# Patient Record
Sex: FEMALE | Race: WHITE | Employment: OTHER | URBAN - METROPOLITAN AREA
[De-identification: names, ages, dates, MRNs, and addresses within clinical notes are randomized per-mention and may not be internally consistent; named-entity substitution may affect disease eponyms.]

---

## 2017-02-07 ENCOUNTER — PATIENT OUTREACH (OUTPATIENT)
Dept: CASE MANAGEMENT | Age: 67
End: 2017-02-07

## 2017-02-07 ENCOUNTER — HOSPITAL ENCOUNTER (INPATIENT)
Age: 67
LOS: 1 days | Discharge: HOME | DRG: 682 | End: 2017-02-09
Attending: EMERGENCY MEDICINE | Admitting: INTERNAL MEDICINE
Payer: MEDICARE

## 2017-02-07 ENCOUNTER — APPOINTMENT (OUTPATIENT)
Dept: GENERAL RADIOLOGY | Age: 67
DRG: 682 | End: 2017-02-07
Attending: EMERGENCY MEDICINE
Payer: MEDICARE

## 2017-02-07 ENCOUNTER — APPOINTMENT (OUTPATIENT)
Dept: CT IMAGING | Age: 67
DRG: 682 | End: 2017-02-07
Attending: EMERGENCY MEDICINE
Payer: MEDICARE

## 2017-02-07 DIAGNOSIS — G93.40 ACUTE ENCEPHALOPATHY: Primary | ICD-10-CM

## 2017-02-07 LAB
BASOPHILS # BLD AUTO: 0 K/UL (ref 0–0.2)
BASOPHILS # BLD: 0 % (ref 0–2)
DIFFERENTIAL METHOD BLD: ABNORMAL
EOSINOPHIL # BLD: 0 K/UL (ref 0–0.8)
EOSINOPHIL NFR BLD: 0 % (ref 0.5–7.8)
ERYTHROCYTE [DISTWIDTH] IN BLOOD BY AUTOMATED COUNT: 15.5 % (ref 11.9–14.6)
HCT VFR BLD AUTO: 35 % (ref 35.8–46.3)
HGB BLD-MCNC: 10.8 G/DL (ref 11.7–15.4)
IMM GRANULOCYTES # BLD: 0 K/UL (ref 0–0.5)
IMM GRANULOCYTES NFR BLD AUTO: 0.2 % (ref 0–5)
INR PPP: 1.1 (ref 0.9–1.2)
LACTATE BLD-SCNC: 1.8 MMOL/L (ref 0.5–1.9)
LYMPHOCYTES # BLD AUTO: 28 % (ref 13–44)
LYMPHOCYTES # BLD: 3 K/UL (ref 0.5–4.6)
MCH RBC QN AUTO: 25.7 PG (ref 26.1–32.9)
MCHC RBC AUTO-ENTMCNC: 30.9 G/DL (ref 31.4–35)
MCV RBC AUTO: 83.1 FL (ref 79.6–97.8)
MONOCYTES # BLD: 0.4 K/UL (ref 0.1–1.3)
MONOCYTES NFR BLD AUTO: 4 % (ref 4–12)
NEUTS SEG # BLD: 7.1 K/UL (ref 1.7–8.2)
NEUTS SEG NFR BLD AUTO: 68 % (ref 43–78)
PLATELET # BLD AUTO: 333 K/UL (ref 150–450)
PMV BLD AUTO: 9.4 FL (ref 10.8–14.1)
PROTHROMBIN TIME: 11.7 SEC (ref 9.6–12)
RBC # BLD AUTO: 4.21 M/UL (ref 4.05–5.25)
WBC # BLD AUTO: 10.6 K/UL (ref 4.3–11.1)

## 2017-02-07 PROCEDURE — 80053 COMPREHEN METABOLIC PANEL: CPT | Performed by: EMERGENCY MEDICINE

## 2017-02-07 PROCEDURE — 96361 HYDRATE IV INFUSION ADD-ON: CPT | Performed by: EMERGENCY MEDICINE

## 2017-02-07 PROCEDURE — 84484 ASSAY OF TROPONIN QUANT: CPT | Performed by: EMERGENCY MEDICINE

## 2017-02-07 PROCEDURE — 71020 XR CHEST PA LAT: CPT

## 2017-02-07 PROCEDURE — 80307 DRUG TEST PRSMV CHEM ANLYZR: CPT | Performed by: EMERGENCY MEDICINE

## 2017-02-07 PROCEDURE — 83605 ASSAY OF LACTIC ACID: CPT

## 2017-02-07 PROCEDURE — 99285 EMERGENCY DEPT VISIT HI MDM: CPT | Performed by: EMERGENCY MEDICINE

## 2017-02-07 PROCEDURE — 96374 THER/PROPH/DIAG INJ IV PUSH: CPT | Performed by: EMERGENCY MEDICINE

## 2017-02-07 PROCEDURE — 85025 COMPLETE CBC W/AUTO DIFF WBC: CPT | Performed by: EMERGENCY MEDICINE

## 2017-02-07 PROCEDURE — 85610 PROTHROMBIN TIME: CPT | Performed by: EMERGENCY MEDICINE

## 2017-02-07 PROCEDURE — 70450 CT HEAD/BRAIN W/O DYE: CPT

## 2017-02-07 RX ORDER — IPRATROPIUM BROMIDE 21 UG/1
2 SPRAY, METERED NASAL
COMMUNITY
End: 2017-02-16

## 2017-02-07 RX ORDER — LORAZEPAM 1 MG/1
1 TABLET ORAL
Status: ON HOLD | COMMUNITY
End: 2017-02-08

## 2017-02-07 RX ORDER — ACYCLOVIR 200 MG/1
200 CAPSULE ORAL 2 TIMES DAILY
COMMUNITY
End: 2017-02-09

## 2017-02-07 RX ORDER — LISINOPRIL 10 MG/1
10 TABLET ORAL
COMMUNITY
End: 2017-02-09

## 2017-02-07 RX ORDER — ZINC GLUCONATE 50 MG
500 TABLET ORAL
COMMUNITY

## 2017-02-07 RX ORDER — FISH OIL/DHA/EPA 1200-144MG
CAPSULE ORAL
COMMUNITY
End: 2017-02-09

## 2017-02-07 RX ORDER — PREGABALIN 75 MG/1
75 CAPSULE ORAL 2 TIMES DAILY
Status: ON HOLD | COMMUNITY
End: 2017-02-09

## 2017-02-07 NOTE — IP AVS SNAPSHOT
[facility] [place] 
 
 
 [address] 
[phone] Patient: Yaritza Montanez MRN: [id number] You are allergic to the following Allergen Reactions Latex Contact Dermatitis Bactrim (Sulfamethoxazole-Trimethoprim) Nausea Only Lamictal (Lamotrigine) Atopic Dermatitis Pcn (Penicillins) Rash Tapentadol Rash Recent Documentation Height Weight BMI OB Status Smoking Status 1.6 m 90.7 kg 35.43 kg/m2 Hysterectomy Never Smoker Unresulted Labs Order Current Status CULTURE, BLOOD Preliminary result CULTURE, BLOOD Preliminary result CULTURE, URINE Preliminary result Emergency Contacts Name Discharge Info Relation Home Work Mobile Alee Loja  Daughter [49] [phone] hSazia Prieto  Spouse [3] [phone] About your hospitalization You were admitted on:  February 8, 2017 You last received care in the:  [facility] 6 MED SURG You were discharged on:  February 9, 2017 Unit phone number:  [phone] Why you were hospitalized Your primary diagnosis was:  Jer[place] (Acute Kidney Injury) (Hcc) Your diagnoses also included:  Speech Abnormality, Hypothyroidism, Hypertension, Polypharmacy, Hyperkalemia Providers Seen During Your Hospitalizations Provider Role Specialty Primary office phone Jose David Davalos MD Attending Provider Emergency Medicine [phone] Yoav Araujo DO Attending Provider Internal Medicine [phone] Makeda Becerra MD Attending Provider [facility] [phone] Georges Marcelino MD Attending Provider Internal Medicine [phone] Your Primary Care Physician (PCP) Primary Care Physician Office Phone Office Fax Deyvi Anand [phone] Follow-up Information Follow up With Details Comments Contact Info Sukhjinder Mohr, DO On 2/17/2017 at 10:45 [address] [facility] [address] 
[phone] Your Appointments Thursday February 16, 2017 11:00 AM EST  
(Arrive by 9:45 AM) [facility] with [facility]) [address] [facility]  
[phone] Current Discharge Medication List  
  
CONTINUE these medications which have CHANGED Dose & Instructions Dispensing Information Comments Morning Noon Evening Bedtime ALPRAZolam 2 mg tablet Commonly known as:  Balbir Pena What changed:  when to take this Dose:  2 mg Take 1 Tab by mouth three (3) times daily as needed for Anxiety. Max Daily Amount: 6 mg. Quantity:  30 Tab Refills:  0  
     
   
   
   
  
 amphetamine-dextroamphetamine XR 20 mg XR capsule Commonly known as:  ADDERALL XR What changed:  additional instructions Your next dose is:  Tomorrow Dose:  20 mg Take 1 Cap (20 mg total) by mouth every morning. Max Daily Amount: 20 mg  
 Quantity:  30 Cap Refills:  0  
     
   
   
   
  
 lisinopril 40 mg tablet Commonly known as:  Ryder Parham What changed:  Another medication with the same name was removed. Continue taking this medication, and follow the directions you see here. Your next dose is:  Tomorrow Dose:  40 mg Take 1 Tab by mouth daily. Quantity:  30 Tab Refills:  5  
     
   
   
   
  
 pregabalin 75 mg capsule Commonly known as:  Maxim Fraire What changed:  when to take this Your next dose is:  Tomorrow Dose:  75 mg Take 1 Cap by mouth daily for 30 days. Max Daily Amount: 75 mg. Quantity:  30 Cap Refills:  0 CONTINUE these medications which have NOT CHANGED Dose & Instructions Dispensing Information Comments Morning Noon Evening Bedtime * albuterol 90 mcg/actuation inhaler Commonly known as:  PROVENTIL HFA, VENTOLIN HFA, PROAIR HFA Dose:  2 Puff Take 2 Puffs by inhalation every four (4) hours as needed. Refills:  0  
     
   
   
   
  
 * albuterol sulfate 2.5 mg/0.5 mL Nebu nebulizer solution Commonly known as:  PROVENTIL;VENTOLIN Dose:  2.5 mg  
0.5 mL by Nebulization route four (4) times daily. Quantity:  120 mL Refills:  11 DX: 799.02 hypoxemia, 518.83 chronic respitory failure  
    
   
   
   
  
 biotin 10,000 mcg Cap Your next dose is:  Tomorrow Dose:  5000 mcg Take 5,000 mcg by mouth daily. Refills:  0  
     
   
   
   
  
 CARAFATE 1 gram tablet Generic drug:  sucralfate Your next dose is: Today Dose:  1 g Take 1 g by mouth two (2) times a day. Refills:  0 CENTRUM SILVER Tab tablet Generic drug:  multivitamins-minerals-lutein Your next dose is:  Tomorrow Dose:  1 Tab Take 1 Tab by mouth daily. Refills:  0  
     
   
   
   
  
 COQ10 (UBIQUINOL) PO Your next dose is: Today Dose:  200 mg Take 200 mg by mouth two (2) times a day. 200 mg 1 tab po qd Refills:  0 DULERA 200-5 mcg/actuation HFA inhaler Generic drug:  mometasone-formoterol Your next dose is: Today USE 2 INHALATIONS TWICE A DAY Quantity:  1 Inhaler Refills:  11  
     
   
   
  
   
  
 folic acid 328 mcg tablet Your next dose is:  Tomorrow Dose:  800 mcg Take 800 mcg by mouth daily. Refills:  0 GENTLE LAXATIVE PO Your next dose is:  Tomorrow Dose:  2 Tab Take 2 Tabs by mouth daily. Refills:  0  
     
   
   
   
  
 GEODON 80 mg capsule Generic drug:  ziprasidone Your next dose is: Today Dose:  160 mg Take 160 mg by mouth nightly. Refills:  0 IMITREX PO Dose:  100 mg  
take 100 mg by mouth as needed. Refills:  0 ipratropium 0.03 % nasal spray Commonly known as:  ATROVENT Dose:  2 Spray 2 Sprays by Both Nostrils route. Refills:  0 LIPITOR 10 mg tablet Generic drug:  atorvastatin Your next dose is:  Tomorrow Take  by mouth daily. Refills:  0 Magnesium Oxide 500 mg Cap Your next dose is:  Tomorrow Dose:  500 mg Take 500 mg by mouth. Refills:  0  
     
   
   
   
  
 mirtazapine 30 mg tablet Commonly known as:  Consuella Cullens Dose:  30 mg Take 30 mg by mouth nightly. Refills:  0 MOVANTIK 25 mg Tab tablet Generic drug:  naloxegol Your next dose is:  Tomorrow Dose:  25 mg Take 25 mg by mouth Daily (before breakfast). Refills:  0 NEBULIZER  
   
 by Does Not Apply route. Refills:  0  
     
   
   
   
  
 nicotinic acid 500 mg tablet Commonly known as:  NIACIN Your next dose is:  Tomorrow Dose:  500 mg Take 500 mg by mouth Daily (before breakfast). Refills:  0 NORCO  mg tablet Generic drug:  HYDROcodone-acetaminophen Dose:  1 Tab Take 1 Tab by mouth every six (6) hours as needed for Pain. Refills:  0  
     
   
   
   
  
 oxybutynin chloride XL 10 mg CR tablet Commonly known as:  DITROPAN XL Your next dose is:  Tomorrow Dose:  10 mg Take 10 mg by mouth daily. Refills:  0  
     
   
   
   
  
 pantoprazole 40 mg tablet Commonly known as:  PROTONIX Your next dose is: Today Dose:  40 mg Take 1 Tab by mouth Before breakfast and dinner. Quantity:  60 Tab Refills:  2 POTASSIUM CHLORIDE SR 10 MEQ TAB Your next dose is:  Tomorrow  
   
 daily. Refills:  0  
     
   
   
   
  
 selenium 200 mcg Cap Your next dose is:  Tomorrow Take  by mouth daily. Refills:  0 SUPER B COMPLEX PO Dose:  1 Tab Take 1 Tab by mouth daily. Refills:  0  
     
   
   
   
  
 SYNTHROID 125 mcg tablet Generic drug:  levothyroxine Your next dose is:  Tomorrow Take  by mouth every evening. Refills:  0  
     
   
   
   
  
 verapamil  mg ER capsule Commonly known as:  Sonny Katos Your next dose is: Today Dose:  120 mg Take 120 mg by mouth two (2) times a day. Refills:  0  
     
   
   
  
   
  
 VITAMIN D3 1,000 unit Cap Generic drug:  cholecalciferol Take  by mouth. Refills:  0 VOLTAREN 1 % Gel Generic drug:  diclofenac Your next dose is: Today Dose:  4 g Apply 4 g to affected area four (4) times daily. Refills:  0 ZOFRAN ODT 8 mg disintegrating tablet Generic drug:  ondansetron Dose:  8 mg Take 8 mg by mouth every eight (8) hours as needed for Nausea. Refills:  0  
     
   
   
   
  
 * Notice: This list has 2 medication(s) that are the same as other medications prescribed for you. Read the directions carefully, and ask your doctor or other care provider to review them with you. STOP taking these medications   
 acyclovir 200 mg capsule Commonly known as:  ZOVIRAX ADVAIR DISKUS 250-50 mcg/dose diskus inhaler Generic drug:  fluticasone-salmeterol AVAPRO 300 mg tablet Generic drug:  irbesartan  
   
  
 cyanocobalamin 500 mcg tablet Commonly known as:  VITAMIN B12  
   
  
 CYMBALTA 30 mg capsule Generic drug:  DULoxetine ELIQUIS PO  
   
  
 ESTRACE 0.01 % (0.1 mg/gram) vaginal cream  
Generic drug:  estradiol  
   
  
 fish oil-dha-epa 1,200-144-216 mg Cap FLEXERIL 10 mg tablet Generic drug:  cyclobenzaprine GERITOL COMPLETE 16 mg iron- 0.38 mg Tab Generic drug:  mv, min #36-iron,carbonyl-FA  
   
  
 GERITOL PO Intuniv 4 mg Tb24 Generic drug:  guanFACINE  
   
  
 LORazepam 1 mg tablet Commonly known as:  ATIVAN  
   
  
 meclizine 25 mg tablet Commonly known as:  ANTIVERT  
   
  
 MUCINEX 600 mg SR tablet Generic drug:  guaiFENesin SR  
   
  
 REGLAN 5 mg tablet Generic drug:  metoclopramide HCl  
   
  
 ROZEREM 8 mg tablet Generic drug:  ramelteon SINGULAIR 10 mg tablet Generic drug:  montelukast  
   
  
 temazepam 15 mg capsule Commonly known as:  RESTORIL  
   
  
 TOPAMAX 50 mg tablet Generic drug:  topiramate  
   
  
 valACYclovir 500 mg tablet Commonly known as:  VALTREX  
   
  
 VITAMIN D2 50,000 unit capsule Generic drug:  ergocalciferol ZOFRAN PO Where to Get Your Medications Information on where to get these meds will be given to you by the nurse or doctor. ! Ask your nurse or doctor about these medications ALPRAZolam 2 mg tablet  
 amphetamine-dextroamphetamine XR 20 mg XR capsule  
 pregabalin 75 mg capsule Discharge Instructions DISCHARGE SUMMARY from Nurse The following personal items are in your possession at time of discharge: 
 
Dental Appliances: At bedside, Lowers, Uppers Visual Aid: At home, Glasses Home Medications: None Jewelry: Yaz Human Clothing: Pants, Shirt, Shorts, Socks, Undergarments Other Valuables: None PATIENT INSTRUCTIONS: 
 
 
F-face looks uneven A-arms unable to move or move unevenly S-speech slurred or non-existent T-time-call 911 as soon as signs and symptoms begin-DO NOT go Back to bed or wait to see if you get better-TIME IS BRAIN. Warning Signs of HEART ATTACK Call 911 if you have these symptoms: ? Chest discomfort. Most heart attacks involve discomfort in the center of the chest that lasts more than a few minutes, or that goes away and comes back. It can feel like uncomfortable pressure, squeezing, fullness, or pain. ? Discomfort in other areas of the upper body. Symptoms can include pain or discomfort in one or both arms, the back, neck, jaw, or stomach. ? Shortness of breath with or without chest discomfort. ? Other signs may include breaking out in a cold sweat, nausea, or lightheadedness. Don't wait more than five minutes to call [address]! Fast action can save your life. Calling 911 is almost always the fastest way to get lifesaving treatment. Emergency Medical Services staff can begin treatment when they arrive  up to an hour sooner than if someone gets to the hospital by car. The discharge information has been reviewed with the patient. The patient verbalized understanding. Discharge medications reviewed with the patient and appropriate educational materials and side effects teaching were provided. Stroke: After Your Visit Your Care Instructions You have had a stroke. Risk factors for stroke include being overweight, smoking, and sedentary lifestyle. This means that the blood flow to a part of your brain was blocked for some time, which damages the nerve cells in that part of the brain. The part of your body controlled by that part of your brain may not function properly now. The brain is an amazing organ that can heal itself to some degree. The stroke you had damaged part of your brain, but other parts of your brain may take over in some way for the damaged areas. You have already started this process. Going home may be hard for you and your family. The more you can try to do for yourself, the better. Remember to take each day one at a time. Follow-up care is a key part of your treatment and safety.  Be sure to make and go to all appointments, and call your doctor if you are having problems. Its also a good idea to know your test results and keep a list of the medicines you take. How can you care for yourself at home? Enter a stroke rehabilitation (rehab) program, if your doctor recommends it. Physical, speech, and occupational therapies can help you manage bathing, dressing, eating, and other basics of daily living. Eat a heart-healthy diet that is low in cholesterol, saturated fat, and salt. Eat lots of fresh fruits and vegetables and foods high in fiber. Increase your activities slowly. Take short rest breaks when you get tired. Gradually increase the amount you walk. Start out by walking a little more than you did the day before. Do not drive until your doctor says it is okay. It is normal to feel sad or depressed after a stroke. If the blues last, talk to your doctor. If you are having problems with urine leakage, go to the bathroom at regular times, including when you first wake up and at bedtime. Also, limit fluids after dinner. If you are constipated, drink plenty of fluids, enough so that your urine is light yellow or clear like water. If you have kidney, heart, or liver disease and have to limit fluids, talk with your doctor before you increase the amount of fluids you drink. Set up a regular time for using the toilet. If you continue to have constipation, your doctor may suggest using a bulking agent, such as Metamucil, or a stool softener, laxative, or enema. Medicines Take your medicines exactly as prescribed. Call your doctor if you think you are having a problem with your medicine. You may be taking several medicines. ACE (angiotensin-converting enzyme) inhibitors, angiotensin II receptor blockers (ARBs), beta-blockers, diuretics (water pills), and calcium channel blockers control your blood pressure. Statins help lower cholesterol.  Your doctor may also prescribe medicines for depression, pain, sleep problems, anxiety, or agitation. If your doctor has given you medicine that prevents blood clots, such as warfarin (Coumadin), aspirin combined with extended-release dipyridamole (Aggrenox), clopidogrel (Plavix), or aspirin to prevent another stroke, you should: 
Tell your dentist, pharmacist, and other health professionals that you take these medicines. Watch for unusual bruising or bleeding, such as blood in your urine, red or black stools, or bleeding from your nose or gums. Get regular blood tests to check your clotting time if you are taking Coumadin. Wear medical alert jewelry that says you take blood thinners. You can buy this at most [facility]iqu[facility]. Do not take any over-the-counter medicines or herbal products without talking to your doctor first. 
If you take birth control pills or hormone replacement therapy, talk to your doctor about whether they are right for you. For family members and caregivers Make the home safe. Set up a room so that your loved one does not have to climb stairs. Be sure the bathroom is on the same floor. Move throw rugs and furniture that could cause falls, and make sure that the lighting is good. Put grab bars and seats in tubs and showers. Find out what your loved one can do and what he or she needs help with. Try not to do things for your loved one that your loved one can do on his or her own. Help him or her learn and practice new skills. Visit and talk with your loved one often. Try doing activities together that you both enjoy, such as playing cards or board games. Keep in touch with your loved one's friends as much as you can, and encourage them to visit. Take care of yourself. Do not try to do everything yourself. Ask other family members to help. Eat well, get enough rest, and take time to do things that you enjoy. Keep up with your own doctor visits, and make sure to take your medicines regularly. Get out of the house as much as you can. Join a local support group. Find out if you qualify for home health care visits to help with rehab or for adult day care. When should you call for help? Call 911 anytime you think you may need emergency care. For example, call if: 
You have signs of another stroke. These may include: 
Sudden numbness, paralysis, or weakness in your face, arm, or leg, especially on only one side of your body. New problems with walking or balance. Sudden vision changes. Drooling or slurred speech. New problems speaking or understanding simple statements, or you feel confused. A sudden, severe headache that is different from past headaches. Call 911 even if these symptoms go away in a few minutes. You cough up blood. You vomit blood or what looks like coffee grounds. You pass maroon or very bloody stools. Call your doctor now or seek immediate medical care if: 
You have new bruises or blood spots under your skin. You have a nosebleed. Your gums bleed when you brush your teeth. You have blood in your urine. Your stools are black and tarlike or have streaks of blood. You have vaginal bleeding when you are not having your period, or heavy period bleeding. You have new symptoms that may be related to your stroke, such as falls or trouble swallowing. Watch closely for changes in your health, and be sure to contact your doctor if you have any problems. Where can you learn more? Go to Emprego Ligado.be Enter P592  in the search box to learn more about \"Stroke: After Your Visit\". © 3114-4198 Healthwise, Incorporated. Care instructions adapted under license by [employer] (which disclaims liability or warranty for this information).  This care instruction is for use with your licensed healthcare professional. If you have questions about a medical condition or this instruction, always ask your healthcare professional. Zelda Rojas disclaims any warranty or liability for your use of this information. Discharge Instructions Attachments/References RENAL FAILURE: ACUTE (ENGLISH) Discharge Orders None Introducing [facility] SERVICES! [employer] introduces [employer] patient portal. Now you can access parts of your medical record, email your doctor's office, and request medication refills online. 1. In your internet browser, go to [email or web address] 2. Click on the First Time User? Click Here link in the Sign In box. You will see the New Member Sign Up page. 3. Enter your [employer] Access Code exactly as it appears below. You will not need to use this code after youve completed the sign-up process. If you do not sign up before the expiration date, you must request a new code. · [employer] Access Code: [email or web address] Expires: 5/2/2017 11:52 AM 
 
4. Enter the last four digits of your Social Security Number (xxxx) and Date of Birth (mm/dd/yyyy) as indicated and click Submit. You will be taken to the next sign-up page. 5. Create a [employer] ID. This will be your [employer] login ID and cannot be changed, so think of one that is secure and easy to remember. 6. Create a [employer] password. You can change your password at any time. 7. Enter your Password Reset Question and Answer. This can be used at a later time if you forget your password. 8. Enter your e-mail address. You will receive e-mail notification when new information is available in [address]. 9. Click Sign Up. You can now view and download portions of your medical record. 10. Click the Download Summary menu link to download a portable copy of your medical information. If you have questions, please visit the Frequently Asked Questions section of the [employer] website. Remember, [employer] is NOT to be used for urgent needs. For medical emergencies, dial 911. Now available from your iPhone and Android! General Information Please provide this summary of care documentation to your next provider. Patient Signature:  ____________________________________________________________ Date:  ____________________________________________________________  
  
[facility] Signature:  ____________________________________________________________ Date:  ____________________________________________________________ More Information Acute Kidney Injury: Care Instructions Your Care Instructions Acute kidney injury is the sudden loss of kidney function that happens when the kidneys stop working over a period of hours, days or, in some cases, weeks. It is also known as acute renal failure. Common causes of acute kidney injury are dehydration, blood loss, and medicines. When acute kidney injury happens, the kidneys cannot remove waste and excess fluids from the body. The waste and fluids build up and become harmful. Kidney function may return to normal if the cause of acute kidney injury is treated quickly. Your chance of a full recovery depends on what caused the problem, how quickly the cause was treated, and what other medical problems you have. A machine may be used to help your kidneys remove waste and fluids for a short period of time. This is called dialysis. Follow-up care is a key part of your treatment and safety. Be sure to make and go to all appointments, and call your doctor if you are having problems. It's also a good idea to know your test results and keep a list of the medicines you take. How can you care for yourself at home? · Talk to your doctor about how much fluid you should drink. · Eat a balanced diet. Talk to your doctor or a dietitian about what type of diet may be best for you. · Follow the instructions and schedule for dialysis that your doctor gives you. · Do not smoke. Smoking can make your condition worse.  If you need help quitting, talk to your doctor about stop-smoking programs and medicines. These can increase your chances of quitting for good. · Do not drink alcohol. · Review all of your medicines with your doctor. Do not take any medicines, including nonsteroidal anti-inflammatory drugs (NSAIDs) such as ibuprofen (Advil, Motrin) or naproxen (Aleve), unless your doctor says it is safe for you to do so. · Make sure that anyone treating you for any health problem knows that you have had acute kidney injury. When should you call for help? Call 911 anytime you think you may need emergency care. For example, call if: 
· You passed out (lost consciousness). · You have severe trouble breathing. Call your doctor now or seek immediate medical care if: 
· You have less urine than normal or no urine. · You have trouble urinating or can urinate only very small amounts. · You are confused or have trouble thinking clearly. · You feel weaker or more tired than usual. 
· You are very thirsty, lightheaded, or dizzy. · You have nausea and vomiting. · You have new swelling of your arms or feet, or your swelling is worse. · You have blood in your urine. · Your body weight goes up every day. · You have new or worse trouble breathing. Watch closely for changes in your health, and be sure to contact your doctor if: 
· You do not get better as expected. Where can you learn more? Go to http://era-fransisco.info/. Enter Y875 in the search box to learn more about \"Acute Kidney Injury: Care Instructions. \" Current as of: November 20, 2015 Content Version: 11.1 © 7068-0426 [employer]. Care instructions adapted under license by [employer] (which disclaims liability or warranty for this information).  If you have questions about a medical condition or this instruction, always ask your healthcare professional. [address] any warranty or liability for your use of this information.

## 2017-02-08 PROBLEM — R47.9 SPEECH ABNORMALITY: Status: ACTIVE | Noted: 2017-02-08

## 2017-02-08 PROBLEM — N17.9 AKI (ACUTE KIDNEY INJURY) (HCC): Status: ACTIVE | Noted: 2017-02-08

## 2017-02-08 PROBLEM — E87.5 HYPERKALEMIA: Status: ACTIVE | Noted: 2017-02-08

## 2017-02-08 PROBLEM — Z79.899 POLYPHARMACY: Status: ACTIVE | Noted: 2017-02-08

## 2017-02-08 LAB
ALBUMIN SERPL BCP-MCNC: 3.3 G/DL (ref 3.2–4.6)
ALBUMIN/GLOB SERPL: 1 {RATIO} (ref 1.2–3.5)
ALP SERPL-CCNC: 96 U/L (ref 50–136)
ALT SERPL-CCNC: 19 U/L (ref 12–65)
AMMONIA PLAS-SCNC: 37 UMOL/L (ref 11–32)
AMPHET UR QL SCN: POSITIVE
ANION GAP BLD CALC-SCNC: 11 MMOL/L (ref 7–16)
ANION GAP BLD CALC-SCNC: 9 MMOL/L (ref 7–16)
APPEARANCE UR: CLEAR
AST SERPL W P-5'-P-CCNC: 34 U/L (ref 15–37)
ATRIAL RATE: 68 BPM
BARBITURATES UR QL SCN: NEGATIVE
BENZODIAZ UR QL: POSITIVE
BILIRUB SERPL-MCNC: 0.3 MG/DL (ref 0.2–1.1)
BILIRUB UR QL: NEGATIVE
BUN SERPL-MCNC: 17 MG/DL (ref 8–23)
BUN SERPL-MCNC: 19 MG/DL (ref 8–23)
CALCIUM SERPL-MCNC: 8.4 MG/DL (ref 8.3–10.4)
CALCIUM SERPL-MCNC: 9 MG/DL (ref 8.3–10.4)
CALCULATED P AXIS, ECG09: 27 DEGREES
CALCULATED R AXIS, ECG10: -20 DEGREES
CALCULATED T AXIS, ECG11: 37 DEGREES
CANNABINOIDS UR QL SCN: NEGATIVE
CHLORIDE SERPL-SCNC: 111 MMOL/L (ref 98–107)
CHLORIDE SERPL-SCNC: 112 MMOL/L (ref 98–107)
CO2 SERPL-SCNC: 23 MMOL/L (ref 21–32)
CO2 SERPL-SCNC: 24 MMOL/L (ref 21–32)
COCAINE UR QL SCN: NEGATIVE
COLOR UR: YELLOW
CREAT SERPL-MCNC: 1.05 MG/DL (ref 0.6–1)
CREAT SERPL-MCNC: 1.76 MG/DL (ref 0.6–1)
DIAGNOSIS, 93000: NORMAL
DIASTOLIC BP, ECG02: NORMAL MMHG
ERYTHROCYTE [DISTWIDTH] IN BLOOD BY AUTOMATED COUNT: 15.6 % (ref 11.9–14.6)
ETHANOL SERPL-MCNC: <3 MG/DL
GLOBULIN SER CALC-MCNC: 3.3 G/DL (ref 2.3–3.5)
GLUCOSE SERPL-MCNC: 111 MG/DL (ref 65–100)
GLUCOSE SERPL-MCNC: 89 MG/DL (ref 65–100)
GLUCOSE UR STRIP.AUTO-MCNC: NEGATIVE MG/DL
HCT VFR BLD AUTO: 32.3 % (ref 35.8–46.3)
HGB BLD-MCNC: 9.9 G/DL (ref 11.7–15.4)
HGB UR QL STRIP: NEGATIVE
KETONES UR QL STRIP.AUTO: NEGATIVE MG/DL
LACTATE SERPL-SCNC: 1 MMOL/L (ref 0.4–2)
LEUKOCYTE ESTERASE UR QL STRIP.AUTO: NEGATIVE
MAGNESIUM SERPL-MCNC: 2.1 MG/DL (ref 1.8–2.4)
MCH RBC QN AUTO: 25.6 PG (ref 26.1–32.9)
MCHC RBC AUTO-ENTMCNC: 30.7 G/DL (ref 31.4–35)
MCV RBC AUTO: 83.5 FL (ref 79.6–97.8)
METHADONE UR QL: NEGATIVE
NITRITE UR QL STRIP.AUTO: NEGATIVE
OPIATES UR QL: POSITIVE
P-R INTERVAL, ECG05: 210 MS
PCP UR QL: NEGATIVE
PH UR STRIP: 5 [PH] (ref 5–9)
PLATELET # BLD AUTO: 285 K/UL (ref 150–450)
PMV BLD AUTO: 9.6 FL (ref 10.8–14.1)
POTASSIUM SERPL-SCNC: 4 MMOL/L (ref 3.5–5.1)
POTASSIUM SERPL-SCNC: 5.5 MMOL/L (ref 3.5–5.1)
PROT SERPL-MCNC: 6.6 G/DL (ref 6.3–8.2)
PROT UR STRIP-MCNC: NEGATIVE MG/DL
Q-T INTERVAL, ECG07: 382 MS
QRS DURATION, ECG06: 80 MS
QTC CALCULATION (BEZET), ECG08: 406 MS
RBC # BLD AUTO: 3.87 M/UL (ref 4.05–5.25)
SODIUM SERPL-SCNC: 144 MMOL/L (ref 136–145)
SODIUM SERPL-SCNC: 146 MMOL/L (ref 136–145)
SP GR UR REFRACTOMETRY: 1.02 (ref 1–1.02)
SYSTOLIC BP, ECG01: NORMAL MMHG
TROPONIN I SERPL-MCNC: <0.02 NG/ML (ref 0.02–0.05)
UROBILINOGEN UR QL STRIP.AUTO: 1 EU/DL (ref 0.2–1)
VENTRICULAR RATE, ECG03: 68 BPM
WBC # BLD AUTO: 8.6 K/UL (ref 4.3–11.1)

## 2017-02-08 PROCEDURE — 87086 URINE CULTURE/COLONY COUNT: CPT | Performed by: HOSPITALIST

## 2017-02-08 PROCEDURE — 80307 DRUG TEST PRSMV CHEM ANLYZR: CPT | Performed by: EMERGENCY MEDICINE

## 2017-02-08 PROCEDURE — 36415 COLL VENOUS BLD VENIPUNCTURE: CPT | Performed by: HOSPITALIST

## 2017-02-08 PROCEDURE — 74011250637 HC RX REV CODE- 250/637: Performed by: INTERNAL MEDICINE

## 2017-02-08 PROCEDURE — 94640 AIRWAY INHALATION TREATMENT: CPT

## 2017-02-08 PROCEDURE — 65660000000 HC RM CCU STEPDOWN

## 2017-02-08 PROCEDURE — 80048 BASIC METABOLIC PNL TOTAL CA: CPT | Performed by: HOSPITALIST

## 2017-02-08 PROCEDURE — 74011250637 HC RX REV CODE- 250/637: Performed by: HOSPITALIST

## 2017-02-08 PROCEDURE — 94760 N-INVAS EAR/PLS OXIMETRY 1: CPT

## 2017-02-08 PROCEDURE — 97162 PT EVAL MOD COMPLEX 30 MIN: CPT

## 2017-02-08 PROCEDURE — 93005 ELECTROCARDIOGRAM TRACING: CPT | Performed by: EMERGENCY MEDICINE

## 2017-02-08 PROCEDURE — 83605 ASSAY OF LACTIC ACID: CPT | Performed by: HOSPITALIST

## 2017-02-08 PROCEDURE — 83735 ASSAY OF MAGNESIUM: CPT | Performed by: HOSPITALIST

## 2017-02-08 PROCEDURE — 97165 OT EVAL LOW COMPLEX 30 MIN: CPT

## 2017-02-08 PROCEDURE — 82140 ASSAY OF AMMONIA: CPT | Performed by: EMERGENCY MEDICINE

## 2017-02-08 PROCEDURE — 74011250636 HC RX REV CODE- 250/636: Performed by: INTERNAL MEDICINE

## 2017-02-08 PROCEDURE — 74011000250 HC RX REV CODE- 250: Performed by: INTERNAL MEDICINE

## 2017-02-08 PROCEDURE — 85027 COMPLETE CBC AUTOMATED: CPT | Performed by: HOSPITALIST

## 2017-02-08 PROCEDURE — 74011250636 HC RX REV CODE- 250/636: Performed by: EMERGENCY MEDICINE

## 2017-02-08 PROCEDURE — 65270000029 HC RM PRIVATE

## 2017-02-08 PROCEDURE — 81003 URINALYSIS AUTO W/O SCOPE: CPT | Performed by: HOSPITALIST

## 2017-02-08 PROCEDURE — 92610 EVALUATE SWALLOWING FUNCTION: CPT

## 2017-02-08 PROCEDURE — 87040 BLOOD CULTURE FOR BACTERIA: CPT | Performed by: HOSPITALIST

## 2017-02-08 RX ORDER — SODIUM CHLORIDE 9 MG/ML
75 INJECTION, SOLUTION INTRAVENOUS CONTINUOUS
Status: DISPENSED | OUTPATIENT
Start: 2017-02-08 | End: 2017-02-09

## 2017-02-08 RX ORDER — LISINOPRIL 20 MG/1
40 TABLET ORAL DAILY
Status: DISCONTINUED | OUTPATIENT
Start: 2017-02-09 | End: 2017-02-09 | Stop reason: HOSPADM

## 2017-02-08 RX ORDER — ALBUTEROL SULFATE 2.5 MG/.5ML
2.5 SOLUTION RESPIRATORY (INHALATION)
Status: DISCONTINUED | OUTPATIENT
Start: 2017-02-08 | End: 2017-02-09 | Stop reason: HOSPADM

## 2017-02-08 RX ORDER — SODIUM CHLORIDE 0.9 % (FLUSH) 0.9 %
5-10 SYRINGE (ML) INJECTION EVERY 8 HOURS
Status: DISCONTINUED | OUTPATIENT
Start: 2017-02-08 | End: 2017-02-09 | Stop reason: HOSPADM

## 2017-02-08 RX ORDER — OXYBUTYNIN CHLORIDE 10 MG/1
10 TABLET, EXTENDED RELEASE ORAL DAILY
COMMUNITY

## 2017-02-08 RX ORDER — SODIUM CHLORIDE 0.9 % (FLUSH) 0.9 %
5-10 SYRINGE (ML) INJECTION AS NEEDED
Status: DISCONTINUED | OUTPATIENT
Start: 2017-02-08 | End: 2017-02-09 | Stop reason: HOSPADM

## 2017-02-08 RX ORDER — ONDANSETRON 2 MG/ML
4 INJECTION INTRAMUSCULAR; INTRAVENOUS
Status: DISCONTINUED | OUTPATIENT
Start: 2017-02-08 | End: 2017-02-09 | Stop reason: HOSPADM

## 2017-02-08 RX ORDER — MIRTAZAPINE 30 MG/1
45 TABLET, FILM COATED ORAL
COMMUNITY
End: 2018-04-02

## 2017-02-08 RX ORDER — ZIPRASIDONE HYDROCHLORIDE 20 MG/1
160 CAPSULE ORAL
Status: DISCONTINUED | OUTPATIENT
Start: 2017-02-08 | End: 2017-02-09 | Stop reason: HOSPADM

## 2017-02-08 RX ORDER — PANTOPRAZOLE SODIUM 40 MG/1
40 TABLET, DELAYED RELEASE ORAL
Status: DISCONTINUED | OUTPATIENT
Start: 2017-02-08 | End: 2017-02-09 | Stop reason: HOSPADM

## 2017-02-08 RX ORDER — ONDANSETRON 8 MG/1
8 TABLET, ORALLY DISINTEGRATING ORAL
COMMUNITY

## 2017-02-08 RX ORDER — VERAPAMIL HYDROCHLORIDE 120 MG/1
120 TABLET, FILM COATED, EXTENDED RELEASE ORAL 2 TIMES DAILY
Status: DISCONTINUED | OUTPATIENT
Start: 2017-02-08 | End: 2017-02-09 | Stop reason: HOSPADM

## 2017-02-08 RX ORDER — ALPRAZOLAM 0.5 MG/1
2 TABLET ORAL
Status: DISCONTINUED | OUTPATIENT
Start: 2017-02-08 | End: 2017-02-09 | Stop reason: HOSPADM

## 2017-02-08 RX ORDER — HYDROCODONE BITARTRATE AND ACETAMINOPHEN 10; 325 MG/1; MG/1
1 TABLET ORAL
Status: DISCONTINUED | OUTPATIENT
Start: 2017-02-08 | End: 2017-02-09 | Stop reason: HOSPADM

## 2017-02-08 RX ORDER — BUDESONIDE 0.5 MG/2ML
500 INHALANT ORAL
Status: DISCONTINUED | OUTPATIENT
Start: 2017-02-08 | End: 2017-02-09 | Stop reason: HOSPADM

## 2017-02-08 RX ORDER — LORAZEPAM 2 MG/ML
1 INJECTION INTRAMUSCULAR
Status: COMPLETED | OUTPATIENT
Start: 2017-02-08 | End: 2017-02-08

## 2017-02-08 RX ORDER — ALBUTEROL SULFATE 2.5 MG/.5ML
2.5 SOLUTION RESPIRATORY (INHALATION) 4 TIMES DAILY
Status: DISCONTINUED | OUTPATIENT
Start: 2017-02-08 | End: 2017-02-08

## 2017-02-08 RX ORDER — MIRTAZAPINE 15 MG/1
30 TABLET, FILM COATED ORAL
Status: DISCONTINUED | OUTPATIENT
Start: 2017-02-08 | End: 2017-02-09 | Stop reason: HOSPADM

## 2017-02-08 RX ORDER — LEVOTHYROXINE SODIUM 125 UG/1
125 TABLET ORAL EVERY 24 HOURS
Status: DISCONTINUED | OUTPATIENT
Start: 2017-02-09 | End: 2017-02-09 | Stop reason: HOSPADM

## 2017-02-08 RX ORDER — MECLIZINE HYDROCHLORIDE 25 MG/1
25 TABLET ORAL
COMMUNITY
End: 2017-02-09

## 2017-02-08 RX ADMIN — SODIUM CHLORIDE 100 ML/HR: 900 INJECTION, SOLUTION INTRAVENOUS at 04:40

## 2017-02-08 RX ADMIN — ONDANSETRON 4 MG: 2 INJECTION INTRAMUSCULAR; INTRAVENOUS at 12:58

## 2017-02-08 RX ADMIN — BUDESONIDE 500 MCG: 0.5 INHALANT RESPIRATORY (INHALATION) at 20:08

## 2017-02-08 RX ADMIN — RIVAROXABAN 20 MG: 20 TABLET, FILM COATED ORAL at 08:02

## 2017-02-08 RX ADMIN — ALBUTEROL SULFATE 2.5 MG: 2.5 SOLUTION RESPIRATORY (INHALATION) at 20:09

## 2017-02-08 RX ADMIN — ALPRAZOLAM 2 MG: 0.5 TABLET ORAL at 22:08

## 2017-02-08 RX ADMIN — VERAPAMIL HYDROCHLORIDE 120 MG: 120 TABLET, FILM COATED, EXTENDED RELEASE ORAL at 17:17

## 2017-02-08 RX ADMIN — SODIUM CHLORIDE 1000 ML: 900 INJECTION, SOLUTION INTRAVENOUS at 01:00

## 2017-02-08 RX ADMIN — Medication 10 ML: at 22:03

## 2017-02-08 RX ADMIN — HYDROCODONE BITARTRATE AND ACETAMINOPHEN 1 TABLET: 325; 10 TABLET ORAL at 22:01

## 2017-02-08 RX ADMIN — ZIPRASIDONE HCL 160 MG: 20 CAPSULE ORAL at 22:01

## 2017-02-08 RX ADMIN — PANTOPRAZOLE SODIUM 40 MG: 40 TABLET, DELAYED RELEASE ORAL at 17:18

## 2017-02-08 RX ADMIN — Medication 10 ML: at 14:00

## 2017-02-08 RX ADMIN — Medication 10 ML: at 13:03

## 2017-02-08 RX ADMIN — LORAZEPAM 1 MG: 2 INJECTION INTRAMUSCULAR; INTRAVENOUS at 01:09

## 2017-02-08 RX ADMIN — MIRTAZAPINE 30 MG: 15 TABLET, FILM COATED ORAL at 22:03

## 2017-02-08 NOTE — PROGRESS NOTES
Pt oriented to room and nurse call system. Admission database complete. admission packet and medication side effect sheet given and explained to pt. Pt denies needs at this time.

## 2017-02-08 NOTE — PROGRESS NOTES
Problem: Dysphagia (Adult)  Goal: *Speech Goal: (INSERT TEXT)  SPEECH LANGUAGE PATHOLOGY: BEDSIDE SWALLOW NOTE: INITIAL ASSESSMENT AND DISCHARGE     NAME/AGE/GENDER: Sergio Wagner is a 73 y.o. female  DATE: 2/8/2017  PRIMARY DIAGNOSIS: CAMILA (acute kidney injury) ([facility] [address]       ICD-10: Treatment Diagnosis: dysphagia; unspecified R13.10  INTERDISCIPLINARY COLLABORATION: Registered Nurse  PRECAUTIONS/ALLERGIES: Latex; Bactrim [sulfamethoxazole-trimethoprim]; Lamictal [lamotrigine]; Pcn [penicillins]; and Tapentadol   ASSESSMENT:   Based on the objective data described below, Ms. Misha Padilla presents with a swallow within functional limits. Patient reports she had an episode of difficulty getting her words out. Speech is clear and patient is fully oriented; although, slower to respond at times. Question effects of medications. Pt currently asking the RN about her medications and requesting something for nausea. Patient reports she doesn't rest well despite medications due to bipolar. Patient tolerated thin liquids, puree, mixed, and regular textures with no signs/sx of aspiration. Oral motor exam is grossly within functional limits. CT negative; unable to have MRI. Patient with polypharmacy and history of narcotic abuse. Recommend continue regular textures/thin liquids. No further ST indicated at this time. Patient will benefit from skilled intervention to address the below impairments. ?????? ? ? This section established at most recent assessment??????????  PROBLEM LIST (Impairments causing functional limitations):  1. Transient speech issues  REHABILITATION POTENTIAL FOR STATED GOALS: N/A      PLAN OF CARE:   Patient will benefit from skilled intervention to address the following impairments.   RECOMMENDATIONS AND PLANNED INTERVENTIONS (Benefits and precautions of therapy have been discussed with the patient.):  · PO:  Regular  · Liquids:  regular thin  MEDICATIONS:  · With liquid  COMPENSATORY STRATEGIES/MODIFICATIONS INCLUDING:  · None  OTHER RECOMMENDATIONS (including follow up treatment recommendations):   n/a  RECOMMENDED DIET MODIFICATIONS DISCUSSED WITH:  · Nursing  · Patient  FREQUENCY/DURATION: Continue to follow patient 3x a week or until short term goals are met to address above goals. RECOMMENDED REHABILITATION/EQUIPMENT: (at time of discharge pending progress):   Rehab. SUBJECTIVE:   Cooperative. History of Present Injury/Illness: Ms. Anup Kwok  has a past medical history of Acute renal failure [facility]) (July, 2014); Bilateral pulmonary embolism [facility]) (September, 2012); Calculus of ureter (May, 2015); CAP (community acquired pneumonia) (October, 2012); CVA (cerebral infarction) (January, 2012); Gastric ulcer (July, 2014); HCAP (healthcare-associated pneumonia) (January, 2013); HCAP (healthcare-associated pneumonia) (February, 2013); Left leg DVT ([address] (1991); Psychiatric disorder; and PUD (peptic ulcer disease) (???). She also has no past medical history of Aneurysm ([address] Arrhythmia; Autoimmune disease ([address] CAD (coronary artery disease); Cancer ([address] Chronic pain; Coagulation disorder ([address] COPD; Diabetes ([address] Heart failure ([address] Liver disease; Seizures ([address] or Unspecified sleep apnea. She also  has a past surgical history that includes appendectomy; cholecystectomy; gastric bypass; tonsillectomy; other surgical; orthopaedic; hysterectomy; and endoscopy (July, 2014). Present Symptoms: n/a  Pain Intensity 1: 0  Current Medications:   No current facility-administered medications on file prior to encounter. Current Outpatient Prescriptions on File Prior to Encounter   Medication Sig Dispense Refill    APIXABAN (ELIQUIS PO) Take 2 Tabs by mouth daily. Indications: 5 mg        lisinopril (PRINIVIL, ZESTRIL) 40 mg tablet Take 1 Tab by mouth daily. 30 Tab 5    HYDROcodone-acetaminophen (NORCO)  mg tablet Take 1 Tab by mouth every six (6) hours as needed for Pain.         DULERA 200-5 mcg/actuation HFA inhaler USE 2 INHALATIONS TWICE A DAY 1 Inhaler 11    irbesartan (AVAPRO) 300 mg tablet Take 300 mg by mouth nightly.  folic acid 802 mcg tablet Take 800 mcg by mouth daily.  nicotinic acid (NIACIN) 500 mg tablet Take 500 mg by mouth Daily (before breakfast).  atorvastatin (LIPITOR) 10 mg tablet Take  by mouth daily.  pantoprazole (PROTONIX) 40 mg tablet Take 1 Tab by mouth Before breakfast and dinner. 60 Tab 2    ALPRAZolam (XANAX) 2 mg tablet Take 2 mg by mouth four (4) times daily as needed for Anxiety.  multivitamins-minerals-lutein (CENTRUM SILVER) tab tablet Take 1 Tab by mouth daily.  metoclopramide HCl (REGLAN) 5 mg tablet Take 5 mg by mouth Before breakfast, lunch, and dinner.  guaiFENesin SR (MUCINEX) 600 mg SR tablet Take 600 mg by mouth two (2) times a day.  albuterol sulfate (PROVENTIL;VENTOLIN) 2.5 mg/0.5 mL nebu nebulizer solution 0.5 mL by Nebulization route four (4) times daily. 120 mL 11    amphetamine-dextroamphetamine XR (ADDERALL XR) 20 mg XR capsule Take 20 mg by mouth every morning. 2 tab po qd        sucralfate (CARAFATE) 1 gram tablet Take 1 g by mouth two (2) times a day.  montelukast (SINGULAIR) 10 mg tablet take 10 mg by mouth every evening.  levothyroxine (SYNTHROID) 125 mcg tablet Take  by mouth every evening.  POTASSIUM CHLORIDE SR 10 MEQ TAB daily.  verapamil ER (VERELAN) 120 mg ER capsule Take 120 mg by mouth two (2) times a day.  diclofenac (VOLTAREN) 1 % topical gel Apply 4 g to affected area four (4) times daily.  Cholecalciferol, Vitamin D3, (VITAMIN D3) 1,000 unit cap Take  by mouth.  biotin 10,000 mcg cap Take 5,000 mcg by mouth daily.  selenium 200 mcg cap Take  by mouth daily.  temazepam (RESTORIL) 15 mg capsule Take 15 mg by mouth nightly. 0    ROZEREM 8 mg tablet Take 8 mg by mouth nightly.    1    topiramate (TOPAMAX) 50 mg tablet Take  by mouth daily.  valACYclovir (VALTREX) 500 mg tablet Take 1,000 mg by mouth daily.  fluticasone-salmeterol (ADVAIR DISKUS) 250-50 mcg/dose diskus inhaler Take 1 Puff by inhalation every twelve (12) hours.  estradiol (ESTRACE) 0.01 % (0.1 mg/gram) vaginal cream Insert 2 g into vagina daily.  cyclobenzaprine (FLEXERIL) 10 mg tablet Take 10 mg by mouth three (3) times daily as needed.  cyanocobalamin (VITAMIN B12) 500 mcg tablet Take 500 mcg by mouth daily.  FERROUS FUMARATE/VIT BCOMP&C (SUPER B COMPLEX PO) Take 1 Tab by mouth daily.  mv, min #36-iron,carbonyl-FA (GERITOL COMPLETE) 16 mg iron- 0.38 mg tab Take 1 Tab by mouth daily.  BISACODYL (GENTLE LAXATIVE PO) Take 2 Tabs by mouth daily.  NEBULIZER by Does Not Apply route.  COQ10, UBIQUINOL, PO Take 200 mg by mouth two (2) times a day. 200 mg 1 tab po qd         Guanfacine (INTUNIV) 4 mg Tb24 Take 4 mg by mouth nightly.  ergocalciferol (VITAMIN D2) 50,000 unit capsule Take 50,000 Units by mouth every Wednesday.  albuterol (PROVENTIL HFA, VENTOLIN HFA) 90 mcg/actuation inhaler Take 2 Puffs by inhalation every four (4) hours as needed.  DULoxetine (CYMBALTA) 30 mg capsule Take 60 mg by mouth daily.  ziprasidone (GEODON) 80 mg capsule Take 160 mg by mouth nightly.  SUMATRIPTAN SUCCINATE (IMITREX PO) take 100 mg by mouth as needed.  ONDANSETRON HCL (ZOFRAN PO) Take 8 mg by mouth as needed.          Current Dietary Status:  regular           History of reflux:  Yes   · Reflux medication: protonix  Social History/Home Situation: home with daughter         OBJECTIVE:   Respiratory Status:  Room air     CXR Results: no acute abnormality     MRI/CT Results: no acute abnormality  Oral Motor Structure/Speech:  Oral-Motor Structure/Motor Speech  Labial: No impairment  Dentition: Upper & lower dentures  Lingual: No impairment     Cognitive and Communication Status:  Neurologic State: Alert  Orientation Level: Oriented X4  Cognition: Appropriate for age attention/concentration  Perception: Appears intact  Perseveration: No perseveration noted  Safety/Judgement: Awareness of environment     BEDSIDE SWALLOW EVALUATION  Oral Assessment:  Oral Assessment  Labial: No impairment  Dentition: Upper & lower dentures  Lingual: No impairment  P.O. Trials:  Patient Position: upright in bed     The patient was given tsp to straw amounts of the following:   Consistency Presented: Thin liquid; Solid;Mixed consistency;Puree  How Presented: Straw;Successive swallows; Self-fed/presented     ORAL PHASE:  Bolus Acceptance: No impairment  Bolus Formation/Control: No impairment  Propulsion: No impairment     Oral Residue: None     PHARYNGEAL PHASE:  Initiation of Swallow: No impairment  Laryngeal Elevation: Functional  Aspiration Signs/Symptoms: None  Vocal Quality: No impairment           Pharyngeal Phase Characteristics: No impairment, issues, or problems      OTHER OBSERVATIONS:  Rate/bite size: WNL         Endurance:   WNL                  Tool Used: Dysphagia Outcome and Severity Scale (ANAHI)     Score Comments   Normal Diet  [X] 7 With no strategies or extra time needed   Functional Swallow  [ ] 6 May have mild oral or pharyngeal delay         Mild Dysphagia     [ ] 5 Which may require one diet consistency restricted (those who demonstrate penetration which is entirely cleared on MBS would be included)   Mild-Moderate Dysphagia  [ ] 4 With 1-2 diet consistencies restricted         Moderate Dysphagia  [ ] 3 With 2 or more diet consistencies restricted         Moderately Severe Dysphagia  [ ] 2 With partial PO strategies (trials with ST only)         Severe Dysphagia  [ ] 1 With inability to tolerate any PO safely            Score:  Initial: 7 Most Recent: X (Date: -- )   Interpretation of Tool: The Dysphagia Outcome and Severity Scale (ANAHI) is a simple, easy-to-use, 7-point scale developed to systematically rate the functional severity of dysphagia based on objective assessment and make recommendations for diet level, independence level, and type of nutrition.        Score 7 6 5 4 3 2 1   Modifier CH CI CJ CK CL CM CN   · Swallowing:               - CURRENT STATUS:           CH - 0% impaired, limited or restricted               - GOAL STATUS:                   CH - 0% impaired, limited or restricted               - D/C STATUS:                       CH - 0% impaired, limited or restricted  [place] / Product Type: Medicare /       TREATMENT:         (In addition to Assessment/Re-Assessment sessions the following treatments were rendered)  Assessment/Reassessment only, no treatment provided today     __________________________________________________________________________________________________  Safety:   After treatment position/precautions:  · RN notified  · Upright in Bed     Total Treatment Duration:  Time In: 1254  Time Out: [address] [place], CCC-SLP

## 2017-02-08 NOTE — PROGRESS NOTES
TRANSFER - IN REPORT:    Verbal report received from Avery Bush on Prisca Hua  being received from ED for routine progression of care      Report consisted of patients Situation, Background, Assessment and   Recommendations(SBAR). Information from the following report(s) SBAR was reviewed with the receiving nurse. Opportunity for questions and clarification was provided. Assessment completed upon patients arrival to unit and care assumed.

## 2017-02-08 NOTE — PROGRESS NOTES
Problem: Mobility Impaired (Adult and Pediatric)  Goal: *Acute Goals and Plan of Care (Insert Text)  LTG:  (1.)Ms. Madhu Reagan will move from supine to sit and sit to supine , scoot up and down and roll side to side in bed with INDEPENDENCE within 5 day(s). (2.)Ms. Madhu Reagan will transfer from bed to chair and chair to bed with MODIFIED INDEPENDENCE using the least restrictive device within 5 day(s). (3.)Ms. Madhu Reagan will ambulate with MODIFIED INDEPENDENCE for 500 feet with the least restrictive device within 5 day(s). (4.)Ms. Madhu Reagan will ascend and descend 3 stairs using B hand rail(s) with SUPERVISION to improve functional mobility and safety within 5 day(s). ________________________________________________________________________________________________      PHYSICAL THERAPY: INITIAL ASSESSMENT, PM 2/8/2017  INPATIENT: Hospital Day: 2  [place] / Product Type: Medicare /      NAME/AGE/GENDER: Hemal Miller is a 73 y.o. female       PRIMARY DIAGNOSIS: CAMILA (acute kidney injury) ([facility] [address] CAMILA (acute kidney injury) ([facility] [address] CAMILA (acute kidney injury) ([facility] [address]        ICD-10: Treatment Diagnosis:       · Generalized Muscle Weakness (M62.81)  · Difficulty in walking, Not elsewhere classified (R26.2)  · Other abnormalities of gait and mobility (R26.89)  · Repeated Falls (R29.6)   Precaution/Allergies:  Latex; Bactrim [sulfamethoxazole-trimethoprim]; Lamictal [lamotrigine]; Pcn [penicillins]; and Tapentadol       ASSESSMENT:      Ms. Madhu Reagan is a 73 y.o. female admitted to the hospital for the above with a PMH of poly-pharmacy. Pt presents to PT with generalized weakness in R dorsiflexors (4/5) and standing balance impairments. Pt performed bed mobility with SBA and was given CGA-Tima for transfer/ambulation. Pt had decreased gait speed with narrowed TERE and increased trunk sway. She was also observed to have decreased R step length and clearance.   Pt back to bed after ambulation in [place], during which she needed a sitting rest break. Pt also seemed to have word-finding difficulty and delayed speech. Pt's deficits are likely the result of poly-pharmacy but could benefit from x 2-3 to ensure home safety and return to baseline. This section established at most recent assessment   PROBLEM LIST (Impairments causing functional limitations):  1. Decreased Strength  2. Decreased Ambulation Ability/Technique  3. Decreased Balance  4. Decreased Activity Tolerance    INTERVENTIONS PLANNED: (Benefits and precautions of physical therapy have been discussed with the patient.)  1. Balance Exercise  2. Bed Mobility  3. Family Education  4. Gait Training  5. Neuromuscular Re-education/Strengthening  6. Therapeutic Activites  7. Therapeutic Exercise/Strengthening  8. Transfer Training      TREATMENT PLAN: Frequency/Duration: 3 times a week for 1 week  Rehabilitation Potential For Stated Goals: GOOD      RECOMMENDED REHABILITATION/EQUIPMENT: (at time of discharge pending progress): Probably none pending pt progress. Jeff Duque HISTORY:   History of Present Injury/Illness (Reason for Referral):  Per H&P:   Subjective:      Patient is a 73 y.o. female who presents with speech trouble. Pt started having stuttering speech, trouble finding words, and generalized weakness around 3pm. She's not been eating well the last several weeks and apparently fell several times in the last 24 hours due to legs giving out. She denies dysphagia, chest pain, dyspnea, cough, fever, chills, N/V/D, abdominal pain, dysuria. She has chronic urinary incontinence. Past Medical History/Comorbidities:   Ms. Per Reeves  has a past medical history of Acute renal failure [facility]) (July, 2014); Bilateral pulmonary embolism [facility]) (September, 2012); Calculus of ureter (May, 2015); CAP (community acquired pneumonia) (October, 2012); CVA (cerebral infarction) (January, 2012); Gastric ulcer (July, 2014);  HCAP (healthcare-associated pneumonia) (January, 2013); HCAP (healthcare-associated pneumonia) (February, 2013); Left leg DVT ([facility] [address] (1991); Psychiatric disorder; and PUD (peptic ulcer disease) (???). She also has no past medical history of Aneurysm ([facility] [address] Arrhythmia; Autoimmune disease ([facility] [address] CAD (coronary artery disease); Cancer ([facility] [address] Chronic pain; Coagulation disorder ([facility] [address] COPD; Diabetes ([facility] [address] Heart failure ([facility] [address] Liver disease; Seizures ([facility] [address] or Unspecified sleep apnea. Ms. Pamela Arenas  has a past surgical history that includes appendectomy; cholecystectomy; gastric bypass; tonsillectomy; other surgical; orthopaedic; hysterectomy; and endoscopy (July, 2014). Social History/Living Environment:   Home Environment: Private residence  # Steps to Enter: 3  Rails to Enter: Yes  Hand Rails : Bilateral  One/Two Story Residence: One story  Living Alone: No  Support Systems: Child(jaswant)  Patient Expects to be Discharged to[de-identified] Unknown  Current DME Used/Available at Home: Shower chair  Tub or Shower Type: Tub/Shower combination  Prior Level of Function/Work/Activity:  Pt lives with her daughter in a one story house with several steps to enter. She reports being independent with ambulation but get assistance from daughter for bathing. Pt has had numerous falls recently.       Number of Personal Factors/Comorbidities that affect the Plan of Care:  · CVA  · Psychiatric disorder 1-2: MODERATE COMPLEXITY   EXAMINATION:   Most Recent Physical Functioning:   Gross Assessment:  AROM: Within functional limits  Strength: Generally decreased, functional  Coordination: Generally decreased, functional               Posture:     Balance:  Sitting: Intact  Standing: Impaired  Standing - Static: Fair  Standing - Dynamic : Fair Bed Mobility:  Rolling: Supervision  Supine to Sit: Supervision  Sit to Supine: Supervision  Wheelchair Mobility:     Transfers:  Sit to Stand: Contact guard assistance  Stand to Sit: Contact guard assistance  Gait:     Base of Support: Narrowed  Speed/Judith: Slow  Step Length: Left shortened;Right shortened  Gait Abnormalities: Decreased step clearance;Trunk sway increased; Path deviations  Distance (ft): 45 Feet (ft) (x 2)  Ambulation - Level of Assistance: Contact guard assistance;Minimal assistance       Body Structures Involved:  1. Nerves  2. Endocrine  3. Muscles Body Functions Affected:  1. Neuromusculoskeletal  2. Metobolic/Endocrine Activities and Participation Affected:  1. Mobility   Number of elements that affect the Plan of Care: 4+: HIGH COMPLEXITY   CLINICAL PRESENTATION:   Presentation: Evolving clinical presentation with changing clinical characteristics: MODERATE COMPLEXITY   CLINICAL DECISION MAKIN [facility] Mobility Inpatient Short Form  How much difficulty does the patient currently have. .. Unable A Lot A Little None   1. Turning over in bed (including adjusting bedclothes, sheets and blankets)? [ ] 1   [ ] 2   [ ] 3   [X] 4   2. Sitting down on and standing up from a chair with arms ( e.g., wheelchair, bedside commode, etc.)   [ ] 1   [ ] 2   [X] 3   [ ] 4   3. Moving from lying on back to sitting on the side of the bed? [ ] 1   [ ] 2   [ ] 3   [X] 4   How much help from another person does the patient currently need. .. Total A Lot A Little None   4. Moving to and from a bed to a chair (including a wheelchair)? [ ] 1   [ ] 2   [X] 3   [ ] 4   5. Need to walk in hospital room? [ ] 1   [ ] 2   [X] 3   [ ] 4   6. Climbing 3-5 steps with a railing? [ ] 1   [ ] 2   [X] 3   [ ] 4   © , Trustees of [address] [place] [address] [address] [id number], under license to [employer]. All rights reserved    Score:  Initial: 20 Most Recent: X (Date: -- )     Interpretation of Tool:  Represents activities that are increasingly more difficult (i.e. Bed mobility, Transfers, Gait).        Score 24 23 22-20 19-15 14-10 9-7 6       Modifier CH CI CJ CK CL CM CN         · Mobility - Walking and Moving Around:  - CURRENT STATUS:    CJ - 20%-39% impaired, limited or restricted               - GOAL STATUS:           CI - 1%-19% impaired, limited or restricted               - D/C STATUS:                       ---------------To be determined---------------  [place] / Product Type: Medicare /       Medical Necessity:     · Patient demonstrates good rehab potential due to higher previous functional level. Reason for Services/Other Comments:  · Patient continues to require skilled intervention due to decreased balance and difficulty with ambulation. Use of outcome tool(s) and clinical judgement create a POC that gives a: Questionable prediction of patient's progress: MODERATE COMPLEXITY                 TREATMENT:   (In addition to Assessment/Re-Assessment sessions the following treatments were rendered)   Pre-treatment Symptoms/Complaints:  Back/coccyx   Pain: Initial:   Pain Intensity 1: 8 (RN alerted)  Pain Location 1: Back  Post Session:  8/10      Assessment/Reassessment only, no treatment provided today     Braces/Orthotics/Lines/Etc:   · IV  · O2 Device: Room air  Treatment/Session Assessment:    · Response to Treatment:  Pt tolerated session fairly as she required sitting rest break during ambulation. · Interdisciplinary Collaboration:  · Physical Therapist  · Registered Nurse  · After treatment position/precautions:  · Supine in bed  · Bed/Chair-wheels locked  · Bed in low position  · Call light within reach  · RN notified  · Family at bedside  · Compliance with Program/Exercises: Will assess as treatment progresses. · Recommendations/Intent for next treatment session: \"Next visit will focus on advancements to more challenging activities and reduction in assistance provided\".   Total Treatment Duration:  PT Patient Time In/Time Out  Time In: 1409  Time Out: [address] [place], PT, DPT

## 2017-02-08 NOTE — ED TRIAGE NOTES
Pt arrives complaining of stroke like symptoms. Pt arrived POV, family states she's been falling and stuttering today. Pt states she can't remember words. Pt states her family has having to walk her from room to room. Pt states this started around 3:00 pm.  Pt shaking in triage, states she feels weak. Pt alert and oriented at this time.

## 2017-02-08 NOTE — H&P
HOSPITALIST H&P    NAME:  Samantha Cardona   Age:  73 y.o.  :   1950   MRN:   [id number]  PCP: Chema Brito DO  Chief complaint: speech trouble    Subjective:     Patient is a 73 y.o. female who presents with speech trouble. Pt started having stuttering speech, trouble finding words, and generalized weakness around 3pm.  She's not been eating well the last several weeks and apparently fell several times in the last 24 hours due to legs giving out. She denies dysphagia, chest pain, dyspnea, cough, fever, chills, N/V/D, abdominal pain, dysuria. She has chronic urinary incontinence. Past Medical History   Diagnosis Date    Acute renal failure ([address]      The patient was admitted with acute renal failure secondary to volume depletion. She was found to have a gastric ulcer on admission.  Bilateral pulmonary embolism ([address]      Restarted on anticoagulation for lifetime    Calculus of ureter May, 2015    CAP (community acquired pneumonia)      RML pneumonia    CVA (cerebral infarction)      Reportedly has mild left arm residual weakness    Gastric ulcer            HCAP (healthcare-associated pneumonia)      RLL pneumonia    HCAP (healthcare-associated pneumonia)      RLL pneumonia    Left leg DVT ([address]      On coumadin until     Psychiatric disorder     PUD (peptic ulcer disease) ? ??       Past Surgical History   Procedure Laterality Date    Hx appendectomy      Hx cholecystectomy      Hx gastric bypass      Hx tonsillectomy      Hx other surgical       vagus nerve stimulator    Hx orthopaedic       rt knee growth, right shoulder, left thumb    Hx hysterectomy      Hx endoscopy       Gastric ulcers x 2       No current facility-administered medications on file prior to encounter.       Current Outpatient Prescriptions on File Prior to Encounter   Medication Sig Dispense Refill    APIXABAN (ELIQUIS PO) Take 2 Tabs by mouth daily. Indications: 5 mg      lisinopril (PRINIVIL, ZESTRIL) 40 mg tablet Take 1 Tab by mouth daily. 30 Tab 5    HYDROcodone-acetaminophen (NORCO)  mg tablet Take 1 Tab by mouth every six (6) hours as needed for Pain.  DULERA 200-5 mcg/actuation HFA inhaler USE 2 INHALATIONS TWICE A DAY 1 Inhaler 11    irbesartan (AVAPRO) 300 mg tablet Take 300 mg by mouth nightly.  folic acid 412 mcg tablet Take 800 mcg by mouth daily.  nicotinic acid (NIACIN) 500 mg tablet Take 500 mg by mouth Daily (before breakfast).  atorvastatin (LIPITOR) 10 mg tablet Take  by mouth daily.  pantoprazole (PROTONIX) 40 mg tablet Take 1 Tab by mouth Before breakfast and dinner. 60 Tab 2    ALPRAZolam (XANAX) 2 mg tablet Take 2 mg by mouth four (4) times daily as needed for Anxiety.  multivitamins-minerals-lutein (CENTRUM SILVER) tab tablet Take 1 Tab by mouth daily.  metoclopramide HCl (REGLAN) 5 mg tablet Take 5 mg by mouth Before breakfast, lunch, and dinner.  guaiFENesin SR (MUCINEX) 600 mg SR tablet Take 600 mg by mouth two (2) times a day.  albuterol sulfate (PROVENTIL;VENTOLIN) 2.5 mg/0.5 mL nebu nebulizer solution 0.5 mL by Nebulization route four (4) times daily. 120 mL 11    amphetamine-dextroamphetamine XR (ADDERALL XR) 20 mg XR capsule Take 20 mg by mouth every morning. 2 tab po qd      sucralfate (CARAFATE) 1 gram tablet Take 1 g by mouth two (2) times a day.  montelukast (SINGULAIR) 10 mg tablet take 10 mg by mouth every evening.  levothyroxine (SYNTHROID) 125 mcg tablet Take  by mouth every evening.  POTASSIUM CHLORIDE SR 10 MEQ TAB daily.  verapamil ER (VERELAN) 120 mg ER capsule Take 120 mg by mouth two (2) times a day.  diclofenac (VOLTAREN) 1 % topical gel Apply 4 g to affected area four (4) times daily.  Cholecalciferol, Vitamin D3, (VITAMIN D3) 1,000 unit cap Take  by mouth.       biotin 10,000 mcg cap Take 5,000 mcg by mouth daily.  selenium 200 mcg cap Take  by mouth daily.  temazepam (RESTORIL) 15 mg capsule Take 15 mg by mouth nightly. 0    ROZEREM 8 mg tablet Take 8 mg by mouth nightly. 1    topiramate (TOPAMAX) 50 mg tablet Take  by mouth daily.  valACYclovir (VALTREX) 500 mg tablet Take 1,000 mg by mouth daily.  fluticasone-salmeterol (ADVAIR DISKUS) 250-50 mcg/dose diskus inhaler Take 1 Puff by inhalation every twelve (12) hours.  estradiol (ESTRACE) 0.01 % (0.1 mg/gram) vaginal cream Insert 2 g into vagina daily.  cyclobenzaprine (FLEXERIL) 10 mg tablet Take 10 mg by mouth three (3) times daily as needed.  cyanocobalamin (VITAMIN B12) 500 mcg tablet Take 500 mcg by mouth daily.  FERROUS FUMARATE/VIT BCOMP&C (SUPER B COMPLEX PO) Take 1 Tab by mouth daily.  mv, min #36-iron,carbonyl-FA (GERITOL COMPLETE) 16 mg iron- 0.38 mg tab Take 1 Tab by mouth daily.  BISACODYL (GENTLE LAXATIVE PO) Take 2 Tabs by mouth daily.  NEBULIZER by Does Not Apply route.  COQ10, UBIQUINOL, PO Take 200 mg by mouth two (2) times a day. 200 mg 1 tab po qd       Guanfacine (INTUNIV) 4 mg Tb24 Take 4 mg by mouth nightly.  ergocalciferol (VITAMIN D2) 50,000 unit capsule Take 50,000 Units by mouth every Wednesday.  albuterol (PROVENTIL HFA, VENTOLIN HFA) 90 mcg/actuation inhaler Take 2 Puffs by inhalation every four (4) hours as needed.  DULoxetine (CYMBALTA) 30 mg capsule Take 60 mg by mouth daily.  ziprasidone (GEODON) 80 mg capsule Take 160 mg by mouth nightly.  SUMATRIPTAN SUCCINATE (IMITREX PO) take 100 mg by mouth as needed.  ONDANSETRON HCL (ZOFRAN PO) Take 8 mg by mouth as needed.          Allergies   Allergen Reactions    Latex Contact Dermatitis    Bactrim [Sulfamethoxazole-Trimethoprim] Nausea Only    Lamictal [Lamotrigine] Atopic Dermatitis    Pcn [Penicillins] Rash    Tapentadol Rash       Social History   Substance Use Topics    Smoking status: Never Smoker    Smokeless tobacco: Never Used      Comment: exposed to second hand smoke at work for 16 years    Alcohol use No       Family History   Problem Relation Age of Onset    Hypertension Mother     Cancer Father      prostate    Heart Attack Father     Heart Disease Father      CAD    Hypertension Father     Hypertension Sister     Stroke Sister     Heart Disease Brother      CAD with CABG    Heart Attack Brother     Hypertension Brother        I have personally reviewed and reconciled patients history. Review of Systems  A comprehensive 12 point review of systems is negative other than what is listed above. Objective:     Patient Vitals for the past 24 hrs:   BP Temp Pulse Resp SpO2 Height Weight   02/07/17 2323 122/71 99.6 °F (37.6 °C) 70 20 96 % 5' 3\" (1.6 m) 90.7 kg (200 lb)       Exam:  General: awake, alert, no apparent distress  Eyes: PERRL, anicteric  Neck: Supple, trachea midline  Lungs: Clear to auscultation bilaterally. No rales, wheezes, or rhonchi  Heart: Regular rate and rhythm. No appreciable murmur. Abdomen: Soft, nontender, nondistended. Bowel sounds normal.  No rebound tenderness, guarding, or rigidity. Extremities:  No LE edema. Skin: Warm/dry. No rashes or lesions. Neurologic: CN II-XII grossly intact bilaterally. No focal deficits. Tongue midline. No facial asymmetry. Stuttering speech and difficulty finding words at times. Psych: AOx3. Normal mood and affect.       Data Review (Labs):   Recent Results (from the past 24 hour(s))   CBC WITH AUTOMATED DIFF    Collection Time: 02/07/17 11:30 PM   Result Value Ref Range    WBC 10.6 4.3 - 11.1 K/uL    RBC 4.21 4.05 - 5.25 M/uL    HGB 10.8 (L) 11.7 - 15.4 g/dL    HCT 35.0 (L) 35.8 - 46.3 %    MCV 83.1 79.6 - 97.8 FL    MCH 25.7 (L) 26.1 - 32.9 PG    MCHC 30.9 (L) 31.4 - 35.0 g/dL    RDW 15.5 (H) 11.9 - 14.6 %    PLATELET 447 004 - 406 K/uL    MPV 9.4 (L) 10.8 - 14.1 FL    DF AUTOMATED      NEUTROPHILS 68 43 - 78 %    LYMPHOCYTES 28 13 - 44 %    MONOCYTES 4 4.0 - 12.0 %    EOSINOPHILS 0 (L) 0.5 - 7.8 %    BASOPHILS 0 0.0 - 2.0 %    IMMATURE GRANULOCYTES 0.2 0.0 - 5.0 %    ABS. NEUTROPHILS 7.1 1.7 - 8.2 K/UL    ABS. LYMPHOCYTES 3.0 0.5 - 4.6 K/UL    ABS. MONOCYTES 0.4 0.1 - 1.3 K/UL    ABS. EOSINOPHILS 0.0 0.0 - 0.8 K/UL    ABS. BASOPHILS 0.0 0.0 - 0.2 K/UL    ABS. IMM. GRANS. 0.0 0.0 - 0.5 K/UL   METABOLIC PANEL, COMPREHENSIVE    Collection Time: 02/07/17 11:30 PM   Result Value Ref Range    Sodium 146 (H) 136 - 145 mmol/L    Potassium 5.5 (H) 3.5 - 5.1 mmol/L    Chloride 112 (H) 98 - 107 mmol/L    CO2 23 21 - 32 mmol/L    Anion gap 11 7 - 16 mmol/L    Glucose 89 65 - 100 mg/dL    BUN 19 8 - 23 MG/DL    Creatinine 1.76 (H) 0.6 - 1.0 MG/DL    GFR est AA 37 (L) >60 ml/min/1.73m2    GFR est non-AA 31 (L) >60 ml/min/1.73m2    Calcium 9.0 8.3 - 10.4 MG/DL    Bilirubin, total 0.3 0.2 - 1.1 MG/DL    ALT (SGPT) 19 12 - 65 U/L    AST (SGOT) 34 15 - 37 U/L    Alk.  phosphatase 96 50 - 136 U/L    Protein, total 6.6 6.3 - 8.2 g/dL    Albumin 3.3 3.2 - 4.6 g/dL    Globulin 3.3 2.3 - 3.5 g/dL    A-G Ratio 1.0 (L) 1.2 - 3.5     PROTHROMBIN TIME + INR    Collection Time: 02/07/17 11:30 PM   Result Value Ref Range    Prothrombin time 11.7 9.6 - 12.0 sec    INR 1.1 0.9 - 1.2     TROPONIN I    Collection Time: 02/07/17 11:30 PM   Result Value Ref Range    Troponin-I, Qt. <0.02 (L) 0.02 - 0.05 NG/ML   ETHYL ALCOHOL    Collection Time: 02/07/17 11:30 PM   Result Value Ref Range    ALCOHOL(ETHYL),SERUM <3 MG/DL   POC LACTIC ACID    Collection Time: 02/07/17 11:31 PM   Result Value Ref Range    Lactic Acid (POC) 1.8 0.5 - 1.9 mmol/L   AMMONIA    Collection Time: 02/08/17 12:57 AM   Result Value Ref Range    Ammonia 37 (H) 11 - 32 UMOL/L       Assessment:   Principal Problem:    CAMILA (acute kidney injury) ([facility] (2/8/2017)    Active Problems:    Hypertension (9/16/2012)      Hypothyroidism (9/16/2012)      Speech abnormality (2/8/2017)      Polypharmacy (2/8/2017)      Hyperkalemia (2/8/2017)        Plan:     Admit to remote telemetry  Cannot get MRI due to vagal nerve stimulator  Check CTA head when creatinine improved  PT/OT/SLP eval  IVF    DVT prophylaxis: Xarelto  Code Status: FULL    Plan of care discussed with: patient  Time spent on patient care: 30 minutes  Anticipated date of discharge: 3 days    Shyann Andrade DO

## 2017-02-08 NOTE — PROGRESS NOTES
Ms. Aarti Atkinson is a very pleasant 74 yo F admitted 02/8/17 with speech difficulties, trouble findings word  And generalized weakness  That started around 3 PM. She had creased PO intake  And generalized weakness with multiple falls. No LOC. Denies any SOB, CP ,abdominal pain . Denies any alcohol or recreational drug use. Laboratory work-up shows CAMILA with Cr 1.76 , K:5.5 and known anemia. CT head w/o acute pathology. Unable to do MRI due to spinal stimulator. Admitted to [facility] and discharged on 12/30/16 for CAP, bacteremia. PMHx positive for Hypothyroidism, Bipolar disorder, HTN, PATRICIA - noncompliant with CPAP, Asthma/COPD, GERD, Fibromyalgia, Hx of PE and DVT on chronic AC. Ms. Aarti Atkinson is on multiple sedating medication, although she is also on Adderall. She is on Norco , Xanax 2 mg 4 times per day, Remeron, Geodon (160 mg PO HS), Antivert, Ditropan, Lisinopril, Irbesartan, Xarelto and Eliquis (????), Lyrica, Topamax, Flexeril, Cymbalta, Imitrex, Verapamil, Levothyroxine, Adderall XR, and other medications. Since admission she ws started only on Xarelto . Her mental status greatly improved. Afebrile. Will get inflammatory markers and urine/blood cultures, CXR to r/o acute infectious process. Unable to get Brain MRI - if mental status not improving will repeat Head CT at 24h. Katiana Barragan her mental statis change is due to polypharmacy exacerbated by deteriorating renal function. Will stop ARB - already on ACEi. Will stop adderall and decrease Xanax. Will continue to monitor her clinical condition closely. Will need follow-up with sleep medicine  For better control of PATRICIA.        Sonido Ramirez MD  02/08/17

## 2017-02-08 NOTE — PROGRESS NOTES
Dual skin assessment performed by Kika Sarkar RN and Steve Carbajal. No breakdown on coccyx area. Skin is in tact.  Will continue to monitor

## 2017-02-08 NOTE — ED PROVIDER NOTES
HPI Comments: 71-year-old female presents with sudden onset of stuttering speech, difficulty speaking, and generalized weakness around 3 PM today. She does admit to having a decreased appetite for the past 2 weeks. Family states they have been \"forcing her to eat. \"  She fell several times today because her legs \"buckled under her. \"  She fell straight backward and hit her head. She denies any loss of consciousness. She also reports uncontrollable tremors today. Denies any new medications or changes in medications. Denies drug or alcohol. Denies any fever or headache. She does report some neck discomfort after arriving to the emergency department. She denies any chest pain, shortness of breath, cough, congestion, abdominal pain, pain with urination. She has chronic urinary incontinence. No focal numbness or weakness. No similar episodes in the past.    Patient is a 73 y.o. female presenting with aphasia. The history is provided by the patient. Aphasia   Primary symptoms include speech difficulty. Pertinent negatives include no shortness of breath, no chest pain, no vomiting, no confusion, no headaches and no nausea. Past Medical History:   Diagnosis Date    Acute renal failure ([address] July, 2014     The patient was admitted with acute renal failure secondary to volume depletion. She was found to have a gastric ulcer on admission.     Bilateral pulmonary embolism ([address] September, 2012     Restarted on anticoagulation for lifetime    Calculus of ureter May, 2015    CAP (community acquired pneumonia) October, 2012     RML pneumonia    CVA (cerebral infarction) January, 2012     Reportedly has mild left arm residual weakness    Gastric ulcer July, 2014           HCAP (healthcare-associated pneumonia) January, 2013     RLL pneumonia    HCAP (healthcare-associated pneumonia) February, 2013     RLL pneumonia    Left leg DVT ([address] 1991     On coumadin until 2008    Psychiatric disorder     PUD (peptic ulcer disease) ? ??       Past Surgical History:   Procedure Laterality Date    Hx appendectomy      Hx cholecystectomy      Hx gastric bypass      Hx tonsillectomy      Hx other surgical       vagus nerve stimulator    Hx orthopaedic       rt knee growth, right shoulder, left thumb    Hx hysterectomy      Hx endoscopy  July, 2014     Gastric ulcers x 2         Family History:   Problem Relation Age of Onset    Hypertension Mother     Cancer Father      prostate    Heart Attack Father     Heart Disease Father      CAD    Hypertension Father     Hypertension Sister     Stroke Sister     Heart Disease Brother      CAD with CABG    Heart Attack Brother     Hypertension Brother        Social History     Social History    Marital status:      Spouse name: N/A    Number of children: N/A    Years of education: N/A     Occupational History          Disabled     Social History Main Topics    Smoking status: Never Smoker    Smokeless tobacco: Never Used      Comment: exposed to second hand smoke at work for 17 years    Alcohol use No    Drug use: No    Sexual activity: Not on file     Other Topics Concern    Not on file     Social History Narrative    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 , one child who is healthy. Has always lived in [address]. Dog as a pet. No history of pet bird. There is no significant environmental or industrial exposure. There is no known exposure to TB. ALLERGIES: Latex; Bactrim [sulfamethoxazole-trimethoprim]; Lamictal [lamotrigine]; Pcn [penicillins]; and Tapentadol    Review of Systems   Constitutional: Positive for fatigue. Negative for chills and fever. HENT: Negative for hearing loss. Eyes: Negative for visual disturbance. Respiratory: Negative for cough and shortness of breath. Cardiovascular: Negative for chest pain and palpitations. Gastrointestinal: Negative for abdominal pain, diarrhea, nausea and vomiting. Musculoskeletal: Positive for neck pain. Negative for back pain and neck stiffness. Skin: Negative for rash. Neurological: Positive for tremors and speech difficulty. Negative for syncope, weakness, numbness and headaches. Psychiatric/Behavioral: Negative for confusion. Vitals:    02/07/17 2323   BP: 122/71   Pulse: 70   Resp: 20   Temp: 99.6 °F (37.6 °C)   SpO2: 96%   Weight: 90.7 kg (200 lb)   Height: 5' 3\" (1.6 m)            Physical Exam   Constitutional: She appears well-developed and well-nourished. She appears ill. HENT:   Head: Normocephalic and atraumatic. Right Ear: External ear normal.   Left Ear: External ear normal.   Nose: Nose normal.   Mouth/Throat: Oropharynx is clear and moist.   Eyes: Conjunctivae are normal. Pupils are equal, round, and reactive to light. Neck: Normal range of motion. Neck supple. Cardiovascular: Regular rhythm, normal heart sounds and intact distal pulses. Pulmonary/Chest: Effort normal and breath sounds normal. No respiratory distress. She has no wheezes. Abdominal: Soft. Bowel sounds are normal. She exhibits no distension. There is no tenderness. Musculoskeletal: Normal range of motion. She exhibits no edema. Neurological: She is alert. She displays tremor. She displays no atrophy. No sensory deficit. Coordination abnormal.   Generalized weakness without focal deficit   Skin: Skin is warm and dry. Psychiatric: Judgment normal.   Nursing note and vitals reviewed. MDM  Number of Diagnoses or Management Options  Diagnosis management comments: Parts of this document were created using dragon voice recognition software. The chart has been reviewed but errors may still be present. Patient falls out of the stroke window. We'll check labs and CT and plan for admission.   Suspect possible stroke, intracranial hemorrhage or other metabolic cause for tremors and mental status changes. 2:21 AM  Urine clear. Head CT normal.  Minimal elevation in ammonia. We'll admit for encephalopathy of unclear etiology and possible MRI in the morning. Patient and family updated.            Amount and/or Complexity of Data Reviewed  Clinical lab tests: ordered and reviewed (Results for orders placed or performed during the hospital encounter of 02/07/17  -CBC WITH AUTOMATED DIFF       Result                                            Value                         Ref Range                       WBC                                               10.6                          4.3 - 11.1 K/uL                 RBC                                               4.21                          4.05 - 5.25 M/uL                HGB                                               10.8 (L)                      11.7 - 15.4 g/dL                HCT                                               35.0 (L)                      35.8 - 46.3 %                   MCV                                               83.1                          79.6 - 97.8 FL                  MCH                                               25.7 (L)                      26.1 - 32.9 PG                  MCHC                                              30.9 (L)                      31.4 - 35.0 g/dL                RDW                                               15.5 (H)                      11.9 - 14.6 %                   PLATELET                                          333                           150 - 450 K/uL                  MPV                                               9.4 (L)                       10.8 - 14.1 FL                  DF                                                AUTOMATED                                                     NEUTROPHILS                                       68                            43 - 78 %                       LYMPHOCYTES                                       28 13 - 44 %                       MONOCYTES                                         4                             4.0 - 12.0 %                    EOSINOPHILS                                       0 (L)                         0.5 - 7.8 %                     BASOPHILS                                         0                             0.0 - 2.0 %                     IMMATURE GRANULOCYTES                             0.2                           0.0 - 5.0 %                     ABS. NEUTROPHILS                                  7.1                           1.7 - 8.2 K/UL                  ABS. LYMPHOCYTES                                  3.0                           0.5 - 4.6 K/UL                  ABS. MONOCYTES                                    0.4                           0.1 - 1.3 K/UL                  ABS. EOSINOPHILS                                  0.0                           0.0 - 0.8 K/UL                  ABS. BASOPHILS                                    0.0                           0.0 - 0.2 K/UL                  ABS. IMM.  GRANS.                                  0.0                           0.0 - 0.5 K/UL             -METABOLIC PANEL, COMPREHENSIVE       Result                                            Value                         Ref Range                       Sodium                                            146 (H)                       136 - 145 mmol/L                Potassium                                         5.5 (H)                       3.5 - 5.1 mmol/L                Chloride                                          112 (H)                       98 - 107 mmol/L                 CO2                                               23                            21 - 32 mmol/L                  Anion gap                                         11                            7 - 16 mmol/L                   Glucose                                           89                            65 - 100 mg/dL                  BUN                                               19                            8 - 23 MG/DL                    Creatinine                                        1.76 (H)                      0.6 - 1.0 MG/DL                 GFR est AA                                        37 (L)                        >60 ml/min/1.73m2               GFR est non-AA                                    31 (L)                        >60 ml/min/1.73m2               Calcium                                           9.0                           8.3 - 10.4 MG/DL                Bilirubin, total                                  0.3                           0.2 - 1.1 MG/DL                 ALT (SGPT)                                        19                            12 - 65 U/L                     AST (SGOT)                                        34                            15 - 37 U/L                     Alk. phosphatase                                  96                            50 - 136 U/L                    Protein, total                                    6.6                           6.3 - 8.2 g/dL                  Albumin                                           3.3                           3.2 - 4.6 g/dL                  Globulin                                          3.3                           2.3 - 3.5 g/dL                  A-G Ratio                                         1.0 (L)                       1.2 - 3.5                  -PROTHROMBIN TIME + INR       Result                                            Value                         Ref Range                       Prothrombin time                                  11.7                          9.6 - 12.0 sec                  INR                                               1.1                           0.9 - 1.2                  -TROPONIN I       Result                                            Value                         Ref Range Troponin-I, Qt.                                   <0.02 (L)                     0.02 - 0.05 NG/ML          -ETHYL ALCOHOL       Result                                            Value                         Ref Range                       ALCOHOL(ETHYL),SERUM                              <3                            MG/DL                      -AMMONIA       Result                                            Value                         Ref Range                       Ammonia                                           37 (H)                        11 - 32 UMOL/L             -POC LACTIC ACID       Result                                            Value                         Ref Range                       Lactic Acid (POC)                                 1.8                           0.5 - 1.9 mmol/L           )  Tests in the radiology section of CPT®: ordered and reviewed (Xr Chest Pa Lat    Result Date: 2/8/2017  Frontal and lateral views of the chest COMPARISON: December 29, 2016 INDICATION: Chest pain FINDINGS: There is no focal pulmonary consolidation. No pleural effusion, pneumothorax or evidence of pulmonary edema. The cardiomediastinal contour is within normal limits. The surrounding bones are intact. There is a battery pack overlying the left lower thorax. IMPRESSION: No pulmonary consolidation. Ct Head Wo Cont    Result Date: 2/8/2017  Noncontrast CT of the brain. COMPARISON: August 18, 2016 INDICATION: Stroke like symptoms. Stuttering TECHNIQUE: Contiguous axial images were obtained from the skull base through the vertex without IV contrast. Radiation dose reduction techniques were used for this study:  Our CT scanners use one or all of the following: Automated exposure control, adjustment of the mA and/or kVp according to patient's size, iterative reconstruction. FINDINGS: There is no acute intracranial hemorrhage or evidence for acute territorial infarction.  There is no mass effect, midline shift or hydrocephalus. There is no extra-axial fluid collection. The cerebellum and brainstem are grossly unremarkable. Periventricular diffuse hypodensities are nonspecific and likely secondary to chronic small vessel disease. There is generalized cerebral volume loss, age-related. Visualized orbits and the globes are intact. Visualized paranasal sinuses and the mastoid air cells are aerated. Surrounding bones are intact. IMPRESSION: No acute intracranial hemorrhage or CT evidence of acute territorial infarction.  MRI would be more sensitive for evaluation of acute infarct.    )  Tests in the medicine section of CPT®: ordered and reviewed  Review and summarize past medical records: yes  Independent visualization of images, tracings, or specimens: yes      ED Course       Procedures

## 2017-02-08 NOTE — PROGRESS NOTES
Problem: Self Care Deficits Care Plan (Adult)  Goal: *Acute Goals and Plan of Care (Insert Text)      OCCUPATIONAL THERAPY: Initial Assessment and Treatment Day: 1st 2/8/2017  INPATIENT: Hospital Day: 2  [place] / [place] / Product Type: Medicare /      NAME/AGE/GENDER: Marian Blandon is a 73 y.o. female       PRIMARY DIAGNOSIS:  CAMILA (acute kidney injury) ([facility] CAMILA (acute kidney injury) ([facility] CAMILA (acute kidney injury) ([facility]        ICD-10: Treatment Diagnosis:        · Generalized Muscle Weakness (M62.81)   Precautions/Allergies:         Latex; Bactrim [sulfamethoxazole-trimethoprim]; Lamictal [lamotrigine]; Pcn [penicillins]; and Tapentadol       ASSESSMENT:      Ms. Gomez Wilkinson presents with recent history of difficulty with word finding which has resolved per patient. She has a history of bipolar disorder and states she's \"screaming for xanax. \"  BUE grossly equal in strength with some limitations due to chronic shoulder problems (can get 90°). She demonstrates no visual -perceptual or obvious limitations with memory/orientation. She is basically independent/modified independent with activities of daily living. No need to pick her up for OT services at this time. Thanks for the consult! This section established at most recent assessment     INTERVENTIONS PLANNED: (Benefits and precautions of occupational therapy have been discussed with the patient.)  1. Assessment only. TREATMENT PLAN: Frequency/Duration: Follow patient this assessment only . Rehabilitation Potential For Stated Goals: N/A      RECOMMENDED REHABILITATION/EQUIPMENT: (at time of discharge pending progress): None. OCCUPATIONAL PROFILE AND HISTORY:   History of Present Injury/Illness (Reason for Referral):  Per MD H & P: Patient is a 73 y.o. female who presents with speech trouble.  Pt started having stuttering speech, trouble finding words, and generalized weakness around 3pm. She's not been eating well the last several weeks and apparently fell several times in the last 24 hours due to legs giving out. She denies dysphagia, chest pain, dyspnea, cough, fever, chills, N/V/D, abdominal pain, dysuria. She has chronic urinary incontinence. Past Medical History/Comorbidities:   Ms. Gomez Wilkinson  has a past medical history of Acute renal failure [facility]) (July, 2014); Bilateral pulmonary embolism [facility]) (September, 2012); Calculus of ureter (May, 2015); CAP (community acquired pneumonia) (October, 2012); CVA (cerebral infarction) (January, 2012); Gastric ulcer (July, 2014); HCAP (healthcare-associated pneumonia) (January, 2013); HCAP (healthcare-associated pneumonia) (February, 2013); Left leg DVT ([address] (1991); Psychiatric disorder; and PUD (peptic ulcer disease) (???). She also has no past medical history of Aneurysm ([address] Arrhythmia; Autoimmune disease ([address] CAD (coronary artery disease); Cancer ([address] Chronic pain; Coagulation disorder ([address] COPD; Diabetes ([address] Heart failure ([address] Liver disease; Seizures ([address] or Unspecified sleep apnea. Ms. Gomez Wilkinson  has a past surgical history that includes appendectomy; cholecystectomy; gastric bypass; tonsillectomy; other surgical; orthopaedic; hysterectomy; and endoscopy (July, 2014). Social History/Living Environment:   Home Environment: Private residence  One/Two Story Residence: One story  Living Alone: No  Support Systems: Child(jaswant), Spouse/Significant Other/Partner  Patient Expects to be Discharged to[de-identified] Private residence  Current DME Used/Available at Home: Shower chair  Prior Level of Function/Work/Activity:  Lives with daughter. Drives. Sits in the shower. Gets occasional assistance for grooming/hair.    Number of Personal Factors/Comorbidities that affect the Plan of Care: Brief history (0):  LOW COMPLEXITY   ASSESSMENT OF OCCUPATIONAL PERFORMANCE[de-identified]   Activities of Daily Living:           Basic ADLs (From Assessment) Complex ADLs (From Assessment)   Basic ADL  Feeding: Independent  Oral Facial Hygiene/Grooming: Independent  Bathing: Modified independent  Upper Body Dressing: Modified independent  Lower Body Dressing: Modified independent  Toileting: Modified independent     Grooming/Bathing/Dressing Activities of Daily Living     Cognitive Retraining  Safety/Judgement: Awareness of environment                     Lower Body Dressing Assistance  Slip on Shoes with Back: Modified independent  Position Performed: Seated edge of bed Bed/Mat Mobility  Supine to Sit: Modified independent  Sit to Supine: Modified independent  Sit to Stand: Independent  Scooting: Independent          Most Recent Physical Functioning:   Gross Assessment:  AROM: Generally decreased, functional (Shoulders limited chronicially ~ 90 degrees elevation)  Strength: Generally decreased, functional (BUE grossly equal in strength)               Posture:     Balance:  Sitting: Intact  Standing - Static: Good  Standing - Dynamic : Good Bed Mobility:  Supine to Sit: Modified independent  Sit to Supine: Modified independent  Scooting: Independent  Wheelchair Mobility:     Transfers:  Sit to Stand: Independent  Stand to Sit: Independent                 Patient Vitals for the past 6 hrs:       BP SpO2 Pulse   02/08/17 1000 162/87 91 % 68   02/08/17 1030 168/89 91 % 67   02/08/17 1100 (!) 169/92 - -   02/08/17 1130 137/89 94 % 66   02/08/17 1226 154/80 90 % 68        Mental Status  Neurologic State: Alert  Orientation Level: Oriented to person, Oriented to place, Oriented to situation  Cognition: Follows commands  Perception: Appears intact  Perseveration: No perseveration noted  Safety/Judgement: Awareness of environment                               Physical Skills Involved:  1. Range of Motion  2. Strength Cognitive Skills Affected (resulting in the inability to perform in a timely and safe manner):  1. None noted Psychosocial Skills Affected:  1.  None noted   Number of elements that affect the Plan of Care: 1-3:  LOW COMPLEXITY   CLINICAL DECISION MAKIN[address] [place] [address] AM-PAC 6 Clicks   Basic Mobility Inpatient Short Form  How much help from another person does the patient currently need. .. Total A Lot A Little None   1. Putting on and taking off regular lower body clothing?   [ ] 1   [ ] 2   [ ] 3   [X] 4   2. Bathing (including washing, rinsing, drying)? [ ] 1   [ ] 2   [ ] 3   [X] 4   3. Toileting, which includes using toilet, bedpan or urinal?   [ ] 1   [ ] 2   [ ] 3   [X] 4   4. Putting on and taking off regular upper body clothing?   [ ] 1   [ ] 2   [ ] 3   [X] 4   5. Taking care of personal grooming such as brushing teeth? [ ] 1   [ ] 2   [ ] 3   [X] 4   6. Eating meals? [ ] 1   [ ] 2   [ ] 3   [X] 4   © , Trustees of [address] [place] [address], under license to [employer]. All rights reserved    Score:  Initial: 24 Most Recent: X (Date: -- )     Interpretation of Tool:  Represents activities that are increasingly more difficult (i.e. Bed mobility, Transfers, Gait). Score 24 23 22-20 19-15 14-10 9-7 6       Modifier CH CI CJ CK CL CM CN         · Self Care:               - CURRENT STATUS:    CH - 0% impaired, limited or restricted               - GOAL STATUS:           CH - 0% impaired, limited or restricted               - D/C STATUS:                       CH - 0% impaired, limited or restricted  [place] / Product Type: Medicare /       Medical Necessity:     · Today's assessment only  Reason for Services/Other Comments:  · Today's assessment only.    Use of outcome tool(s) and clinical judgement create a POC that gives a: LOW COMPLEXITY             TREATMENT:   (In addition to Assessment/Re-Assessment sessions the following treatments were rendered)      Pre-treatment Symptoms/Complaints:    Pain: Initial:   Pain Intensity 1: 0  Post Session:  same      Assessment/Reassessment only, no treatment provided today Braces/Orthotics/Lines/Etc:   · IV  · O2 Device: Room air  Treatment/Session Assessment:    · Response to Treatment:  No treatment rendered this date.   · Interdisciplinary Collaboration:  · Occupational Therapist  · After treatment position/precautions:  · Supine in bed  · Bed/Chair-wheels locked  · Bed in low position  · Call light within reach  · Side rails x 2  · Compliance with Program/Exercises: compliant today  · .  · Recommendations/Intent for next treatment session:  N/A  Total Treatment Duration:  OT Patient Time In/Time Out  Time In: 1515  Time Out: 13[address] [facility], OTD, OTR/L

## 2017-02-09 VITALS
WEIGHT: 200 LBS | OXYGEN SATURATION: 98 % | HEART RATE: 76 BPM | DIASTOLIC BLOOD PRESSURE: 66 MMHG | SYSTOLIC BLOOD PRESSURE: 115 MMHG | BODY MASS INDEX: 35.44 KG/M2 | TEMPERATURE: 98 F | RESPIRATION RATE: 18 BRPM | HEIGHT: 63 IN

## 2017-02-09 LAB
ALBUMIN SERPL BCP-MCNC: 2.8 G/DL (ref 3.2–4.6)
ALBUMIN/GLOB SERPL: 1.1 {RATIO} (ref 1.2–3.5)
ALP SERPL-CCNC: 87 U/L (ref 50–136)
ALT SERPL-CCNC: 13 U/L (ref 12–65)
ANION GAP BLD CALC-SCNC: 9 MMOL/L (ref 7–16)
AST SERPL W P-5'-P-CCNC: 10 U/L (ref 15–37)
BASOPHILS # BLD AUTO: 0 K/UL (ref 0–0.2)
BASOPHILS # BLD: 1 % (ref 0–2)
BILIRUB SERPL-MCNC: 0.2 MG/DL (ref 0.2–1.1)
BUN SERPL-MCNC: 14 MG/DL (ref 8–23)
CALCIUM SERPL-MCNC: 8.2 MG/DL (ref 8.3–10.4)
CHLORIDE SERPL-SCNC: 111 MMOL/L (ref 98–107)
CHOLEST SERPL-MCNC: 159 MG/DL
CO2 SERPL-SCNC: 26 MMOL/L (ref 21–32)
CREAT SERPL-MCNC: 0.87 MG/DL (ref 0.6–1)
DIFFERENTIAL METHOD BLD: ABNORMAL
EOSINOPHIL # BLD: 0.3 K/UL (ref 0–0.8)
EOSINOPHIL NFR BLD: 3 % (ref 0.5–7.8)
ERYTHROCYTE [DISTWIDTH] IN BLOOD BY AUTOMATED COUNT: 15.7 % (ref 11.9–14.6)
EST. AVERAGE GLUCOSE BLD GHB EST-MCNC: ABNORMAL MG/DL
FERRITIN SERPL-MCNC: 8 NG/ML (ref 8–388)
FOLATE SERPL-MCNC: 75.4 NG/ML (ref 3.1–17.5)
GLOBULIN SER CALC-MCNC: 2.6 G/DL (ref 2.3–3.5)
GLUCOSE SERPL-MCNC: 83 MG/DL (ref 65–100)
HBA1C MFR BLD: 4.5 % (ref 4.8–6)
HCT VFR BLD AUTO: 32.5 % (ref 35.8–46.3)
HDLC SERPL-MCNC: 55 MG/DL (ref 40–60)
HDLC SERPL: 2.9 {RATIO}
HGB BLD-MCNC: 9.8 G/DL (ref 11.7–15.4)
HGB RETIC QN AUTO: 28 PG (ref 29–35)
IMM GRANULOCYTES # BLD: 0 K/UL (ref 0–0.5)
IMM GRANULOCYTES NFR BLD AUTO: 0.1 % (ref 0–5)
IMM RETICS NFR: 25.9 % (ref 3–15.9)
IRON SATN MFR SERPL: 4 %
IRON SERPL-MCNC: 13 UG/DL (ref 35–150)
IRON SERPL-MCNC: 15 UG/DL (ref 35–150)
LDLC SERPL CALC-MCNC: 89.6 MG/DL
LIPID PROFILE,FLP: NORMAL
LYMPHOCYTES # BLD AUTO: 42 % (ref 13–44)
LYMPHOCYTES # BLD: 3.4 K/UL (ref 0.5–4.6)
MCH RBC QN AUTO: 25.3 PG (ref 26.1–32.9)
MCHC RBC AUTO-ENTMCNC: 30.2 G/DL (ref 31.4–35)
MCV RBC AUTO: 83.8 FL (ref 79.6–97.8)
MONOCYTES # BLD: 0.7 K/UL (ref 0.1–1.3)
MONOCYTES NFR BLD AUTO: 9 % (ref 4–12)
NEUTS SEG # BLD: 3.7 K/UL (ref 1.7–8.2)
NEUTS SEG NFR BLD AUTO: 45 % (ref 43–78)
PLATELET # BLD AUTO: 262 K/UL (ref 150–450)
PMV BLD AUTO: 9.9 FL (ref 10.8–14.1)
POTASSIUM SERPL-SCNC: 3.5 MMOL/L (ref 3.5–5.1)
PROT SERPL-MCNC: 5.4 G/DL (ref 6.3–8.2)
RBC # BLD AUTO: 3.88 M/UL (ref 4.05–5.25)
RETICS # AUTO: 0.04 M/UL (ref 0.02–0.08)
RETICS/RBC NFR AUTO: 1 % (ref 0.3–2)
SODIUM SERPL-SCNC: 146 MMOL/L (ref 136–145)
TIBC SERPL-MCNC: 349 UG/DL (ref 250–450)
TRIGL SERPL-MCNC: 72 MG/DL (ref 35–150)
VIT B12 SERPL-MCNC: 769 PG/ML (ref 193–986)
VLDLC SERPL CALC-MCNC: 14.4 MG/DL (ref 6–23)
WBC # BLD AUTO: 8.1 K/UL (ref 4.3–11.1)

## 2017-02-09 PROCEDURE — 82746 ASSAY OF FOLIC ACID SERUM: CPT | Performed by: INTERNAL MEDICINE

## 2017-02-09 PROCEDURE — 85046 RETICYTE/HGB CONCENTRATE: CPT | Performed by: INTERNAL MEDICINE

## 2017-02-09 PROCEDURE — 80061 LIPID PANEL: CPT | Performed by: INTERNAL MEDICINE

## 2017-02-09 PROCEDURE — 94760 N-INVAS EAR/PLS OXIMETRY 1: CPT

## 2017-02-09 PROCEDURE — 85025 COMPLETE CBC W/AUTO DIFF WBC: CPT | Performed by: INTERNAL MEDICINE

## 2017-02-09 PROCEDURE — 74011000250 HC RX REV CODE- 250: Performed by: INTERNAL MEDICINE

## 2017-02-09 PROCEDURE — 36415 COLL VENOUS BLD VENIPUNCTURE: CPT | Performed by: INTERNAL MEDICINE

## 2017-02-09 PROCEDURE — 82728 ASSAY OF FERRITIN: CPT | Performed by: INTERNAL MEDICINE

## 2017-02-09 PROCEDURE — 74011250637 HC RX REV CODE- 250/637: Performed by: INTERNAL MEDICINE

## 2017-02-09 PROCEDURE — 83036 HEMOGLOBIN GLYCOSYLATED A1C: CPT | Performed by: INTERNAL MEDICINE

## 2017-02-09 PROCEDURE — 94640 AIRWAY INHALATION TREATMENT: CPT

## 2017-02-09 PROCEDURE — 74011250637 HC RX REV CODE- 250/637: Performed by: HOSPITALIST

## 2017-02-09 PROCEDURE — 82607 VITAMIN B-12: CPT | Performed by: INTERNAL MEDICINE

## 2017-02-09 PROCEDURE — 80053 COMPREHEN METABOLIC PANEL: CPT | Performed by: INTERNAL MEDICINE

## 2017-02-09 PROCEDURE — 83540 ASSAY OF IRON: CPT | Performed by: INTERNAL MEDICINE

## 2017-02-09 PROCEDURE — 83540 ASSAY OF IRON: CPT | Performed by: HOSPITALIST

## 2017-02-09 RX ORDER — DEXTROAMPHETAMINE SACCHARATE, AMPHETAMINE ASPARTATE MONOHYDRATE, DEXTROAMPHETAMINE SULFATE AND AMPHETAMINE SULFATE 5; 5; 5; 5 MG/1; MG/1; MG/1; MG/1
20 CAPSULE, EXTENDED RELEASE ORAL
Qty: 30 CAP | Refills: 0 | Status: SHIPPED
Start: 2017-02-09 | End: 2017-07-26

## 2017-02-09 RX ORDER — ALPRAZOLAM 2 MG/1
2 TABLET ORAL
Qty: 30 TAB | Refills: 0 | Status: SHIPPED
Start: 2017-02-09 | End: 2018-08-27

## 2017-02-09 RX ORDER — PREGABALIN 75 MG/1
75 CAPSULE ORAL DAILY
Qty: 30 CAP | Refills: 0 | Status: SHIPPED
Start: 2017-02-09 | End: 2017-03-11

## 2017-02-09 RX ADMIN — BUDESONIDE 500 MCG: 0.5 INHALANT RESPIRATORY (INHALATION) at 08:40

## 2017-02-09 RX ADMIN — VERAPAMIL HYDROCHLORIDE 120 MG: 120 TABLET, FILM COATED, EXTENDED RELEASE ORAL at 08:36

## 2017-02-09 RX ADMIN — ALBUTEROL SULFATE 2.5 MG: 2.5 SOLUTION RESPIRATORY (INHALATION) at 01:35

## 2017-02-09 RX ADMIN — Medication 10 ML: at 06:00

## 2017-02-09 RX ADMIN — RIVAROXABAN 20 MG: 20 TABLET, FILM COATED ORAL at 08:35

## 2017-02-09 RX ADMIN — LISINOPRIL 40 MG: 20 TABLET ORAL at 08:35

## 2017-02-09 RX ADMIN — PANTOPRAZOLE SODIUM 40 MG: 40 TABLET, DELAYED RELEASE ORAL at 07:30

## 2017-02-09 RX ADMIN — ALBUTEROL SULFATE 2.5 MG: 2.5 SOLUTION RESPIRATORY (INHALATION) at 08:39

## 2017-02-09 RX ADMIN — LEVOTHYROXINE SODIUM 125 MCG: 125 TABLET ORAL at 07:00

## 2017-02-09 NOTE — PROGRESS NOTES
Discharge instructions given and reviewed with pt, verbalizes understanding, medication side effect sheet reviewed with pt, pt to be discharged home, calling  for ride.

## 2017-02-09 NOTE — DISCHARGE INSTRUCTIONS
DISCHARGE SUMMARY from Nurse    The following personal items are in your possession at time of discharge:    Dental Appliances: At bedside, Lowers, Uppers  Visual Aid: At home, Glasses     Home Medications: None  Jewelry: Ring, Bracelet  Clothing: Pants, Shirt, Shorts, Socks, Undergarments  Other Valuables: None             PATIENT INSTRUCTIONS:    After general anesthesia or intravenous sedation, for 24 hours or while taking prescription Narcotics:  · Limit your activities  · Do not drive and operate hazardous machinery  · Do not make important personal or business decisions  · Do  not drink alcoholic beverages  · If you have not urinated within 8 hours after discharge, please contact your surgeon on call. Report the following to your surgeon:  · Excessive pain, swelling, redness or odor of or around the surgical area  · Temperature over 100.5  · Nausea and vomiting lasting longer than 4 hours or if unable to take medications  · Any signs of decreased circulation or nerve impairment to extremity: change in color, persistent  numbness, tingling, coldness or increase pain  · Any questions        What to do at Home:  Recommended activity: Activity as tolerated, increase water intake. If you experience any of the following symptoms changes in mental status, fever greater than 101, nausea/vomiting, or pain, please follow up with your primary care physician. *  Please give a list of your current medications to your Primary Care Provider. *  Please update this list whenever your medications are discontinued, doses are      changed, or new medications (including over-the-counter products) are added. *  Please carry medication information at all times in case of emergency situations.           These are general instructions for a healthy lifestyle:    No smoking/ No tobacco products/ Avoid exposure to second hand smoke    Surgeon General's Warning:  Quitting smoking now greatly reduces serious risk to your health. Obesity, smoking, and sedentary lifestyle greatly increases your risk for illness    A healthy diet, regular physical exercise & weight monitoring are important for maintaining a healthy lifestyle    You may be retaining fluid if you have a history of heart failure or if you experience any of the following symptoms:  Weight gain of 3 pounds or more overnight or 5 pounds in a week, increased swelling in our hands or feet or shortness of breath while lying flat in bed. Please call your doctor as soon as you notice any of these symptoms; do not wait until your next office visit. Recognize signs and symptoms of STROKE:    F-face looks uneven    A-arms unable to move or move unevenly    S-speech slurred or non-existent    T-time-call 911 as soon as signs and symptoms begin-DO NOT go       Back to bed or wait to see if you get better-TIME IS BRAIN. Warning Signs of HEART ATTACK     Call 911 if you have these symptoms:   Chest discomfort. Most heart attacks involve discomfort in the center of the chest that lasts more than a few minutes, or that goes away and comes back. It can feel like uncomfortable pressure, squeezing, fullness, or pain.  Discomfort in other areas of the upper body. Symptoms can include pain or discomfort in one or both arms, the back, neck, jaw, or stomach.  Shortness of breath with or without chest discomfort.  Other signs may include breaking out in a cold sweat, nausea, or lightheadedness. Don't wait more than five minutes to call 911 - MINUTES MATTER! Fast action can save your life. Calling 911 is almost always the fastest way to get lifesaving treatment. Emergency Medical Services staff can begin treatment when they arrive -- up to an hour sooner than if someone gets to the hospital by car. The discharge information has been reviewed with the patient. The patient verbalized understanding.     Discharge medications reviewed with the patient and appropriate educational materials and side effects teaching were provided. Stroke: After Your Visit     Your Care Instructions     You have had a stroke. Risk factors for stroke include being overweight, smoking, and sedentary lifestyle. This means that the blood flow to a part of your brain was blocked for some time, which damages the nerve cells in that part of the brain. The part of your body controlled by that part of your brain may not function properly now. The brain is an amazing organ that can heal itself to some degree. The stroke you had damaged part of your brain, but other parts of your brain may take over in some way for the damaged areas. You have already started this process. Going home may be hard for you and your family. The more you can try to do for yourself, the better. Remember to take each day one at a time. Follow-up care is a key part of your treatment and safety. Be sure to make and go to all appointments, and call your doctor if you are having problems. Its also a good idea to know your test results and keep a list of the medicines you take. How can you care for yourself at home? Enter a stroke rehabilitation (rehab) program, if your doctor recommends it. Physical, speech, and occupational therapies can help you manage bathing, dressing, eating, and other basics of daily living. Eat a heart-healthy diet that is low in cholesterol, saturated fat, and salt. Eat lots of fresh fruits and vegetables and foods high in fiber. Increase your activities slowly. Take short rest breaks when you get tired. Gradually increase the amount you walk. Start out by walking a little more than you did the day before. Do not drive until your doctor says it is okay. It is normal to feel sad or depressed after a stroke. If the blues last, talk to your doctor. If you are having problems with urine leakage, go to the bathroom at regular times, including when you first wake up and at bedtime. Also, limit fluids after dinner. If you are constipated, drink plenty of fluids, enough so that your urine is light yellow or clear like water. If you have kidney, heart, or liver disease and have to limit fluids, talk with your doctor before you increase the amount of fluids you drink. Set up a regular time for using the toilet. If you continue to have constipation, your doctor may suggest using a bulking agent, such as Metamucil, or a stool softener, laxative, or enema. Medicines  Take your medicines exactly as prescribed. Call your doctor if you think you are having a problem with your medicine. You may be taking several medicines. ACE (angiotensin-converting enzyme) inhibitors, angiotensin II receptor blockers (ARBs), beta-blockers, diuretics (water pills), and calcium channel blockers control your blood pressure. Statins help lower cholesterol. Your doctor may also prescribe medicines for depression, pain, sleep problems, anxiety, or agitation. If your doctor has given you medicine that prevents blood clots, such as warfarin (Coumadin), aspirin combined with extended-release dipyridamole (Aggrenox), clopidogrel (Plavix), or aspirin to prevent another stroke, you should:  Tell your dentist, pharmacist, and other health professionals that you take these medicines. Watch for unusual bruising or bleeding, such as blood in your urine, red or black stools, or bleeding from your nose or gums. Get regular blood tests to check your clotting time if you are taking Coumadin. Wear medical alert jewelry that says you take blood thinners. You can buy this at most drugstores. Do not take any over-the-counter medicines or herbal products without talking to your doctor first.  If you take birth control pills or hormone replacement therapy, talk to your doctor about whether they are right for you. For family members and caregivers  Make the home safe.  Set up a room so that your loved one does not have to climb stairs. Be sure the bathroom is on the same floor. Move throw rugs and furniture that could cause falls, and make sure that the lighting is good. Put grab bars and seats in tubs and showers. Find out what your loved one can do and what he or she needs help with. Try not to do things for your loved one that your loved one can do on his or her own. Help him or her learn and practice new skills. Visit and talk with your loved one often. Try doing activities together that you both enjoy, such as playing cards or board games. Keep in touch with your loved one's friends as much as you can, and encourage them to visit. Take care of yourself. Do not try to do everything yourself. Ask other family members to help. Eat well, get enough rest, and take time to do things that you enjoy. Keep up with your own doctor visits, and make sure to take your medicines regularly. Get out of the house as much as you can. Join a local support group. Find out if you qualify for home health care visits to help with rehab or for adult day care. When should you call for help? Call 911 anytime you think you may need emergency care. For example, call if:  You have signs of another stroke. These may include:  Sudden numbness, paralysis, or weakness in your face, arm, or leg, especially on only one side of your body. New problems with walking or balance. Sudden vision changes. Drooling or slurred speech. New problems speaking or understanding simple statements, or you feel confused. A sudden, severe headache that is different from past headaches. Call 911 even if these symptoms go away in a few minutes. You cough up blood. You vomit blood or what looks like coffee grounds. You pass maroon or very bloody stools. Call your doctor now or seek immediate medical care if:  You have new bruises or blood spots under your skin. You have a nosebleed. Your gums bleed when you brush your teeth. You have blood in your urine.   Your stools are black and tarlike or have streaks of blood. You have vaginal bleeding when you are not having your period, or heavy period bleeding. You have new symptoms that may be related to your stroke, such as falls or trouble swallowing. Watch closely for changes in your health, and be sure to contact your doctor if you have any problems. Where can you learn more? Go to [employer]    Enter C294  in the search box to learn more about \"Stroke: After Your Visit\". © 4088-6350 [employer]. Care instructions adapted under license by Martina Underwood (which disclaims liability or warranty for this information). This care instruction is for use with your licensed healthcare professional. If you have questions about a medical condition or this instruction, always ask your healthcare professional. Lorelei Alfaro any warranty or liability for your use of this information.

## 2017-02-09 NOTE — PROGRESS NOTES
Care Management Interventions  PCP Verified by CM: Yes  Transition of Care Consult (CM Consult): Discharge Planning  Current Support Network: Lives with Spouse, Relative's Home  Confirm Follow Up Transport: Family  Plan discussed with Pt/Family/Caregiver: Yes   Met with patient for discharge planning. Spoke with patient regarding discharge. Patient states lives with her daughter who lives next door to her house that her  lives at. States is  but daughter has been having her stay with her to help her out. Patient states she is independent of ADL's prior to admission has been getting up in her room independently. Uses no DME. States she still drives and is retired. States her  is coming to pick her up this afternoon around 430 pm. Voices no concerns or discharge needs. Discharge plan is home with spouse.

## 2017-02-09 NOTE — DISCHARGE SUMMARY
Hospitalist Discharge Summary     Patient ID:  Ernesto Haynes  [id number]  73 y.o.  1950  Admit date: 2/7/2017 11:29 PM  Discharge date and time: 2/9/2017  Attending: Mak Jernigan MD  PCP:  Frank Reyes DO  Treatment Team: Attending Provider: Mak Jernigan MD; Utilization Review: [facility]    Principal Diagnosis CAMILA (acute kidney injury) [facility])   Hospital Problems as of 2/9/2017  Date Reviewed: 1/4/2017          Codes Class Noted - Resolved POA    * (Principal)[facility] ICD-10-CM: N17.9  ICD-9-CM: 584.9  2/8/2017 - Present Yes        Speech abnormality ICD-10-CM: R47.9  ICD-9-CM: 784.59  2/8/2017 - Present Yes        Polypharmacy ICD-10-CM: J48.592  ICD-9-CM: V58.69  2/8/2017 - Present Yes        Hyperkalemia ICD-10-CM: E87.5  ICD-9-CM: 276.7  2/8/2017 - Present Yes        Hypertension (Chronic) ICD-10-CM: I10  ICD-9-CM: 401.9  9/16/2012 - Present Yes        Hypothyroidism (Chronic) ICD-10-CM: E03.9  ICD-9-CM: 244.9  9/16/2012 - Present Yes              HPI: 73 y.o. female who presents with speech trouble. Pt started having stuttering speech, trouble finding words, and generalized weakness around 3pm. She's not been eating well the last several weeks and apparently fell several times in the last 24 hours due to legs giving out. She denies dysphagia, chest pain, dyspnea, cough, fever, chills, N/V/D, abdominal pain, dysuria. She has chronic urinary incontinence. Hospital Course: Admitted overnight on 2/7 with encephalopathy and slurred speech. Was found to have worsening renal function likely from poor po intake. Likely her confusion was from polypharmacy in the setting of an CAMILA. Her condition improved significantly with improvement in her renal function and decreasing her sedating medications. Went through her medication list with the pharmacist and stopped all medications that she was not currently taking.  Will decrease Xanax to TID prn (from QID), adderall and lyrica to daily (from BID). Concerning that patient is on a stimulant when many of her psych symptoms are related to insomnia. Recommend tapering off adderall completely in the next 1-2 weeks. Patient remains at risk for polypharmacy given her extensive list of high risk medications. She would benefit from her medication list being examined further in the outpatient setting and if possible, discontinuation of some of these high risk meds. She reports that she already has followup with both her PCP, Dr. Ricky Dunn, and her psychiatrist, Dr. Ras Cheng, in the next 1-2 weeks. Significant Diagnostic Studies:   Labs: Results:       Chemistry Recent Labs      02/09/17   0535 02/08/17 1337 02/07/17   2330   GLU  83  111*  89   NA  146*  144  146*   K  3.5  4.0  5.5*   CL  111*  111*  112*   CO2  26  24  23   BUN  14  17  19   CREA  0.87  1.05*  1.76*   CA  8.2*  8.4  9.0   AGAP  9  9  11   AP  87   --   96   TP  5.4*   --   6.6   ALB  2.8*   --   3.3   GLOB  2.6   --   3.3   AGRAT  1.1*   --   1.0*      CBC w/Diff Recent Labs      02/09/17 0535 02/08/17 1337 02/07/17   2330   WBC  8.1  8.6  10.6   RBC  3.88*  3.87*  4.21   HGB  9.8*  9.9*  10.8*   HCT  32.5*  32.3*  35.0*   PLT  262  285  333   GRANS  45   --   68   LYMPH  42   --   28   EOS  3   --   0*      Cardiac Enzymes No results for input(s): CPK, CKND1, BRAN in the last 72 hours. No lab exists for component: CKRMB, TROIP   Coagulation Recent Labs      02/07/17   2330   PTP  11.7   INR  1.1       Lipid Panel Lab Results   Component Value Date/Time    Cholesterol, total 159 02/09/2017 05:35 AM    HDL Cholesterol 55 02/09/2017 05:35 AM    LDL, calculated 89.6 02/09/2017 05:35 AM    VLDL, calculated 14.4 02/09/2017 05:35 AM    Triglyceride 72 02/09/2017 05:35 AM    CHOL/HDL Ratio 2.9 02/09/2017 05:35 AM      BNP No results for input(s): BNPP in the last 72 hours.    Liver Enzymes Recent Labs      02/09/17   0535   TP  5.4*   ALB  2.8*   AP  87   SGOT  10*      Thyroid Studies Lab Results   Component Value Date/Time    T4, Total 4.9 09/16/2012 04:20 AM    T3 Uptake 41 09/16/2012 04:20 AM    TSH 0.845 02/07/2013 09:10 AM            Discharge Exam:  Visit Vitals    /66    Pulse 76    Temp 98 °F (36.7 °C)    Resp 18    Ht 5' 3\" (1.6 m)    Wt 90.7 kg (200 lb)    SpO2 98%    BMI 35.43 kg/m2     General appearance: alert, cooperative, NAD  Lungs: clear to auscultation bilaterally  Heart: regular rate and rhythm  Abdomen: soft, NTTP  Extremities: no cyanosis or edema  Neurologic: Grossly normal, no dysarthria, oriented x 4    Disposition: home  Discharge Condition: stable  Patient Instructions:   Current Discharge Medication List      CONTINUE these medications which have CHANGED    Details   ALPRAZolam (XANAX) 2 mg tablet Take 1 Tab by mouth three (3) times daily as needed for Anxiety. Max Daily Amount: 6 mg. Qty: 30 Tab, Refills: 0      amphetamine-dextroamphetamine XR (ADDERALL XR) 20 mg XR capsule Take 1 Cap (20 mg total) by mouth every morning. Max Daily Amount: 20 mg  Qty: 30 Cap, Refills: 0      pregabalin (LYRICA) 75 mg capsule Take 1 Cap by mouth daily for 30 days. Max Daily Amount: 75 mg. Qty: 30 Cap, Refills: 0         CONTINUE these medications which have NOT CHANGED    Details   mirtazapine (REMERON) 30 mg tablet Take 30 mg by mouth nightly. ondansetron (ZOFRAN ODT) 8 mg disintegrating tablet Take 8 mg by mouth every eight (8) hours as needed for Nausea. oxybutynin chloride XL (DITROPAN XL) 10 mg CR tablet Take 10 mg by mouth daily. ipratropium (ATROVENT) 0.03 % nasal spray 2 Sprays by Both Nostrils route. Magnesium Oxide 500 mg cap Take 500 mg by mouth.      lisinopril (PRINIVIL, ZESTRIL) 40 mg tablet Take 1 Tab by mouth daily. Qty: 30 Tab, Refills: 5      HYDROcodone-acetaminophen (NORCO)  mg tablet Take 1 Tab by mouth every six (6) hours as needed for Pain.       DULERA 200-5 mcg/actuation HFA inhaler USE 2 INHALATIONS TWICE A DAY  Qty: 1 Inhaler, Refills: 11      folic acid 688 mcg tablet Take 800 mcg by mouth daily. nicotinic acid (NIACIN) 500 mg tablet Take 500 mg by mouth Daily (before breakfast). atorvastatin (LIPITOR) 10 mg tablet Take  by mouth daily. pantoprazole (PROTONIX) 40 mg tablet Take 1 Tab by mouth Before breakfast and dinner. Qty: 60 Tab, Refills: 2      albuterol sulfate (PROVENTIL;VENTOLIN) 2.5 mg/0.5 mL nebu nebulizer solution 0.5 mL by Nebulization route four (4) times daily. Qty: 120 mL, Refills: 11    Comments: DX: 799.02 hypoxemia, 518.83 chronic respitory failure      levothyroxine (SYNTHROID) 125 mcg tablet Take  by mouth every evening. POTASSIUM CHLORIDE SR 10 MEQ TAB daily. verapamil ER (VERELAN) 120 mg ER capsule Take 120 mg by mouth two (2) times a day. Cholecalciferol, Vitamin D3, (VITAMIN D3) 1,000 unit cap Take  by mouth. FERROUS FUMARATE/VIT BCOMP&C (SUPER B COMPLEX PO) Take 1 Tab by mouth daily. BISACODYL (GENTLE LAXATIVE PO) Take 2 Tabs by mouth daily. NEBULIZER by Does Not Apply route. albuterol (PROVENTIL HFA, VENTOLIN HFA) 90 mcg/actuation inhaler Take 2 Puffs by inhalation every four (4) hours as needed. ziprasidone (GEODON) 80 mg capsule Take 160 mg by mouth nightly. SUMATRIPTAN SUCCINATE (IMITREX PO) take 100 mg by mouth as needed.          STOP taking these medications       meclizine (ANTIVERT) 25 mg tablet Comments:   Reason for Stopping:         naloxegol (MOVANTIK) 25 mg tab tablet Comments:   Reason for Stopping:         acyclovir (ZOVIRAX) 200 mg capsule Comments:   Reason for Stopping:         fish oil-dha-epa 1,200-144-216 mg cap Comments:   Reason for Stopping:         IRON/VITAMIN B COMPLEX (GERITOL PO) Comments:   Reason for Stopping:         APIXABAN (ELIQUIS PO) Comments:   Reason for Stopping:         irbesartan (AVAPRO) 300 mg tablet Comments:   Reason for Stopping:         multivitamins-minerals-lutein (CENTRUM SILVER) tab tablet Comments:   Reason for Stopping:         guaiFENesin SR (MUCINEX) 600 mg SR tablet Comments:   Reason for Stopping:         sucralfate (CARAFATE) 1 gram tablet Comments:   Reason for Stopping:         montelukast (SINGULAIR) 10 mg tablet Comments:   Reason for Stopping:         LORazepam (ATIVAN) 1 mg tablet Comments:   Reason for Stopping:         diclofenac (VOLTAREN) 1 % topical gel Comments:   Reason for Stopping:         biotin 10,000 mcg cap Comments:   Reason for Stopping:         selenium 200 mcg cap Comments:   Reason for Stopping:         ROZEREM 8 mg tablet Comments:   Reason for Stopping:         topiramate (TOPAMAX) 50 mg tablet Comments:   Reason for Stopping:         valACYclovir (VALTREX) 500 mg tablet Comments:   Reason for Stopping:         fluticasone-salmeterol (ADVAIR DISKUS) 250-50 mcg/dose diskus inhaler Comments:   Reason for Stopping:         estradiol (ESTRACE) 0.01 % (0.1 mg/gram) vaginal cream Comments:   Reason for Stopping:         temazepam (RESTORIL) 15 mg capsule Comments:   Reason for Stopping:         cyclobenzaprine (FLEXERIL) 10 mg tablet Comments:   Reason for Stopping:         cyanocobalamin (VITAMIN B12) 500 mcg tablet Comments:   Reason for Stopping:         metoclopramide HCl (REGLAN) 5 mg tablet Comments:   Reason for Stopping:         mv, min #36-iron,carbonyl-FA (GERITOL COMPLETE) 16 mg iron- 0.38 mg tab Comments:   Reason for Stopping:         COQ10, UBIQUINOL, PO Comments:   Reason for Stopping:         Guanfacine (INTUNIV) 4 mg Tb24 Comments:   Reason for Stopping:         ergocalciferol (VITAMIN D2) 50,000 unit capsule Comments:   Reason for Stopping:         DULoxetine (CYMBALTA) 30 mg capsule Comments:   Reason for Stopping:         ONDANSETRON HCL (ZOFRAN PO) Comments:   Reason for Stopping:               Activity: Activity as tolerated  Diet: Regular Diet    Follow-up  -   PCP and psych as scheduled    Time spent to discharge patient: 35min    Signed:  Jens Villa MD  2/9/2017  10:16 AM

## 2017-02-10 LAB
BACTERIA SPEC CULT: NORMAL
SERVICE CMNT-IMP: NORMAL

## 2017-02-13 LAB
BACTERIA SPEC CULT: NORMAL
BACTERIA SPEC CULT: NORMAL
SERVICE CMNT-IMP: NORMAL
SERVICE CMNT-IMP: NORMAL

## 2017-02-16 ENCOUNTER — HOSPITAL ENCOUNTER (OUTPATIENT)
Dept: GENERAL RADIOLOGY | Age: 67
Discharge: HOME | End: 2017-02-16
Payer: MEDICARE

## 2017-02-16 DIAGNOSIS — J45.909 UNCOMPLICATED ASTHMA, UNSPECIFIED ASTHMA SEVERITY: Chronic | ICD-10-CM

## 2017-02-16 PROCEDURE — 71020 XR CHEST PA LAT: CPT

## 2017-03-01 ENCOUNTER — PATIENT OUTREACH (OUTPATIENT)
Dept: CASE MANAGEMENT | Age: 67
End: 2017-03-01

## 2017-04-10 ENCOUNTER — APPOINTMENT (OUTPATIENT)
Dept: CT IMAGING | Age: 67
DRG: 071 | End: 2017-04-10
Attending: EMERGENCY MEDICINE
Payer: MEDICARE

## 2017-04-10 ENCOUNTER — HOSPITAL ENCOUNTER (INPATIENT)
Age: 67
LOS: 3 days | Discharge: HOME | DRG: 071 | End: 2017-04-13
Attending: EMERGENCY MEDICINE | Admitting: HOSPITALIST
Payer: MEDICARE

## 2017-04-10 DIAGNOSIS — G93.40 ACUTE ENCEPHALOPATHY: ICD-10-CM

## 2017-04-10 DIAGNOSIS — N17.9 ACUTE KIDNEY INJURY (HCC): Primary | ICD-10-CM

## 2017-04-10 PROBLEM — R41.0 CONFUSION: Status: ACTIVE | Noted: 2017-04-10

## 2017-04-10 PROBLEM — Z86.711 HX OF PULMONARY EMBOLUS: Chronic | Status: ACTIVE | Noted: 2017-04-10

## 2017-04-10 PROBLEM — R94.31 QT PROLONGATION: Status: ACTIVE | Noted: 2017-04-10

## 2017-04-10 PROBLEM — G47.33 OSA (OBSTRUCTIVE SLEEP APNEA): Chronic | Status: ACTIVE | Noted: 2017-04-10

## 2017-04-10 LAB
ALBUMIN SERPL BCP-MCNC: 3.1 G/DL (ref 3.2–4.6)
ALBUMIN/GLOB SERPL: 1 {RATIO} (ref 1.2–3.5)
ALP SERPL-CCNC: 125 U/L (ref 50–136)
ALT SERPL-CCNC: 19 U/L (ref 12–65)
AMPHET UR QL SCN: POSITIVE
ANION GAP BLD CALC-SCNC: 14 MMOL/L (ref 7–16)
APPEARANCE UR: NORMAL
AST SERPL W P-5'-P-CCNC: 15 U/L (ref 15–37)
ATRIAL RATE: 87 BPM
BARBITURATES UR QL SCN: NEGATIVE
BASOPHILS # BLD AUTO: 0 K/UL (ref 0–0.2)
BASOPHILS # BLD: 0 % (ref 0–2)
BENZODIAZ UR QL: POSITIVE
BILIRUB SERPL-MCNC: 0.3 MG/DL (ref 0.2–1.1)
BILIRUB UR QL: NEGATIVE
BUN SERPL-MCNC: 51 MG/DL (ref 8–23)
CALCIUM SERPL-MCNC: 8.6 MG/DL (ref 8.3–10.4)
CALCULATED P AXIS, ECG09: 44 DEGREES
CALCULATED R AXIS, ECG10: -11 DEGREES
CALCULATED T AXIS, ECG11: 35 DEGREES
CANNABINOIDS UR QL SCN: NEGATIVE
CHLORIDE SERPL-SCNC: 106 MMOL/L (ref 98–107)
CO2 SERPL-SCNC: 20 MMOL/L (ref 21–32)
COCAINE UR QL SCN: NEGATIVE
COLOR UR: YELLOW
CREAT SERPL-MCNC: 3.09 MG/DL (ref 0.6–1)
CREAT UR-MCNC: 112.73 MG/DL
DIAGNOSIS, 93000: NORMAL
DIFFERENTIAL METHOD BLD: ABNORMAL
EOSINOPHIL # BLD: 0.1 K/UL (ref 0–0.8)
EOSINOPHIL NFR BLD: 1 % (ref 0.5–7.8)
ERYTHROCYTE [DISTWIDTH] IN BLOOD BY AUTOMATED COUNT: 17.6 % (ref 11.9–14.6)
GLOBULIN SER CALC-MCNC: 3.1 G/DL (ref 2.3–3.5)
GLUCOSE SERPL-MCNC: 123 MG/DL (ref 65–100)
GLUCOSE UR STRIP.AUTO-MCNC: NEGATIVE MG/DL
HCT VFR BLD AUTO: 38.1 % (ref 35.8–46.3)
HGB BLD-MCNC: 11.9 G/DL (ref 11.7–15.4)
HGB UR QL STRIP: NEGATIVE
IMM GRANULOCYTES # BLD: 0 K/UL (ref 0–0.5)
IMM GRANULOCYTES NFR BLD AUTO: 0.3 % (ref 0–5)
KETONES UR QL STRIP.AUTO: NEGATIVE MG/DL
LEUKOCYTE ESTERASE UR QL STRIP.AUTO: NEGATIVE
LYMPHOCYTES # BLD AUTO: 22 % (ref 13–44)
LYMPHOCYTES # BLD: 3.1 K/UL (ref 0.5–4.6)
MCH RBC QN AUTO: 25.6 PG (ref 26.1–32.9)
MCHC RBC AUTO-ENTMCNC: 31.2 G/DL (ref 31.4–35)
MCV RBC AUTO: 81.9 FL (ref 79.6–97.8)
METHADONE UR QL: NEGATIVE
MONOCYTES # BLD: 0.8 K/UL (ref 0.1–1.3)
MONOCYTES NFR BLD AUTO: 6 % (ref 4–12)
NEUTS SEG # BLD: 9.8 K/UL (ref 1.7–8.2)
NEUTS SEG NFR BLD AUTO: 71 % (ref 43–78)
NITRITE UR QL STRIP.AUTO: NEGATIVE
OPIATES UR QL: POSITIVE
P-R INTERVAL, ECG05: 196 MS
PCP UR QL: NEGATIVE
PH UR STRIP: 5 [PH] (ref 5–9)
PLATELET # BLD AUTO: 276 K/UL (ref 150–450)
PMV BLD AUTO: 10.2 FL (ref 10.8–14.1)
POTASSIUM SERPL-SCNC: 3.9 MMOL/L (ref 3.5–5.1)
PROT SERPL-MCNC: 6.2 G/DL (ref 6.3–8.2)
PROT UR STRIP-MCNC: NEGATIVE MG/DL
PROT UR-MCNC: 14 MG/DL
PROT/CREAT UR-RTO: 0.1
Q-T INTERVAL, ECG07: 392 MS
QRS DURATION, ECG06: 86 MS
QTC CALCULATION (BEZET), ECG08: 471 MS
RBC # BLD AUTO: 4.65 M/UL (ref 4.05–5.25)
SODIUM SERPL-SCNC: 140 MMOL/L (ref 136–145)
SODIUM UR-SCNC: 26 MMOL/L
SP GR UR REFRACTOMETRY: 1.01 (ref 1–1.02)
UROBILINOGEN UR QL STRIP.AUTO: 0.2 EU/DL (ref 0.2–1)
VENTRICULAR RATE, ECG03: 87 BPM
WBC # BLD AUTO: 13.9 K/UL (ref 4.3–11.1)

## 2017-04-10 PROCEDURE — 85025 COMPLETE CBC W/AUTO DIFF WBC: CPT | Performed by: EMERGENCY MEDICINE

## 2017-04-10 PROCEDURE — 99285 EMERGENCY DEPT VISIT HI MDM: CPT | Performed by: EMERGENCY MEDICINE

## 2017-04-10 PROCEDURE — 82550 ASSAY OF CK (CPK): CPT | Performed by: HOSPITALIST

## 2017-04-10 PROCEDURE — 80053 COMPREHEN METABOLIC PANEL: CPT | Performed by: EMERGENCY MEDICINE

## 2017-04-10 PROCEDURE — 74011250637 HC RX REV CODE- 250/637: Performed by: HOSPITALIST

## 2017-04-10 PROCEDURE — 84443 ASSAY THYROID STIM HORMONE: CPT | Performed by: HOSPITALIST

## 2017-04-10 PROCEDURE — 77030019605

## 2017-04-10 PROCEDURE — 70450 CT HEAD/BRAIN W/O DYE: CPT

## 2017-04-10 PROCEDURE — 84300 ASSAY OF URINE SODIUM: CPT | Performed by: HOSPITALIST

## 2017-04-10 PROCEDURE — 80307 DRUG TEST PRSMV CHEM ANLYZR: CPT | Performed by: EMERGENCY MEDICINE

## 2017-04-10 PROCEDURE — 87641 MR-STAPH DNA AMP PROBE: CPT | Performed by: HOSPITALIST

## 2017-04-10 PROCEDURE — 77030011943

## 2017-04-10 PROCEDURE — 65660000000 HC RM CCU STEPDOWN

## 2017-04-10 PROCEDURE — 81003 URINALYSIS AUTO W/O SCOPE: CPT | Performed by: HOSPITALIST

## 2017-04-10 PROCEDURE — 96360 HYDRATION IV INFUSION INIT: CPT | Performed by: EMERGENCY MEDICINE

## 2017-04-10 PROCEDURE — 86140 C-REACTIVE PROTEIN: CPT | Performed by: HOSPITALIST

## 2017-04-10 PROCEDURE — 84156 ASSAY OF PROTEIN URINE: CPT | Performed by: HOSPITALIST

## 2017-04-10 PROCEDURE — 81003 URINALYSIS AUTO W/O SCOPE: CPT | Performed by: EMERGENCY MEDICINE

## 2017-04-10 PROCEDURE — 74011250636 HC RX REV CODE- 250/636: Performed by: EMERGENCY MEDICINE

## 2017-04-10 PROCEDURE — 74011000258 HC RX REV CODE- 258: Performed by: HOSPITALIST

## 2017-04-10 PROCEDURE — 51701 INSERT BLADDER CATHETER: CPT | Performed by: EMERGENCY MEDICINE

## 2017-04-10 PROCEDURE — 93005 ELECTROCARDIOGRAM TRACING: CPT | Performed by: EMERGENCY MEDICINE

## 2017-04-10 RX ORDER — MIRTAZAPINE 30 MG/1
30 TABLET, FILM COATED ORAL
Status: CANCELLED | OUTPATIENT
Start: 2017-04-10

## 2017-04-10 RX ORDER — ALPRAZOLAM 0.5 MG/1
1 TABLET ORAL
Status: DISCONTINUED | OUTPATIENT
Start: 2017-04-10 | End: 2017-04-13 | Stop reason: HOSPADM

## 2017-04-10 RX ORDER — SODIUM CHLORIDE 450 MG/100ML
100 INJECTION, SOLUTION INTRAVENOUS CONTINUOUS
Status: DISCONTINUED | OUTPATIENT
Start: 2017-04-10 | End: 2017-04-12

## 2017-04-10 RX ORDER — ALBUTEROL SULFATE 2.5 MG/.5ML
2.5 SOLUTION RESPIRATORY (INHALATION)
Status: DISCONTINUED | OUTPATIENT
Start: 2017-04-10 | End: 2017-04-13 | Stop reason: HOSPADM

## 2017-04-10 RX ORDER — HYDRALAZINE HYDROCHLORIDE 20 MG/ML
10 INJECTION INTRAMUSCULAR; INTRAVENOUS
Status: DISCONTINUED | OUTPATIENT
Start: 2017-04-10 | End: 2017-04-13 | Stop reason: HOSPADM

## 2017-04-10 RX ORDER — ZIPRASIDONE HYDROCHLORIDE 20 MG/1
160 CAPSULE ORAL
Status: CANCELLED | OUTPATIENT
Start: 2017-04-10

## 2017-04-10 RX ORDER — LEVOTHYROXINE SODIUM 125 UG/1
125 TABLET ORAL EVERY EVENING
Status: DISCONTINUED | OUTPATIENT
Start: 2017-04-10 | End: 2017-04-11

## 2017-04-10 RX ORDER — VERAPAMIL HYDROCHLORIDE 120 MG/1
120 TABLET, FILM COATED, EXTENDED RELEASE ORAL
Status: DISCONTINUED | OUTPATIENT
Start: 2017-04-11 | End: 2017-04-13 | Stop reason: HOSPADM

## 2017-04-10 RX ORDER — SODIUM CHLORIDE 0.9 % (FLUSH) 0.9 %
5-10 SYRINGE (ML) INJECTION AS NEEDED
Status: DISCONTINUED | OUTPATIENT
Start: 2017-04-10 | End: 2017-04-13 | Stop reason: HOSPADM

## 2017-04-10 RX ORDER — ALBUTEROL SULFATE 2.5 MG/.5ML
2.5 SOLUTION RESPIRATORY (INHALATION)
Status: DISCONTINUED | OUTPATIENT
Start: 2017-04-11 | End: 2017-04-13 | Stop reason: HOSPADM

## 2017-04-10 RX ORDER — BUDESONIDE 0.5 MG/2ML
500 INHALANT ORAL
Status: DISCONTINUED | OUTPATIENT
Start: 2017-04-10 | End: 2017-04-13 | Stop reason: HOSPADM

## 2017-04-10 RX ORDER — PANTOPRAZOLE SODIUM 40 MG/1
40 TABLET, DELAYED RELEASE ORAL
Status: DISCONTINUED | OUTPATIENT
Start: 2017-04-11 | End: 2017-04-13 | Stop reason: HOSPADM

## 2017-04-10 RX ORDER — SODIUM CHLORIDE 0.9 % (FLUSH) 0.9 %
5-10 SYRINGE (ML) INJECTION EVERY 8 HOURS
Status: DISCONTINUED | OUTPATIENT
Start: 2017-04-10 | End: 2017-04-13 | Stop reason: HOSPADM

## 2017-04-10 RX ORDER — ACETAMINOPHEN 325 MG/1
650 TABLET ORAL
Status: DISCONTINUED | OUTPATIENT
Start: 2017-04-10 | End: 2017-04-13 | Stop reason: HOSPADM

## 2017-04-10 RX ORDER — HEPARIN SODIUM 5000 [USP'U]/ML
5000 INJECTION, SOLUTION INTRAVENOUS; SUBCUTANEOUS EVERY 8 HOURS
Status: CANCELLED | OUTPATIENT
Start: 2017-04-10

## 2017-04-10 RX ADMIN — LEVOTHYROXINE SODIUM 125 MCG: 125 TABLET ORAL at 23:09

## 2017-04-10 RX ADMIN — ACETAMINOPHEN 650 MG: 325 TABLET, FILM COATED ORAL at 23:09

## 2017-04-10 RX ADMIN — SODIUM CHLORIDE 125 ML/HR: 450 INJECTION, SOLUTION INTRAVENOUS at 22:37

## 2017-04-10 RX ADMIN — Medication 10 ML: at 23:09

## 2017-04-10 RX ADMIN — SODIUM CHLORIDE 1000 ML: 900 INJECTION, SOLUTION INTRAVENOUS at 19:53

## 2017-04-10 RX ADMIN — ALPRAZOLAM 1 MG: 0.5 TABLET ORAL at 23:09

## 2017-04-10 NOTE — ED NOTES
Portable is doing the same thing. Just found out pt has a vagus nerve stimulator. Pt is also moving all appendages normally and speaking normally.

## 2017-04-10 NOTE — ED PROVIDER NOTES
HPI Comments: Patient is a 43-year-old female with history of pulmonary embolism currently on apixaban, fibromyalgia, and admission in February of this year for acute encephalopathy presented with stuttering confusion. Today patient presents with one week of word finding difficulty difficulty speaking. She also reports some left anterior foot numbness radiating up to approximately her left knee. She states she went to her psychiatrist today who told her to come to the emergency Department immediately for evaluation. Patient is alert and oriented but has some mild word finding difficulty. She has normal motor function and has no difficulty walking. The time of my evaluation she is drinking a [employer] in no acute distress. Patient is a 73 y.o. female presenting with numbness. The history is provided by the patient. No  was used. Numbness   Associated symptoms include confusion. Pertinent negatives include no shortness of breath and no headaches. Past Medical History:   Diagnosis Date    Acute renal failure ([address] July, 2014    The patient was admitted with acute renal failure secondary to volume depletion. She was found to have a gastric ulcer on admission.  Bilateral pulmonary embolism ([address] September, 2012    Restarted on anticoagulation for lifetime    Calculus of ureter May, 2015    CAP (community acquired pneumonia) October, 2012    RML pneumonia    CVA (cerebral infarction) January, 2012    Reportedly has mild left arm residual weakness    Gastric ulcer July, 2014          HCAP (healthcare-associated pneumonia) January, 2013    RLL pneumonia    HCAP (healthcare-associated pneumonia) February, 2013    RLL pneumonia    Left leg DVT ([address] 1991    On coumadin until 2008    Psychiatric disorder     PUD (peptic ulcer disease) ? ??       Past Surgical History:   Procedure Laterality Date    HX APPENDECTOMY      HX CHOLECYSTECTOMY      HX ENDOSCOPY  July, 2014 Gastric ulcers x 2    HX GASTRIC BYPASS      HX HYSTERECTOMY      HX ORTHOPAEDIC      rt knee growth, right shoulder, left thumb    HX OTHER SURGICAL      vagus nerve stimulator    HX TONSILLECTOMY           Family History:   Problem Relation Age of Onset    Hypertension Mother     Cancer Father      prostate    Heart Attack Father     Heart Disease Father      CAD    Hypertension Father     Hypertension Sister     Stroke Sister     Heart Disease Brother      CAD with CABG    Heart Attack Brother     Hypertension Brother        Social History     Social History    Marital status:      Spouse name: N/A    Number of children: N/A    Years of education: N/A     Occupational History          Disabled     Social History Main Topics    Smoking status: Never Smoker    Smokeless tobacco: Never Used      Comment: exposed to second hand smoke at work for 17 years    Alcohol use No    Drug use: No    Sexual activity: Not on file     Other Topics Concern    Not on file     Social History Narrative    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 , one child who is healthy. Has always lived in [address]. Dog as a pet. No history of pet bird. There is no significant environmental or industrial exposure. There is no known exposure to TB. ALLERGIES: Latex; Bactrim [sulfamethoxazole-trimethoprim]; Lamictal [lamotrigine]; Pcn [penicillins]; and Tapentadol    Review of Systems   Constitutional: Negative for fatigue and fever. HENT: Negative for sore throat. Eyes: Negative for pain. Respiratory: Negative for cough, chest tightness and shortness of breath. Cardiovascular: Negative for leg swelling. Gastrointestinal: Negative for abdominal pain. Genitourinary: Negative for dysuria. Musculoskeletal: Negative for back pain. Skin: Negative for rash. Neurological: Positive for numbness. Negative for syncope and headaches. Psychiatric/Behavioral: Positive for confusion and decreased concentration. Vitals:    04/10/17 1657   BP: 114/64   Pulse: (!) 103   Resp: 16   Temp: 98.1 °F (36.7 °C)   SpO2: 95%   Weight: 81.6 kg (180 lb)   Height: 5' 3\" (1.6 m)            Physical Exam   Constitutional: She is oriented to person, place, and time. She appears well-developed and well-nourished. No distress. HENT:   Head: Normocephalic and atraumatic. Eyes: Conjunctivae and EOM are normal. Pupils are equal, round, and reactive to light. Neck: Normal range of motion. Neck supple. Cardiovascular: Normal rate, regular rhythm and normal heart sounds. Pulmonary/Chest: Effort normal and breath sounds normal. No respiratory distress. She has no wheezes. She has no rales. Abdominal: Soft. She exhibits no distension. There is no tenderness. There is no rebound. Musculoskeletal: Normal range of motion. She exhibits no edema or tenderness. Neurological: She is alert and oriented to person, place, and time. Patient has some  Moderate word finding difficulty and stuttering speech. Extraocular movements intact. Symmetric smile. Good strength in bilateral upper and lower extremities. She has reduced sensation to soft touch as well as 2 point discrimination in her left anterior foot and shin region. Normal pulses distally. Skin: Skin is warm and dry. No rash noted. She is not diaphoretic. Psychiatric: She has a normal mood and affect. Her behavior is normal.   Vitals reviewed. MDM  Number of Diagnoses or Management Options  Acute encephalopathy: new and requires workup  Acute kidney injury [facility]): new and requires workup  Diagnosis management comments: Patient's presentation could be related to polypharmacy. Creatinine is significantly elevated from baseline. Fluids given here in the ED. Will admit to hospitalists for further evaluation and treatment.        Amount and/or Complexity of Data Reviewed  Clinical lab tests: ordered and reviewed (Results for orders placed or performed during the hospital encounter of 04/10/17  -CBC WITH AUTOMATED DIFF       Result                                            Value                         Ref Range                       WBC                                               13.9 (H)                      4.3 - 11.1 K/uL                 RBC                                               4.65                          4.05 - 5.25 M/uL                HGB                                               11.9                          11.7 - 15.4 g/dL                HCT                                               38.1                          35.8 - 46.3 %                   MCV                                               81.9                          79.6 - 97.8 FL                  MCH                                               25.6 (L)                      26.1 - 32.9 PG                  MCHC                                              31.2 (L)                      31.4 - 35.0 g/dL                RDW                                               17.6 (H)                      11.9 - 14.6 %                   PLATELET                                          276                           150 - 450 K/uL                  MPV                                               10.2 (L)                      10.8 - 14.1 FL                  DF                                                AUTOMATED                                                     NEUTROPHILS                                       71                            43 - 78 %                       LYMPHOCYTES                                       22                            13 - 44 %                       MONOCYTES                                         6                             4.0 - 12.0 %                    EOSINOPHILS                                       1                             0.5 - 7.8 % BASOPHILS                                         0                             0.0 - 2.0 %                     IMMATURE GRANULOCYTES                             0.3                           0.0 - 5.0 %                     ABS. NEUTROPHILS                                  9.8 (H)                       1.7 - 8.2 K/UL                  ABS. LYMPHOCYTES                                  3.1                           0.5 - 4.6 K/UL                  ABS. MONOCYTES                                    0.8                           0.1 - 1.3 K/UL                  ABS. EOSINOPHILS                                  0.1                           0.0 - 0.8 K/UL                  ABS. BASOPHILS                                    0.0                           0.0 - 0.2 K/UL                  ABS. IMM.  GRANS.                                  0.0                           0.0 - 0.5 K/UL             -METABOLIC PANEL, COMPREHENSIVE       Result                                            Value                         Ref Range                       Sodium                                            140                           136 - 145 mmol/L                Potassium                                         3.9                           3.5 - 5.1 mmol/L                Chloride                                          106                           98 - 107 mmol/L                 CO2                                               20 (L)                        21 - 32 mmol/L                  Anion gap                                         14                            7 - 16 mmol/L                   Glucose                                           123 (H)                       65 - 100 mg/dL                  BUN                                               51 (H)                        8 - 23 MG/DL                    Creatinine                                        3.09 (H)                      0.6 - 1.0 MG/DL GFR est AA                                        19 (L)                        >60 ml/min/1.73m2               GFR est non-AA                                    16 (L)                        >60 ml/min/1.73m2               Calcium                                           8.6                           8.3 - 10.4 MG/DL                Bilirubin, total                                  0.3                           0.2 - 1.1 MG/DL                 ALT (SGPT)                                        19                            12 - 65 U/L                     AST (SGOT)                                        15                            15 - 37 U/L                     Alk. phosphatase                                  125                           50 - 136 U/L                    Protein, total                                    6.2 (L)                       6.3 - 8.2 g/dL                  Albumin                                           3.1 (L)                       3.2 - 4.6 g/dL                  Globulin                                          3.1                           2.3 - 3.5 g/dL                  A-G Ratio                                         1.0 (L)                       1.2 - 3.5                  -DRUG SCREEN, URINE       Result                                            Value                         Ref Range                       PCP(PHENCYCLIDINE)                                NEGATIVE                                                      BENZODIAZEPINE                                    POSITIVE                                                      COCAINE                                           NEGATIVE                                                      AMPHETAMINE                                       POSITIVE                                                      METHADONE                                         NEGATIVE                                                      THC (TH-CANNABINOL) NEGATIVE                                                      OPIATES                                           POSITIVE                                                      BARBITURATES                                      NEGATIVE                                                 -PROTEIN/CREATININE RATIO, URINE       Result                                            Value                         Ref Range                       Protein, urine random                             14 (H)                        <11.9 mg/dL                     Creatinine, urine                                 112.73                        mg/dL                           Protein/Creat.  urine Ratio                        0.1                                                      -SODIUM, UR, RANDOM       Result                                            Value                         Ref Range                       Sodium urine, random                              26                            MMOL/L                     -EKG, 12 LEAD, INITIAL       Result                                            Value                         Ref Range                       Ventricular Rate                                  87                            BPM                             Atrial Rate                                       87                            BPM                             P-R Interval                                      196                           ms                              QRS Duration                                      86                            ms                              Q-T Interval                                      392                           ms                              QTC Calculation (Bezet)                           471                           ms                              Calculated P Axis                                 44                            degrees Calculated R Axis                                 -11                           degrees                         Calculated T Axis                                 35                            degrees                         Diagnosis                                                                                                   60 hz artefact of baseline    Normal sinus rhythm   Possible Lateral infarct , age undetermined   Abnormal ECG   When compared with ECG of 08-FEB-2017 00:04,   QT has lengthened   Confirmed by CALEB LARSON (), Johnny Ramires ([id number]) on 4/10/2017 10:12:44 PM     )  Tests in the radiology section of CPT®: ordered and reviewed (Ct Head Wo Cont    Result Date: 4/10/2017  Noncontrast head CT Clinical Indication: Difficulty speaking for one week with acute left lower extremity paresthesias today, stroke evaluation. Technique: Noncontrast axial images were obtained through the brain. Comparison: February 7, 2017 Findings: There is no acute intracranial hemorrhage, hydrocephalus, intra-axial mass, or mass-effect. There is no CT evidence of acute large artery territorial infarction or abnormal extra-axial fluid collection. The mastoid air cells and paranasal sinuses are clear where imaged. No displaced skull fractures are present.      Impression: No acute intracranial abnormality.     )  Review and summarize past medical records: yes  Discuss the patient with other providers: yes  Independent visualization of images, tracings, or specimens: yes    Risk of Complications, Morbidity, and/or Mortality  Presenting problems: high  Diagnostic procedures: moderate  Management options: moderate    Patient Progress  Patient progress: stable    ED Course       Procedures

## 2017-04-10 NOTE — IP AVS SNAPSHOT
[address] 
[phone] Patient: Remigio Fisher MRN: [id number] IIO:[birth year] You are allergic to the following Allergen Reactions Latex Contact Dermatitis Bactrim (Sulfamethoxazole-Trimethoprim) Nausea Only Lamictal (Lamotrigine) Atopic Dermatitis Pcn (Penicillins) Rash Tapentadol Rash Immunizations Administered for This Admission Name Date  
 TB Skin Test (PPD) Intradermal 4/11/2017 Recent Documentation Height Weight BMI OB Status Smoking Status 1.6 m 94.3 kg 36.85 kg/m2 Hysterectomy Never Smoker Unresulted Labs Order Current Status CULTURE, BLOOD Preliminary result CULTURE, BLOOD Preliminary result Emergency Contacts Name Discharge Info Relation Home Work Mobile Georgia Flanagan  Daughter [51] [phone] Julee Walker  Spouse [3] [phone] About your hospitalization You were admitted on:  April 10, 2017 You last received care in the:  [facility] 3 ICU You were discharged on:  April 13, 2017 Unit phone number:  [phone] Why you were hospitalized Your primary diagnosis was:  Jerome (Acute Kidney Injury) (Hcc) Your diagnoses also included:  Polypharmacy, Morbid Obesity (Hcc), Hypothyroidism, Gerd (Gastroesophageal Reflux Disease), Bipolar Affective Disorder (Hcc), Acute Encephalopathy, Ho (Obstructive Sleep Apnea), Asthma, Hypertension, History Of Peptic Ulcer Disease, Hx Of Pulmonary Embolus, Qt Prolongation Providers Seen During Your Hospitalizations Provider Role Specialty Primary office phone Danni Rosario MD Attending Provider Emergency Medicine [phone] Antonio Khan MD Attending Provider [facility] [phone] Amber Silva MD Attending Provider Internal Medicine [phone] Your Primary Care Physician (PCP) Primary Care Physician Office Phone Office Fax Jarrell Spain [phone] Follow-up Information Follow up With Details Comments Contact Info [address] In 1 week PATRICIA 11 [facility], [facility] [address] [address] [phone] Get [employer] Go on 5/2/2017 for follow-up after hospitalization [address] [facility] [address] [place] [address] 
[phone] Your Appointments Friday May 05, 2017 10:20 AM EDT  
(Arrive by 9:50 AM) Return appointment with [facility] 75 [facility] [address]  
[phone] Current Discharge Medication List  
  
CONTINUE these medications which have CHANGED Dose & Instructions Dispensing Information Comments Morning Noon Evening Bedtime  
 atorvastatin 10 mg tablet Commonly known as:  LIPITOR What changed:   
- how much to take - when to take this Your next dose is:  tonight Dose:  20 mg Take 2 Tabs by mouth nightly. Quantity:  30 Tab Refills:  11 CONTINUE these medications which have NOT CHANGED Dose & Instructions Dispensing Information Comments Morning Noon Evening Bedtime * PROAIR HFA 90 mcg/actuation inhaler Generic drug:  albuterol Dose:  2 Puff Take 2 Puffs by inhalation every four (4) hours as needed for Wheezing. Refills:  0  
     
   
   
   
  
 * albuterol sulfate 2.5 mg/0.5 mL Nebu nebulizer solution Commonly known as:  PROVENTIL;VENTOLIN Dose:  2.5 mg  
0.5 mL by Nebulization route four (4) times daily. Quantity:  120 mL Refills:  11 DX: 799.02 hypoxemia, 518.83 chronic respitory failure ALPRAZolam 2 mg tablet Commonly known as:  Josette [place] Dose:  2 mg Take 1 Tab by mouth three (3) times daily as needed for Anxiety.  Max Daily Amount: 6 mg. Quantity:  30 Tab Refills:  0  
     
   
   
   
  
 amphetamine-dextroamphetamine XR 20 mg XR capsule Commonly known as:  ADDERALL XR Your next dose is:  tomorrow Dose:  20 mg Take 1 Cap (20 mg total) by mouth every morning. Max Daily Amount: 20 mg  
 Quantity:  30 Cap Refills:  0 DULERA 200-5 mcg/actuation HFA inhaler Generic drug:  mometasone-formoterol USE 2 INHALATIONS TWICE A DAY Quantity:  1 Inhaler Refills:  11 ELIQUIS 2.5 mg tablet Generic drug:  apixaban Your last dose was:  4/13/17 at 9:30 AM  
Your next dose is:  tonight Dose:  2.5 mg Take 2.5 mg by mouth two (2) times a day. Indications: PULMONARY THROMBOEMBOLISM PREVENTION Refills:  0  
     
   
   
  
   
  
 folic acid 560 mcg tablet Dose:  800 mcg Take 800 mcg by mouth daily. Refills:  0 GENTLE LAXATIVE PO Dose:  2 Tab Take 2 Tabs by mouth daily. Refills:  0  
     
  
   
   
   
  
 GEODON 80 mg capsule Generic drug:  ziprasidone Your next dose is: Tonight Dose:  160 mg Take 160 mg by mouth nightly. Refills:  0 IMITREX PO Dose:  100 mg  
take 100 mg by mouth as needed. Refills:  0  
     
   
   
   
  
 lisinopril 40 mg tablet Commonly known as:  Barbarann Plunk Your last dose was:  4/13/17 at 11:00 AM  
Your next dose is:  Tomorrow morning 4/14/17 Dose:  40 mg Take 1 Tab by mouth daily. Quantity:  30 Tab Refills:  5 Magnesium Oxide 500 mg Cap Dose:  500 mg Take 500 mg by mouth. Refills:  0  
     
   
  
   
   
  
 mirtazapine 30 mg tablet Commonly known as:  Isis Briscoe Dose:  30 mg Take 30 mg by mouth nightly. Refills:  0 NEBULIZER  
   
 by Does Not Apply route. Refills:  0 nicotinic acid 500 mg tablet Commonly known as:  NIACIN Your next dose is:  Tomorrow 4/14/17 Dose:  500 mg Take 500 mg by mouth Daily (before breakfast). Refills:  0 NORCO  mg tablet Generic drug:  HYDROcodone-acetaminophen Dose:  1 Tab Take 1 Tab by mouth every six (6) hours as needed for Pain. Refills:  0  
     
   
   
   
  
 oxybutynin chloride XL 10 mg CR tablet Commonly known as:  DITROPAN XL Dose:  10 mg Take 10 mg by mouth daily. Refills:  0  
     
   
  
   
   
  
 pantoprazole 40 mg tablet Commonly known as:  PROTONIX Your last dose was:  4/13/17 at 8:00 AM  
Your next dose is: Tonight before dinner. Dose:  40 mg Take 1 Tab by mouth Before breakfast and dinner. Quantity:  60 Tab Refills:  2 POTASSIUM CHLORIDE SR 10 MEQ TAB Dose:  1 Tab Take 1 Tab by mouth daily. Refills:  0 ROZEREM 8 mg tablet Generic drug:  ramelteon Take  by mouth nightly. Refills:  0 SUPER B COMPLEX PO Dose:  1 Tab Take 1 Tab by mouth daily. Refills:  0  
     
   
  
   
   
  
 SYNTHROID 125 mcg tablet Generic drug:  levothyroxine Take  by mouth every evening. Refills:  0  
     
   
   
  
   
  
 verapamil  mg ER capsule Commonly known as:  Levora Gosling Your last dose was:  4/13/17 at 8:00 AM  
Your next dose is: Tonight with your evening medications Dose:  120 mg Take 120 mg by mouth two (2) times a day. Refills:  0  
     
   
   
  
   
  
 VITAMIN D3 1,000 unit Cap Generic drug:  cholecalciferol Take  by mouth. Refills:  0 ZOFRAN ODT 8 mg disintegrating tablet Generic drug:  ondansetron Dose:  8 mg Take 8 mg by mouth every eight (8) hours as needed for Nausea. Refills:  0 * Notice: This list has 2 medication(s) that are the same as other medications prescribed for you. Read the directions carefully, and ask your doctor or other care provider to review them with you. Where to Get Your Medications These medications were sent to [facility], [address] [facility] [address], [place] [address] Phone:  [phone]  
  atorvastatin 10 mg tablet Discharge Instructions Stroke: After Your Visit Your Care Instructions You have had a stroke. Risk factors for stroke include being overweight, smoking, and sedentary lifestyle. This means that the blood flow to a part of your brain was blocked for some time, which damages the nerve cells in that part of the brain. The part of your body controlled by that part of your brain may not function properly now. The brain is an amazing organ that can heal itself to some degree. The stroke you had damaged part of your brain, but other parts of your brain may take over in some way for the damaged areas. You have already started this process. Going home may be hard for you and your family. The more you can try to do for yourself, the better. Remember to take each day one at a time. Follow-up care is a key part of your treatment and safety. Be sure to make and go to all appointments, and call your doctor if you are having problems. Its also a good idea to know your test results and keep a list of the medicines you take. How can you care for yourself at home? Enter a stroke rehabilitation (rehab) program, if your doctor recommends it. Physical, speech, and occupational therapies can help you manage bathing, dressing, eating, and other basics of daily living. Eat a heart-healthy diet that is low in cholesterol, saturated fat, and salt. Eat lots of fresh fruits and vegetables and foods high in fiber. Increase your activities slowly. Take short rest breaks when you get tired. Gradually increase the amount you walk. Start out by walking a little more than you did the day before. Do not drive until your doctor says it is okay. It is normal to feel sad or depressed after a stroke. If the blues last, talk to your doctor. If you are having problems with urine leakage, go to the bathroom at regular times, including when you first wake up and at bedtime. Also, limit fluids after dinner. If you are constipated, drink plenty of fluids, enough so that your urine is light yellow or clear like water. If you have kidney, heart, or liver disease and have to limit fluids, talk with your doctor before you increase the amount of fluids you drink. Set up a regular time for using the toilet. If you continue to have constipation, your doctor may suggest using a bulking agent, such as Metamucil, or a stool softener, laxative, or enema. Medicines Take your medicines exactly as prescribed. Call your doctor if you think you are having a problem with your medicine. You may be taking several medicines. ACE (angiotensin-converting enzyme) inhibitors, angiotensin II receptor blockers (ARBs), beta-blockers, diuretics (water pills), and calcium channel blockers control your blood pressure. Statins help lower cholesterol. Your doctor may also prescribe medicines for depression, pain, sleep problems, anxiety, or agitation. If your doctor has given you medicine that prevents blood clots, such as warfarin (Coumadin), aspirin combined with extended-release dipyridamole (Aggrenox), clopidogrel (Plavix), or aspirin to prevent another stroke, you should: 
Tell your dentist, pharmacist, and other health professionals that you take these medicines. Watch for unusual bruising or bleeding, such as blood in your urine, red or black stools, or bleeding from your nose or gums. Get regular blood tests to check your clotting time if you are taking Coumadin. Wear medical alert jewelry that says you take blood thinners. You can buy this at most drugstores. Do not take any over-the-counter medicines or herbal products without talking to your doctor first. 
If you take birth control pills or hormone replacement therapy, talk to your doctor about whether they are right for you. For family members and caregivers Make the home safe. Set up a room so that your loved one does not have to climb stairs. Be sure the bathroom is on the same floor. Move throw rugs and furniture that could cause falls, and make sure that the lighting is good. Put grab bars and seats in tubs and showers. Find out what your loved one can do and what he or she needs help with. Try not to do things for your loved one that your loved one can do on his or her own. Help him or her learn and practice new skills. Visit and talk with your loved one often. Try doing activities together that you both enjoy, such as playing cards or board games. Keep in touch with your loved one's friends as much as you can, and encourage them to visit. Take care of yourself. Do not try to do everything yourself. Ask other family members to help. Eat well, get enough rest, and take time to do things that you enjoy. Keep up with your own doctor visits, and make sure to take your medicines regularly. Get out of the house as much as you can. Join a local support group. Find out if you qualify for home health care visits to help with rehab or for adult day care. When should you call for help? Call 911 anytime you think you may need emergency care. For example, call if: 
You have signs of another stroke. These may include: 
Sudden numbness, paralysis, or weakness in your face, arm, or leg, especially on only one side of your body. New problems with walking or balance. Sudden vision changes. Drooling or slurred speech. New problems speaking or understanding simple statements, or you feel confused. A sudden, severe headache that is different from past headaches. Call 911 even if these symptoms go away in a few minutes. You cough up blood. You vomit blood or what looks like coffee grounds. You pass maroon or very bloody stools. Call your doctor now or seek immediate medical care if: 
You have new bruises or blood spots under your skin. You have a nosebleed. Your gums bleed when you brush your teeth. You have blood in your urine. Your stools are black and tarlike or have streaks of blood. You have vaginal bleeding when you are not having your period, or heavy period bleeding. You have new symptoms that may be related to your stroke, such as falls or trouble swallowing. Watch closely for changes in your health, and be sure to contact your doctor if you have any problems. Where can you learn more? Go to ahoyDoc.be Enter R473  in the search box to learn more about \"Stroke: After Your Visit\". © 6995-9873 Healthwise, Incorporated. Care instructions adapted under license by Danica Kinney (which disclaims liability or warranty for this information). This care instruction is for use with your licensed healthcare professional. If you have questions about a medical condition or this instruction, always ask your healthcare professional. Shelley Keto any warranty or liability for your use of this information. Discharge Orders None Vinted Announcement We are excited to announce that we are making your provider's discharge notes available to you in Vinted. You will see these notes when they are completed and signed by the physician that discharged you from your recent hospital stay.   If you have any questions or concerns about any information you see in Vinted, please call the [facility] Department where you were seen or reach out to your Primary Care Provider for more information about your plan of care. Introducing [facility]! Amy Cooper introduces [employer] patient portal. Now you can access parts of your medical record, email your doctor's office, and request medication refills online. 1. In your internet browser, go to [email or web address] 2. Click on the First Time User? Click Here link in the Sign In box. You will see the New Member Sign Up page. 3. Enter your [employer] Access Code exactly as it appears below. You will not need to use this code after youve completed the sign-up process. If you do not sign up before the expiration date, you must request a new code. · [employer] Access Code: [email or web address] Expires: 5/2/2017 12:52 PM 
 
4. Enter the last four digits of your Social Security Number (xxxx) and Date of Birth (mm/dd/yyyy) as indicated and click Submit. You will be taken to the next sign-up page. 5. Create a SI[employer]ER ID. This will be your [employer] login ID and cannot be changed, so think of one that is secure and easy to remember. 6. Create a [employer] password. You can change your password at any time. 7. Enter your Password Reset Question and Answer. This can be used at a later time if you forget your password. 8. Enter your e-mail address. You will receive e-mail notification when new information is available in [address] 9. Click Sign Up. You can now view and download portions of your medical record. 10. Click the Download Summary menu link to download a portable copy of your medical information. If you have questions, please visit the Frequently Asked Questions section of the [employer] website. Remember, [employer]ER is NOT to be used for urgent needs. For medical emergencies, dial 911. Now available from your iPhone and Android! General Information Please provide this summary of care documentation to your next provider. Patient Signature:  ____________________________________________________________ Date:  ____________________________________________________________  
  
Thacker Ledger Provider Signature:  ____________________________________________________________ Date:  ____________________________________________________________

## 2017-04-10 NOTE — IP AVS SNAPSHOT
Current Discharge Medication List  
  
CONTINUE these medications which have CHANGED Dose & Instructions Dispensing Information Comments Morning Noon Evening Bedtime  
 atorvastatin 10 mg tablet Commonly known as:  LIPITOR What changed:   
- how much to take - when to take this Your next dose is:  tonight Dose:  20 mg Take 2 Tabs by mouth nightly. Quantity:  30 Tab Refills:  11 CONTINUE these medications which have NOT CHANGED Dose & Instructions Dispensing Information Comments Morning Noon Evening Bedtime * PROAIR HFA 90 mcg/actuation inhaler Generic drug:  albuterol Dose:  2 Puff Take 2 Puffs by inhalation every four (4) hours as needed for Wheezing. Refills:  0  
     
   
   
   
  
 * albuterol sulfate 2.5 mg/0.5 mL Nebu nebulizer solution Commonly known as:  PROVENTIL;VENTOLIN Dose:  2.5 mg  
0.5 mL by Nebulization route four (4) times daily. Quantity:  120 mL Refills:  11 DX: 799.02 hypoxemia, 518.83 chronic respitory failure ALPRAZolam 2 mg tablet Commonly known as:  Caity Isbell Dose:  2 mg Take 1 Tab by mouth three (3) times daily as needed for Anxiety. Max Daily Amount: 6 mg. Quantity:  30 Tab Refills:  0  
     
   
   
   
  
 amphetamine-dextroamphetamine XR 20 mg XR capsule Commonly known as:  ADDERALL XR Your next dose is:  tomorrow Dose:  20 mg Take 1 Cap (20 mg total) by mouth every morning. Max Daily Amount: 20 mg  
 Quantity:  30 Cap Refills:  0 DULERA 200-5 mcg/actuation HFA inhaler Generic drug:  mometasone-formoterol USE 2 INHALATIONS TWICE A DAY Quantity:  1 Inhaler Refills:  11 ELIQUIS 2.5 mg tablet Generic drug:  apixaban Your last dose was:  4/13/17 at 9:30 AM  
Your next dose is:  tonight  Dose:  2.5 mg  
 Take 2.5 mg by mouth two (2) times a day. Indications: PULMONARY THROMBOEMBOLISM PREVENTION Refills:  0  
     
   
   
  
   
  
 folic acid 058 mcg tablet Dose:  800 mcg Take 800 mcg by mouth daily. Refills:  0 GENTLE LAXATIVE PO Dose:  2 Tab Take 2 Tabs by mouth daily. Refills:  0  
     
  
   
   
   
  
 GEODON 80 mg capsule Generic drug:  ziprasidone Your next dose is: Tonight Dose:  160 mg Take 160 mg by mouth nightly. Refills:  0 IMITREX PO Dose:  100 mg  
take 100 mg by mouth as needed. Refills:  0  
     
   
   
   
  
 lisinopril 40 mg tablet Commonly known as:  Evelia Embs Your last dose was:  4/13/17 at 11:00 AM  
Your next dose is:  Tomorrow morning 4/14/17 Dose:  40 mg Take 1 Tab by mouth daily. Quantity:  30 Tab Refills:  5 Magnesium Oxide 500 mg Cap Dose:  500 mg Take 500 mg by mouth. Refills:  0  
     
   
  
   
   
  
 mirtazapine 30 mg tablet Commonly known as:  Irven Rend Dose:  30 mg Take 30 mg by mouth nightly. Refills:  0 NEBULIZER  
   
 by Does Not Apply route. Refills:  0  
     
   
   
   
  
 nicotinic acid 500 mg tablet Commonly known as:  NIACIN Your next dose is:  Tomorrow 4/14/17 Dose:  500 mg Take 500 mg by mouth Daily (before breakfast). Refills:  0 NORCO  mg tablet Generic drug:  HYDROcodone-acetaminophen Dose:  1 Tab Take 1 Tab by mouth every six (6) hours as needed for Pain. Refills:  0  
     
   
   
   
  
 oxybutynin chloride XL 10 mg CR tablet Commonly known as:  DITROPAN XL Dose:  10 mg Take 10 mg by mouth daily. Refills:  0  
     
   
  
   
   
  
 pantoprazole 40 mg tablet Commonly known as:  PROTONIX Your last dose was:  4/13/17 at 8:00 AM  
 Your next dose is: Tonight before dinner. Dose:  40 mg Take 1 Tab by mouth Before breakfast and dinner. Quantity:  60 Tab Refills:  2 POTASSIUM CHLORIDE SR 10 MEQ TAB Dose:  1 Tab Take 1 Tab by mouth daily. Refills:  0 ROZEREM 8 mg tablet Generic drug:  ramelteon Take  by mouth nightly. Refills:  0 SUPER B COMPLEX PO Dose:  1 Tab Take 1 Tab by mouth daily. Refills:  0  
     
   
  
   
   
  
 SYNTHROID 125 mcg tablet Generic drug:  levothyroxine Take  by mouth every evening. Refills:  0  
     
   
   
  
   
  
 verapamil  mg ER capsule Commonly known as:  Tru Janiya Your last dose was:  4/13/17 at 8:00 AM  
Your next dose is: Tonight with your evening medications Dose:  120 mg Take 120 mg by mouth two (2) times a day. Refills:  0  
     
   
   
  
   
  
 VITAMIN D3 1,000 unit Cap Generic drug:  cholecalciferol Take  by mouth. Refills:  0 ZOFRAN ODT 8 mg disintegrating tablet Generic drug:  ondansetron Dose:  8 mg Take 8 mg by mouth every eight (8) hours as needed for Nausea. Refills:  0  
     
   
   
   
  
 * Notice: This list has 2 medication(s) that are the same as other medications prescribed for you. Read the directions carefully, and ask your doctor or other care provider to review them with you. Where to Get Your Medications These medications were sent to [address] [facility], [address] [place] [address], [facility] [address] [address] Phone:  [phone]  
  atorvastatin 10 mg tablet

## 2017-04-11 ENCOUNTER — APPOINTMENT (OUTPATIENT)
Dept: ULTRASOUND IMAGING | Age: 67
DRG: 071 | End: 2017-04-11
Attending: HOSPITALIST
Payer: MEDICARE

## 2017-04-11 ENCOUNTER — APPOINTMENT (OUTPATIENT)
Dept: GENERAL RADIOLOGY | Age: 67
DRG: 071 | End: 2017-04-11
Attending: INTERNAL MEDICINE
Payer: MEDICARE

## 2017-04-11 LAB
ALBUMIN SERPL BCP-MCNC: 2.9 G/DL (ref 3.2–4.6)
ALBUMIN/GLOB SERPL: 1.3 {RATIO} (ref 1.2–3.5)
ALP SERPL-CCNC: 111 U/L (ref 50–136)
ALT SERPL-CCNC: 16 U/L (ref 12–65)
AMMONIA PLAS-SCNC: 19 UMOL/L (ref 11–32)
ANION GAP BLD CALC-SCNC: 12 MMOL/L (ref 7–16)
AST SERPL W P-5'-P-CCNC: 14 U/L (ref 15–37)
BACTERIA SPEC CULT: NORMAL
BASOPHILS # BLD AUTO: 0 K/UL (ref 0–0.2)
BASOPHILS # BLD: 0 % (ref 0–2)
BILIRUB SERPL-MCNC: 0.4 MG/DL (ref 0.2–1.1)
BUN SERPL-MCNC: 37 MG/DL (ref 8–23)
CALCIUM SERPL-MCNC: 7.8 MG/DL (ref 8.3–10.4)
CHLORIDE SERPL-SCNC: 109 MMOL/L (ref 98–107)
CK MB CFR SERPL CALC: 0.2 %
CK MB SERPL-MCNC: 1.2 NG/ML (ref 0.5–3.6)
CK SERPL-CCNC: 747 U/L (ref 21–215)
CO2 SERPL-SCNC: 21 MMOL/L (ref 21–32)
CORTIS BS SERPL-MCNC: 11.2 UG/DL
CREAT SERPL-MCNC: 1.69 MG/DL (ref 0.6–1)
CRP SERPL-MCNC: 2.5 MG/DL (ref 0–0.9)
DIFFERENTIAL METHOD BLD: ABNORMAL
EOSINOPHIL # BLD: 0.2 K/UL (ref 0–0.8)
EOSINOPHIL NFR BLD: 2 % (ref 0.5–7.8)
ERYTHROCYTE [DISTWIDTH] IN BLOOD BY AUTOMATED COUNT: 17.6 % (ref 11.9–14.6)
ERYTHROCYTE [SEDIMENTATION RATE] IN BLOOD: 11 MM/HR (ref 0–30)
ETHANOL SERPL-MCNC: <3 MG/DL
FERRITIN SERPL-MCNC: 31 NG/ML (ref 8–388)
FOLATE SERPL-MCNC: 81.5 NG/ML (ref 3.1–17.5)
GLOBULIN SER CALC-MCNC: 2.3 G/DL (ref 2.3–3.5)
GLUCOSE SERPL-MCNC: 87 MG/DL (ref 65–100)
HCT VFR BLD AUTO: 34.1 % (ref 35.8–46.3)
HGB BLD-MCNC: 10.5 G/DL (ref 11.7–15.4)
HGB RETIC QN AUTO: 31 PG (ref 29–35)
IMM GRANULOCYTES # BLD: 0 K/UL (ref 0–0.5)
IMM GRANULOCYTES NFR BLD AUTO: 0.2 % (ref 0–5)
IMM RETICS NFR: 10.2 % (ref 3–15.9)
IRON SATN MFR SERPL: 9 %
IRON SERPL-MCNC: 26 UG/DL (ref 35–150)
LACTATE SERPL-SCNC: 0.6 MMOL/L (ref 0.4–2)
LYMPHOCYTES # BLD AUTO: 26 % (ref 13–44)
LYMPHOCYTES # BLD: 2.8 K/UL (ref 0.5–4.6)
MAGNESIUM SERPL-MCNC: 2.3 MG/DL (ref 1.8–2.4)
MCH RBC QN AUTO: 25.2 PG (ref 26.1–32.9)
MCHC RBC AUTO-ENTMCNC: 30.8 G/DL (ref 31.4–35)
MCV RBC AUTO: 81.8 FL (ref 79.6–97.8)
MONOCYTES # BLD: 1 K/UL (ref 0.1–1.3)
MONOCYTES NFR BLD AUTO: 9 % (ref 4–12)
NEUTS SEG # BLD: 6.8 K/UL (ref 1.7–8.2)
NEUTS SEG NFR BLD AUTO: 63 % (ref 43–78)
OSMOLALITY SERPL: 297 MOSM/KG H2O
PHOSPHATE SERPL-MCNC: 4.2 MG/DL (ref 2.3–3.7)
PLATELET # BLD AUTO: 216 K/UL (ref 150–450)
PMV BLD AUTO: 10.7 FL (ref 10.8–14.1)
POTASSIUM SERPL-SCNC: 3.8 MMOL/L (ref 3.5–5.1)
PROCALCITONIN SERPL-MCNC: 0.06 NG/ML
PROT SERPL-MCNC: 5.2 G/DL (ref 6.3–8.2)
RBC # BLD AUTO: 4.17 M/UL (ref 4.05–5.25)
RETICS # AUTO: 0.02 M/UL (ref 0.02–0.08)
RETICS/RBC NFR AUTO: 0.6 % (ref 0.3–2)
SERVICE CMNT-IMP: NORMAL
SODIUM SERPL-SCNC: 142 MMOL/L (ref 136–145)
T3 SERPL-MCNC: 0.64 NG/ML (ref 0.6–1.81)
T4 FREE SERPL-MCNC: 1.2 NG/DL (ref 0.78–1.46)
TIBC SERPL-MCNC: 294 UG/DL (ref 250–450)
TROPONIN I SERPL-MCNC: <0.04 NG/ML (ref 0.02–0.05)
TSH SERPL DL<=0.005 MIU/L-ACNC: 0.26 UIU/ML (ref 0.36–3.74)
VIT B12 SERPL-MCNC: >6000 PG/ML (ref 193–986)
WBC # BLD AUTO: 10.8 K/UL (ref 4.3–11.1)

## 2017-04-11 PROCEDURE — 83930 ASSAY OF BLOOD OSMOLALITY: CPT | Performed by: HOSPITALIST

## 2017-04-11 PROCEDURE — 82728 ASSAY OF FERRITIN: CPT | Performed by: HOSPITALIST

## 2017-04-11 PROCEDURE — 94640 AIRWAY INHALATION TREATMENT: CPT

## 2017-04-11 PROCEDURE — 74011000250 HC RX REV CODE- 250: Performed by: HOSPITALIST

## 2017-04-11 PROCEDURE — 82533 TOTAL CORTISOL: CPT | Performed by: HOSPITALIST

## 2017-04-11 PROCEDURE — 97165 OT EVAL LOW COMPLEX 30 MIN: CPT

## 2017-04-11 PROCEDURE — 84100 ASSAY OF PHOSPHORUS: CPT | Performed by: HOSPITALIST

## 2017-04-11 PROCEDURE — 84439 ASSAY OF FREE THYROXINE: CPT | Performed by: HOSPITALIST

## 2017-04-11 PROCEDURE — 84145 PROCALCITONIN (PCT): CPT | Performed by: HOSPITALIST

## 2017-04-11 PROCEDURE — 65660000000 HC RM CCU STEPDOWN

## 2017-04-11 PROCEDURE — 74011250637 HC RX REV CODE- 250/637: Performed by: INTERNAL MEDICINE

## 2017-04-11 PROCEDURE — 83605 ASSAY OF LACTIC ACID: CPT | Performed by: HOSPITALIST

## 2017-04-11 PROCEDURE — 82140 ASSAY OF AMMONIA: CPT | Performed by: HOSPITALIST

## 2017-04-11 PROCEDURE — 86580 TB INTRADERMAL TEST: CPT | Performed by: INTERNAL MEDICINE

## 2017-04-11 PROCEDURE — 95816 EEG AWAKE AND DROWSY: CPT | Performed by: HOSPITALIST

## 2017-04-11 PROCEDURE — 82746 ASSAY OF FOLIC ACID SERUM: CPT | Performed by: HOSPITALIST

## 2017-04-11 PROCEDURE — 85025 COMPLETE CBC W/AUTO DIFF WBC: CPT | Performed by: HOSPITALIST

## 2017-04-11 PROCEDURE — 84480 ASSAY TRIIODOTHYRONINE (T3): CPT | Performed by: HOSPITALIST

## 2017-04-11 PROCEDURE — 83540 ASSAY OF IRON: CPT | Performed by: HOSPITALIST

## 2017-04-11 PROCEDURE — 74011000302 HC RX REV CODE- 302: Performed by: INTERNAL MEDICINE

## 2017-04-11 PROCEDURE — 80053 COMPREHEN METABOLIC PANEL: CPT | Performed by: HOSPITALIST

## 2017-04-11 PROCEDURE — 87040 BLOOD CULTURE FOR BACTERIA: CPT | Performed by: HOSPITALIST

## 2017-04-11 PROCEDURE — 82607 VITAMIN B-12: CPT | Performed by: HOSPITALIST

## 2017-04-11 PROCEDURE — 94664 DEMO&/EVAL PT USE INHALER: CPT

## 2017-04-11 PROCEDURE — 85046 RETICYTE/HGB CONCENTRATE: CPT | Performed by: HOSPITALIST

## 2017-04-11 PROCEDURE — 76770 US EXAM ABDO BACK WALL COMP: CPT

## 2017-04-11 PROCEDURE — 83735 ASSAY OF MAGNESIUM: CPT | Performed by: HOSPITALIST

## 2017-04-11 PROCEDURE — 84484 ASSAY OF TROPONIN QUANT: CPT | Performed by: HOSPITALIST

## 2017-04-11 PROCEDURE — 71010 XR CHEST PORT: CPT

## 2017-04-11 PROCEDURE — 74011250637 HC RX REV CODE- 250/637: Performed by: HOSPITALIST

## 2017-04-11 PROCEDURE — 85652 RBC SED RATE AUTOMATED: CPT | Performed by: HOSPITALIST

## 2017-04-11 PROCEDURE — 80307 DRUG TEST PRSMV CHEM ANLYZR: CPT | Performed by: HOSPITALIST

## 2017-04-11 PROCEDURE — 93005 ELECTROCARDIOGRAM TRACING: CPT | Performed by: HOSPITALIST

## 2017-04-11 PROCEDURE — 74011000258 HC RX REV CODE- 258: Performed by: HOSPITALIST

## 2017-04-11 PROCEDURE — 97161 PT EVAL LOW COMPLEX 20 MIN: CPT

## 2017-04-11 RX ORDER — ZIPRASIDONE HYDROCHLORIDE 20 MG/1
160 CAPSULE ORAL
Status: DISCONTINUED | OUTPATIENT
Start: 2017-04-11 | End: 2017-04-13 | Stop reason: HOSPADM

## 2017-04-11 RX ORDER — LEVOTHYROXINE SODIUM 50 UG/1
100 TABLET ORAL EVERY EVENING
Status: DISCONTINUED | OUTPATIENT
Start: 2017-04-11 | End: 2017-04-13 | Stop reason: HOSPADM

## 2017-04-11 RX ORDER — HYDROCODONE BITARTRATE AND ACETAMINOPHEN 10; 325 MG/1; MG/1
1 TABLET ORAL
Status: DISCONTINUED | OUTPATIENT
Start: 2017-04-11 | End: 2017-04-13 | Stop reason: HOSPADM

## 2017-04-11 RX ADMIN — ALBUTEROL SULFATE 2.5 MG: 2.5 SOLUTION RESPIRATORY (INHALATION) at 01:26

## 2017-04-11 RX ADMIN — Medication 10 ML: at 21:51

## 2017-04-11 RX ADMIN — BUDESONIDE 500 MCG: 0.5 INHALANT RESPIRATORY (INHALATION) at 01:26

## 2017-04-11 RX ADMIN — SODIUM CHLORIDE 125 ML/HR: 450 INJECTION, SOLUTION INTRAVENOUS at 16:08

## 2017-04-11 RX ADMIN — HYDROCODONE BITARTRATE AND ACETAMINOPHEN 1 TABLET: 10; 325 TABLET ORAL at 13:52

## 2017-04-11 RX ADMIN — APIXABAN 2.5 MG: 2.5 TABLET, FILM COATED ORAL at 08:47

## 2017-04-11 RX ADMIN — ALPRAZOLAM 1 MG: 0.5 TABLET ORAL at 08:47

## 2017-04-11 RX ADMIN — Medication 10 ML: at 05:54

## 2017-04-11 RX ADMIN — APIXABAN 2.5 MG: 2.5 TABLET, FILM COATED ORAL at 17:39

## 2017-04-11 RX ADMIN — ALPRAZOLAM 1 MG: 0.5 TABLET ORAL at 20:34

## 2017-04-11 RX ADMIN — BUDESONIDE 500 MCG: 0.5 INHALANT RESPIRATORY (INHALATION) at 09:14

## 2017-04-11 RX ADMIN — LEVOTHYROXINE SODIUM 100 MCG: 50 TABLET ORAL at 17:39

## 2017-04-11 RX ADMIN — VERAPAMIL HYDROCHLORIDE 120 MG: 120 TABLET, FILM COATED, EXTENDED RELEASE ORAL at 13:50

## 2017-04-11 RX ADMIN — ALBUTEROL SULFATE 2.5 MG: 2.5 SOLUTION RESPIRATORY (INHALATION) at 20:03

## 2017-04-11 RX ADMIN — TUBERCULIN PURIFIED PROTEIN DERIVATIVE 5 UNITS: 5 INJECTION, SOLUTION INTRADERMAL at 08:53

## 2017-04-11 RX ADMIN — ALBUTEROL SULFATE 2.5 MG: 2.5 SOLUTION RESPIRATORY (INHALATION) at 09:14

## 2017-04-11 RX ADMIN — ACETAMINOPHEN 650 MG: 325 TABLET, FILM COATED ORAL at 19:30

## 2017-04-11 RX ADMIN — SODIUM CHLORIDE 125 ML/HR: 450 INJECTION, SOLUTION INTRAVENOUS at 08:48

## 2017-04-11 RX ADMIN — ALBUTEROL SULFATE 2.5 MG: 2.5 SOLUTION RESPIRATORY (INHALATION) at 13:24

## 2017-04-11 RX ADMIN — ACETAMINOPHEN 650 MG: 325 TABLET, FILM COATED ORAL at 08:47

## 2017-04-11 RX ADMIN — ZIPRASIDONE HCL 160 MG: 20 CAPSULE ORAL at 21:48

## 2017-04-11 RX ADMIN — PANTOPRAZOLE SODIUM 40 MG: 40 TABLET, DELAYED RELEASE ORAL at 08:47

## 2017-04-11 RX ADMIN — BUDESONIDE 500 MCG: 0.5 INHALANT RESPIRATORY (INHALATION) at 20:03

## 2017-04-11 NOTE — PROGRESS NOTES
Per Dr. Francisco Raymond, suicide precautions are due to past history of SI and depression. No current SI or attempts to harm self. Sitter not needed at this time.

## 2017-04-11 NOTE — PROGRESS NOTES
TRANSFER - IN REPORT:    Verbal report received from Saadia Denson, [facility] (name) on Nancy Piña  being received from ED (unit) for routine progression of care. Report consisted of patients Situation, Background, Assessment and   Recommendations(SBAR). Information from the following report(s) SBAR, Kardex, ED Summary, Procedure Summary, Intake/Output, Accordion, Recent Results and Cardiac Rhythm NSR was reviewed with the receiving nurse. Opportunity for questions and clarification was provided. Assessment completed upon patients arrival to unit and care assumed. Pt bathed. Dirty brief changed. Allevyn applied to sacrum. Cardiac monitoring in place. Pt noted to have excoriation to groin and rectum, with a healing bruise to right hip.

## 2017-04-11 NOTE — ED NOTES
TRANSFER - OUT REPORT:    Verbal report given to [facility] MICHAEL RN(name) on Lesvia Sutherland  being transferred to 372  ICU(unit) for routine progression of care       Report consisted of patients Situation, Background, Assessment and   Recommendations(SBAR). Information from the following report(s) ED Summary was reviewed with the receiving nurse. Lines:   Peripheral IV 04/10/17 Right Hand (Active)   Site Assessment Clean, dry, & intact 4/10/2017  6:39 PM   Phlebitis Assessment 0 4/10/2017  6:39 PM   Infiltration Assessment 0 4/10/2017  6:39 PM   Dressing Status Clean, dry, & intact 4/10/2017  6:39 PM        Opportunity for questions and clarification was provided.       Patient transported with:   Registered Nurse

## 2017-04-11 NOTE — PROGRESS NOTES
Hospitalist Progress Note    2017  12:49 PM  Admit Date: 4/10/2017  6:04 PM   NAME: Frannie Brock   :  1950   MRN:  [id number]   Attending: Adiel Machuca MD  PCP:  Janes Andrew DO  Treatment Team: Attending Provider: Adiel Machuca MD; Consulting Provider: Maddy Lin MD  SUBJECTIVE:       Ms. Albina Triplett is a 72 yo WF with PMH of PE on eliquis, CVA, urine incontinence with nerve stimulator, depression and polypharmacy admitted with acute change in mentation, slurred speech, left sided weakness and CAMILA. CT head negative, unable to have MRI due to nerve stimulator. EEG ordered and neurology consulted. She has been hydrated with negative renal US and improved renal function. BC pending, CXR negative. Will need SW for dispo      17 currently A and O x 3, wants to resume home meds that have been held, denies taking extra meds, has ongoing left sided weakness, no dysphagia       Recent Results (from the past 24 hour(s))   CBC WITH AUTOMATED DIFF    Collection Time: 04/10/17  6:32 PM   Result Value Ref Range    WBC 13.9 (H) 4.3 - 11.1 K/uL    RBC 4.65 4.05 - 5.25 M/uL    HGB 11.9 11.7 - 15.4 g/dL    HCT 38.1 35.8 - 46.3 %    MCV 81.9 79.6 - 97.8 FL    MCH 25.6 (L) 26.1 - 32.9 PG    MCHC 31.2 (L) 31.4 - 35.0 g/dL    RDW 17.6 (H) 11.9 - 14.6 %    PLATELET 179 006 - 866 K/uL    MPV 10.2 (L) 10.8 - 14.1 FL    DF AUTOMATED      NEUTROPHILS 71 43 - 78 %    LYMPHOCYTES 22 13 - 44 %    MONOCYTES 6 4.0 - 12.0 %    EOSINOPHILS 1 0.5 - 7.8 %    BASOPHILS 0 0.0 - 2.0 %    IMMATURE GRANULOCYTES 0.3 0.0 - 5.0 %    ABS. NEUTROPHILS 9.8 (H) 1.7 - 8.2 K/UL    ABS. LYMPHOCYTES 3.1 0.5 - 4.6 K/UL    ABS. MONOCYTES 0.8 0.1 - 1.3 K/UL    ABS. EOSINOPHILS 0.1 0.0 - 0.8 K/UL    ABS. BASOPHILS 0.0 0.0 - 0.2 K/UL    ABS. IMM.  GRANS. 0.0 0.0 - 0.5 K/UL   METABOLIC PANEL, COMPREHENSIVE    Collection Time: 04/10/17  6:32 PM   Result Value Ref Range    Sodium 140 136 - 145 mmol/L    Potassium 3.9 3.5 - 5.1 mmol/L Chloride 106 98 - 107 mmol/L    CO2 20 (L) 21 - 32 mmol/L    Anion gap 14 7 - 16 mmol/L    Glucose 123 (H) 65 - 100 mg/dL    BUN 51 (H) 8 - 23 MG/DL    Creatinine 3.09 (H) 0.6 - 1.0 MG/DL    GFR est AA 19 (L) >60 ml/min/1.73m2    GFR est non-AA 16 (L) >60 ml/min/1.73m2    Calcium 8.6 8.3 - 10.4 MG/DL    Bilirubin, total 0.3 0.2 - 1.1 MG/DL    ALT (SGPT) 19 12 - 65 U/L    AST (SGOT) 15 15 - 37 U/L    Alk. phosphatase 125 50 - 136 U/L    Protein, total 6.2 (L) 6.3 - 8.2 g/dL    Albumin 3.1 (L) 3.2 - 4.6 g/dL    Globulin 3.1 2.3 - 3.5 g/dL    A-G Ratio 1.0 (L) 1.2 - 3.5     EKG, 12 LEAD, INITIAL    Collection Time: 04/10/17  7:16 PM   Result Value Ref Range    Ventricular Rate 87 BPM    Atrial Rate 87 BPM    P-R Interval 196 ms    QRS Duration 86 ms    Q-T Interval 392 ms    QTC Calculation (Bezet) 471 ms    Calculated P Axis 44 degrees    Calculated R Axis -11 degrees    Calculated T Axis 35 degrees    Diagnosis       60 hz artefact of baseline   Normal sinus rhythm  Possible Lateral infarct , age undetermined  Abnormal ECG  When compared with ECG of 08-FEB-2017 00:04,  QT has lengthened  Confirmed by CALEB LARSON (), Kalli Salinas ([id number]) on 4/10/2017 10:12:44 PM     DRUG SCREEN, URINE    Collection Time: 04/10/17  8:25 PM   Result Value Ref Range    PCP(PHENCYCLIDINE) NEGATIVE       BENZODIAZEPINE POSITIVE      COCAINE NEGATIVE       AMPHETAMINE POSITIVE      METHADONE NEGATIVE       THC (TH-CANNABINOL) NEGATIVE       OPIATES POSITIVE      BARBITURATES NEGATIVE      PROTEIN/CREATININE RATIO, URINE    Collection Time: 04/10/17  8:25 PM   Result Value Ref Range    Protein, urine random 14 (H) <11.9 mg/dL    Creatinine, urine 112.73 mg/dL    Protein/Creat.  urine Ratio 0.1     SODIUM, UR, RANDOM    Collection Time: 04/10/17  8:25 PM   Result Value Ref Range    Sodium urine, random 26 MMOL/L   URINALYSIS W/ RFLX MICROSCOPIC    Collection Time: 04/10/17  8:50 PM   Result Value Ref Range    Color YELLOW      Appearance CLOUDY Specific gravity 1.013 1.001 - 1.023      pH (UA) 5.0 5.0 - 9.0      Protein NEGATIVE  NEG mg/dL    Glucose NEGATIVE  mg/dL    Ketone NEGATIVE  NEG mg/dL    Bilirubin NEGATIVE  NEG      Blood NEGATIVE  NEG      Urobilinogen 0.2 0.2 - 1.0 EU/dL    Nitrites NEGATIVE  NEG      Leukocyte Esterase NEGATIVE  NEG     MRSA SCREEN - PCR (NASAL)    Collection Time: 04/10/17 10:36 PM   Result Value Ref Range    Special Requests: NO SPECIAL REQUESTS      Culture result:        MRSA target DNA is not detected (presumptive not colonized with MRSA)   CK WITH MB    Collection Time: 04/10/17 11:00 PM   Result Value Ref Range     (H) 21 - 215 U/L    CK - MB 1.2 0.5 - 3.6 ng/ml    CK-MB Index 0.2 %   TSH 3RD GENERATION    Collection Time: 04/10/17 11:00 PM   Result Value Ref Range    TSH 0.259 (L) 0.358 - 3.740 uIU/mL   C REACTIVE PROTEIN, QT    Collection Time: 04/10/17 11:00 PM   Result Value Ref Range    C-Reactive protein 2.5 (H) 0.0 - 0.9 mg/dL   OSMOLALITY, SERUM/PLASMA    Collection Time: 04/11/17  6:13 AM   Result Value Ref Range    Osmolality, serum/plasma 297 MOSM/kg H2O   ETHYL ALCOHOL    Collection Time: 04/11/17  6:13 AM   Result Value Ref Range    ALCOHOL(ETHYL),SERUM <3 MG/DL   METABOLIC PANEL, COMPREHENSIVE    Collection Time: 04/11/17  6:13 AM   Result Value Ref Range    Sodium 142 136 - 145 mmol/L    Potassium 3.8 3.5 - 5.1 mmol/L    Chloride 109 (H) 98 - 107 mmol/L    CO2 21 21 - 32 mmol/L    Anion gap 12 7 - 16 mmol/L    Glucose 87 65 - 100 mg/dL    BUN 37 (H) 8 - 23 MG/DL    Creatinine 1.69 (H) 0.6 - 1.0 MG/DL    GFR est AA 39 (L) >60 ml/min/1.73m2    GFR est non-AA 32 (L) >60 ml/min/1.73m2    Calcium 7.8 (L) 8.3 - 10.4 MG/DL    Bilirubin, total 0.4 0.2 - 1.1 MG/DL    ALT (SGPT) 16 12 - 65 U/L    AST (SGOT) 14 (L) 15 - 37 U/L    Alk.  phosphatase 111 50 - 136 U/L    Protein, total 5.2 (L) 6.3 - 8.2 g/dL    Albumin 2.9 (L) 3.2 - 4.6 g/dL    Globulin 2.3 2.3 - 3.5 g/dL    A-G Ratio 1.3 1.2 - 3.5     CORTISOL, BASELINE    Collection Time: 04/11/17  6:13 AM   Result Value Ref Range    Cortisol, baseline 11.2 ug/dL   MAGNESIUM    Collection Time: 04/11/17  6:13 AM   Result Value Ref Range    Magnesium 2.3 1.8 - 2.4 mg/dL   PHOSPHORUS    Collection Time: 04/11/17  6:13 AM   Result Value Ref Range    Phosphorus 4.2 (H) 2.3 - 3.7 MG/DL   FOLATE    Collection Time: 04/11/17  6:13 AM   Result Value Ref Range    Folate 81.5 (H) 3.1 - 17.5 ng/mL   CBC WITH AUTOMATED DIFF    Collection Time: 04/11/17  6:13 AM   Result Value Ref Range    WBC 10.8 4.3 - 11.1 K/uL    RBC 4.17 4.05 - 5.25 M/uL    HGB 10.5 (L) 11.7 - 15.4 g/dL    HCT 34.1 (L) 35.8 - 46.3 %    MCV 81.8 79.6 - 97.8 FL    MCH 25.2 (L) 26.1 - 32.9 PG    MCHC 30.8 (L) 31.4 - 35.0 g/dL    RDW 17.6 (H) 11.9 - 14.6 %    PLATELET 889 018 - 535 K/uL    MPV 10.7 (L) 10.8 - 14.1 FL    DF AUTOMATED      NEUTROPHILS 63 43 - 78 %    LYMPHOCYTES 26 13 - 44 %    MONOCYTES 9 4.0 - 12.0 %    EOSINOPHILS 2 0.5 - 7.8 %    BASOPHILS 0 0.0 - 2.0 %    IMMATURE GRANULOCYTES 0.2 0.0 - 5.0 %    ABS. NEUTROPHILS 6.8 1.7 - 8.2 K/UL    ABS. LYMPHOCYTES 2.8 0.5 - 4.6 K/UL    ABS. MONOCYTES 1.0 0.1 - 1.3 K/UL    ABS. EOSINOPHILS 0.2 0.0 - 0.8 K/UL    ABS. BASOPHILS 0.0 0.0 - 0.2 K/UL    ABS. IMM.  GRANS. 0.0 0.0 - 0.5 K/UL   VITAMIN B12    Collection Time: 04/11/17  6:13 AM   Result Value Ref Range    Vitamin B12 >6000 (H) 193 - 986 pg/mL   FERRITIN    Collection Time: 04/11/17  6:13 AM   Result Value Ref Range    Ferritin 31 8 - 388 NG/ML   TRANSFERRIN SATURATION    Collection Time: 04/11/17  6:13 AM   Result Value Ref Range    Iron 26 (L) 35 - 150 ug/dL    TIBC 294 250 - 450 ug/dL    Transferrin Saturation 9 (L) >20 %   RETICULOCYTE COUNT    Collection Time: 04/11/17  6:13 AM   Result Value Ref Range    Reticulocyte count 0.6 0.3 - 2.0 %    Absolute Retic Cnt. 0.0234 0.0164 - 0.0776 M/ul    Immature Retic Fraction 10.2 3.0 - 15.9 %    Retic Hgb Conc. 31 29 - 35 pg   SED RATE, AUTOMATED    Collection Time: 04/11/17  6:13 AM   Result Value Ref Range    Sed rate, automated 11 0 - 30 mm/hr   TROPONIN I    Collection Time: 04/11/17  6:13 AM   Result Value Ref Range    Troponin-I, Qt. <0.04 0.02 - 0.05 NG/ML   AMMONIA    Collection Time: 04/11/17  6:25 AM   Result Value Ref Range    Ammonia 19 11 - 32 UMOL/L   LACTIC ACID, PLASMA    Collection Time: 04/11/17  6:25 AM   Result Value Ref Range    Lactic acid 0.6 0.4 - 2.0 MMOL/L   T4, FREE    Collection Time: 04/11/17  6:25 AM   Result Value Ref Range    T4, Free 1.2 0.78 - 1.46 NG/DL   T3 TOTAL    Collection Time: 04/11/17  6:25 AM   Result Value Ref Range    T3, total 0.64 0.60 - 1.81 ng/mL       Allergies   Allergen Reactions    Latex Contact Dermatitis    Bactrim [Sulfamethoxazole-Trimethoprim] Nausea Only    Lamictal [Lamotrigine] Atopic Dermatitis    Pcn [Penicillins] Rash    Tapentadol Rash     Current Facility-Administered Medications   Medication Dose Route Frequency Provider Last Rate Last Dose    levothyroxine (SYNTHROID) tablet 100 mcg  100 mcg Oral QPM Blanca Novoa MD        ziprasidone (GEODON) capsule 160 mg  160 mg Oral QHS Shyam Medellin MD        HYDROcodone-acetaminophen (NORCO)  mg tablet 1 Tab  1 Tab Oral Q6H PRN Shyam Medellin MD        0.45% sodium chloride infusion  125 mL/hr IntraVENous CONTINUOUS Obey Longo  mL/hr at 04/11/17 0848 125 mL/hr at 04/11/17 0848    albuterol CONCENTRATE 2.5mg/0.5 mL neb soln  2.5 mg Nebulization Q4H PRN Obey Longo MD        ALPRAZolam María Doll) tablet 1 mg  1 mg Oral TID PRN Obey Longo MD   1 mg at 04/11/17 0847    pantoprazole (PROTONIX) tablet 40 mg  40 mg Oral ACB Obey Longo MD   40 mg at 04/11/17 0847    verapamil ER (CALAN-SR) tablet 120 mg  120 mg Oral DAILY WITH BREAKFAST Obey Longo MD        sodium chloride (NS) flush 5-10 mL  5-10 mL IntraVENous Q8H Obey Longo MD   10 mL at 04/11/17 0554    sodium chloride (NS) flush 5-10 mL 5-10 mL IntraVENous PRN Cari Sanchez MD        acetaminophen (TYLENOL) tablet 650 mg  650 mg Oral Q4H PRN Cari Sanchez MD   650 mg at 17 0847    apixaban (ELIQUIS) tablet 2.5 mg  2.5 mg Oral BID Cari Sanchez MD   2.5 mg at 17 0847    hydrALAZINE (APRESOLINE) 20 mg/mL injection 10 mg  10 mg IntraVENous Q6H PRN Cari Sanchez MD        budesonide (PULMICORT) 500 mcg/2 ml nebulizer suspension  500 mcg Nebulization BID RT Cari Sanchez MD   500 mcg at 17 0914    And    albuterol CONCENTRATE 2.5mg/0.5 mL neb soln  2.5 mg Nebulization Q6H RT Cari Sanchez MD   2.5 mg at 17 8935           Review of Systems as noted above in HPI   PHYSICAL EXAM     Visit Vitals    /47    Pulse 85    Temp 98.7 °F (37.1 °C)    Resp 22    Ht 5' 3\" (1.6 m)    Wt 93 kg (205 lb)    SpO2 96%    BMI 36.31 kg/m2      Temp (24hrs), Av.3 °F (36.8 °C), Min:98.1 °F (36.7 °C), Max:98.7 °F (37.1 °C)    Oxygen Therapy  O2 Sat (%): 96 % (17 09)  Pulse via Oximetry: 91 beats per minute (17)  O2 Device: Room air (17)    Intake/Output Summary (Last 24 hours) at 17 1249  Last data filed at 17 1219   Gross per 24 hour   Intake           916.67 ml   Output             2550 ml   Net         -1633.33 ml      General: No acute distress,conversive  Lungs:  CTAB, good effort   Heart:  Regular rate and rhythm, no murmur, no edema   Abdomen: Soft, nontender and nondistended, normal bowel sounds  Extremities: Warm and dry   Neuro:  5/5 strength UE and LE, slowed and delayed speech         DIAGNOSTIC STUDIES      Labs and Studies from previous 24 hours have been personally reviewed by myself           Full Code    ASSESSMENT / Marquise Sawyer 169 Problems    Diagnosis Date Noted    Acute encephalopathy 04/10/2017    PATRICIA (obstructive sleep apnea) 04/10/2017     Probable.  Possible also CSA drug induced      Hx of pulmonary embolus 04/10/2017    QT prolongation 04/10/2017    Polypharmacy 02/08/2017    CAMILA (acute kidney injury) ([facility] 02/08/2017    History of peptic ulcer disease 01/13/2013    Morbid obesity - sedentary lifestyle 09/16/2012    Hypothyroidism 09/16/2012    GERD (gastroesophageal reflux disease) 09/16/2012    Bipolar affective disorder ([facility] 09/16/2012    Asthma 09/16/2012    Hypertension 09/16/2012     -continue holding home meds that might complicate picture, needs medication reconciliation  -unable to have MRI brain, pending EEG and neuro consult   -no carotid duplex or ECHO ordered to date  - for dispo, PT/OT consult, PPD   FEN:oral,IVF  DVT Prophylaxis: eliquis  Disposition:pending  Time spent with patient:  Care plan addressed:  Risk Assessment:  Signed By: J Carlos Miranda MD     April 11, 2017

## 2017-04-11 NOTE — PROGRESS NOTES
Problem: Mobility Impaired (Adult and Pediatric)  Goal: *Acute Goals and Plan of Care (Insert Text)    LTG:  (1.)Ms. Calos Ellison will move from supine to sit and sit to supine in bed with STAND BY ASSIST within 4-7 day(s). (2.)Ms. Calos Ellison will transfer from bed to chair and chair to bed with STAND BY ASSIST using the least restrictive device within 4-7 day(s). (3.)Ms. Calos Elliosn will ambulate with STAND BY ASSIST for 200 feet with the least restrictive device within 4-7 day(s). ________________________________________________________________________________________________      PHYSICAL THERAPY: INITIAL ASSESSMENT 4/11/2017  INPATIENT: Hospital Day: 2  [place] / Product Type: Medicare /      NAME/AGE/GENDER: Fide Putnam is a 73 y.o. female       PRIMARY DIAGNOSIS: Confusion Acute encephalopathy Acute encephalopathy        ICD-10: Treatment Diagnosis:       · Generalized Muscle Weakness (M62.81)  · Difficulty in walking, Not elsewhere classified (R26.2)   Precaution/Allergies:  Latex; Bactrim [sulfamethoxazole-trimethoprim]; Lamictal [lamotrigine]; Pcn [penicillins]; and Tapentadol       ASSESSMENT:      Ms. Calos Ellison presents with generalized weakness resulting in limited gait and transfer independence. Did not notice any notable difference in strength from one side to the other. No more complaints of numbness or tingling noted on L LE. Will benefit from PT to increase her functional independence. Type of therapy needed at discharge to be determined. Will need 24 hour supervision and probably HHPT. This section established at most recent assessment   PROBLEM LIST (Impairments causing functional limitations):  1. Decreased Strength  2. Decreased Transfer Abilities  3. Decreased Ambulation Ability/Technique  4. Decreased Balance    INTERVENTIONS PLANNED: (Benefits and precautions of physical therapy have been discussed with the patient.)  1. Bed Mobility  2.  Gait Training  3. Therapeutic Exercise/Strengthening  4. Transfer Training  5. Group Therapy      TREATMENT PLAN: Frequency/Duration: daily for duration of hospital stay  Rehabilitation Potential For Stated Goals: EXCELLENT      RECOMMENDED REHABILITATION/EQUIPMENT: (at time of discharge pending progress): Continue Skilled Therapy and to be determined between HHPT and rehab. HISTORY:   History of Present Injury/Illness (Reason for Referral):  Ms. Latonya Pisano is a 72 yo F brought to ED by her daughter with complains of increasing confusion And slurred speech in the last 10-14 days, unstable gait that she needs other person to support her because he is leaning toward left side and today she noticed left foot numbness. She had multiple falls in the last 10 days. Denies any spikes of fever, cough, chest pain or abdominal pain. Complains if one soft stool per day for the last 2 days. CT head w/o acute pathology. Unable to get MRI brain due to vagal nerve stimulator and InterStim present for urgency. WBC:13.9, H/H:11.9/38.1, Cr:3.09, BUN:51. Recently admitted in February 2017 with similar complains   PMHx significant for Hx of chronic respiratory failure , was on O2 supplementation for 3 years until 1 year prior, iron deficiency anemia - had colonoscopy 1 week prior and was WNL per daughter , Severe urge incontinence s/p InterStim 7/2015 with consideration for Botox by her urologist, Hx of DVT and b/l PE (2012) on chronic Eliquis, Morbid Obesity, hx of possible CVA (2012), PUD with gastric ulcer, hx of bipolar disorder s/p Vagal nerve stimulator, HTN, Insomnia. Complains of sleeping behaviors - never had a sleep study or seen a sleep medicine physician. Past Medical History/Comorbidities:   Ms. Latonya Pisano  has a past medical history of Acute renal failure [facility]) (July, 2014); Bilateral pulmonary embolism [facility]) (September, 2012);  Calculus of ureter (May, 2015); CAP (community acquired pneumonia) (October, 2012); CVA (cerebral infarction) (January, 2012); Gastric ulcer (July, 2014); HCAP (healthcare-associated pneumonia) (January, 2013); HCAP (healthcare-associated pneumonia) (February, 2013); Left leg DVT ([address] (1991); Psychiatric disorder; and PUD (peptic ulcer disease) (???). She also has no past medical history of Aneurysm ([address] Arrhythmia; Autoimmune disease ([address] CAD (coronary artery disease); Cancer ([address] Chronic pain; Coagulation disorder ([address] COPD; Diabetes ([address] Heart failure ([address] Liver disease; Seizures ([address] or Unspecified sleep apnea. Ms. Long Moya  has a past surgical history that includes appendectomy; cholecystectomy; gastric bypass; tonsillectomy; other surgical; orthopaedic; hysterectomy; and endoscopy (July, 2014). Social History/Living Environment: patient states that she lives at home with her  and is independent without any assistive device. No family present to confirm that patient report is accurate. Prior Level of Function/Work/Activity:  Patient states she is independent without assistive device         Number of Personal Factors/Comorbidities that affect the Plan of Care: 0: LOW COMPLEXITY   EXAMINATION:   Most Recent Physical Functioning:   Gross Assessment:                  Posture:     Balance:    Bed Mobility:     Wheelchair Mobility:     Transfers:     Gait:     Speed/Judith: Slow  Gait Abnormalities:  (generally unsteady)  Distance (ft): 3 Feet (ft) (sidestep to the head of the bed)  Assistive Device:  (would benefit from RW)  Ambulation - Level of Assistance: Minimal assistance;Assist x2  Interventions: Safety awareness training;Verbal cues       Body Structures Involved:  1. None Body Functions Affected:  1. Movement Related Activities and Participation Affected:  1.  Mobility   Number of elements that affect the Plan of Care: 1-2: LOW COMPLEXITY   CLINICAL PRESENTATION:   Presentation: Evolving clinical presentation with changing clinical characteristics: MODERATE COMPLEXITY CLINICAL DECISION MAKIN[address] [place] [id number] AM-PAC 6 Clicks   Basic Mobility Inpatient Short Form  How much difficulty does the patient currently have. .. Unable A Lot A Little None   1. Turning over in bed (including adjusting bedclothes, sheets and blankets)? [ ] 1   [ ] 2   [X] 3   [ ] 4   2. Sitting down on and standing up from a chair with arms ( e.g., wheelchair, bedside commode, etc.)   [ ] 1   [ ] 2   [X] 3   [ ] 4   3. Moving from lying on back to sitting on the side of the bed? [ ] 1   [ ] 2   [X] 3   [ ] 4   How much help from another person does the patient currently need. .. Total A Lot A Little None   4. Moving to and from a bed to a chair (including a wheelchair)? [ ] 1   [ ] 2   [X] 3   [ ] 4   5. Need to walk in hospital room? [ ] 1   [ ] 2   [X] 3   [ ] 4   6. Climbing 3-5 steps with a railing? [ ] 1   [ ] 2   [X] 3   [ ] 4   © 2007, Trustees of [address] [place] [address] [address] [id number], under license to [employer]. All rights reserved    Score:  Initial: 18 Most Recent: X (Date: -- )     Interpretation of Tool:  Represents activities that are increasingly more difficult (i.e. Bed mobility, Transfers, Gait). Score 24 23 22-20 19-15 14-10 9-7 6       Modifier CH CI CJ CK CL CM CN         · Mobility - Walking and Moving Around:               - CURRENT STATUS:    CK - 40%-59% impaired, limited or restricted               - GOAL STATUS:           CJ - 20%-39% impaired, limited or restricted               - D/C STATUS:                       ---------------To be determined---------------  [place] / [place] / Product Type: Medicare /       Medical Necessity:     · Patient is expected to demonstrate progress in strength and balance to increase independence with bed mobility, transfers and gait. Reason for Services/Other Comments:  · Patient continues to require skilled intervention due to limited functional independence.    Use of outcome tool(s) and clinical judgement create a POC that gives a: Clear prediction of patient's progress: LOW COMPLEXITY                 TREATMENT:   (In addition to Assessment/Re-Assessment sessions the following treatments were rendered)   Pre-treatment Symptoms/Complaints:  none  Pain: Initial: 0     Post Session:  0      Assessment/Reassessment only, no treatment provided today     Braces/Orthotics/Lines/Etc:   · ICU lines   · O2 Device: Room air  Treatment/Session Assessment:    · Response to Treatment:  Tolerated therapy well with some minimal nausea. · Interdisciplinary Collaboration:  · Physical Therapist  · Occupational Therapist  · Registered Nurse  · After treatment position/precautions:  · Supine in bed  · Bed/Chair-wheels locked  · Bed in low position  · Call light within reach  · RN notified  · Compliance with Program/Exercises: compliant all of the time. · Recommendations/Intent for next treatment session: \"Next visit will focus on advancements to more challenging activities and reduction in assistance provided\".   Total Treatment Duration:  PT Patient Time In/Time Out  Time In: 1300  Time Out: Giacomo Coreas 99, PT

## 2017-04-11 NOTE — PROGRESS NOTES
Problem: Self Care Deficits Care Plan (Adult)  Goal: *Acute Goals and Plan of Care (Insert Text)  GOALS:  1. Patient will perform grooming with supervision/set-up within 7 day(s). 2. Patient will perform bathing and upper body dressing with minimal assistance assist within 7 day(s). 3. Patient will perform toileting with minimal assistance within 7 day(s). 4. Patient will perform toilet transfer with minimal assistance/contact guard assist within 7 day(s). 5. Patient will participate in upper extremity therapeutic exercise/activities with supervision/set-up for 15 minutes within 7 day(s). ________________________________________________________________________________________________      OCCUPATIONAL THERAPY: Initial Assessment 4/11/2017  INPATIENT: Hospital Day: 2  [place] / Product Type: Medicare /      NAME/AGE/GENDER: Cesia Field is a 73 y.o. female       PRIMARY DIAGNOSIS:  Confusion Acute encephalopathy Acute encephalopathy        ICD-10: Treatment Diagnosis:        · Generalized Muscle Weakness (M62.81)  · Other lack of cordination (R27.8)  · Difficulty in walking, Not elsewhere classified (R26.2)   Precautions/Allergies:         Latex; Bactrim [sulfamethoxazole-trimethoprim]; Lamictal [lamotrigine]; Pcn [penicillins]; and Tapentadol       ASSESSMENT:      Ms. Atif Bernstein admitted with above diagnosis; pt presents with decreased self care, functional mobility, strength and endurance. Pt would benefit from skilled OT to increase independence. This section established at most recent assessment   PROBLEM LIST (Impairments causing functional limitations):  1. Decreased Strength  2. Decreased ADL/Functional Activities  3. Decreased Transfer Abilities  4. Decreased Ambulation Ability/Technique  5. Decreased Balance  6.  Decreased Activity Tolerance    INTERVENTIONS PLANNED: (Benefits and precautions of occupational therapy have been discussed with the patient.)  1. Activities of daily living training  2. Adaptive equipment training  3. Balance training  4. Clothing management  5. Theraputic activity  6. Theraputic exercise      TREATMENT PLAN: Frequency/Duration: Follow patient 3x/week to address above goals. Rehabilitation Potential For Stated Goals: GOOD      RECOMMENDED REHABILITATION/EQUIPMENT: (at time of discharge pending progress): Continue Skilled Therapy and Rehab. OCCUPATIONAL PROFILE AND HISTORY:   History of Present Injury/Illness (Reason for Referral):Acute encephalopathy  Past Medical History/Comorbidities:   Ms. Christiano Mathew  has a past medical history of Acute renal failure [facility]) (July, 2014); Bilateral pulmonary embolism [facility]) (September, 2012); Calculus of ureter (May, 2015); CAP (community acquired pneumonia) (October, 2012); CVA (cerebral infarction) (January, 2012); Gastric ulcer (July, 2014); HCAP (healthcare-associated pneumonia) (January, 2013); HCAP (healthcare-associated pneumonia) (February, 2013); Left leg DVT ([address] (1991); Psychiatric disorder; and PUD (peptic ulcer disease) (???). She also has no past medical history of Aneurysm ([address] Arrhythmia; Autoimmune disease ([address] CAD (coronary artery disease); Cancer ([address] Chronic pain; Coagulation disorder ([address] COPD; Diabetes ([address] Heart failure ([address] Liver disease; Seizures ([address] or Unspecified sleep apnea. Ms. Christiano Mathew  has a past surgical history that includes appendectomy; cholecystectomy; gastric bypass; tonsillectomy; other surgical; orthopaedic; hysterectomy; and endoscopy (July, 2014).   Social History/Living Environment:    lives with   Prior Level of Function/Work/Activity:  independent   Number of Personal Factors/Comorbidities that affect the Plan of Care: Expanded review of therapy/medical records (1-2):  MODERATE COMPLEXITY   ASSESSMENT OF OCCUPATIONAL PERFORMANCE[de-identified]   Activities of Daily Living:           Basic ADLs (From Assessment) Complex ADLs (From Assessment)   Basic ADL  Feeding: Setup  Oral Facial Hygiene/Grooming: Minimum assistance  Bathing: Moderate assistance  Upper Body Dressing: Minimum assistance  Lower Body Dressing: Maximum assistance  Toileting: Maximum assistance     Grooming/Bathing/Dressing Activities of Daily Living     Cognitive Retraining  Safety/Judgement: Fall prevention; Awareness of environment                 Functional Transfers  Toilet Transfer : Minimum assistance;Assist x1  Shower Transfer: Minimum assistance;Assist x1     Bed/Mat Mobility  Supine to Sit: Minimum assistance  Sit to Supine: Minimum assistance  Sit to Stand: Minimum assistance;Assist x1  Bed to Chair: Minimum assistance;Assist x1          Most Recent Physical Functioning:   Gross Assessment:  AROM: Generally decreased, functional (BUE)  Strength: Generally decreased, functional (BUE)  Coordination: Generally decreased, functional (BUE)  Tone: Normal  Sensation: Intact               Posture:     Balance:  Sitting: Intact  Standing: Impaired;Pull to stand; With support  Standing - Static: Fair Bed Mobility:  Supine to Sit: Minimum assistance  Sit to Supine: Minimum assistance  Wheelchair Mobility:     Transfers:  Sit to Stand: Minimum assistance;Assist x1  Stand to Sit: Minimum assistance;Assist x1  Bed to Chair: Minimum assistance;Assist x1                 Patient Vitals for the past 6 hrs:       BP SpO2 Pulse   04/11/17 0914 - 96 % -   04/11/17 1324 - 97 % -   04/11/17 1350 124/51 - 85        Mental Status  Neurologic State: Alert  Orientation Level: Oriented to person, Oriented to place  Cognition: Follows commands  Perception: Appears intact  Perseveration: No perseveration noted  Safety/Judgement: Fall prevention, Awareness of environment                               Physical Skills Involved:  1. Balance  2. Mobility  3. Strength Cognitive Skills Affected (resulting in the inability to perform in a timely and safe manner):   1. none Psychosocial Skills Affected:  1. none   Number of elements that affect the Plan of Care: 1-3:  LOW COMPLEXITY   CLINICAL DECISION MAKIN[address] [place] [address] AM-PAC 6 Clicks   Basic Mobility Inpatient Short Form  How much help from another person does the patient currently need. .. Total A Lot A Little None   1. Putting on and taking off regular lower body clothing?   [ ] 1   [X] 2   [ ] 3   [ ] 4   2. Bathing (including washing, rinsing, drying)? [ ] 1   [X] 2   [ ] 3   [ ] 4   3. Toileting, which includes using toilet, bedpan or urinal?   [ ] 1   [X] 2   [ ] 3   [ ] 4   4. Putting on and taking off regular upper body clothing?   [ ] 1   [ ] 2   [X] 3   [ ] 4   5. Taking care of personal grooming such as brushing teeth? [ ] 1   [ ] 2   [X] 3   [ ] 4   6. Eating meals? [ ] 1   [ ] 2   [X] 3   [ ] 4   © , Trustees of [address] [place] [address], under license to [employer]. All rights reserved    Score:  Initial: 15 Most Recent: X (Date: -- )     Interpretation of Tool:  Represents activities that are increasingly more difficult (i.e. Bed mobility, Transfers, Gait). Score 24 23 22-20 19-15 14-10 9-7 6       Modifier CH CI CJ CK CL CM CN         · Self Care:               - CURRENT STATUS:    CK - 40%-59% impaired, limited or restricted               - GOAL STATUS:           CJ - 20%-39% impaired, limited or restricted               - D/C STATUS:                       ---------------To be determined---------------  [place] / [place] / Product Type: Medicare /       Medical Necessity:     · Patient is expected to demonstrate progress in strength, balance, coordination and functional technique to increase independence with self care and functional mobiltiy. Reason for Services/Other Comments:  · Patient continues to require skilled intervention due to decrease self care and mobility.    Use of outcome tool(s) and clinical judgement create a POC that gives a: LOW COMPLEXITY TREATMENT:   (In addition to Assessment/Re-Assessment sessions the following treatments were rendered)      Pre-treatment Symptoms/Complaints:    Pain: Initial:   Pain Intensity 1: 6  Pain Location 1: Generalized  Pain Orientation 1: Anterior  Pain Intervention(s) 1: Repositioned  Post Session:        Assessment/Reassessment only, no treatment provided today     Braces/Orthotics/Lines/Etc:   · O2 Device: Room air  Treatment/Session Assessment:    · Response to Treatment:   · Interdisciplinary Collaboration:  · Physical Therapist  · Registered Nurse  · After treatment position/precautions:  · Supine in bed  · Bed/Chair-wheels locked  · Bed in low position  · Call light within reach  · RN notified  · Side rails x 3  · Compliance with Program/Exercises: Will assess as treatment progresses. · Recommendations/Intent for next treatment session: \"Next visit will focus on advancements to more challenging activities and reduction in assistance provided\".   Total Treatment Duration:  OT Patient Time In/Time Out  Time In: 1310  Time Out: 16[address]

## 2017-04-11 NOTE — ED NOTES
Unable to draw blood as Hospitalist ordered. Attempted x 2 and attempted to draw off IV with no success. Will have lab draw upstairs.

## 2017-04-11 NOTE — H&P
HOSPITALIST H&P/CONSULT  NAME:  Joan Cates   Age:  73 y.o.  :   1950   DOS:               04/10/17  MRN:   [id number]  PCP: Nolan Soto DO  Consulting MD:  Treatment Team: Attending Provider: Ritesh Gibson MD    HPI:   Ms. Tommie Mcfarland is a 72 yo F brought to ED by her daughter with complains of increasing confusion  And slurred speech in the last 10-14 days, unstable gait that she needs other person to support her because he is leaning toward left side and today she noticed left foot numbness. She had multiple falls in the last 10 days. Denies any spikes of fever, cough, chest pain or abdominal pain. Complains if one soft stool per day for the last 2 days. CT head w/o acute pathology. Unable to get MRI brain due to vagal nerve stimulator and InterStim present for urgency. WBC:13.9, H/H:11.9/38.1, Cr:3.09, BUN:51. Recently admitted in 2017 with similar complains   PMHx significant for Hx of chronic respiratory failure , was on O2 supplementation for 3 years until 1 year prior, iron deficiency anemia - had colonoscopy 1 week prior and was WNL per daughter , Severe urge incontinence s/p InterStim 2015 with consideration for Botox by her urologist, Hx of DVT and b/l PE () on chronic Eliquis,  Morbid Obesity, hx of possible CVA (), PUD with gastric ulcer, hx of bipolar disorder s/p  Vagal nerve stimulator, HTN, Insomnia. Complains of sleeping behaviors - never had a sleep study or seen a sleep medicine physician. 10+ point ROS done and is negative except as noted in HPI. Past Medical History:   Diagnosis Date    Acute renal failure ([address]     The patient was admitted with acute renal failure secondary to volume depletion. She was found to have a gastric ulcer on admission.     Bilateral pulmonary embolism ([address]     Restarted on anticoagulation for lifetime    Calculus of ureter May, 2015    CAP (community acquired pneumonia)     RML pneumonia  CVA (cerebral infarction) January, 2012    Reportedly has mild left arm residual weakness    Gastric ulcer July, 2014          HCAP (healthcare-associated pneumonia) January, 2013    RLL pneumonia    HCAP (healthcare-associated pneumonia) February, 2013    RLL pneumonia    Left leg DVT ([address] 1991    On coumadin until 2008    Psychiatric disorder     PUD (peptic ulcer disease) ? ??      Past Surgical History:   Procedure Laterality Date    HX APPENDECTOMY      HX CHOLECYSTECTOMY      HX ENDOSCOPY  July, 2014    Gastric ulcers x 2    HX GASTRIC BYPASS      HX HYSTERECTOMY      HX ORTHOPAEDIC      rt knee growth, right shoulder, left thumb    HX OTHER SURGICAL      vagus nerve stimulator    HX TONSILLECTOMY        Prior to Admission Medications   Prescriptions Last Dose Informant Patient Reported? Taking? ALPRAZolam (XANAX) 2 mg tablet   No No   Sig: Take 1 Tab by mouth three (3) times daily as needed for Anxiety. Max Daily Amount: 6 mg. BISACODYL (GENTLE LAXATIVE PO)   Yes No   Sig: Take 2 Tabs by mouth daily. Cholecalciferol, Vitamin D3, (VITAMIN D3) 1,000 unit cap   Yes No   Sig: Take  by mouth. DULERA 200-5 mcg/actuation HFA inhaler   No No   Sig: USE 2 INHALATIONS TWICE A DAY   FERROUS FUMARATE/VIT BCOMP&C (SUPER B COMPLEX PO)   Yes No   Sig: Take 1 Tab by mouth daily. HYDROcodone-acetaminophen (NORCO)  mg tablet   Yes No   Sig: Take 1 Tab by mouth every six (6) hours as needed for Pain. Magnesium Oxide 500 mg cap   Yes No   Sig: Take 500 mg by mouth. NEBULIZER   Yes No   Sig: by Does Not Apply route. POTASSIUM CHLORIDE SR 10 MEQ TAB   Yes No   Sig: Take 1 Tab by mouth daily. SUMATRIPTAN SUCCINATE (IMITREX PO)   Yes No   Sig: take 100 mg by mouth as needed. albuterol (PROAIR HFA) 90 mcg/actuation inhaler   Yes No   Sig: Take 2 Puffs by inhalation every four (4) hours as needed for Wheezing.    albuterol sulfate (PROVENTIL;VENTOLIN) 2.5 mg/0.5 mL nebu nebulizer solution No No   Si.5 mL by Nebulization route four (4) times daily. amphetamine-dextroamphetamine XR (ADDERALL XR) 20 mg XR capsule   No No   Sig: Take 1 Cap (20 mg total) by mouth every morning. Max Daily Amount: 20 mg   atorvastatin (LIPITOR) 10 mg tablet   Yes No   Sig: Take  by mouth daily. folic acid 919 mcg tablet   Yes No   Sig: Take 800 mcg by mouth daily. levothyroxine (SYNTHROID) 125 mcg tablet   Yes No   Sig: Take  by mouth every evening. lisinopril (PRINIVIL, ZESTRIL) 40 mg tablet   No No   Sig: Take 1 Tab by mouth daily. mirtazapine (REMERON) 30 mg tablet   Yes No   Sig: Take 30 mg by mouth nightly. nicotinic acid (NIACIN) 500 mg tablet   Yes No   Sig: Take 500 mg by mouth Daily (before breakfast). ondansetron (ZOFRAN ODT) 8 mg disintegrating tablet   Yes No   Sig: Take 8 mg by mouth every eight (8) hours as needed for Nausea. oxybutynin chloride XL (DITROPAN XL) 10 mg CR tablet   Yes No   Sig: Take 10 mg by mouth daily. pantoprazole (PROTONIX) 40 mg tablet   No No   Sig: Take 1 Tab by mouth Before breakfast and dinner. ramelteon (ROZEREM) 8 mg tablet   Yes No   Sig: Take  by mouth nightly. verapamil ER (VERELAN) 120 mg ER capsule   Yes No   Sig: Take 120 mg by mouth two (2) times a day. ziprasidone (GEODON) 80 mg capsule   Yes No   Sig: Take 160 mg by mouth nightly. Facility-Administered Medications: None     Home meds reconciled.   Allergies   Allergen Reactions    Latex Contact Dermatitis    Bactrim [Sulfamethoxazole-Trimethoprim] Nausea Only    Lamictal [Lamotrigine] Atopic Dermatitis    Pcn [Penicillins] Rash    Tapentadol Rash      Social History   Substance Use Topics    Smoking status: Never Smoker    Smokeless tobacco: Never Used      Comment: exposed to second hand smoke at work for 17 years    Alcohol use No      Family History   Problem Relation Age of Onset    Hypertension Mother     Cancer Father      prostate    Heart Attack Father     Heart Disease Father      CAD    Hypertension Father     Hypertension Sister     Stroke Sister     Heart Disease Brother      CAD with CABG    Heart Attack Brother     Hypertension Brother       I personally reviewed home medications, social and family history. Immunization History   Administered Date(s) Administered    Influenza Vaccine 10/01/2014, 2016    Influenza Vaccine Split 2012    Pneumococcal Vaccine (Unspecified Type) 2010    TB Skin Test (PPD) Intradermal 2013, 2016     Objective:     Visit Vitals    /59 (BP 1 Location: Left arm, BP Patient Position: At rest)    Pulse 80    Temp 98.1 °F (36.7 °C)    Resp 18    Ht 5' 3\" (1.6 m)    Wt 81.6 kg (180 lb)    SpO2 99%    BMI 31.89 kg/m2      Temp (24hrs), Av.1 °F (36.7 °C), Min:98.1 °F (36.7 °C), Max:98.1 °F (36.7 °C)    Oxygen Therapy  O2 Sat (%): 99 % (04/10/17 2209)  Pulse via Oximetry: 83 beats per minute (04/10/17 2039)  O2 Device: Room air (04/10/17 2209)    Physical Exam:  General:         Alert , although with generalized weakness and On/Off confusion. Cooperative. No acute distress . Obese. HEENT:               NCAT. No obvious deformity. Nares normal. No drainage  Lungs:                    CTABL. No wheezing/rhonchi/rales  Cardiovascular:   RRR. No m/r/g. No pedal edema b/l. +2 PT/DT pulses b/l. Abdomen:       S/nt/nd. Bowel sounds normal. .   Skin:         No rashes or lesions. Not Jaundiced  Neurologic:    Alert and oriented x3, although with on/off confusion. Stuttering speech and difficulty finding words at [place]. Mydriatic pupils, reactive to light an accomodation. CN II- XII grossly WNL. No gross focal deficit. Moves all extremities. Gait not assessed due to clinical status. Psychiatric:         Depressed mood. Flat affect. Denies any HI. Complains of daily SI for as long as she knows denies any plans. No previous Hx of attempt.        Data Review:   Recent Results (from the past 24 hour(s))   CBC WITH AUTOMATED DIFF    Collection Time: 04/10/17  6:32 PM   Result Value Ref Range    WBC 13.9 (H) 4.3 - 11.1 K/uL    RBC 4.65 4.05 - 5.25 M/uL    HGB 11.9 11.7 - 15.4 g/dL    HCT 38.1 35.8 - 46.3 %    MCV 81.9 79.6 - 97.8 FL    MCH 25.6 (L) 26.1 - 32.9 PG    MCHC 31.2 (L) 31.4 - 35.0 g/dL    RDW 17.6 (H) 11.9 - 14.6 %    PLATELET 128 492 - 632 K/uL    MPV 10.2 (L) 10.8 - 14.1 FL    DF AUTOMATED      NEUTROPHILS 71 43 - 78 %    LYMPHOCYTES 22 13 - 44 %    MONOCYTES 6 4.0 - 12.0 %    EOSINOPHILS 1 0.5 - 7.8 %    BASOPHILS 0 0.0 - 2.0 %    IMMATURE GRANULOCYTES 0.3 0.0 - 5.0 %    ABS. NEUTROPHILS 9.8 (H) 1.7 - 8.2 K/UL    ABS. LYMPHOCYTES 3.1 0.5 - 4.6 K/UL    ABS. MONOCYTES 0.8 0.1 - 1.3 K/UL    ABS. EOSINOPHILS 0.1 0.0 - 0.8 K/UL    ABS. BASOPHILS 0.0 0.0 - 0.2 K/UL    ABS. IMM. GRANS. 0.0 0.0 - 0.5 K/UL   METABOLIC PANEL, COMPREHENSIVE    Collection Time: 04/10/17  6:32 PM   Result Value Ref Range    Sodium 140 136 - 145 mmol/L    Potassium 3.9 3.5 - 5.1 mmol/L    Chloride 106 98 - 107 mmol/L    CO2 20 (L) 21 - 32 mmol/L    Anion gap 14 7 - 16 mmol/L    Glucose 123 (H) 65 - 100 mg/dL    BUN 51 (H) 8 - 23 MG/DL    Creatinine 3.09 (H) 0.6 - 1.0 MG/DL    GFR est AA 19 (L) >60 ml/min/1.73m2    GFR est non-AA 16 (L) >60 ml/min/1.73m2    Calcium 8.6 8.3 - 10.4 MG/DL    Bilirubin, total 0.3 0.2 - 1.1 MG/DL    ALT (SGPT) 19 12 - 65 U/L    AST (SGOT) 15 15 - 37 U/L    Alk.  phosphatase 125 50 - 136 U/L    Protein, total 6.2 (L) 6.3 - 8.2 g/dL    Albumin 3.1 (L) 3.2 - 4.6 g/dL    Globulin 3.1 2.3 - 3.5 g/dL    A-G Ratio 1.0 (L) 1.2 - 3.5     EKG, 12 LEAD, INITIAL    Collection Time: 04/10/17  7:16 PM   Result Value Ref Range    Ventricular Rate 87 BPM    Atrial Rate 87 BPM    P-R Interval 196 ms    QRS Duration 86 ms    Q-T Interval 392 ms    QTC Calculation (Bezet) 471 ms    Calculated P Axis 44 degrees    Calculated R Axis -11 degrees    Calculated T Axis 35 degrees    Diagnosis       60 hz artefact of baseline   Normal sinus rhythm  Possible Lateral infarct , age undetermined  Abnormal ECG  When compared with ECG of 08-FEB-2017 00:04,  QT has lengthened  Confirmed by CALEB LARSON (), Tam Felix ([id number]) on 4/10/2017 10:12:44 PM     DRUG SCREEN, URINE    Collection Time: 04/10/17  8:25 PM   Result Value Ref Range    PCP(PHENCYCLIDINE) NEGATIVE       BENZODIAZEPINE POSITIVE      COCAINE NEGATIVE       AMPHETAMINE POSITIVE      METHADONE NEGATIVE       THC (TH-CANNABINOL) NEGATIVE       OPIATES POSITIVE      BARBITURATES NEGATIVE        Imaging /Procedures /Studies:  I personally reviewed all labs, imaging, and other studies this admission:    CT Results  (Last 48 hours)               04/10/17 1850  CT HEAD WO CONT Final result    Impression:  Impression: No acute intracranial abnormality. Narrative:  Noncontrast head CT        Clinical Indication: Difficulty speaking for one week with acute left lower   extremity paresthesias today, stroke evaluation. Technique: Noncontrast axial images were obtained through the brain. Comparison: February 7, 2017       Findings: There is no acute intracranial hemorrhage, hydrocephalus, intra-axial   mass, or mass-effect. There is no CT evidence of acute large artery territorial   infarction or abnormal extra-axial fluid collection. The mastoid air cells and paranasal sinuses are clear where imaged. No displaced skull fractures are present. Assessment and Plan: Active Hospital Problems    Diagnosis Date Noted    Acute encephalopathy 04/10/2017    PATRICIA (obstructive sleep apnea) 04/10/2017     Probable.  Possible also CSA drug induced      Hx of pulmonary embolus 04/10/2017    QT prolongation 04/10/2017    Polypharmacy 02/08/2017    CAMILA (acute kidney injury) ([facility] 02/08/2017    History of peptic ulcer disease 01/13/2013    Morbid obesity - sedentary lifestyle 09/16/2012    Hypothyroidism 09/16/2012    GERD (gastroesophageal reflux disease) 09/16/2012    Bipolar affective disorder ([facility] 09/16/2012    Asthma 09/16/2012    Hypertension 09/16/2012       PLAN  Acute encephalopathy - with L side weakness. L side weakness improving. CT head w/o acute pathology. Unclear etiology - can be due to polypharmacy, although on multiple medications that can lower the seizure threshold. Will get EEG. Elevated uremia  That can be part of etiology. Check TSH, vitamin B12, RPR. Unable to get MRI due to vagal nerve stimulator. . Neuro checks. Fall/seizure/aspiration precautions. Hold sedating medications. Will consult neurology - appreciate the help. CAMILA - on IV fluids. Check urine chemistries, CK and  US renal. Monitor. Leukocytosis: afebrile. Check inflammatory markers and cultures    Probable PATRICIA - in female most classic sine of PATRICIA is depression/anxiety. Hx of nocturnal hypoxemia also, although never had a sleep study. Possible CSA also due to polypharmacy. Will gent nocturnal oximetry and will arrange f/u in outpatient with sleep medicine. Bipolar disorder - chronic suicidal ideations. Denies any plans or hx of attempt. Daughter and granddaughter at bedside who confirm. Holdmedications that can prolog QT - recheck EKG in AM.     Anxiety - on chronic Xanax, although in the same time of Adderall. Possible untreated PATRICIA plays a role also    Hx of multiple PE and DVT - on chronic Eliquis    Hypothyrodism: restart Levothyroxine - check TSH    GERD with hx of PUD - on PPI    Anemia - colonoscopy 1 week prior that was WNL per daughter. Check anemia panel. IF iron deficiency will give some Iron. Asthma : resume home meds     HTN: hold ACEi due to CAMILA. PRN hydralazine    Insomnia: due to anxiety, untreated PATRICIA and use of adderall? Will hold  adderall for now.        DVT ppx: Eliquis   Code status:  Full CODE  Estimated LOS:  2-3 days  Risk assessment:  high  Plan of care discussed with: patient, daughter and granddaughter at bedside. Care team  Ms. Albina Triplett will need at least 2 midnights of admission.       Keli Cotton MD  04/10/17

## 2017-04-12 ENCOUNTER — APPOINTMENT (OUTPATIENT)
Dept: ULTRASOUND IMAGING | Age: 67
DRG: 071 | End: 2017-04-12
Attending: PSYCHIATRY & NEUROLOGY
Payer: MEDICARE

## 2017-04-12 LAB
ANION GAP BLD CALC-SCNC: 12 MMOL/L (ref 7–16)
BASOPHILS # BLD AUTO: 0 K/UL (ref 0–0.2)
BASOPHILS # BLD: 0 % (ref 0–2)
BUN SERPL-MCNC: 17 MG/DL (ref 8–23)
CALCIUM SERPL-MCNC: 8.3 MG/DL (ref 8.3–10.4)
CHLORIDE SERPL-SCNC: 113 MMOL/L (ref 98–107)
CO2 SERPL-SCNC: 21 MMOL/L (ref 21–32)
CREAT SERPL-MCNC: 0.9 MG/DL (ref 0.6–1)
DIFFERENTIAL METHOD BLD: ABNORMAL
EOSINOPHIL # BLD: 0.2 K/UL (ref 0–0.8)
EOSINOPHIL NFR BLD: 3 % (ref 0.5–7.8)
ERYTHROCYTE [DISTWIDTH] IN BLOOD BY AUTOMATED COUNT: 17.6 % (ref 11.9–14.6)
GLUCOSE SERPL-MCNC: 93 MG/DL (ref 65–100)
HCT VFR BLD AUTO: 33.4 % (ref 35.8–46.3)
HGB BLD-MCNC: 10.3 G/DL (ref 11.7–15.4)
IMM GRANULOCYTES # BLD: 0 K/UL (ref 0–0.5)
IMM GRANULOCYTES NFR BLD AUTO: 0.2 % (ref 0–5)
LYMPHOCYTES # BLD AUTO: 35 % (ref 13–44)
LYMPHOCYTES # BLD: 2.9 K/UL (ref 0.5–4.6)
MCH RBC QN AUTO: 25.3 PG (ref 26.1–32.9)
MCHC RBC AUTO-ENTMCNC: 30.8 G/DL (ref 31.4–35)
MCV RBC AUTO: 82.1 FL (ref 79.6–97.8)
MM INDURATION POC: 0 MM (ref 0–5)
MONOCYTES # BLD: 0.9 K/UL (ref 0.1–1.3)
MONOCYTES NFR BLD AUTO: 10 % (ref 4–12)
NEUTS SEG # BLD: 4.5 K/UL (ref 1.7–8.2)
NEUTS SEG NFR BLD AUTO: 52 % (ref 43–78)
PLATELET # BLD AUTO: 247 K/UL (ref 150–450)
PMV BLD AUTO: 10.1 FL (ref 10.8–14.1)
POTASSIUM SERPL-SCNC: 3.7 MMOL/L (ref 3.5–5.1)
PPD POC: NEGATIVE NEGATIVE
RBC # BLD AUTO: 4.07 M/UL (ref 4.05–5.25)
SODIUM SERPL-SCNC: 146 MMOL/L (ref 136–145)
WBC # BLD AUTO: 8.5 K/UL (ref 4.3–11.1)

## 2017-04-12 PROCEDURE — 74011250637 HC RX REV CODE- 250/637: Performed by: INTERNAL MEDICINE

## 2017-04-12 PROCEDURE — 36415 COLL VENOUS BLD VENIPUNCTURE: CPT | Performed by: INTERNAL MEDICINE

## 2017-04-12 PROCEDURE — 94640 AIRWAY INHALATION TREATMENT: CPT

## 2017-04-12 PROCEDURE — 74011250637 HC RX REV CODE- 250/637: Performed by: HOSPITALIST

## 2017-04-12 PROCEDURE — 74011000258 HC RX REV CODE- 258: Performed by: HOSPITALIST

## 2017-04-12 PROCEDURE — 80048 BASIC METABOLIC PNL TOTAL CA: CPT | Performed by: INTERNAL MEDICINE

## 2017-04-12 PROCEDURE — 74011250636 HC RX REV CODE- 250/636: Performed by: INTERNAL MEDICINE

## 2017-04-12 PROCEDURE — 74011000250 HC RX REV CODE- 250: Performed by: HOSPITALIST

## 2017-04-12 PROCEDURE — 93880 EXTRACRANIAL BILAT STUDY: CPT

## 2017-04-12 PROCEDURE — 65660000000 HC RM CCU STEPDOWN

## 2017-04-12 PROCEDURE — 74011000250 HC RX REV CODE- 250: Performed by: INTERNAL MEDICINE

## 2017-04-12 PROCEDURE — C8929 TTE W OR WO FOL WCON,DOPPLER: HCPCS

## 2017-04-12 PROCEDURE — 85025 COMPLETE CBC W/AUTO DIFF WBC: CPT | Performed by: INTERNAL MEDICINE

## 2017-04-12 RX ADMIN — ALPRAZOLAM 1 MG: 0.5 TABLET ORAL at 14:12

## 2017-04-12 RX ADMIN — ALBUTEROL SULFATE 2.5 MG: 2.5 SOLUTION RESPIRATORY (INHALATION) at 19:24

## 2017-04-12 RX ADMIN — Medication 10 ML: at 05:15

## 2017-04-12 RX ADMIN — VERAPAMIL HYDROCHLORIDE 120 MG: 120 TABLET, FILM COATED, EXTENDED RELEASE ORAL at 08:09

## 2017-04-12 RX ADMIN — HYDROCODONE BITARTRATE AND ACETAMINOPHEN 1 TABLET: 10; 325 TABLET ORAL at 14:11

## 2017-04-12 RX ADMIN — ALPRAZOLAM 1 MG: 0.5 TABLET ORAL at 21:35

## 2017-04-12 RX ADMIN — ALBUTEROL SULFATE 2.5 MG: 2.5 SOLUTION RESPIRATORY (INHALATION) at 09:01

## 2017-04-12 RX ADMIN — ALBUTEROL SULFATE 2.5 MG: 2.5 SOLUTION RESPIRATORY (INHALATION) at 13:09

## 2017-04-12 RX ADMIN — PERFLUTREN 1 ML: 6.52 INJECTION, SUSPENSION INTRAVENOUS at 16:09

## 2017-04-12 RX ADMIN — LEVOTHYROXINE SODIUM 100 MCG: 50 TABLET ORAL at 17:32

## 2017-04-12 RX ADMIN — Medication 10 ML: at 14:00

## 2017-04-12 RX ADMIN — SODIUM CHLORIDE 125 ML/HR: 450 INJECTION, SOLUTION INTRAVENOUS at 00:18

## 2017-04-12 RX ADMIN — APIXABAN 2.5 MG: 2.5 TABLET, FILM COATED ORAL at 17:32

## 2017-04-12 RX ADMIN — ACETAMINOPHEN 650 MG: 325 TABLET, FILM COATED ORAL at 18:37

## 2017-04-12 RX ADMIN — HYDROCODONE BITARTRATE AND ACETAMINOPHEN 1 TABLET: 10; 325 TABLET ORAL at 05:54

## 2017-04-12 RX ADMIN — Medication 10 ML: at 21:34

## 2017-04-12 RX ADMIN — ZIPRASIDONE HCL 160 MG: 20 CAPSULE ORAL at 21:33

## 2017-04-12 RX ADMIN — BUDESONIDE 500 MCG: 0.5 INHALANT RESPIRATORY (INHALATION) at 08:59

## 2017-04-12 RX ADMIN — APIXABAN 2.5 MG: 2.5 TABLET, FILM COATED ORAL at 08:09

## 2017-04-12 RX ADMIN — ALPRAZOLAM 1 MG: 0.5 TABLET ORAL at 10:53

## 2017-04-12 RX ADMIN — PANTOPRAZOLE SODIUM 40 MG: 40 TABLET, DELAYED RELEASE ORAL at 08:09

## 2017-04-12 RX ADMIN — BUDESONIDE 500 MCG: 0.5 INHALANT RESPIRATORY (INHALATION) at 19:24

## 2017-04-12 RX ADMIN — SODIUM CHLORIDE 125 ML/HR: 450 INJECTION, SOLUTION INTRAVENOUS at 10:50

## 2017-04-12 NOTE — PROGRESS NOTES
Bedside report received from [facility]. Patient resting quietly in bed, alert and oriented, VSS. Denies any needs at this time. Appears in NAD.

## 2017-04-12 NOTE — REHAB NOTE
Attempted to see patient this morning, nurse in room, patient on bedside toilet, observed patient ambulating to bed with nurse.   Will attempt to see patient in the pm.

## 2017-04-12 NOTE — PROGRESS NOTES
Hospitalist Progress Note     Admit Date:  4/10/2017  6:04 PM   Name:  Nancie Rivera   Age:  73 y.o.  :  1950   MRN:  [id number]   PCP:  Natalio Alva DO  Treatment Team: Attending Provider: Cortez Villavicencio MD; Consulting Provider: Jorge Carlton MD; Utilization Review: Michel Sandoval    Subjective:   Ms. Adonis Urban is a 74 yo WF with PMH of PE on eliquis, CVA, urine incontinence with nerve stimulator, depression and polypharmacy admitted with acute change in mentation, slurred speech, left sided weakness and CAMILA. CT head negative, unable to have MRI due to nerve stimulator. EEG ordered and neurology consulted. She has been hydrated with negative renal US and improved renal function. CXR negative. Carotid US no significant stenosis     - pt says she is feeling better. No current complaints.       Objective:   Patient Vitals for the past 24 hrs:   Temp Pulse Resp BP SpO2   17 1100 98.1 °F (36.7 °C) 83 20 149/63 100 %   17 0901 - - - - 98 %   17 0810 - 82 30 154/66 97 %   17 0801 - 83 13 144/89 98 %   17 0759 97.4 °F (36.3 °C) 82 12 144/89 98 %   17 0410 98.2 °F (36.8 °C) 76 18 153/75 94 %   17 0310 - 82 16 157/90 -   17 0210 - 86 13 158/72 -   17 0110 - (!) 103 17 148/66 -   17 0010 97.6 °F (36.4 °C) 80 11 (!) 162/91 99 %   17 2310 - 92 26 135/74 -   178 - 85 13 145/73 -   17 2110 - 79 12 123/66 -   17 8 120/66 -   17 - - - - 98 %   17 - - -   17 1925 97.9 °F (36.6 °C) 80 16 115/55 97 %   17 1924 - 78 13 115/55 -   17 1910 - 80 12 112/55 -   17 1810 - 82 12 119/59 -   17 1710 - 82 13 108/54 -   17 1610 - 89 22 131/73 -   17 1350 - 85 - 124/51 -   17 1324 - - - - 97 %   17 1316 - 85 12 124/51 -   17 1217 - 85 13 140/60 -     Oxygen Therapy  O2 Sat (%): 100 % (17 1100)  Pulse via Oximetry: 83 beats per minute (04/12/17 1100)  O2 Device: Room air (04/12/17 0901)    Intake/Output Summary (Last 24 hours) at 04/12/17 1138  Last data filed at 04/12/17 0900   Gross per 24 hour   Intake           2912.5 ml   Output             3350 ml   Net           -437.5 ml         General:    Well nourished. Alert. CV:   RRR. No murmur, rub, or gallop. Lungs:   Clear to auscultation bilaterally. No wheezing, rhonchi, or rales. Abdomen:   Soft, nontender, nondistended. Bowel sounds normal.   Extremities: Warm and dry. No cyanosis or edema. Skin:     No rashes or jaundice. Current Meds:  Current Facility-Administered Medications   Medication Dose Route Frequency    levothyroxine (SYNTHROID) tablet 100 mcg  100 mcg Oral QPM    ziprasidone (GEODON) capsule 160 mg  160 mg Oral QHS    HYDROcodone-acetaminophen (NORCO)  mg tablet 1 Tab  1 Tab Oral Q6H PRN    0.45% sodium chloride infusion  125 mL/hr IntraVENous CONTINUOUS    albuterol CONCENTRATE 2.5mg/0.5 mL neb soln  2.5 mg Nebulization Q4H PRN    ALPRAZolam (XANAX) tablet 1 mg  1 mg Oral TID PRN    pantoprazole (PROTONIX) tablet 40 mg  40 mg Oral ACB    verapamil ER (CALAN-SR) tablet 120 mg  120 mg Oral DAILY WITH BREAKFAST    sodium chloride (NS) flush 5-10 mL  5-10 mL IntraVENous Q8H    sodium chloride (NS) flush 5-10 mL  5-10 mL IntraVENous PRN    acetaminophen (TYLENOL) tablet 650 mg  650 mg Oral Q4H PRN    apixaban (ELIQUIS) tablet 2.5 mg  2.5 mg Oral BID    hydrALAZINE (APRESOLINE) 20 mg/mL injection 10 mg  10 mg IntraVENous Q6H PRN    budesonide (PULMICORT) 500 mcg/2 ml nebulizer suspension  500 mcg Nebulization BID RT    And    albuterol CONCENTRATE 2.5mg/0.5 mL neb soln  2.5 mg Nebulization Q6H RT       Labs and Studies:  I have reviewed all labs, meds, telemetry events, and studies from the last 24 hours.   Recent Results (from the past 24 hour(s))   TROPONIN I    Collection Time: 04/11/17  1:17 PM   Result Value Ref Range    Troponin-I, Qt. <0.04 0.02 - 0.05 NG/ML   TROPONIN I    Collection Time: 04/11/17  9:06 PM   Result Value Ref Range    Troponin-I, Qt. <0.04 0.02 - 0.05 NG/ML   CBC WITH AUTOMATED DIFF    Collection Time: 04/12/17  3:33 AM   Result Value Ref Range    WBC 8.5 4.3 - 11.1 K/uL    RBC 4.07 4.05 - 5.25 M/uL    HGB 10.3 (L) 11.7 - 15.4 g/dL    HCT 33.4 (L) 35.8 - 46.3 %    MCV 82.1 79.6 - 97.8 FL    MCH 25.3 (L) 26.1 - 32.9 PG    MCHC 30.8 (L) 31.4 - 35.0 g/dL    RDW 17.6 (H) 11.9 - 14.6 %    PLATELET 264 505 - 001 K/uL    MPV 10.1 (L) 10.8 - 14.1 FL    DF AUTOMATED      NEUTROPHILS 52 43 - 78 %    LYMPHOCYTES 35 13 - 44 %    MONOCYTES 10 4.0 - 12.0 %    EOSINOPHILS 3 0.5 - 7.8 %    BASOPHILS 0 0.0 - 2.0 %    IMMATURE GRANULOCYTES 0.2 0.0 - 5.0 %    ABS. NEUTROPHILS 4.5 1.7 - 8.2 K/UL    ABS. LYMPHOCYTES 2.9 0.5 - 4.6 K/UL    ABS. MONOCYTES 0.9 0.1 - 1.3 K/UL    ABS. EOSINOPHILS 0.2 0.0 - 0.8 K/UL    ABS. BASOPHILS 0.0 0.0 - 0.2 K/UL    ABS. IMM.  GRANS. 0.0 0.0 - 0.5 K/UL   METABOLIC PANEL, BASIC    Collection Time: 04/12/17  3:33 AM   Result Value Ref Range    Sodium 146 (H) 136 - 145 mmol/L    Potassium 3.7 3.5 - 5.1 mmol/L    Chloride 113 (H) 98 - 107 mmol/L    CO2 21 21 - 32 mmol/L    Anion gap 12 7 - 16 mmol/L    Glucose 93 65 - 100 mg/dL    BUN 17 8 - 23 MG/DL    Creatinine 0.90 0.6 - 1.0 MG/DL    GFR est AA >60 >60 ml/min/1.73m2    GFR est non-AA >60 >60 ml/min/1.73m2    Calcium 8.3 8.3 - 10.4 MG/DL   PLEASE READ & DOCUMENT PPD TEST IN 24 HRS    Collection Time: 04/12/17  8:53 AM   Result Value Ref Range    PPD Negative Negative    mm Induration 0 mm        All Micro Results     Procedure Component Value Units Date/Time    CULTURE, BLOOD [[id number]] Collected:  04/11/17 0625    Order Status:  Completed Specimen:  Blood from Blood Updated:  04/12/17 0611     Special Requests: LEFT HAND        Culture result: NO GROWTH AFTER 23 HOURS       CULTURE, BLOOD [[id number]] Collected:  04/11/17 0631    Order Status:  Completed Specimen: Blood from Blood Updated:  04/12/17 0611     Special Requests: RIGHT HAND        Culture result: NO GROWTH AFTER 23 HOURS       MRSA SCREEN - PCR (NASAL) [[id number]] Collected:  04/10/17 2236    Order Status:  Completed Specimen:  Nasal from Nares Updated:  04/11/17 0012     Special Requests: NO SPECIAL REQUESTS        Culture result:         MRSA target DNA is not detected (presumptive not colonized with MRSA)          Recent Imaging:  CXR Results  (Last 48 hours)               04/11/17 0944  XR CHEST PORT Final result    Impression:  Impression: Lungs underinflated but appear clear. Please see above comments. Narrative:  Single View portable upright chest x-ray dated   April 11, 2017       Reference Exam: February 16, 2017       Indication: Acute encephalopathy, no other clinical information provided        Findings:  Patient is rotated some to the right with lungs mildly underinflated. The cardiac silhouette is normal in size and contour. The lungs and pulmonary   vascularity appear normal. Electronic device is again seen on the left with lead   extending inferiorly. CT Results  (Last 48 hours)               04/10/17 1850  CT HEAD WO CONT Final result    Impression:  Impression: No acute intracranial abnormality. Narrative:  Noncontrast head CT        Clinical Indication: Difficulty speaking for one week with acute left lower   extremity paresthesias today, stroke evaluation. Technique: Noncontrast axial images were obtained through the brain. Comparison: February 7, 2017       Findings: There is no acute intracranial hemorrhage, hydrocephalus, intra-axial   mass, or mass-effect. There is no CT evidence of acute large artery territorial   infarction or abnormal extra-axial fluid collection. The mastoid air cells and paranasal sinuses are clear where imaged. No displaced skull fractures are present.                      Assessment and Plan:     [facility] Hospital Problems as of 4/12/2017  Date Reviewed: 2/16/2017          Codes Class Noted - Resolved POA    * (Principal)Acute encephalopathy ICD-10-CM: G93.40  ICD-9-CM: 348.30  4/10/2017 - Present Yes        PATRICIA (obstructive sleep apnea) (Chronic) ICD-10-CM: G47.33  ICD-9-CM: 327.23  4/10/2017 - Present Yes    Overview Signed 4/10/2017 10:06 PM by Colin Mendez MD     Probable. Possible also CSA drug induced             Hx of pulmonary embolus (Chronic) ICD-10-CM: Z86.711  ICD-9-CM: V12.55  4/10/2017 - Present Yes        QT prolongation ICD-10-CM: R94.31  ICD-9-CM: 794.31  4/10/2017 - Present Yes        CAMILA (acute kidney injury) ([facility] ICD-10-CM: N17.9  ICD-9-CM: 584.9  2/8/2017 - Present Yes        Polypharmacy ICD-10-CM: H15.901  ICD-9-CM: V58.69  2/8/2017 - Present Yes        History of peptic ulcer disease (Chronic) ICD-10-CM: Z87.11  ICD-9-CM: V12.71  1/13/2013 - Present Yes        Hypertension (Chronic) ICD-10-CM: I10  ICD-9-CM: 401.9  9/16/2012 - Present Yes        GERD (gastroesophageal reflux disease) (Chronic) ICD-10-CM: K21.9  ICD-9-CM: 530.81  9/16/2012 - Present Yes        Morbid obesity - sedentary lifestyle (Chronic) ICD-10-CM: E66.01  ICD-9-CM: 278.01  9/16/2012 - Present Yes        Hypothyroidism (Chronic) ICD-10-CM: E03.9  ICD-9-CM: 244.9  9/16/2012 - Present Yes        Asthma (Chronic) ICD-10-CM: J45.909  ICD-9-CM: 493.90  9/16/2012 - Present Yes        Bipolar affective disorder ([facility] (Chronic) ICD-10-CM: F31.9  ICD-9-CM: 296.80  9/16/2012 - Present Yes                PLAN:    · Awaiting 2d echo  · Appreciate neurology recs  · ARF resolved  · Home with home health later if workup done.         Signed:  Burgess Lorena MD

## 2017-04-12 NOTE — PROGRESS NOTES
Patient placed on continuous sat monitor for overnight oximetry study on room air. Monitor cleared of trends. RN notified. Notification sign placed on door. Alarm limits set to 100/80.

## 2017-04-12 NOTE — PROGRESS NOTES
Spoke with Dr. Christa Yates about sodium level of 146. No new orders received. Will Continue to monitor.

## 2017-04-13 VITALS
OXYGEN SATURATION: 97 % | HEIGHT: 63 IN | SYSTOLIC BLOOD PRESSURE: 151 MMHG | DIASTOLIC BLOOD PRESSURE: 60 MMHG | TEMPERATURE: 97.5 F | WEIGHT: 208 LBS | BODY MASS INDEX: 36.86 KG/M2 | HEART RATE: 102 BPM | RESPIRATION RATE: 15 BRPM

## 2017-04-13 PROBLEM — G93.40 ACUTE ENCEPHALOPATHY: Status: RESOLVED | Noted: 2017-04-10 | Resolved: 2017-04-13

## 2017-04-13 PROBLEM — N17.9 AKI (ACUTE KIDNEY INJURY) (HCC): Status: RESOLVED | Noted: 2017-02-08 | Resolved: 2017-04-13

## 2017-04-13 PROCEDURE — 94640 AIRWAY INHALATION TREATMENT: CPT

## 2017-04-13 PROCEDURE — 74011250637 HC RX REV CODE- 250/637: Performed by: HOSPITALIST

## 2017-04-13 PROCEDURE — 74011250637 HC RX REV CODE- 250/637: Performed by: INTERNAL MEDICINE

## 2017-04-13 PROCEDURE — 74011000250 HC RX REV CODE- 250: Performed by: HOSPITALIST

## 2017-04-13 RX ORDER — LISINOPRIL 20 MG/1
40 TABLET ORAL DAILY
Status: DISCONTINUED | OUTPATIENT
Start: 2017-04-13 | End: 2017-04-13 | Stop reason: HOSPADM

## 2017-04-13 RX ORDER — ATORVASTATIN CALCIUM 10 MG/1
20 TABLET, FILM COATED ORAL
Qty: 30 TAB | Refills: 11 | Status: SHIPPED | OUTPATIENT
Start: 2017-04-13

## 2017-04-13 RX ADMIN — VERAPAMIL HYDROCHLORIDE 120 MG: 120 TABLET, FILM COATED, EXTENDED RELEASE ORAL at 07:50

## 2017-04-13 RX ADMIN — PANTOPRAZOLE SODIUM 40 MG: 40 TABLET, DELAYED RELEASE ORAL at 07:50

## 2017-04-13 RX ADMIN — BUDESONIDE 500 MCG: 0.5 INHALANT RESPIRATORY (INHALATION) at 07:31

## 2017-04-13 RX ADMIN — LISINOPRIL 40 MG: 20 TABLET ORAL at 10:23

## 2017-04-13 RX ADMIN — ALBUTEROL SULFATE 2.5 MG: 2.5 SOLUTION RESPIRATORY (INHALATION) at 07:31

## 2017-04-13 RX ADMIN — Medication 10 ML: at 07:05

## 2017-04-13 RX ADMIN — APIXABAN 2.5 MG: 2.5 TABLET, FILM COATED ORAL at 09:26

## 2017-04-13 NOTE — PROGRESS NOTES
Discharge medications reviewed with patient and spouse and appropriate educational materials and side effects teaching were provided. Reviewed follow-up appointments and times, signs and symptoms of a stroke, and when to call 911. Informed her prescriptions were sent to her local pharmacy. All questions answered. Awaiting transport. All patient's belongings sent home including shoes, pants, two shirts, undergarments, one grey ring, and 1 pair of sunglasses.

## 2017-04-13 NOTE — DISCHARGE SUMMARY
Hospitalist Discharge Summary     Admit Date:  4/10/2017  6:04 PM   Name:  Annette Aldrich   Age:  73 y.o.  :  1950   MRN:  [id number]   PCP:  Kellie Forbes DO  Treatment Team: Attending Provider: Kamille Montana MD; Utilization Review: Hannah Dc    Problem List for this Hospitalization:  Hospital Problems as of 2017  Date Reviewed: 2017          Codes Class Noted - Resolved POA    PATRICIA (obstructive sleep apnea) (Chronic) ICD-10-CM: P79.87  ICD-9-CM: 327.23  4/10/2017 - Present Yes    Overview Signed 4/10/2017 10:06 PM by Juwan Oreilly MD     Probable.  Possible also CSA drug induced             Hx of pulmonary embolus (Chronic) ICD-10-CM: Z86.711  ICD-9-CM: V12.55  4/10/2017 - Present Yes        QT prolongation ICD-10-CM: R94.31  ICD-9-CM: 794.31  4/10/2017 - Present Yes        Polypharmacy ICD-10-CM: Z79.899  ICD-9-CM: V58.69  2017 - Present Yes        History of peptic ulcer disease (Chronic) ICD-10-CM: Z87.11  ICD-9-CM: V12.71  2013 - Present Yes        Hypertension (Chronic) ICD-10-CM: I10  ICD-9-CM: 401.9  2012 - Present Yes        GERD (gastroesophageal reflux disease) (Chronic) ICD-10-CM: K21.9  ICD-9-CM: 530.81  2012 - Present Yes        Morbid obesity - sedentary lifestyle (Chronic) ICD-10-CM: E66.01  ICD-9-CM: 278.01  2012 - Present Yes        Hypothyroidism (Chronic) ICD-10-CM: E03.9  ICD-9-CM: 244.9  2012 - Present Yes        Asthma (Chronic) ICD-10-CM: J45.909  ICD-9-CM: 493.90  2012 - Present Yes        Bipolar affective disorder ([facility] (Chronic) ICD-10-CM: F31.9  ICD-9-CM: 296.80  2012 - Present Yes        RESOLVED: Acute encephalopathy ICD-10-CM: G93.40  ICD-9-CM: 348.30  4/10/2017 - 2017 Yes        * (Principal)RESOLVED: CAMILA (acute kidney injury) ([facility] ICD-10-CM: N17.9  ICD-9-CM: 584.9  2017 - 2017 Yes                Admission HPI from 4/10/2017:    \" Ms. Maldonado Covernarciso is a 72 yo F brought to ED by her daughter with complains of increasing confusion And slurred speech in the last 10-14 days, unstable gait that she needs other person to support her because he is leaning toward left side and today she noticed left foot numbness. She had multiple falls in the last 10 days. Denies any spikes of fever, cough, chest pain or abdominal pain. Complains if one soft stool per day for the last 2 days. CT head w/o acute pathology. Unable to get MRI brain due to vagal nerve stimulator and InterStim present for urgency. WBC:13.9, H/H:11.9/38.1, Cr:3.09, BUN:51. Recently admitted in February 2017 with similar complains   PMHx significant for Hx of chronic respiratory failure , was on O2 supplementation for 3 years until 1 year prior, iron deficiency anemia - had colonoscopy 1 week prior and was WNL per daughter , Severe urge incontinence s/p InterStim 7/2015 with consideration for Botox by her urologist, Hx of DVT and b/l PE (2012) on chronic Eliquis, Morbid Obesity, hx of possible CVA (2012), PUD with gastric ulcer, hx of bipolar disorder s/p Vagal nerve stimulator, HTN, Insomnia. Complains of sleeping behaviors - never had a sleep study or seen a sleep medicine physician. \"    Hospital Course:  Ms. Calos Ellison is a 72 yo WF with PMH of PE on eliquis, CVA, urine incontinence with nerve stimulator, depression and polypharmacy admitted with acute change in mentation, slurred speech, left sided weakness and CAMILA. CT head negative, unable to have MRI due to nerve stimulator. EEG ordered and neurology consulted. She has been hydrated with negative renal US and improved renal function. CXR negative. Carotid US no significant stenosis. 2d echo unremarkable. Neurology saw and felt she had a TIA. lipitor was increased slightly based on recent LDL of 90. She will continue her eliquis that she was on already. Lisinopril will be restarted. Patient was educated on importance of staying hydrated.     Pt says her meds are managed by her PCP and Psychiatrist.  I will defer the issue of possible polypharmacy to the patient's PCP and Psychiatrist.  Her adderall also increases her risk of stroke and stopping it should be considered. Follow up instructions below. Plan was discussed with pt. All questions answered. Patient was stable at time of discharge and was instructed to call or return if there are any concerns or recurrence of symptoms. Diagnostic Imaging/Tests:    DUPLEX CAROTID BILATERAL (Final result) Result time: 04/12/17 10:42:24     Final result     Impression:     IMPRESSION:    No hemodynamically significant internal carotid artery stenosis. Thyroid nodules. Recommend consideration of a dedicated thyroid ultrasound exam.       Narrative:     HISTORY: Transient ischemic attack. Hypertension and abnormal gait. Dyslipidemia. FINDINGS:    Duplex doppler carotid ultrasound exams performed of both the right and left  side of the neck. NASCET criteria. Peak systolic velocity right common carotid artery 88 cm/s, right internal  carotid of 120 cm/s with a ratio of 1.4 on the right. Right internal carotid  artery end-diastolic velocity of 31 cm/s. Peak systolic velocity left common artery 84 cm/s, internal carotid of 117 cm/s  with a ratio of 1.4 on the left. Left internal carotid artery end-diastolic  velocity of 44 cm/s. Antegrade flow right vertebral artery and antegrade flow left vertebral artery. Grayscale and color-flow imaging reveal no significant stenosis. Thyroid nodules.                 XR CHEST PORT (Final result) Result time: 04/11/17 09:48:21     Final result     Impression:     Impression: Lungs underinflated but appear clear.  Please see above comments.     Narrative:     Single View portable upright chest x-ray dated   April 11, 2017    Reference Exam: February 16, 2017    Indication: Acute encephalopathy, no other clinical information provided     Findings:  Patient is rotated some to the right with lungs mildly underinflated. The cardiac silhouette is normal in size and contour.  The lungs and pulmonary  vascularity appear normal. Electronic device is again seen on the left with lead  extending inferiorly.                 US RETROPERITONEUM COMP (Final result) Result time: 04/11/17 07:02:34     Final result     Impression:     IMPRESSION:    No hydronephrosis.     Narrative:     Renal ultrasound     INDICATION: Acute renal failure    COMPARISON: none    TECHNIQUE: Multiple grayscale sonographic images of the kidneys. FINDINGS:    Both kidneys are normal in echogenicity, contour and size without  hydronephrosis. The right measures 9.4 cm and the left measures 10 cm. Urinary bladder is normal in contour.                 CT HEAD WO CONT (Final result) Result time: 04/10/17 18:59:11     Final result     Impression:     Impression: No acute intracranial abnormality.         Narrative:     Noncontrast head CT     Clinical Indication: Difficulty speaking for one week with acute left lower  extremity paresthesias today, stroke evaluation. Technique: Noncontrast axial images were obtained through the brain. Comparison: February 7, 2017    Findings: There is no acute intracranial hemorrhage, hydrocephalus, intra-axial  mass, or mass-effect. There is no CT evidence of acute large artery territorial  infarction or abnormal extra-axial fluid collection. The mastoid air cells and paranasal sinuses are clear where imaged. No displaced skull fractures are present.            All Micro Results     Procedure Component Value Units Date/Time    CULTURE, BLOOD [[id number]] Collected:  04/11/17 0625    Order Status:  Completed Specimen:  Blood from Blood Updated:  04/13/17 0546     Special Requests: LEFT HAND        Culture result: NO GROWTH 2 DAYS       CULTURE, BLOOD [[id number]] Collected:  04/11/17 0631    Order Status:  Completed Specimen:  Blood from Blood Updated:  04/13/17 0546     Special Requests: RIGHT HAND Culture result: NO GROWTH 2 DAYS       MRSA SCREEN - PCR (NASAL) [[id number]] Collected:  04/10/17 2236    Order Status:  Completed Specimen:  Nasal from Nares Updated:  04/11/17 0012     Special Requests: NO SPECIAL REQUESTS        Culture result:         MRSA target DNA is not detected (presumptive not colonized with MRSA)          Labs: Results:       BMP, Mg, Phos Recent Labs      04/12/17   0333  04/11/17   0613  04/10/17   1832   NA  146*  142  140   K  3.7  3.8  3.9   CL  113*  109*  106   CO2  21  21  20*   AGAP  12  12  14   BUN  17  37*  51*   CREA  0.90  1.69*  3.09*   CA  8.3  7.8*  8.6   GLU  93  87  123*   MG   --   2.3   --    PHOS   --   4.2*   --       CBC Recent Labs      04/12/17   0333  04/11/17   0613  04/10/17   1832   WBC  8.5  10.8  13.9*   RBC  4.07  4.17  4.65   HGB  10.3*  10.5*  11.9   HCT  33.4*  34.1*  38.1   PLT  247  216  276   GRANS  52  63  71   LYMPH  35  26  22   EOS  3  2  1   MONOS  10  9  6   BASOS  0  0  0   IG  0.2  0.2  0.3   ANEU  4.5  6.8  9.8*   ABL  2.9  2.8  3.1   NABIL  0.2  0.2  0.1   ABM  0.9  1.0  0.8   ABB  0.0  0.0  0.0   AIG  0.0  0.0  0.0      LFT Recent Labs      04/11/17   0613  04/10/17   1832   SGOT  14*  15   ALT  16  19   AP  111  125   TP  5.2*  6.2*   ALB  2.9*  3.1*   GLOB  2.3  3.1   AGRAT  1.3  1.0*      Cardiac Testing Lab Results   Component Value Date/Time    BNP 91 12/28/2016 04:13 AM    BNP 2 07/23/2014 09:35 PM    BNP 33 02/07/2013 09:11 AM     04/10/2017 11:00 PM    CK - MB 1.2 04/10/2017 11:00 PM    CK-MB Index 0.2 04/10/2017 11:00 PM    Troponin-I, Qt. <0.04 04/11/2017 09:06 PM    Troponin-I, Qt. <0.04 04/11/2017 01:17 PM    Troponin-I, Qt. <0.04 04/11/2017 06:13 AM      Coagulation Tests Lab Results   Component Value Date/Time    Prothrombin time 11.7 02/07/2017 11:30 PM    Prothrombin time 12.4 07/23/2014 09:35 PM    Prothrombin time 14.1 04/02/2013 11:54 AM    INR 1.1 02/07/2017 11:30 PM    INR 1.2 07/23/2014 09:35 PM    INR 1.4 04/02/2013 11:54 AM    aPTT 42.6 02/07/2013 09:10 AM    aPTT 30.3 01/06/2013 08:54 PM    aPTT 47.8 10/01/2012 10:16 AM      A1c Lab Results   Component Value Date/Time    Hemoglobin A1c 4.5 02/09/2017 05:35 AM      Lipid Panel Lab Results   Component Value Date/Time    Cholesterol, total 159 02/09/2017 05:35 AM    HDL Cholesterol 55 02/09/2017 05:35 AM    LDL, calculated 89.6 02/09/2017 05:35 AM    VLDL, calculated 14.4 02/09/2017 05:35 AM    Triglyceride 72 02/09/2017 05:35 AM    CHOL/HDL Ratio 2.9 02/09/2017 05:35 AM      Thyroid Panel Lab Results   Component Value Date/Time    T4, Total 4.9 09/16/2012 04:20 AM    T3 Uptake 41 09/16/2012 04:20 AM    TSH 0.259 04/10/2017 11:00 PM    TSH 0.845 02/07/2013 09:10 AM        Most Recent UA Lab Results   Component Value Date/Time    Color YELLOW 04/10/2017 08:50 PM    Appearance CLOUDY 04/10/2017 08:50 PM    Specific gravity 1.013 04/10/2017 08:50 PM    pH (UA) 5.0 04/10/2017 08:50 PM    Protein NEGATIVE  04/10/2017 08:50 PM    Glucose NEGATIVE  04/10/2017 08:50 PM    Ketone NEGATIVE  04/10/2017 08:50 PM    Bilirubin NEGATIVE  04/10/2017 08:50 PM    Blood NEGATIVE  04/10/2017 08:50 PM    Urobilinogen 0.2 04/10/2017 08:50 PM    Nitrites NEGATIVE  04/10/2017 08:50 PM    Leukocyte Esterase NEGATIVE  04/10/2017 08:50 PM        Allergies   Allergen Reactions    Latex Contact Dermatitis    Bactrim [Sulfamethoxazole-Trimethoprim] Nausea Only    Lamictal [Lamotrigine] Atopic Dermatitis    Pcn [Penicillins] Rash    Tapentadol Rash     Immunization History   Administered Date(s) Administered    Influenza Vaccine 10/01/2014, 08/01/2016    Influenza Vaccine Split 09/21/2012    Pneumococcal Vaccine (Unspecified Type) 09/16/2010    TB Skin Test (PPD) Intradermal 01/11/2013, 12/27/2016, 04/11/2017       All Labs from Last 24 Hrs:  No results found for this or any previous visit (from the past 24 hour(s)).     Discharge Exam:  Patient Vitals for the past 24 hrs:   Temp Pulse Resp BP SpO2   04/13/17 0748 97.5 °F (36.4 °C) (!) 102 15 151/60 97 %   04/13/17 0403 - (!) 104 18 167/78 -   04/13/17 0200 - 97 15 156/84 -   04/13/17 0002 98.1 °F (36.7 °C) 93 12 (!) 189/101 -   04/12/17 1955 97.8 °F (36.6 °C) (!) 112 20 172/81 -   04/12/17 1925 - - - - 97 %   04/12/17 1835 - (!) 111 (!) 33 164/89 97 %   04/12/17 1602 97.6 °F (36.4 °C) 99 16 (!) 141/91 94 %   04/12/17 1409 - (!) 110 18 155/77 98 %   04/12/17 1100 98.1 °F (36.7 °C) 83 20 149/63 100 %   04/12/17 1040 - 87 15 149/63 99 %     Oxygen Therapy  O2 Sat (%): 97 % (04/13/17 0748)  Pulse via Oximetry: 83 beats per minute (04/13/17 0732)  O2 Device: Room air (04/13/17 0748)    Intake/Output Summary (Last 24 hours) at 04/13/17 1018  Last data filed at 04/12/17 1611   Gross per 24 hour   Intake              480 ml   Output                0 ml   Net              480 ml       General:    Well nourished. Alert. No distress. Eyes:   Normal sclera. Extraocular movements intact. ENT:  Normocephalic, atraumatic. Moist mucous membranes  CV:   Regular rate and rhythm. No murmur, rub, or gallop. Lungs:  Clear to auscultation bilaterally. No wheezing, rhonchi, or rales. Abdomen: Soft, nontender, nondistended. Bowel sounds normal.   Extremities: Warm and dry. No cyanosis or edema. Neurologic: CN II-XII grossly intact. Sensation intact. Skin:     No rashes or jaundice. No wounds. Psych:  Normal mood and affect. Discharge Info:   Current Discharge Medication List      CONTINUE these medications which have CHANGED    Details   atorvastatin (LIPITOR) 10 mg tablet Take 2 Tabs by mouth nightly. Qty: 30 Tab, Refills: 11         CONTINUE these medications which have NOT CHANGED    Details   apixaban (ELIQUIS) 2.5 mg tablet Take 2.5 mg by mouth two (2) times a day. Indications: PULMONARY THROMBOEMBOLISM PREVENTION      ALPRAZolam (XANAX) 2 mg tablet Take 1 Tab by mouth three (3) times daily as needed for Anxiety. Max Daily Amount: 6 mg.   Qty: 30 Tab, Refills: 0      amphetamine-dextroamphetamine XR (ADDERALL XR) 20 mg XR capsule Take 1 Cap (20 mg total) by mouth every morning. Max Daily Amount: 20 mg  Qty: 30 Cap, Refills: 0      HYDROcodone-acetaminophen (NORCO)  mg tablet Take 1 Tab by mouth every six (6) hours as needed for Pain. ziprasidone (GEODON) 80 mg capsule Take 160 mg by mouth nightly. albuterol (PROAIR HFA) 90 mcg/actuation inhaler Take 2 Puffs by inhalation every four (4) hours as needed for Wheezing. ramelteon (ROZEREM) 8 mg tablet Take  by mouth nightly. mirtazapine (REMERON) 30 mg tablet Take 30 mg by mouth nightly. ondansetron (ZOFRAN ODT) 8 mg disintegrating tablet Take 8 mg by mouth every eight (8) hours as needed for Nausea. oxybutynin chloride XL (DITROPAN XL) 10 mg CR tablet Take 10 mg by mouth daily. Magnesium Oxide 500 mg cap Take 500 mg by mouth.      lisinopril (PRINIVIL, ZESTRIL) 40 mg tablet Take 1 Tab by mouth daily. Qty: 30 Tab, Refills: 5      DULERA 200-5 mcg/actuation HFA inhaler USE 2 INHALATIONS TWICE A DAY  Qty: 1 Inhaler, Refills: 11      POTASSIUM CHLORIDE SR 10 MEQ TAB Take 1 Tab by mouth daily. verapamil ER (VERELAN) 120 mg ER capsule Take 120 mg by mouth two (2) times a day. Cholecalciferol, Vitamin D3, (VITAMIN D3) 1,000 unit cap Take  by mouth. folic acid 717 mcg tablet Take 800 mcg by mouth daily. nicotinic acid (NIACIN) 500 mg tablet Take 500 mg by mouth Daily (before breakfast). pantoprazole (PROTONIX) 40 mg tablet Take 1 Tab by mouth Before breakfast and dinner. Qty: 60 Tab, Refills: 2      FERROUS FUMARATE/VIT BCOMP&C (SUPER B COMPLEX PO) Take 1 Tab by mouth daily. BISACODYL (GENTLE LAXATIVE PO) Take 2 Tabs by mouth daily. albuterol sulfate (PROVENTIL;VENTOLIN) 2.5 mg/0.5 mL nebu nebulizer solution 0.5 mL by Nebulization route four (4) times daily.   Qty: 120 mL, Refills: 11    Comments: DX: 799.02 hypoxemia, 518.83 chronic respitory failure      NEBULIZER by Does Not Apply route. SUMATRIPTAN SUCCINATE (IMITREX PO) take 100 mg by mouth as needed. levothyroxine (SYNTHROID) 125 mcg tablet Take  by mouth every evening. Disposition: home  Activity: Activity as tolerated  Diet: Resume previous diet, cardiac diet    Follow-up Information     Follow up With Details Comments Nain Valencia In 1 week PATRICIA [address] [address]  [address] [place] [address] In 1 week follow up [address] [place] [address] Rd  [phone]              Time spent in patient discharge planning and coordination 35 minutes.     Signed:  Jamey Paredes MD

## 2017-04-13 NOTE — DISCHARGE INSTRUCTIONS

## 2017-04-13 NOTE — PROCEDURES
[facility]       Name:  Sebastian Montgomery   MR#:  [id number]   :  1950   Account #:  [de-identified]   Date of Adm:  04/10/2017       CLINICAL INFORMATION: Confusion, rule out seizure. FINDINGS: This is a routine 20-channel EEG. The predominant   background rhythm has a frequency of 9 Hz. There is normal and   symmetric reactivity to eye opening and eye closure. The patient   has intermittent slowing during drowsiness and normal stage 2   sleep architecture. No signs of localized slowing or   epileptiform activity are seen. Photic stimulation was   accomplished and this was unremarkable. Hyperventilation was not   done. INTERPRETATION: Normal awake and asleep EEG. The absence of   epileptiform abnormality does not exclude the possibility of   seizures in the proper clinical setting, clinical correlation is   required.         MD CHANTAL Galvez / Maged Solano   D:  2017   20:54   T:  2017   21:50   Job #:  [id number]

## 2017-04-13 NOTE — PROGRESS NOTES
[address] Neurology    EEG negative. Unremarkable carotids/echo. No new recommendations. Will sign off. Suspect TIA.   Thanks,    Elvis Pierce MD

## 2017-06-16 ENCOUNTER — APPOINTMENT (OUTPATIENT)
Dept: CT IMAGING | Age: 67
DRG: 871 | End: 2017-06-16
Attending: EMERGENCY MEDICINE
Payer: MEDICARE

## 2017-06-16 ENCOUNTER — HOSPITAL ENCOUNTER (INPATIENT)
Age: 67
LOS: 3 days | Discharge: HOME | DRG: 871 | End: 2017-06-19
Attending: EMERGENCY MEDICINE | Admitting: FAMILY MEDICINE
Payer: MEDICARE

## 2017-06-16 ENCOUNTER — APPOINTMENT (OUTPATIENT)
Dept: GENERAL RADIOLOGY | Age: 67
DRG: 871 | End: 2017-06-16
Attending: EMERGENCY MEDICINE
Payer: MEDICARE

## 2017-06-16 DIAGNOSIS — J18.9 ATYPICAL PNEUMONIA: Primary | ICD-10-CM

## 2017-06-16 LAB
ALBUMIN SERPL BCP-MCNC: 3 G/DL (ref 3.2–4.6)
ALBUMIN/GLOB SERPL: 0.8 {RATIO} (ref 1.2–3.5)
ALP SERPL-CCNC: 100 U/L (ref 50–136)
ALT SERPL-CCNC: 23 U/L (ref 12–65)
ANION GAP BLD CALC-SCNC: 9 MMOL/L (ref 7–16)
ARTERIAL PATENCY WRIST A: ABNORMAL
AST SERPL W P-5'-P-CCNC: 20 U/L (ref 15–37)
ATRIAL RATE: 118 BPM
BASE DEFICIT BLDA-SCNC: 2.6 MMOL/L (ref 0–2)
BASOPHILS # BLD AUTO: 0 K/UL (ref 0–0.2)
BASOPHILS # BLD: 0 % (ref 0–2)
BDY SITE: ABNORMAL
BILIRUB SERPL-MCNC: 0.3 MG/DL (ref 0.2–1.1)
BUN SERPL-MCNC: 19 MG/DL (ref 8–23)
CALCIUM SERPL-MCNC: 8.9 MG/DL (ref 8.3–10.4)
CALCULATED P AXIS, ECG09: 59 DEGREES
CALCULATED R AXIS, ECG10: -4 DEGREES
CALCULATED T AXIS, ECG11: 31 DEGREES
CHLORIDE SERPL-SCNC: 109 MMOL/L (ref 98–107)
CO2 SERPL-SCNC: 26 MMOL/L (ref 21–32)
COHGB MFR BLD: 0.2 % (ref 0.5–1.5)
CREAT SERPL-MCNC: 0.99 MG/DL (ref 0.6–1)
DIAGNOSIS, 93000: NORMAL
DIFFERENTIAL METHOD BLD: ABNORMAL
DO-HGB BLD-MCNC: 7 % (ref 0–5)
EOSINOPHIL # BLD: 0.2 K/UL (ref 0–0.8)
EOSINOPHIL NFR BLD: 2 % (ref 0.5–7.8)
ERYTHROCYTE [DISTWIDTH] IN BLOOD BY AUTOMATED COUNT: 16.1 % (ref 11.9–14.6)
FIO2 ON VENT: 21 %
GLOBULIN SER CALC-MCNC: 3.6 G/DL (ref 2.3–3.5)
GLUCOSE SERPL-MCNC: 108 MG/DL (ref 65–100)
HCO3 BLDA-SCNC: 22 MMOL/L (ref 22–26)
HCT VFR BLD AUTO: 37.5 % (ref 35.8–46.3)
HGB BLD-MCNC: 11.4 G/DL (ref 11.7–15.4)
HGB BLDMV-MCNC: 11.1 GM/DL (ref 11.7–15)
IMM GRANULOCYTES # BLD: 0 K/UL (ref 0–0.5)
IMM GRANULOCYTES NFR BLD AUTO: 0.3 % (ref 0–5)
LACTATE BLD-SCNC: 2.1 MMOL/L (ref 0.5–1.9)
LACTATE BLD-SCNC: 2.7 MMOL/L (ref 0.5–1.9)
LYMPHOCYTES # BLD AUTO: 8 % (ref 13–44)
LYMPHOCYTES # BLD: 1.2 K/UL (ref 0.5–4.6)
MCH RBC QN AUTO: 25.2 PG (ref 26.1–32.9)
MCHC RBC AUTO-ENTMCNC: 30.4 G/DL (ref 31.4–35)
MCV RBC AUTO: 83 FL (ref 79.6–97.8)
METHGB MFR BLD: 0.3 % (ref 0–1.5)
MONOCYTES # BLD: 1.4 K/UL (ref 0.1–1.3)
MONOCYTES NFR BLD AUTO: 9 % (ref 4–12)
NEUTS SEG # BLD: 11.9 K/UL (ref 1.7–8.2)
NEUTS SEG NFR BLD AUTO: 81 % (ref 43–78)
OXYHGB MFR BLDA: 92.1 % (ref 94–97)
P-R INTERVAL, ECG05: 170 MS
PCO2 BLDA: 35 MMHG (ref 35–45)
PH BLDA: 7.41 [PH] (ref 7.35–7.45)
PLATELET # BLD AUTO: 307 K/UL (ref 150–450)
PMV BLD AUTO: 10.4 FL (ref 10.8–14.1)
PO2 BLDA: 66 MMHG (ref 75–100)
POTASSIUM SERPL-SCNC: 3.9 MMOL/L (ref 3.5–5.1)
PROT SERPL-MCNC: 6.6 G/DL (ref 6.3–8.2)
Q-T INTERVAL, ECG07: 292 MS
QRS DURATION, ECG06: 84 MS
QTC CALCULATION (BEZET), ECG08: 409 MS
RBC # BLD AUTO: 4.52 M/UL (ref 4.05–5.25)
SAO2 % BLD: 93 % (ref 92–98.5)
SERVICE CMNT-IMP: ABNORMAL
SODIUM SERPL-SCNC: 144 MMOL/L (ref 136–145)
TROPONIN I BLD-MCNC: 0 NG/ML (ref 0–0.08)
VENTILATION MODE VENT: ABNORMAL
VENTRICULAR RATE, ECG03: 118 BPM
WBC # BLD AUTO: 14.8 K/UL (ref 4.3–11.1)

## 2017-06-16 PROCEDURE — 96361 HYDRATE IV INFUSION ADD-ON: CPT | Performed by: EMERGENCY MEDICINE

## 2017-06-16 PROCEDURE — 83605 ASSAY OF LACTIC ACID: CPT

## 2017-06-16 PROCEDURE — 96365 THER/PROPH/DIAG IV INF INIT: CPT | Performed by: EMERGENCY MEDICINE

## 2017-06-16 PROCEDURE — 99285 EMERGENCY DEPT VISIT HI MDM: CPT | Performed by: EMERGENCY MEDICINE

## 2017-06-16 PROCEDURE — 74011250636 HC RX REV CODE- 250/636: Performed by: FAMILY MEDICINE

## 2017-06-16 PROCEDURE — 93005 ELECTROCARDIOGRAM TRACING: CPT | Performed by: EMERGENCY MEDICINE

## 2017-06-16 PROCEDURE — 74011000258 HC RX REV CODE- 258: Performed by: EMERGENCY MEDICINE

## 2017-06-16 PROCEDURE — 74011250636 HC RX REV CODE- 250/636: Performed by: EMERGENCY MEDICINE

## 2017-06-16 PROCEDURE — 74011250637 HC RX REV CODE- 250/637: Performed by: EMERGENCY MEDICINE

## 2017-06-16 PROCEDURE — 81003 URINALYSIS AUTO W/O SCOPE: CPT | Performed by: EMERGENCY MEDICINE

## 2017-06-16 PROCEDURE — 71020 XR CHEST PA LAT: CPT

## 2017-06-16 PROCEDURE — 71260 CT THORAX DX C+: CPT

## 2017-06-16 PROCEDURE — 36600 WITHDRAWAL OF ARTERIAL BLOOD: CPT

## 2017-06-16 PROCEDURE — 74011250637 HC RX REV CODE- 250/637: Performed by: FAMILY MEDICINE

## 2017-06-16 PROCEDURE — 82803 BLOOD GASES ANY COMBINATION: CPT

## 2017-06-16 PROCEDURE — 74011636320 HC RX REV CODE- 636/320: Performed by: EMERGENCY MEDICINE

## 2017-06-16 PROCEDURE — 77010033678 HC OXYGEN DAILY

## 2017-06-16 PROCEDURE — 85025 COMPLETE CBC W/AUTO DIFF WBC: CPT | Performed by: EMERGENCY MEDICINE

## 2017-06-16 PROCEDURE — 87040 BLOOD CULTURE FOR BACTERIA: CPT | Performed by: EMERGENCY MEDICINE

## 2017-06-16 PROCEDURE — 80053 COMPREHEN METABOLIC PANEL: CPT | Performed by: EMERGENCY MEDICINE

## 2017-06-16 PROCEDURE — 65270000029 HC RM PRIVATE

## 2017-06-16 PROCEDURE — 84484 ASSAY OF TROPONIN QUANT: CPT

## 2017-06-16 RX ORDER — VERAPAMIL HYDROCHLORIDE 120 MG/1
120 TABLET, FILM COATED, EXTENDED RELEASE ORAL
Status: DISCONTINUED | OUTPATIENT
Start: 2017-06-17 | End: 2017-06-19 | Stop reason: HOSPADM

## 2017-06-16 RX ORDER — ALBUTEROL SULFATE 2.5 MG/.5ML
2.5 SOLUTION RESPIRATORY (INHALATION)
Status: DISCONTINUED | OUTPATIENT
Start: 2017-06-17 | End: 2017-06-19 | Stop reason: HOSPADM

## 2017-06-16 RX ORDER — BISACODYL 5 MG
10 TABLET, DELAYED RELEASE (ENTERIC COATED) ORAL DAILY PRN
Status: DISCONTINUED | OUTPATIENT
Start: 2017-06-16 | End: 2017-06-19 | Stop reason: HOSPADM

## 2017-06-16 RX ORDER — ZIPRASIDONE HYDROCHLORIDE 20 MG/1
160 CAPSULE ORAL
Status: DISCONTINUED | OUTPATIENT
Start: 2017-06-16 | End: 2017-06-19 | Stop reason: HOSPADM

## 2017-06-16 RX ORDER — SUMATRIPTAN 50 MG/1
100 TABLET, FILM COATED ORAL DAILY PRN
Status: DISCONTINUED | OUTPATIENT
Start: 2017-06-16 | End: 2017-06-19 | Stop reason: HOSPADM

## 2017-06-16 RX ORDER — HYDROCODONE BITARTRATE AND ACETAMINOPHEN 10; 325 MG/1; MG/1
1 TABLET ORAL
Status: DISCONTINUED | OUTPATIENT
Start: 2017-06-16 | End: 2017-06-19 | Stop reason: HOSPADM

## 2017-06-16 RX ORDER — SODIUM CHLORIDE 9 MG/ML
100 INJECTION, SOLUTION INTRAVENOUS CONTINUOUS
Status: DISPENSED | OUTPATIENT
Start: 2017-06-16 | End: 2017-06-18

## 2017-06-16 RX ORDER — LEVOTHYROXINE SODIUM 125 UG/1
125 TABLET ORAL EVERY EVENING
Status: DISCONTINUED | OUTPATIENT
Start: 2017-06-16 | End: 2017-06-19 | Stop reason: HOSPADM

## 2017-06-16 RX ORDER — LANOLIN ALCOHOL/MO/W.PET/CERES
400 CREAM (GRAM) TOPICAL DAILY
Status: DISCONTINUED | OUTPATIENT
Start: 2017-06-17 | End: 2017-06-19 | Stop reason: HOSPADM

## 2017-06-16 RX ORDER — SODIUM CHLORIDE 0.9 % (FLUSH) 0.9 %
10 SYRINGE (ML) INJECTION
Status: COMPLETED | OUTPATIENT
Start: 2017-06-16 | End: 2017-06-16

## 2017-06-16 RX ORDER — LISINOPRIL 20 MG/1
40 TABLET ORAL DAILY
Status: DISCONTINUED | OUTPATIENT
Start: 2017-06-17 | End: 2017-06-19 | Stop reason: HOSPADM

## 2017-06-16 RX ORDER — LISINOPRIL 20 MG/1
40 TABLET ORAL
Status: COMPLETED | OUTPATIENT
Start: 2017-06-16 | End: 2017-06-16

## 2017-06-16 RX ORDER — SODIUM CHLORIDE 0.9 % (FLUSH) 0.9 %
5-10 SYRINGE (ML) INJECTION AS NEEDED
Status: DISCONTINUED | OUTPATIENT
Start: 2017-06-16 | End: 2017-06-19 | Stop reason: HOSPADM

## 2017-06-16 RX ORDER — PANTOPRAZOLE SODIUM 40 MG/1
40 TABLET, DELAYED RELEASE ORAL
Status: DISCONTINUED | OUTPATIENT
Start: 2017-06-17 | End: 2017-06-19 | Stop reason: HOSPADM

## 2017-06-16 RX ORDER — LEVOFLOXACIN 5 MG/ML
750 INJECTION, SOLUTION INTRAVENOUS EVERY 24 HOURS
Status: DISCONTINUED | OUTPATIENT
Start: 2017-06-17 | End: 2017-06-19 | Stop reason: HOSPADM

## 2017-06-16 RX ORDER — MIRTAZAPINE 15 MG/1
30 TABLET, FILM COATED ORAL
Status: DISCONTINUED | OUTPATIENT
Start: 2017-06-16 | End: 2017-06-19 | Stop reason: HOSPADM

## 2017-06-16 RX ORDER — SODIUM CHLORIDE 0.9 % (FLUSH) 0.9 %
5-10 SYRINGE (ML) INJECTION EVERY 8 HOURS
Status: DISCONTINUED | OUTPATIENT
Start: 2017-06-16 | End: 2017-06-19 | Stop reason: HOSPADM

## 2017-06-16 RX ORDER — OXYBUTYNIN CHLORIDE 5 MG/1
5 TABLET ORAL 2 TIMES DAILY
Status: DISCONTINUED | OUTPATIENT
Start: 2017-06-17 | End: 2017-06-19 | Stop reason: HOSPADM

## 2017-06-16 RX ORDER — ACETAMINOPHEN 325 MG/1
650 TABLET ORAL
Status: DISCONTINUED | OUTPATIENT
Start: 2017-06-16 | End: 2017-06-19 | Stop reason: HOSPADM

## 2017-06-16 RX ORDER — LEVOFLOXACIN 5 MG/ML
750 INJECTION, SOLUTION INTRAVENOUS
Status: COMPLETED | OUTPATIENT
Start: 2017-06-16 | End: 2017-06-16

## 2017-06-16 RX ORDER — ALBUTEROL SULFATE 0.83 MG/ML
2.5 SOLUTION RESPIRATORY (INHALATION)
Status: DISCONTINUED | OUTPATIENT
Start: 2017-06-16 | End: 2017-06-19 | Stop reason: HOSPADM

## 2017-06-16 RX ORDER — ACETAMINOPHEN 500 MG
1000 TABLET ORAL
Status: COMPLETED | OUTPATIENT
Start: 2017-06-16 | End: 2017-06-16

## 2017-06-16 RX ORDER — ATORVASTATIN CALCIUM 10 MG/1
20 TABLET, FILM COATED ORAL
Status: DISCONTINUED | OUTPATIENT
Start: 2017-06-16 | End: 2017-06-19 | Stop reason: HOSPADM

## 2017-06-16 RX ORDER — BUDESONIDE 0.5 MG/2ML
500 INHALANT ORAL
Status: DISCONTINUED | OUTPATIENT
Start: 2017-06-16 | End: 2017-06-19 | Stop reason: HOSPADM

## 2017-06-16 RX ORDER — ALPRAZOLAM 0.5 MG/1
2 TABLET ORAL
Status: DISCONTINUED | OUTPATIENT
Start: 2017-06-16 | End: 2017-06-19 | Stop reason: HOSPADM

## 2017-06-16 RX ADMIN — LISINOPRIL 40 MG: 20 TABLET ORAL at 15:56

## 2017-06-16 RX ADMIN — SODIUM CHLORIDE 100 ML: 900 INJECTION, SOLUTION INTRAVENOUS at 18:31

## 2017-06-16 RX ADMIN — LEVOTHYROXINE SODIUM 125 MCG: 125 TABLET ORAL at 22:24

## 2017-06-16 RX ADMIN — LEVOFLOXACIN 750 MG: 5 INJECTION, SOLUTION INTRAVENOUS at 18:06

## 2017-06-16 RX ADMIN — SODIUM CHLORIDE 700 ML: 900 INJECTION, SOLUTION INTRAVENOUS at 19:55

## 2017-06-16 RX ADMIN — SODIUM CHLORIDE 1000 ML: 900 INJECTION, SOLUTION INTRAVENOUS at 18:06

## 2017-06-16 RX ADMIN — APIXABAN 2.5 MG: 2.5 TABLET, FILM COATED ORAL at 22:24

## 2017-06-16 RX ADMIN — MIRTAZAPINE 30 MG: 15 TABLET, FILM COATED ORAL at 22:24

## 2017-06-16 RX ADMIN — Medication 10 ML: at 18:31

## 2017-06-16 RX ADMIN — ZIPRASIDONE HCL 160 MG: 20 CAPSULE ORAL at 22:31

## 2017-06-16 RX ADMIN — ACETAMINOPHEN 1000 MG: 500 TABLET ORAL at 14:35

## 2017-06-16 RX ADMIN — IOPAMIDOL 100 ML: 755 INJECTION, SOLUTION INTRAVENOUS at 18:31

## 2017-06-16 RX ADMIN — SODIUM CHLORIDE 100 ML/HR: 900 INJECTION, SOLUTION INTRAVENOUS at 22:24

## 2017-06-16 RX ADMIN — SODIUM CHLORIDE 1000 ML: 900 INJECTION, SOLUTION INTRAVENOUS at 14:35

## 2017-06-16 RX ADMIN — ALPRAZOLAM 2 MG: 0.5 TABLET ORAL at 22:24

## 2017-06-16 RX ADMIN — ATORVASTATIN CALCIUM 20 MG: 10 TABLET, FILM COATED ORAL at 22:24

## 2017-06-16 NOTE — ED TRIAGE NOTES
Pt in via [facility] c/o fever and sob starting 2 hours pta. Pt denies cough, denies urinary symptoms. States she did not take anything for fever. Pt initial O2 was 92% on room air. bgl 129 with ems.

## 2017-06-16 NOTE — H&P
HOSPITALIST H&P/CONSULT  NAME:  Fide Putnam   Age:  78 y.o.  :   1950   MRN:   [id number]  PCP: Augustine Andrew DO  Treatment Team: Primary Nurse: Danielle Mireles RN    Prior     CC: Reason for admission is: sepsis due to CAP    HPI:   Patient case was reviewed with the ER provider prior to seeing the patient. Of note: my exam was done after her sepsis IVF bolus    Patient is a 78 y.o. female who presents to the ER due to fever and cough. Symptoms started about 12 hours ago when she awoke this am.  She had been incont of urine overnight and was having chills. Once she got up and cleaned up, she was still feeling \"bad\", some SOB - but not severe. States that she just wanted to sleep. Denies chest pain or abdominal pain; no n/v/d. She did not try any treatment at home. ROS:  All systems have been reviewed and are negative except as stated in HPI or elsewhere. Past Medical History:   Diagnosis Date    Acute renal failure ([address]     The patient was admitted with acute renal failure secondary to volume depletion. She was found to have a gastric ulcer on admission. 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 ??      Past Surgical History:   Procedure Laterality Date    HX APPENDECTOMY      HX CHOLECYSTECTOMY      HX ENDOSCOPY      Gastric ulcers x 2    HX GASTRIC BYPASS      HX HYSTERECTOMY      HX ORTHOPAEDIC      rt knee growth, right shoulder, left thumb    HX OTHER SURGICAL      vagus nerve stimulator    HX TONSILLECTOMY        Social History   Substance Use Topics    Smoking status: Never Smoker    Smokeless tobacco: Never Used      Comment: exposed to second hand smoke at work for 16 years    Alcohol use No      Family History   Problem Relation Age of Onset    Hypertension Mother     Cancer Father      prostate    Heart Attack Father     Heart Disease Father      CAD    Hypertension Father     Hypertension Sister     Stroke Sister     Heart Disease Brother      CAD with CABG    Heart Attack Brother     Hypertension Brother        FH Reviewed and non-contributory to admitting diagnosis    Allergies   Allergen Reactions    Latex Contact Dermatitis    Bactrim [Sulfamethoxazole-Trimethoprim] Nausea Only    Lamictal [Lamotrigine] Atopic Dermatitis    Pcn [Penicillins] Rash    Tapentadol Rash      Prior to Admission Medications   Prescriptions Last Dose Informant Patient Reported? Taking? ALPRAZolam (XANAX) 2 mg tablet   No Yes   Sig: Take 1 Tab by mouth three (3) times daily as needed for Anxiety. Max Daily Amount: 6 mg. BISACODYL (GENTLE LAXATIVE PO)   Yes Yes   Sig: Take 2 Tabs by mouth daily. Cholecalciferol, Vitamin D3, (VITAMIN D3) 1,000 unit cap   Yes Yes   Sig: Take  by mouth. DULERA 200-5 mcg/actuation HFA inhaler   No Yes   Sig: USE 2 INHALATIONS TWICE A DAY   FERROUS FUMARATE/VIT BCOMP&C (SUPER B COMPLEX PO)   Yes Yes   Sig: Take 1 Tab by mouth daily. HYDROcodone-acetaminophen (NORCO)  mg tablet   Yes Yes   Sig: Take 1 Tab by mouth every six (6) hours as needed for Pain. Magnesium Oxide 500 mg cap   Yes Yes   Sig: Take 500 mg by mouth. NEBULIZER   Yes Yes   Sig: by Does Not Apply route. POTASSIUM CHLORIDE SR 10 MEQ TAB   Yes Yes   Sig: Take 1 Tab by mouth daily. SUMATRIPTAN SUCCINATE (IMITREX PO)   Yes Yes   Sig: take 100 mg by mouth as needed.    albuterol (PROAIR HFA) 90 mcg/actuation inhaler   Yes Yes   Sig: Take 2 Puffs by inhalation every four (4) hours as needed for Wheezing. albuterol sulfate (PROVENTIL;VENTOLIN) 2.5 mg/0.5 mL nebu nebulizer solution   No Yes   Si.5 mL by Nebulization route four (4) times daily. amphetamine-dextroamphetamine XR (ADDERALL XR) 20 mg XR capsule   No Yes   Sig: Take 1 Cap (20 mg total) by mouth every morning. Max Daily Amount: 20 mg   apixaban (ELIQUIS) 2.5 mg tablet   Yes Yes   Sig: Take 2.5 mg by mouth two (2) times a day. Indications: PULMONARY THROMBOEMBOLISM PREVENTION   atorvastatin (LIPITOR) 10 mg tablet   No Yes   Sig: Take 2 Tabs by mouth nightly. folic acid 077 mcg tablet   Yes Yes   Sig: Take 800 mcg by mouth daily. levothyroxine (SYNTHROID) 125 mcg tablet   Yes Yes   Sig: Take  by mouth every evening. lisinopril (PRINIVIL, ZESTRIL) 40 mg tablet   No Yes   Sig: Take 1 Tab by mouth daily. mirtazapine (REMERON) 30 mg tablet   Yes Yes   Sig: Take 30 mg by mouth nightly. nicotinic acid (NIACIN) 500 mg tablet   Yes Yes   Sig: Take 500 mg by mouth Daily (before breakfast). ondansetron (ZOFRAN ODT) 8 mg disintegrating tablet   Yes Yes   Sig: Take 8 mg by mouth every eight (8) hours as needed for Nausea. oxybutynin chloride XL (DITROPAN XL) 10 mg CR tablet   Yes Yes   Sig: Take 10 mg by mouth daily. pantoprazole (PROTONIX) 40 mg tablet   No Yes   Sig: Take 1 Tab by mouth Before breakfast and dinner. ramelteon (ROZEREM) 8 mg tablet   Yes Yes   Sig: Take  by mouth nightly. verapamil ER (VERELAN) 120 mg ER capsule   Yes Yes   Sig: Take 120 mg by mouth two (2) times a day. ziprasidone (GEODON) 80 mg capsule   Yes Yes   Sig: Take 160 mg by mouth nightly.       Facility-Administered Medications: None         Objective:     Visit Vitals    /75    Pulse 100    Temp 98.7 °F (37.1 °C)    Resp 17    Ht 5' 3\" (1.6 m)    Wt 90.7 kg (200 lb)    SpO2 93%    BMI 35.43 kg/m2      Temp (24hrs), Av.6 °F (37.6 °C), Min:98.7 °F (37.1 °C), Max:100.4 °F (38 °C)    Oxygen Therapy  O2 Sat (%): 93 % (06/16/17 1912)  Pulse via Oximetry: 104 beats per minute (06/16/17 1814)  O2 Device: Room air (06/16/17 1336)   Body mass index is 35.43 kg/(m^2). Physical Exam:    General:    WD and WN , No acute distress. Moderately ill appearing  Head:   Normocephalic, without obvious abnormality, atraumatic. Eyes:  PERRL; EOMI  ENT:  Hearing is normal.  Oropharynx is clear with tacky mucous membranes   Resp:    Clear/diminished to auscultation bilaterally. No Wheezing or Rhonchi. Resp are even and unlabored  Heart[de-identified]  Regular rate and rhythm,  no murmur, rub or gallop. No LE edema;  Normal peripheral pulses and cap refill  Abdomen:   Soft, non-tender. Not distended. Bowel sounds normal.  hepato-splenomegaly cannot be assess due to obesity   Musc/SK: Muscle strength and tone normal and appropriate for age and condition. No cyanosis. No clubbing  Skin:     Texture, turgor normal. No significant rashes or lesions. Skin slightly pale  Neurologic: CN II - XII are tested and intact; Reflexes unobtainable  Psych: Alert and oriented x 4;  Judgement and insight are normal     Data Review:   Recent Results (from the past 24 hour(s))   POC TROPONIN-I    Collection Time: 06/16/17  1:38 PM   Result Value Ref Range    Troponin-I (POC) 0 0.0 - 0.08 ng/ml   CBC WITH AUTOMATED DIFF    Collection Time: 06/16/17  1:40 PM   Result Value Ref Range    WBC 14.8 (H) 4.3 - 11.1 K/uL    RBC 4.52 4.05 - 5.25 M/uL    HGB 11.4 (L) 11.7 - 15.4 g/dL    HCT 37.5 35.8 - 46.3 %    MCV 83.0 79.6 - 97.8 FL    MCH 25.2 (L) 26.1 - 32.9 PG    MCHC 30.4 (L) 31.4 - 35.0 g/dL    RDW 16.1 (H) 11.9 - 14.6 %    PLATELET 401 908 - 964 K/uL    MPV 10.4 (L) 10.8 - 14.1 FL    DF AUTOMATED      NEUTROPHILS 81 (H) 43 - 78 %    LYMPHOCYTES 8 (L) 13 - 44 %    MONOCYTES 9 4.0 - 12.0 %    EOSINOPHILS 2 0.5 - 7.8 %    BASOPHILS 0 0.0 - 2.0 %    IMMATURE GRANULOCYTES 0.3 0.0 - 5.0 %    ABS. NEUTROPHILS 11.9 (H) 1.7 - 8.2 K/UL    ABS. LYMPHOCYTES 1.2 0.5 - 4.6 K/UL    ABS. MONOCYTES 1.4 (H) 0.1 - 1.3 K/UL    ABS. EOSINOPHILS 0.2 0.0 - 0.8 K/UL    ABS. BASOPHILS 0.0 0.0 - 0.2 K/UL    ABS. IMM. GRANS. 0.0 0.0 - 0.5 K/UL   METABOLIC PANEL, COMPREHENSIVE    Collection Time: 06/16/17  1:40 PM   Result Value Ref Range    Sodium 144 136 - 145 mmol/L    Potassium 3.9 3.5 - 5.1 mmol/L    Chloride 109 (H) 98 - 107 mmol/L    CO2 26 21 - 32 mmol/L    Anion gap 9 7 - 16 mmol/L    Glucose 108 (H) 65 - 100 mg/dL    BUN 19 8 - 23 MG/DL    Creatinine 0.99 0.6 - 1.0 MG/DL    GFR est AA >60 >60 ml/min/1.73m2    GFR est non-AA 60 (L) >60 ml/min/1.73m2    Calcium 8.9 8.3 - 10.4 MG/DL    Bilirubin, total 0.3 0.2 - 1.1 MG/DL    ALT (SGPT) 23 12 - 65 U/L    AST (SGOT) 20 15 - 37 U/L    Alk. phosphatase 100 50 - 136 U/L    Protein, total 6.6 6.3 - 8.2 g/dL    Albumin 3.0 (L) 3.2 - 4.6 g/dL    Globulin 3.6 (H) 2.3 - 3.5 g/dL    A-G Ratio 0.8 (L) 1.2 - 3.5     POC LACTIC ACID    Collection Time: 06/16/17  1:44 PM   Result Value Ref Range    Lactic Acid (POC) 2.1 (H) 0.5 - 1.9 mmol/L   EKG, 12 LEAD, INITIAL    Collection Time: 06/16/17  1:47 PM   Result Value Ref Range    Ventricular Rate 118 BPM    Atrial Rate 118 BPM    P-R Interval 170 ms    QRS Duration 84 ms    Q-T Interval 292 ms    QTC Calculation (Bezet) 409 ms    Calculated P Axis 59 degrees    Calculated R Axis -4 degrees    Calculated T Axis 31 degrees    Diagnosis       !! AGE AND GENDER SPECIFIC ECG ANALYSIS !!   Sinus tachycardia  Inferior infarct , age undetermined  Cannot rule out Anterior infarct (cited on or before 10-APR-2017)  Abnormal ECG  When compared with ECG of 10-APR-2017 19:16,  Inferior infarct is now Present  Confirmed by [facility]  MD (), TOMI FARRAR ([id number]) on 6/16/2017 4:29:07 PM     CULTURE, BLOOD    Collection Time: 06/16/17  2:00 PM   Result Value Ref Range    Special Requests: RIGHT ANTECUBITAL      Culture result: PENDING    CULTURE, BLOOD Collection Time: 06/16/17  2:30 PM   Result Value Ref Range    Special Requests: RIGHT ANTECUBITAL      Culture result: PENDING    BLOOD GAS, ARTERIAL    Collection Time: 06/16/17  2:59 PM   Result Value Ref Range    pH 7.41 7.35 - 7.45      PCO2 35 35 - 45 mmHg    PO2 66 (L) 75.0 - 100.0 mmHg    BICARBONATE 22 22 - 26 mmol/L    BASE DEFICIT 2.6 (H) 0 - 2 mmol/L    TOTAL HEMOGLOBIN 11.1 (L) 11.7 - 15.0 GM/DL    O2 SAT 93 92.0 - 98.5 %    ARTERIAL O2 HGB 92.1 (L) 94.0 - 97.0 %    CARBOXYHEMOGLOBIN 0.2 (L) 0.5 - 1.5 %    METHEMOGLOBIN 0.3 0.0 - 1.5 %    DEOXYHEMOGLOBIN 7 (H) 0.0 - 5.0 %    SITE LB     ALLENS TEST NA     MODE RA     FIO2 21.0 %    Respiratory comment:       Dr Keith Christy at 6 16 2017 3 58 46 PM. Read back. ROOM AIR   POC LACTIC ACID    Collection Time: 06/16/17  4:29 PM   Result Value Ref Range    Lactic Acid (POC) 2.7 (H) 0.5 - 1.9 mmol/L     CXR Results  (Last 48 hours)               06/16/17 1409  XR CHEST PA LAT Final result    Impression:  IMPRESSION: No acute disease. Narrative:  Chest 2 view       CLINICAL INDICATION: Acute moderate dyspnea and fever today       COMPARISON: 4/11/2017, 2/16/2017       TECHNIQUE: Upright AP and lateral views of the chest. Lateral view details are   limited by prominent overlying soft tissues. FINDINGS: Lung volumes are well inflated. Cardiomediastinal silhouette and   hilar contours are stable and within normal limits given technique. The lungs   demonstrate no consolidation, pneumothorax, pleural effusion or pulmonary edema. There is a left side nerve stimulator remaining in place. No acute osseous abnormalities are seen. CT Results  (Last 48 hours)               06/16/17 1846  CT CHEST W CONT Final result    Impression:  IMPRESSION:   1. No acute pulmonary embolus. 2. Clustered infectious/inflammatory nodularity in the bilateral lower lobes to   a greater extent on the right and in the right upper lobe.  This is most in   keeping with a diffuse, atypical infectious or inflammatory process       Narrative:  CT OF THE CHEST WITH CONTRAST, PULMONARY EMBOLUS PROTOCOL. CLINICAL INDICATION: Shortness of breath, elevated D-dimer, chest pain       PROCEDURE: Serial thin section axial images are obtained from the thoracic inlet   through the upper abdomen following the administration of intravenous contrast   per a dedicated, institutional pulmonary embolus protocol. Coronal MIP   reformatted images are generated. Radiation dose reduction techniques were used   for this study. Our CT scanners use one or all of the following: Automated   exposure control, adjusted of the mA and/or kV according to patient size,   iterative reconstruction       COMPARISON: Chest CT dated 9/15/2012       FINDINGS: There is adequate opacification of the central pulmonary arterial   tree. No central intraluminal filling defect noted indicate acute pulmonary   embolus. No mediastinal or axillary adenopathy evident. The heart is normal in   size. No pleural or pericardial effusion evident. Clustered nodular densities   most evident in the lower lobes, right greater than left with smaller clustered   nodularity present in the right upper lobe. An atypical infectious or   inflammatory process is suspected. There is no pneumothorax. Postsurgical changes in keeping with gastric bypass procedure noted in the left   upper abdominal quadrant. No aggressive osseous lesions identified. Assessment and Plan: Active Hospital Problems    Diagnosis Date Noted    Pneumonia 06/16/2017    Hypertension 09/16/2012    GERD (gastroesophageal reflux disease) 09/16/2012    Morbid obesity - sedentary lifestyle 09/16/2012         · PLAN   · IVF   · IV abx  · Cont appropriate home meds (see MAR)  · Control symptoms (pain, n/v, fever, etc)  · Monitor appropriate labs   · Plans discussed with patient and/or caregiver; questions answered.     Med records reviewed if applicable; findings:     Critical care time if applicable:      Signed By: [place] MD     June 16, 2017         Checklist for SIRS/Sepsis continuum    1. SIRS:  (need 2 or more from same set of VS);    this counts as time of presentation!!   T > 100.9 or < 96.8 :   n   HR > 90:     y   Resp > 20:     n   WBC > 12 or < 4 or bands >10%:  y    2. What is most likely cause of above? Suspected infection:   yes; CAP    3. SIRS + susp infect = sepsis: yes? continue;  No? stop here   Make sure cultures and abx ordered now;     then, check criteria for #4    4. SEVERE SEPSIS  (need 1 of the following)   SBP < 90 or MAP <65, or a SBP drop of > 40: n   Creat > 2 :  n   Bili > 2 :  n   plt < 100k :  n   inr > 1.5 or aPTT > 60: ukn   Lactate > 2 :  yes  If not done yet, order now   If yes; start severe sepsis bundle; re-eval after fluid bolus of 30ml/kg    Order: RN to call when IVF bolus complete    After bolus, do sepsis focused exam and eval for #5    5.  SEPTIC SHOCK (have all of the above) and (hypoperfusion after fluid bolus)   Need one of following   SBP < 90 or MAP <65, or a SBP drop of > 40: no ; if yes, start pressors   Lactate >/= 4  (measured after bolus; not original)      [place] Granger MD

## 2017-06-16 NOTE — IP AVS SNAPSHOT
[address] 
[phone] Patient: Reinier Ramsay MRN: [id number] PYN:[id number] You are allergic to the following Allergen Reactions Latex Contact Dermatitis Bactrim (Sulfamethoxazole-Trimethoprim) Nausea Only Lamictal (Lamotrigine) Atopic Dermatitis Pcn (Penicillins) Rash Tapentadol Rash Recent Documentation Height Weight Breastfeeding? BMI OB Status Smoking Status 1.6 m 90.7 kg No 35.43 kg/m2 Hysterectomy Never Smoker Unresulted Labs Order Current Status CULTURE, BLOOD Preliminary result CULTURE, BLOOD Preliminary result Emergency Contacts Name Discharge Info Relation Home Work Mobile Kathy Wei  Daughter [75] [phone] Joshua Collins  Spouse [3] [phone] About your hospitalization You were admitted on:  June 16, 2017 You last received care in the:  [facility] You were discharged on:  June 19, 2017 Unit phone number:  [phone] Why you were hospitalized Your primary diagnosis was:  Pneumonia Your diagnoses also included:  Hypertension, Gerd (Gastroesophageal Reflux Disease), Morbid Obesity (Hcc), Sepsis (Hcc) Providers Seen During Your Hospitalizations Provider Role Specialty Primary office phone Mono Mello DO Attending Provider Emergency Medicine [phone] De Tamayo MD Attending Provider [facility] [phone] Your Primary Care Physician (PCP) Primary Care Physician Office Phone Office Fax Sabrina Johnson [phone] Follow-up Information Follow up With Details Comments Contact Info Frida Topete DO On 6/22/2017 4:45pm [address] [facility] [address] 
[phone] [facility] WILL CALL YOU WITH AN APPT. [address] [address] [address] [phone] Current Discharge Medication List  
  
START taking these medications Dose & Instructions Dispensing Information Comments Morning Noon Evening Bedtime  
 levoFLOXacin 750 mg tablet Commonly known as:  Rella Gene Your next dose is: Today Dose:  750 mg Take 1 Tab by mouth daily. Quantity:  4 Tab Refills:  0 CONTINUE these medications which have NOT CHANGED Dose & Instructions Dispensing Information Comments Morning Noon Evening Bedtime * PROAIR HFA 90 mcg/actuation inhaler Generic drug:  albuterol Dose:  2 Puff Take 2 Puffs by inhalation every four (4) hours as needed for Wheezing. Refills:  0  
     
   
   
   
  
 * albuterol sulfate 2.5 mg/0.5 mL Nebu nebulizer solution Commonly known as:  PROVENTIL;VENTOLIN Your next dose is: Today Dose:  2.5 mg  
0.5 mL by Nebulization route four (4) times daily. Quantity:  120 mL Refills:  11 DX: 799.02 hypoxemia, 518.83 chronic respitory failure ALPRAZolam 2 mg tablet Commonly known as:  Brianna Trev Dose:  2 mg Take 1 Tab by mouth three (3) times daily as needed for Anxiety. Max Daily Amount: 6 mg. Quantity:  30 Tab Refills:  0  
     
   
   
   
  
 amphetamine-dextroamphetamine XR 20 mg XR capsule Commonly known as:  ADDERALL XR Dose:  20 mg Take 1 Cap (20 mg total) by mouth every morning. Max Daily Amount: 20 mg  
 Quantity:  30 Cap Refills:  0  
     
   
   
   
  
 atorvastatin 10 mg tablet Commonly known as:  LIPITOR Your next dose is: Today Dose:  20 mg Take 2 Tabs by mouth nightly. Quantity:  30 Tab Refills:  11  
     
   
   
   
  
  
 DULERA 200-5 mcg/actuation HFA inhaler Generic drug:  mometasone-formoterol Your next dose is: Today USE 2 INHALATIONS TWICE A DAY Quantity:  1 Inhaler Refills:  11  
     
   
   
  
 ELIQUIS 2.5 mg tablet Generic drug:  apixaban Your next dose is: Today Dose:  2.5 mg Take 2.5 mg by mouth two (2) times a day. Indications: PULMONARY THROMBOEMBOLISM PREVENTION Refills:  0  
     
   
   
   
  
 folic acid 840 mcg tablet Your next dose is:  Tomorrow Dose:  800 mcg Take 800 mcg by mouth daily. Refills:  0 GENTLE LAXATIVE PO Dose:  2 Tab Take 2 Tabs by mouth daily. Refills:  0  
     
   
   
   
  
 GEODON 80 mg capsule Generic drug:  ziprasidone Your next dose is: Today Dose:  160 mg Take 160 mg by mouth nightly. Refills:  0 IMITREX PO Dose:  100 mg  
take 100 mg by mouth as needed. Refills:  0  
     
   
   
   
  
 lisinopril 40 mg tablet Commonly known as:  Minus Salk Your next dose is:  Tomorrow Dose:  40 mg Take 1 Tab by mouth daily. Quantity:  30 Tab Refills:  5 Magnesium Oxide 500 mg Cap Your next dose is:  Tomorrow Dose:  500 mg Take 500 mg by mouth. Refills:  0  
     
   
   
   
  
 mirtazapine 30 mg tablet Commonly known as:  Luzmaria Gaucher Your next dose is: Today Dose:  30 mg Take 30 mg by mouth nightly. Refills:  0 NEBULIZER  
   
 by Does Not Apply route. Refills:  0  
     
   
   
   
  
 nicotinic acid 500 mg tablet Commonly known as:  NIACIN Your next dose is:  Tomorrow Dose:  500 mg Take 500 mg by mouth Daily (before breakfast). Refills:  0 NORCO  mg tablet Generic drug:  HYDROcodone-acetaminophen Dose:  1 Tab Take 1 Tab by mouth every six (6) hours as needed for Pain. Refills:  0  
     
   
   
   
  
 oxybutynin chloride XL 10 mg CR tablet Commonly known as:  DITROPAN XL Your next dose is:  Tomorrow Dose:  10 mg Take 10 mg by mouth daily. Refills:  0 pantoprazole 40 mg tablet Commonly known as:  PROTONIX Your next dose is: Today Dose:  40 mg Take 1 Tab by mouth Before breakfast and dinner. Quantity:  60 Tab Refills:  2 POTASSIUM CHLORIDE SR 10 MEQ TAB Your next dose is:  Tomorrow Dose:  1 Tab Take 1 Tab by mouth daily. Refills:  0 ROZEREM 8 mg tablet Generic drug:  ramelteon Your next dose is: Today Take  by mouth nightly. Refills:  0 SUPER B COMPLEX PO Your next dose is:  Tomorrow Dose:  1 Tab Take 1 Tab by mouth daily. Refills:  0  
     
   
   
   
  
 SYNTHROID 125 mcg tablet Generic drug:  levothyroxine Your next dose is: Today Take  by mouth every evening. Refills:  0  
     
   
   
  
   
  
 verapamil  mg ER capsule Commonly known as:  Orval Nancy Your next dose is: Today Dose:  120 mg Take 120 mg by mouth two (2) times a day. Refills:  0  
     
   
   
  
   
  
 VITAMIN D3 1,000 unit Cap Generic drug:  cholecalciferol Your next dose is:  Tomorrow Take  by mouth. Refills:  0 ZOFRAN ODT 8 mg disintegrating tablet Generic drug:  ondansetron Dose:  8 mg Take 8 mg by mouth every eight (8) hours as needed for Nausea. Refills:  0  
     
   
   
   
  
 * Notice: This list has 2 medication(s) that are the same as other medications prescribed for you. Read the directions carefully, and ask your doctor or other care provider to review them with you. Where to Get Your Medications Information on where to get these meds will be given to you by the nurse or doctor. ! Ask your nurse or doctor about these medications  
  levoFLOXacin 750 mg tablet Discharge Instructions DISCHARGE SUMMARY from Nurse The following personal items are in your possession at time of discharge: Dental Appliances: Uppers, Lowers, With patient Visual Aid: Glasses Home Medications: None Jewelry: Bracelet, Earrings, Ring, With patient, Necklace Clothing: At bedside Other Valuables: None PATIENT INSTRUCTIONS: 
 
After general anesthesia or intravenous sedation, for 24 hours or while taking prescription Narcotics: · Limit your activities · Do not drive and operate hazardous machinery · Do not make important personal or business decisions · Do  not drink alcoholic beverages · If you have not urinated within 8 hours after discharge, please contact your surgeon on call. Report the following to your surgeon: 
· Excessive pain, swelling, redness or odor of or around the surgical area · Temperature over 100.5 · Nausea and vomiting lasting longer than 4 hours or if unable to take medications · Any signs of decreased circulation or nerve impairment to extremity: change in color, persistent  numbness, tingling, coldness or increase pain · Any questions What to do at Home: 
Recommended activity: Activity as tolerated, per MD 
 
If you experience any of the following symptoms fever>101, pain unrelieved with medication, nausea/vomiting, shortness of breath, dizziness/fainting, chest pain. , please follow up with your doctor. *  Please give a list of your current medications to your Primary Care Provider. *  Please update this list whenever your medications are discontinued, doses are 
    changed, or new medications (including over-the-counter products) are added. *  Please carry medication information at all times in case of emergency situations. These are general instructions for a healthy lifestyle: No smoking/ No tobacco products/ Avoid exposure to second hand smoke Surgeon General's Warning:  Quitting smoking now greatly reduces serious risk to your health. Obesity, smoking, and sedentary lifestyle greatly increases your risk for illness A healthy diet, regular physical exercise & weight monitoring are important for maintaining a healthy lifestyle You may be retaining fluid if you have a history of heart failure or if you experience any of the following symptoms:  Weight gain of 3 pounds or more overnight or 5 pounds in a week, increased swelling in our hands or feet or shortness of breath while lying flat in bed. Please call your doctor as soon as you notice any of these symptoms; do not wait until your next office visit. Recognize signs and symptoms of STROKE: 
 
F-face looks uneven A-arms unable to move or move unevenly S-speech slurred or non-existent T-time-call 911 as soon as signs and symptoms begin-DO NOT go Back to bed or wait to see if you get better-TIME IS BRAIN. Warning Signs of HEART ATTACK Call 911 if you have these symptoms: 
? Chest discomfort. Most heart attacks involve discomfort in the center of the chest that lasts more than a few minutes, or that goes away and comes back. It can feel like uncomfortable pressure, squeezing, fullness, or pain. ? Discomfort in other areas of the upper body. Symptoms can include pain or discomfort in one or both arms, the back, neck, jaw, or stomach. ? Shortness of breath with or without chest discomfort. ? Other signs may include breaking out in a cold sweat, nausea, or lightheadedness. Don't wait more than five minutes to call [address]! Fast action can save your life. Calling 911 is almost always the fastest way to get lifesaving treatment. Emergency Medical Services staff can begin treatment when they arrive  up to an hour sooner than if someone gets to the hospital by car. The discharge information has been reviewed with the patient. The patient verbalized understanding. Discharge medications reviewed with the patient and appropriate educational materials and side effects teaching were provided. Pneumonia: Care Instructions Your Care Instructions Pneumonia is an infection of the lungs. Most cases are caused by infections from bacteria or viruses. Pneumonia may be mild or very severe. If it is caused by bacteria, you will be treated with antibiotics. It may take a few weeks to a few months to recover fully from pneumonia, depending on how sick you were and whether your overall health is good. Follow-up care is a key part of your treatment and safety. Be sure to make and go to all appointments, and call your doctor if you are having problems. Its also a good idea to know your test results and keep a list of the medicines you take. How can you care for yourself at home? · Take your antibiotics exactly as directed. Do not stop taking the medicine just because you are feeling better. You need to take the full course of antibiotics. · Take your medicines exactly as prescribed. Call your doctor if you think you are having a problem with your medicine. · Get plenty of rest and sleep. You may feel weak and tired for a while, but your energy level will improve with time. · To prevent dehydration, drink plenty of fluids, enough so that your urine is light yellow or clear like water. Choose water and other caffeine-free clear liquids until you feel better. If you have kidney, heart, or liver disease and have to limit fluids, talk with your doctor before you increase the amount of fluids you drink. · Take care of your cough so you can rest. A cough that brings up mucus from your lungs is common with pneumonia. It is one way your body gets rid of the infection. But if coughing keeps you from resting or causes severe fatigue and chest-wall pain, talk to your doctor. He or she may suggest that you take a medicine to reduce the cough. · Use a vaporizer or humidifier to add moisture to your bedroom. Follow the directions for cleaning the machine. · Do not smoke or allow others to smoke around you.  Smoke will make your cough last longer. If you need help quitting, talk to your doctor about stop-smoking programs and medicines. These can increase your chances of quitting for good. · Take an over-the-counter pain medicine, such as acetaminophen (Tylenol), ibuprofen (Advil, Motrin), or naproxen (Aleve). Read and follow all instructions on the label. · Do not take two or more pain medicines at the same time unless the doctor told you to. Many pain medicines have acetaminophen, which is Tylenol. Too much acetaminophen (Tylenol) can be harmful. · If you were given a spirometer to measure how well your lungs are working, use it as instructed. This can help your doctor tell how your recovery is going. · To prevent pneumonia in the future, talk to your doctor about getting a flu vaccine (once a year) and a pneumococcal vaccine (one time only for most people). When should you call for help? Call 911 anytime you think you may need emergency care. For example, call if: 
· You have severe trouble breathing. Call your doctor now or seek immediate medical care if: 
· You cough up dark brown or bloody mucus (sputum). · You have new or worse trouble breathing. · You are dizzy or lightheaded, or you feel like you may faint. Watch closely for changes in your health, and be sure to contact your doctor if: 
· You have a new or higher fever. · You are coughing more deeply or more often. · You are not getting better after 2 days (48 hours). · You do not get better as expected. Where can you learn more? Go to http://era-fransisco.info/. Enter [id number] in the search box to learn more about \"Pneumonia: Care Instructions. \" Current as of: May 23, 2016 Content Version: 11.2 © 9693-8179 [employer], [employer]. Care instructions adapted under license by [employer] (which disclaims liability or warranty for this information).  If you have questions about a medical condition or this instruction, always ask your healthcare professional. [place] [address] any warranty or liability for your use of this information. Discharge Orders None [employer] Announcement We are excited to announce that we are making your provider's discharge notes available to you in [employer]. You will see these notes when they are completed and signed by the physician that discharged you from your recent hospital stay. If you have any questions or concerns about any information you see in [employer], please call the Health Information Department where you were seen or reach out to your Primary Care Provider for more information about your plan of care. Introducing [facility]! Richardkj Collazo introduces [employer] patient portal. Now you can access parts of your medical record, email your doctor's office, and request medication refills online. 1. In your internet browser, go to [email or web address] 2. Click on the First Time User? Click Here link in the Sign In box. You will see the New Member Sign Up page. 3. Enter your [employer] Access Code exactly as it appears below. You will not need to use this code after youve completed the sign-up process. If you do not sign up before the expiration date, you must request a new code. · [employer] Access Code: [email or web address] Expires: 7/31/2017  4:35 PM 
 
4. Enter the last four digits of your Social Security Number (xxxx) and Date of Birth (mm/dd/yyyy) as indicated and click Submit. You will be taken to the next sign-up page. 5. Create a [employer] ID. This will be your [employer] login ID and cannot be changed, so think of one that is secure and easy to remember. 6. Create a [employer] password. You can change your password at any time. 7. Enter your Password Reset Question and Answer. This can be used at a later time if you forget your password. 8. Enter your e-mail address.  You will receive e-mail notification when new information is available in [employer]. 9. Click Sign Up. You can now view and download portions of your medical record. 10. Click the Download Summary menu link to download a portable copy of your medical information. If you have questions, please visit the Frequently Asked Questions section of the [employer] website. Remember, [employer] is NOT to be used for urgent needs. For medical emergencies, dial 911. Now available from your iPhone and Android! General Information Please provide this summary of care documentation to your next provider. Patient Signature:  ____________________________________________________________ Date:  ____________________________________________________________  
  
[facility] [employer] Provider Signature:  ____________________________________________________________ Date:  ____________________________________________________________

## 2017-06-17 PROBLEM — A41.9 SEPSIS (HCC): Status: ACTIVE | Noted: 2017-06-17

## 2017-06-17 LAB
ANION GAP BLD CALC-SCNC: 7 MMOL/L (ref 7–16)
BASOPHILS # BLD AUTO: 0 K/UL (ref 0–0.2)
BASOPHILS # BLD: 0 % (ref 0–2)
BUN SERPL-MCNC: 13 MG/DL (ref 8–23)
CALCIUM SERPL-MCNC: 8.4 MG/DL (ref 8.3–10.4)
CHLORIDE SERPL-SCNC: 113 MMOL/L (ref 98–107)
CO2 SERPL-SCNC: 25 MMOL/L (ref 21–32)
CREAT SERPL-MCNC: 0.88 MG/DL (ref 0.6–1)
DIFFERENTIAL METHOD BLD: ABNORMAL
EOSINOPHIL # BLD: 0.2 K/UL (ref 0–0.8)
EOSINOPHIL NFR BLD: 1 % (ref 0.5–7.8)
ERYTHROCYTE [DISTWIDTH] IN BLOOD BY AUTOMATED COUNT: 16.3 % (ref 11.9–14.6)
GLUCOSE SERPL-MCNC: 102 MG/DL (ref 65–100)
HCT VFR BLD AUTO: 32.4 % (ref 35.8–46.3)
HGB BLD-MCNC: 9.7 G/DL (ref 11.7–15.4)
IMM GRANULOCYTES # BLD: 0.1 K/UL (ref 0–0.5)
IMM GRANULOCYTES NFR BLD AUTO: 0.2 % (ref 0–5)
LYMPHOCYTES # BLD AUTO: 13 % (ref 13–44)
LYMPHOCYTES # BLD: 3.2 K/UL (ref 0.5–4.6)
MCH RBC QN AUTO: 25.3 PG (ref 26.1–32.9)
MCHC RBC AUTO-ENTMCNC: 29.9 G/DL (ref 31.4–35)
MCV RBC AUTO: 84.4 FL (ref 79.6–97.8)
MONOCYTES # BLD: 1.7 K/UL (ref 0.1–1.3)
MONOCYTES NFR BLD AUTO: 7 % (ref 4–12)
NEUTS SEG # BLD: 20 K/UL (ref 1.7–8.2)
NEUTS SEG NFR BLD AUTO: 79 % (ref 43–78)
PLATELET # BLD AUTO: 262 K/UL (ref 150–450)
PMV BLD AUTO: 10.8 FL (ref 10.8–14.1)
POTASSIUM SERPL-SCNC: 3.7 MMOL/L (ref 3.5–5.1)
RBC # BLD AUTO: 3.84 M/UL (ref 4.05–5.25)
SODIUM SERPL-SCNC: 145 MMOL/L (ref 136–145)
WBC # BLD AUTO: 25.1 K/UL (ref 4.3–11.1)

## 2017-06-17 PROCEDURE — 80048 BASIC METABOLIC PNL TOTAL CA: CPT | Performed by: FAMILY MEDICINE

## 2017-06-17 PROCEDURE — 94640 AIRWAY INHALATION TREATMENT: CPT

## 2017-06-17 PROCEDURE — 77030013140 HC MSK NEB VYRM -A

## 2017-06-17 PROCEDURE — 85025 COMPLETE CBC W/AUTO DIFF WBC: CPT | Performed by: FAMILY MEDICINE

## 2017-06-17 PROCEDURE — 77010033678 HC OXYGEN DAILY

## 2017-06-17 PROCEDURE — 36415 COLL VENOUS BLD VENIPUNCTURE: CPT | Performed by: FAMILY MEDICINE

## 2017-06-17 PROCEDURE — 74011250637 HC RX REV CODE- 250/637: Performed by: FAMILY MEDICINE

## 2017-06-17 PROCEDURE — 94760 N-INVAS EAR/PLS OXIMETRY 1: CPT

## 2017-06-17 PROCEDURE — 94664 DEMO&/EVAL PT USE INHALER: CPT

## 2017-06-17 PROCEDURE — 65270000029 HC RM PRIVATE

## 2017-06-17 PROCEDURE — 74011000250 HC RX REV CODE- 250: Performed by: FAMILY MEDICINE

## 2017-06-17 RX ORDER — GABAPENTIN 100 MG/1
100 CAPSULE ORAL
Status: DISCONTINUED | OUTPATIENT
Start: 2017-06-18 | End: 2017-06-19 | Stop reason: HOSPADM

## 2017-06-17 RX ORDER — GABAPENTIN 300 MG/1
300 CAPSULE ORAL
Status: DISCONTINUED | OUTPATIENT
Start: 2017-06-17 | End: 2017-06-19 | Stop reason: HOSPADM

## 2017-06-17 RX ORDER — GABAPENTIN 300 MG/1
300 CAPSULE ORAL
Status: DISCONTINUED | OUTPATIENT
Start: 2017-06-18 | End: 2017-06-17

## 2017-06-17 RX ADMIN — OXYBUTYNIN CHLORIDE 5 MG: 5 TABLET ORAL at 08:08

## 2017-06-17 RX ADMIN — BUDESONIDE 500 MCG: 0.5 SUSPENSION RESPIRATORY (INHALATION) at 08:36

## 2017-06-17 RX ADMIN — ALBUTEROL SULFATE 2.5 MG: 2.5 SOLUTION RESPIRATORY (INHALATION) at 13:46

## 2017-06-17 RX ADMIN — ZIPRASIDONE HCL 160 MG: 20 CAPSULE ORAL at 22:02

## 2017-06-17 RX ADMIN — LISINOPRIL 40 MG: 20 TABLET ORAL at 08:08

## 2017-06-17 RX ADMIN — PANTOPRAZOLE SODIUM 40 MG: 40 TABLET, DELAYED RELEASE ORAL at 16:23

## 2017-06-17 RX ADMIN — APIXABAN 2.5 MG: 2.5 TABLET, FILM COATED ORAL at 22:02

## 2017-06-17 RX ADMIN — GABAPENTIN 300 MG: 300 CAPSULE ORAL at 16:23

## 2017-06-17 RX ADMIN — VERAPAMIL HYDROCHLORIDE 120 MG: 120 TABLET, FILM COATED, EXTENDED RELEASE ORAL at 08:08

## 2017-06-17 RX ADMIN — ATORVASTATIN CALCIUM 20 MG: 10 TABLET, FILM COATED ORAL at 22:02

## 2017-06-17 RX ADMIN — GABAPENTIN 300 MG: 300 CAPSULE ORAL at 22:02

## 2017-06-17 RX ADMIN — ALBUTEROL SULFATE 2.5 MG: 2.5 SOLUTION RESPIRATORY (INHALATION) at 20:34

## 2017-06-17 RX ADMIN — LEVOTHYROXINE SODIUM 125 MCG: 125 TABLET ORAL at 16:56

## 2017-06-17 RX ADMIN — ALPRAZOLAM 2 MG: 0.5 TABLET ORAL at 06:41

## 2017-06-17 RX ADMIN — BUDESONIDE 500 MCG: 0.5 SUSPENSION RESPIRATORY (INHALATION) at 20:34

## 2017-06-17 RX ADMIN — HYDROCODONE BITARTRATE AND ACETAMINOPHEN 1 TABLET: 10; 325 TABLET ORAL at 22:02

## 2017-06-17 RX ADMIN — PANTOPRAZOLE SODIUM 40 MG: 40 TABLET, DELAYED RELEASE ORAL at 05:49

## 2017-06-17 RX ADMIN — OXYBUTYNIN CHLORIDE 5 MG: 5 TABLET ORAL at 16:56

## 2017-06-17 RX ADMIN — HYDROCODONE BITARTRATE AND ACETAMINOPHEN 1 TABLET: 10; 325 TABLET ORAL at 08:16

## 2017-06-17 RX ADMIN — ALPRAZOLAM 2 MG: 0.5 TABLET ORAL at 18:53

## 2017-06-17 RX ADMIN — ALBUTEROL SULFATE 2.5 MG: 2.5 SOLUTION RESPIRATORY (INHALATION) at 08:36

## 2017-06-17 RX ADMIN — HYDROCODONE BITARTRATE AND ACETAMINOPHEN 1 TABLET: 10; 325 TABLET ORAL at 16:23

## 2017-06-17 RX ADMIN — ACETAMINOPHEN 650 MG: 325 TABLET, FILM COATED ORAL at 08:08

## 2017-06-17 RX ADMIN — Medication 400 MG: at 08:08

## 2017-06-17 RX ADMIN — APIXABAN 2.5 MG: 2.5 TABLET, FILM COATED ORAL at 08:08

## 2017-06-17 RX ADMIN — Medication 10 ML: at 22:03

## 2017-06-17 RX ADMIN — MIRTAZAPINE 30 MG: 15 TABLET, FILM COATED ORAL at 22:02

## 2017-06-17 NOTE — PROGRESS NOTES
Shift assessment complete, see flow sheet for full assessment. Pt alert and oriented x4. Co pain See MAR. Respirations even and unlabored. Dyspnea on exertion. Diminished breath sounds bilaterally. S1 S2 auscultated, VSS. Abdomen soft and non tender, bowel sounds active in all quadrants. Bed low and locked, call light within reach, pt advised to call if assistance is needed.

## 2017-06-17 NOTE — ED PROVIDER NOTES
HPI Comments: Patient presents from her son with complaints of fever and cough. She states she woke up this morning feeling ill but no specific complaints she noted that her pants were wet and that she was having some chills as well she does complain of some shortness of breath but denies any known ill exposures she denies dysuria or hematuria due to feeling ill she did not take her blood pressure medications today. Patient is a 78 y.o. female presenting with fever. The history is provided by the patient. Fever    This is a new problem. The current episode started 6 to 12 hours ago. The problem occurs constantly. The problem has not changed since onset. The maximum temperature noted was 100 - 100.9 F. The temperature was taken using an oral thermometer. Associated symptoms include congestion, shortness of breath and urinary symptoms. Pertinent negatives include no chest pain, no sleepiness, no diarrhea, no vomiting, no headaches, no sore throat, no muscle aches, no mental status change, no neck pain and no rash. She has tried nothing for the symptoms. The treatment provided no relief. Past Medical History:   Diagnosis Date    Acute renal failure ([address] July, 2014    The patient was admitted with acute renal failure secondary to volume depletion. She was found to have a gastric ulcer on admission.  Bilateral pulmonary embolism ([address] September, 2012    Restarted on anticoagulation for lifetime    Calculus of ureter May, 2015    CAP (community acquired pneumonia) October, 2012    RML pneumonia    CVA (cerebral infarction) January, 2012    Reportedly has mild left arm residual weakness    Gastric ulcer July, 2014          HCAP (healthcare-associated pneumonia) January, 2013    RLL pneumonia    HCAP (healthcare-associated pneumonia) February, 2013    RLL pneumonia    Left leg DVT ([address] 1991    On coumadin until 2008    Psychiatric disorder     PUD (peptic ulcer disease) ? ??       Past Surgical History:   Procedure Laterality Date    HX APPENDECTOMY      HX CHOLECYSTECTOMY      HX ENDOSCOPY  July, 2014    Gastric ulcers x 2    HX GASTRIC BYPASS      HX HYSTERECTOMY      HX ORTHOPAEDIC      rt knee growth, right shoulder, left thumb    HX OTHER SURGICAL      vagus nerve stimulator    HX TONSILLECTOMY           Family History:   Problem Relation Age of Onset    Hypertension Mother     Cancer Father      prostate    Heart Attack Father     Heart Disease Father      CAD    Hypertension Father     Hypertension Sister     Stroke Sister     Heart Disease Brother      CAD with CABG    Heart Attack Brother     Hypertension Brother        Social History     Social History    Marital status:      Spouse name: N/A    Number of children: N/A    Years of education: N/A     Occupational History          Disabled     Social History Main Topics    Smoking status: Never Smoker    Smokeless tobacco: Never Used      Comment: exposed to second hand smoke at work for 17 years    Alcohol use No    Drug use: No    Sexual activity: Not on file     Other Topics Concern    Not on file     Social History Narrative    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 , one child who is healthy. Has always lived in [address] Dog as a pet. No history of pet bird. There is no significant environmental or industrial exposure. There is no known exposure to TB. ALLERGIES: Latex; Bactrim [sulfamethoxazole-trimethoprim]; Lamictal [lamotrigine]; Pcn [penicillins]; and Tapentadol    Review of Systems   Constitutional: Positive for chills and fever. HENT: Positive for congestion. Negative for sore throat. Respiratory: Positive for shortness of breath. Cardiovascular: Negative for chest pain. Gastrointestinal: Negative for diarrhea and vomiting. Genitourinary: Positive for enuresis.    Musculoskeletal: Negative for neck pain. Skin: Negative for rash. Neurological: Negative for headaches. All other systems reviewed and are negative. Vitals:    06/16/17 1912 06/16/17 1953 06/16/17 2040 06/16/17 2059   BP: 145/75 (!) 175/95  140/67   Pulse: 100 (!) 115  (!) 106   Resp:    18   Temp:    97.5 °F (36.4 °C)   SpO2: 93% 94% 94% 97%   Weight:       Height:                Physical Exam   Constitutional: She is oriented to person, place, and time. She appears well-developed and well-nourished. No distress. HENT:   Head: Normocephalic and atraumatic. Eyes: Conjunctivae and EOM are normal. Pupils are equal, round, and reactive to light. Neck: Normal range of motion. Neck supple. Cardiovascular: Normal rate, regular rhythm and normal heart sounds. Pulmonary/Chest: Effort normal and breath sounds normal.   Abdominal: Soft. Bowel sounds are normal.   Musculoskeletal: Normal range of motion. She exhibits no edema. Neurological: She is alert and oriented to person, place, and time. Skin: Skin is warm and dry. Psychiatric: She has a normal mood and affect. Her behavior is normal.   Nursing note and vitals reviewed. MDM  Number of Diagnoses or Management Options  Atypical pneumonia:   Diagnosis management comments: Patient says he was treated in the fluid bolus was given due to the tachycardia she is also given lisinopril orally for her hypertension. Due to the elevation of lactic acid and white blood cell count but no focus of infection identified in either the urine or N chest x-ray a CT of the chest was obtained to evaluate for possible pulmonary embolism.   This did demonstrate atypical type infection of the lungs case was discussed with the hospitalist for admission due to the increasing lactic acid       Amount and/or Complexity of Data Reviewed  Clinical lab tests: ordered and reviewed  Tests in the radiology section of CPT®: ordered and reviewed  Discuss the patient with other providers: yes  Independent visualization of images, tracings, or specimens: yes    Risk of Complications, Morbidity, and/or Mortality  Presenting problems: high  Diagnostic procedures: high  Management options: high    Patient Progress  Patient progress: stable    ED Course       Procedures

## 2017-06-17 NOTE — PROGRESS NOTES
Admission assessment complete. Patient is A&Ox4. Able to verbalize needs. Resting quietly with no distress noted. Neurovascular and peripheral vascular checks WNL. Lungs sounds clear bilaterally. Dyspnea with exertion. Voiding clear yellow urine. Ambulates with assistance. Denies needs. Bed low and locked. Call light within reach. Instructed to call for assistance. Patient verbalizes understanding. Will monitor.

## 2017-06-17 NOTE — ROUTINE PROCESS
TRANSFER - OUT REPORT:    Verbal report given to Blayne Dubon RN on Austin Siegel  being transferred to [phone] for routine progression of care       Report consisted of patients Situation, Background, Assessment and   Recommendations(SBAR). Information from the following report(s) ED Summary was reviewed with the receiving nurse. Opportunity for questions and clarification was provided.       Patient transported with:   [employer]

## 2017-06-17 NOTE — PROGRESS NOTES
TRANSFER - IN REPORT:    Verbal report received from Angel Garcia RN on Joan Cates  being received from ER for routine progression of care      Report consisted of patients Situation, Background, Assessment and   Recommendations(SBAR). Information from the following report(s) SBAR, Kardex, ED Summary, Procedure Summary, Intake/Output, MAR and Recent Results was reviewed with the receiving nurse. Opportunity for questions and clarification was provided. Assessment completed upon patients arrival to unit and care assumed.

## 2017-06-17 NOTE — PROGRESS NOTES
Hospitalist Progress Note    2017  Admit Date: 2017  1:32 PM   NAME: Reinier Ramsay   :  1950   MRN:  [id number]   Attending: De Tamayo MD  PCP:  Frida Topete DO    SUBJECTIVE:   Patient is a 79 yo CF with a history of HTN, GERD, morbid obesity, hypothyroidism, bipolar, chronic respiratory failure, h/o pe who presented to the ED with a complaint of fever, chills, sob, fatigue, and cough. Found to have pneumonia and admitted. 2017: No Events over night. No new complaints. States SOB, cough improved. Wearing 2L o2 via NC. Review of Systems negative with exception of pertinent positives noted above  PHYSICAL EXAM     Visit Vitals    /73    Pulse 78    Temp 97 °F (36.1 °C)    Resp 17    Ht 5' 3\" (1.6 m)    Wt 90.7 kg (200 lb)    SpO2 96%    Breastfeeding No    BMI 35.43 kg/m2      Temp (24hrs), Av.9 °F (36.6 °C), Min:96.9 °F (36.1 °C), Max:100.4 °F (38 °C)    Oxygen Therapy  O2 Sat (%): 96 % (17 1214)  Pulse via Oximetry: 76 beats per minute (17 0845)  O2 Device: Room air (Decreased from 2 lpm) (17 0845)  O2 Flow Rate (L/min): 2 l/min (17 2040)    Intake/Output Summary (Last 24 hours) at 17 1320  Last data filed at 17 0553   Gross per 24 hour   Intake              777 ml   Output              150 ml   Net              627 ml      General: Alert and oriented, No acute distress. Obese   Eye: EOMI, Normal conjunctiva, Vision Unchanged. HENT: Normocephalic, atraumatic, Normal hearing, moist mucous membranes. Respiratory: BL diminished at bases, Respirations are non-labored. Cardiovascular: Normal rate, Regular rhythm, No murmur. Gastrointestinal: Soft, Non-tender, positive bowel sounds   Musculoskeletal: No cyanosis, clubbing. Trace b/l nonpitting LE edema. Integumentary: Warm, Dry, Pink, no rash. Neurologic: Alert, Oriented, No focal deficits. Cognition and Speech: Oriented, Speech clear and coherent. Psychiatric: Cooperative, Appropriate mood & affect. ASSESSMENT      Sepsis due to pneumonia - improving  - CT Chest: No PE. Clustered infectious/inflammatory nodularity in the bilateral lower lobes to a greater extent on the right and in the right upper lobe  - IV Levaquin  - IV fluids  - bronchodilators, pulmicort, o2  - LA 2.7, ordered repeat  - Follow leukocytosis  - Follow blood cultures    Hypertension  -  Continue home antihypertensives. -  Monitor and trend BP. HLD  - Continue Statin    GERD/PUD  - cont ppi    H/o cva  - aware    H/o pe, dvt  - anticoagulated on eliquis    Bipolar  - continue home meds    Obesity BMI 35  - diet, exercise and lifestyle changes advised. DVT prophylaxis:eliquis  Full Code  Dispo: to home when stable. Total Time spent: 30 minutes. Counseling & coordinating care dominated the encounter >55%. Counseled patient regarding dx, rx, tx. Reviewed prior records, labs, vitals, diagnostic tests, flow sheet, home medications, inpatient medications, nursing and provider notes.     Taya Hardy PA-C, MPAS

## 2017-06-18 LAB
ERYTHROCYTE [DISTWIDTH] IN BLOOD BY AUTOMATED COUNT: 16.6 % (ref 11.9–14.6)
HCT VFR BLD AUTO: 28.5 % (ref 35.8–46.3)
HGB BLD-MCNC: 8.8 G/DL (ref 11.7–15.4)
MCH RBC QN AUTO: 25.7 PG (ref 26.1–32.9)
MCHC RBC AUTO-ENTMCNC: 30.9 G/DL (ref 31.4–35)
MCV RBC AUTO: 83.3 FL (ref 79.6–97.8)
PLATELET # BLD AUTO: 242 K/UL (ref 150–450)
PMV BLD AUTO: 10.3 FL (ref 10.8–14.1)
RBC # BLD AUTO: 3.42 M/UL (ref 4.05–5.25)
WBC # BLD AUTO: 14 K/UL (ref 4.3–11.1)

## 2017-06-18 PROCEDURE — 74011250636 HC RX REV CODE- 250/636: Performed by: FAMILY MEDICINE

## 2017-06-18 PROCEDURE — 94760 N-INVAS EAR/PLS OXIMETRY 1: CPT

## 2017-06-18 PROCEDURE — 65270000029 HC RM PRIVATE

## 2017-06-18 PROCEDURE — 36415 COLL VENOUS BLD VENIPUNCTURE: CPT | Performed by: PHYSICIAN ASSISTANT

## 2017-06-18 PROCEDURE — 94640 AIRWAY INHALATION TREATMENT: CPT

## 2017-06-18 PROCEDURE — 85027 COMPLETE CBC AUTOMATED: CPT | Performed by: PHYSICIAN ASSISTANT

## 2017-06-18 PROCEDURE — 94664 DEMO&/EVAL PT USE INHALER: CPT

## 2017-06-18 PROCEDURE — 74011250637 HC RX REV CODE- 250/637: Performed by: FAMILY MEDICINE

## 2017-06-18 PROCEDURE — 74011000250 HC RX REV CODE- 250: Performed by: FAMILY MEDICINE

## 2017-06-18 RX ADMIN — OXYBUTYNIN CHLORIDE 5 MG: 5 TABLET ORAL at 17:17

## 2017-06-18 RX ADMIN — PANTOPRAZOLE SODIUM 40 MG: 40 TABLET, DELAYED RELEASE ORAL at 17:17

## 2017-06-18 RX ADMIN — APIXABAN 2.5 MG: 2.5 TABLET, FILM COATED ORAL at 21:23

## 2017-06-18 RX ADMIN — HYDROCODONE BITARTRATE AND ACETAMINOPHEN 1 TABLET: 10; 325 TABLET ORAL at 17:17

## 2017-06-18 RX ADMIN — LEVOTHYROXINE SODIUM 125 MCG: 125 TABLET ORAL at 17:17

## 2017-06-18 RX ADMIN — Medication 10 ML: at 06:00

## 2017-06-18 RX ADMIN — MIRTAZAPINE 30 MG: 15 TABLET, FILM COATED ORAL at 21:23

## 2017-06-18 RX ADMIN — ALBUTEROL SULFATE 2.5 MG: 2.5 SOLUTION RESPIRATORY (INHALATION) at 14:03

## 2017-06-18 RX ADMIN — ZIPRASIDONE HCL 160 MG: 20 CAPSULE ORAL at 21:23

## 2017-06-18 RX ADMIN — GABAPENTIN 300 MG: 300 CAPSULE ORAL at 11:33

## 2017-06-18 RX ADMIN — Medication 10 ML: at 21:23

## 2017-06-18 RX ADMIN — APIXABAN 2.5 MG: 2.5 TABLET, FILM COATED ORAL at 07:50

## 2017-06-18 RX ADMIN — BUDESONIDE 500 MCG: 0.5 SUSPENSION RESPIRATORY (INHALATION) at 08:56

## 2017-06-18 RX ADMIN — ALPRAZOLAM 2 MG: 0.5 TABLET ORAL at 06:00

## 2017-06-18 RX ADMIN — ALBUTEROL SULFATE 2.5 MG: 2.5 SOLUTION RESPIRATORY (INHALATION) at 08:00

## 2017-06-18 RX ADMIN — HYDROCODONE BITARTRATE AND ACETAMINOPHEN 1 TABLET: 10; 325 TABLET ORAL at 06:00

## 2017-06-18 RX ADMIN — VERAPAMIL HYDROCHLORIDE 120 MG: 120 TABLET, FILM COATED, EXTENDED RELEASE ORAL at 07:49

## 2017-06-18 RX ADMIN — Medication 400 MG: at 07:49

## 2017-06-18 RX ADMIN — BUDESONIDE 500 MCG: 0.5 SUSPENSION RESPIRATORY (INHALATION) at 19:43

## 2017-06-18 RX ADMIN — ALBUTEROL SULFATE 2.5 MG: 2.5 SOLUTION RESPIRATORY (INHALATION) at 19:42

## 2017-06-18 RX ADMIN — LEVOFLOXACIN 750 MG: 5 INJECTION, SOLUTION INTRAVENOUS at 17:17

## 2017-06-18 RX ADMIN — GABAPENTIN 100 MG: 100 CAPSULE ORAL at 06:00

## 2017-06-18 RX ADMIN — PANTOPRAZOLE SODIUM 40 MG: 40 TABLET, DELAYED RELEASE ORAL at 06:00

## 2017-06-18 RX ADMIN — ALPRAZOLAM 2 MG: 0.5 TABLET ORAL at 17:17

## 2017-06-18 RX ADMIN — SODIUM CHLORIDE 100 ML/HR: 900 INJECTION, SOLUTION INTRAVENOUS at 06:01

## 2017-06-18 RX ADMIN — GABAPENTIN 300 MG: 300 CAPSULE ORAL at 21:23

## 2017-06-18 RX ADMIN — ACETAMINOPHEN 650 MG: 325 TABLET, FILM COATED ORAL at 07:49

## 2017-06-18 RX ADMIN — OXYBUTYNIN CHLORIDE 5 MG: 5 TABLET ORAL at 07:49

## 2017-06-18 RX ADMIN — ALPRAZOLAM 2 MG: 0.5 TABLET ORAL at 11:33

## 2017-06-18 RX ADMIN — LISINOPRIL 40 MG: 20 TABLET ORAL at 07:49

## 2017-06-18 RX ADMIN — ATORVASTATIN CALCIUM 20 MG: 10 TABLET, FILM COATED ORAL at 21:23

## 2017-06-18 RX ADMIN — HYDROCODONE BITARTRATE AND ACETAMINOPHEN 1 TABLET: 10; 325 TABLET ORAL at 11:33

## 2017-06-18 RX ADMIN — SUMATRIPTAN SUCCINATE 100 MG: 50 TABLET ORAL at 22:58

## 2017-06-18 NOTE — PROGRESS NOTES
Hospitalist Progress Note    2017  Admit Date: 2017  1:32 PM   NAME: Lizbet Gomez   :  1950   MRN:  [id number]   Attending: Lyndon Craft MD  PCP:  Betsy Khan DO    SUBJECTIVE:   Patient is a 79 yo CF with a history of HTN, GERD, morbid obesity, hypothyroidism, bipolar, chronic respiratory failure, h/o pe who presented to the ED with a complaint of fever, chills, sob, fatigue, and cough. Found to have pneumonia and admitted. 2017: No Events over night. No new complaints. States SOB, cough improved. Wearing 2L o2 via NC.     17: Pt continues to improve. No SOB, no cough. WBC coming down but still high at 14.0. She is on Levaquin. Bp is high    Review of Systems negative with exception of pertinent positives noted above  PHYSICAL EXAM     Visit Vitals    /88    Pulse 90    Temp 97.6 °F (36.4 °C)    Resp 16    Ht 5' 3\" (1.6 m)    Wt 90.7 kg (200 lb)    SpO2 96%    Breastfeeding No    BMI 35.43 kg/m2      Temp (24hrs), Av.5 °F (36.4 °C), Min:97.2 °F (36.2 °C), Max:97.6 °F (36.4 °C)    Oxygen Therapy  O2 Sat (%): 96 % (17 0859)  Pulse via Oximetry: 77 beats per minute (17 0859)  O2 Device: Room air (17 0859)  O2 Flow Rate (L/min): 0 l/min (17 0859)    Intake/Output Summary (Last 24 hours) at 17 1222  Last data filed at 17 0559   Gross per 24 hour   Intake             2166 ml   Output              100 ml   Net             2066 ml      General: Alert and oriented, No acute distress. Obese   Eye: EOMI, Normal conjunctiva, Vision Unchanged. HENT: Normocephalic, atraumatic, Normal hearing, moist mucous membranes. Respiratory: BL diminished at bases, Respirations are non-labored. Cardiovascular: Normal rate, Regular rhythm, No murmur. Gastrointestinal: Soft, Non-tender, positive bowel sounds   Musculoskeletal: No cyanosis, clubbing. Trace b/l nonpitting LE edema. Integumentary: Warm, Dry, Pink, no rash.   Neurologic: Alert, Oriented, No focal deficits. Cognition and Speech: Oriented, Speech clear and coherent. Psychiatric: Cooperative, Appropriate mood & affect. ASSESSMENT      Sepsis due to pneumonia - improving   - continue IV Levaquin, home in 24 - 48 hrs when WBC normal. Check CBC in am    Hypertension  -  Continue home antihypertensives. -  Monitor and trend BP.      HLD  - Continue Statin    H/o pe, dvt  - anticoagulated on eliquis    Bipolar  - continue home meds    DVT prophylaxis:eliquis  Full Code  Dispo: Possible home in 24 - 48 hrs, check WBC in am    Sherry Tarango MD

## 2017-06-18 NOTE — PROGRESS NOTES
Shift assessment complete, see flow sheet for full assessment. Pt alert and oriented x4, denies pain at this time. Respirations even and unlabored breath sounds clear bilaterally. S1 S2 auscultated, VSS. Skin warm, dryAbdomen soft and non tender, bowel sounds active in all quadrants. Bed low and locked, call light within reach, pt advised to call if assistance is needed.

## 2017-06-18 NOTE — PROGRESS NOTES
Patient is alert and oriented x4. Able to verbalize needs. Resting quietly with no distress noted. Diminished breath sounds. Neurovascular and peripheral vascular checks WNL. Voiding clear yellow urine. Pain is being managed with Norco, pain noted to back. Bed low and locked. Call light within reach. Instructed to call for assistance. Patient verbalizes understanding. Will monitor.

## 2017-06-19 VITALS
HEART RATE: 69 BPM | DIASTOLIC BLOOD PRESSURE: 79 MMHG | OXYGEN SATURATION: 95 % | BODY MASS INDEX: 35.44 KG/M2 | WEIGHT: 200 LBS | HEIGHT: 63 IN | TEMPERATURE: 97.6 F | SYSTOLIC BLOOD PRESSURE: 171 MMHG | RESPIRATION RATE: 18 BRPM

## 2017-06-19 LAB
ANION GAP BLD CALC-SCNC: 9 MMOL/L (ref 7–16)
BASOPHILS # BLD AUTO: 0 K/UL (ref 0–0.2)
BASOPHILS # BLD: 0 % (ref 0–2)
BUN SERPL-MCNC: 11 MG/DL (ref 8–23)
CALCIUM SERPL-MCNC: 9 MG/DL (ref 8.3–10.4)
CHLORIDE SERPL-SCNC: 108 MMOL/L (ref 98–107)
CO2 SERPL-SCNC: 27 MMOL/L (ref 21–32)
CREAT SERPL-MCNC: 0.91 MG/DL (ref 0.6–1)
DIFFERENTIAL METHOD BLD: ABNORMAL
EOSINOPHIL # BLD: 0.3 K/UL (ref 0–0.8)
EOSINOPHIL NFR BLD: 4 % (ref 0.5–7.8)
ERYTHROCYTE [DISTWIDTH] IN BLOOD BY AUTOMATED COUNT: 16.5 % (ref 11.9–14.6)
GLUCOSE SERPL-MCNC: 87 MG/DL (ref 65–100)
HCT VFR BLD AUTO: 44.3 % (ref 35.8–46.3)
HGB BLD-MCNC: 14.3 G/DL (ref 11.7–15.4)
IMM GRANULOCYTES # BLD: 0 K/UL (ref 0–0.5)
IMM GRANULOCYTES NFR BLD AUTO: 0.3 % (ref 0–5)
LYMPHOCYTES # BLD AUTO: 28 % (ref 13–44)
LYMPHOCYTES # BLD: 2 K/UL (ref 0.5–4.6)
MCH RBC QN AUTO: 26.1 PG (ref 26.1–32.9)
MCHC RBC AUTO-ENTMCNC: 32.3 G/DL (ref 31.4–35)
MCV RBC AUTO: 81 FL (ref 79.6–97.8)
MONOCYTES # BLD: 0.5 K/UL (ref 0.1–1.3)
MONOCYTES NFR BLD AUTO: 7 % (ref 4–12)
NEUTS SEG # BLD: 4.3 K/UL (ref 1.7–8.2)
NEUTS SEG NFR BLD AUTO: 61 % (ref 43–78)
PLATELET # BLD AUTO: 187 K/UL (ref 150–450)
PMV BLD AUTO: 10.6 FL (ref 10.8–14.1)
POTASSIUM SERPL-SCNC: 4.1 MMOL/L (ref 3.5–5.1)
RBC # BLD AUTO: 5.47 M/UL (ref 4.05–5.25)
SODIUM SERPL-SCNC: 144 MMOL/L (ref 136–145)
WBC # BLD AUTO: 7.2 K/UL (ref 4.3–11.1)

## 2017-06-19 PROCEDURE — 36415 COLL VENOUS BLD VENIPUNCTURE: CPT | Performed by: INTERNAL MEDICINE

## 2017-06-19 PROCEDURE — 94640 AIRWAY INHALATION TREATMENT: CPT

## 2017-06-19 PROCEDURE — 74011250637 HC RX REV CODE- 250/637: Performed by: HOSPITALIST

## 2017-06-19 PROCEDURE — 74011000250 HC RX REV CODE- 250: Performed by: FAMILY MEDICINE

## 2017-06-19 PROCEDURE — 94760 N-INVAS EAR/PLS OXIMETRY 1: CPT

## 2017-06-19 PROCEDURE — 85025 COMPLETE CBC W/AUTO DIFF WBC: CPT | Performed by: INTERNAL MEDICINE

## 2017-06-19 PROCEDURE — 74011250637 HC RX REV CODE- 250/637: Performed by: FAMILY MEDICINE

## 2017-06-19 PROCEDURE — 80048 BASIC METABOLIC PNL TOTAL CA: CPT | Performed by: INTERNAL MEDICINE

## 2017-06-19 RX ORDER — CLONIDINE HYDROCHLORIDE 0.1 MG/1
0.1 TABLET ORAL
Status: DISCONTINUED | OUTPATIENT
Start: 2017-06-19 | End: 2017-06-19 | Stop reason: HOSPADM

## 2017-06-19 RX ORDER — LEVOFLOXACIN 750 MG/1
750 TABLET ORAL DAILY
Qty: 4 TAB | Refills: 0 | Status: ON HOLD | OUTPATIENT
Start: 2017-06-19 | End: 2017-07-26

## 2017-06-19 RX ADMIN — PANTOPRAZOLE SODIUM 40 MG: 40 TABLET, DELAYED RELEASE ORAL at 05:21

## 2017-06-19 RX ADMIN — OXYBUTYNIN CHLORIDE 5 MG: 5 TABLET ORAL at 07:47

## 2017-06-19 RX ADMIN — GABAPENTIN 100 MG: 100 CAPSULE ORAL at 05:21

## 2017-06-19 RX ADMIN — CLONIDINE HYDROCHLORIDE 0.1 MG: 0.1 TABLET ORAL at 05:43

## 2017-06-19 RX ADMIN — ACETAMINOPHEN 650 MG: 325 TABLET, FILM COATED ORAL at 07:53

## 2017-06-19 RX ADMIN — VERAPAMIL HYDROCHLORIDE 120 MG: 120 TABLET, FILM COATED, EXTENDED RELEASE ORAL at 07:47

## 2017-06-19 RX ADMIN — Medication 10 ML: at 05:21

## 2017-06-19 RX ADMIN — Medication 400 MG: at 07:48

## 2017-06-19 RX ADMIN — BUDESONIDE 500 MCG: 0.5 SUSPENSION RESPIRATORY (INHALATION) at 08:31

## 2017-06-19 RX ADMIN — APIXABAN 2.5 MG: 2.5 TABLET, FILM COATED ORAL at 07:47

## 2017-06-19 RX ADMIN — HYDROCODONE BITARTRATE AND ACETAMINOPHEN 1 TABLET: 10; 325 TABLET ORAL at 05:21

## 2017-06-19 RX ADMIN — ALBUTEROL SULFATE 2.5 MG: 2.5 SOLUTION RESPIRATORY (INHALATION) at 08:31

## 2017-06-19 RX ADMIN — LISINOPRIL 40 MG: 20 TABLET ORAL at 05:43

## 2017-06-19 RX ADMIN — ALPRAZOLAM 2 MG: 0.5 TABLET ORAL at 05:20

## 2017-06-19 NOTE — PROGRESS NOTES
Shift assessment complete. Patient is alert and oriented. Chest clear and respirations unlabored. Neurovascular status are WNL. Voiding yellow clear urine. Ambulates to bathroom. Taking Tylenol for pain. Bed is low and locked. Call light within reach. Instructed to call for assistance.

## 2017-06-19 NOTE — PROGRESS NOTES
Patient is alert and oriented x4. Able to verbalize needs. Resting quietly with no distress noted. Respirations even and unlabored breath sounds clear bilaterally. Neurovascular and peripheral vascular checks WNL. Voiding clear yellow urine. Pain is being managed with Norco, tolerating well. Bed low and locked. Call light within reach. Instructed to call for assistance. Patient verbalizes understanding. Will monitor.

## 2017-06-19 NOTE — PROGRESS NOTES
Sepsis Recovery Guide provided to and reviewed with patient. Thermometer provided to patient with instructions on how often to track temperature at home. Opportunity for questions was provided. Instructed to follow up with primary care physician with any concerns once home.

## 2017-06-19 NOTE — DISCHARGE SUMMARY
Hospitalist Discharge Summary   Patient ID:  Ca Glaser  [id number]  31 y.o.  1950  Admit date: 6/16/2017  1:32 PM  Discharge date and time: 6/19/2017  Attending: Madison Lea MD  PCP:  Elizabeth Turk DO  Treatment Team: Attending Provider: Madison Lea MD; Utilization Review: Talia Vargas RN  Principal Diagnosis Pneumonia   Principal Problem:    Pneumonia (6/16/2017)    Active Problems:    Hypertension (9/16/2012)      GERD (gastroesophageal reflux disease) (9/16/2012)      Morbid obesity - sedentary lifestyle (9/16/2012)      Sepsis ([facility] [address] (6/17/2017)       * Admission Diagnoses: Pneumonia  * Discharge Diagnoses:    Hospital Problems as of 6/19/2017  Date Reviewed: 2/16/2017          Codes Class Noted - Resolved POA    Sepsis ([facility] [address] ICD-10-CM: A41.9  ICD-9-CM: 038.9, 995.91  6/17/2017 - Present Yes        * (Principal)Pneumonia ICD-10-CM: J18.9  ICD-9-CM: 198  6/16/2017 - Present Yes        Hypertension (Chronic) ICD-10-CM: I10  ICD-9-CM: 401.9  9/16/2012 - Present Yes        GERD (gastroesophageal reflux disease) (Chronic) ICD-10-CM: K21.9  ICD-9-CM: 530.81  9/16/2012 - Present Yes        Morbid obesity - sedentary lifestyle (Chronic) ICD-10-CM: E66.01  ICD-9-CM: 278.01  9/16/2012 - Present Yes                Hospital Course:  Ms. Gage Araujo is a 79 yo female who presented with c/o fever, SOB, cough and fatigue. Pt CT chest showing bilateral lower lobe pneumonia. Pt with leukocytosis on admission, has since resolved. Pt started on levaquin. On RA. Denies SOB, CP, n/v/d, dizziness.       Diagnostic Study/Procedure results summary copied from within [facility] EMR:    CT OF THE CHEST WITH CONTRAST, PULMONARY EMBOLUS PROTOCOL.     CLINICAL INDICATION: Shortness of breath, elevated D-dimer, chest pain     PROCEDURE: Serial thin section axial images are obtained from the thoracic inlet  through the upper abdomen following the administration of intravenous contrast  per a dedicated, institutional pulmonary embolus protocol. Coronal MIP  reformatted images are generated. Radiation dose reduction techniques were used  for this study. Our CT scanners use one or all of the following: Automated  exposure control, adjusted of the mA and/or kV according to patient size,  iterative reconstruction     COMPARISON: Chest CT dated 9/15/2012     FINDINGS: There is adequate opacification of the central pulmonary arterial  tree. No central intraluminal filling defect noted indicate acute pulmonary  embolus. No mediastinal or axillary adenopathy evident. The heart is normal in  size. No pleural or pericardial effusion evident. Clustered nodular densities  most evident in the lower lobes, right greater than left with smaller clustered  nodularity present in the right upper lobe. An atypical infectious or  inflammatory process is suspected. There is no pneumothorax.     Postsurgical changes in keeping with gastric bypass procedure noted in the left  upper abdominal quadrant.     No aggressive osseous lesions identified.     IMPRESSION  IMPRESSION:  1. No acute pulmonary embolus. 2. Clustered infectious/inflammatory nodularity in the bilateral lower lobes to  a greater extent on the right and in the right upper lobe. This is most in  keeping with a diffuse, atypical infectious or inflammatory process      Chest 2 view     CLINICAL INDICATION: Acute moderate dyspnea and fever today     COMPARISON: 4/11/2017, 2/16/2017     TECHNIQUE: Upright AP and lateral views of the chest. Lateral view details are  limited by prominent overlying soft tissues.      FINDINGS: Lung volumes are well inflated. Cardiomediastinal silhouette and  hilar contours are stable and within normal limits given technique. The lungs  demonstrate no consolidation, pneumothorax, pleural effusion or pulmonary edema.   There is a left side nerve stimulator remaining in place.      No acute osseous abnormalities are seen.        IMPRESSION  IMPRESSION: No acute disease. Labs: Results:       Chemistry Recent Labs      06/19/17 0440 06/17/17   0544  06/16/17   1340   GLU  87  102*  108*   NA  144  145  144   K  4.1  3.7  3.9   CL  108*  113*  109*   CO2  27  25  26   BUN  11  13  19   CREA  0.91  0.88  0.99   CA  9.0  8.4  8.9   AGAP  9  7  9   TBILI   --    --   0.3   ALT   --    --   23   AP   --    --   100   TP   --    --   6.6   ALB   --    --   3.0*   GLOB   --    --   3.6*   AGRAT   --    --   0.8*      CBC w/Diff Recent Labs      06/19/17 0440 06/18/17 0434 06/17/17   0544  06/16/17   1340   WBC  7.2  14.0*  25.1*  14.8*   RBC  5.47*  3.42*  3.84*  4.52   HGB  14.3  8.8*  9.7*  11.4*   HCT  44.3  28.5*  32.4*  37.5   PLT  187  242  262  307   GRANS  61   --   79*  81*   LYMPH  28   --   13  8*   EOS  4   --   1  2      Cardiac Enzymes No results for input(s): CPK, CKND1, BRAN in the last 72 hours. No lab exists for component: CKRMB, TROIP   Coagulation No results for input(s): PTP, INR, APTT in the last 72 hours. No lab exists for component: INREXT    Lipid Panel @BRIEFLAB(CHOL,CHOLPOCT,715214,685385,[id number],CHOLX,CHOLP,CHLST,CHOLV,829033,HDL,HDLPOC,HDLPOCT,654805,NHDLCT,[id number],HDLC,HDLP,LDL,LDLPOCT,LDLCPOC,897015,NLDLCT,DLDL,LDLC,DLDLP,894918,VLDLC,VLDL,TGL,TGLX,TRIGL,[id number],TRIGP,TGLPOCT,046085,647362,CHHD,CHHDX)@   BNP No results for input(s): BNPP in the last 72 hours.    Liver Enzymes Recent Labs      06/16/17   1340   TP  6.6   ALB  3.0*   AP  100   SGOT  20      Thyroid Studies Lab Results   Component Value Date/Time    T4, Total 4.9 09/16/2012 04:20 AM    T3 Uptake 41 09/16/2012 04:20 AM    TSH 0.259 04/10/2017 11:00 PM            Discharge Exam:  Visit Vitals    /79 (BP 1 Location: Left arm, BP Patient Position: At rest)    Pulse 69    Temp 97.6 °F (36.4 °C)    Resp 18    Ht 5' 3\" (1.6 m)    Wt 90.7 kg (200 lb)    SpO2 95%    Breastfeeding No    BMI 35.43 kg/m2 General appearance: alert, cooperative, no distress, appears stated age  Lungs: clear to auscultation bilaterally, resp even and nonlabored  Heart: regular rate and rhythm, S1S2 present without murmur rubs gallops. No edema  Abdomen: soft, non-tender, non distended. Bowel sounds normal.    Extremities: moves ext spontaneously. No cyanosis  Psych:   A/O X3  Neuro:  No focal deficits      Disposition: home  Discharge Condition: stable  Patient Instructions:   Current Discharge Medication List      START taking these medications    Details   levoFLOXacin (LEVAQUIN) 750 mg tablet Take 1 Tab by mouth daily. Qty: 4 Tab, Refills: 0         CONTINUE these medications which have NOT CHANGED    Details   apixaban (ELIQUIS) 2.5 mg tablet Take 2.5 mg by mouth two (2) times a day. Indications: PULMONARY THROMBOEMBOLISM PREVENTION      atorvastatin (LIPITOR) 10 mg tablet Take 2 Tabs by mouth nightly. Qty: 30 Tab, Refills: 11      albuterol (PROAIR HFA) 90 mcg/actuation inhaler Take 2 Puffs by inhalation every four (4) hours as needed for Wheezing. ramelteon (ROZEREM) 8 mg tablet Take  by mouth nightly. ALPRAZolam (XANAX) 2 mg tablet Take 1 Tab by mouth three (3) times daily as needed for Anxiety. Max Daily Amount: 6 mg. Qty: 30 Tab, Refills: 0      amphetamine-dextroamphetamine XR (ADDERALL XR) 20 mg XR capsule Take 1 Cap (20 mg total) by mouth every morning. Max Daily Amount: 20 mg  Qty: 30 Cap, Refills: 0      mirtazapine (REMERON) 30 mg tablet Take 30 mg by mouth nightly. ondansetron (ZOFRAN ODT) 8 mg disintegrating tablet Take 8 mg by mouth every eight (8) hours as needed for Nausea. oxybutynin chloride XL (DITROPAN XL) 10 mg CR tablet Take 10 mg by mouth daily. Magnesium Oxide 500 mg cap Take 500 mg by mouth.      lisinopril (PRINIVIL, ZESTRIL) 40 mg tablet Take 1 Tab by mouth daily.   Qty: 30 Tab, Refills: 5      HYDROcodone-acetaminophen (NORCO)  mg tablet Take 1 Tab by mouth every six (6) hours as needed for Pain. DULERA 200-5 mcg/actuation HFA inhaler USE 2 INHALATIONS TWICE A DAY  Qty: 1 Inhaler, Refills: 11      POTASSIUM CHLORIDE SR 10 MEQ TAB Take 1 Tab by mouth daily. verapamil ER (VERELAN) 120 mg ER capsule Take 120 mg by mouth two (2) times a day. Cholecalciferol, Vitamin D3, (VITAMIN D3) 1,000 unit cap Take  by mouth. folic acid 216 mcg tablet Take 800 mcg by mouth daily. nicotinic acid (NIACIN) 500 mg tablet Take 500 mg by mouth Daily (before breakfast). pantoprazole (PROTONIX) 40 mg tablet Take 1 Tab by mouth Before breakfast and dinner. Qty: 60 Tab, Refills: 2      FERROUS FUMARATE/VIT BCOMP&C (SUPER B COMPLEX PO) Take 1 Tab by mouth daily. BISACODYL (GENTLE LAXATIVE PO) Take 2 Tabs by mouth daily. albuterol sulfate (PROVENTIL;VENTOLIN) 2.5 mg/0.5 mL nebu nebulizer solution 0.5 mL by Nebulization route four (4) times daily. Qty: 120 mL, Refills: 11    Comments: DX: 799.02 hypoxemia, 518.83 chronic respitory failure      NEBULIZER by Does Not Apply route. ziprasidone (GEODON) 80 mg capsule Take 160 mg by mouth nightly. SUMATRIPTAN SUCCINATE (IMITREX PO) take 100 mg by mouth as needed. levothyroxine (SYNTHROID) 125 mcg tablet Take  by mouth every evening.              Activity: Activity as tolerated  Diet: Regular Diet  Wound Care: None needed      Full Code   Surrogate decision maker: none reported    Pneumonia and flu vaccine to be administered at discharge per hospital protocol   Follow-up    ·   DC to complete levaquin course total 8 days  · FU with PCP 2 days  · FU [facility] 1 week,  Established patient    Notes, labs, VS, diagnostic testing reviewed  Case dicussed with pt, care team, Dr. Brenda Estrella    Time spent to discharge patient 45 min  Signed:  Donta Strauss NP  6/19/2017  10:38 AM

## 2017-06-19 NOTE — DISCHARGE INSTRUCTIONS
DISCHARGE SUMMARY from Nurse    The following personal items are in your possession at time of discharge:    Dental Appliances: Uppers, Lowers, With patient  Visual Aid: Glasses     Home Medications: None  Jewelry: Bracelet, Earrings, Ring, With patient, Necklace  Clothing: At bedside  Other Valuables: None             PATIENT INSTRUCTIONS:    After general anesthesia or intravenous sedation, for 24 hours or while taking prescription Narcotics:  · Limit your activities  · Do not drive and operate hazardous machinery  · Do not make important personal or business decisions  · Do  not drink alcoholic beverages  · If you have not urinated within 8 hours after discharge, please contact your surgeon on call. Report the following to your surgeon:  · Excessive pain, swelling, redness or odor of or around the surgical area  · Temperature over 100.5  · Nausea and vomiting lasting longer than 4 hours or if unable to take medications  · Any signs of decreased circulation or nerve impairment to extremity: change in color, persistent  numbness, tingling, coldness or increase pain  · Any questions        What to do at Home:  Recommended activity: Activity as tolerated, per MD    If you experience any of the following symptoms fever>101, pain unrelieved with medication, nausea/vomiting, shortness of breath, dizziness/fainting, chest pain. , please follow up with your doctor. *  Please give a list of your current medications to your Primary Care Provider. *  Please update this list whenever your medications are discontinued, doses are      changed, or new medications (including over-the-counter products) are added. *  Please carry medication information at all times in case of emergency situations.           These are general instructions for a healthy lifestyle:    No smoking/ No tobacco products/ Avoid exposure to second hand smoke    Surgeon General's Warning:  Quitting smoking now greatly reduces serious risk to your health. Obesity, smoking, and sedentary lifestyle greatly increases your risk for illness    A healthy diet, regular physical exercise & weight monitoring are important for maintaining a healthy lifestyle    You may be retaining fluid if you have a history of heart failure or if you experience any of the following symptoms:  Weight gain of 3 pounds or more overnight or 5 pounds in a week, increased swelling in our hands or feet or shortness of breath while lying flat in bed. Please call your doctor as soon as you notice any of these symptoms; do not wait until your next office visit. Recognize signs and symptoms of STROKE:    F-face looks uneven    A-arms unable to move or move unevenly    S-speech slurred or non-existent    T-time-call 911 as soon as signs and symptoms begin-DO NOT go       Back to bed or wait to see if you get better-TIME IS BRAIN. Warning Signs of HEART ATTACK     Call 911 if you have these symptoms:   Chest discomfort. Most heart attacks involve discomfort in the center of the chest that lasts more than a few minutes, or that goes away and comes back. It can feel like uncomfortable pressure, squeezing, fullness, or pain.  Discomfort in other areas of the upper body. Symptoms can include pain or discomfort in one or both arms, the back, neck, jaw, or stomach.  Shortness of breath with or without chest discomfort.  Other signs may include breaking out in a cold sweat, nausea, or lightheadedness. Don't wait more than five minutes to call 911 - MINUTES MATTER! Fast action can save your life. Calling 911 is almost always the fastest way to get lifesaving treatment. Emergency Medical Services staff can begin treatment when they arrive -- up to an hour sooner than if someone gets to the hospital by car. The discharge information has been reviewed with the patient. The patient verbalized understanding.     Discharge medications reviewed with the patient and appropriate educational materials and side effects teaching were provided. Pneumonia: Care Instructions  Your Care Instructions    Pneumonia is an infection of the lungs. Most cases are caused by infections from bacteria or viruses. Pneumonia may be mild or very severe. If it is caused by bacteria, you will be treated with antibiotics. It may take a few weeks to a few months to recover fully from pneumonia, depending on how sick you were and whether your overall health is good. Follow-up care is a key part of your treatment and safety. Be sure to make and go to all appointments, and call your doctor if you are having problems. Its also a good idea to know your test results and keep a list of the medicines you take. How can you care for yourself at home? · Take your antibiotics exactly as directed. Do not stop taking the medicine just because you are feeling better. You need to take the full course of antibiotics. · Take your medicines exactly as prescribed. Call your doctor if you think you are having a problem with your medicine. · Get plenty of rest and sleep. You may feel weak and tired for a while, but your energy level will improve with time. · To prevent dehydration, drink plenty of fluids, enough so that your urine is light yellow or clear like water. Choose water and other caffeine-free clear liquids until you feel better. If you have kidney, heart, or liver disease and have to limit fluids, talk with your doctor before you increase the amount of fluids you drink. · Take care of your cough so you can rest. A cough that brings up mucus from your lungs is common with pneumonia. It is one way your body gets rid of the infection. But if coughing keeps you from resting or causes severe fatigue and chest-wall pain, talk to your doctor. He or she may suggest that you take a medicine to reduce the cough. · Use a vaporizer or humidifier to add moisture to your bedroom.  Follow the directions for cleaning the machine. · Do not smoke or allow others to smoke around you. Smoke will make your cough last longer. If you need help quitting, talk to your doctor about stop-smoking programs and medicines. These can increase your chances of quitting for good. · Take an over-the-counter pain medicine, such as acetaminophen (Tylenol), ibuprofen (Advil, Motrin), or naproxen (Aleve). Read and follow all instructions on the label. · Do not take two or more pain medicines at the same time unless the doctor told you to. Many pain medicines have acetaminophen, which is Tylenol. Too much acetaminophen (Tylenol) can be harmful. · If you were given a spirometer to measure how well your lungs are working, use it as instructed. This can help your doctor tell how your recovery is going. · To prevent pneumonia in the future, talk to your doctor about getting a flu vaccine (once a year) and a pneumococcal vaccine (one time only for most people). When should you call for help? Call 911 anytime you think you may need emergency care. For example, call if:  · You have severe trouble breathing. Call your doctor now or seek immediate medical care if:  · You cough up dark brown or bloody mucus (sputum). · You have new or worse trouble breathing. · You are dizzy or lightheaded, or you feel like you may faint. Watch closely for changes in your health, and be sure to contact your doctor if:  · You have a new or higher fever. · You are coughing more deeply or more often. · You are not getting better after 2 days (48 hours). · You do not get better as expected. Where can you learn more? Go to http://era-fransisco.info/. Enter [id number] in the search box to learn more about \"Pneumonia: Care Instructions. \"  Current as of: May 23, 2016  Content Version: 11.2  © 9669-4321 [employer]. Care instructions adapted under license by [employer] (which disclaims liability or warranty for this information).  If you have questions about a medical condition or this instruction, always ask your healthcare professional. [place] [address] any warranty or liability for your use of this information.

## 2017-06-19 NOTE — PROGRESS NOTES
Discharge instructions and prescriptions provided and explained to the pt. Med side effect sheet reviewed. Opportunity for questions provided. Pt  Needs to get dressed. Instructed to call once ready to leave.

## 2017-07-25 ENCOUNTER — APPOINTMENT (OUTPATIENT)
Dept: GENERAL RADIOLOGY | Age: 67
DRG: 189 | End: 2017-07-25
Attending: EMERGENCY MEDICINE
Payer: MEDICARE

## 2017-07-25 ENCOUNTER — HOSPITAL ENCOUNTER (INPATIENT)
Age: 67
LOS: 1 days | Discharge: HOME HEALTH | DRG: 189 | End: 2017-07-27
Attending: EMERGENCY MEDICINE | Admitting: INTERNAL MEDICINE
Payer: MEDICARE

## 2017-07-25 DIAGNOSIS — J45.901 ASTHMA EXACERBATION: ICD-10-CM

## 2017-07-25 DIAGNOSIS — R60.0 BILATERAL LOWER EXTREMITY EDEMA: ICD-10-CM

## 2017-07-25 DIAGNOSIS — J96.00 ACUTE RESPIRATORY FAILURE, UNSPECIFIED WHETHER WITH HYPOXIA OR HYPERCAPNIA (HCC): ICD-10-CM

## 2017-07-25 DIAGNOSIS — J45.41 MODERATE PERSISTENT ASTHMA WITH ACUTE EXACERBATION: Primary | ICD-10-CM

## 2017-07-25 DIAGNOSIS — R50.9 FEVER, UNSPECIFIED FEVER CAUSE: ICD-10-CM

## 2017-07-25 DIAGNOSIS — J02.9 SORE THROAT: ICD-10-CM

## 2017-07-25 DIAGNOSIS — J20.9 ACUTE BRONCHITIS, UNSPECIFIED ORGANISM: ICD-10-CM

## 2017-07-25 PROBLEM — J18.9 PNEUMONIA: Status: RESOLVED | Noted: 2017-06-16 | Resolved: 2017-07-25

## 2017-07-25 PROBLEM — A41.9 SEPSIS (HCC): Status: RESOLVED | Noted: 2017-06-17 | Resolved: 2017-07-25

## 2017-07-25 LAB
ALBUMIN SERPL BCP-MCNC: 2.8 G/DL (ref 3.2–4.6)
ALBUMIN/GLOB SERPL: 0.7 {RATIO} (ref 1.2–3.5)
ALP SERPL-CCNC: 105 U/L (ref 50–136)
ALT SERPL-CCNC: 23 U/L (ref 12–65)
ANION GAP BLD CALC-SCNC: 11 MMOL/L (ref 7–16)
ARTERIAL PATENCY WRIST A: ABNORMAL
AST SERPL W P-5'-P-CCNC: 17 U/L (ref 15–37)
BASE EXCESS BLDA CALC-SCNC: 1.5 MMOL/L (ref 0–3)
BASOPHILS # BLD AUTO: 0 K/UL (ref 0–0.2)
BASOPHILS # BLD: 0 % (ref 0–2)
BDY SITE: ABNORMAL
BILIRUB SERPL-MCNC: 0.3 MG/DL (ref 0.2–1.1)
BNP SERPL-MCNC: 66 PG/ML
BUN SERPL-MCNC: 11 MG/DL (ref 8–23)
CALCIUM SERPL-MCNC: 8.2 MG/DL (ref 8.3–10.4)
CHLORIDE SERPL-SCNC: 116 MMOL/L (ref 98–107)
CO2 SERPL-SCNC: 27 MMOL/L (ref 21–32)
COHGB MFR BLD: 0.6 % (ref 0.5–1.5)
CREAT SERPL-MCNC: 0.98 MG/DL (ref 0.6–1)
DIFFERENTIAL METHOD BLD: ABNORMAL
DO-HGB BLD-MCNC: 7 % (ref 0–5)
EOSINOPHIL # BLD: 0.3 K/UL (ref 0–0.8)
EOSINOPHIL NFR BLD: 2 % (ref 0.5–7.8)
ERYTHROCYTE [DISTWIDTH] IN BLOOD BY AUTOMATED COUNT: 17.1 % (ref 11.9–14.6)
FIO2 ON VENT: 28 %
GAS FLOW.O2 O2 DELIVERY SYS: 2 L/MIN
GLOBULIN SER CALC-MCNC: 3.8 G/DL (ref 2.3–3.5)
GLUCOSE SERPL-MCNC: 88 MG/DL (ref 65–100)
HCO3 BLDA-SCNC: 27 MMOL/L (ref 22–26)
HCT VFR BLD AUTO: 34.8 % (ref 35.8–46.3)
HGB BLD-MCNC: 10.9 G/DL (ref 11.7–15.4)
HGB BLDMV-MCNC: 11.1 GM/DL (ref 11.7–15)
IMM GRANULOCYTES # BLD: 0 K/UL (ref 0–0.5)
IMM GRANULOCYTES NFR BLD AUTO: 0.2 % (ref 0–5)
LYMPHOCYTES # BLD AUTO: 23 % (ref 13–44)
LYMPHOCYTES # BLD: 3 K/UL (ref 0.5–4.6)
MCH RBC QN AUTO: 25.6 PG (ref 26.1–32.9)
MCHC RBC AUTO-ENTMCNC: 31.3 G/DL (ref 31.4–35)
MCV RBC AUTO: 81.9 FL (ref 79.6–97.8)
METHGB MFR BLD: 0.3 % (ref 0–1.5)
MONOCYTES # BLD: 1.2 K/UL (ref 0.1–1.3)
MONOCYTES NFR BLD AUTO: 9 % (ref 4–12)
NEUTS SEG # BLD: 8.6 K/UL (ref 1.7–8.2)
NEUTS SEG NFR BLD AUTO: 66 % (ref 43–78)
OXYHGB MFR BLDA: 92.1 % (ref 94–97)
PCO2 BLDA: 46 MMHG (ref 35–45)
PH BLDA: 7.38 [PH] (ref 7.35–7.45)
PLATELET # BLD AUTO: 299 K/UL (ref 150–450)
PMV BLD AUTO: 10 FL (ref 10.8–14.1)
PO2 BLDA: 66 MMHG (ref 75–100)
POTASSIUM SERPL-SCNC: 3.6 MMOL/L (ref 3.5–5.1)
PROCALCITONIN SERPL-MCNC: <0.1 NG/ML
PROT SERPL-MCNC: 6.6 G/DL (ref 6.3–8.2)
RBC # BLD AUTO: 4.25 M/UL (ref 4.05–5.25)
SAO2 % BLD: 93 % (ref 92–98.5)
SERVICE CMNT-IMP: ABNORMAL
SODIUM SERPL-SCNC: 154 MMOL/L (ref 136–145)
VENTILATION MODE VENT: ABNORMAL
WBC # BLD AUTO: 13.1 K/UL (ref 4.3–11.1)

## 2017-07-25 PROCEDURE — 83880 ASSAY OF NATRIURETIC PEPTIDE: CPT | Performed by: INTERNAL MEDICINE

## 2017-07-25 PROCEDURE — 85025 COMPLETE CBC W/AUTO DIFF WBC: CPT | Performed by: EMERGENCY MEDICINE

## 2017-07-25 PROCEDURE — 94664 DEMO&/EVAL PT USE INHALER: CPT

## 2017-07-25 PROCEDURE — 96365 THER/PROPH/DIAG IV INF INIT: CPT | Performed by: EMERGENCY MEDICINE

## 2017-07-25 PROCEDURE — 80053 COMPREHEN METABOLIC PANEL: CPT | Performed by: EMERGENCY MEDICINE

## 2017-07-25 PROCEDURE — 74011250636 HC RX REV CODE- 250/636: Performed by: EMERGENCY MEDICINE

## 2017-07-25 PROCEDURE — 74011000250 HC RX REV CODE- 250: Performed by: EMERGENCY MEDICINE

## 2017-07-25 PROCEDURE — 99284 EMERGENCY DEPT VISIT MOD MDM: CPT | Performed by: EMERGENCY MEDICINE

## 2017-07-25 PROCEDURE — 94644 CONT INHLJ TX 1ST HOUR: CPT

## 2017-07-25 PROCEDURE — 87081 CULTURE SCREEN ONLY: CPT | Performed by: INTERNAL MEDICINE

## 2017-07-25 PROCEDURE — 87040 BLOOD CULTURE FOR BACTERIA: CPT | Performed by: EMERGENCY MEDICINE

## 2017-07-25 PROCEDURE — 74011250636 HC RX REV CODE- 250/636: Performed by: INTERNAL MEDICINE

## 2017-07-25 PROCEDURE — 84145 PROCALCITONIN (PCT): CPT | Performed by: EMERGENCY MEDICINE

## 2017-07-25 PROCEDURE — 99223 1ST HOSP IP/OBS HIGH 75: CPT | Performed by: INTERNAL MEDICINE

## 2017-07-25 PROCEDURE — 74011250637 HC RX REV CODE- 250/637: Performed by: EMERGENCY MEDICINE

## 2017-07-25 PROCEDURE — 36600 WITHDRAWAL OF ARTERIAL BLOOD: CPT

## 2017-07-25 PROCEDURE — 96376 TX/PRO/DX INJ SAME DRUG ADON: CPT | Performed by: EMERGENCY MEDICINE

## 2017-07-25 PROCEDURE — 87880 STREP A ASSAY W/OPTIC: CPT | Performed by: INTERNAL MEDICINE

## 2017-07-25 PROCEDURE — 96375 TX/PRO/DX INJ NEW DRUG ADDON: CPT | Performed by: EMERGENCY MEDICINE

## 2017-07-25 PROCEDURE — 82803 BLOOD GASES ANY COMBINATION: CPT

## 2017-07-25 PROCEDURE — 71010 XR CHEST PORT: CPT

## 2017-07-25 RX ORDER — FUROSEMIDE 10 MG/ML
20 INJECTION INTRAMUSCULAR; INTRAVENOUS ONCE
Status: COMPLETED | OUTPATIENT
Start: 2017-07-25 | End: 2017-07-25

## 2017-07-25 RX ORDER — MAGNESIUM SULFATE HEPTAHYDRATE 40 MG/ML
2 INJECTION, SOLUTION INTRAVENOUS
Status: COMPLETED | OUTPATIENT
Start: 2017-07-25 | End: 2017-07-25

## 2017-07-25 RX ORDER — ACETAMINOPHEN 500 MG
1000 TABLET ORAL
Status: COMPLETED | OUTPATIENT
Start: 2017-07-25 | End: 2017-07-25

## 2017-07-25 RX ORDER — ALBUTEROL SULFATE 0.83 MG/ML
10 SOLUTION RESPIRATORY (INHALATION)
Status: COMPLETED | OUTPATIENT
Start: 2017-07-25 | End: 2017-07-25

## 2017-07-25 RX ORDER — KETOROLAC TROMETHAMINE 30 MG/ML
15 INJECTION, SOLUTION INTRAMUSCULAR; INTRAVENOUS
Status: COMPLETED | OUTPATIENT
Start: 2017-07-25 | End: 2017-07-25

## 2017-07-25 RX ADMIN — FUROSEMIDE 20 MG: 10 INJECTION, SOLUTION INTRAMUSCULAR; INTRAVENOUS at 23:08

## 2017-07-25 RX ADMIN — KETOROLAC TROMETHAMINE 15 MG: 30 INJECTION, SOLUTION INTRAMUSCULAR; INTRAVENOUS at 20:50

## 2017-07-25 RX ADMIN — METHYLPREDNISOLONE SODIUM SUCCINATE 125 MG: 125 INJECTION, POWDER, FOR SOLUTION INTRAMUSCULAR; INTRAVENOUS at 20:45

## 2017-07-25 RX ADMIN — ACETAMINOPHEN 1000 MG: 500 TABLET ORAL at 23:41

## 2017-07-25 RX ADMIN — SODIUM CHLORIDE 1000 ML: 900 INJECTION, SOLUTION INTRAVENOUS at 20:40

## 2017-07-25 RX ADMIN — ALBUTEROL SULFATE 10 MG: 2.5 SOLUTION RESPIRATORY (INHALATION) at 21:22

## 2017-07-25 RX ADMIN — MAGNESIUM SULFATE HEPTAHYDRATE 2 G: 40 INJECTION, SOLUTION INTRAVENOUS at 21:06

## 2017-07-25 NOTE — IP AVS SNAPSHOT
Arlyss Drought 
 
 
 [facility] 
[phone] Patient: Cheo Allison MRN: [id number] AOH:2/90/8967 You are allergic to the following Allergen Reactions Latex Contact Dermatitis Bactrim (Sulfamethoxazole-Trimethoprim) Nausea Only Lamictal (Lamotrigine) Atopic Dermatitis Pcn (Penicillins) Rash Tapentadol Rash Recent Documentation Height Weight Breastfeeding? BMI OB Status Smoking Status 1.6 m 113.4 kg No 44.29 kg/m2 Hysterectomy Never Smoker Unresulted Labs Order Current Status CULTURE, BLOOD Preliminary result CULTURE, BLOOD Preliminary result CULTURE, STREP THROAT Preliminary result Emergency Contacts Name Discharge Info Relation Home Work Mobile Zenaida Maddox  Daughter [78] [phone] Clinton Safe  Spouse [3] [phone] Soo Ferguson   [phone] About your hospitalization You were admitted on:  July 26, 2017 You last received care in the:  [facility] 8 MED SURG You were discharged on:  July 27, 2017 Unit phone number:  [phone] Why you were hospitalized Your primary diagnosis was:  Acute Respiratory Failure (Hcc) Your diagnoses also included:  Asthma Exacerbation, Bilateral Lower Extremity Edema, Sore Throat Providers Seen During Your Hospitalizations Provider Role Specialty Primary office phone Arslan Vargas MD Attending Provider Emergency Medicine [phone] Diamond Regalado MD Attending Provider Pulmonary Disease [phone] Your Primary Care Physician (PCP) Primary Care Physician Office Phone Office Fax George Inna [phone] Follow-up Information Follow up With Details Comments Contact Info Rossy Boyle DO   [address] [facility] [address] [phone] Rae Quintanilla NP On 8/24/2017 9:00 a.m. [address] [place] [address] 
[phone] Your Appointments Thursday August 24, 2017  9:20 AM EDT  
(Arrive by 9:00 AM) HOSPITAL with Rae Quintanilla NP [facility] ([facility]) [address] [facility]  
[phone] Current Discharge Medication List  
  
START taking these medications Dose & Instructions Dispensing Information Comments Morning Noon Evening Bedtime  
 doxycycline 100 mg capsule Commonly known as:  Richmond Sow Your next dose is:  TODAY, evening Dose:  100 mg Take 1 Cap by mouth two (2) times a day for 7 days. Quantity:  14 Cap Refills:  0  
     
  
   
   
  
   
  
 predniSONE 20 mg tablet Commonly known as:  Jeff Res Your next dose is:  Tomorrow Morning following instructions on medication bottle Take 2 tablets daily for 3 days then, take 1 1/2 tablets daily for 3 days then, take 1 tablet daily for 3 days then, take 1/2 tablet daily for 3 days then stop. #20 Quantity:  20 Tab Refills:  0 CONTINUE these medications which have NOT CHANGED Dose & Instructions Dispensing Information Comments Morning Noon Evening Bedtime * PROAIR HFA 90 mcg/actuation inhaler Generic drug:  albuterol Your next dose is: Take on as needed schedule Dose:  2 Puff Take 2 Puffs by inhalation every four (4) hours as needed for Wheezing. Refills:  0  
     
   
   
   
  
 * albuterol sulfate 2.5 mg/0.5 mL Nebu nebulizer solution Commonly known as:  PROVENTIL;VENTOLIN Your next dose is: Take on as needed schedule Dose:  2.5 mg  
0.5 mL by Nebulization route four (4) times daily. Quantity:  120 mL Refills:  11 DX: 799.02 hypoxemia, 518.83 chronic respitory failure ALPRAZolam 2 mg tablet Commonly known as:  Jacklyn Díaz Your last dose was:  9:03 a.m. Your next dose is: Take on as needed schedule Dose:  2 mg Take 1 Tab by mouth three (3) times daily as needed for Anxiety. Max Daily Amount: 6 mg. Quantity:  30 Tab Refills:  0  
     
   
   
   
  
 atorvastatin 10 mg tablet Commonly known as:  LIPITOR Your next dose is: Take tonight Dose:  20 mg Take 2 Tabs by mouth nightly. Quantity:  30 Tab Refills:  11  
     
   
   
   
  
  
 COREG 12.5 mg tablet Generic drug:  carvedilol Your next dose is:  TODAY, with evening meal  
   
 Dose:  12.5 mg Take 12.5 mg by mouth two (2) times daily (with meals). Refills:  0 DULERA 200-5 mcg/actuation HFA inhaler Generic drug:  mometasone-formoterol Your next dose is:  TODAY, evening USE 2 INHALATIONS TWICE A DAY Quantity:  1 Inhaler Refills:  11 ELIQUIS 2.5 mg tablet Generic drug:  apixaban Your next dose is:  TODAY, evening Dose:  2.5 mg Take 2.5 mg by mouth two (2) times a day. Indications: PULMONARY THROMBOEMBOLISM PREVENTION Refills:  0  
     
  
   
   
  
   
  
 folic acid 600 mcg tablet Your next dose is:  Tomorrow Morning Dose:  800 mcg Take 800 mcg by mouth daily. Refills:  0 GENTLE LAXATIVE PO Your next dose is:  Tomorrow Morning Dose:  2 Tab Take 2 Tabs by mouth daily. Refills:  0  
     
  
   
   
   
  
 GEODON 80 mg capsule Generic drug:  ziprasidone Your next dose is: Take tonight Dose:  160 mg Take 160 mg by mouth nightly. Refills:  0 IMITREX PO Your next dose is: Take on as needed schedule Dose:  100 mg  
take 100 mg by mouth as needed. Refills:  0  
     
   
   
   
  
 lisinopril 40 mg tablet Commonly known as:  Yamini Englewood Your next dose is:  Tomorrow Morning  Dose:  40 mg  
 Take 1 Tab by mouth daily. Quantity:  30 Tab Refills:  5 Magnesium Oxide 500 mg Cap Your next dose is:  Tomorrow Morning Dose:  500 mg Take 500 mg by mouth. Refills:  0  
     
  
   
   
   
  
 mirtazapine 30 mg tablet Commonly known as:  Marina Jacks Your next dose is: Take tonight Dose:  30 mg Take 30 mg by mouth nightly. Refills:  0 NEBULIZER  
   
 by Does Not Apply route. Refills:  0  
     
   
   
   
  
 nicotinic acid 500 mg tablet Commonly known as:  NIACIN Your next dose is:  Tomorrow Morning Dose:  500 mg Take 500 mg by mouth Daily (before breakfast). Refills:  0 NORCO  mg tablet Generic drug:  HYDROcodone-acetaminophen Your last dose was:  9:03 a.m. Your next dose is:  3:00 p.m. Dose:  1 Tab Take 1 Tab by mouth every six (6) hours as needed for Pain. Refills:  0  
     
   
   
   
  
 oxybutynin chloride XL 10 mg CR tablet Commonly known as:  DITROPAN XL Your next dose is:  Tomorrow Morning Dose:  10 mg Take 10 mg by mouth daily. Refills:  0  
     
  
   
   
   
  
 pantoprazole 40 mg tablet Commonly known as:  PROTONIX Your next dose is:  TODAY, before dinner Dose:  40 mg Take 1 Tab by mouth Before breakfast and dinner. Quantity:  60 Tab Refills:  2 POTASSIUM CHLORIDE SR 10 MEQ TAB Your next dose is:  Tomorrow Morning Dose:  1 Tab Take 1 Tab by mouth daily. Refills:  0 SUPER B COMPLEX PO Your next dose is:  Tomorrow Morning Dose:  1 Tab Take 1 Tab by mouth daily. Refills:  0  
     
  
   
   
   
  
 SYNTHROID 125 mcg tablet Generic drug:  levothyroxine Your next dose is: This evening Take  by mouth every evening. Refills:  0  
     
   
   
  
   
  
 verapamil  mg ER capsule Commonly known as:  Monet Hinders Your next dose is:  TODAY, evening Dose:  120 mg Take 120 mg by mouth two (2) times a day. Refills:  0  
     
  
   
   
  
   
  
 VITAMIN D3 1,000 unit Cap Generic drug:  cholecalciferol Your next dose is:  Tomorrow Morning Take  by mouth. Refills:  0 ZOFRAN ODT 8 mg disintegrating tablet Generic drug:  ondansetron Your next dose is: Take on as needed schedule Dose:  8 mg Take 8 mg by mouth every eight (8) hours as needed for Nausea. Refills:  0  
     
   
   
   
  
 * Notice: This list has 2 medication(s) that are the same as other medications prescribed for you. Read the directions carefully, and ask your doctor or other care provider to review them with you. Where to Get Your Medications Information on where to get these meds will be given to you by the nurse or doctor. ! Ask your nurse or doctor about these medications  
  doxycycline 100 mg capsule  
 predniSONE 20 mg tablet Discharge Instructions Asthma in Adults: Care Instructions Your Care Instructions During an asthma attack, your airways swell and narrow as a reaction to certain things (triggers). This makes it hard to breathe. You may be able to prevent asthma attacks if you avoid the things that set off your asthma symptoms. Keeping your asthma under control and treating symptoms before they get bad can help you avoid severe attacks. If you can control your asthma, you may be able to do all of your normal daily activities. You may also avoid asthma attacks and trips to the hospital. 
Follow-up care is a key part of your treatment and safety. Be sure to make and go to all appointments, and call your doctor if you are having problems. It's also a good idea to know your test results and keep a list of the medicines you take. How can you care for yourself at home? · Follow your asthma action plan so you can manage your symptoms at home. An asthma action plan will help you prevent and control airway reactions and will tell you what to do during an asthma attack. If you do not have an asthma action plan, work with your doctor to build one. · Take your asthma medicine exactly as prescribed. Medicine plays an important role in controlling asthma. Talk to your doctor right away if you have any questions about what to take and how to take it. ¨ Use your quick-relief medicine when you have symptoms of an attack. Quick-relief medicine often is an albuterol inhaler. Some people need to use quick-relief medicine before they exercise. ¨ Take your controller medicine every day, not just when you have symptoms. Controller medicine is usually an inhaled corticosteroid. The goal is to prevent problems before they occur. Do not use your controller medicine to try to treat an attack that has already started. It does not work fast enough to help. ¨ If your doctor prescribed corticosteroid pills to use during an attack, take them as directed. They may take hours to work, but they may shorten the attack and help you breathe better. ¨ Keep your quick-relief medicine with you at all times. · Talk to your doctor before using other medicines. Some medicines, such as aspirin, can cause asthma attacks in some people. · Check yourself for asthma symptoms to know which step to follow in your action plan. Watch for things like being short of breath, having chest tightness, coughing, and wheezing. Also notice if symptoms wake you up at night or if you get tired quickly when you exercise. · If you have a peak flow meter, use it to check how well you are breathing. This can help you predict when an asthma attack is going to occur. Then you can take medicine to prevent the asthma attack or make it less severe. · See your doctor regularly.  These visits will help you learn more about asthma and what you can do to control it. Your doctor will monitor your treatment to make sure the medicine is helping you. · Keep track of your asthma attacks and your treatment. After you have had an attack, write down what triggered it, what helped end it, and any concerns you have about your asthma action plan. Take your diary when you see your doctor. You can then review your asthma action plan and decide if it is working. · Do not smoke or allow others to smoke around you. Avoid smoky places. Smoking makes asthma worse. If you need help quitting, talk to your doctor about stop-smoking programs and medicines. These can increase your chances of quitting for good. · Learn what triggers an asthma attack for you, and avoid the triggers when you can. Common triggers include colds, smoke, air pollution, dust, pollen, mold, pets, cockroaches, stress, and cold air. · Avoid colds and the flu. Get a pneumococcal vaccine shot. If you have had one before, ask your doctor whether you need a second dose. Get a flu vaccine every fall. If you must be around people with colds or the flu, wash your hands often. When should you call for help? Call 911 anytime you think you may need emergency care. For example, call if: 
· You have severe trouble breathing. Call your doctor now or seek immediate medical care if: 
· Your symptoms do not get better after you have followed your asthma action plan. · You cough up yellow, dark brown, or bloody mucus (sputum). Watch closely for changes in your health, and be sure to contact your doctor if: 
· Your coughing and wheezing get worse. · You need to use quick-relief medicine on more than 2 days a week (unless it is just for exercise). · You need help figuring out what is triggering your asthma attacks. Where can you learn more? Go to http://era-fransisco.info/. Enter P597 in the search box to learn more about \"Asthma in Adults: Care Instructions. \" 
 Current as of: March 25, 2017 Content Version: 11.3 © 1241-0608 [employer]. Care instructions adapted under license by [employer] (which disclaims liability or warranty for this information). If you have questions about a medical condition or this instruction, always ask your healthcare professional. [address] any warranty or liability for your use of this information. DISCHARGE SUMMARY from Nurse The following personal items are in your possession at time of discharge: 
 
Dental Appliances: None Visual Aid: Glasses Home Medications: None Jewelry: Ring, Earrings, Bracelet Clothing: Pajamas PATIENT INSTRUCTIONS: 
 
 
F-face looks uneven A-arms unable to move or move unevenly S-speech slurred or non-existent T-time-call 911 as soon as signs and symptoms begin-DO NOT go Back to bed or wait to see if you get better-TIME IS BRAIN. Warning Signs of HEART ATTACK Call 911 if you have these symptoms: 
? Chest discomfort. Most heart attacks involve discomfort in the center of the chest that lasts more than a few minutes, or that goes away and comes back. It can feel like uncomfortable pressure, squeezing, fullness, or pain. ? Discomfort in other areas of the upper body. Symptoms can include pain or discomfort in one or both arms, the back, neck, jaw, or stomach. ? Shortness of breath with or without chest discomfort. ? Other signs may include breaking out in a cold sweat, nausea, or lightheadedness. Don't wait more than five minutes to call [address]! Fast action can save your life. Calling 911 is almost always the fastest way to get lifesaving treatment.  Emergency Medical Services staff can begin treatment when they arrive  up to an hour sooner than if someone gets to the hospital by car. The discharge information has been reviewed with the patient. The patient verbalized understanding. Discharge medications reviewed with the patient and appropriate educational materials and side effects teaching were provided. [facility] Announcement We are excited to announce that we are making your provider's discharge notes available to you in Raptr. You will see these notes when they are completed and signed by the physician that discharged you from your recent hospital stay. If you have any questions or concerns about any information you see in Raptr, please call the Health Information Department where you were seen or reach out to your Primary Care Provider for more information about your plan of care. Introducing [facility]! Willadean Saint introduces Raptr patient portal. Now you can access parts of your medical record, email your doctor's office, and request medication refills online. 1. In your internet browser, go to [email or web address] 2. Click on the First Time User? Click Here link in the Sign In box. You will see the New Member Sign Up page. 3. Enter your Raptr Access Code exactly as it appears below. You will not need to use this code after youve completed the sign-up process. If you do not sign up before the expiration date, you must request a new code. · Raptr Access Code: [email or web address] Expires: 7/31/2017  4:35 PM 
 
4. Enter the last four digits of your Social Security Number (xxxx) and Date of Birth (mm/dd/yyyy) as indicated and click Submit. You will be taken to the next sign-up page. 5. Create a qunbt ID. This will be your Raptr login ID and cannot be changed, so think of one that is secure and easy to remember. 6. Create a qunbt password. You can change your password at any time. 7. Enter your Password Reset Question and Answer. This can be used at a later time if you forget your password. 8. Enter your e-mail address. You will receive e-mail notification when new information is available in [address] 9. Click Sign Up. You can now view and download portions of your medical record. 10. Click the Download Summary menu link to download a portable copy of your medical information. If you have questions, please visit the Frequently Asked Questions section of the [employer] website. Remember, [employer] is NOT to be used for urgent needs. For medical emergencies, dial 911. Now available from your iPhone and Android! General Information Please provide this summary of care documentation to your next provider. Patient Signature:  ____________________________________________________________ Date:  ____________________________________________________________  
  
[facility] Provider Signature:  ____________________________________________________________ Date:  ____________________________________________________________

## 2017-07-25 NOTE — ED TRIAGE NOTES
Patient brought in via EMS of known bilateral PE and pneumonia. Patient complains of increased SOB, cough and congestion. 4L NC by EMS. 2 neb given in route.

## 2017-07-26 LAB
ANION GAP BLD CALC-SCNC: 8 MMOL/L (ref 7–16)
BUN SERPL-MCNC: 12 MG/DL (ref 8–23)
CALCIUM SERPL-MCNC: 7.9 MG/DL (ref 8.3–10.4)
CHLORIDE SERPL-SCNC: 107 MMOL/L (ref 98–107)
CO2 SERPL-SCNC: 27 MMOL/L (ref 21–32)
CREAT SERPL-MCNC: 1.23 MG/DL (ref 0.6–1)
DEPRECATED S PYO AG THROAT QL EIA: NEGATIVE
ERYTHROCYTE [DISTWIDTH] IN BLOOD BY AUTOMATED COUNT: 16.7 % (ref 11.9–14.6)
GLUCOSE SERPL-MCNC: 289 MG/DL (ref 65–100)
HCT VFR BLD AUTO: 35.2 % (ref 35.8–46.3)
HEMOCCULT STL QL: NEGATIVE
HGB BLD-MCNC: 10.8 G/DL (ref 11.7–15.4)
MCH RBC QN AUTO: 25.4 PG (ref 26.1–32.9)
MCHC RBC AUTO-ENTMCNC: 30.7 G/DL (ref 31.4–35)
MCV RBC AUTO: 82.6 FL (ref 79.6–97.8)
PLATELET # BLD AUTO: 262 K/UL (ref 150–450)
PMV BLD AUTO: 9.7 FL (ref 10.8–14.1)
POTASSIUM SERPL-SCNC: 3.7 MMOL/L (ref 3.5–5.1)
RBC # BLD AUTO: 4.26 M/UL (ref 4.05–5.25)
SODIUM SERPL-SCNC: 142 MMOL/L (ref 136–145)
WBC # BLD AUTO: 14.9 K/UL (ref 4.3–11.1)

## 2017-07-26 PROCEDURE — 80048 BASIC METABOLIC PNL TOTAL CA: CPT | Performed by: INTERNAL MEDICINE

## 2017-07-26 PROCEDURE — 74011250636 HC RX REV CODE- 250/636: Performed by: INTERNAL MEDICINE

## 2017-07-26 PROCEDURE — 85027 COMPLETE CBC AUTOMATED: CPT | Performed by: INTERNAL MEDICINE

## 2017-07-26 PROCEDURE — 74011000250 HC RX REV CODE- 250: Performed by: NURSE PRACTITIONER

## 2017-07-26 PROCEDURE — 74011000250 HC RX REV CODE- 250: Performed by: INTERNAL MEDICINE

## 2017-07-26 PROCEDURE — 65270000029 HC RM PRIVATE

## 2017-07-26 PROCEDURE — 94640 AIRWAY INHALATION TREATMENT: CPT

## 2017-07-26 PROCEDURE — 94760 N-INVAS EAR/PLS OXIMETRY 1: CPT

## 2017-07-26 PROCEDURE — 74011000258 HC RX REV CODE- 258: Performed by: NURSE PRACTITIONER

## 2017-07-26 PROCEDURE — 82272 OCCULT BLD FECES 1-3 TESTS: CPT | Performed by: NURSE PRACTITIONER

## 2017-07-26 PROCEDURE — 74011250637 HC RX REV CODE- 250/637: Performed by: INTERNAL MEDICINE

## 2017-07-26 PROCEDURE — 77010033678 HC OXYGEN DAILY

## 2017-07-26 PROCEDURE — 99232 SBSQ HOSP IP/OBS MODERATE 35: CPT | Performed by: INTERNAL MEDICINE

## 2017-07-26 PROCEDURE — 99218 HC RM OBSERVATION: CPT

## 2017-07-26 RX ORDER — OXYBUTYNIN CHLORIDE 5 MG/1
5 TABLET ORAL EVERY 12 HOURS
Status: DISCONTINUED | OUTPATIENT
Start: 2017-07-26 | End: 2017-07-27 | Stop reason: HOSPADM

## 2017-07-26 RX ORDER — ONDANSETRON 2 MG/ML
4 INJECTION INTRAMUSCULAR; INTRAVENOUS
Status: DISCONTINUED | OUTPATIENT
Start: 2017-07-26 | End: 2017-07-27 | Stop reason: HOSPADM

## 2017-07-26 RX ORDER — LEVOTHYROXINE SODIUM 125 UG/1
125 TABLET ORAL EVERY EVENING
Status: DISCONTINUED | OUTPATIENT
Start: 2017-07-26 | End: 2017-07-27 | Stop reason: HOSPADM

## 2017-07-26 RX ORDER — LANOLIN ALCOHOL/MO/W.PET/CERES
400 CREAM (GRAM) TOPICAL DAILY
Status: DISCONTINUED | OUTPATIENT
Start: 2017-07-26 | End: 2017-07-27 | Stop reason: HOSPADM

## 2017-07-26 RX ORDER — ALBUTEROL SULFATE 0.83 MG/ML
2.5 SOLUTION RESPIRATORY (INHALATION)
Status: DISCONTINUED | OUTPATIENT
Start: 2017-07-26 | End: 2017-07-27 | Stop reason: HOSPADM

## 2017-07-26 RX ORDER — AMOXICILLIN 250 MG
1 CAPSULE ORAL
Status: DISCONTINUED | OUTPATIENT
Start: 2017-07-26 | End: 2017-07-27 | Stop reason: HOSPADM

## 2017-07-26 RX ORDER — SODIUM CHLORIDE 0.9 % (FLUSH) 0.9 %
5-10 SYRINGE (ML) INJECTION AS NEEDED
Status: DISCONTINUED | OUTPATIENT
Start: 2017-07-26 | End: 2017-07-27 | Stop reason: HOSPADM

## 2017-07-26 RX ORDER — LISINOPRIL 20 MG/1
40 TABLET ORAL DAILY
Status: DISCONTINUED | OUTPATIENT
Start: 2017-07-26 | End: 2017-07-27 | Stop reason: HOSPADM

## 2017-07-26 RX ORDER — SODIUM CHLORIDE 0.9 % (FLUSH) 0.9 %
5-10 SYRINGE (ML) INJECTION EVERY 8 HOURS
Status: DISCONTINUED | OUTPATIENT
Start: 2017-07-26 | End: 2017-07-27 | Stop reason: HOSPADM

## 2017-07-26 RX ORDER — ACETAMINOPHEN 325 MG/1
650 TABLET ORAL
Status: DISCONTINUED | OUTPATIENT
Start: 2017-07-26 | End: 2017-07-27 | Stop reason: HOSPADM

## 2017-07-26 RX ORDER — NIACIN 500 MG/1
500 TABLET, EXTENDED RELEASE ORAL
Status: DISCONTINUED | OUTPATIENT
Start: 2017-07-26 | End: 2017-07-27 | Stop reason: HOSPADM

## 2017-07-26 RX ORDER — MIRTAZAPINE 30 MG/1
30 TABLET, FILM COATED ORAL
Status: DISCONTINUED | OUTPATIENT
Start: 2017-07-26 | End: 2017-07-27 | Stop reason: HOSPADM

## 2017-07-26 RX ORDER — ATORVASTATIN CALCIUM 10 MG/1
20 TABLET, FILM COATED ORAL
Status: DISCONTINUED | OUTPATIENT
Start: 2017-07-26 | End: 2017-07-27 | Stop reason: HOSPADM

## 2017-07-26 RX ORDER — DEXTROAMPHETAMINE SACCHARATE, AMPHETAMINE ASPARTATE MONOHYDRATE, DEXTROAMPHETAMINE SULFATE AND AMPHETAMINE SULFATE 5; 5; 5; 5 MG/1; MG/1; MG/1; MG/1
20 CAPSULE, EXTENDED RELEASE ORAL DAILY
Status: DISCONTINUED | OUTPATIENT
Start: 2017-07-26 | End: 2017-07-26

## 2017-07-26 RX ORDER — BISACODYL 5 MG
10 TABLET, DELAYED RELEASE (ENTERIC COATED) ORAL DAILY
Status: DISCONTINUED | OUTPATIENT
Start: 2017-07-26 | End: 2017-07-27 | Stop reason: HOSPADM

## 2017-07-26 RX ORDER — BUDESONIDE 0.5 MG/2ML
500 INHALANT ORAL
Status: DISCONTINUED | OUTPATIENT
Start: 2017-07-26 | End: 2017-07-27 | Stop reason: HOSPADM

## 2017-07-26 RX ORDER — CARVEDILOL 12.5 MG/1
12.5 TABLET ORAL 2 TIMES DAILY
Status: ON HOLD | COMMUNITY
End: 2020-08-04

## 2017-07-26 RX ORDER — VERAPAMIL HYDROCHLORIDE 120 MG/1
120 TABLET, FILM COATED, EXTENDED RELEASE ORAL EVERY 12 HOURS
Status: DISCONTINUED | OUTPATIENT
Start: 2017-07-26 | End: 2017-07-27 | Stop reason: HOSPADM

## 2017-07-26 RX ORDER — ALPRAZOLAM 0.5 MG/1
2 TABLET ORAL
Status: DISCONTINUED | OUTPATIENT
Start: 2017-07-26 | End: 2017-07-27 | Stop reason: HOSPADM

## 2017-07-26 RX ORDER — PANTOPRAZOLE SODIUM 40 MG/1
40 TABLET, DELAYED RELEASE ORAL
Status: DISCONTINUED | OUTPATIENT
Start: 2017-07-26 | End: 2017-07-27 | Stop reason: HOSPADM

## 2017-07-26 RX ORDER — RAMELTEON 8 MG/1
8 TABLET ORAL
Status: DISCONTINUED | OUTPATIENT
Start: 2017-07-26 | End: 2017-07-26

## 2017-07-26 RX ORDER — ZIPRASIDONE HYDROCHLORIDE 20 MG/1
160 CAPSULE ORAL
Status: DISCONTINUED | OUTPATIENT
Start: 2017-07-26 | End: 2017-07-27 | Stop reason: HOSPADM

## 2017-07-26 RX ORDER — HYDROCODONE BITARTRATE AND ACETAMINOPHEN 10; 325 MG/1; MG/1
1 TABLET ORAL
Status: DISCONTINUED | OUTPATIENT
Start: 2017-07-26 | End: 2017-07-27 | Stop reason: HOSPADM

## 2017-07-26 RX ADMIN — BUDESONIDE 500 MCG: 0.5 SUSPENSION RESPIRATORY (INHALATION) at 20:04

## 2017-07-26 RX ADMIN — MIRTAZAPINE 30 MG: 30 TABLET, FILM COATED ORAL at 03:11

## 2017-07-26 RX ADMIN — B-COMPLEX W/ C & FOLIC ACID TAB 1 TABLET: TAB at 09:45

## 2017-07-26 RX ADMIN — BUDESONIDE 500 MCG: 0.5 SUSPENSION RESPIRATORY (INHALATION) at 08:19

## 2017-07-26 RX ADMIN — Medication 400 MG: at 09:46

## 2017-07-26 RX ADMIN — BISACODYL 10 MG: 5 TABLET, COATED ORAL at 09:45

## 2017-07-26 RX ADMIN — DOXYCYCLINE 100 MG: 100 INJECTION, POWDER, LYOPHILIZED, FOR SOLUTION INTRAVENOUS at 15:39

## 2017-07-26 RX ADMIN — APIXABAN 2.5 MG: 2.5 TABLET, FILM COATED ORAL at 09:46

## 2017-07-26 RX ADMIN — OXYBUTYNIN CHLORIDE 5 MG: 5 TABLET ORAL at 09:46

## 2017-07-26 RX ADMIN — Medication 10 ML: at 15:40

## 2017-07-26 RX ADMIN — ALBUTEROL SULFATE 2.5 MG: 2.5 SOLUTION RESPIRATORY (INHALATION) at 11:23

## 2017-07-26 RX ADMIN — VERAPAMIL HYDROCHLORIDE 120 MG: 120 TABLET, FILM COATED, EXTENDED RELEASE ORAL at 09:46

## 2017-07-26 RX ADMIN — ZIPRASIDONE HYDROCHLORIDE 160 MG: 20 CAPSULE ORAL at 22:08

## 2017-07-26 RX ADMIN — OXYBUTYNIN CHLORIDE 5 MG: 5 TABLET ORAL at 22:08

## 2017-07-26 RX ADMIN — ALPRAZOLAM 2 MG: 0.5 TABLET ORAL at 17:55

## 2017-07-26 RX ADMIN — ALBUTEROL SULFATE 2.5 MG: 2.5 SOLUTION RESPIRATORY (INHALATION) at 20:05

## 2017-07-26 RX ADMIN — PANTOPRAZOLE SODIUM 40 MG: 40 TABLET, DELAYED RELEASE ORAL at 09:46

## 2017-07-26 RX ADMIN — PANTOPRAZOLE SODIUM 40 MG: 40 TABLET, DELAYED RELEASE ORAL at 16:14

## 2017-07-26 RX ADMIN — VERAPAMIL HYDROCHLORIDE 120 MG: 120 TABLET, FILM COATED, EXTENDED RELEASE ORAL at 22:08

## 2017-07-26 RX ADMIN — ZIPRASIDONE HYDROCHLORIDE 160 MG: 20 CAPSULE ORAL at 03:11

## 2017-07-26 RX ADMIN — LEVOTHYROXINE SODIUM 125 MCG: 125 TABLET ORAL at 16:14

## 2017-07-26 RX ADMIN — MIRTAZAPINE 30 MG: 30 TABLET, FILM COATED ORAL at 22:08

## 2017-07-26 RX ADMIN — Medication 5 ML: at 22:07

## 2017-07-26 RX ADMIN — ALBUTEROL SULFATE 2.5 MG: 2.5 SOLUTION RESPIRATORY (INHALATION) at 05:38

## 2017-07-26 RX ADMIN — HYDROCODONE BITARTRATE AND ACETAMINOPHEN 1 TABLET: 10; 325 TABLET ORAL at 17:55

## 2017-07-26 RX ADMIN — ATORVASTATIN CALCIUM 20 MG: 10 TABLET, FILM COATED ORAL at 03:11

## 2017-07-26 RX ADMIN — NIACIN 500 MG: 500 TABLET, EXTENDED RELEASE ORAL at 09:46

## 2017-07-26 RX ADMIN — METHYLPREDNISOLONE SODIUM SUCCINATE 60 MG: 125 INJECTION, POWDER, FOR SOLUTION INTRAMUSCULAR; INTRAVENOUS at 09:46

## 2017-07-26 RX ADMIN — LISINOPRIL 40 MG: 20 TABLET ORAL at 09:44

## 2017-07-26 RX ADMIN — HYDROCODONE BITARTRATE AND ACETAMINOPHEN 1 TABLET: 10; 325 TABLET ORAL at 03:16

## 2017-07-26 RX ADMIN — METHYLPREDNISOLONE SODIUM SUCCINATE 60 MG: 125 INJECTION, POWDER, FOR SOLUTION INTRAMUSCULAR; INTRAVENOUS at 22:07

## 2017-07-26 RX ADMIN — ALPRAZOLAM 2 MG: 0.5 TABLET ORAL at 03:20

## 2017-07-26 RX ADMIN — ALBUTEROL SULFATE 2.5 MG: 2.5 SOLUTION RESPIRATORY (INHALATION) at 15:50

## 2017-07-26 RX ADMIN — ALBUTEROL SULFATE 2.5 MG: 2.5 SOLUTION RESPIRATORY (INHALATION) at 08:19

## 2017-07-26 RX ADMIN — APIXABAN 2.5 MG: 2.5 TABLET, FILM COATED ORAL at 22:08

## 2017-07-26 RX ADMIN — ATORVASTATIN CALCIUM 20 MG: 10 TABLET, FILM COATED ORAL at 22:07

## 2017-07-26 NOTE — ED NOTES
TRANSFER - OUT REPORT:    Verbal report given to [address] [place] [address] (name) on Braulio Early  being transferred to [facility](unit) for routine progression of care       Report consisted of patients Situation, Background, Assessment and   Recommendations(SBAR). Information from the following report(s) SBAR was reviewed with the receiving nurse. Lines:   Peripheral IV 07/25/17 Left Hand (Active)   Site Assessment Clean, dry, & intact 7/25/2017  8:35 PM   Phlebitis Assessment 0 7/25/2017  8:35 PM   Infiltration Assessment 0 7/25/2017  8:35 PM   Dressing Status Clean, dry, & intact 7/25/2017  8:35 PM        Opportunity for questions and clarification was provided.       Patient transported with:   [employer]

## 2017-07-26 NOTE — PROGRESS NOTES
TRANSFER - IN REPORT:    Verbal report received from Letty Olivo 44 to [address] [facility] 21 on Kellen Merchant  being received from ER for routine progression of care      Report consisted of patients Situation, Background, Assessment and   Recommendations(SBAR). Information from the following report(s) SBAR and ED Summary was reviewed with the receiving nurse. Opportunity for questions and clarification was provided. Assessment completed upon patients arrival to unit and care assumed.

## 2017-07-26 NOTE — H&P
HISTORY AND PHYSICAL      Shazia Griffith    7/25/2017    Date of Admission:  7/25/2017    The patient's chart is reviewed and the patient is discussed with the staff. Dr. Tremayne White of the [facility] has requested that this patient be admitted for asthma exacerbation. HPI:     Shazia Griffith is a 77yoF with h/o asthma and morbid obesity, GERD, with recurrent episodes of pneumonia (2012, 2013, 2017), PE on lifelong anticoagulation, left vocal fold paralysis, nephrolithiasis, s/p gastric bypass 13 years ago, iron deficiency anemia on infusions as well as recent hospitalization from 6/16-6/19 for CAP. She reports that for the last 2-3 days she has had worsening dyspnea and wheezing. She called her PCP who prescribed prednisone, which she started 2 days ago, as well as an antibiotic. She denies chest pain, hemoptysis or fevers but confirms a sore throat. She reports she has been doing her nebulizers every 4 hours at night as well as dulera but has continued to wheeze, keeping her up at night. Also her whole family is out of town and she has little support in caring for herself, has felt weak to get up to do nebs. In the ER, she was given solumedrol and 10mg of albuterol. She has not been hypoxic but has been tachypneic and breathless even with short distance of ambulation. There is no infiltrate on her CXR but she continues to wheeze. REVIEW OF SYSTEMS:  CONSTITUTIONAL:  There is no history of fever, chills, night sweats, weight loss, weight gain, persistent fatigue, or lethargy/hypersomnolence. EYES:  Denies problems with eye pain, erythema, blurred vision, or visual field loss. ENTM:  Denies history of tinnitus, epistaxis, sore throat, hoarseness, or dysphonia. LYMPH:  Denies swollen glands. CARDIAC:  No chest pain, pressure, discomfort, palpitations, orthopnea, murmurs, or edema.   GI:  No dysphagia, heartburn reflux, nausea/vomiting, diarrhea, abdominal pain, or bleeding. :Denies history of dysuria, hematuria, polyuria, or decreased urine output. MS:  No history of myalgias, arthralgias, bone pain, or muscle cramps. SKIN:  No history of rashes, jaundice, cyanosis, nodules, or ulcers. ENDO:  Negative for heat or cold intolerance. PSYCH:  Negative for anxiety, depression, insomnia, hallucinations. NEURO:  There is no history of AMS, persistent headache, decreased level of consciousness, seizures, or motor or sensory deficits. Prior to Admission Medications   Prescriptions Last Dose Informant Patient Reported? Taking? ALPRAZolam (XANAX) 2 mg tablet   No No   Sig: Take 1 Tab by mouth three (3) times daily as needed for Anxiety. Max Daily Amount: 6 mg. BISACODYL (GENTLE LAXATIVE PO)   Yes No   Sig: Take 2 Tabs by mouth daily. Cholecalciferol, Vitamin D3, (VITAMIN D3) 1,000 unit cap   Yes No   Sig: Take  by mouth. DULERA 200-5 mcg/actuation HFA inhaler   No No   Sig: USE 2 INHALATIONS TWICE A DAY   FERROUS FUMARATE/VIT BCOMP&C (SUPER B COMPLEX PO)   Yes No   Sig: Take 1 Tab by mouth daily. HYDROcodone-acetaminophen (NORCO)  mg tablet   Yes No   Sig: Take 1 Tab by mouth every six (6) hours as needed for Pain. Magnesium Oxide 500 mg cap   Yes No   Sig: Take 500 mg by mouth. NEBULIZER   Yes No   Sig: by Does Not Apply route. POTASSIUM CHLORIDE SR 10 MEQ TAB   Yes No   Sig: Take 1 Tab by mouth daily. SUMATRIPTAN SUCCINATE (IMITREX PO)   Yes No   Sig: take 100 mg by mouth as needed. albuterol (PROAIR HFA) 90 mcg/actuation inhaler   Yes No   Sig: Take 2 Puffs by inhalation every four (4) hours as needed for Wheezing. albuterol sulfate (PROVENTIL;VENTOLIN) 2.5 mg/0.5 mL nebu nebulizer solution   No No   Si.5 mL by Nebulization route four (4) times daily. amphetamine-dextroamphetamine XR (ADDERALL XR) 20 mg XR capsule   No No   Sig: Take 1 Cap (20 mg total) by mouth every morning.   Max Daily Amount: 20 mg   apixaban (ELIQUIS) 2.5 mg tablet Yes No   Sig: Take 2.5 mg by mouth two (2) times a day. Indications: PULMONARY THROMBOEMBOLISM PREVENTION   atorvastatin (LIPITOR) 10 mg tablet   No No   Sig: Take 2 Tabs by mouth nightly. folic acid 776 mcg tablet   Yes No   Sig: Take 800 mcg by mouth daily. levoFLOXacin (LEVAQUIN) 750 mg tablet   No No   Sig: Take 1 Tab by mouth daily. levothyroxine (SYNTHROID) 125 mcg tablet   Yes No   Sig: Take  by mouth every evening. lisinopril (PRINIVIL, ZESTRIL) 40 mg tablet   No No   Sig: Take 1 Tab by mouth daily. mirtazapine (REMERON) 30 mg tablet   Yes No   Sig: Take 30 mg by mouth nightly. nicotinic acid (NIACIN) 500 mg tablet   Yes No   Sig: Take 500 mg by mouth Daily (before breakfast). ondansetron (ZOFRAN ODT) 8 mg disintegrating tablet   Yes No   Sig: Take 8 mg by mouth every eight (8) hours as needed for Nausea. oxybutynin chloride XL (DITROPAN XL) 10 mg CR tablet   Yes No   Sig: Take 10 mg by mouth daily. pantoprazole (PROTONIX) 40 mg tablet   No No   Sig: Take 1 Tab by mouth Before breakfast and dinner. ramelteon (ROZEREM) 8 mg tablet   Yes No   Sig: Take  by mouth nightly. verapamil ER (VERELAN) 120 mg ER capsule   Yes No   Sig: Take 120 mg by mouth two (2) times a day. ziprasidone (GEODON) 80 mg capsule   Yes No   Sig: Take 160 mg by mouth nightly. Facility-Administered Medications: None       Past Medical History:   Diagnosis Date    Acute renal failure ([address] July, 2014    The patient was admitted with acute renal failure secondary to volume depletion. She was found to have a gastric ulcer on admission.     Bilateral pulmonary embolism ([address] September, 2012    Restarted on anticoagulation for lifetime    Calculus of ureter May, 2015    CAP (community acquired pneumonia) October, 2012    RML pneumonia    CVA (cerebral infarction) January, 2012    Reportedly has mild left arm residual weakness    Gastric ulcer July, 2014          HCAP (healthcare-associated pneumonia) January, 2013    RLL pneumonia    HCAP (healthcare-associated pneumonia) February, 2013    RLL pneumonia    Left leg DVT ([address] 1991    On coumadin until 2008    Psychiatric disorder     PUD (peptic ulcer disease) ? ??     Past Surgical History:   Procedure Laterality Date    HX APPENDECTOMY      HX CHOLECYSTECTOMY      HX ENDOSCOPY  July, 2014    Gastric ulcers x 2    HX GASTRIC BYPASS      HX HYSTERECTOMY      HX ORTHOPAEDIC      rt knee growth, right shoulder, left thumb    HX OTHER SURGICAL      vagus nerve stimulator    HX TONSILLECTOMY       Social History     Social History    Marital status:      Spouse name: N/A    Number of children: N/A    Years of education: N/A     Occupational History          Disabled     Social History Main Topics    Smoking status: Never Smoker    Smokeless tobacco: Never Used      Comment: exposed to second hand smoke at work for 17 years    Alcohol use No    Drug use: No    Sexual activity: Not on file     Other Topics Concern    Not on file     Social History Narrative    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 , one child who is healthy. Has always lived in [address] [place] [address] Road. Dog as a pet. No history of pet bird. There is no significant environmental or industrial exposure. There is no known exposure to TB.          Family History   Problem Relation Age of Onset    Hypertension Mother     Cancer Father      prostate    Heart Attack Father     Heart Disease Father      CAD    Hypertension Father     Hypertension Sister     Stroke Sister     Heart Disease Brother      CAD with CABG    Heart Attack Brother     Hypertension Brother      Allergies   Allergen Reactions    Latex Contact Dermatitis    Bactrim [Sulfamethoxazole-Trimethoprim] Nausea Only    Lamictal [Lamotrigine] Atopic Dermatitis    Pcn [Penicillins] Rash    Tapentadol Rash       Current Facility-Administered Medications   Medication Dose Route Frequency    furosemide (LASIX) injection 20 mg  20 mg IntraVENous ONCE     Current Outpatient Prescriptions   Medication Sig    levoFLOXacin (LEVAQUIN) 750 mg tablet Take 1 Tab by mouth daily.  apixaban (ELIQUIS) 2.5 mg tablet Take 2.5 mg by mouth two (2) times a day. Indications: PULMONARY THROMBOEMBOLISM PREVENTION    atorvastatin (LIPITOR) 10 mg tablet Take 2 Tabs by mouth nightly.  albuterol (PROAIR HFA) 90 mcg/actuation inhaler Take 2 Puffs by inhalation every four (4) hours as needed for Wheezing.  ramelteon (ROZEREM) 8 mg tablet Take  by mouth nightly.  ALPRAZolam (XANAX) 2 mg tablet Take 1 Tab by mouth three (3) times daily as needed for Anxiety. Max Daily Amount: 6 mg.  amphetamine-dextroamphetamine XR (ADDERALL XR) 20 mg XR capsule Take 1 Cap (20 mg total) by mouth every morning. Max Daily Amount: 20 mg    mirtazapine (REMERON) 30 mg tablet Take 30 mg by mouth nightly.  ondansetron (ZOFRAN ODT) 8 mg disintegrating tablet Take 8 mg by mouth every eight (8) hours as needed for Nausea.  oxybutynin chloride XL (DITROPAN XL) 10 mg CR tablet Take 10 mg by mouth daily.  Magnesium Oxide 500 mg cap Take 500 mg by mouth.  lisinopril (PRINIVIL, ZESTRIL) 40 mg tablet Take 1 Tab by mouth daily.  HYDROcodone-acetaminophen (NORCO)  mg tablet Take 1 Tab by mouth every six (6) hours as needed for Pain.  DULERA 200-5 mcg/actuation HFA inhaler USE 2 INHALATIONS TWICE A DAY    POTASSIUM CHLORIDE SR 10 MEQ TAB Take 1 Tab by mouth daily.  verapamil ER (VERELAN) 120 mg ER capsule Take 120 mg by mouth two (2) times a day.  Cholecalciferol, Vitamin D3, (VITAMIN D3) 1,000 unit cap Take  by mouth.  folic acid 488 mcg tablet Take 800 mcg by mouth daily.  nicotinic acid (NIACIN) 500 mg tablet Take 500 mg by mouth Daily (before breakfast).     pantoprazole (PROTONIX) 40 mg tablet Take 1 Tab by mouth Before breakfast and dinner.  FERROUS FUMARATE/VIT BCOMP&C (SUPER B COMPLEX PO) Take 1 Tab by mouth daily.  BISACODYL (GENTLE LAXATIVE PO) Take 2 Tabs by mouth daily.  albuterol sulfate (PROVENTIL;VENTOLIN) 2.5 mg/0.5 mL nebu nebulizer solution 0.5 mL by Nebulization route four (4) times daily.  NEBULIZER by Does Not Apply route.  ziprasidone (GEODON) 80 mg capsule Take 160 mg by mouth nightly.  SUMATRIPTAN SUCCINATE (IMITREX PO) take 100 mg by mouth as needed.  levothyroxine (SYNTHROID) 125 mcg tablet Take  by mouth every evening. Objective:     PHYSICAL EXAM     Vitals:    07/25/17 2116 07/25/17 2122 07/25/17 2224 07/25/17 2232   BP: 142/70  144/80    Pulse: 90  88 (!) 113   Resp: 16  19 24   Temp:       SpO2: 98% 98% 96% 90%   Weight:       Height:         Constitutional:  the patient is obese and tachypneic with RR 20  EENMT:  Sclera clear, pupils equal, oral mucosa moist  Respiratory: scattered bilateral wheezes, worse on left presently  Cardiovascular:  RRR without M,G,R (tachycardic from neb)  Gastrointestinal: soft and non-tender; with positive bowel sounds. Musculoskeletal: warm without cyanosis. There is ++ lower leg edema. Skin:  no jaundice or rashes  Neurologic: no gross neuro deficits     Psychiatric:  alert and oriented x3    CXR: more vascular congestion and possible L-sided infiltrate      Prior CXR in April    Recent Labs      07/25/17   1734   WBC  13.1*   HGB  10.9*   HCT  34.8*   PLT  299     Recent Labs      07/25/17   1734   NA  154*   K  3.6   CL  116*   GLU  88   CO2  27   BUN  11   CREA  0.98   CA  8.2*   ALB  2.8*   TBILI  0.3   ALT  23   SGOT  17     Recent Labs      07/25/17   2302   PH  7.38   PCO2  46*   PO2  66*   HCO3  27*     No results for input(s): LCAD, LAC in the last 72 hours.     Assessment:  (Medical Decision Making)     Hospital Problems  Date Reviewed: 2/16/2017          Codes Class Noted POA    Asthma exacerbation ICD-10-CM: J45.901  ICD-9-CM: 493.92  7/25/2017 Unknown    Moderate, will admit to observation and monitor on steroids, bronchodilators,     Bilateral lower extremity edema ICD-10-CM: R60.0  ICD-9-CM: 782.3  7/25/2017 Unknown    Low dose lasix 20mg IV given in ER    Acute respiratory failure [facility]) ICD-10-CM: J96.00  ICD-9-CM: 518.81  7/25/2017 Unknown    With dyspnea, tachypnea, acute wheezing and severe dyspnea on exertion. Not currently hypoxemic    Sore throat ICD-10-CM: J02.9  ICD-9-CM: 462  7/25/2017 Unknown    No purulent exudate or measured fever thus far. Supportive care planned. Plan:  (Medical Decision Making)     --Will admit to observation for further medical management of asthma exacerbation and acute respiratory failure. --Supplemental O2 IF NEEDED. --Respiratory nebulizer treatments  --stop IVF and monitor BNP  --Check labs in am  --Holding off on antibiotics since no purulent sputum production or fevers. --Continue home medications  --Full code, Alfonso Dang is her         More than 50% of the time documented was spent in face-to-face contact with the patient and in the care of the patient on the floor/unit where the patient is located.     Carmen Dunne MD

## 2017-07-26 NOTE — PROGRESS NOTES
Xanax (4- 0.5 mg tabs ) po given for complaints of anxiety and Norco 10 mg 1 tab po given for complaints of back pain 7/10. Will monitor.

## 2017-07-26 NOTE — PROGRESS NOTES
TRANSFER - IN REPORT:    Verbal report received from tamara(name) on Cy Nolen  being received from er(unit) for routine progression of care      Report consisted of patients Situation, Background, Assessment and   Recommendations(SBAR). Information from the following report(s) ED Summary was reviewed with the receiving nurse. Opportunity for questions and clarification was provided. Assessment completed upon patients arrival to unit and care assumed.  Awaiting arrival, report sagrario sim rn

## 2017-07-26 NOTE — PROGRESS NOTES
Pt resting in bed, pt remains on RA at this time. Pt encouraged to call for assistance if needed call light in reach, door open will monitor.

## 2017-07-26 NOTE — ED NOTES
IV in left wrist intact; no phlebitis, or infiltration noted. Pt. Able to swallow pills, and drink water. NAD noted.

## 2017-07-26 NOTE — ED NOTES
Pt asked for bedpan and also was incontinent in brief she had on from home. Pericare and respositioned in bed for comfort.  AB

## 2017-07-26 NOTE — ED NOTES
\"My am hurting in my esophagus and my throat and my right knee has blood clots in it and it hurts.   My throat feels like hamburger meat\"

## 2017-07-26 NOTE — PROGRESS NOTES
Pt arrived to room 812 via stretcher from the ER. Pt assisted to room. Pt incontinent of urine. Pt cleaned and assisted back to bed. Pt denies pain or distress at this time. Pt has no skin breakdown noted at this time. Pt skin assessed with Mark Melendrez RN. Pt on RA with O2 sat 92% at this time. Pt oriented to room and surroundings.  Pt encouraged to call for assistance if needed call light in reach, door open [facility]

## 2017-07-26 NOTE — PROGRESS NOTES
Jazzy Judd  Admission Date: 7/25/2017             Daily Progress Note: 7/26/2017    The patient's chart is reviewed and the patient is discussed with the staff. 67yoF presents to the ER with worsening sore throat, dyspnea and wheezing for last 2-3 days. PCP called in Prednisone and an antibiotic. Used nebulizer q4h, on Dulera but continued to wheeze. Her whole family is out of town, she has little support in caring for herself and too weak to do nebs. Received Albuterol and Soul Medrol in the ER and tachypneic and breathless with short ambulation. No infiltrate on CXR but she continues to wheeze. Has asthma, morbid obesity, GERD, recurrent pneumonia (2012, 2013, 2017), PE on lifelong anticoagulation, left vocal fold paralysis, nephrolithiasis, s/p gastric bypass 13 years ago, iron deficiency anemia on infusions as well as recent hospitalization from 6/16-6/19 for CAP. Admitted for observation. Subjective:     Sitting up in bed with O2 off, sat 94%. States is feeling better today and slept well last night. Has occasional cough, nonproductive. Family at the bedside.       Current Facility-Administered Medications   Medication Dose Route Frequency    albuterol (PROVENTIL VENTOLIN) nebulizer solution 2.5 mg  2.5 mg Nebulization Q4H RT    ALPRAZolam (XANAX) tablet 2 mg  2 mg Oral TID PRN    apixaban (ELIQUIS) tablet 2.5 mg  2.5 mg Oral BID    atorvastatin (LIPITOR) tablet 20 mg  20 mg Oral QHS    bisacodyl (DULCOLAX) tablet 10 mg  10 mg Oral DAILY    multivitamin, stress formula (STRESS TAB) tablet 1 Tab  1 Tab Oral DAILY    HYDROcodone-acetaminophen (NORCO)  mg tablet 1 Tab  1 Tab Oral Q6H PRN    levothyroxine (SYNTHROID) tablet 125 mcg  125 mcg Oral QPM    lisinopril (PRINIVIL, ZESTRIL) tablet 40 mg  40 mg Oral DAILY    magnesium oxide (MAG-OX) tablet 400 mg  400 mg Oral DAILY    mirtazapine (REMERON) tablet 30 mg  30 mg Oral QHS    niacin ER (NIASPAN) tablet 500 mg  500 mg Oral ACB    oxybutynin (DITROPAN) tablet 5 mg  5 mg Oral Q12H    pantoprazole (PROTONIX) tablet 40 mg  40 mg Oral ACB&D    verapamil ER (CALAN-SR) tablet 120 mg  120 mg Oral Q12H    ziprasidone (GEODON) capsule 160 mg  160 mg Oral QHS    sodium chloride (NS) flush 5-10 mL  5-10 mL IntraVENous Q8H    sodium chloride (NS) flush 5-10 mL  5-10 mL IntraVENous PRN    albuterol (PROVENTIL VENTOLIN) nebulizer solution 2.5 mg  2.5 mg Nebulization Q4H PRN    budesonide (PULMICORT) 500 mcg/2 ml nebulizer suspension  500 mcg Nebulization BID RT    acetaminophen (TYLENOL) tablet 650 mg  650 mg Oral Q4H PRN    ondansetron (ZOFRAN) injection 4 mg  4 mg IntraVENous Q4H PRN    senna-docusate (PERICOLACE) 8.6-50 mg per tablet 1 Tab  1 Tab Oral BID PRN    methylPREDNISolone (PF) (SOLU-MEDROL) injection 60 mg  60 mg IntraVENous Q12H     Current Outpatient Prescriptions   Medication Sig    levoFLOXacin (LEVAQUIN) 750 mg tablet Take 1 Tab by mouth daily.  apixaban (ELIQUIS) 2.5 mg tablet Take 2.5 mg by mouth two (2) times a day. Indications: PULMONARY THROMBOEMBOLISM PREVENTION    atorvastatin (LIPITOR) 10 mg tablet Take 2 Tabs by mouth nightly.  albuterol (PROAIR HFA) 90 mcg/actuation inhaler Take 2 Puffs by inhalation every four (4) hours as needed for Wheezing.  ALPRAZolam (XANAX) 2 mg tablet Take 1 Tab by mouth three (3) times daily as needed for Anxiety. Max Daily Amount: 6 mg.  mirtazapine (REMERON) 30 mg tablet Take 30 mg by mouth nightly.  ondansetron (ZOFRAN ODT) 8 mg disintegrating tablet Take 8 mg by mouth every eight (8) hours as needed for Nausea.  oxybutynin chloride XL (DITROPAN XL) 10 mg CR tablet Take 10 mg by mouth daily.  Magnesium Oxide 500 mg cap Take 500 mg by mouth.  lisinopril (PRINIVIL, ZESTRIL) 40 mg tablet Take 1 Tab by mouth daily.  HYDROcodone-acetaminophen (NORCO)  mg tablet Take 1 Tab by mouth every six (6) hours as needed for Pain.     DULERA 200-5 mcg/actuation HFA inhaler USE 2 INHALATIONS TWICE A DAY    POTASSIUM CHLORIDE SR 10 MEQ TAB Take 1 Tab by mouth daily.  verapamil ER (VERELAN) 120 mg ER capsule Take 120 mg by mouth two (2) times a day.  Cholecalciferol, Vitamin D3, (VITAMIN D3) 1,000 unit cap Take  by mouth.  folic acid 293 mcg tablet Take 800 mcg by mouth daily.  nicotinic acid (NIACIN) 500 mg tablet Take 500 mg by mouth Daily (before breakfast).  pantoprazole (PROTONIX) 40 mg tablet Take 1 Tab by mouth Before breakfast and dinner.  FERROUS FUMARATE/VIT BCOMP&C (SUPER B COMPLEX PO) Take 1 Tab by mouth daily.  BISACODYL (GENTLE LAXATIVE PO) Take 2 Tabs by mouth daily.  albuterol sulfate (PROVENTIL;VENTOLIN) 2.5 mg/0.5 mL nebu nebulizer solution 0.5 mL by Nebulization route four (4) times daily.  NEBULIZER by Does Not Apply route.  ziprasidone (GEODON) 80 mg capsule Take 160 mg by mouth nightly.  SUMATRIPTAN SUCCINATE (IMITREX PO) take 100 mg by mouth as needed.  levothyroxine (SYNTHROID) 125 mcg tablet Take  by mouth every evening. Review of Systems  Constitutional: negative for fever, chills, sweats  Cardiovascular: negative for chest pain, palpitations, syncope, edema  Gastrointestinal:  negative for dysphagia, reflux, vomiting, diarrhea, abdominal pain, or melena  Neurologic:  negative for focal weakness, numbness, headache    Objective:     Vitals:    07/26/17 0604 07/26/17 0821 07/26/17 0944 07/26/17 1126   BP: 145/79  141/75    Pulse: 84  60    Resp:       Temp:   98.6 °F (37 °C)    SpO2: 94% 94% 97% 96%   Weight:       Height:         Intake and Output:           Physical Exam:   Constitution:  the patient is obese and in no acute distress, room air, sat 94%  EENMT:  Sclera clear, pupils equal, oral mucosa moist  Respiratory: posterior bibasilar crackles and wheezes  Cardiovascular:  RRR without M,G,R  Gastrointestinal: soft, obese and non-tender; with positive bowel sounds.   Musculoskeletal: warm without cyanosis. There is no lower leg edema. Skin:  no jaundice or rashes, no wounds   Neurologic: no gross neuro deficits     Psychiatric:  alert and oriented x 3    CXR:       LAB  No results for input(s): GLUCPOC in the last 72 hours. No lab exists for component: [place]   Recent Labs      07/26/17   0330  07/25/17   1734   WBC  14.9*  13.1*   HGB  10.8*  10.9*   HCT  35.2*  34.8*   PLT  262  299     Recent Labs      07/26/17   0330  07/25/17   1734   NA  142  154*   K  3.7  3.6   CL  107  116*   CO2  27  27   GLU  289*  88   BUN  12  11   CREA  1.23*  0.98   CA  7.9*  8.2*   ALB   --   2.8*   TBILI   --   0.3   ALT   --   23   SGOT   --   17     Recent Labs      07/25/17   2302   PH  7.38   PCO2  46*   PO2  66*   HCO3  27*     No results for input(s): LCAD, LAC in the last 72 hours. Assessment:  (Medical Decision Making)     Hospital Problems  Date Reviewed: 2/16/2017          Codes Class Noted POA    Asthma exacerbation ICD-10-CM: J45.901  ICD-9-CM: 493.92  7/25/2017 Unknown    Posterior wheezing    Bilateral lower extremity edema ICD-10-CM: R60.0  ICD-9-CM: 782.3  7/25/2017 Unknown    Less with bedrest    * (Principal)Acute respiratory failure ([facility] [address] ICD-10-CM: J96.00  ICD-9-CM: 518.81  7/25/2017 Unknown    OP2 required on admission--now on room air    Sore throat ICD-10-CM: J02.9  ICD-9-CM: 393  7/25/2017 Unknown    Negative strep          Plan:  (Medical Decision Making)     --Albuterol, Pulmicort  --Solu Medrol 60mg q12h  --Eliquis--life long for PE  --Strep throat-negative  --Blood culture:  No growth --pending  --Stool positive for blood--hemoglobin 10.8--history of Fe def anemia--follow   --PT for mobility  --Need to determine O2 requirements prior to discharge  --Add Doxycycline for antibiotics coverage    More than 50% of the time documented was spent in face-to-face contact with the patient and in the care of the patient on the floor/unit where the patient is located.     Eddie Ruiz, NP          Lungs:  Diffuse B exp wheeze  Heart:  RRR with no Murmur/Rubs/Gallops    Additional Comments:    Patient with ongoing asthma exacerbation symptoms. Also recent hospitalization for PNA, infiltrates only visible on CT scan. With low grade fever will start abx: more recently received levquin, will use doxycycline to cover bronchitis and atypical organisms. I have spoken with and examined the patient. I agree with the above assessment and plan as documented.     Jose Uriostegui MD

## 2017-07-26 NOTE — ED NOTES
Patient ambulated . Patient complained of being winded and was coughing during ambulation.   Patient placed on o2

## 2017-07-26 NOTE — ED PROVIDER NOTES
HPI Comments: Patient was several days of productive cough, though she cannot tell me what the sputum looks like. Increasing shortness of breath with wheezing. No fevers, but patient feels generally weak. Recently seen by her family doctor day or 2 ago, Dr. Sander Nissen put her on an antibiotic, and oral steroids, which have not yet helped. She relates usually using her nebulizer 3 times a day, she used it twice yesterday and once this morning. She states she's been too weak to get up to get to it. Patient is a 78 y.o. female presenting with shortness of breath. The history is provided by the patient. Shortness of Breath   This is a new problem. The problem occurs frequently. The current episode started more than 2 days ago. The problem has been gradually worsening. Associated symptoms include sore throat, cough, wheezing and leg swelling. Pertinent negatives include no fever, no headaches, no rhinorrhea, no chest pain, no vomiting, no abdominal pain and no rash. Precipitated by: Patient was cleaning her house some yesterday with a friend who was helping her, and thinks the dust may have made things worse. Treatments tried: albuterol nebulizer twice yesterday, once today. She has had prior hospitalizations. She has had prior ED visits. Past Medical History:   Diagnosis Date    Acute renal failure ([address] July, 2014    The patient was admitted with acute renal failure secondary to volume depletion. She was found to have a gastric ulcer on admission.     Bilateral pulmonary embolism ([address] September, 2012    Restarted on anticoagulation for lifetime    Calculus of ureter May, 2015    CAP (community acquired pneumonia) October, 2012    RML pneumonia    CVA (cerebral infarction) January, 2012    Reportedly has mild left arm residual weakness    Gastric ulcer July, 2014          HCAP (healthcare-associated pneumonia) January, 2013    RLL pneumonia    HCAP (healthcare-associated pneumonia) February, 2013    RLL pneumonia    Left leg DVT [facility] 1991    On coumadin until 2008    Psychiatric disorder     PUD (peptic ulcer disease) ? ??       Past Surgical History:   Procedure Laterality Date    HX APPENDECTOMY      HX CHOLECYSTECTOMY      HX ENDOSCOPY  July, 2014    Gastric ulcers x 2    HX GASTRIC BYPASS      HX HYSTERECTOMY      HX ORTHOPAEDIC      rt knee growth, right shoulder, left thumb    HX OTHER SURGICAL      vagus nerve stimulator    HX TONSILLECTOMY           Family History:   Problem Relation Age of Onset    Hypertension Mother     Cancer Father      prostate    Heart Attack Father     Heart Disease Father      CAD    Hypertension Father     Hypertension Sister     Stroke Sister     Heart Disease Brother      CAD with CABG    Heart Attack Brother     Hypertension Brother        Social History     Social History    Marital status:      Spouse name: N/A    Number of children: N/A    Years of education: N/A     Occupational History          Disabled     Social History Main Topics    Smoking status: Never Smoker    Smokeless tobacco: Never Used      Comment: exposed to second hand smoke at work for 17 years    Alcohol use No    Drug use: No    Sexual activity: Not on file     Other Topics Concern    Not on file     Social History Narrative    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 , one child who is healthy. Has always lived in [facility] [address]. Dog as a pet. No history of pet bird. There is no significant environmental or industrial exposure. There is no known exposure to TB. ALLERGIES: Latex; Bactrim [sulfamethoxazole-trimethoprim]; Lamictal [lamotrigine]; Pcn [penicillins]; and Tapentadol    Review of Systems   Constitutional: Positive for activity change and fatigue. Negative for chills and fever. HENT: Positive for sore throat. Negative for rhinorrhea.     Eyes: Negative for discharge and redness. Respiratory: Positive for cough, shortness of breath and wheezing. Cardiovascular: Positive for leg swelling. Negative for chest pain and palpitations. Gastrointestinal: Negative for abdominal pain, nausea and vomiting. Genitourinary: Negative for difficulty urinating and dysuria. Musculoskeletal: Negative for arthralgias and back pain. Skin: Negative for rash. Neurological: Negative for dizziness and headaches. All other systems reviewed and are negative. Vitals:    07/25/17 1732 07/25/17 2116 07/25/17 2122 07/25/17 2224   BP: 144/78 142/70  144/80   Pulse: 98 90  88   Resp: 18 16  19   Temp: 98.7 °F (37.1 °C)      SpO2: 96% 98% 98% 96%   Weight: 113.4 kg (250 lb)      Height: 5' 3\" (1.6 m)               Physical Exam   Constitutional: She is oriented to person, place, and time. She appears well-developed and well-nourished. She appears distressed. HENT:   Head: Normocephalic and atraumatic. Eyes: Conjunctivae are normal. Pupils are equal, round, and reactive to light. Right eye exhibits no discharge. Left eye exhibits no discharge. No scleral icterus. Neck: Normal range of motion. Neck supple. Cardiovascular: Normal rate, regular rhythm and normal heart sounds. Exam reveals no gallop. No murmur heard. Pulmonary/Chest: Tachypnea noted. No respiratory distress. She has wheezes in the right upper field, the right middle field, the right lower field, the left upper field, the left middle field and the left lower field. She has no rales. Abdominal: Soft. Bowel sounds are normal. There is no tenderness. There is no guarding. Musculoskeletal: Normal range of motion. She exhibits edema. Ecchymosis about right lower extremity,  Left lower extremity tender to pre-tibial palpation/edema check     Neurological: She is alert and oriented to person, place, and time. She exhibits normal muscle tone. cni 2-12 grossly   Skin: Skin is warm and dry.  She is not diaphoretic. Psychiatric: She has a normal mood and affect. Her behavior is normal.   Nursing note and vitals reviewed. MDM  Number of Diagnoses or Management Options  Moderate persistent asthma with acute exacerbation:   Diagnosis management comments: Medical decision making note:  Asthma exacerbation, patient continues doing poorly despiteing seen her family doctor couple days ago and she is early on antibiotics, she is already on oral steroids, but continues to do poorly. After iv steroids, iv Mg, and 10mg nebulizer, resp rate is elevated, at 19/min, at rest.  Then, following a short walk across the chest pain center, sats drop to 90% on room air, and resp rate accelerates to 24/minute,   Pt visibly fatigued with increased work of breathing  This concludes the \"medical decision making note\" part of this emergency department visit note.       ED Course       Procedures

## 2017-07-27 VITALS
OXYGEN SATURATION: 96 % | HEART RATE: 82 BPM | TEMPERATURE: 98.3 F | SYSTOLIC BLOOD PRESSURE: 168 MMHG | RESPIRATION RATE: 18 BRPM | BODY MASS INDEX: 44.3 KG/M2 | HEIGHT: 63 IN | DIASTOLIC BLOOD PRESSURE: 81 MMHG | WEIGHT: 250 LBS

## 2017-07-27 LAB
BASOPHILS # BLD AUTO: 0 K/UL (ref 0–0.2)
BASOPHILS # BLD: 0 % (ref 0–2)
DIFFERENTIAL METHOD BLD: ABNORMAL
EOSINOPHIL # BLD: 0 K/UL (ref 0–0.8)
EOSINOPHIL NFR BLD: 0 % (ref 0.5–7.8)
ERYTHROCYTE [DISTWIDTH] IN BLOOD BY AUTOMATED COUNT: 16.6 % (ref 11.9–14.6)
HCT VFR BLD AUTO: 31.8 % (ref 35.8–46.3)
HGB BLD-MCNC: 10.2 G/DL (ref 11.7–15.4)
IMM GRANULOCYTES # BLD: 0 K/UL (ref 0–0.5)
IMM GRANULOCYTES NFR BLD AUTO: 0.3 % (ref 0–5)
LYMPHOCYTES # BLD AUTO: 10 % (ref 13–44)
LYMPHOCYTES # BLD: 1.5 K/UL (ref 0.5–4.6)
MCH RBC QN AUTO: 25.5 PG (ref 26.1–32.9)
MCHC RBC AUTO-ENTMCNC: 32.1 G/DL (ref 31.4–35)
MCV RBC AUTO: 79.5 FL (ref 79.6–97.8)
MONOCYTES # BLD: 0.5 K/UL (ref 0.1–1.3)
MONOCYTES NFR BLD AUTO: 3 % (ref 4–12)
NEUTS SEG # BLD: 13.1 K/UL (ref 1.7–8.2)
NEUTS SEG NFR BLD AUTO: 87 % (ref 43–78)
PLATELET # BLD AUTO: 295 K/UL (ref 150–450)
PMV BLD AUTO: 9.7 FL (ref 10.8–14.1)
RBC # BLD AUTO: 4 M/UL (ref 4.05–5.25)
WBC # BLD AUTO: 15.1 K/UL (ref 4.3–11.1)

## 2017-07-27 PROCEDURE — 74011000250 HC RX REV CODE- 250: Performed by: INTERNAL MEDICINE

## 2017-07-27 PROCEDURE — 77010033678 HC OXYGEN DAILY

## 2017-07-27 PROCEDURE — 74011000250 HC RX REV CODE- 250: Performed by: NURSE PRACTITIONER

## 2017-07-27 PROCEDURE — 74011250636 HC RX REV CODE- 250/636: Performed by: INTERNAL MEDICINE

## 2017-07-27 PROCEDURE — 74011250637 HC RX REV CODE- 250/637: Performed by: INTERNAL MEDICINE

## 2017-07-27 PROCEDURE — 97161 PT EVAL LOW COMPLEX 20 MIN: CPT

## 2017-07-27 PROCEDURE — 99238 HOSP IP/OBS DSCHRG MGMT 30/<: CPT | Performed by: INTERNAL MEDICINE

## 2017-07-27 PROCEDURE — 36415 COLL VENOUS BLD VENIPUNCTURE: CPT | Performed by: NURSE PRACTITIONER

## 2017-07-27 PROCEDURE — 85025 COMPLETE CBC W/AUTO DIFF WBC: CPT | Performed by: NURSE PRACTITIONER

## 2017-07-27 PROCEDURE — 74011000258 HC RX REV CODE- 258: Performed by: NURSE PRACTITIONER

## 2017-07-27 PROCEDURE — 94760 N-INVAS EAR/PLS OXIMETRY 1: CPT

## 2017-07-27 PROCEDURE — 94640 AIRWAY INHALATION TREATMENT: CPT

## 2017-07-27 RX ORDER — DOXYCYCLINE 100 MG/1
100 CAPSULE ORAL 2 TIMES DAILY
Qty: 14 CAP | Refills: 0 | Status: SHIPPED | OUTPATIENT
Start: 2017-07-27 | End: 2017-08-03

## 2017-07-27 RX ORDER — PREDNISONE 20 MG/1
TABLET ORAL
Qty: 20 TAB | Refills: 0 | Status: SHIPPED | OUTPATIENT
Start: 2017-07-27 | End: 2017-08-24

## 2017-07-27 RX ADMIN — Medication 400 MG: at 09:01

## 2017-07-27 RX ADMIN — PANTOPRAZOLE SODIUM 40 MG: 40 TABLET, DELAYED RELEASE ORAL at 05:20

## 2017-07-27 RX ADMIN — ALBUTEROL SULFATE 2.5 MG: 2.5 SOLUTION RESPIRATORY (INHALATION) at 11:14

## 2017-07-27 RX ADMIN — APIXABAN 2.5 MG: 2.5 TABLET, FILM COATED ORAL at 09:01

## 2017-07-27 RX ADMIN — Medication 5 ML: at 05:20

## 2017-07-27 RX ADMIN — BISACODYL 10 MG: 5 TABLET, COATED ORAL at 09:01

## 2017-07-27 RX ADMIN — BUDESONIDE 500 MCG: 0.5 SUSPENSION RESPIRATORY (INHALATION) at 07:20

## 2017-07-27 RX ADMIN — HYDROCODONE BITARTRATE AND ACETAMINOPHEN 1 TABLET: 10; 325 TABLET ORAL at 09:06

## 2017-07-27 RX ADMIN — B-COMPLEX W/ C & FOLIC ACID TAB 1 TABLET: TAB at 09:01

## 2017-07-27 RX ADMIN — LISINOPRIL 40 MG: 20 TABLET ORAL at 09:01

## 2017-07-27 RX ADMIN — NIACIN 500 MG: 500 TABLET, EXTENDED RELEASE ORAL at 05:20

## 2017-07-27 RX ADMIN — ALBUTEROL SULFATE 2.5 MG: 2.5 SOLUTION RESPIRATORY (INHALATION) at 07:20

## 2017-07-27 RX ADMIN — METHYLPREDNISOLONE SODIUM SUCCINATE 60 MG: 125 INJECTION, POWDER, FOR SOLUTION INTRAMUSCULAR; INTRAVENOUS at 09:02

## 2017-07-27 RX ADMIN — ALPRAZOLAM 2 MG: 0.5 TABLET ORAL at 09:03

## 2017-07-27 RX ADMIN — OXYBUTYNIN CHLORIDE 5 MG: 5 TABLET ORAL at 09:01

## 2017-07-27 RX ADMIN — ALPRAZOLAM 2 MG: 0.5 TABLET ORAL at 02:20

## 2017-07-27 RX ADMIN — VERAPAMIL HYDROCHLORIDE 120 MG: 120 TABLET, FILM COATED, EXTENDED RELEASE ORAL at 09:01

## 2017-07-27 RX ADMIN — HYDROCODONE BITARTRATE AND ACETAMINOPHEN 1 TABLET: 10; 325 TABLET ORAL at 02:20

## 2017-07-27 RX ADMIN — DOXYCYCLINE 100 MG: 100 INJECTION, POWDER, LYOPHILIZED, FOR SOLUTION INTRAVENOUS at 05:20

## 2017-07-27 NOTE — PROGRESS NOTES
Problem: Mobility Impaired (Adult and Pediatric)  Goal: *Acute Goals and Plan of Care (Insert Text)  LTG:  (1.)Ms. Yamila Pryor will move from supine to sit and sit to supine, scoot up and down and roll side to side INDEPENDENTLY with bed flat within 7 day(s). (2.)Ms. Yamila Pryor will transfer from bed to chair and chair to bed INDEPENDENTLY within 7 day(s). (3.)Ms. Yamila Pryor will ambulate INDEPENDENTLY for 600 feet with good balance and safety awareness within 7 day(s). (4.)Ms. Yamila Pryor will ascend and descend 3 steps with MODIFIED INDEPENDENCE using handrails within 7 days. ________________________________________________________________________________________________      PHYSICAL THERAPY: INITIAL ASSESSMENT, AM 7/27/2017  INPATIENT: Hospital Day: 3  [place] / Product Type: Medicare /      NAME/AGE/GENDER: Michael Elena is a 78 y.o. female       PRIMARY DIAGNOSIS: Acute respiratory failure ([facility] [address]  Asthma exacerbation Acute respiratory failure ([facility] [address] Acute respiratory failure ([facility] [address]        ICD-10: Treatment Diagnosis:       · Generalized Muscle Weakness (M62.81)  · Other abnormalities of gait and mobility (R26.89)   Precaution/Allergies:  Latex; Bactrim [sulfamethoxazole-trimethoprim]; Lamictal [lamotrigine]; Pcn [penicillins]; and Tapentadol       ASSESSMENT:      Ms. Yamila Pryor is a pleasant 78year old female admitted from home for acute respiratory failure, asthma exacerbation. She presents in supine without complaints and is agreeable to therapy assessment. Lives alone at baseline but has been staying with daughter and son in law recently due to weakness. Pt transfers to sitting with min assist at her request. Able to don shoes and housecoat independently. Performs transfers with SBA. Ambulates total of 500 ft in hallway without use of any DME. Using rails briefly at first however balance improves greatly once she takes the first few steps. No major loss of balance noted.  O2 sats during ambulation are 94% on room air. Returned to room and up to chair after activity. Reviewed seated LE exercises which pt is familiar with from previous hospitalizations. Overall Ms. Geovanna Becerra appears to be functioning close to baseline. She may be slightly weaker due to her resp failure and decreased activity recently so may benefit from a few additional therapy sessions if she stays, however feel she is currently safe to discharge home and will have family staying with her. This section established at most recent assessment   PROBLEM LIST (Impairments causing functional limitations):  1. Decreased Ambulation Ability/Technique  2. Decreased Balance  3. Decreased Activity Tolerance    INTERVENTIONS PLANNED: (Benefits and precautions of physical therapy have been discussed with the patient.)  1. Balance Exercise  2. Bed Mobility  3. Gait Training  4. Therapeutic Activites  5. Therapeutic Exercise/Strengthening  6. Transfer Training  7. Group Therapy      TREATMENT PLAN: Frequency/Duration: 3 times a week for 1 week  Rehabilitation Potential For Stated Goals: EXCELLENT      RECOMMENDED REHABILITATION/EQUIPMENT: (at time of discharge pending progress): Continue Skilled Therapy and possible  PT. HISTORY:   History of Present Injury/Illness (Reason for Referral):  Per H&P, \"Cheri Caceres Ana Becerra is a 77yoF with h/o asthma and morbid obesity, GERD, with recurrent episodes of pneumonia (2012, 2013, 2017), PE on lifelong anticoagulation, left vocal fold paralysis, nephrolithiasis, s/p gastric bypass 13 years ago, iron deficiency anemia on infusions as well as recent hospitalization from 6/16-6/19 for CAP. She reports that for the last 2-3 days she has had worsening dyspnea and wheezing. She called her PCP who prescribed prednisone, which she started 2 days ago, as well as an antibiotic. She denies chest pain, hemoptysis or fevers but confirms a sore throat.   She reports she has been doing her nebulizers every 4 hours at night as well as dulera but has continued to wheeze, keeping her up at night. Also her whole family is out of town and she has little support in caring for herself, has felt weak to get up to do nebs. In the ER, she was given solumedrol and 10mg of albuterol. She has not been hypoxic but has been tachypneic and breathless even with short distance of ambulation. There is no infiltrate on her CXR but she continues to wheeze\"     Past Medical History/Comorbidities:   Ms. Savannah Mcclain  has a past medical history of Acute renal failure [facility]) (July, 2014); Bilateral pulmonary embolism [facility]) (September, 2012); Calculus of ureter (May, 2015); CAP (community acquired pneumonia) (October, 2012); CVA (cerebral infarction) (January, 2012); Gastric ulcer (July, 2014); HCAP (healthcare-associated pneumonia) (January, 2013); HCAP (healthcare-associated pneumonia) (February, 2013); Left leg DVT ([address] (1991); Psychiatric disorder; and PUD (peptic ulcer disease) (???). She also has no past medical history of Aneurysm ([address] Arrhythmia; Autoimmune disease ([address] CAD (coronary artery disease); Cancer ([address] Chronic pain; Coagulation disorder ([address] COPD; Diabetes ([address] Heart failure ([address] Liver disease; Seizures ([address] or Unspecified sleep apnea. Ms. Savannah Mcclain  has a past surgical history that includes appendectomy; cholecystectomy; gastric bypass; tonsillectomy; other surgical; orthopaedic; hysterectomy; and endoscopy (July, 2014).   Social History/Living Environment:   Home Environment: Private residence  # Steps to Enter: 3  Rails to Enter: Yes  Hand Rails : Bilateral  Wheelchair Ramp: No  One/Two Story Residence: One story  Living Alone: No  Support Systems: Family member(s), Child(jaswant)  Patient Expects to be Discharged to[de-identified] Private residence  Current DME Used/Available at Home: Shower chair  Tub or Shower Type: Tub/Shower combination  Prior Level of Function/Work/Activity:  Lives alone but has been staying with daughter and son in law recently who live next door. Ambulates without assist or DME. Denies falls or home O2 use. Daughter assists sometimes with ADLs. Number of Personal Factors/Comorbidities that affect the Plan of Care: 1-2: MODERATE COMPLEXITY   EXAMINATION:   Most Recent Physical Functioning:   Gross Assessment:  AROM: Within functional limits  Strength: Generally decreased, functional  Coordination: Within functional limits  Sensation: Intact               Posture:  Posture (WDL): Exceptions to WDL  Posture Assessment: Forward head, Rounded shoulders  Balance:  Sitting: Intact  Standing: Impaired  Standing - Static: Good  Standing - Dynamic : Fair (+) Bed Mobility:  Rolling: Supervision  Supine to Sit: Minimum assistance  Scooting: Supervision  Wheelchair Mobility:     Transfers:  Sit to Stand: Stand-by asssistance  Stand to Sit: Stand-by asssistance  Bed to Chair: Independent  Gait:     Base of Support: Center of gravity altered  Speed/Judith: Pace decreased (<100 feet/min)  Step Length: Left shortened;Right shortened  Gait Abnormalities: Trunk sway increased;Decreased step clearance  Distance (ft): 500 Feet (ft)  Assistive Device:  (none)  Ambulation - Level of Assistance: Stand-by asssistance;Supervision  Interventions: Verbal cues; Safety awareness training       Body Structures Involved:  1. Lungs  2. Thoracic Cage  3. Muscles Body Functions Affected:  1. Respiratory  2. Movement Related Activities and Participation Affected:  1. General Tasks and Demands  2. Mobility  3. Self Care  4. Domestic Life  5. Community, Social and [place]   Number of elements that affect the Plan of Care: 4+: HIGH COMPLEXITY   CLINICAL PRESENTATION:   Presentation: Stable and uncomplicated: LOW COMPLEXITY   CLINICAL DECISION MAKIN [facility] Inpatient Short Form  How much difficulty does the patient currently have. .. Unable A Lot A Little None   1.   Turning over in bed (including adjusting bedclothes, sheets and blankets)? [ ] 1   [ ] 2   [ ] 3   [X] 4   2. Sitting down on and standing up from a chair with arms ( e.g., wheelchair, bedside commode, etc.)   [ ] 1   [ ] 2   [ ] 3   [X] 4   3. Moving from lying on back to sitting on the side of the bed? [ ] 1   [ ] 2   [X] 3   [ ] 4   How much help from another person does the patient currently need. .. Total A Lot A Little None   4. Moving to and from a bed to a chair (including a wheelchair)? [ ] 1   [ ] 2   [ ] 3   [X] 4   5. Need to walk in hospital room? [ ] 1   [ ] 2   [ ] 3   [X] 4   6. Climbing 3-5 steps with a railing? [ ] 1   [ ] 2   [X] 3   [ ] 4   © 2007, Trustees of [address] [place] [address], under license to [employer]. All rights reserved    Score:  Initial: 22 Most Recent: X (Date: -- )     Interpretation of Tool:  Represents activities that are increasingly more difficult (i.e. Bed mobility, Transfers, Gait). Score 24 23 22-20 19-15 14-10 9-7 6       Modifier CH CI CJ CK CL CM CN         · Mobility - Walking and Moving Around:               - CURRENT STATUS:    CJ - 20%-39% impaired, limited or restricted               - GOAL STATUS:           CH - 0% impaired, limited or restricted               - D/C STATUS:                       ---------------To be determined---------------  [place] / Product Type: Medicare /       Medical Necessity:     · Patient demonstrates excellent rehab potential due to higher previous functional level. Reason for Services/Other Comments:  · Patient continues to demonstrate capacity to improve strength, balance, activity tolerance which will increase independence, decrease amount of assistance required from caregiver and increase safety.    Use of outcome tool(s) and clinical judgement create a POC that gives a: Clear prediction of patient's progress: LOW COMPLEXITY                 TREATMENT:   (In addition to Assessment/Re-Assessment sessions the following treatments were rendered)   Pre-treatment Symptoms/Complaints:  \"thank you\"  Pain: Initial:   Pain Intensity 1: 0  Post Session:  0/10      Assessment/Reassessment only, no treatment provided today     Braces/Orthotics/Lines/Etc:   · O2 Device: Room air  Treatment/Session Assessment:    · Response to Treatment:  Pt performs mobility with supervision-independence  · Interdisciplinary Collaboration:  · Physical Therapist  · Registered Nurse  · After treatment position/precautions:  · Up in chair  · Bed/Chair-wheels locked  · Bed in low position  · Call light within reach  · Compliance with Program/Exercises: Will assess as treatment progresses. · Recommendations/Intent for next treatment session: \"Next visit will focus on advancements to more challenging activities and reduction in assistance provided\".   Total Treatment Duration:  PT Patient Time In/Time Out  Time In: 1042  Time Out: Nain Nelson DPT

## 2017-07-27 NOTE — PROGRESS NOTES
Pt discharged this pm to private home. Pt left via wheelchair/ private car. Daughter in attendance pt is alert and oriented. rr are even and unlabored pt stated she has no c/o pain no distress noted. Pt has no increased bleeding noted. Pt stated she understood all discharge teaching and she stated she had no questions. Pt was given all follow up appointments. Pt stated she left with every thing she came with. Pt is stable.

## 2017-07-27 NOTE — PROGRESS NOTES
After Visit Summary   2017    Xiang Pavon    MRN: [id number]           Patient Information     Date Of Birth          1992        Visit Information        Provider Department      2017 10:45 AM Sully Seymour MD [facility]        Today's Diagnoses     Acne vulgaris    -  1       Follow-ups after your visit        Follow-up notes from your care team     Return in about 6 months (around 10/13/2017).      Who to contact     Please call your clinic at [phone] to:    Ask questions about your health    Make or cancel appointments    Discuss your medicines    Learn about your test results    Speak to your doctor   If you have compliments or concerns about an experience at your clinic, or if you wish to file a complaint, please contact [facility] Patient Relations at [phone] or email us at [email or web address]         Additional Information About Your Visit        MyChart Information     [employer] is an electronic gateway that provides easy, online access to your medical records. With [employer], you can request a clinic appointment, read your test results, renew a prescription or communicate with your care team.     To sign up for [employer] visit the website at [email or web address]   You will be asked to enter the access code listed below, as well as some personal information. Please follow the directions to create your username and password.     Your access code is: [email or web address]  Expires: 2017  6:30 AM     Your access code will  in 90 days. If you need help or a new code, please contact your [facility] Clinic or call [phone] for assistance.        Care EveryWhere ID     This is your Care EveryWhere ID. This could be used by other organizations to access your [facility] medical records  [id number]         Blood Pressure from Last 3 Encounters:   No data found for BP    Weight from Last 3  Slept without further complaints. Encounters:   No data found for Wt              Today, you had the following     No orders found for display         Today's Medication Changes          These changes are accurate as of: 4/13/17 11:59 PM.  If you have any questions, ask your nurse or doctor.               These medicines have changed or have updated prescriptions.        Dose/Directions    * tretinoin 0.025 % cream   Commonly known as:  RETIN-A   This may have changed:  Another medication with the same name was added. Make sure you understand how and when to take each.   Used for:  Acne vulgaris   Changed by:  Carmel Tavarez MD        Apply pea sized amount of medication to face every other day at night for one week, then increase to nightly.   Quantity:  45 g   Refills:  3       * tretinoin 0.05 % cream   Commonly known as:  RETIN-A   This may have changed:  You were already taking a medication with the same name, and this prescription was added. Make sure you understand how and when to take each.   Used for:  Acne vulgaris   Changed by:  Sully Seymour MD        Apply topically At Bedtime Apply pea size amount over face   Quantity:  20 g   Refills:  3       * Notice:  This list has 2 medication(s) that are the same as other medications prescribed for you. Read the directions carefully, and ask your doctor or other care provider to review them with you.         Where to get your medicines      These medications were sent to [address] [address]     Phone:  [phone]     amoxicillin 500 MG capsule    tretinoin 0.05 % cream                Primary Care Provider    None Specified       No primary provider on file.        Thank you!     Thank you for choosing [facility]  for your care. Our goal is always to provide you with excellent care. Hearing back from our patients is one way we can continue to improve our services. Please take a few minutes to  complete the written survey that you may receive in the mail after your visit with us. Thank you!             Your Updated Medication List - Protect others around you: Learn how to safely use, store and throw away your medicines at www.disposemymeds.org.          This list is accurate as of: 4/13/17 11:59 PM.  Always use your most recent med list.                   Brand Name Dispense Instructions for use    amoxicillin 500 MG capsule    AMOXIL    60 capsule    Take 1 capsule (500 mg) by mouth 2 times daily       clindamycin 1 % lotion    CLEOCIN T    60 mL    Apply topically once every morning       * tretinoin 0.025 % cream    RETIN-A    45 g    Apply pea sized amount of medication to face every other day at night for one week, then increase to nightly.       * tretinoin 0.05 % cream    RETIN-A    20 g    Apply topically At Bedtime Apply pea size amount over face       * Notice:  This list has 2 medication(s) that are the same as other medications prescribed for you. Read the directions carefully, and ask your doctor or other care provider to review them with you.

## 2017-07-27 NOTE — DISCHARGE SUMMARY
Discharge Note    Julian Baeza  Admission date:  7/25/2017  Discharge date:  7/27/2017     Admitting Diagnosis:  Acute respiratory failure [facility])  Asthma exacerbation    Discharge Diagnoses:   Hospital Problems  Date Reviewed: 2/16/2017          Codes Class Noted POA    Asthma exacerbation ICD-10-CM: J45.901  ICD-9-CM: 493.92  7/25/2017 Yes        Bilateral lower extremity edema ICD-10-CM: R60.0  ICD-9-CM: 782.3  7/25/2017 Yes        * (Principal)Acute respiratory failure ([facility] [address] ICD-10-CM: J96.00  ICD-9-CM: 518.81  7/25/2017 Yes        Sore throat ICD-10-CM: J02.9  ICD-9-CM: 906  7/25/2017 Yes              Consultants:  none    Studies/Procedures:  CXR    Condition on Discharge:  stable    Disposition:  home    Hospital course:  67yoF presents to the ER with worsening sore throat, dyspnea and wheezing for last 2-3 days. PCP recently prescribed Prednisone and antibiotics. Used nebulizer and on Dulera but continued to wheeze.  Received Albuterol and Solu Medrol in the ER and tachypneic and breathless with short ambulation.  Her family members were not at home and she had no on to care for her. She felt weak and was brought to the ER. CXR with no infiltrate but she continued to wheeze and was admitted. She has asthma, morbid obesity, GERD, recurrent pneumonia (2012, 2013, 2017), PE on lifelong anticoagulation, left vocal fold paralysis, nephrolithiasis, s/p gastric bypass 13 years ago, iron deficiency anemia on infusions as well as recent hospitalization from 6/16-6/19 for CAP.  She was admitted for observation and placed on Doxycycline. She is feeling better, has been up to the bathroom and ready to go home. She will continue antibiotics and follow up in our office in 4 weeks.      Physical Exam:   Constitution:  the patient is obese and in no acute distress, room air, sat 94%  EENMT:  Sclera clear, pupils equal, oral mucosa moist  Respiratory: posterior bibasilar crackles and wheezes  Cardiovascular:  RRR without M,G,R  Gastrointestinal: soft, obese and non-tender; with positive bowel sounds. Musculoskeletal: warm without cyanosis. There is no lower leg edema. Skin:  no jaundice or rashes, no wounds   Neurologic: no gross neuro deficits     Psychiatric:  alert and oriented x 3      LAB  Recent Labs      07/27/17   0612  07/26/17   0330  07/25/17   1734   WBC  15.1*  14.9*  13.1*   HGB  10.2*  10.8*  10.9*   HCT  31.8*  35.2*  34.8*   PLT  295  262  299     Recent Labs      07/26/17   0330  07/25/17   1734   NA  142  154*   K  3.7  3.6   CL  107  116*   CO2  27  27   BUN  12  11   CREA  1.23*  0.98   ALB   --   2.8*     Recent Labs      07/25/17   2302   PH  7.38   PCO2  46*   PO2  66*   HCO3  27*       Discharge Medications:   Current Discharge Medication List      START taking these medications    Details   predniSONE (DELTASONE) 20 mg tablet Take 2 tablets daily for 3 days then, take 1 1/2 tablets daily for 3 days then, take 1 tablet daily for 3 days then, take 1/2 tablet daily for 3 days then stop. #20  Qty: 20 Tab, Refills: 0      doxycycline (MONODOX) 100 mg capsule Take 1 Cap by mouth two (2) times a day for 7 days. Qty: 14 Cap, Refills: 0         CONTINUE these medications which have NOT CHANGED    Details   carvedilol (COREG) 12.5 mg tablet Take 12.5 mg by mouth two (2) times daily (with meals). apixaban (ELIQUIS) 2.5 mg tablet Take 2.5 mg by mouth two (2) times a day. Indications: PULMONARY THROMBOEMBOLISM PREVENTION      atorvastatin (LIPITOR) 10 mg tablet Take 2 Tabs by mouth nightly. Qty: 30 Tab, Refills: 11      albuterol (PROAIR HFA) 90 mcg/actuation inhaler Take 2 Puffs by inhalation every four (4) hours as needed for Wheezing. ALPRAZolam (XANAX) 2 mg tablet Take 1 Tab by mouth three (3) times daily as needed for Anxiety. Max Daily Amount: 6 mg. Qty: 30 Tab, Refills: 0      mirtazapine (REMERON) 30 mg tablet Take 30 mg by mouth nightly.       ondansetron (ZOFRAN ODT) 8 mg disintegrating tablet Take 8 mg by mouth every eight (8) hours as needed for Nausea. oxybutynin chloride XL (DITROPAN XL) 10 mg CR tablet Take 10 mg by mouth daily. Magnesium Oxide 500 mg cap Take 500 mg by mouth.      lisinopril (PRINIVIL, ZESTRIL) 40 mg tablet Take 1 Tab by mouth daily. Qty: 30 Tab, Refills: 5      HYDROcodone-acetaminophen (NORCO)  mg tablet Take 1 Tab by mouth every six (6) hours as needed for Pain. DULERA 200-5 mcg/actuation HFA inhaler USE 2 INHALATIONS TWICE A DAY  Qty: 1 Inhaler, Refills: 11      POTASSIUM CHLORIDE SR 10 MEQ TAB Take 1 Tab by mouth daily. verapamil ER (VERELAN) 120 mg ER capsule Take 120 mg by mouth two (2) times a day. Cholecalciferol, Vitamin D3, (VITAMIN D3) 1,000 unit cap Take  by mouth. folic acid 824 mcg tablet Take 800 mcg by mouth daily. nicotinic acid (NIACIN) 500 mg tablet Take 500 mg by mouth Daily (before breakfast). pantoprazole (PROTONIX) 40 mg tablet Take 1 Tab by mouth Before breakfast and dinner. Qty: 60 Tab, Refills: 2      FERROUS FUMARATE/VIT BCOMP&C (SUPER B COMPLEX PO) Take 1 Tab by mouth daily. BISACODYL (GENTLE LAXATIVE PO) Take 2 Tabs by mouth daily. albuterol sulfate (PROVENTIL;VENTOLIN) 2.5 mg/0.5 mL nebu nebulizer solution 0.5 mL by Nebulization route four (4) times daily. Qty: 120 mL, Refills: 11    Comments: DX: 799.02 hypoxemia, 518.83 chronic respitory failure      NEBULIZER by Does Not Apply route. ziprasidone (GEODON) 80 mg capsule Take 160 mg by mouth nightly. SUMATRIPTAN SUCCINATE (IMITREX PO) take 100 mg by mouth as needed. levothyroxine (SYNTHROID) 125 mcg tablet Take  by mouth every evening. Followup/Outpt Studies:    --Follow up appointment with [facility] in 4 weeks. --Continue home meds  --Prednisone taper  --Complete course of antibiotics with Doxycycline. --Total discharge greater than 30 minutes in duration.     More than 50% of the time documented was spent in face-to-face contact with the patient and in the care of the patient on the floor/unit where the patient is located. Rachel Salinas NP     Lungs: few trace rhonchi  Heart:  RRR with no Murmur/Rubs/Gallops    Additional Comments:  Doing much better. Ready for discharge. I have spoken with and examined the patient. I agree with the above assessment and plan as documented.     Chaitanya Mcdonald MD

## 2017-07-27 NOTE — PROGRESS NOTES
Discharge instructions and prescriptions provided and explained to the pt. Med side effect sheet reviewed. Opportunity for questions provided. Patient waiting on ride. States it will be after lunch. Instructed to call once ready to leave.

## 2017-07-27 NOTE — DISCHARGE INSTRUCTIONS
Asthma in Adults: Care Instructions  Your Care Instructions    During an asthma attack, your airways swell and narrow as a reaction to certain things (triggers). This makes it hard to breathe. You may be able to prevent asthma attacks if you avoid the things that set off your asthma symptoms. Keeping your asthma under control and treating symptoms before they get bad can help you avoid severe attacks. If you can control your asthma, you may be able to do all of your normal daily activities. You may also avoid asthma attacks and trips to the hospital.  Follow-up care is a key part of your treatment and safety. Be sure to make and go to all appointments, and call your doctor if you are having problems. It's also a good idea to know your test results and keep a list of the medicines you take. How can you care for yourself at home? · Follow your asthma action plan so you can manage your symptoms at home. An asthma action plan will help you prevent and control airway reactions and will tell you what to do during an asthma attack. If you do not have an asthma action plan, work with your doctor to build one. · Take your asthma medicine exactly as prescribed. Medicine plays an important role in controlling asthma. Talk to your doctor right away if you have any questions about what to take and how to take it. ¨ Use your quick-relief medicine when you have symptoms of an attack. Quick-relief medicine often is an albuterol inhaler. Some people need to use quick-relief medicine before they exercise. ¨ Take your controller medicine every day, not just when you have symptoms. Controller medicine is usually an inhaled corticosteroid. The goal is to prevent problems before they occur. Do not use your controller medicine to try to treat an attack that has already started. It does not work fast enough to help. ¨ If your doctor prescribed corticosteroid pills to use during an attack, take them as directed.  They may take hours to work, but they may shorten the attack and help you breathe better. ¨ Keep your quick-relief medicine with you at all times. · Talk to your doctor before using other medicines. Some medicines, such as aspirin, can cause asthma attacks in some people. · Check yourself for asthma symptoms to know which step to follow in your action plan. Watch for things like being short of breath, having chest tightness, coughing, and wheezing. Also notice if symptoms wake you up at night or if you get tired quickly when you exercise. · If you have a peak flow meter, use it to check how well you are breathing. This can help you predict when an asthma attack is going to occur. Then you can take medicine to prevent the asthma attack or make it less severe. · See your doctor regularly. These visits will help you learn more about asthma and what you can do to control it. Your doctor will monitor your treatment to make sure the medicine is helping you. · Keep track of your asthma attacks and your treatment. After you have had an attack, write down what triggered it, what helped end it, and any concerns you have about your asthma action plan. Take your diary when you see your doctor. You can then review your asthma action plan and decide if it is working. · Do not smoke or allow others to smoke around you. Avoid smoky places. Smoking makes asthma worse. If you need help quitting, talk to your doctor about stop-smoking programs and medicines. These can increase your chances of quitting for good. · Learn what triggers an asthma attack for you, and avoid the triggers when you can. Common triggers include colds, smoke, air pollution, dust, pollen, mold, pets, cockroaches, stress, and cold air. · Avoid colds and the flu. Get a pneumococcal vaccine shot. If you have had one before, ask your doctor whether you need a second dose. Get a flu vaccine every fall.  If you must be around people with colds or the flu, wash your hands often.  When should you call for help? Call 911 anytime you think you may need emergency care. For example, call if:  · You have severe trouble breathing. Call your doctor now or seek immediate medical care if:  · Your symptoms do not get better after you have followed your asthma action plan. · You cough up yellow, dark brown, or bloody mucus (sputum). Watch closely for changes in your health, and be sure to contact your doctor if:  · Your coughing and wheezing get worse. · You need to use quick-relief medicine on more than 2 days a week (unless it is just for exercise). · You need help figuring out what is triggering your asthma attacks. Where can you learn more? Go to http://era-fransisco.info/. Enter P597 in the search box to learn more about \"Asthma in Adults: Care Instructions. \"  Current as of: March 25, 2017  Content Version: 11.3  © 1730-7747 [employer]. Care instructions adapted under license by [employer] (which disclaims liability or warranty for this information). If you have questions about a medical condition or this instruction, always ask your healthcare professional. [place] [address] any warranty or liability for your use of this information. DISCHARGE SUMMARY from Nurse    The following personal items are in your possession at time of discharge:    Dental Appliances: None  Visual Aid: Glasses     Home Medications: None  Jewelry: Ring, Earrings, Bracelet  Clothing: Pajamas                PATIENT INSTRUCTIONS:    After general anesthesia or intravenous sedation, for 24 hours or while taking prescription Narcotics:  · Limit your activities  · Do not drive and operate hazardous machinery  · Do not make important personal or business decisions  · Do  not drink alcoholic beverages  · If you have not urinated within 8 hours after discharge, please contact your surgeon on call.     Report the following to your surgeon:  · Excessive pain, swelling, redness or odor of or around the surgical area  · Temperature over 100.5  · Nausea and vomiting lasting longer than 4 hours or if unable to take medications  · Any signs of decreased circulation or nerve impairment to extremity: change in color, persistent  numbness, tingling, coldness or increase pain  · Any questions        What to do at Home:  Recommended activity: Activity as tolerated, diet as tolerated. *  Please give a list of your current medications to your Primary Care Provider. *  Please update this list whenever your medications are discontinued, doses are      changed, or new medications (including over-the-counter products) are added. *  Please carry medication information at all times in case of emergency situations. These are general instructions for a healthy lifestyle:    No smoking/ No tobacco products/ Avoid exposure to second hand smoke    Surgeon General's Warning:  Quitting smoking now greatly reduces serious risk to your health. Obesity, smoking, and sedentary lifestyle greatly increases your risk for illness    A healthy diet, regular physical exercise & weight monitoring are important for maintaining a healthy lifestyle    You may be retaining fluid if you have a history of heart failure or if you experience any of the following symptoms:  Weight gain of 3 pounds or more overnight or 5 pounds in a week, increased swelling in our hands or feet or shortness of breath while lying flat in bed. Please call your doctor as soon as you notice any of these symptoms; do not wait until your next office visit. Recognize signs and symptoms of STROKE:    F-face looks uneven    A-arms unable to move or move unevenly    S-speech slurred or non-existent    T-time-call 911 as soon as signs and symptoms begin-DO NOT go       Back to bed or wait to see if you get better-TIME IS BRAIN.     Warning Signs of HEART ATTACK     Call 911 if you have these symptoms:   Chest discomfort. Most heart attacks involve discomfort in the center of the chest that lasts more than a few minutes, or that goes away and comes back. It can feel like uncomfortable pressure, squeezing, fullness, or pain.  Discomfort in other areas of the upper body. Symptoms can include pain or discomfort in one or both arms, the back, neck, jaw, or stomach.  Shortness of breath with or without chest discomfort.  Other signs may include breaking out in a cold sweat, nausea, or lightheadedness. Don't wait more than five minutes to call 911 - MINUTES MATTER! Fast action can save your life. Calling 911 is almost always the fastest way to get lifesaving treatment. Emergency Medical Services staff can begin treatment when they arrive -- up to an hour sooner than if someone gets to the hospital by car. The discharge information has been reviewed with the patient. The patient verbalized understanding. Discharge medications reviewed with the patient and appropriate educational materials and side effects teaching were provided.

## 2017-07-28 LAB
BACTERIA SPEC CULT: NORMAL
SERVICE CMNT-IMP: NORMAL

## 2017-09-28 ENCOUNTER — APPOINTMENT (OUTPATIENT)
Dept: GENERAL RADIOLOGY | Age: 67
DRG: 202 | End: 2017-09-28
Attending: EMERGENCY MEDICINE
Payer: MEDICARE

## 2017-09-28 ENCOUNTER — HOSPITAL ENCOUNTER (INPATIENT)
Age: 67
LOS: 4 days | Discharge: HOME HEALTH | DRG: 202 | End: 2017-10-02
Attending: EMERGENCY MEDICINE | Admitting: INTERNAL MEDICINE
Payer: MEDICARE

## 2017-09-28 DIAGNOSIS — R53.81 DEBILITY: Chronic | ICD-10-CM

## 2017-09-28 DIAGNOSIS — J98.4 RESTRICTIVE LUNG DISEASE: Chronic | ICD-10-CM

## 2017-09-28 DIAGNOSIS — Z79.899 POLYPHARMACY: ICD-10-CM

## 2017-09-28 DIAGNOSIS — G47.33 OSA (OBSTRUCTIVE SLEEP APNEA): Chronic | ICD-10-CM

## 2017-09-28 DIAGNOSIS — E66.01 MORBID OBESITY DUE TO EXCESS CALORIES (HCC): Chronic | ICD-10-CM

## 2017-09-28 DIAGNOSIS — J45.901 MODERATE ASTHMA WITH ACUTE EXACERBATION, UNSPECIFIED WHETHER PERSISTENT: ICD-10-CM

## 2017-09-28 DIAGNOSIS — J18.9 CAP (COMMUNITY ACQUIRED PNEUMONIA): ICD-10-CM

## 2017-09-28 DIAGNOSIS — E03.9 HYPOTHYROIDISM, UNSPECIFIED TYPE: Chronic | ICD-10-CM

## 2017-09-28 DIAGNOSIS — J45.901 ASTHMA EXACERBATION: ICD-10-CM

## 2017-09-28 DIAGNOSIS — E66.01 MORBID OBESITY (HCC): Chronic | ICD-10-CM

## 2017-09-28 DIAGNOSIS — F41.1 ANXIETY STATE: Chronic | ICD-10-CM

## 2017-09-28 DIAGNOSIS — J96.00 ACUTE RESPIRATORY FAILURE, UNSPECIFIED WHETHER WITH HYPOXIA OR HYPERCAPNIA (HCC): ICD-10-CM

## 2017-09-28 DIAGNOSIS — J45.41 MODERATE PERSISTENT ASTHMA WITH ACUTE EXACERBATION: ICD-10-CM

## 2017-09-28 DIAGNOSIS — R09.02 HYPOXIA: Primary | ICD-10-CM

## 2017-09-28 DIAGNOSIS — J45.20 INTERMITTENT ASTHMA WITHOUT COMPLICATION, UNSPECIFIED ASTHMA SEVERITY: Chronic | ICD-10-CM

## 2017-09-28 PROBLEM — Z86.711 HX OF PULMONARY EMBOLUS: Chronic | Status: ACTIVE | Noted: 2017-09-28

## 2017-09-28 PROBLEM — J45.909 ASTHMA: Chronic | Status: ACTIVE | Noted: 2017-09-28

## 2017-09-28 LAB
ALBUMIN SERPL-MCNC: 2.9 G/DL (ref 3.2–4.6)
ALBUMIN/GLOB SERPL: 0.9 {RATIO} (ref 1.2–3.5)
ALP SERPL-CCNC: 97 U/L (ref 50–136)
ALT SERPL-CCNC: 17 U/L (ref 12–65)
ANION GAP SERPL CALC-SCNC: 8 MMOL/L (ref 7–16)
AST SERPL-CCNC: 22 U/L (ref 15–37)
ATRIAL RATE: 77 BPM
BACTERIA URNS QL MICRO: 0 /HPF
BASOPHILS # BLD: 0 K/UL (ref 0–0.2)
BASOPHILS NFR BLD: 0 % (ref 0–2)
BILIRUB SERPL-MCNC: 0.3 MG/DL (ref 0.2–1.1)
BUN SERPL-MCNC: 15 MG/DL (ref 8–23)
CALCIUM SERPL-MCNC: 9 MG/DL (ref 8.3–10.4)
CALCULATED P AXIS, ECG09: 46 DEGREES
CALCULATED R AXIS, ECG10: -17 DEGREES
CALCULATED T AXIS, ECG11: 39 DEGREES
CASTS URNS QL MICRO: 0 /LPF
CHLORIDE SERPL-SCNC: 106 MMOL/L (ref 98–107)
CO2 SERPL-SCNC: 30 MMOL/L (ref 21–32)
CREAT SERPL-MCNC: 0.96 MG/DL (ref 0.6–1)
DIAGNOSIS, 93000: NORMAL
DIFFERENTIAL METHOD BLD: ABNORMAL
EOSINOPHIL # BLD: 0.1 K/UL (ref 0–0.8)
EOSINOPHIL NFR BLD: 1 % (ref 0.5–7.8)
EPI CELLS #/AREA URNS HPF: NORMAL /HPF
ERYTHROCYTE [DISTWIDTH] IN BLOOD BY AUTOMATED COUNT: 17.5 % (ref 11.9–14.6)
FLUAV AG NPH QL IA: NEGATIVE
FLUBV AG NPH QL IA: NEGATIVE
GLOBULIN SER CALC-MCNC: 3.3 G/DL (ref 2.3–3.5)
GLUCOSE BLD STRIP.AUTO-MCNC: 254 MG/DL (ref 65–100)
GLUCOSE BLD STRIP.AUTO-MCNC: 273 MG/DL (ref 65–100)
GLUCOSE SERPL-MCNC: 114 MG/DL (ref 65–100)
HCT VFR BLD AUTO: 36.3 % (ref 35.8–46.3)
HGB BLD-MCNC: 10.8 G/DL (ref 11.7–15.4)
IMM GRANULOCYTES # BLD: 0 K/UL (ref 0–0.5)
IMM GRANULOCYTES NFR BLD: 0.4 % (ref 0–5)
LACTATE BLD-SCNC: 1.3 MMOL/L (ref 0.5–1.9)
LYMPHOCYTES # BLD: 1.3 K/UL (ref 0.5–4.6)
LYMPHOCYTES NFR BLD: 12 % (ref 13–44)
MCH RBC QN AUTO: 24.5 PG (ref 26.1–32.9)
MCHC RBC AUTO-ENTMCNC: 29.8 G/DL (ref 31.4–35)
MCV RBC AUTO: 82.3 FL (ref 79.6–97.8)
MONOCYTES # BLD: 0.7 K/UL (ref 0.1–1.3)
MONOCYTES NFR BLD: 6 % (ref 4–12)
NEUTS SEG # BLD: 9 K/UL (ref 1.7–8.2)
NEUTS SEG NFR BLD: 81 % (ref 43–78)
P-R INTERVAL, ECG05: 178 MS
PLATELET # BLD AUTO: 283 K/UL (ref 150–450)
PMV BLD AUTO: 9.8 FL (ref 10.8–14.1)
POTASSIUM SERPL-SCNC: 4.4 MMOL/L (ref 3.5–5.1)
PROCALCITONIN SERPL-MCNC: 0.2 NG/ML
PROT SERPL-MCNC: 6.2 G/DL (ref 6.3–8.2)
Q-T INTERVAL, ECG07: 356 MS
QRS DURATION, ECG06: 80 MS
QTC CALCULATION (BEZET), ECG08: 402 MS
RBC # BLD AUTO: 4.41 M/UL (ref 4.05–5.25)
RBC #/AREA URNS HPF: NORMAL /HPF
SODIUM SERPL-SCNC: 144 MMOL/L (ref 136–145)
VENTRICULAR RATE, ECG03: 77 BPM
WBC # BLD AUTO: 11.1 K/UL (ref 4.3–11.1)
WBC URNS QL MICRO: NORMAL /HPF

## 2017-09-28 PROCEDURE — 81003 URINALYSIS AUTO W/O SCOPE: CPT | Performed by: EMERGENCY MEDICINE

## 2017-09-28 PROCEDURE — 74011250636 HC RX REV CODE- 250/636: Performed by: EMERGENCY MEDICINE

## 2017-09-28 PROCEDURE — 83605 ASSAY OF LACTIC ACID: CPT

## 2017-09-28 PROCEDURE — 87040 BLOOD CULTURE FOR BACTERIA: CPT | Performed by: EMERGENCY MEDICINE

## 2017-09-28 PROCEDURE — 82962 GLUCOSE BLOOD TEST: CPT

## 2017-09-28 PROCEDURE — 99223 1ST HOSP IP/OBS HIGH 75: CPT | Performed by: INTERNAL MEDICINE

## 2017-09-28 PROCEDURE — 94640 AIRWAY INHALATION TREATMENT: CPT

## 2017-09-28 PROCEDURE — 74011636637 HC RX REV CODE- 636/637: Performed by: NURSE PRACTITIONER

## 2017-09-28 PROCEDURE — 74011000258 HC RX REV CODE- 258: Performed by: EMERGENCY MEDICINE

## 2017-09-28 PROCEDURE — 96365 THER/PROPH/DIAG IV INF INIT: CPT | Performed by: EMERGENCY MEDICINE

## 2017-09-28 PROCEDURE — 74011250636 HC RX REV CODE- 250/636: Performed by: NURSE PRACTITIONER

## 2017-09-28 PROCEDURE — 93005 ELECTROCARDIOGRAM TRACING: CPT | Performed by: EMERGENCY MEDICINE

## 2017-09-28 PROCEDURE — 81015 MICROSCOPIC EXAM OF URINE: CPT | Performed by: EMERGENCY MEDICINE

## 2017-09-28 PROCEDURE — 85025 COMPLETE CBC W/AUTO DIFF WBC: CPT | Performed by: EMERGENCY MEDICINE

## 2017-09-28 PROCEDURE — 80053 COMPREHEN METABOLIC PANEL: CPT | Performed by: EMERGENCY MEDICINE

## 2017-09-28 PROCEDURE — 94760 N-INVAS EAR/PLS OXIMETRY 1: CPT

## 2017-09-28 PROCEDURE — 74011000250 HC RX REV CODE- 250: Performed by: EMERGENCY MEDICINE

## 2017-09-28 PROCEDURE — 71020 XR CHEST PA LAT: CPT

## 2017-09-28 PROCEDURE — 65270000029 HC RM PRIVATE

## 2017-09-28 PROCEDURE — 84145 PROCALCITONIN (PCT): CPT | Performed by: NURSE PRACTITIONER

## 2017-09-28 PROCEDURE — 74011250637 HC RX REV CODE- 250/637: Performed by: NURSE PRACTITIONER

## 2017-09-28 PROCEDURE — 99285 EMERGENCY DEPT VISIT HI MDM: CPT | Performed by: EMERGENCY MEDICINE

## 2017-09-28 PROCEDURE — 74011000250 HC RX REV CODE- 250: Performed by: INTERNAL MEDICINE

## 2017-09-28 PROCEDURE — 74011250637 HC RX REV CODE- 250/637: Performed by: EMERGENCY MEDICINE

## 2017-09-28 PROCEDURE — 87804 INFLUENZA ASSAY W/OPTIC: CPT | Performed by: NURSE PRACTITIONER

## 2017-09-28 RX ORDER — SODIUM CHLORIDE 0.9 % (FLUSH) 0.9 %
5-10 SYRINGE (ML) INJECTION EVERY 8 HOURS
Status: DISCONTINUED | OUTPATIENT
Start: 2017-09-28 | End: 2017-10-02 | Stop reason: HOSPADM

## 2017-09-28 RX ORDER — ALPRAZOLAM 0.5 MG/1
2 TABLET ORAL
Status: DISCONTINUED | OUTPATIENT
Start: 2017-09-28 | End: 2017-10-02 | Stop reason: HOSPADM

## 2017-09-28 RX ORDER — CARVEDILOL 12.5 MG/1
12.5 TABLET ORAL 2 TIMES DAILY WITH MEALS
Status: DISCONTINUED | OUTPATIENT
Start: 2017-09-28 | End: 2017-10-02 | Stop reason: HOSPADM

## 2017-09-28 RX ORDER — SODIUM CHLORIDE 0.9 % (FLUSH) 0.9 %
5-10 SYRINGE (ML) INJECTION AS NEEDED
Status: DISCONTINUED | OUTPATIENT
Start: 2017-09-28 | End: 2017-10-02 | Stop reason: HOSPADM

## 2017-09-28 RX ORDER — ATORVASTATIN CALCIUM 10 MG/1
20 TABLET, FILM COATED ORAL
Status: DISCONTINUED | OUTPATIENT
Start: 2017-09-28 | End: 2017-10-02 | Stop reason: HOSPADM

## 2017-09-28 RX ORDER — ZIPRASIDONE HYDROCHLORIDE 20 MG/1
160 CAPSULE ORAL
Status: DISCONTINUED | OUTPATIENT
Start: 2017-09-28 | End: 2017-10-02 | Stop reason: HOSPADM

## 2017-09-28 RX ORDER — DOXYCYCLINE 100 MG/1
100 CAPSULE ORAL EVERY 12 HOURS
Status: DISCONTINUED | OUTPATIENT
Start: 2017-09-28 | End: 2017-10-02 | Stop reason: HOSPADM

## 2017-09-28 RX ORDER — PREGABALIN 150 MG/1
150 CAPSULE ORAL 2 TIMES DAILY
Status: DISCONTINUED | OUTPATIENT
Start: 2017-09-28 | End: 2017-10-02 | Stop reason: HOSPADM

## 2017-09-28 RX ORDER — ACETAMINOPHEN 325 MG/1
650 TABLET ORAL
Status: COMPLETED | OUTPATIENT
Start: 2017-09-28 | End: 2017-09-28

## 2017-09-28 RX ORDER — ALBUTEROL SULFATE 2.5 MG/.5ML
2.5 SOLUTION RESPIRATORY (INHALATION) 4 TIMES DAILY
Status: DISCONTINUED | OUTPATIENT
Start: 2017-09-28 | End: 2017-09-28

## 2017-09-28 RX ORDER — HYDROCODONE BITARTRATE AND ACETAMINOPHEN 10; 325 MG/1; MG/1
1 TABLET ORAL
Status: DISCONTINUED | OUTPATIENT
Start: 2017-09-28 | End: 2017-10-02 | Stop reason: HOSPADM

## 2017-09-28 RX ORDER — ONDANSETRON 4 MG/1
8 TABLET, ORALLY DISINTEGRATING ORAL
Status: DISCONTINUED | OUTPATIENT
Start: 2017-09-28 | End: 2017-10-02 | Stop reason: HOSPADM

## 2017-09-28 RX ORDER — SUCRALFATE 1 G/1
1 TABLET ORAL 4 TIMES DAILY
Status: DISCONTINUED | OUTPATIENT
Start: 2017-09-28 | End: 2017-10-02 | Stop reason: HOSPADM

## 2017-09-28 RX ORDER — IPRATROPIUM BROMIDE AND ALBUTEROL SULFATE 2.5; .5 MG/3ML; MG/3ML
3 SOLUTION RESPIRATORY (INHALATION)
Status: COMPLETED | OUTPATIENT
Start: 2017-09-28 | End: 2017-09-28

## 2017-09-28 RX ORDER — MONTELUKAST SODIUM 10 MG/1
10 TABLET ORAL DAILY
Status: DISCONTINUED | OUTPATIENT
Start: 2017-09-29 | End: 2017-10-02 | Stop reason: HOSPADM

## 2017-09-28 RX ORDER — FOLIC ACID 1 MG/1
1000 TABLET ORAL DAILY
Status: DISCONTINUED | OUTPATIENT
Start: 2017-09-29 | End: 2017-10-02 | Stop reason: HOSPADM

## 2017-09-28 RX ORDER — LISINOPRIL 20 MG/1
40 TABLET ORAL DAILY
Status: DISCONTINUED | OUTPATIENT
Start: 2017-09-29 | End: 2017-10-02 | Stop reason: HOSPADM

## 2017-09-28 RX ORDER — LEVOTHYROXINE SODIUM 112 UG/1
112 TABLET ORAL EVERY EVENING
Status: DISCONTINUED | OUTPATIENT
Start: 2017-09-28 | End: 2017-10-02 | Stop reason: HOSPADM

## 2017-09-28 RX ORDER — MIRTAZAPINE 30 MG/1
45 TABLET, FILM COATED ORAL
Status: DISCONTINUED | OUTPATIENT
Start: 2017-09-28 | End: 2017-10-02 | Stop reason: HOSPADM

## 2017-09-28 RX ORDER — ALBUTEROL SULFATE 2.5 MG/.5ML
2.5 SOLUTION RESPIRATORY (INHALATION)
Status: DISCONTINUED | OUTPATIENT
Start: 2017-09-28 | End: 2017-10-02 | Stop reason: HOSPADM

## 2017-09-28 RX ORDER — PANTOPRAZOLE SODIUM 40 MG/1
40 TABLET, DELAYED RELEASE ORAL
Status: DISCONTINUED | OUTPATIENT
Start: 2017-09-28 | End: 2017-10-02 | Stop reason: HOSPADM

## 2017-09-28 RX ORDER — GABAPENTIN 300 MG/1
300 CAPSULE ORAL 3 TIMES DAILY
Status: DISCONTINUED | OUTPATIENT
Start: 2017-09-28 | End: 2017-10-02 | Stop reason: HOSPADM

## 2017-09-28 RX ORDER — LANOLIN ALCOHOL/MO/W.PET/CERES
400 CREAM (GRAM) TOPICAL DAILY
Status: DISCONTINUED | OUTPATIENT
Start: 2017-09-29 | End: 2017-10-02 | Stop reason: HOSPADM

## 2017-09-28 RX ADMIN — Medication 10 ML: at 18:16

## 2017-09-28 RX ADMIN — HYDROCODONE BITARTRATE AND ACETAMINOPHEN 1 TABLET: 10; 325 TABLET ORAL at 21:32

## 2017-09-28 RX ADMIN — METHYLPREDNISOLONE SODIUM SUCCINATE 40 MG: 40 INJECTION, POWDER, FOR SOLUTION INTRAMUSCULAR; INTRAVENOUS at 21:32

## 2017-09-28 RX ADMIN — DOXYCYCLINE HYCLATE 100 MG: 100 CAPSULE ORAL at 21:32

## 2017-09-28 RX ADMIN — PANTOPRAZOLE SODIUM 40 MG: 40 TABLET, DELAYED RELEASE ORAL at 18:14

## 2017-09-28 RX ADMIN — GABAPENTIN 300 MG: 300 CAPSULE ORAL at 18:14

## 2017-09-28 RX ADMIN — SUCRALFATE 1 G: 1 TABLET ORAL at 18:14

## 2017-09-28 RX ADMIN — SUCRALFATE 1 G: 1 TABLET ORAL at 21:31

## 2017-09-28 RX ADMIN — GABAPENTIN 300 MG: 300 CAPSULE ORAL at 21:31

## 2017-09-28 RX ADMIN — ATORVASTATIN CALCIUM 20 MG: 10 TABLET, FILM COATED ORAL at 21:31

## 2017-09-28 RX ADMIN — ACETAMINOPHEN 650 MG: 325 TABLET ORAL at 14:51

## 2017-09-28 RX ADMIN — CARVEDILOL 12.5 MG: 12.5 TABLET, FILM COATED ORAL at 18:14

## 2017-09-28 RX ADMIN — Medication 10 ML: at 21:33

## 2017-09-28 RX ADMIN — APIXABAN 2.5 MG: 2.5 TABLET, FILM COATED ORAL at 21:32

## 2017-09-28 RX ADMIN — IPRATROPIUM BROMIDE AND ALBUTEROL SULFATE 3 ML: .5; 3 SOLUTION RESPIRATORY (INHALATION) at 15:04

## 2017-09-28 RX ADMIN — ZIPRASIDONE HYDROCHLORIDE 160 MG: 20 CAPSULE ORAL at 21:29

## 2017-09-28 RX ADMIN — PREGABALIN 150 MG: 150 CAPSULE ORAL at 21:47

## 2017-09-28 RX ADMIN — MIRTAZAPINE 45 MG: 30 TABLET, FILM COATED ORAL at 22:48

## 2017-09-28 RX ADMIN — ALBUTEROL SULFATE 2.5 MG: 2.5 SOLUTION RESPIRATORY (INHALATION) at 19:30

## 2017-09-28 RX ADMIN — HUMAN INSULIN 6 UNITS: 100 INJECTION, SOLUTION SUBCUTANEOUS at 21:33

## 2017-09-28 RX ADMIN — ALPRAZOLAM 2 MG: 0.5 TABLET ORAL at 23:41

## 2017-09-28 RX ADMIN — CEFTRIAXONE SODIUM 2 G: 2 INJECTION, POWDER, FOR SOLUTION INTRAMUSCULAR; INTRAVENOUS at 15:07

## 2017-09-28 NOTE — PROGRESS NOTES
Admission assessment completed. Pt alert and oriented x4. O2 in place @ 5lnc. Audible wheezing noted. No overt distress noted. Brief changed. Oriented to room and staff. Dual skin assessment completed with Flakito Roy RN. Blister noted to right forearm. Excoriation noted to groin and buttocks. Multiple ecchymotic areas.

## 2017-09-28 NOTE — ED NOTES
TRANSFER - OUT REPORT:    Verbal report given to Eladia on Sergio Wagner  being transferred to  for routine progression of care       Report consisted of patients Situation, Background, Assessment and   Recommendations(SBAR). Information from the following report(s) SBAR, ED Summary, Procedure Summary, Intake/Output, MAR and Recent Results was reviewed with the receiving nurse. Lines:   Peripheral IV 09/28/17 Left Wrist (Active)   Phlebitis Assessment 0 9/28/2017 12:42 PM   Infiltration Assessment 0 9/28/2017 12:42 PM   Dressing Status Clean, dry, & intact 9/28/2017 12:42 PM        Opportunity for questions and clarification was provided.       Patient transported with:   O2 @ 5 liters  Tech

## 2017-09-28 NOTE — ED PROVIDER NOTES
HPI Comments: Patient is a 81-year-old asthmatic history of pneumonia is presenting with what she believes is pneumonia. She states last night she began somewhat short of breath and noticed she had a fever. She says yesterday her PCP started her on a Z-Geovani as well as steroid. c.  She denies any significant nausea, vomiting. She does not wear oxygen at home and came in with a room air oxygen saturation of 85% after 2 nebulizer treatments as well as Solu-Medrol. Patient is a 78 y.o. female presenting with shortness of breath. The history is provided by the patient. No  was used. Shortness of Breath   Associated symptoms include a fever. Pertinent negatives include no headaches, no vomiting and no abdominal pain. Past Medical History:   Diagnosis Date    Acute renal failure ([address] July, 2014    The patient was admitted with acute renal failure secondary to volume depletion. She was found to have a gastric ulcer on admission.  Bilateral pulmonary embolism ([address] September, 2012    Restarted on anticoagulation for lifetime    Calculus of ureter May, 2015    CAP (community acquired pneumonia) October, 2012    RML pneumonia    CVA (cerebral infarction) January, 2012    Reportedly has mild left arm residual weakness    Gastric ulcer July, 2014          HCAP (healthcare-associated pneumonia) January, 2013    RLL pneumonia    HCAP (healthcare-associated pneumonia) February, 2013    RLL pneumonia    Left leg DVT ([address] 1991    On coumadin until 2008    Psychiatric disorder     PUD (peptic ulcer disease) ? ??       Past Surgical History:   Procedure Laterality Date    HX APPENDECTOMY      HX CHOLECYSTECTOMY      HX ENDOSCOPY  July, 2014    Gastric ulcers x 2    HX GASTRIC BYPASS      HX HYSTERECTOMY      HX ORTHOPAEDIC      rt knee growth, right shoulder, left thumb    HX OTHER SURGICAL      vagus nerve stimulator    HX TONSILLECTOMY           Family History:   Problem Relation Age of Onset    Hypertension Mother     Cancer Father      prostate    Heart Attack Father     Heart Disease Father      CAD    Hypertension Father     Hypertension Sister     Stroke Sister     Heart Disease Brother      CAD with CABG    Heart Attack Brother     Hypertension Brother        Social History     Social History    Marital status:      Spouse name: N/A    Number of children: N/A    Years of education: N/A     Occupational History          Disabled     Social History Main Topics    Smoking status: Never Smoker    Smokeless tobacco: Never Used      Comment: exposed to second hand smoke at work for 17 years    Alcohol use No    Drug use: No    Sexual activity: Not on file     Other Topics Concern    Not on file     Social History Narrative    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 , one child who is healthy. Has always lived in [address]. Dog as a pet. No history of pet bird. There is no significant environmental or industrial exposure. There is no known exposure to TB. ALLERGIES: Latex; Bactrim [sulfamethoxazole-trimethoprim]; Lamictal [lamotrigine]; Pcn [penicillins]; and Tapentadol    Review of Systems   Constitutional: Positive for fever. Negative for fatigue. Respiratory: Positive for shortness of breath. Gastrointestinal: Negative for abdominal pain, nausea and vomiting. Neurological: Negative for headaches. All other systems reviewed and are negative. Vitals:    09/28/17 1122 09/28/17 1123 09/28/17 1130   BP: 128/63     Pulse: 76     Resp: 28     Temp: (!) 100.6 °F (38.1 °C)     SpO2: (!) 85% (!) 85% 90%            Physical Exam   Constitutional: She is oriented to person, place, and time. She appears well-developed and well-nourished. No distress. HENT:   Head: Normocephalic and atraumatic.    Eyes: Conjunctivae and EOM are normal. Pupils are equal, round, and reactive to light. Neck: Normal range of motion. Neck supple. Cardiovascular: Normal rate, regular rhythm and normal heart sounds. Pulmonary/Chest: Effort normal. No respiratory distress. She has wheezes. She has no rales. Moderate wheezes appreciated bilaterally. Abdominal: Soft. She exhibits no distension. There is no tenderness. There is no rebound. Musculoskeletal: Normal range of motion. She exhibits no edema or tenderness. Neurological: She is alert and oriented to person, place, and time. Skin: Skin is warm and dry. No rash noted. She is not diaphoretic. Psychiatric: She has a normal mood and affect. Her behavior is normal.   Vitals reviewed. MDM  Number of Diagnoses or Management Options  Hypoxia: new and requires workup  Diagnosis management comments: Patient still requiring oxygen to maintain saturations above 90%. We'll give Tylenol and then here in the ED. Discussed patient with pulmonary who will be evaluating the patient for admission. Patient updated and is agreeable with plan.        Amount and/or Complexity of Data Reviewed  Clinical lab tests: ordered and reviewed (Results for orders placed or performed during the hospital encounter of 09/28/17  -CBC WITH AUTOMATED DIFF       Result                                            Value                         Ref Range                       WBC                                               11.1                          4.3 - 11.1 K/uL                 RBC                                               4.41                          4.05 - 5.25 M/uL                HGB                                               10.8 (L)                      11.7 - 15.4 g/dL                HCT                                               36.3                          35.8 - 46.3 %                   MCV                                               82.3                          79.6 - 97.8 FL                  MCH 24.5 (L)                      26.1 - 32.9 PG                  MCHC                                              29.8 (L)                      31.4 - 35.0 g/dL                RDW                                               17.5 (H)                      11.9 - 14.6 %                   PLATELET                                          283                           150 - 450 K/uL                  MPV                                               9.8 (L)                       10.8 - 14.1 FL                  DF                                                AUTOMATED                                                     NEUTROPHILS                                       81 (H)                        43 - 78 %                       LYMPHOCYTES                                       12 (L)                        13 - 44 %                       MONOCYTES                                         6                             4.0 - 12.0 %                    EOSINOPHILS                                       1                             0.5 - 7.8 %                     BASOPHILS                                         0                             0.0 - 2.0 %                     IMMATURE GRANULOCYTES                             0.4                           0.0 - 5.0 %                     ABS. NEUTROPHILS                                  9.0 (H)                       1.7 - 8.2 K/UL                  ABS. LYMPHOCYTES                                  1.3                           0.5 - 4.6 K/UL                  ABS. MONOCYTES                                    0.7                           0.1 - 1.3 K/UL                  ABS. EOSINOPHILS                                  0.1                           0.0 - 0.8 K/UL                  ABS. BASOPHILS                                    0.0                           0.0 - 0.2 K/UL                  ABS. IMM.  GRANS.                                  0.0 0.0 - 0.5 K/UL             -METABOLIC PANEL, COMPREHENSIVE       Result                                            Value                         Ref Range                       Sodium                                            144                           136 - 145 mmol/L                Potassium                                         4.4                           3.5 - 5.1 mmol/L                Chloride                                          106                           98 - 107 mmol/L                 CO2                                               30                            21 - 32 mmol/L                  Anion gap                                         8                             7 - 16 mmol/L                   Glucose                                           114 (H)                       65 - 100 mg/dL                  BUN                                               15                            8 - 23 MG/DL                    Creatinine                                        0.96                          0.6 - 1.0 MG/DL                 GFR est AA                                        >60                           >60 ml/min/1.73m2               GFR est non-AA                                    >60                           >60 ml/min/1.73m2               Calcium                                           9.0                           8.3 - 10.4 MG/DL                Bilirubin, total                                  0.3                           0.2 - 1.1 MG/DL                 ALT (SGPT)                                        17                            12 - 65 U/L                     AST (SGOT)                                        22                            15 - 37 U/L                     Alk. phosphatase                                  97                            50 - 136 U/L                    Protein, total                                    6.2 (L) 6.3 - 8.2 g/dL                  Albumin                                           2.9 (L)                       3.2 - 4.6 g/dL                  Globulin                                          3.3                           2.3 - 3.5 g/dL                  A-G Ratio                                         0.9 (L)                       1.2 - 3.5                  -URINE MICROSCOPIC       Result                                            Value                         Ref Range                       WBC                                               0-3                           0 /hpf                          RBC                                               0-3                           0 /hpf                          Epithelial cells                                  0-3                           0 /hpf                          Bacteria                                          0                             0 /hpf                          Casts                                             0                             0 /lpf                     -POC LACTIC ACID       Result                                            Value                         Ref Range                       Lactic Acid (POC)                                 1.3                           0.5 - 1.9 mmol/L           -EKG, 12 LEAD, INITIAL       Result                                            Value                         Ref Range                       Ventricular Rate                                  77                            BPM                             Atrial Rate                                       77                            BPM                             P-R Interval                                      178                           ms                              QRS Duration                                      80                            ms                              Q-T Interval                                      356 ms                              QTC Calculation (Bezet)                           402                           ms                              Calculated P Axis                                 46                            degrees                         Calculated R Axis                                 -17                           degrees                         Calculated T Axis                                 39                            degrees                         Diagnosis                                                                                                   !! AGE AND GENDER SPECIFIC ECG ANALYSIS !! Normal sinus rhythm   Cannot rule out Anterior infarct (cited on or before 10-APR-2017)   Abnormal ECG   When compared with ECG of 16-JUN-2017 13:47,   Vent. rate has decreased BY  41 BPM   Criteria for Inferior infarct are no longer Present     )  Tests in the radiology section of CPT®: ordered and reviewed (Xr Chest Pa Lat    Result Date: 9/28/2017  CHEST X-RAY, 2 views. HISTORY:  Moderate shortness of breath. TECHNIQUE: PA and lateral views. COMPARISON: 25 July 2017. FINDINGS: Some bibasilar atelectasis. Otherwise lungs are clear. Left-sided electronic device is present.      IMPRESSION: Negative for acute abnormality.     )  Review and summarize past medical records: yes  Discuss the patient with other providers: yes  Independent visualization of images, tracings, or specimens: yes    Risk of Complications, Morbidity, and/or Mortality  Presenting problems: high  Diagnostic procedures: moderate  Management options: moderate    Patient Progress  Patient progress: stable    ED Course       Procedures

## 2017-09-28 NOTE — PROGRESS NOTES
Patient resting in bed with HOB elevated. Resp even and unlabored. O2 at 5L via NC. IV to left hand. Denies SOB at this time. No distress noted.

## 2017-09-28 NOTE — PROGRESS NOTES
Admission database complete. Requested family bring in an updated home medication list,  Daughter states she can bring one tomorrow. Instructed daughter to give list to patient's primary nurse when brought. Patient/family oriented to room and call system. SBAR handoff given to primary nurse.

## 2017-09-28 NOTE — H&P
HISTORY AND PHYSICAL      Staci Soto    9/28/2017    Date of Admission:  9/28/2017    The patient's chart is reviewed and the patient is discussed with the staff. Subjective:     Patient is a 78 y.o.  female presents with shortness of breath and wheezing that started yesterday. She went to PCP and was prescribed antibiotics and steroids but felt worse today and called EMS. Temp was 100.6 on arrival and she felt feverish this morning with sweating epsiode. Has had some cough but no sputum production. She has been at her usual states of health and has been able to ambulate in the home. She is not on home O2 and states she has been compliant with chronic home meds. LA is 1.3, WBC 11.1 and CXR without infiltrate. She will be admitted for asthma exacerbation with possible CAP. Was admitted 7/25-7/27/17 for asthma exacerbation, treated with sterids and discharged on Doxycycline. She is morbidly obese, has chronic left vocal cord paralysis. GERD, hx of PE and on lifelong anticoagulation, previous gastric bypass 13 years ago, Fe def anemia and has been on iron infusions as well pneumonia in 2012, 2013 and 2017. Home DME company none.     Review of Systems  A comprehensive review of systems was negative except for: Constitutional: positive for fevers, chills, sweats, fatigue and malaise  Respiratory: positive for cough, wheezing, dyspnea on exertion or chronic asthma  Cardiovascular: positive for dyspnea, fatigue, dyspnea on exertion  Gastrointestinal: positive for reflux symptoms  Behvioral/Psych: positive for anxiety, bipolar, depression and obesity  Endocrine: positive for hypothyroidism    Patient Active Problem List   Diagnosis Code    Fibromyalgia M79.7    Hypertension I10    GERD (gastroesophageal reflux disease) K21.9    Morbid obesity - sedentary lifestyle E66.01    Hypothyroidism E03.9    Asthma J45.909    Arthritis M19.90    Bipolar affective disorder ([facility] [address] F31.9    Debility R53.81    History of peptic ulcer disease Z87.11    Chronic narcotic use     Anxiety state F41.1    Chronic rhinitis J31.0    Stress incontinence in female N39.3    Calculus of ureter N20.1    CAP (community acquired pneumonia) J18.9    Asthma with acute exacerbation J45. 0    Speech abnormality R47.9    Polypharmacy Z79.899    PATRICIA (obstructive sleep apnea) G47.33    Hx of pulmonary embolus Z86.711    QT prolongation R94.31    Bilateral lower extremity edema R60.0    Acute respiratory failure (HCC) J96.00    Sore throat J02.9       Prior to Admission Medications   Prescriptions Last Dose Informant Patient Reported? Taking? ALPRAZolam (XANAX) 2 mg tablet   No No   Sig: Take 1 Tab by mouth three (3) times daily as needed for Anxiety. Max Daily Amount: 6 mg. Biotin 2,500 mcg cap   Yes No   Sig: Take  by mouth. Cholecalciferol, Vitamin D3, (VITAMIN D3) 1,000 unit cap   Yes No   Sig: Take  by mouth. FERROUS FUMARATE/VIT BCOMP&C (SUPER B COMPLEX PO)   Yes No   Sig: Take 1 Tab by mouth daily. HYDROcodone-acetaminophen (NORCO)  mg tablet   Yes No   Sig: Take 1 Tab by mouth every six (6) hours as needed for Pain. Magnesium Oxide 500 mg cap   Yes No   Sig: Take 500 mg by mouth. NEBULIZER   Yes No   Sig: by Does Not Apply route. POTASSIUM CHLORIDE SR 10 MEQ TAB   Yes No   Sig: Take 1 Tab by mouth daily. SUMATRIPTAN SUCCINATE (IMITREX PO)   Yes No   Sig: take 100 mg by mouth as needed. UBIDECARENONE/VITAMIN E MIXED (COQ10  PO)   Yes No   Sig: Take  by mouth. albuterol (PROVENTIL HFA, VENTOLIN HFA, PROAIR HFA) 90 mcg/actuation inhaler   No No   Sig: Take 1 Puff by inhalation every four (4) hours as needed for Wheezing. albuterol sulfate (PROVENTIL;VENTOLIN) 2.5 mg/0.5 mL nebu nebulizer solution   No No   Si.5 mL by Nebulization route four (4) times daily. apixaban (ELIQUIS) 2.5 mg tablet   Yes No   Sig: Take 2.5 mg by mouth two (2) times a day. Indications: PULMONARY THROMBOEMBOLISM PREVENTION   atorvastatin (LIPITOR) 10 mg tablet   No No   Sig: Take 2 Tabs by mouth nightly. carvedilol (COREG) 12.5 mg tablet   Yes No   Sig: Take 12.5 mg by mouth two (2) times daily (with meals). folic acid 887 mcg tablet   Yes No   Sig: Take 800 mcg by mouth daily. gabapentin (NEURONTIN) 300 mg capsule   Yes No   Sig: Take 300 mg by mouth three (3) times daily. ipratropium (ATROVENT) 0.03 % nasal spray   Yes No   Si Sprays every twelve (12) hours. levothyroxine (SYNTHROID) 125 mcg tablet   Yes No   Sig: Take  by mouth every evening. lisinopril (PRINIVIL, ZESTRIL) 40 mg tablet   No No   Sig: Take 1 Tab by mouth daily. mirtazapine (REMERON) 30 mg tablet   Yes No   Sig: Take 30 mg by mouth nightly. mometasone-formoterol (DULERA) 200-5 mcg/actuation HFA inhaler   No No   Sig: Take 2 Puffs by inhalation two (2) times a day. montelukast (SINGULAIR) 10 mg tablet   Yes No   Sig: Take 10 mg by mouth daily. naloxegol (MOVANTIK) 25 mg tab tablet   Yes No   Sig: Take  by mouth Daily (before breakfast). nicotinic acid (NIACIN) 500 mg tablet   Yes No   Sig: Take 500 mg by mouth Daily (before breakfast). ondansetron (ZOFRAN ODT) 8 mg disintegrating tablet   Yes No   Sig: Take 8 mg by mouth every eight (8) hours as needed for Nausea. oxybutynin chloride XL (DITROPAN XL) 10 mg CR tablet   Yes No   Sig: Take 10 mg by mouth daily. pantoprazole (PROTONIX) 40 mg tablet   No No   Sig: Take 1 Tab by mouth Before breakfast and dinner. pregabalin (LYRICA) 150 mg capsule   Yes No   Sig: Take  by mouth.   sucralfate (CARAFATE) 1 gram tablet   Yes No   Sig: Take 1 g by mouth four (4) times daily. verapamil ER (VERELAN) 120 mg ER capsule   Yes No   Sig: Take 120 mg by mouth two (2) times a day. ziprasidone (GEODON) 80 mg capsule   Yes No   Sig: Take 160 mg by mouth nightly.       Facility-Administered Medications: None       Past Medical History:   Diagnosis Date  Acute renal failure [facility]) July, 2014    The patient was admitted with acute renal failure secondary to volume depletion. She was found to have a gastric ulcer on admission.  Bilateral pulmonary embolism ([address] September, 2012    Restarted on anticoagulation for lifetime    Calculus of ureter May, 2015    CAP (community acquired pneumonia) October, 2012    RML pneumonia    CVA (cerebral infarction) January, 2012    Reportedly has mild left arm residual weakness    Gastric ulcer July, 2014          Gram-positive bacteremia 1/2 cx 12/26/2016    HCAP (healthcare-associated pneumonia) January, 2013    RLL pneumonia    HCAP (healthcare-associated pneumonia) February, 2013    RLL pneumonia    Left leg DVT ([facility] [address] 1991    On coumadin until 2008    Psychiatric disorder     PUD (peptic ulcer disease) ? ??     Past Surgical History:   Procedure Laterality Date    HX APPENDECTOMY      HX CHOLECYSTECTOMY      HX ENDOSCOPY  July, 2014    Gastric ulcers x 2    HX GASTRIC BYPASS      HX HYSTERECTOMY      HX ORTHOPAEDIC      rt knee growth, right shoulder, left thumb    HX OTHER SURGICAL      vagus nerve stimulator    HX TONSILLECTOMY       Social History     Social History    Marital status:      Spouse name: N/A    Number of children: N/A    Years of education: N/A     Occupational History          Disabled     Social History Main Topics    Smoking status: Never Smoker    Smokeless tobacco: Never Used      Comment: exposed to second hand smoke at work for 17 years    Alcohol use No    Drug use: No    Sexual activity: Not on file     Other Topics Concern    Not on file     Social History Narrative    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 , one child who is healthy. Has always lived in [address] [place] [address] Road. Dog as a pet. No history of pet bird. There is no significant environmental or industrial exposure.   There is no known exposure to TB. Family History   Problem Relation Age of Onset    Hypertension Mother     Cancer Father      prostate    Heart Attack Father     Heart Disease Father      CAD    Hypertension Father     Hypertension Sister     Stroke Sister     Heart Disease Brother      CAD with CABG    Heart Attack Brother     Hypertension Brother      Allergies   Allergen Reactions    Latex Contact Dermatitis    Bactrim [Sulfamethoxazole-Trimethoprim] Nausea Only    Lamictal [Lamotrigine] Atopic Dermatitis    Pcn [Penicillins] Rash    Tapentadol Rash       Current Facility-Administered Medications   Medication Dose Route Frequency    cefTRIAXone (ROCEPHIN) 2 g in 0.9% sodium chloride (MBP/ADV) 50 mL  2 g IntraVENous NOW     Current Outpatient Prescriptions   Medication Sig    sucralfate (CARAFATE) 1 gram tablet Take 1 g by mouth four (4) times daily.  Biotin 2,500 mcg cap Take  by mouth.  UBIDECARENONE/VITAMIN E MIXED (COQ10  PO) Take  by mouth.  gabapentin (NEURONTIN) 300 mg capsule Take 300 mg by mouth three (3) times daily.  ipratropium (ATROVENT) 0.03 % nasal spray 2 Sprays every twelve (12) hours.  pregabalin (LYRICA) 150 mg capsule Take  by mouth.  montelukast (SINGULAIR) 10 mg tablet Take 10 mg by mouth daily.  naloxegol (MOVANTIK) 25 mg tab tablet Take  by mouth Daily (before breakfast).  albuterol sulfate (PROVENTIL;VENTOLIN) 2.5 mg/0.5 mL nebu nebulizer solution 0.5 mL by Nebulization route four (4) times daily.  mometasone-formoterol (DULERA) 200-5 mcg/actuation HFA inhaler Take 2 Puffs by inhalation two (2) times a day.  albuterol (PROVENTIL HFA, VENTOLIN HFA, PROAIR HFA) 90 mcg/actuation inhaler Take 1 Puff by inhalation every four (4) hours as needed for Wheezing.  carvedilol (COREG) 12.5 mg tablet Take 12.5 mg by mouth two (2) times daily (with meals).  apixaban (ELIQUIS) 2.5 mg tablet Take 2.5 mg by mouth two (2) times a day.  Indications: PULMONARY THROMBOEMBOLISM PREVENTION    atorvastatin (LIPITOR) 10 mg tablet Take 2 Tabs by mouth nightly.  ALPRAZolam (XANAX) 2 mg tablet Take 1 Tab by mouth three (3) times daily as needed for Anxiety. Max Daily Amount: 6 mg.  mirtazapine (REMERON) 30 mg tablet Take 30 mg by mouth nightly.  ondansetron (ZOFRAN ODT) 8 mg disintegrating tablet Take 8 mg by mouth every eight (8) hours as needed for Nausea.  oxybutynin chloride XL (DITROPAN XL) 10 mg CR tablet Take 10 mg by mouth daily.  Magnesium Oxide 500 mg cap Take 500 mg by mouth.  lisinopril (PRINIVIL, ZESTRIL) 40 mg tablet Take 1 Tab by mouth daily.  HYDROcodone-acetaminophen (NORCO)  mg tablet Take 1 Tab by mouth every six (6) hours as needed for Pain.  POTASSIUM CHLORIDE SR 10 MEQ TAB Take 1 Tab by mouth daily.  verapamil ER (VERELAN) 120 mg ER capsule Take 120 mg by mouth two (2) times a day.  Cholecalciferol, Vitamin D3, (VITAMIN D3) 1,000 unit cap Take  by mouth.  folic acid 605 mcg tablet Take 800 mcg by mouth daily.  nicotinic acid (NIACIN) 500 mg tablet Take 500 mg by mouth Daily (before breakfast).  pantoprazole (PROTONIX) 40 mg tablet Take 1 Tab by mouth Before breakfast and dinner.  FERROUS FUMARATE/VIT BCOMP&C (SUPER B COMPLEX PO) Take 1 Tab by mouth daily.  NEBULIZER by Does Not Apply route.  ziprasidone (GEODON) 80 mg capsule Take 160 mg by mouth nightly.  SUMATRIPTAN SUCCINATE (IMITREX PO) take 100 mg by mouth as needed.  levothyroxine (SYNTHROID) 125 mcg tablet Take  by mouth every evening.            Objective:     Vitals:    09/28/17 1155 09/28/17 1219 09/28/17 1238 09/28/17 1504   BP:  128/68     Pulse: 77 73 84    Resp: 22 21     Temp:       SpO2: 90% 94% 94% 95%       PHYSICAL EXAM     Constitutional:  the patient is obese and in no acute distress, NC 3L, sat 93%  EENMT:  Sclera clear, pupils equal, oral mucosa moist  Respiratory: anterior and posterior crackles and wheezing with moist, nonproductive cough  Cardiovascular:  RRR without M,G,R  Gastrointestinal: soft and non-tender; with positive bowel sounds. Musculoskeletal: warm without cyanosis. There is trace lower leg edema. Skin:  no jaundice or rashes, no wounds   Neurologic: no gross neuro deficits     Psychiatric:  alert and oriented x 3    CXR:      Recent Labs      09/28/17   1207   WBC  11.1   HGB  10.8*   HCT  36.3   PLT  283     Recent Labs      09/28/17   1207   NA  144   K  4.4   CL  106   GLU  114*   CO2  30   BUN  15   CREA  0.96   CA  9.0   ALB  2.9*   TBILI  0.3   ALT  17   SGOT  22     No results for input(s): PH, PCO2, PO2, HCO3 in the last 72 hours. No results for input(s): LCAD, LAC in the last 72 hours. Assessment:  (Medical Decision Making)     Hospital Problems  Date Reviewed: 9/28/2017          Codes Class Noted POA    Morbid obesity - sedentary lifestyle (Chronic) ICD-10-CM: E66.01  ICD-9-CM: 278.01  9/28/2017 Yes        Hypothyroidism (Chronic) ICD-10-CM: E03.9  ICD-9-CM: 244.9  9/28/2017 Yes        Asthma (Chronic) ICD-10-CM: J45.909  ICD-9-CM: 493.90  9/28/2017 Yes        * (Principal)Asthma with acute exacerbation ICD-10-CM: J45.901  ICD-9-CM: 493.92  9/28/2017 Yes    Will treat with IV steroids and bronchodilators    Hx of pulmonary embolus (Chronic) ICD-10-CM: Z86.711  ICD-9-CM: V12.55  9/28/2017 Yes    Overview Signed 9/28/2017  3:18 PM by Diamantina Osgood, NP     Chronic Eliquis               debility: engage PT  CAP: possible, repeat CXR tomorrow.   For now will continue ceftriaxone/azithro    Plan:  (Medical Decision Making)     --Will admit for further medical management  --Supplemental O2 if needed  --Respiratory nebulizer treatments  --Blood cultures drawn in ER  --IV steroid therapy  --Antibiotic therapy for possible CAP with Doxycycline and Rocephin  --Check influenza swab  --Continue Eliquis--is on lifelong anticoagulation secondary to PE    More than 50% of the time documented was spent in face-to-face contact with the patient and in the care of the patient on the floor/unit where the patient is located. Shabana Kumari NP  I have spoken with and examined the patient. I agree with the above assessment and plan as documented. Luis Astudillo has had fevers and res    Gen: dyspneic on supplemental O2  Lungs: coarse rhonchi and ZOILA wheezes  Heart:  RRR with no Murmur/Rubs/Gallops  Abd: NABS  Ext:1+ edema    --Continue ceftriaxone/azithro  --Will treat asthma exacerbation with steroids and bronchodilators  --f/u cultures  --wean O2 as tolerated.     Juan Su MD

## 2017-09-28 NOTE — ED TRIAGE NOTES
Pt has chronic asthma, tried all home inhalor, was given prednisone and zithromax, was told to get chest xray to confirm pneumonia. 125 solumedrol, 2 albuterol tx by ems.

## 2017-09-28 NOTE — IP AVS SNAPSHOT
Rene Marquis 
 
 
 [address] [facility] [address] [facility] 
[phone] Patient: Shawn Ayoub MRN: [id number] You are allergic to the following Allergen Reactions Latex Contact Dermatitis Bactrim (Sulfamethoxazole-Trimethoprim) Nausea Only Lamictal (Lamotrigine) Atopic Dermatitis Pcn (Penicillins) Rash Tapentadol Rash Recent Documentation Weight BMI OB Status Smoking Status 115.4 kg 48.15 kg/m2 Hysterectomy Never Smoker Unresulted Labs Order Current Status CULTURE, BLOOD Preliminary result CULTURE, BLOOD Preliminary result Emergency Contacts Name Discharge Info Relation Home Work Mobile Melissa Gutierrez  Daughter [26] [phone] Haritha Lama  Spouse [3] [phone] Martha Lopez   [phone] About your hospitalization You were admitted on:  September 28, 2017 You last received care in the:  [facility] 8 MED SURG You were discharged on:  October 2, 2017 Unit phone number:  [phone] Why you were hospitalized Your primary diagnosis was:  Asthma With Acute Exacerbation Your diagnoses also included: Morbid Obesity (Hcc), Hx Of Pulmonary Embolus, Asthma, Hypothyroidism, Cap (Community Acquired Pneumonia), Restrictive Lung Disease Providers Seen During Your Hospitalizations Provider Role Specialty Primary office phone Janith Koyanagi, MD Attending Provider Emergency Medicine [phone] Abimael Jules MD Attending Provider Emergency Medicine [phone] Ne Monsalve MD Attending Provider Pulmonary Disease [phone] Your Primary Care Physician (PCP) Primary Care Physician Office Phone Office Fax Lily Noland [phone] Follow-up Information Follow up With Details Comments Contact Info Juanis Mario DO On 10/5/2017 2:15pm [address] [facility] [address] 
[phone] [facility]  Office lines are down. Patient to call tomorrow for follow up appt. [address] [facility] [address] Marquise Aleman [id number] [phone] [address] [facility] [address] 
[phone] Current Discharge Medication List  
  
START taking these medications Dose & Instructions Dispensing Information Comments Morning Noon Evening Bedtime  
 doxycycline 100 mg capsule Commonly known as:  VIBRAMYCIN Your last dose was:  10/02/17 am  
Your next dose is:  10/02/17 9pm  
   
 Dose:  100 mg Take 1 Cap by mouth every twelve (12) hours for 3 days. Quantity:  6 Cap Refills:  0  
     
  
   
   
   
  
  
 nystatin powder Commonly known as:  MYCOSTATIN Your last dose was:  10/02/17 am  
Your next dose is:  10/02/17 6pm  
   
 Apply  to affected area two (2) times a day. Quantity:  1 Bottle Refills:  0  
     
  
   
   
  
   
  
 predniSONE 20 mg tablet Commonly known as:  Clarke Fossa Your last dose was:  10/02/17 am  
Your next dose is:  10/03/17 am  
   
 40mg per day for 3 days, then 20mg per day for 3 days then 10mg per day for 3 days, then stop. Quantity:  11 Tab Refills:  0 CONTINUE these medications which have NOT CHANGED Dose & Instructions Dispensing Information Comments Morning Noon Evening Bedtime * albuterol sulfate 2.5 mg/0.5 mL Nebu nebulizer solution Commonly known as:  PROVENTIL;VENTOLIN Your last dose was:  10/02/17 am  
Your next dose is:  10/02/17 1pm  
   
 Dose:  2.5 mg  
0.5 mL by Nebulization route four (4) times daily. Quantity:  120 mL Refills:  11 DX: 799.02 hypoxemia, 518.83 chronic respitory failure * albuterol 90 mcg/actuation inhaler Commonly known as:  PROVENTIL HFA, VENTOLIN HFA, PROAIR HFA  
 Your next dose is:  Per as needed schedule Dose:  1 Puff Take 1 Puff by inhalation every four (4) hours as needed for Wheezing. Quantity:  1 Inhaler Refills:  5 ALPRAZolam 2 mg tablet Commonly known as:  Aarti Bardalesin Your last dose was:  10/1/2017 5:40pm  
Your next dose is:  Per as needed schedule Dose:  2 mg Take 1 Tab by mouth three (3) times daily as needed for Anxiety. Max Daily Amount: 6 mg. Quantity:  30 Tab Refills:  0  
     
   
   
   
  
 atorvastatin 10 mg tablet Commonly known as:  LIPITOR Your last dose was:  10/1/2017 10pm  
Your next dose is:  10/02/17 10pm  
   
 Dose:  20 mg Take 2 Tabs by mouth nightly. Quantity:  30 Tab Refills:  11  
     
   
   
   
  
  
 ATROVENT 0.03 % nasal spray Generic drug:  ipratropium Your next dose is:  Resume home schedule Dose:  2 Spray 2 Sprays every twelve (12) hours. Refills:  0 Biotin 2,500 mcg Cap Your next dose is:  Resume home schedule Take  by mouth. Refills:  0  
     
  
   
   
   
  
 CARAFATE 1 gram tablet Generic drug:  sucralfate Your last dose was:  10/02/17 am  
Your next dose is:  10/02/17 1pm  
   
 Dose:  1 g Take 1 g by mouth four (4) times daily. Refills:  0  
     
  
   
  
   
  
   
  
  
 COQ10  PO Your next dose is:  Resume home schedule Take  by mouth. Refills:  0 COREG 12.5 mg tablet Generic drug:  carvedilol Your last dose was:  10/02/17 am  
Your next dose is:  10/02/17 5pm  
   
 Dose:  12.5 mg Take 12.5 mg by mouth two (2) times daily (with meals). Refills:  0  
     
  
   
   
  
   
  
 ELIQUIS 2.5 mg tablet Generic drug:  apixaban Your last dose was:  10/02/17 am  
Your next dose is:  10/02/17 9pm  
   
 Dose:  2.5 mg Take 2.5 mg by mouth two (2) times a day. Indications: PULMONARY THROMBOEMBOLISM PREVENTION Refills:  0 folic acid 736 mcg tablet Your last dose was:  10/02/17 am  
Your next dose is:  10/03/17 am  
   
 Dose:  800 mcg Take 800 mcg by mouth daily. Refills:  0  
     
  
   
   
   
  
 gabapentin 300 mg capsule Commonly known as:  NEURONTIN Your last dose was:  10/02/17 am  
Your next dose is:  10/02/17 4pm  
   
 Dose:  300 mg Take 300 mg by mouth three (3) times daily. Refills:  0  
     
  
   
   
  
   
  
  
 GEODON 80 mg capsule Generic drug:  ziprasidone Your last dose was:  10/1/2017 10pm  
Your next dose is:  10/02/17 10pm  
   
 Dose:  160 mg Take 160 mg by mouth nightly. Refills:  0 IMITREX PO Your next dose is:  Per as needed schedule Dose:  100 mg  
take 100 mg by mouth as needed. Refills:  0  
     
   
   
   
  
 lisinopril 40 mg tablet Commonly known as:  Robertha Roup Your last dose was:  10/02/17 am  
Your next dose is:  10/03/17 am  
   
 Dose:  40 mg Take 1 Tab by mouth daily. Quantity:  30 Tab Refills:  5 Magnesium Oxide 500 mg Cap Your last dose was:  10/02/17 am  
Your next dose is:  10/03/17 am  
   
 Dose:  500 mg Take 500 mg by mouth. Refills:  0  
     
  
   
   
   
  
 mirtazapine 30 mg tablet Commonly known as:  Elvie Khan Your last dose was:  10/1/2017 10pm  
Your next dose is:  10/02/17 10pm  
   
 Dose:  45 mg Take 45 mg by mouth nightly. Refills:  0  
     
   
   
   
  
  
 mometasone-formoterol 200-5 mcg/actuation HFA inhaler Commonly known as:  Suzanna Suh Your next dose is:  Resume home schedule Dose:  2 Puff Take 2 Puffs by inhalation two (2) times a day. Quantity:  3 Inhaler Refills:  4  
     
  
   
   
  
   
  
 montelukast 10 mg tablet Commonly known as:  SINGULAIR Your last dose was:  10/02/17 am  
Your next dose is:  10/03/17 am  
   
 Dose:  10 mg Take 10 mg by mouth daily. Refills:  0 MOVANTIK 25 mg Tab tablet Generic drug:  naloxegol Your next dose is:  Resume home schedule Take  by mouth Daily (before breakfast). Refills:  0 NEBULIZER  
   
 by Does Not Apply route. Refills:  0  
     
   
   
   
  
 nicotinic acid 500 mg tablet Commonly known as:  NIACIN Your next dose is:  Resume home schedule Dose:  500 mg Take 500 mg by mouth Daily (before breakfast). Refills:  0 NORCO  mg tablet Generic drug:  HYDROcodone-acetaminophen Your last dose was:  10/1/2017 10am  
Your next dose is:  Per as needed schedule Dose:  1 Tab Take 1 Tab by mouth every six (6) hours as needed for Pain. Refills:  0  
     
   
   
   
  
 oxybutynin chloride XL 10 mg CR tablet Commonly known as:  DITROPAN XL Your next dose is:  Resume home schedule Dose:  10 mg Take 10 mg by mouth daily. Refills:  0  
     
  
   
   
   
  
 pantoprazole 40 mg tablet Commonly known as:  PROTONIX Your last dose was:  10/02/17 am  
Your next dose is:  10/02/17 4pm  
   
 Dose:  40 mg Take 1 Tab by mouth Before breakfast and dinner. Quantity:  60 Tab Refills:  2 POTASSIUM CHLORIDE SR 10 MEQ TAB Your next dose is:  Resume home schedule Dose:  1 Tab Take 1 Tab by mouth daily. Refills:  0  
     
  
   
   
   
  
 pregabalin 150 mg capsule Commonly known as:  Edman Speaker Your last dose was:  10/1/2017 6pm  
Your next dose is:  10/02/17 6pm  
   
 Take  by mouth. Refills:  0 SUPER B COMPLEX PO Your next dose is:  Resume home schedule Dose:  1 Tab Take 1 Tab by mouth daily. Refills:  0  
     
  
   
   
   
  
 SYNTHROID 125 mcg tablet Generic drug:  levothyroxine Your last dose was:  10/1/2017 6pm  
Your next dose is:  10/02/17 6pm  
   
 Dose:  112 mcg Take 112 mcg by mouth every evening. Refills:  0 VITAMIN D3 1,000 unit Cap Generic drug:  cholecalciferol Your next dose is:  Resume home schedule Take  by mouth. Refills:  0 ZOFRAN ODT 8 mg disintegrating tablet Generic drug:  ondansetron Your next dose is:  Per as needed schedule Dose:  8 mg Take 8 mg by mouth every eight (8) hours as needed for Nausea. Refills:  0  
     
   
   
   
  
 * Notice: This list has 2 medication(s) that are the same as other medications prescribed for you. Read the directions carefully, and ask your doctor or other care provider to review them with you. STOP taking these medications   
 verapamil  mg ER capsule Commonly known as:  Sergio Layne Where to Get Your Medications Information on where to get these meds will be given to you by the nurse or doctor. ! Ask your nurse or doctor about these medications  
  doxycycline 100 mg capsule  
 nystatin powder  
 predniSONE 20 mg tablet Discharge Instructions DISCHARGE SUMMARY from Nurse The following personal items are in your possession at time of discharge: 
 
Dental Appliances: Uppers Visual Aid: Glasses Home Medications: None PATIENT INSTRUCTIONS: 
 
After general anesthesia or intravenous sedation, for 24 hours or while taking prescription Narcotics: · Limit your activities · Do not drive and operate hazardous machinery · Do not make important personal or business decisions · Do  not drink alcoholic beverages · If you have not urinated within 8 hours after discharge, please contact your surgeon on call. Report the following to your surgeon: 
· Excessive pain, swelling, redness or odor of or around the surgical area · Temperature over 100.5 · Nausea and vomiting lasting longer than 4 hours or if unable to take medications · Any signs of decreased circulation or nerve impairment to extremity: change in color, persistent  numbness, tingling, coldness or increase pain · Any questions What to do at Home: 
Recommended activity: Activity as tolerated. If you experience any of the following symptoms temp > 101.5, worsening cough or wheezing, shortness of breath or fatigue not relieved with rest, please follow up with MD. 
 
 
*  Please give a list of your current medications to your Primary Care Provider. *  Please update this list whenever your medications are discontinued, doses are 
    changed, or new medications (including over-the-counter products) are added. *  Please carry medication information at all times in case of emergency situations. These are general instructions for a healthy lifestyle: No smoking/ No tobacco products/ Avoid exposure to second hand smoke Surgeon General's Warning:  Quitting smoking now greatly reduces serious risk to your health. Obesity, smoking, and sedentary lifestyle greatly increases your risk for illness A healthy diet, regular physical exercise & weight monitoring are important for maintaining a healthy lifestyle You may be retaining fluid if you have a history of heart failure or if you experience any of the following symptoms:  Weight gain of 3 pounds or more overnight or 5 pounds in a week, increased swelling in our hands or feet or shortness of breath while lying flat in bed. Please call your doctor as soon as you notice any of these symptoms; do not wait until your next office visit. Recognize signs and symptoms of STROKE: 
 
F-face looks uneven A-arms unable to move or move unevenly S-speech slurred or non-existent T-time-call 911 as soon as signs and symptoms begin-DO NOT go Back to bed or wait to see if you get better-TIME IS BRAIN. Warning Signs of HEART ATTACK Call 911 if you have these symptoms: 
? Chest discomfort.  Most heart attacks involve discomfort in the center of the chest that lasts more than a few minutes, or that goes away and comes back. It can feel like uncomfortable pressure, squeezing, fullness, or pain. ? Discomfort in other areas of the upper body. Symptoms can include pain or discomfort in one or both arms, the back, neck, jaw, or stomach. ? Shortness of breath with or without chest discomfort. ? Other signs may include breaking out in a cold sweat, nausea, or lightheadedness. Don't wait more than five minutes to call [address]! Fast action can save your life. Calling 911 is almost always the fastest way to get lifesaving treatment. Emergency Medical Services staff can begin treatment when they arrive  up to an hour sooner than if someone gets to the hospital by car. The discharge information has been reviewed with the patient. The patient verbalized understanding. Discharge medications reviewed with the patient and appropriate educational materials and side effects teaching were provided. Asthma in Adults: Care Instructions Your Care Instructions During an asthma attack, your airways swell and narrow as a reaction to certain things (triggers). This makes it hard to breathe. You may be able to prevent asthma attacks if you avoid the things that set off your asthma symptoms. Keeping your asthma under control and treating symptoms before they get bad can help you avoid severe attacks. If you can control your asthma, you may be able to do all of your normal daily activities. You may also avoid asthma attacks and trips to the hospital. 
Follow-up care is a key part of your treatment and safety. Be sure to make and go to all appointments, and call your doctor if you are having problems. It's also a good idea to know your test results and keep a list of the medicines you take. How can you care for yourself at home? · Follow your asthma action plan so you can manage your symptoms at home. An asthma action plan will help you prevent and control airway reactions and will tell you what to do during an asthma attack. If you do not have an asthma action plan, work with your doctor to build one. · Take your asthma medicine exactly as prescribed. Medicine plays an important role in controlling asthma. Talk to your doctor right away if you have any questions about what to take and how to take it. ¨ Use your quick-relief medicine when you have symptoms of an attack. Quick-relief medicine often is an albuterol inhaler. Some people need to use quick-relief medicine before they exercise. ¨ Take your controller medicine every day, not just when you have symptoms. Controller medicine is usually an inhaled corticosteroid. The goal is to prevent problems before they occur. Do not use your controller medicine to try to treat an attack that has already started. It does not work fast enough to help. ¨ If your doctor prescribed corticosteroid pills to use during an attack, take them as directed. They may take hours to work, but they may shorten the attack and help you breathe better. ¨ Keep your quick-relief medicine with you at all times. · Talk to your doctor before using other medicines. Some medicines, such as aspirin, can cause asthma attacks in some people. · Check yourself for asthma symptoms to know which step to follow in your action plan. Watch for things like being short of breath, having chest tightness, coughing, and wheezing. Also notice if symptoms wake you up at night or if you get tired quickly when you exercise. · If you have a peak flow meter, use it to check how well you are breathing. This can help you predict when an asthma attack is going to occur. Then you can take medicine to prevent the asthma attack or make it less severe. · See your doctor regularly. These visits will help you learn more about asthma and what you can do to control it.  Your doctor will monitor your treatment to make sure the medicine is helping you. · Keep track of your asthma attacks and your treatment. After you have had an attack, write down what triggered it, what helped end it, and any concerns you have about your asthma action plan. Take your diary when you see your doctor. You can then review your asthma action plan and decide if it is working. · Do not smoke or allow others to smoke around you. Avoid smoky places. Smoking makes asthma worse. If you need help quitting, talk to your doctor about stop-smoking programs and medicines. These can increase your chances of quitting for good. · Learn what triggers an asthma attack for you, and avoid the triggers when you can. Common triggers include colds, smoke, air pollution, dust, pollen, mold, pets, cockroaches, stress, and cold air. · Avoid colds and the flu. Get a pneumococcal vaccine shot. If you have had one before, ask your doctor whether you need a second dose. Get a flu vaccine every fall. If you must be around people with colds or the flu, wash your hands often. When should you call for help? Call 911 anytime you think you may need emergency care. For example, call if: 
· You have severe trouble breathing. Call your doctor now or seek immediate medical care if: 
· Your symptoms do not get better after you have followed your asthma action plan. · You cough up yellow, dark brown, or bloody mucus (sputum). Watch closely for changes in your health, and be sure to contact your doctor if: 
· Your coughing and wheezing get worse. · You need to use quick-relief medicine on more than 2 days a week (unless it is just for exercise). · You need help figuring out what is triggering your asthma attacks. Where can you learn more? Go to http://era-fransisco.info/. Enter P597 in the search box to learn more about \"Asthma in Adults: Care Instructions. \" Current as of: March 25, 2017 Content Version: 11.3 © 1335-5321 [employer]. Care instructions adapted under license by [employer] (which disclaims liability or warranty for this information). If you have questions about a medical condition or this instruction, always ask your healthcare professional. [address] any warranty or liability for your use of this information. Pneumonia: Care Instructions Your Care Instructions Pneumonia is an infection of the lungs. Most cases are caused by infections from bacteria or viruses. Pneumonia may be mild or very severe. If it is caused by bacteria, you will be treated with antibiotics. It may take a few weeks to a few months to recover fully from pneumonia, depending on how sick you were and whether your overall health is good. Follow-up care is a key part of your treatment and safety. Be sure to make and go to all appointments, and call your doctor if you are having problems. Its also a good idea to know your test results and keep a list of the medicines you take. How can you care for yourself at home? · Take your antibiotics exactly as directed. Do not stop taking the medicine just because you are feeling better. You need to take the full course of antibiotics. · Take your medicines exactly as prescribed. Call your doctor if you think you are having a problem with your medicine. · Get plenty of rest and sleep. You may feel weak and tired for a while, but your energy level will improve with time. · To prevent dehydration, drink plenty of fluids, enough so that your urine is light yellow or clear like water. Choose water and other caffeine-free clear liquids until you feel better. If you have kidney, heart, or liver disease and have to limit fluids, talk with your doctor before you increase the amount of fluids you drink.  
· Take care of your cough so you can rest. A cough that brings up mucus from your lungs is common with pneumonia. It is one way your body gets rid of the infection. But if coughing keeps you from resting or causes severe fatigue and chest-wall pain, talk to your doctor. He or she may suggest that you take a medicine to reduce the cough. · Use a vaporizer or humidifier to add moisture to your bedroom. Follow the directions for cleaning the machine. · Do not smoke or allow others to smoke around you. Smoke will make your cough last longer. If you need help quitting, talk to your doctor about stop-smoking programs and medicines. These can increase your chances of quitting for good. · Take an over-the-counter pain medicine, such as acetaminophen (Tylenol), ibuprofen (Advil, Motrin), or naproxen (Aleve). Read and follow all instructions on the label. · Do not take two or more pain medicines at the same time unless the doctor told you to. Many pain medicines have acetaminophen, which is Tylenol. Too much acetaminophen (Tylenol) can be harmful. · If you were given a spirometer to measure how well your lungs are working, use it as instructed. This can help your doctor tell how your recovery is going. · To prevent pneumonia in the future, talk to your doctor about getting a flu vaccine (once a year) and a pneumococcal vaccine (one time only for most people). When should you call for help? Call 911 anytime you think you may need emergency care. For example, call if: 
· You have severe trouble breathing. Call your doctor now or seek immediate medical care if: 
· You cough up dark brown or bloody mucus (sputum). · You have new or worse trouble breathing. · You are dizzy or lightheaded, or you feel like you may faint. Watch closely for changes in your health, and be sure to contact your doctor if: 
· You have a new or higher fever. · You are coughing more deeply or more often. · You are not getting better after 2 days (48 hours). · You do not get better as expected. Where can you learn more? Go to http://era-fransisco.info/. Enter [id number] in the search box to learn more about \"Pneumonia: Care Instructions. \" Current as of: March 25, 2017 Content Version: 11.3 © 3656-9189 [employer]. Care instructions adapted under license by [employer] (which disclaims liability or warranty for this information). If you have questions about a medical condition or this instruction, always ask your healthcare professional. [place] [address] any warranty or liability for your use of this information. Discharge Orders Procedure Order Date Status Priority Quantity Spec Type Associated Dx SLEEP STUDIES - [facility] 10/02/17 0951 Normal Routine 1  Restrictive lung disease [[id number]] Comments:  PSG for restrictive lung disease, snoring [employer] Announcement We are excited to announce that we are making your provider's discharge notes available to you in [employer]. You will see these notes when they are completed and signed by the physician that discharged you from your recent hospital stay. If you have any questions or concerns about any information you see in [employer], please call the Health Information Department where you were seen or reach out to your Primary Care Provider for more information about your plan of care. Introducing [facility]! [employer] introduces [employer] patient portal. Now you can access parts of your medical record, email your doctor's office, and request medication refills online. 1. In your internet browser, go to [email or web address] 2. Click on the First Time User? Click Here link in the Sign In box. You will see the New Member Sign Up page. 3. Enter your [employer] Access Code exactly as it appears below. You will not need to use this code after youve completed the sign-up process.  If you do not sign up before the expiration date, you must request a new code. · [employer] Access Code: [email or web address] Expires: 11/20/2017 11:54 AM 
 
4. Enter the last four digits of your Social Security Number (xxxx) and Date of Birth (mm/dd/yyyy) as indicated and click Submit. You will be taken to the next sign-up page. 5. Create a [employer] ID. This will be your [employer] login ID and cannot be changed, so think of one that is secure and easy to remember. 6. Create a [employer] password. You can change your password at any time. 7. Enter your Password Reset Question and Answer. This can be used at a later time if you forget your password. 8. Enter your e-mail address. You will receive e-mail notification when new information is available in [address] 9. Click Sign Up. You can now view and download portions of your medical record. 10. Click the Download Summary menu link to download a portable copy of your medical information. If you have questions, please visit the Frequently Asked Questions section of the [employer] website. Remember, [employer] is NOT to be used for urgent needs. For medical emergencies, dial 911. Now available from your iPhone and Android! General Information Please provide this summary of care documentation to your next provider. Patient Signature:  ____________________________________________________________ Date:  ____________________________________________________________  
  
Kamilla Nadiya Provider Signature:  ____________________________________________________________ Date:  ____________________________________________________________

## 2017-09-28 NOTE — PROGRESS NOTES
TRANSFER - IN REPORT:    Verbal report received from [place](name) on Octavio Seen  being received from er(unit) for routine progression of care      Report consisted of patients Situation, Background, Assessment and   Recommendations(SBAR). Information from the following report(s) ED Summary was reviewed with the receiving nurse. Opportunity for questions and clarification was provided. Assessment completed upon patients arrival to unit and care assumed. Awaiting arrival,report to [address] [place] [phone], primary nurse upon arrival to unit.

## 2017-09-28 NOTE — IP AVS SNAPSHOT
Ann Delgado 
 
 
 [address] [facility] [address] [facility] 
[phone] Patient: Gold Daniel MRN: [id number] [facility]:[id number] Current Discharge Medication List  
  
START taking these medications Dose & Instructions Dispensing Information Comments Morning Noon Evening Bedtime  
 doxycycline 100 mg capsule Commonly known as:  VIBRAMYCIN Your last dose was:  10/02/17 am  
Your next dose is:  10/02/17 9pm  
   
 Dose:  100 mg Take 1 Cap by mouth every twelve (12) hours for 3 days. Quantity:  6 Cap Refills:  0  
     
  
   
   
   
  
  
 nystatin powder Commonly known as:  MYCOSTATIN Your last dose was:  10/02/17 am  
Your next dose is:  10/02/17 6pm  
   
 Apply  to affected area two (2) times a day. Quantity:  1 Bottle Refills:  0  
     
  
   
   
  
   
  
 predniSONE 20 mg tablet Commonly known as:  Nanine Knife Your last dose was:  10/02/17 am  
Your next dose is:  10/03/17 am  
   
 40mg per day for 3 days, then 20mg per day for 3 days then 10mg per day for 3 days, then stop. Quantity:  11 Tab Refills:  0 CONTINUE these medications which have NOT CHANGED Dose & Instructions Dispensing Information Comments Morning Noon Evening Bedtime * albuterol sulfate 2.5 mg/0.5 mL Nebu nebulizer solution Commonly known as:  PROVENTIL;VENTOLIN Your last dose was:  10/02/17 am  
Your next dose is:  10/02/17 1pm  
   
 Dose:  2.5 mg  
0.5 mL by Nebulization route four (4) times daily. Quantity:  120 mL Refills:  11 DX: 799.02 hypoxemia, 518.83 chronic respitory failure * albuterol 90 mcg/actuation inhaler Commonly known as:  PROVENTIL HFA, VENTOLIN HFA, PROAIR HFA Your next dose is:  Per as needed schedule Dose:  1 Puff Take 1 Puff by inhalation every four (4) hours as needed for Wheezing. Quantity:  1 Inhaler Refills:  5 ALPRAZolam 2 mg tablet Commonly known as:  Melliliana Alcaraz Your last dose was:  10/1/2017 5:40pm  
Your next dose is:  Per as needed schedule Dose:  2 mg Take 1 Tab by mouth three (3) times daily as needed for Anxiety. Max Daily Amount: 6 mg. Quantity:  30 Tab Refills:  0  
     
   
   
   
  
 atorvastatin 10 mg tablet Commonly known as:  LIPITOR Your last dose was:  10/1/2017 10pm  
Your next dose is:  10/02/17 10pm  
   
 Dose:  20 mg Take 2 Tabs by mouth nightly. Quantity:  30 Tab Refills:  11  
     
   
   
   
  
  
 ATROVENT 0.03 % nasal spray Generic drug:  ipratropium Your next dose is:  Resume home schedule Dose:  2 Spray 2 Sprays every twelve (12) hours. Refills:  0 Biotin 2,500 mcg Cap Your next dose is:  Resume home schedule Take  by mouth. Refills:  0  
     
  
   
   
   
  
 CARAFATE 1 gram tablet Generic drug:  sucralfate Your last dose was:  10/02/17 am  
Your next dose is:  10/02/17 1pm  
   
 Dose:  1 g Take 1 g by mouth four (4) times daily. Refills:  0  
     
  
   
  
   
  
   
  
  
 COQ10  PO Your next dose is:  Resume home schedule Take  by mouth. Refills:  0 COREG 12.5 mg tablet Generic drug:  carvedilol Your last dose was:  10/02/17 am  
Your next dose is:  10/02/17 5pm  
   
 Dose:  12.5 mg Take 12.5 mg by mouth two (2) times daily (with meals). Refills:  0  
     
  
   
   
  
   
  
 ELIQUIS 2.5 mg tablet Generic drug:  apixaban Your last dose was:  10/02/17 am  
Your next dose is:  10/02/17 9pm  
   
 Dose:  2.5 mg Take 2.5 mg by mouth two (2) times a day. Indications: PULMONARY THROMBOEMBOLISM PREVENTION Refills:  0  
     
  
   
   
   
  
  
 folic acid 034 mcg tablet Your last dose was:  10/02/17 am  
Your next dose is:  10/03/17 am  
   
 Dose:  800 mcg Take 800 mcg by mouth daily. Refills:  0 gabapentin 300 mg capsule Commonly known as:  NEURONTIN Your last dose was:  10/02/17 am  
Your next dose is:  10/02/17 4pm  
   
 Dose:  300 mg Take 300 mg by mouth three (3) times daily. Refills:  0  
     
  
   
   
  
   
  
  
 GEODON 80 mg capsule Generic drug:  ziprasidone Your last dose was:  10/1/2017 10pm  
Your next dose is:  10/02/17 10pm  
   
 Dose:  160 mg Take 160 mg by mouth nightly. Refills:  0 IMITREX PO Your next dose is:  Per as needed schedule Dose:  100 mg  
take 100 mg by mouth as needed. Refills:  0  
     
   
   
   
  
 lisinopril 40 mg tablet Commonly known as:  Denice Sanchez Your last dose was:  10/02/17 am  
Your next dose is:  10/03/17 am  
   
 Dose:  40 mg Take 1 Tab by mouth daily. Quantity:  30 Tab Refills:  5 Magnesium Oxide 500 mg Cap Your last dose was:  10/02/17 am  
Your next dose is:  10/03/17 am  
   
 Dose:  500 mg Take 500 mg by mouth. Refills:  0  
     
  
   
   
   
  
 mirtazapine 30 mg tablet Commonly known as:  Nolvia Reeves Your last dose was:  10/1/2017 10pm  
Your next dose is:  10/02/17 10pm  
   
 Dose:  45 mg Take 45 mg by mouth nightly. Refills:  0  
     
   
   
   
  
  
 mometasone-formoterol 200-5 mcg/actuation HFA inhaler Commonly known as:  Dmitriy Moore Your next dose is:  Resume home schedule Dose:  2 Puff Take 2 Puffs by inhalation two (2) times a day. Quantity:  3 Inhaler Refills:  4  
     
  
   
   
  
   
  
 montelukast 10 mg tablet Commonly known as:  SINGULAIR Your last dose was:  10/02/17 am  
Your next dose is:  10/03/17 am  
   
 Dose:  10 mg Take 10 mg by mouth daily. Refills:  0 MOVANTIK 25 mg Tab tablet Generic drug:  naloxegol Your next dose is:  Resume home schedule Take  by mouth Daily (before breakfast). Refills:  0  NEBULIZER  
   
 by Does Not Apply route. Refills:  0  
     
   
   
   
  
 nicotinic acid 500 mg tablet Commonly known as:  NIACIN Your next dose is:  Resume home schedule Dose:  500 mg Take 500 mg by mouth Daily (before breakfast). Refills:  0 NORCO  mg tablet Generic drug:  HYDROcodone-acetaminophen Your last dose was:  10/1/2017 10am  
Your next dose is:  Per as needed schedule Dose:  1 Tab Take 1 Tab by mouth every six (6) hours as needed for Pain. Refills:  0  
     
   
   
   
  
 oxybutynin chloride XL 10 mg CR tablet Commonly known as:  DITROPAN XL Your next dose is:  Resume home schedule Dose:  10 mg Take 10 mg by mouth daily. Refills:  0  
     
  
   
   
   
  
 pantoprazole 40 mg tablet Commonly known as:  PROTONIX Your last dose was:  10/02/17 am  
Your next dose is:  10/02/17 4pm  
   
 Dose:  40 mg Take 1 Tab by mouth Before breakfast and dinner. Quantity:  60 Tab Refills:  2 POTASSIUM CHLORIDE SR 10 MEQ TAB Your next dose is:  Resume home schedule Dose:  1 Tab Take 1 Tab by mouth daily. Refills:  0  
     
  
   
   
   
  
 pregabalin 150 mg capsule Commonly known as:  Sidonie [place] Your last dose was:  10/1/2017 6pm  
Your next dose is:  10/02/17 6pm  
   
 Take  by mouth. Refills:  0 SUPER B COMPLEX PO Your next dose is:  Resume home schedule Dose:  1 Tab Take 1 Tab by mouth daily. Refills:  0  
     
  
   
   
   
  
 SYNTHROID 125 mcg tablet Generic drug:  levothyroxine Your last dose was:  10/1/2017 6pm  
Your next dose is:  10/02/17 6pm  
   
 Dose:  112 mcg Take 112 mcg by mouth every evening. Refills:  0  
     
   
   
  
   
  
 VITAMIN D3 1,000 unit Cap Generic drug:  cholecalciferol Your next dose is:  Resume home schedule Take  by mouth. Refills:  0 ZOFRAN ODT 8 mg disintegrating tablet Generic drug:  ondansetron Your next dose is:  Per as needed schedule Dose:  8 mg Take 8 mg by mouth every eight (8) hours as needed for Nausea. Refills:  0  
     
   
   
   
  
 * Notice: This list has 2 medication(s) that are the same as other medications prescribed for you. Read the directions carefully, and ask your doctor or other care provider to review them with you. STOP taking these medications   
 verapamil  mg ER capsule Commonly known as:  [place] Where to Get Your Medications Information on where to get these meds will be given to you by the nurse or doctor. ! Ask your nurse or doctor about these medications  
  doxycycline 100 mg capsule  
 nystatin powder  
 predniSONE 20 mg tablet

## 2017-09-29 PROBLEM — J98.4 RESTRICTIVE LUNG DISEASE: Chronic | Status: ACTIVE | Noted: 2017-09-29

## 2017-09-29 LAB
ANION GAP SERPL CALC-SCNC: 8 MMOL/L (ref 7–16)
BUN SERPL-MCNC: 19 MG/DL (ref 8–23)
CALCIUM SERPL-MCNC: 8.6 MG/DL (ref 8.3–10.4)
CHLORIDE SERPL-SCNC: 104 MMOL/L (ref 98–107)
CO2 SERPL-SCNC: 29 MMOL/L (ref 21–32)
CREAT SERPL-MCNC: 0.81 MG/DL (ref 0.6–1)
ERYTHROCYTE [DISTWIDTH] IN BLOOD BY AUTOMATED COUNT: 17.8 % (ref 11.9–14.6)
GLUCOSE BLD STRIP.AUTO-MCNC: 105 MG/DL (ref 65–100)
GLUCOSE BLD STRIP.AUTO-MCNC: 110 MG/DL (ref 65–100)
GLUCOSE BLD STRIP.AUTO-MCNC: 152 MG/DL (ref 65–100)
GLUCOSE BLD STRIP.AUTO-MCNC: 165 MG/DL (ref 65–100)
GLUCOSE SERPL-MCNC: 156 MG/DL (ref 65–100)
HCT VFR BLD AUTO: 33.4 % (ref 35.8–46.3)
HGB BLD-MCNC: 10.6 G/DL (ref 11.7–15.4)
MCH RBC QN AUTO: 25.3 PG (ref 26.1–32.9)
MCHC RBC AUTO-ENTMCNC: 31.7 G/DL (ref 31.4–35)
MCV RBC AUTO: 79.7 FL (ref 79.6–97.8)
PLATELET # BLD AUTO: 291 K/UL (ref 150–450)
PMV BLD AUTO: 9.6 FL (ref 10.8–14.1)
POTASSIUM SERPL-SCNC: 4 MMOL/L (ref 3.5–5.1)
RBC # BLD AUTO: 4.19 M/UL (ref 4.05–5.25)
SODIUM SERPL-SCNC: 141 MMOL/L (ref 136–145)
WBC # BLD AUTO: 9.7 K/UL (ref 4.3–11.1)

## 2017-09-29 PROCEDURE — 77030012341 HC CHMB SPCR OPTC MDI VYRM -A

## 2017-09-29 PROCEDURE — 94640 AIRWAY INHALATION TREATMENT: CPT

## 2017-09-29 PROCEDURE — 36415 COLL VENOUS BLD VENIPUNCTURE: CPT | Performed by: NURSE PRACTITIONER

## 2017-09-29 PROCEDURE — 74011000258 HC RX REV CODE- 258: Performed by: NURSE PRACTITIONER

## 2017-09-29 PROCEDURE — 74011250637 HC RX REV CODE- 250/637: Performed by: NURSE PRACTITIONER

## 2017-09-29 PROCEDURE — 77010033678 HC OXYGEN DAILY

## 2017-09-29 PROCEDURE — 74011250636 HC RX REV CODE- 250/636: Performed by: NURSE PRACTITIONER

## 2017-09-29 PROCEDURE — 82962 GLUCOSE BLOOD TEST: CPT

## 2017-09-29 PROCEDURE — 80048 BASIC METABOLIC PNL TOTAL CA: CPT | Performed by: NURSE PRACTITIONER

## 2017-09-29 PROCEDURE — 97161 PT EVAL LOW COMPLEX 20 MIN: CPT

## 2017-09-29 PROCEDURE — 74011000250 HC RX REV CODE- 250: Performed by: INTERNAL MEDICINE

## 2017-09-29 PROCEDURE — 99232 SBSQ HOSP IP/OBS MODERATE 35: CPT | Performed by: INTERNAL MEDICINE

## 2017-09-29 PROCEDURE — 94760 N-INVAS EAR/PLS OXIMETRY 1: CPT

## 2017-09-29 PROCEDURE — 65270000029 HC RM PRIVATE

## 2017-09-29 PROCEDURE — 85027 COMPLETE CBC AUTOMATED: CPT | Performed by: NURSE PRACTITIONER

## 2017-09-29 PROCEDURE — 74011636637 HC RX REV CODE- 636/637: Performed by: NURSE PRACTITIONER

## 2017-09-29 RX ORDER — POLYETHYLENE GLYCOL 3350 17 G/17G
17 POWDER, FOR SOLUTION ORAL DAILY
Status: DISCONTINUED | OUTPATIENT
Start: 2017-09-29 | End: 2017-10-02 | Stop reason: HOSPADM

## 2017-09-29 RX ADMIN — Medication 10 ML: at 23:14

## 2017-09-29 RX ADMIN — LEVOTHYROXINE SODIUM 112 MCG: 112 TABLET ORAL at 17:14

## 2017-09-29 RX ADMIN — PANTOPRAZOLE SODIUM 40 MG: 40 TABLET, DELAYED RELEASE ORAL at 05:05

## 2017-09-29 RX ADMIN — GABAPENTIN 300 MG: 300 CAPSULE ORAL at 09:49

## 2017-09-29 RX ADMIN — SUCRALFATE 1 G: 1 TABLET ORAL at 17:14

## 2017-09-29 RX ADMIN — ALBUTEROL SULFATE 2.5 MG: 2.5 SOLUTION RESPIRATORY (INHALATION) at 19:15

## 2017-09-29 RX ADMIN — SUCRALFATE 1 G: 1 TABLET ORAL at 09:49

## 2017-09-29 RX ADMIN — POLYETHYLENE GLYCOL 3350 17 G: 17 POWDER, FOR SOLUTION ORAL at 13:25

## 2017-09-29 RX ADMIN — Medication 10 ML: at 13:30

## 2017-09-29 RX ADMIN — PREGABALIN 150 MG: 150 CAPSULE ORAL at 17:15

## 2017-09-29 RX ADMIN — GABAPENTIN 300 MG: 300 CAPSULE ORAL at 17:14

## 2017-09-29 RX ADMIN — HYDROCODONE BITARTRATE AND ACETAMINOPHEN 1 TABLET: 10; 325 TABLET ORAL at 11:35

## 2017-09-29 RX ADMIN — SUCRALFATE 1 G: 1 TABLET ORAL at 11:35

## 2017-09-29 RX ADMIN — ALBUTEROL SULFATE 2.5 MG: 2.5 SOLUTION RESPIRATORY (INHALATION) at 15:03

## 2017-09-29 RX ADMIN — ATORVASTATIN CALCIUM 20 MG: 10 TABLET, FILM COATED ORAL at 23:12

## 2017-09-29 RX ADMIN — CARVEDILOL 12.5 MG: 12.5 TABLET, FILM COATED ORAL at 09:49

## 2017-09-29 RX ADMIN — ZIPRASIDONE HYDROCHLORIDE 160 MG: 20 CAPSULE ORAL at 23:12

## 2017-09-29 RX ADMIN — APIXABAN 2.5 MG: 2.5 TABLET, FILM COATED ORAL at 23:12

## 2017-09-29 RX ADMIN — Medication 400 MG: at 09:49

## 2017-09-29 RX ADMIN — METHYLPREDNISOLONE SODIUM SUCCINATE 40 MG: 40 INJECTION, POWDER, FOR SOLUTION INTRAMUSCULAR; INTRAVENOUS at 09:49

## 2017-09-29 RX ADMIN — PANTOPRAZOLE SODIUM 40 MG: 40 TABLET, DELAYED RELEASE ORAL at 17:14

## 2017-09-29 RX ADMIN — PREGABALIN 150 MG: 150 CAPSULE ORAL at 09:49

## 2017-09-29 RX ADMIN — CEFTRIAXONE SODIUM 2 G: 2 INJECTION, POWDER, FOR SOLUTION INTRAMUSCULAR; INTRAVENOUS at 13:24

## 2017-09-29 RX ADMIN — HYDROCODONE BITARTRATE AND ACETAMINOPHEN 1 TABLET: 10; 325 TABLET ORAL at 17:18

## 2017-09-29 RX ADMIN — ALBUTEROL SULFATE 2.5 MG: 2.5 SOLUTION RESPIRATORY (INHALATION) at 11:06

## 2017-09-29 RX ADMIN — GABAPENTIN 300 MG: 300 CAPSULE ORAL at 23:12

## 2017-09-29 RX ADMIN — HUMAN INSULIN 2 UNITS: 100 INJECTION, SOLUTION SUBCUTANEOUS at 11:38

## 2017-09-29 RX ADMIN — ALPRAZOLAM 2 MG: 0.5 TABLET ORAL at 13:25

## 2017-09-29 RX ADMIN — APIXABAN 2.5 MG: 2.5 TABLET, FILM COATED ORAL at 09:49

## 2017-09-29 RX ADMIN — SUCRALFATE 1 G: 1 TABLET ORAL at 23:14

## 2017-09-29 RX ADMIN — FOLIC ACID 1 MG: 1 TABLET ORAL at 09:49

## 2017-09-29 RX ADMIN — MONTELUKAST SODIUM 10 MG: 10 TABLET, FILM COATED ORAL at 09:49

## 2017-09-29 RX ADMIN — MIRTAZAPINE 45 MG: 30 TABLET, FILM COATED ORAL at 23:12

## 2017-09-29 RX ADMIN — CARVEDILOL 12.5 MG: 12.5 TABLET, FILM COATED ORAL at 17:14

## 2017-09-29 RX ADMIN — LISINOPRIL 40 MG: 20 TABLET ORAL at 09:49

## 2017-09-29 RX ADMIN — DOXYCYCLINE HYCLATE 100 MG: 100 CAPSULE ORAL at 23:14

## 2017-09-29 RX ADMIN — METHYLPREDNISOLONE SODIUM SUCCINATE 40 MG: 40 INJECTION, POWDER, FOR SOLUTION INTRAMUSCULAR; INTRAVENOUS at 23:14

## 2017-09-29 RX ADMIN — HUMAN INSULIN 2 UNITS: 100 INJECTION, SOLUTION SUBCUTANEOUS at 06:11

## 2017-09-29 RX ADMIN — ALBUTEROL SULFATE 2.5 MG: 2.5 SOLUTION RESPIRATORY (INHALATION) at 08:18

## 2017-09-29 RX ADMIN — Medication 5 ML: at 05:05

## 2017-09-29 RX ADMIN — DOXYCYCLINE HYCLATE 100 MG: 100 CAPSULE ORAL at 09:49

## 2017-09-29 NOTE — PROGRESS NOTES
Shift assessment completed. Pt. Alert and oriented x4. Delayed responses. 2lnc in place. States, \"I had a rough night. \" wheezing noted. Nonproductive cough. Still need sputum cx. Trying to rest. Call light in reach. Instructed to call for assistance.

## 2017-09-29 NOTE — PROGRESS NOTES
Problem: Mobility Impaired (Adult and Pediatric)  Goal: *Acute Goals and Plan of Care (Insert Text)  STG:  (1.)Ms. Misha Padilla will move from supine to sit and sit to supine , scoot up and down and roll side to side with CONTACT GUARD ASSIST within 3 day(s). (2.)Ms. Misha Padilla will transfer from bed to chair and chair to bed with CONTACT GUARD ASSIST using the least restrictive device within 3 day(s). (3.)Ms. Misha Padilla will ambulate with CONTACT GUARD ASSIST for 50 feet with the least restrictive device within 3 day(s). LTG:  (1.)Ms. Misha Padilla will move from supine to sit and sit to supine , scoot up and down and roll side to side in bed with INDEPENDENT within 7 day(s). (2.)Ms. Misha Padilla will transfer from bed to chair and chair to bed with SUPERVISION using the least restrictive device within 7 day(s). (3.)Ms. Misha Padilla will ambulate with STAND BY ASSIST for 200+ feet with the least restrictive device within 7 day(s). ________________________________________________________________________________________________      PHYSICAL THERAPY: INITIAL ASSESSMENT, TREATMENT DAY: DAY OF ASSESSMENT, PM 9/29/2017  INPATIENT: Hospital Day: 2  [place] / Product Type: Medicare /      NAME/AGE/GENDER: Sergio Wagner is a 78 y.o. female       PRIMARY DIAGNOSIS: Asthma exacerbation Asthma with acute exacerbation Asthma with acute exacerbation        ICD-10: Treatment Diagnosis:       · Generalized Muscle Weakness (M62.81)  · Difficulty in walking, Not elsewhere classified (R26.2)  · History of falling (Z91.81)   Precaution/Allergies:  Latex; Bactrim [sulfamethoxazole-trimethoprim]; Lamictal [lamotrigine]; Pcn [penicillins]; and Tapentadol       ASSESSMENT:      Ms. Misha Padilla is supine in bed upon contact and agreeable to PT evaluation this afternoon. Pt reports 6/10 lower back pain prior to activity and states she recent received medication for this pain.  Pt reports she is living with her daughter right now in one story home with 3 steps to enter with B railing. Pt reports independence with gait at baseline and requires assist with showering from her daughter. Pt does not require supplemental O2 at baseline and reports 2 falls in past 6 months. Pt is Tima with transition supine to sit EOB. Pt is min-modA with sit to stand. Pt is unsteady on feet and reports some lightheadedness. Secondary to symptoms, pt took lateral side steps at EOB right and left with min-modA. Pt returned to sitting and transitioned supine in bed with CGA. Pt left supine in bed with all needs met and within reach. Esequiel Saucedo will benefit from skilled PT (medically necessary) to address decreased strength, decreased balance, decreased functional tolerance, decreased cardiopulmonary endurance affecting participation in basic ADLs and functional tasks. This section established at most recent assessment   PROBLEM LIST (Impairments causing functional limitations):  1. Decreased Strength  2. Decreased ADL/Functional Activities  3. Decreased Transfer Abilities  4. Decreased Ambulation Ability/Technique  5. Decreased Balance  6. Increased Pain  7. Decreased Activity Tolerance  8. Decreased Pacing Skills  9. Increased Fatigue  10. Increased Shortness of Breath  11. Decreased [place] with Home Exercise Program    INTERVENTIONS PLANNED: (Benefits and precautions of physical therapy have been discussed with the patient.)  1. Balance Exercise  2. Bed Mobility  3. Family Education  4. Gait Training  5. Home Exercise Program (HEP)  6. Neuromuscular Re-education/Strengthening  7. Range of Motion (ROM)  8. Therapeutic Activites  9. Therapeutic Exercise/Strengthening  10. Transfer Training  11.  Group Therapy      TREATMENT PLAN: Frequency/Duration: 3 times a week for duration of hospital stay  Rehabilitation Potential For Stated Goals: FAIR      RECOMMENDED REHABILITATION/EQUIPMENT: (at time of discharge pending progress): Due to the probability of continued deficits (see above) this patient will likely need continued skilled physical therapy after discharge. Equipment:   · None at this time                   HISTORY:   History of Present Injury/Illness (Reason for Referral):  See H&P below  Patient is a 78 y.o.  female presents with shortness of breath and wheezing that started yesterday. She went to PCP and was prescribed antibiotics and steroids but felt worse today and called EMS. Temp was 100.6 on arrival and she felt feverish this morning with sweating epsiode. Has had some cough but no sputum production. She has been at her usual states of health and has been able to ambulate in the home. She is not on home O2 and states she has been compliant with chronic home meds. LA is 1.3, WBC 11.1 and CXR without infiltrate. She will be admitted for asthma exacerbation with possible CAP. Was admitted 7/25-7/27/17 for asthma exacerbation, treated with sterids and discharged on Doxycycline. She is morbidly obese, has chronic left vocal cord paralysis. GERD, hx of PE and on lifelong anticoagulation, previous gastric bypass 13 years ago, Fe def anemia and has been on iron infusions as well pneumonia in 2012, 2013 and 2017. Home DME company none. Past Medical History/Comorbidities:   Ms. Hieu Oliver  has a past medical history of Acute renal failure [facility]) (July, 2014); Bilateral pulmonary embolism [facility]) (September, 2012); Calculus of ureter (May, 2015); CAP (community acquired pneumonia) (October, 2012); CVA (cerebral infarction) (January, 2012); Gastric ulcer (July, 2014); Gram-positive bacteremia 1/2 cx (12/26/2016); HCAP (healthcare-associated pneumonia) (January, 2013); HCAP (healthcare-associated pneumonia) (February, 2013); Left leg DVT ([address] (1991); Psychiatric disorder; and PUD (peptic ulcer disease) (???). She also has no past medical history of Aneurysm ([address] Arrhythmia;  Autoimmune disease ([facility] [address] CAD (coronary artery disease); Cancer ([facility] [address] Chronic pain; Coagulation disorder ([facility] [address] COPD; Diabetes ([facility] [address] Heart failure ([facility] [address] Liver disease; Seizures ([facility] [address] or Unspecified sleep apnea. Ms. Doretha Jarvis  has a past surgical history that includes appendectomy; cholecystectomy; gastric bypass; tonsillectomy; other surgical; orthopaedic; hysterectomy; and endoscopy (July, 2014). Social History/Living Environment:   Home Environment: Private residence  # Steps to Enter: 3  Rails to Enter: Yes  Hand Rails : Bilateral  One/Two Story Residence: One story  Living Alone: Yes  Support Systems: Child(jaswant)  Patient Expects to be Discharged to[de-identified] Private residence  Current DME Used/Available at Home: Nebulizer, Shower chair  Tub or Shower Type: Tub/Shower combination  Prior Level of Function/Work/Activity:  Lives with daughter, indep with gait, requires assist with bathing, 2 falls    Number of Personal Factors/Comorbidities that affect the Plan of Care: 3+: HIGH COMPLEXITY   EXAMINATION:   Most Recent Physical Functioning:   Gross Assessment:  AROM: Generally decreased, functional  Strength: Generally decreased, functional  Coordination: Generally decreased, functional  Sensation: Intact               Posture:     Balance:  Sitting: Intact; Without support  Standing: Impaired;Pull to stand; With support Bed Mobility:  Supine to Sit: Minimum assistance  Sit to Supine: Contact guard assistance;Stand-by asssistance  Wheelchair Mobility:     Transfers:  Sit to Stand: Minimum assistance; Moderate assistance  Stand to Sit: Minimum assistance  Gait:     Base of Support: Widened;Center of gravity altered  Speed/Judith: Slow;Shuffled  Step Length: Right shortened;Left shortened  Gait Abnormalities: Decreased step clearance;Trunk sway increased; Path deviations  Distance (ft): 5 Feet (ft)  Assistive Device:  (HHA)  Ambulation - Level of Assistance: Minimal assistance  Interventions: Safety awareness training; Tactile cues; Verbal cues       Body Structures Involved:  1. Heart  2. Lungs  3. Bones  4. Joints  5. Muscles Body Functions Affected:  1. Sensory/Pain  2. Cardio  3. Respiratory  4. Neuromusculoskeletal  5. Movement Related Activities and Participation Affected:  1. General Tasks and Demands  2. Mobility  3. Self Care  4. Interpersonal Interactions and Relationships   Number of elements that affect the Plan of Care: 4+: HIGH COMPLEXITY   CLINICAL PRESENTATION:   Presentation: Evolving clinical presentation with changing clinical characteristics: MODERATE COMPLEXITY   CLINICAL DECISION MAKIN [facility] Inpatient Short Form  How much difficulty does the patient currently have. .. Unable A Lot A Little None   1. Turning over in bed (including adjusting bedclothes, sheets and blankets)? [ ] 1   [ ] 2   [X] 3   [ ] 4   2. Sitting down on and standing up from a chair with arms ( e.g., wheelchair, bedside commode, etc.)   [ ] 1   [ ] 2   [X] 3   [ ] 4   3. Moving from lying on back to sitting on the side of the bed? [ ] 1   [ ] 2   [X] 3   [ ] 4   How much help from another person does the patient currently need. .. Total A Lot A Little None   4. Moving to and from a bed to a chair (including a wheelchair)? [ ] 1   [X] 2   [ ] 3   [ ] 4   5. Need to walk in hospital room? [ ] 1   [X] 2   [ ] 3   [ ] 4   6. Climbing 3-5 steps with a railing? [ ] 1   [X] 2   [ ] 3   [ ] 4   © , Trustees of [address] [place] [address], under license to [employer]. All rights reserved    Score:  Initial: 15 Most Recent: X (Date: -- )     Interpretation of Tool:  Represents activities that are increasingly more difficult (i.e. Bed mobility, Transfers, Gait).        Score 24 23 22-20 19-15 14-10 9-7 6       Modifier CH CI CJ CK CL CM CN         · Mobility - Walking and Moving Around:               - CURRENT STATUS:    CK - 40%-59% impaired, limited or restricted               - GOAL STATUS:           CJ - 20%-39% impaired, limited or restricted               - D/C STATUS:                       ---------------To be determined---------------  [place] / Product Type: Medicare /       Medical Necessity:     · Patient is expected to demonstrate progress in strength, balance, coordination and functional technique to decrease assistance required with gait, transfers, and functional mobility. Reason for Services/Other Comments:  · Patient continues to require skilled intervention due to  decreased strength, decreased balance, decreased functional tolerance, decreased cardiopulmonary endurance affecting participation in basic ADLs and functional tasks. Use of outcome tool(s) and clinical judgement create a POC that gives a: Clear prediction of patient's progress: LOW COMPLEXITY                 TREATMENT:   (In addition to Assessment/Re-Assessment sessions the following treatments were rendered)   Pre-treatment Symptoms/Complaints:  Lower back pain, weakness  Pain: Initial:   Pain Intensity 1: 6  Pain Location 1: Back  Post Session:  No increased pain with activity, increased dizziness/lightheadedness with standing. Assessment/Reassessment only, no treatment provided today     Braces/Orthotics/Lines/Etc:   · IV  · O2 Device: Nasal cannula  Treatment/Session Assessment:    · Response to Treatment:  Decreased tolerance for functional activity. Min-modA to stand and take lateral steps EOB. · Interdisciplinary Collaboration:  · Physical Therapist  · Registered Nurse  · After treatment position/precautions:  · Supine in bed  · Bed alarm/tab alert on  · Bed/Chair-wheels locked  · Bed in low position  · Caregiver at bedside  · Call light within reach  · Compliance with Program/Exercises: Will assess as treatment progresses. · Recommendations/Intent for next treatment session: \"Next visit will focus on advancements to more challenging activities and reduction in assistance provided\".   Total Treatment Duration:  PT Patient Time In/Time Out  Time In: 1433  Time Out: [address]

## 2017-09-29 NOTE — PROGRESS NOTES
COPD patient well known to Case Management. She lives in her own home with family. Patient does not have home O2 at this time. She is normally independent. PT/OT evaluations have been requested. Patient will likely return home with no needs at discharge. Case Management will continue to follow.     Care Management Interventions  Transition of Care Consult (CM Consult): Discharge Planning  Discharge Durable Medical Equipment: No  Physical Therapy Consult: Yes  Occupational Therapy Consult: Yes  Speech Therapy Consult: No  Current Support Network: Lives with Spouse, Own Home  Confirm Follow Up Transport: Family  Plan discussed with Pt/Family/Caregiver: Yes  Freedom of Choice Offered: Yes  Discharge Location  Discharge Placement: Home

## 2017-09-29 NOTE — PROGRESS NOTES
Spiritual Care visit. Initial Visit. Visit by Darius Gongora.     By Meera Jackson M.Ed., .B. ,Staff

## 2017-09-29 NOTE — PROGRESS NOTES
Pt. Sitting up in bed. No acute distress noted. 2lnc in place. Wheezing. Call light in reach. Instructed to call for assistance.

## 2017-09-29 NOTE — PROGRESS NOTES
Pt. sitting up in bed. O2 in place @ 2lnc. Still needs to have bm. Daughter is to bring home medication in. Call light in reach. Instructed to call for assistance.

## 2017-09-29 NOTE — PROGRESS NOTES
Patient is a potential Asthma Bundle Payment for Care Improvement (BPCI) patient and will be followed for 90 days post acute care. Patient known to us from previous admission. She is very weak- PT/ OT following and slow to respond. Asthma and Pneumonia education given. Lives with daughter and 3year old grandchildren. She would like to thing about HH and HC. 24 hour Help Line number given if patient would like Health  to follow.      Malissa Neff, RN- [facility], BSN  Care Transition/ Bundled Payment Navigator  [phone]

## 2017-09-29 NOTE — PROGRESS NOTES
Shar Fletcher  Admission Date: 9/28/2017             Daily Progress Note: 9/29/2017     Patient is a 78 y.o.  female presented with shortness of breath and wheezing that started yesterday. She went to PCP and was prescribed antibiotics and steroids but felt worse  and called EMS. She has been at her usual states of health and has been able to ambulate in the home. She is not on home O2 and states she has been compliant with chronic home meds. LA is 1.3, WBC 11.1 and CXR without infiltrate. She was admitted for asthma exacerbation with possible CAP.     Was admitted 7/25-7/27/17 for asthma exacerbation, treated with sterids and discharged on Doxycycline. She is morbidly obese, has chronic left vocal cord paralysis. GERD, hx of PE and on lifelong anticoagulation, previous gastric bypass 13 years ago, Fe def anemia and has been on iron infusions as well pneumonia in 2012, 2013 and 2017.     Home DME company none.       Subjective:       On 2 lpm NC, afebrile. Flat affect. On norco and xanax. Restrictiveve lung disease with morbid obesity and chronic pain.       Review of Systems  Respiratory: positive for dyspnea on exertion    Current Facility-Administered Medications   Medication Dose Route Frequency    ALPRAZolam (XANAX) tablet 2 mg  2 mg Oral TID PRN    apixaban (ELIQUIS) tablet 2.5 mg  2.5 mg Oral Q12H    atorvastatin (LIPITOR) tablet 20 mg  20 mg Oral QHS    carvedilol (COREG) tablet 12.5 mg  12.5 mg Oral BID WITH MEALS    levothyroxine (SYNTHROID) tablet 112 mcg  112 mcg Oral QPM    lisinopril (PRINIVIL, ZESTRIL) tablet 40 mg  40 mg Oral DAILY    magnesium oxide (MAG-OX) tablet 400 mg  400 mg Oral DAILY    mirtazapine (REMERON) tablet 45 mg  45 mg Oral QHS    montelukast (SINGULAIR) tablet 10 mg  10 mg Oral DAILY    folic acid (FOLVITE) tablet 1 mg  1,000 mcg Oral DAILY    gabapentin (NEURONTIN) capsule 300 mg  300 mg Oral TID    HYDROcodone-acetaminophen (NORCO)  mg tablet 1 Tab  1 Tab Oral Q6H PRN    ondansetron (ZOFRAN ODT) tablet 8 mg  8 mg Oral Q8H PRN    pantoprazole (PROTONIX) tablet 40 mg  40 mg Oral ACB&D    pregabalin (LYRICA) capsule 150 mg  150 mg Oral BID    sucralfate (CARAFATE) tablet 1 g  1 g Oral QID    ziprasidone (GEODON) capsule 160 mg  160 mg Oral QHS    sodium chloride (NS) flush 5-10 mL  5-10 mL IntraVENous Q8H    sodium chloride (NS) flush 5-10 mL  5-10 mL IntraVENous PRN    cefTRIAXone (ROCEPHIN) 2 g in 0.9% sodium chloride (MBP/ADV) 50 mL  2 g IntraVENous Q24H    insulin regular (NOVOLIN R, HUMULIN R) injection   SubCUTAneous AC&HS    methylPREDNISolone (PF) (SOLU-MEDROL) injection 40 mg  40 mg IntraVENous Q12H    doxycycline (VIBRAMYCIN) capsule 100 mg  100 mg Oral Q12H    albuterol CONCENTRATE 2.5mg/0.5 mL neb soln  2.5 mg Nebulization QID RT         Objective:     Vitals:    09/29/17 0121 09/29/17 0341 09/29/17 0722 09/29/17 0819   BP:  112/70 130/71    Pulse:  79 73    Resp:  19 18    Temp:  97.9 °F (36.6 °C) 97.8 °F (36.6 °C)    SpO2:  92% 94% 95%   Weight: 254 lb 8 oz (115.4 kg)        Intake and Output:   09/27 1901 - 09/29 0700  In: -   Out: 250 [Urine:250]       Physical Exam:          Constitutional: the patient is morbidly obese  HEENT: Sclera clear, pupils equal, oral mucosa moist  Lungs: scattered bilateral wheezes on NC  Cardiovascular: RRR without M,G,R  Abd/GI: soft and non-tender; with positive bowel sounds. Ext: warm without cyanosis. There is no lower leg edema.   Musculoskeletal: moves all four extremities with equal strength  Skin: no jaundice or rashes, no wounds   Neuro: no gross neuro deficits       Lines/Drains: IV  Nutrition: cardiac      LAB  Recent Labs      09/29/17   0550  09/28/17   1207   WBC  9.7  11.1   HGB  10.6*  10.8*   HCT  33.4*  36.3   PLT  291  283     Recent Labs      09/29/17   0550  09/28/17   1207   NA  141  144   K  4.0  4.4   CL  104  106   CO2  29  30   GLU  156*  114*   BUN  19  15   CREA 0. 81  0.96         Assessment:     Patient Active Problem List   Diagnosis Code    Fibromyalgia M79.7    Hypertension I10    GERD (gastroesophageal reflux disease) K21.9    Morbid obesity - sedentary lifestyle E66.01    Hypothyroidism E03.9    Asthma J45.909    Arthritis M19.90    Bipolar affective disorder ([facility] F31.9    Debility R53.81    History of peptic ulcer disease Z87.11    Chronic narcotic use     Anxiety state F41.1    Chronic rhinitis J31.0    Stress incontinence in female N39.3    Calculus of ureter N20.1    CAP (community acquired pneumonia) J18.9    Asthma with acute exacerbation J45. 0    Speech abnormality R47.9    Polypharmacy Z79.899    PATRICIA (obstructive sleep apnea) G47.33    Hx of pulmonary embolus Z86.711    QT prolongation R94.31    Bilateral lower extremity edema R60.0    Acute respiratory failure (HCC) J96.00    Sore throat J02.9    Asthma exacerbation [facility] Problems  Date Reviewed: 9/28/2017          Codes Class Noted POA    Morbid obesity - sedentary lifestyle (Chronic) ICD-10-CM: E66.01  ICD-9-CM: 278.01  9/28/2017 Yes    Chronic     Hypothyroidism (Chronic) ICD-10-CM: E03.9  ICD-9-CM: 244.9  9/28/2017 Yes        Asthma (Chronic) ICD-10-CM: J45.909  ICD-9-CM: 493.90  9/28/2017 Yes        CAP (community acquired pneumonia) ICD-10-CM: J18.9  ICD-9-CM: 789  9/28/2017 Yes    On doxycycline and rocephin     * (Principal)Asthma with acute exacerbation ICD-10-CM: J45.901  ICD-9-CM: 493.92  9/28/2017 Yes        Hx of pulmonary embolus (Chronic) ICD-10-CM: Z86.711  ICD-9-CM: V12.55  9/28/2017 Yes    Overview Signed 9/28/2017  3:18 PM by Matthew Baron NP     Chronic Eliquis             Asthma exacerbation ICD-10-CM: G79.890  ICD-9-CM: 313.36  9/28/2017 Unknown    BD, steroids          -- weight loss, limit oversedation with restrictive lung disease  -- try and convert solumedrol 40 mg BID to oral prednisone tomorrow  -- Mobilize  -- de-escalate abx soon  -- continue BD  --on doxycycline and rocephin  --PT/OT, REMA Patterson Cost, NP    More than 50% of time documented was spent in face-to-face contact with the patient and in the care of the patient on the floor/unit where the patient is located. The patient has been seen and examined by me personally, the chart,labs, and radiographic studies have been reviewed. Chest: scattered wheezing  Extremities: trace edema    I agree with the above assessment and plan.     Shelby Gale MD.

## 2017-09-30 LAB
GLUCOSE BLD STRIP.AUTO-MCNC: 110 MG/DL (ref 65–100)
GLUCOSE BLD STRIP.AUTO-MCNC: 121 MG/DL (ref 65–100)
GLUCOSE BLD STRIP.AUTO-MCNC: 149 MG/DL (ref 65–100)
GLUCOSE BLD STRIP.AUTO-MCNC: 99 MG/DL (ref 65–100)

## 2017-09-30 PROCEDURE — 82962 GLUCOSE BLOOD TEST: CPT

## 2017-09-30 PROCEDURE — 94760 N-INVAS EAR/PLS OXIMETRY 1: CPT

## 2017-09-30 PROCEDURE — 74011000250 HC RX REV CODE- 250: Performed by: INTERNAL MEDICINE

## 2017-09-30 PROCEDURE — 74011250636 HC RX REV CODE- 250/636: Performed by: INTERNAL MEDICINE

## 2017-09-30 PROCEDURE — 99232 SBSQ HOSP IP/OBS MODERATE 35: CPT | Performed by: INTERNAL MEDICINE

## 2017-09-30 PROCEDURE — 65270000029 HC RM PRIVATE

## 2017-09-30 PROCEDURE — 74011250636 HC RX REV CODE- 250/636: Performed by: NURSE PRACTITIONER

## 2017-09-30 PROCEDURE — 77010033678 HC OXYGEN DAILY

## 2017-09-30 PROCEDURE — 97166 OT EVAL MOD COMPLEX 45 MIN: CPT

## 2017-09-30 PROCEDURE — 74011250637 HC RX REV CODE- 250/637: Performed by: INTERNAL MEDICINE

## 2017-09-30 PROCEDURE — 74011250637 HC RX REV CODE- 250/637: Performed by: NURSE PRACTITIONER

## 2017-09-30 PROCEDURE — 94640 AIRWAY INHALATION TREATMENT: CPT

## 2017-09-30 RX ORDER — PREDNISONE 20 MG/1
40 TABLET ORAL
Status: DISCONTINUED | OUTPATIENT
Start: 2017-10-01 | End: 2017-10-02 | Stop reason: HOSPADM

## 2017-09-30 RX ORDER — HYDRALAZINE HYDROCHLORIDE 20 MG/ML
10 INJECTION INTRAMUSCULAR; INTRAVENOUS
Status: DISCONTINUED | OUTPATIENT
Start: 2017-09-30 | End: 2017-10-02 | Stop reason: HOSPADM

## 2017-09-30 RX ORDER — NYSTATIN 100000 [USP'U]/G
POWDER TOPICAL 2 TIMES DAILY
Status: DISCONTINUED | OUTPATIENT
Start: 2017-09-30 | End: 2017-10-02 | Stop reason: HOSPADM

## 2017-09-30 RX ADMIN — PREGABALIN 150 MG: 150 CAPSULE ORAL at 08:44

## 2017-09-30 RX ADMIN — ALBUTEROL SULFATE 2.5 MG: 2.5 SOLUTION RESPIRATORY (INHALATION) at 07:36

## 2017-09-30 RX ADMIN — SUCRALFATE 1 G: 1 TABLET ORAL at 22:57

## 2017-09-30 RX ADMIN — NYSTATIN: 100000 POWDER TOPICAL at 12:25

## 2017-09-30 RX ADMIN — ALPRAZOLAM 2 MG: 0.5 TABLET ORAL at 08:45

## 2017-09-30 RX ADMIN — ZIPRASIDONE HYDROCHLORIDE 160 MG: 20 CAPSULE ORAL at 22:57

## 2017-09-30 RX ADMIN — Medication 5 ML: at 05:54

## 2017-09-30 RX ADMIN — HYDRALAZINE HYDROCHLORIDE 10 MG: 20 INJECTION INTRAMUSCULAR; INTRAVENOUS at 19:05

## 2017-09-30 RX ADMIN — Medication 400 MG: at 08:44

## 2017-09-30 RX ADMIN — ALBUTEROL SULFATE 2.5 MG: 2.5 SOLUTION RESPIRATORY (INHALATION) at 20:25

## 2017-09-30 RX ADMIN — METHYLPREDNISOLONE SODIUM SUCCINATE 40 MG: 40 INJECTION, POWDER, FOR SOLUTION INTRAMUSCULAR; INTRAVENOUS at 08:44

## 2017-09-30 RX ADMIN — POLYETHYLENE GLYCOL 3350 17 G: 17 POWDER, FOR SOLUTION ORAL at 08:44

## 2017-09-30 RX ADMIN — SUCRALFATE 1 G: 1 TABLET ORAL at 17:24

## 2017-09-30 RX ADMIN — PREGABALIN 150 MG: 150 CAPSULE ORAL at 17:24

## 2017-09-30 RX ADMIN — APIXABAN 2.5 MG: 2.5 TABLET, FILM COATED ORAL at 08:45

## 2017-09-30 RX ADMIN — ATORVASTATIN CALCIUM 20 MG: 10 TABLET, FILM COATED ORAL at 22:57

## 2017-09-30 RX ADMIN — CARVEDILOL 12.5 MG: 12.5 TABLET, FILM COATED ORAL at 08:44

## 2017-09-30 RX ADMIN — DOXYCYCLINE HYCLATE 100 MG: 100 CAPSULE ORAL at 08:45

## 2017-09-30 RX ADMIN — CARVEDILOL 12.5 MG: 12.5 TABLET, FILM COATED ORAL at 17:24

## 2017-09-30 RX ADMIN — FOLIC ACID 1 MG: 1 TABLET ORAL at 08:44

## 2017-09-30 RX ADMIN — SUCRALFATE 1 G: 1 TABLET ORAL at 08:45

## 2017-09-30 RX ADMIN — LEVOTHYROXINE SODIUM 112 MCG: 112 TABLET ORAL at 17:26

## 2017-09-30 RX ADMIN — DOXYCYCLINE HYCLATE 100 MG: 100 CAPSULE ORAL at 22:59

## 2017-09-30 RX ADMIN — GABAPENTIN 300 MG: 300 CAPSULE ORAL at 22:57

## 2017-09-30 RX ADMIN — GABAPENTIN 300 MG: 300 CAPSULE ORAL at 08:45

## 2017-09-30 RX ADMIN — ALBUTEROL SULFATE 2.5 MG: 2.5 SOLUTION RESPIRATORY (INHALATION) at 15:42

## 2017-09-30 RX ADMIN — LISINOPRIL 40 MG: 20 TABLET ORAL at 08:45

## 2017-09-30 RX ADMIN — Medication 10 ML: at 23:12

## 2017-09-30 RX ADMIN — ALBUTEROL SULFATE 2.5 MG: 2.5 SOLUTION RESPIRATORY (INHALATION) at 12:10

## 2017-09-30 RX ADMIN — NYSTATIN: 100000 POWDER TOPICAL at 17:27

## 2017-09-30 RX ADMIN — PANTOPRAZOLE SODIUM 40 MG: 40 TABLET, DELAYED RELEASE ORAL at 05:55

## 2017-09-30 RX ADMIN — PANTOPRAZOLE SODIUM 40 MG: 40 TABLET, DELAYED RELEASE ORAL at 17:24

## 2017-09-30 RX ADMIN — APIXABAN 2.5 MG: 2.5 TABLET, FILM COATED ORAL at 22:57

## 2017-09-30 RX ADMIN — GABAPENTIN 300 MG: 300 CAPSULE ORAL at 17:26

## 2017-09-30 RX ADMIN — HYDROCODONE BITARTRATE AND ACETAMINOPHEN 1 TABLET: 10; 325 TABLET ORAL at 23:19

## 2017-09-30 RX ADMIN — Medication 5 ML: at 17:28

## 2017-09-30 RX ADMIN — SUCRALFATE 1 G: 1 TABLET ORAL at 12:25

## 2017-09-30 RX ADMIN — MIRTAZAPINE 45 MG: 30 TABLET, FILM COATED ORAL at 22:57

## 2017-09-30 RX ADMIN — MONTELUKAST SODIUM 10 MG: 10 TABLET, FILM COATED ORAL at 08:45

## 2017-09-30 NOTE — PROGRESS NOTES
Received bedside shift report from [place]. Pt lying in bed. No apparent distress. Respirations even and unlabored. Instructed to call for assistance with needs, as they arise. Pt voiced understanding.

## 2017-09-30 NOTE — PROGRESS NOTES
Visit with patient to build rapport with . Patient is calm. Receptive to  presence. Encouraged and assured patient of our continued care.     Josy Patterson,  Staff   C: [phone]  /  [email or web address]

## 2017-09-30 NOTE — PROGRESS NOTES
Bethany Chun  Admission Date: 9/28/2017             Daily Progress Note: 9/30/2017     Patient is a 78 y.o.  female presented with shortness of breath and wheezing that started yesterday. She went to PCP and was prescribed antibiotics and steroids but felt worse  and called EMS. She has been at her usual states of health and has been able to ambulate in the home. She is not on home O2 and states she has been compliant with chronic home meds. LA is 1.3, WBC 11.1 and CXR without infiltrate. She was admitted for asthma exacerbation with possible CAP.     Was admitted 7/25-7/27/17 for asthma exacerbation, treated with sterids and discharged on Doxycycline. She is morbidly obese, has chronic left vocal cord paralysis. GERD, hx of PE and on lifelong anticoagulation, previous gastric bypass 13 years ago, Fe def anemia and has been on iron infusions as well pneumonia in 2012, 2013 and 2017.     Home DME company none.       Subjective:       On 2 lpm NC, afebrile. Flat affect. On norco and xanax. Restrictiveve lung disease with morbid obesity and chronic pain.   States she feels better     Review of Systems  Respiratory: positive for dyspnea on exertion    Current Facility-Administered Medications   Medication Dose Route Frequency    nystatin (MYCOSTATIN) 100,000 unit/gram powder   Topical BID    polyethylene glycol (MIRALAX) packet 17 g  17 g Oral DAILY    naloxegol (MOVANTIK) tablet 25 mg (Patient Supplied)  25 mg Oral ACB    ALPRAZolam (XANAX) tablet 2 mg  2 mg Oral TID PRN    apixaban (ELIQUIS) tablet 2.5 mg  2.5 mg Oral Q12H    atorvastatin (LIPITOR) tablet 20 mg  20 mg Oral QHS    carvedilol (COREG) tablet 12.5 mg  12.5 mg Oral BID WITH MEALS    levothyroxine (SYNTHROID) tablet 112 mcg  112 mcg Oral QPM    lisinopril (PRINIVIL, ZESTRIL) tablet 40 mg  40 mg Oral DAILY    magnesium oxide (MAG-OX) tablet 400 mg  400 mg Oral DAILY    mirtazapine (REMERON) tablet 45 mg  45 mg Oral QHS    montelukast (SINGULAIR) tablet 10 mg  10 mg Oral DAILY    folic acid (FOLVITE) tablet 1 mg  1,000 mcg Oral DAILY    gabapentin (NEURONTIN) capsule 300 mg  300 mg Oral TID    HYDROcodone-acetaminophen (NORCO)  mg tablet 1 Tab  1 Tab Oral Q6H PRN    ondansetron (ZOFRAN ODT) tablet 8 mg  8 mg Oral Q8H PRN    pantoprazole (PROTONIX) tablet 40 mg  40 mg Oral ACB&D    pregabalin (LYRICA) capsule 150 mg  150 mg Oral BID    sucralfate (CARAFATE) tablet 1 g  1 g Oral QID    ziprasidone (GEODON) capsule 160 mg  160 mg Oral QHS    sodium chloride (NS) flush 5-10 mL  5-10 mL IntraVENous Q8H    sodium chloride (NS) flush 5-10 mL  5-10 mL IntraVENous PRN    cefTRIAXone (ROCEPHIN) 2 g in 0.9% sodium chloride (MBP/ADV) 50 mL  2 g IntraVENous Q24H    insulin regular (NOVOLIN R, HUMULIN R) injection   SubCUTAneous AC&HS    methylPREDNISolone (PF) (SOLU-MEDROL) injection 40 mg  40 mg IntraVENous Q12H    doxycycline (VIBRAMYCIN) capsule 100 mg  100 mg Oral Q12H    albuterol CONCENTRATE 2.5mg/0.5 mL neb soln  2.5 mg Nebulization QID RT         Objective:     Vitals:    09/30/17 0725 09/30/17 0737 09/30/17 1126 09/30/17 1210   BP: 144/79  (!) 153/93    Pulse: 75  78    Resp: 20  18    Temp: 98.4 °F (36.9 °C)  98.4 °F (36.9 °C)    SpO2: 95% 93% 93% 94%   Weight:         Intake and Output:   09/28 1901 - 09/30 0700  In: 240 [P.O.:240]  Out: 250 [Urine:250]       Physical Exam:          Constitutional: the patient is morbidly obese  HEENT: Sclera clear, pupils equal, oral mucosa moist  Lungs: scattered bilateral wheezes on NC  Cardiovascular: RRR without M,G,R  Abd/GI: soft and non-tender; with positive bowel sounds. Ext: warm without cyanosis. There is no lower leg edema.   Musculoskeletal: moves all four extremities with equal strength  Skin: no jaundice or rashes, no wounds   Neuro: no gross neuro deficits       Lines/Drains: IV  Nutrition: cardiac      LAB  Recent Labs      09/29/17   0550  09/28/17   1207   WBC 9. 7  11.1   HGB  10.6*  10.8*   HCT  33.4*  36.3   PLT  291  283     Recent Labs      09/29/17   0550  09/28/17   1207   NA  141  144   K  4.0  4.4   CL  104  106   CO2  29  30   GLU  156*  114*   BUN  19  15   CREA  0.81  0.96         Assessment:     Patient Active Problem List   Diagnosis Code    Fibromyalgia M79.7    Hypertension I10    GERD (gastroesophageal reflux disease) K21.9    Morbid obesity - sedentary lifestyle E66.01    Hypothyroidism E03.9    Asthma J45.909    Arthritis M19.90    Bipolar affective disorder ([facility] [address] F31.9    Debility R53.81    History of peptic ulcer disease Z87.11    Chronic narcotic use     Anxiety state F41.1    Chronic rhinitis J31.0    Stress incontinence in female N39.3    Calculus of ureter N20.1    CAP (community acquired pneumonia) J18.9    Asthma with acute exacerbation J45. 0    Speech abnormality R47.9    Polypharmacy Z79.899    PATRICIA (obstructive sleep apnea) G47.33    Hx of pulmonary embolus Z86.711    QT prolongation R94.31    Bilateral lower extremity edema R60.0    Acute respiratory failure (HCC) J96.00    Sore throat J02.9    Restrictive lung disease J98.4       Plan     Hospital Problems  Date Reviewed: 9/28/2017          Codes Class Noted POA    Morbid obesity - sedentary lifestyle (Chronic) ICD-10-CM: E66.01  ICD-9-CM: 278.01  9/28/2017 Yes    Chronic     Hypothyroidism (Chronic) ICD-10-CM: E03.9  ICD-9-CM: 244.9  9/28/2017 Yes        Asthma (Chronic) ICD-10-CM: J45.909  ICD-9-CM: 493.90  9/28/2017 Yes        CAP (community acquired pneumonia) ICD-10-CM: J18.9  ICD-9-CM: 301  9/28/2017 Yes    On doxycycline and rocephin     * (Principal)Asthma with acute exacerbation ICD-10-CM: J45.901  ICD-9-CM: 493.92  9/28/2017 Yes        Hx of pulmonary embolus (Chronic) ICD-10-CM: Q92.374  ICD-9-CM: V12.55  9/28/2017 Yes    Overview Signed 9/28/2017  3:18 PM by Mari Reagan NP     Chronic Eliquis             Asthma exacerbation ICD-10-CM: J45.901  ICD-9-CM: 493.92  9/28/2017 Unknown    BD, steroids          -- weight loss, limit oversedation with restrictive lung disease  --  convert solumedrol 40 mg BID to oral prednisone tomorrow  -- Mobilize  -- de-escalate abx soon  -- continue BD  --on doxycycline and rocephin- deescalate to doxycicline PO alone to complete 8 days of Rx  --PT/OT, OOB  Anticipate discharge by Monday    Brian Stephenson MD    More than 50% of time documented was spent in face-to-face contact with the patient and in the care of the patient on the floor/unit where the patient is located.

## 2017-09-30 NOTE — PROGRESS NOTES
Problem: Self Care Deficits Care Plan (Adult)  Goal: *Acute Goals and Plan of Care (Insert Text)  1. Patient will complete lower body bathing and dressing with modified independence and adaptive equipment as needed. 2. Patient will complete toileting with modified independence. 3. Patient will tolerate 20 minutes of OT treatment with 1-2 rest breaks to increase activity tolerance for ADLs. 4. Patient will complete functional transfers with modified independence and adaptive equipment as needed. Timeframe: 7 visits       OCCUPATIONAL THERAPY: Initial Assessment and PM 9/30/2017  INPATIENT: Hospital Day: 3  [place] / Product Type: Medicare /      NAME/AGE/GENDER: Staci Soto is a 78 y.o. female       PRIMARY DIAGNOSIS:  Asthma exacerbation Asthma with acute exacerbation Asthma with acute exacerbation        ICD-10: Treatment Diagnosis:        · Generalized Muscle Weakness (M62.81)  · Other lack of cordination (R27.8)   Precautions/Allergies:        FALLS Latex; Bactrim [sulfamethoxazole-trimethoprim]; Lamictal [lamotrigine]; Pcn [penicillins]; and Tapentadol       ASSESSMENT:      Ms. Leidy Alonso presents with SOB and wheezing. Pt with 02 nasal cannula in place. Pt with decreased activity tolerance this admission and a few weeks prior per her report. Before this onset of problems lately, she was driving and taking care of her own ADLS. Pt with decreased mobility and ADLS, requiring assistance at this time for safety and falls prevention. Recommend further OT. This section established at most recent assessment   PROBLEM LIST (Impairments causing functional limitations):  1. Decreased Strength  2. Decreased ADL/Functional Activities  3. Decreased Transfer Abilities  4. Decreased Ambulation Ability/Technique  5. Decreased Balance  6. Increased Pain  7. Decreased Activity Tolerance  8. Decreased Pacing Skills  9.  Decreased Work Simplification/Energy Conservation Techniques  10. Increased Fatigue  11. Increased Shortness of Breath  12. Decreased Flexibility/Joint Mobility  13. Edema/Girth  14. Decreased [place] with Home Exercise Program    INTERVENTIONS PLANNED: (Benefits and precautions of occupational therapy have been discussed with the patient.)  1. Activities of daily living training  2. Adaptive equipment training  3. Balance training  4. Clothing management  5. Community reintergration  6. Donning&doffing training  7. Hygiene training  8. Therapeutic activity  9. Therapeutic exercise      TREATMENT PLAN: Frequency/Duration: Follow patient 2 wks to address above goals. Rehabilitation Potential For Stated Goals: GOOD      RECOMMENDED REHABILITATION/EQUIPMENT: (at time of discharge pending progress): Due to the probability of continued deficits (see above) this patient will likely need continued skilled occupational therapy after discharge. Equipment:   · None at this time                      [address]:   History of Present Injury/Illness (Reason for Referral):  See H & P  Past Medical History/Comorbidities:   Ms. Shalom Villeda  has a past medical history of Acute renal failure [facility]) (July, 2014); Bilateral pulmonary embolism [facility]) (September, 2012); Calculus of ureter (May, 2015); CAP (community acquired pneumonia) (October, 2012); CVA (cerebral infarction) (January, 2012); Gastric ulcer (July, 2014); Gram-positive bacteremia 1/2 cx (12/26/2016); HCAP (healthcare-associated pneumonia) (January, 2013); HCAP (healthcare-associated pneumonia) (February, 2013); Left leg DVT ([address] (1991); Psychiatric disorder; and PUD (peptic ulcer disease) (???). She also has no past medical history of Aneurysm ([address] Arrhythmia; Autoimmune disease ([address] CAD (coronary artery disease); Cancer ([address] Chronic pain; Coagulation disorder ([address] COPD; Diabetes ([address] Heart failure ([address] Liver disease; Seizures ([address] or Unspecified sleep apnea.   Ms. Shalom Villeda  has a past surgical history that includes appendectomy; cholecystectomy; gastric bypass; tonsillectomy; other surgical; orthopaedic; hysterectomy; and endoscopy (July, 2014). Social History/Living Environment:   Home Environment: Private residence  # Steps to Enter: 3  Rails to Enter: Yes  Hand Rails : Bilateral  One/Two Story Residence: One story  Living Alone: Yes  Support Systems: Child(jaswant)  Patient Expects to be Discharged to[de-identified] Private residence  Current DME Used/Available at Home: Shower chair (handheld shower head)  Tub or Shower Type: Tub/Shower combination  Prior Level of Function/Work/Activity:  Able to do her own ADLS, driving car      Number of Personal Factors/Comorbidities that affect the Plan of Care: Expanded review of therapy/medical records (1-2):  MODERATE COMPLEXITY   ASSESSMENT OF OCCUPATIONAL PERFORMANCE[de-identified]   Activities of Daily Living:           Basic ADLs (From Assessment) Complex ADLs (From Assessment)   Basic ADL  Feeding: Independent  Oral Facial Hygiene/Grooming: Setup, Supervision  Bathing: Minimum assistance, Adaptive equipment, Additional time  Upper Body Dressing: Setup  Lower Body Dressing: Minimum assistance, Additional time, Adaptive equipment  Toileting: Supervision, Setup, Additional time, Adaptive equipment     Grooming/Bathing/Dressing Activities of Daily Living     Cognitive Retraining  Safety/Judgement: Awareness of environment; Insight into deficits           Toileting  Toileting Assistance: Supervision/set up;Stand-by assistance  Bowel Hygiene: Supervision/set-up; Stand-by assistance     Functional Transfers  Toilet Transfer : Contact guard assistance     Bed/Mat Mobility  Supine to Sit: Contact guard assistance  Sit to Supine: Contact guard assistance  Sit to Stand: Contact guard assistance  Scooting: Contact guard assistance          Most Recent Physical Functioning:   Gross Assessment:                  Posture:     Balance:  Sitting: Intact; Without support  Standing: Impaired; With support  Standing - Static: Good  Standing - Dynamic : Fair Bed Mobility:  Supine to Sit: Contact guard assistance  Sit to Supine: Contact guard assistance  Scooting: Contact guard assistance  Wheelchair Mobility:     Transfers:  Sit to Stand: Contact guard assistance                    Patient Vitals for the past 6 hrs:       BP SpO2 O2 Flow Rate (L/min) Pulse   17 1126 (!) 153/93 93 % - 78   17 1210 - 94 % 2 l/min -        Mental Status  Neurologic State: Alert  Orientation Level: Oriented X4  Cognition: Follows commands  Perception: Appears intact  Perseveration: No perseveration noted  Safety/Judgement: Awareness of environment, Insight into deficits                               Physical Skills Involved:  1. Range of Motion  2. Balance  3. Strength  4. Activity Tolerance  5. Edema Cognitive Skills Affected (resulting in the inability to perform in a timely and safe manner):  1. Perception  2. Executive Function  3. Divided Attention  4. Comprehension  5. Expression Psychosocial Skills Affected:  1. Habits/Routines  2. Environmental Adaptation   Number of elements that affect the Plan of Care: 3-5:  MODERATE COMPLEXITY   CLINICAL DECISION MAKIN[address] [place] [id number] AM-PAC 6 Clicks   Daily Activity Inpatient Short Form  How much help from another person does the patient currently need. .. Total A Lot A Little None   1. Putting on and taking off regular lower body clothing?   [ ] 1   [ ] 2   [X] 3   [ ] 4   2. Bathing (including washing, rinsing, drying)? [ ] 1   [ ] 2   [X] 3   [ ] 4   3. Toileting, which includes using toilet, bedpan or urinal?   [ ] 1   [ ] 2   [X] 3   [ ] 4   4. Putting on and taking off regular upper body clothing?   [ ] 1   [ ] 2   [X] 3   [ ] 4   5. Taking care of personal grooming such as brushing teeth? [ ] 1   [ ] 2   [ ] 3   [X] 4   6. Eating meals? [ ] 1   [ ] 2   [ ] 3   [X] 4   © , Trustees of [address] [place] [address] [address] [address], under license to [employer].  All rights reserved    Score:  Initial: 20, completed 9/30 Most Recent: X (Date: -- )     Interpretation of Tool:  Represents activities that are increasingly more difficult (i.e. Bed mobility, Transfers, Gait). Score 24 23 22-20 19-15 14-10 9-7 6       Modifier CH CI CJ CK CL CM CN         · Self Care:               - CURRENT STATUS:    CJ - 20%-39% impaired, limited or restricted               - GOAL STATUS:           CI - 1%-19% impaired, limited or restricted               - D/C STATUS:                       ---------------To be determined---------------  [place] / Product Type: Medicare /       Medical Necessity:     · Skilled intervention continues to be required due to need for 02, SOB, difficulty with voiding bladder. Reason for Services/Other Comments:  · Patient continues to require skilled intervention due to functioning below baseline. Use of outcome tool(s) and clinical judgement create a POC that gives a: MODERATE COMPLEXITY             TREATMENT:   (In addition to Assessment/Re-Assessment sessions the following treatments were rendered)      Pre-treatment Symptoms/Complaints:    Pain: Initial:   Pain Intensity 1: 0  Post Session:  No complaints of pain      Assessment/Reassessment only, no treatment provided today     Braces/Orthotics/Lines/Etc:   · IV  · O2 Device: Nasal cannula  Treatment/Session Assessment:    · Response to Treatment:  Pleasant and cooperative  · Interdisciplinary Collaboration:  · Occupational Therapist  · Registered Nurse  · After treatment position/precautions:  · Supine in bed  · Bed alarm/tab alert on  · Bed/Chair-wheels locked  · Bed in low position  · Caregiver at bedside  · Call light within reach  · RN notified  · Compliance with Program/Exercises: Will assess as treatment progresses. · Recommendations/Intent for next treatment session:   \"Next visit will focus on advancements to more challenging activities and reduction in assistance provided\".   Total Treatment Duration:  OT Patient Time In/Time Out  Time In: 1345  Time Out: [facility],

## 2017-09-30 NOTE — PROGRESS NOTES
Pt resting in bed,  alert and orient x4. Pt on 2L via nasal cannula. Respirations unlabored, wheezing noted on assessment. No acute distress noted. Call light in reach. Simona pad on. Pt instructed to call for assistance.

## 2017-09-30 NOTE — PROGRESS NOTES
Pt resting in bed after working with PT. Alert, oriented x4. Family at bedside. No acute distress noted. Posey pad in place. Call light in reach. Pt instructed to call for assistance.

## 2017-10-01 LAB
GLUCOSE BLD STRIP.AUTO-MCNC: 101 MG/DL (ref 65–100)
GLUCOSE BLD STRIP.AUTO-MCNC: 120 MG/DL (ref 65–100)
GLUCOSE BLD STRIP.AUTO-MCNC: 94 MG/DL (ref 65–100)

## 2017-10-01 PROCEDURE — 82962 GLUCOSE BLOOD TEST: CPT

## 2017-10-01 PROCEDURE — 65270000029 HC RM PRIVATE

## 2017-10-01 PROCEDURE — 94760 N-INVAS EAR/PLS OXIMETRY 1: CPT

## 2017-10-01 PROCEDURE — 74011250636 HC RX REV CODE- 250/636: Performed by: INTERNAL MEDICINE

## 2017-10-01 PROCEDURE — 74011250637 HC RX REV CODE- 250/637: Performed by: INTERNAL MEDICINE

## 2017-10-01 PROCEDURE — 99232 SBSQ HOSP IP/OBS MODERATE 35: CPT | Performed by: INTERNAL MEDICINE

## 2017-10-01 PROCEDURE — 74011000250 HC RX REV CODE- 250: Performed by: INTERNAL MEDICINE

## 2017-10-01 PROCEDURE — 94640 AIRWAY INHALATION TREATMENT: CPT

## 2017-10-01 PROCEDURE — 74011250637 HC RX REV CODE- 250/637: Performed by: NURSE PRACTITIONER

## 2017-10-01 PROCEDURE — 74011636637 HC RX REV CODE- 636/637: Performed by: INTERNAL MEDICINE

## 2017-10-01 PROCEDURE — 77010033678 HC OXYGEN DAILY

## 2017-10-01 RX ADMIN — Medication 10 ML: at 23:09

## 2017-10-01 RX ADMIN — NYSTATIN: 100000 POWDER TOPICAL at 23:07

## 2017-10-01 RX ADMIN — PREGABALIN 150 MG: 150 CAPSULE ORAL at 09:44

## 2017-10-01 RX ADMIN — Medication 10 ML: at 17:43

## 2017-10-01 RX ADMIN — PANTOPRAZOLE SODIUM 40 MG: 40 TABLET, DELAYED RELEASE ORAL at 17:41

## 2017-10-01 RX ADMIN — Medication 400 MG: at 09:45

## 2017-10-01 RX ADMIN — APIXABAN 2.5 MG: 2.5 TABLET, FILM COATED ORAL at 09:46

## 2017-10-01 RX ADMIN — SUCRALFATE 1 G: 1 TABLET ORAL at 12:47

## 2017-10-01 RX ADMIN — SUCRALFATE 1 G: 1 TABLET ORAL at 23:04

## 2017-10-01 RX ADMIN — ALBUTEROL SULFATE 2.5 MG: 2.5 SOLUTION RESPIRATORY (INHALATION) at 07:29

## 2017-10-01 RX ADMIN — PREGABALIN 150 MG: 150 CAPSULE ORAL at 17:40

## 2017-10-01 RX ADMIN — FOLIC ACID 1 MG: 1 TABLET ORAL at 09:46

## 2017-10-01 RX ADMIN — APIXABAN 2.5 MG: 2.5 TABLET, FILM COATED ORAL at 23:06

## 2017-10-01 RX ADMIN — POLYETHYLENE GLYCOL 3350 17 G: 17 POWDER, FOR SOLUTION ORAL at 09:44

## 2017-10-01 RX ADMIN — GABAPENTIN 300 MG: 300 CAPSULE ORAL at 09:45

## 2017-10-01 RX ADMIN — MONTELUKAST SODIUM 10 MG: 10 TABLET, FILM COATED ORAL at 09:45

## 2017-10-01 RX ADMIN — ZIPRASIDONE HYDROCHLORIDE 160 MG: 20 CAPSULE ORAL at 23:05

## 2017-10-01 RX ADMIN — LEVOTHYROXINE SODIUM 112 MCG: 112 TABLET ORAL at 17:41

## 2017-10-01 RX ADMIN — GABAPENTIN 300 MG: 300 CAPSULE ORAL at 17:41

## 2017-10-01 RX ADMIN — ALBUTEROL SULFATE 2.5 MG: 2.5 SOLUTION RESPIRATORY (INHALATION) at 11:28

## 2017-10-01 RX ADMIN — PANTOPRAZOLE SODIUM 40 MG: 40 TABLET, DELAYED RELEASE ORAL at 06:36

## 2017-10-01 RX ADMIN — CARVEDILOL 12.5 MG: 12.5 TABLET, FILM COATED ORAL at 17:40

## 2017-10-01 RX ADMIN — NYSTATIN: 100000 POWDER TOPICAL at 09:44

## 2017-10-01 RX ADMIN — DOXYCYCLINE HYCLATE 100 MG: 100 CAPSULE ORAL at 23:06

## 2017-10-01 RX ADMIN — HYDRALAZINE HYDROCHLORIDE 10 MG: 20 INJECTION INTRAMUSCULAR; INTRAVENOUS at 18:32

## 2017-10-01 RX ADMIN — ALBUTEROL SULFATE 2.5 MG: 2.5 SOLUTION RESPIRATORY (INHALATION) at 20:38

## 2017-10-01 RX ADMIN — LISINOPRIL 40 MG: 20 TABLET ORAL at 09:45

## 2017-10-01 RX ADMIN — ALPRAZOLAM 2 MG: 0.5 TABLET ORAL at 17:40

## 2017-10-01 RX ADMIN — HYDROCODONE BITARTRATE AND ACETAMINOPHEN 1 TABLET: 10; 325 TABLET ORAL at 09:56

## 2017-10-01 RX ADMIN — MIRTAZAPINE 45 MG: 30 TABLET, FILM COATED ORAL at 23:05

## 2017-10-01 RX ADMIN — DOXYCYCLINE HYCLATE 100 MG: 100 CAPSULE ORAL at 09:45

## 2017-10-01 RX ADMIN — SUCRALFATE 1 G: 1 TABLET ORAL at 09:44

## 2017-10-01 RX ADMIN — Medication 10 ML: at 06:38

## 2017-10-01 RX ADMIN — ATORVASTATIN CALCIUM 20 MG: 10 TABLET, FILM COATED ORAL at 23:05

## 2017-10-01 RX ADMIN — SUCRALFATE 1 G: 1 TABLET ORAL at 17:40

## 2017-10-01 RX ADMIN — PREDNISONE 40 MG: 20 TABLET ORAL at 09:45

## 2017-10-01 RX ADMIN — ALBUTEROL SULFATE 2.5 MG: 2.5 SOLUTION RESPIRATORY (INHALATION) at 16:23

## 2017-10-01 RX ADMIN — CARVEDILOL 12.5 MG: 12.5 TABLET, FILM COATED ORAL at 09:45

## 2017-10-01 RX ADMIN — GABAPENTIN 300 MG: 300 CAPSULE ORAL at 23:06

## 2017-10-01 NOTE — PROGRESS NOTES
Norco 10 mg po given. Will monitor.      09/30/17 2313   Pain 1   Pain Scale 1 Numeric (0 - 10)   Pain Intensity 1 8   Pain Location 1 Back   Pain Orientation 1 Posterior   Pain Description 1 Aching   Pain Intervention(s) 1 Medication (see MAR)

## 2017-10-01 NOTE — PROGRESS NOTES
Received bedside shift report from [facility], [facility]. Pt lying in bed. No apparent distress. Respirations even and unlabored. Instructed to call for assistance with needs, as they arise. Pt voiced understanding.

## 2017-10-01 NOTE — PROGRESS NOTES
Soy Lott  Admission Date: 9/28/2017             Daily Progress Note: 10/1/2017     Patient is a 78 y.o.  female presented with shortness of breath and wheezing that started yesterday. She went to PCP and was prescribed antibiotics and steroids but felt worse  and called EMS. She has been at her usual states of health and has been able to ambulate in the home. She is not on home O2 and states she has been compliant with chronic home meds. LA is 1.3, WBC 11.1 and CXR without infiltrate. She was admitted for asthma exacerbation with possible CAP.     Was admitted 7/25-7/27/17 for asthma exacerbation, treated with sterids and discharged on Doxycycline. She is morbidly obese, has chronic left vocal cord paralysis. GERD, hx of PE and on lifelong anticoagulation, previous gastric bypass 13 years ago, Fe def anemia and has been on iron infusions as well pneumonia in 2012, 2013 and 2017.     Home DME company none.       Subjective:       On 2 lpm NC, afebrile. Flat affect. On norco and xanax. Restrictiveve lung disease with morbid obesity and chronic pain.   States she feels better     Review of Systems  Respiratory: positive for dyspnea on exertion    Current Facility-Administered Medications   Medication Dose Route Frequency    nystatin (MYCOSTATIN) 100,000 unit/gram powder   Topical BID    predniSONE (DELTASONE) tablet 40 mg  40 mg Oral DAILY WITH BREAKFAST    hydrALAZINE (APRESOLINE) 20 mg/mL injection 10 mg  10 mg IntraVENous Q6H PRN    polyethylene glycol (MIRALAX) packet 17 g  17 g Oral DAILY    naloxegol (MOVANTIK) tablet 25 mg (Patient Supplied)  25 mg Oral ACB    ALPRAZolam (XANAX) tablet 2 mg  2 mg Oral TID PRN    apixaban (ELIQUIS) tablet 2.5 mg  2.5 mg Oral Q12H    atorvastatin (LIPITOR) tablet 20 mg  20 mg Oral QHS    carvedilol (COREG) tablet 12.5 mg  12.5 mg Oral BID WITH MEALS    levothyroxine (SYNTHROID) tablet 112 mcg  112 mcg Oral QPM    lisinopril (PRINIVIL, ZESTRIL) tablet 40 mg  40 mg Oral DAILY    magnesium oxide (MAG-OX) tablet 400 mg  400 mg Oral DAILY    mirtazapine (REMERON) tablet 45 mg  45 mg Oral QHS    montelukast (SINGULAIR) tablet 10 mg  10 mg Oral DAILY    folic acid (FOLVITE) tablet 1 mg  1,000 mcg Oral DAILY    gabapentin (NEURONTIN) capsule 300 mg  300 mg Oral TID    HYDROcodone-acetaminophen (NORCO)  mg tablet 1 Tab  1 Tab Oral Q6H PRN    ondansetron (ZOFRAN ODT) tablet 8 mg  8 mg Oral Q8H PRN    pantoprazole (PROTONIX) tablet 40 mg  40 mg Oral ACB&D    pregabalin (LYRICA) capsule 150 mg  150 mg Oral BID    sucralfate (CARAFATE) tablet 1 g  1 g Oral QID    ziprasidone (GEODON) capsule 160 mg  160 mg Oral QHS    sodium chloride (NS) flush 5-10 mL  5-10 mL IntraVENous Q8H    sodium chloride (NS) flush 5-10 mL  5-10 mL IntraVENous PRN    insulin regular (NOVOLIN R, HUMULIN R) injection   SubCUTAneous AC&HS    doxycycline (VIBRAMYCIN) capsule 100 mg  100 mg Oral Q12H    albuterol CONCENTRATE 2.5mg/0.5 mL neb soln  2.5 mg Nebulization QID RT         Objective:     Vitals:    10/01/17 0718 10/01/17 0731 10/01/17 1038 10/01/17 1128   BP: 178/87  135/88    Pulse: (!) 101  99    Resp: 18  18    Temp: 98.9 °F (37.2 °C)  98.9 °F (37.2 °C)    SpO2: 90% 91% 90% 91%   Weight:         Intake and Output:   09/29 1901 - 10/01 0700  In: 240 [P.O.:240]  Out: 1750 [Urine:1750]       Physical Exam:          Constitutional: the patient is morbidly obese  HEENT: Sclera clear, pupils equal, oral mucosa moist  Lungs: scattered bilateral wheezes on NC  Cardiovascular: RRR without M,G,R  Abd/GI: soft and non-tender; with positive bowel sounds. Ext: warm without cyanosis. There is no lower leg edema.   Musculoskeletal: moves all four extremities with equal strength  Skin: no jaundice or rashes, no wounds   Neuro: no gross neuro deficits       Lines/Drains: IV  Nutrition: cardiac      LAB  Recent Labs      09/29/17   0550   WBC  9.7   HGB  10.6*   HCT  33.4* PLT  291     Recent Labs      09/29/17   0550   NA  141   K  4.0   CL  104   CO2  29   GLU  156*   BUN  19   CREA  0.81         Assessment:     Patient Active Problem List   Diagnosis Code    Fibromyalgia M79.7    Hypertension I10    GERD (gastroesophageal reflux disease) K21.9    Morbid obesity - sedentary lifestyle E66.01    Hypothyroidism E03.9    Asthma J45.909    Arthritis M19.90    Bipolar affective disorder ([facility] [address] F31.9    Debility R53.81    History of peptic ulcer disease Z87.11    Chronic narcotic use     Anxiety state F41.1    Chronic rhinitis J31.0    Stress incontinence in female N39.3    Calculus of ureter N20.1    CAP (community acquired pneumonia) J18.9    Asthma with acute exacerbation J45. 0    Speech abnormality R47.9    Polypharmacy Z79.899    PATRICIA (obstructive sleep apnea) G47.33    Hx of pulmonary embolus Z86.711    QT prolongation R94.31    Bilateral lower extremity edema R60.0    Acute respiratory failure (HCC) J96.00    Sore throat J02.9    Restrictive lung disease J98.4       Plan     Hospital Problems  Date Reviewed: 9/28/2017          Codes Class Noted POA    Morbid obesity - sedentary lifestyle (Chronic) ICD-10-CM: E66.01  ICD-9-CM: 278.01  9/28/2017 Yes    Chronic     Hypothyroidism (Chronic) ICD-10-CM: E03.9  ICD-9-CM: 244.9  9/28/2017 Yes        Asthma (Chronic) ICD-10-CM: J45.909  ICD-9-CM: 493.90  9/28/2017 Yes        CAP (community acquired pneumonia) ICD-10-CM: J18.9  ICD-9-CM: 340  9/28/2017 Yes    On doxycycline and rocephin     * (Principal)Asthma with acute exacerbation ICD-10-CM: J45.901  ICD-9-CM: 493.92  9/28/2017 Yes        Hx of pulmonary embolus (Chronic) ICD-10-CM: Z86.711  ICD-9-CM: V12.55  9/28/2017 Yes    Overview Signed 9/28/2017  3:18 PM by Iliana Renae NP     Chronic Eliquis             Asthma exacerbation ICD-10-CM: X72.003  ICD-9-CM: 841.27  9/28/2017 Unknown    BD, steroids          -- weight loss, limit oversedation with restrictive lung disease  --  convert solumedrol 40 mg BID to oral prednisone  -- Mobilize  -- de-escalate abx soon  -- continue BD  --on doxycycline and rocephin- deescalated to doxycicline PO alone to complete 8 days of Rx  --PT/OT, OOB  Anticipate discharge by Brittanie Miller MD    More than 50% of time documented was spent in face-to-face contact with the patient and in the care of the patient on the floor/unit where the patient is located.

## 2017-10-02 ENCOUNTER — PATIENT OUTREACH (OUTPATIENT)
Dept: CASE MANAGEMENT | Age: 67
End: 2017-10-02

## 2017-10-02 ENCOUNTER — HOME HEALTH ADMISSION (OUTPATIENT)
Dept: HOME HEALTH SERVICES | Facility: HOME HEALTH | Age: 67
End: 2017-10-02

## 2017-10-02 VITALS
RESPIRATION RATE: 18 BRPM | TEMPERATURE: 98.5 F | DIASTOLIC BLOOD PRESSURE: 81 MMHG | SYSTOLIC BLOOD PRESSURE: 127 MMHG | OXYGEN SATURATION: 92 % | BODY MASS INDEX: 48.15 KG/M2 | HEART RATE: 78 BPM | WEIGHT: 254.5 LBS

## 2017-10-02 PROBLEM — J98.11 ATELECTASIS: Status: ACTIVE | Noted: 2017-10-02

## 2017-10-02 LAB
GLUCOSE BLD STRIP.AUTO-MCNC: 106 MG/DL (ref 65–100)
GLUCOSE BLD STRIP.AUTO-MCNC: 136 MG/DL (ref 65–100)

## 2017-10-02 PROCEDURE — 74011636637 HC RX REV CODE- 636/637: Performed by: INTERNAL MEDICINE

## 2017-10-02 PROCEDURE — 94640 AIRWAY INHALATION TREATMENT: CPT

## 2017-10-02 PROCEDURE — 82962 GLUCOSE BLOOD TEST: CPT

## 2017-10-02 PROCEDURE — 77010033678 HC OXYGEN DAILY

## 2017-10-02 PROCEDURE — 99238 HOSP IP/OBS DSCHRG MGMT 30/<: CPT | Performed by: INTERNAL MEDICINE

## 2017-10-02 PROCEDURE — 74011000250 HC RX REV CODE- 250: Performed by: INTERNAL MEDICINE

## 2017-10-02 PROCEDURE — 74011250637 HC RX REV CODE- 250/637: Performed by: NURSE PRACTITIONER

## 2017-10-02 RX ORDER — NYSTATIN 100000 [USP'U]/G
POWDER TOPICAL 2 TIMES DAILY
Qty: 1 BOTTLE | Refills: 0 | Status: SHIPPED | OUTPATIENT
Start: 2017-10-02

## 2017-10-02 RX ORDER — DOXYCYCLINE 100 MG/1
100 CAPSULE ORAL EVERY 12 HOURS
Qty: 6 CAP | Refills: 0 | Status: SHIPPED | OUTPATIENT
Start: 2017-10-02 | End: 2017-10-05

## 2017-10-02 RX ORDER — PREDNISONE 20 MG/1
TABLET ORAL
Qty: 11 TAB | Refills: 0 | Status: SHIPPED | OUTPATIENT
Start: 2017-10-02 | End: 2017-11-06

## 2017-10-02 RX ADMIN — DOXYCYCLINE HYCLATE 100 MG: 100 CAPSULE ORAL at 08:42

## 2017-10-02 RX ADMIN — NYSTATIN: 100000 POWDER TOPICAL at 08:43

## 2017-10-02 RX ADMIN — PREGABALIN 150 MG: 150 CAPSULE ORAL at 08:43

## 2017-10-02 RX ADMIN — PANTOPRAZOLE SODIUM 40 MG: 40 TABLET, DELAYED RELEASE ORAL at 06:49

## 2017-10-02 RX ADMIN — FOLIC ACID 1 MG: 1 TABLET ORAL at 08:43

## 2017-10-02 RX ADMIN — Medication 400 MG: at 08:43

## 2017-10-02 RX ADMIN — PREDNISONE 40 MG: 20 TABLET ORAL at 08:42

## 2017-10-02 RX ADMIN — MONTELUKAST SODIUM 10 MG: 10 TABLET, FILM COATED ORAL at 08:43

## 2017-10-02 RX ADMIN — ALPRAZOLAM 2 MG: 0.5 TABLET ORAL at 11:52

## 2017-10-02 RX ADMIN — GABAPENTIN 300 MG: 300 CAPSULE ORAL at 08:42

## 2017-10-02 RX ADMIN — ALBUTEROL SULFATE 2.5 MG: 2.5 SOLUTION RESPIRATORY (INHALATION) at 08:28

## 2017-10-02 RX ADMIN — SUCRALFATE 1 G: 1 TABLET ORAL at 08:43

## 2017-10-02 RX ADMIN — APIXABAN 2.5 MG: 2.5 TABLET, FILM COATED ORAL at 08:43

## 2017-10-02 RX ADMIN — ALBUTEROL SULFATE 2.5 MG: 2.5 SOLUTION RESPIRATORY (INHALATION) at 11:37

## 2017-10-02 RX ADMIN — Medication 10 ML: at 06:49

## 2017-10-02 RX ADMIN — CARVEDILOL 12.5 MG: 12.5 TABLET, FILM COATED ORAL at 08:43

## 2017-10-02 RX ADMIN — LISINOPRIL 40 MG: 20 TABLET ORAL at 08:42

## 2017-10-02 NOTE — DISCHARGE INSTRUCTIONS
DISCHARGE SUMMARY from Nurse    The following personal items are in your possession at time of discharge:    Dental Appliances: Uppers  Visual Aid: Glasses     Home Medications: None                      PATIENT INSTRUCTIONS:    After general anesthesia or intravenous sedation, for 24 hours or while taking prescription Narcotics:  · Limit your activities  · Do not drive and operate hazardous machinery  · Do not make important personal or business decisions  · Do  not drink alcoholic beverages  · If you have not urinated within 8 hours after discharge, please contact your surgeon on call. Report the following to your surgeon:  · Excessive pain, swelling, redness or odor of or around the surgical area  · Temperature over 100.5  · Nausea and vomiting lasting longer than 4 hours or if unable to take medications  · Any signs of decreased circulation or nerve impairment to extremity: change in color, persistent  numbness, tingling, coldness or increase pain  · Any questions        What to do at Home:  Recommended activity: Activity as tolerated. If you experience any of the following symptoms temp > 101.5, worsening cough or wheezing, shortness of breath or fatigue not relieved with rest, please follow up with MD.      *  Please give a list of your current medications to your Primary Care Provider. *  Please update this list whenever your medications are discontinued, doses are      changed, or new medications (including over-the-counter products) are added. *  Please carry medication information at all times in case of emergency situations. These are general instructions for a healthy lifestyle:    No smoking/ No tobacco products/ Avoid exposure to second hand smoke    Surgeon General's Warning:  Quitting smoking now greatly reduces serious risk to your health.     Obesity, smoking, and sedentary lifestyle greatly increases your risk for illness    A healthy diet, regular physical exercise & weight monitoring are important for maintaining a healthy lifestyle    You may be retaining fluid if you have a history of heart failure or if you experience any of the following symptoms:  Weight gain of 3 pounds or more overnight or 5 pounds in a week, increased swelling in our hands or feet or shortness of breath while lying flat in bed. Please call your doctor as soon as you notice any of these symptoms; do not wait until your next office visit. Recognize signs and symptoms of STROKE:    F-face looks uneven    A-arms unable to move or move unevenly    S-speech slurred or non-existent    T-time-call 911 as soon as signs and symptoms begin-DO NOT go       Back to bed or wait to see if you get better-TIME IS BRAIN. Warning Signs of HEART ATTACK     Call 911 if you have these symptoms:   Chest discomfort. Most heart attacks involve discomfort in the center of the chest that lasts more than a few minutes, or that goes away and comes back. It can feel like uncomfortable pressure, squeezing, fullness, or pain.  Discomfort in other areas of the upper body. Symptoms can include pain or discomfort in one or both arms, the back, neck, jaw, or stomach.  Shortness of breath with or without chest discomfort.  Other signs may include breaking out in a cold sweat, nausea, or lightheadedness. Don't wait more than five minutes to call 911 - MINUTES MATTER! Fast action can save your life. Calling 911 is almost always the fastest way to get lifesaving treatment. Emergency Medical Services staff can begin treatment when they arrive -- up to an hour sooner than if someone gets to the hospital by car. The discharge information has been reviewed with the patient. The patient verbalized understanding. Discharge medications reviewed with the patient and appropriate educational materials and side effects teaching were provided.            Asthma in Adults: Care Instructions  Your Care Instructions    During an asthma attack, your airways swell and narrow as a reaction to certain things (triggers). This makes it hard to breathe. You may be able to prevent asthma attacks if you avoid the things that set off your asthma symptoms. Keeping your asthma under control and treating symptoms before they get bad can help you avoid severe attacks. If you can control your asthma, you may be able to do all of your normal daily activities. You may also avoid asthma attacks and trips to the hospital.  Follow-up care is a key part of your treatment and safety. Be sure to make and go to all appointments, and call your doctor if you are having problems. It's also a good idea to know your test results and keep a list of the medicines you take. How can you care for yourself at home? · Follow your asthma action plan so you can manage your symptoms at home. An asthma action plan will help you prevent and control airway reactions and will tell you what to do during an asthma attack. If you do not have an asthma action plan, work with your doctor to build one. · Take your asthma medicine exactly as prescribed. Medicine plays an important role in controlling asthma. Talk to your doctor right away if you have any questions about what to take and how to take it. ¨ Use your quick-relief medicine when you have symptoms of an attack. Quick-relief medicine often is an albuterol inhaler. Some people need to use quick-relief medicine before they exercise. ¨ Take your controller medicine every day, not just when you have symptoms. Controller medicine is usually an inhaled corticosteroid. The goal is to prevent problems before they occur. Do not use your controller medicine to try to treat an attack that has already started. It does not work fast enough to help. ¨ If your doctor prescribed corticosteroid pills to use during an attack, take them as directed. They may take hours to work, but they may shorten the attack and help you breathe better.   ¨ Keep your quick-relief medicine with you at all times. · Talk to your doctor before using other medicines. Some medicines, such as aspirin, can cause asthma attacks in some people. · Check yourself for asthma symptoms to know which step to follow in your action plan. Watch for things like being short of breath, having chest tightness, coughing, and wheezing. Also notice if symptoms wake you up at night or if you get tired quickly when you exercise. · If you have a peak flow meter, use it to check how well you are breathing. This can help you predict when an asthma attack is going to occur. Then you can take medicine to prevent the asthma attack or make it less severe. · See your doctor regularly. These visits will help you learn more about asthma and what you can do to control it. Your doctor will monitor your treatment to make sure the medicine is helping you. · Keep track of your asthma attacks and your treatment. After you have had an attack, write down what triggered it, what helped end it, and any concerns you have about your asthma action plan. Take your diary when you see your doctor. You can then review your asthma action plan and decide if it is working. · Do not smoke or allow others to smoke around you. Avoid smoky places. Smoking makes asthma worse. If you need help quitting, talk to your doctor about stop-smoking programs and medicines. These can increase your chances of quitting for good. · Learn what triggers an asthma attack for you, and avoid the triggers when you can. Common triggers include colds, smoke, air pollution, dust, pollen, mold, pets, cockroaches, stress, and cold air. · Avoid colds and the flu. Get a pneumococcal vaccine shot. If you have had one before, ask your doctor whether you need a second dose. Get a flu vaccine every fall. If you must be around people with colds or the flu, wash your hands often. When should you call for help?   Call 911 anytime you think you may need emergency care. For example, call if:  · You have severe trouble breathing. Call your doctor now or seek immediate medical care if:  · Your symptoms do not get better after you have followed your asthma action plan. · You cough up yellow, dark brown, or bloody mucus (sputum). Watch closely for changes in your health, and be sure to contact your doctor if:  · Your coughing and wheezing get worse. · You need to use quick-relief medicine on more than 2 days a week (unless it is just for exercise). · You need help figuring out what is triggering your asthma attacks. Where can you learn more? Go to http://era-fransisco.info/. Enter P597 in the search box to learn more about \"Asthma in Adults: Care Instructions. \"  Current as of: March 25, 2017  Content Version: 11.3  © 5725-6338 [employer]. Care instructions adapted under license by [employer] (which disclaims liability or warranty for this information). If you have questions about a medical condition or this instruction, always ask your healthcare professional. [place] [address] any warranty or liability for your use of this information. Pneumonia: Care Instructions  Your Care Instructions    Pneumonia is an infection of the lungs. Most cases are caused by infections from bacteria or viruses. Pneumonia may be mild or very severe. If it is caused by bacteria, you will be treated with antibiotics. It may take a few weeks to a few months to recover fully from pneumonia, depending on how sick you were and whether your overall health is good. Follow-up care is a key part of your treatment and safety. Be sure to make and go to all appointments, and call your doctor if you are having problems. Its also a good idea to know your test results and keep a list of the medicines you take. How can you care for yourself at home? · Take your antibiotics exactly as directed.  Do not stop taking the medicine just because you are feeling better. You need to take the full course of antibiotics. · Take your medicines exactly as prescribed. Call your doctor if you think you are having a problem with your medicine. · Get plenty of rest and sleep. You may feel weak and tired for a while, but your energy level will improve with time. · To prevent dehydration, drink plenty of fluids, enough so that your urine is light yellow or clear like water. Choose water and other caffeine-free clear liquids until you feel better. If you have kidney, heart, or liver disease and have to limit fluids, talk with your doctor before you increase the amount of fluids you drink. · Take care of your cough so you can rest. A cough that brings up mucus from your lungs is common with pneumonia. It is one way your body gets rid of the infection. But if coughing keeps you from resting or causes severe fatigue and chest-wall pain, talk to your doctor. He or she may suggest that you take a medicine to reduce the cough. · Use a vaporizer or humidifier to add moisture to your bedroom. Follow the directions for cleaning the machine. · Do not smoke or allow others to smoke around you. Smoke will make your cough last longer. If you need help quitting, talk to your doctor about stop-smoking programs and medicines. These can increase your chances of quitting for good. · Take an over-the-counter pain medicine, such as acetaminophen (Tylenol), ibuprofen (Advil, Motrin), or naproxen (Aleve). Read and follow all instructions on the label. · Do not take two or more pain medicines at the same time unless the doctor told you to. Many pain medicines have acetaminophen, which is Tylenol. Too much acetaminophen (Tylenol) can be harmful. · If you were given a spirometer to measure how well your lungs are working, use it as instructed. This can help your doctor tell how your recovery is going.   · To prevent pneumonia in the future, talk to your doctor about getting a flu vaccine (once a year) and a pneumococcal vaccine (one time only for most people). When should you call for help? Call 911 anytime you think you may need emergency care. For example, call if:  · You have severe trouble breathing. Call your doctor now or seek immediate medical care if:  · You cough up dark brown or bloody mucus (sputum). · You have new or worse trouble breathing. · You are dizzy or lightheaded, or you feel like you may faint. Watch closely for changes in your health, and be sure to contact your doctor if:  · You have a new or higher fever. · You are coughing more deeply or more often. · You are not getting better after 2 days (48 hours). · You do not get better as expected. Where can you learn more? Go to http://era-fransisco.info/. Enter [id number] in the search box to learn more about \"Pneumonia: Care Instructions. \"  Current as of: March 25, 2017  Content Version: 11.3  © 4358-1803 [employer]. Care instructions adapted under license by [employer] (which disclaims liability or warranty for this information). If you have questions about a medical condition or this instruction, always ask your healthcare professional. [address] any warranty or liability for your use of this information.

## 2017-10-02 NOTE — PROGRESS NOTES
Pt. sitting up in bed eating supper. 2lnc in place. Waiting on home O2.  at bedside. Call light in reach. Instructed to call for assistance.

## 2017-10-02 NOTE — PROGRESS NOTES
Oxygen Qualifier       Room air: SpO2 with O2 and liter flow   Resting SpO2  88%  93% on 2L   Ambulating SpO2  84%  91% on 3L       Completed by:    Lois Sahu

## 2017-10-02 NOTE — WOUND CARE
Mild redness between folds of groin and abdomen, noted nystatin powder in use, would continue, noted purwick in use, would continue nothing further to add. Available please call or re-consult. Will sign off.

## 2017-10-02 NOTE — PROGRESS NOTES
Discharge instructions, follow up information , medication list, prescriptions, and medication side effects sheet provided and explained to the pt. IV removed from right wrist.  Opportunity for questions provided. Patient waiting on home oxygen before leaving,  Patient and family at bedside verbalized understanding to wait until portable oxygen tank arrives to leave. Instructed to call once ready to leave.

## 2017-10-02 NOTE — PROGRESS NOTES
Patient is discharging home today. She requires home O2 and will need home health services at discharge. Home O2 has been arranged through [address] [place] [address]. A portable tank will be delivered to her room prior to discharge for transport home. She will receive home health RN, PT, and OT services through [facility]. No other discharge needs identified at this time. Case Management will remain available to assist as needed.     Care Management Interventions  Transition of Care Consult (CM Consult): Discharge Planning  Discharge Durable Medical Equipment: No  Physical Therapy Consult: Yes  Occupational Therapy Consult: Yes  Speech Therapy Consult: No  Current Support Network: Lives with Spouse, Own Home  Confirm Follow Up Transport: Family  Plan discussed with Pt/Family/Caregiver: Yes  Freedom of Choice Offered: Yes  Discharge Location  Discharge Placement: Home

## 2017-10-02 NOTE — PROGRESS NOTES
Shift assessment completed. Pt. Alert and oriented x4. Delayed responses. No acute distress noted. Minimal wheezing noted. Pure wick catheter in place. Call light in reach. Instructed to call for assistance.

## 2017-10-03 ENCOUNTER — HOME CARE VISIT (OUTPATIENT)
Dept: SCHEDULING | Facility: HOME HEALTH | Age: 67
End: 2017-10-03

## 2017-10-05 ENCOUNTER — PATIENT OUTREACH (OUTPATIENT)
Dept: CASE MANAGEMENT | Age: 67
End: 2017-10-05

## 2017-10-05 NOTE — PROGRESS NOTES
Transition of Care Discharge Follow-up Questionnaire   Date/Time of Call:   10/5/17 1600   What was the patient hospitalized for? Asthma           Does the patient understand his/her diagnosis and/or treatment and what happened during the hospitalization? yes   Did the patient receive discharge instructions? yes     Review any discharge instructions (see notes in [facility]). Ask patient if they understand these. Do they have any questions? No questions    Were home services ordered (nursing, PT, OT, ST, etc.)? Turned down home care. Did not feel like she needed them. Has aid everyday. If so, has the first visit occurred? If not, why? (Assist with coordination of services if necessary.)   N/A       Was any DME ordered?   none   If so, has it been received? If not, why?  (Assist with coordination of arranging DME orders if necessary.)   N/A       Complete a review of all medications (new, continued and discontinued meds per the D/C instructions and medication tab in [facility]). Done        Were all new prescriptions filled? If not, why?  (Assist with obtainment of medications if necessary.) Yes, all filled          Does the patient understand the purpose and dosing instructions for all medications? (If patient has questions, provide explanation and education.) Yes   Does the patient have any problems in performing ADLs? (If patient is unable to perform ADLs  what is the limiting factor(s)? Do they have a support system that can assist? If no support system is present, discuss possible assistance that they may be able to obtain.)     Needs assistance with ADLs and has aid to assist.  and daughter in home. Does the patient have all follow-up appointments scheduled? Has transportation been arranged?   [facility] Pulmonary follow-up should be within 7 days of discharge; all others should have PCP follow-up within 7 days of discharge; follow-ups with other specialists as appropriate or ordered.) F/u with Dr Hi Baeza 10/6/17  Does not have [facility] Pulmonary appointment yet- will call. Any other questions or concerns expressed by the patient? None. SHLOMO Watson 99 number given if questions or needs arise. Schedule next appointment with ALEXA PEGUERO Coordinator or refer to RN Case Manager/  as appropriate.  10/12/17   TAWNY Call Completed By: Indigo Wallace RN- [facility], BSN  Care Transition/ Bundled Payment Navigator  [phone]

## 2017-10-24 ENCOUNTER — PATIENT OUTREACH (OUTPATIENT)
Dept: CASE MANAGEMENT | Age: 67
End: 2017-10-24

## 2017-10-30 ENCOUNTER — PATIENT OUTREACH (OUTPATIENT)
Dept: CASE MANAGEMENT | Age: 67
End: 2017-10-30

## 2017-11-01 ENCOUNTER — APPOINTMENT (OUTPATIENT)
Dept: GENERAL RADIOLOGY | Age: 67
DRG: 871 | End: 2017-11-01
Attending: EMERGENCY MEDICINE
Payer: MEDICARE

## 2017-11-01 ENCOUNTER — HOSPITAL ENCOUNTER (INPATIENT)
Age: 67
LOS: 5 days | Discharge: HOME | DRG: 871 | End: 2017-11-06
Attending: EMERGENCY MEDICINE | Admitting: INTERNAL MEDICINE
Payer: MEDICARE

## 2017-11-01 DIAGNOSIS — J18.9 COMMUNITY ACQUIRED PNEUMONIA OF LEFT LUNG, UNSPECIFIED PART OF LUNG: ICD-10-CM

## 2017-11-01 DIAGNOSIS — R53.81 DEBILITY: Chronic | ICD-10-CM

## 2017-11-01 DIAGNOSIS — A41.9 SEPSIS, DUE TO UNSPECIFIED ORGANISM: Primary | ICD-10-CM

## 2017-11-01 DIAGNOSIS — M79.7 FIBROMYALGIA: Chronic | ICD-10-CM

## 2017-11-01 DIAGNOSIS — J18.9 HOSPITAL-ACQUIRED PNEUMONIA: ICD-10-CM

## 2017-11-01 DIAGNOSIS — J18.9 HCAP (HEALTHCARE-ASSOCIATED PNEUMONIA): ICD-10-CM

## 2017-11-01 DIAGNOSIS — Z79.899 POLYPHARMACY: Chronic | ICD-10-CM

## 2017-11-01 DIAGNOSIS — L03.116 CELLULITIS OF LEFT LOWER EXTREMITY: ICD-10-CM

## 2017-11-01 DIAGNOSIS — J96.20 ACUTE ON CHRONIC RESPIRATORY FAILURE, UNSPECIFIED WHETHER WITH HYPOXIA OR HYPERCAPNIA (HCC): ICD-10-CM

## 2017-11-01 DIAGNOSIS — J98.4 RESTRICTIVE LUNG DISEASE: Chronic | ICD-10-CM

## 2017-11-01 DIAGNOSIS — N28.9 MILD RENAL INSUFFICIENCY: ICD-10-CM

## 2017-11-01 DIAGNOSIS — R47.9 SPEECH DISTURBANCE, UNSPECIFIED TYPE: Chronic | ICD-10-CM
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
## 2017-11-01 DIAGNOSIS — E66.01 MORBID OBESITY (HCC): Chronic | ICD-10-CM

## 2017-11-01 PROBLEM — R94.31 QT PROLONGATION: Chronic | Status: ACTIVE | Noted: 2017-04-10

## 2017-11-01 LAB
ALBUMIN SERPL-MCNC: 2.6 G/DL (ref 3.2–4.6)
ALBUMIN/GLOB SERPL: 0.8 {RATIO} (ref 1.2–3.5)
ALP SERPL-CCNC: 83 U/L (ref 50–136)
ALT SERPL-CCNC: 17 U/L (ref 12–65)
AMPHET UR QL SCN: POSITIVE
ANION GAP SERPL CALC-SCNC: 7 MMOL/L (ref 7–16)
APPEARANCE UR: CLEAR
AST SERPL-CCNC: 13 U/L (ref 15–37)
ATRIAL RATE: 71 BPM
BARBITURATES UR QL SCN: NEGATIVE
BASOPHILS # BLD: 0 K/UL (ref 0–0.2)
BASOPHILS NFR BLD: 0 % (ref 0–2)
BENZODIAZ UR QL: POSITIVE
BILIRUB SERPL-MCNC: 0.4 MG/DL (ref 0.2–1.1)
BILIRUB UR QL: NEGATIVE
BNP SERPL-MCNC: 16 PG/ML
BUN SERPL-MCNC: 24 MG/DL (ref 8–23)
CALCIUM SERPL-MCNC: 8.4 MG/DL (ref 8.3–10.4)
CALCULATED P AXIS, ECG09: 35 DEGREES
CALCULATED R AXIS, ECG10: -17 DEGREES
CALCULATED T AXIS, ECG11: 29 DEGREES
CANNABINOIDS UR QL SCN: NEGATIVE
CHLORIDE SERPL-SCNC: 104 MMOL/L (ref 98–107)
CO2 SERPL-SCNC: 33 MMOL/L (ref 21–32)
COCAINE UR QL SCN: NEGATIVE
COLOR UR: YELLOW
CREAT SERPL-MCNC: 1.31 MG/DL (ref 0.6–1)
DIAGNOSIS, 93000: NORMAL
DIFFERENTIAL METHOD BLD: ABNORMAL
EOSINOPHIL # BLD: 0.2 K/UL (ref 0–0.8)
EOSINOPHIL NFR BLD: 1 % (ref 0.5–7.8)
ERYTHROCYTE [DISTWIDTH] IN BLOOD BY AUTOMATED COUNT: 18.2 % (ref 11.9–14.6)
FLUAV AG NPH QL IA: NEGATIVE
FLUBV AG NPH QL IA: NEGATIVE
GLOBULIN SER CALC-MCNC: 3.1 G/DL (ref 2.3–3.5)
GLUCOSE BLD STRIP.AUTO-MCNC: 339 MG/DL (ref 65–100)
GLUCOSE SERPL-MCNC: 99 MG/DL (ref 65–100)
GLUCOSE UR STRIP.AUTO-MCNC: NEGATIVE MG/DL
HCT VFR BLD AUTO: 31.7 % (ref 35.8–46.3)
HGB BLD-MCNC: 9.8 G/DL (ref 11.7–15.4)
HGB UR QL STRIP: NEGATIVE
IGG SERPL-MCNC: 492 MG/DL (ref 700–1600)
IMM GRANULOCYTES # BLD: 0.1 K/UL (ref 0–0.5)
IMM GRANULOCYTES NFR BLD: 0 % (ref 0–5)
KETONES UR QL STRIP.AUTO: NEGATIVE MG/DL
LACTATE BLD-SCNC: 1.5 MMOL/L (ref 0.5–1.9)
LEUKOCYTE ESTERASE UR QL STRIP.AUTO: NEGATIVE
LYMPHOCYTES # BLD: 1.2 K/UL (ref 0.5–4.6)
LYMPHOCYTES NFR BLD: 6 % (ref 13–44)
MCH RBC QN AUTO: 25.7 PG (ref 26.1–32.9)
MCHC RBC AUTO-ENTMCNC: 30.9 G/DL (ref 31.4–35)
MCV RBC AUTO: 83 FL (ref 79.6–97.8)
METHADONE UR QL: NEGATIVE
MONOCYTES # BLD: 1.2 K/UL (ref 0.1–1.3)
MONOCYTES NFR BLD: 6 % (ref 4–12)
NEUTS SEG # BLD: 17.7 K/UL (ref 1.7–8.2)
NEUTS SEG NFR BLD: 87 % (ref 43–78)
NITRITE UR QL STRIP.AUTO: NEGATIVE
OPIATES UR QL: POSITIVE
P-R INTERVAL, ECG05: 246 MS
PCP UR QL: NEGATIVE
PH UR STRIP: 6 [PH] (ref 5–9)
PLATELET # BLD AUTO: 380 K/UL (ref 150–450)
PMV BLD AUTO: 9 FL (ref 10.8–14.1)
POTASSIUM SERPL-SCNC: 3.7 MMOL/L (ref 3.5–5.1)
PROCALCITONIN SERPL-MCNC: 0.1 NG/ML
PROT SERPL-MCNC: 5.7 G/DL (ref 6.3–8.2)
PROT UR STRIP-MCNC: NEGATIVE MG/DL
Q-T INTERVAL, ECG07: 372 MS
QRS DURATION, ECG06: 80 MS
QTC CALCULATION (BEZET), ECG08: 404 MS
RBC # BLD AUTO: 3.82 M/UL (ref 4.05–5.25)
SODIUM SERPL-SCNC: 144 MMOL/L (ref 136–145)
SP GR UR REFRACTOMETRY: 1.02 (ref 1–1.02)
UROBILINOGEN UR QL STRIP.AUTO: 0.2 EU/DL (ref 0.2–1)
VENTRICULAR RATE, ECG03: 71 BPM
WBC # BLD AUTO: 20.3 K/UL (ref 4.3–11.1)

## 2017-11-01 PROCEDURE — 99285 EMERGENCY DEPT VISIT HI MDM: CPT | Performed by: EMERGENCY MEDICINE

## 2017-11-01 PROCEDURE — 74011000258 HC RX REV CODE- 258: Performed by: EMERGENCY MEDICINE

## 2017-11-01 PROCEDURE — 83605 ASSAY OF LACTIC ACID: CPT

## 2017-11-01 PROCEDURE — 96366 THER/PROPH/DIAG IV INF ADDON: CPT | Performed by: EMERGENCY MEDICINE

## 2017-11-01 PROCEDURE — 80307 DRUG TEST PRSMV CHEM ANLYZR: CPT | Performed by: NURSE PRACTITIONER

## 2017-11-01 PROCEDURE — 85025 COMPLETE CBC W/AUTO DIFF WBC: CPT | Performed by: EMERGENCY MEDICINE

## 2017-11-01 PROCEDURE — 65270000029 HC RM PRIVATE

## 2017-11-01 PROCEDURE — 96367 TX/PROPH/DG ADDL SEQ IV INF: CPT | Performed by: EMERGENCY MEDICINE

## 2017-11-01 PROCEDURE — 83880 ASSAY OF NATRIURETIC PEPTIDE: CPT | Performed by: EMERGENCY MEDICINE

## 2017-11-01 PROCEDURE — 74011250636 HC RX REV CODE- 250/636: Performed by: EMERGENCY MEDICINE

## 2017-11-01 PROCEDURE — 87040 BLOOD CULTURE FOR BACTERIA: CPT | Performed by: EMERGENCY MEDICINE

## 2017-11-01 PROCEDURE — 82784 ASSAY IGA/IGD/IGG/IGM EACH: CPT | Performed by: NURSE PRACTITIONER

## 2017-11-01 PROCEDURE — 93005 ELECTROCARDIOGRAM TRACING: CPT | Performed by: EMERGENCY MEDICINE

## 2017-11-01 PROCEDURE — 74011000250 HC RX REV CODE- 250: Performed by: EMERGENCY MEDICINE

## 2017-11-01 PROCEDURE — 84145 PROCALCITONIN (PCT): CPT | Performed by: EMERGENCY MEDICINE

## 2017-11-01 PROCEDURE — 94640 AIRWAY INHALATION TREATMENT: CPT

## 2017-11-01 PROCEDURE — 82785 ASSAY OF IGE: CPT | Performed by: NURSE PRACTITIONER

## 2017-11-01 PROCEDURE — 87804 INFLUENZA ASSAY W/OPTIC: CPT | Performed by: INTERNAL MEDICINE

## 2017-11-01 PROCEDURE — 81003 URINALYSIS AUTO W/O SCOPE: CPT | Performed by: INTERNAL MEDICINE

## 2017-11-01 PROCEDURE — 80053 COMPREHEN METABOLIC PANEL: CPT | Performed by: EMERGENCY MEDICINE

## 2017-11-01 PROCEDURE — 74011636637 HC RX REV CODE- 636/637: Performed by: INTERNAL MEDICINE

## 2017-11-01 PROCEDURE — 74011000250 HC RX REV CODE- 250: Performed by: INTERNAL MEDICINE

## 2017-11-01 PROCEDURE — 71010 XR CHEST PORT: CPT

## 2017-11-01 PROCEDURE — 99223 1ST HOSP IP/OBS HIGH 75: CPT | Performed by: INTERNAL MEDICINE

## 2017-11-01 PROCEDURE — 74011250637 HC RX REV CODE- 250/637: Performed by: INTERNAL MEDICINE

## 2017-11-01 PROCEDURE — 96365 THER/PROPH/DIAG IV INF INIT: CPT | Performed by: EMERGENCY MEDICINE

## 2017-11-01 PROCEDURE — 74011250637 HC RX REV CODE- 250/637: Performed by: EMERGENCY MEDICINE

## 2017-11-01 PROCEDURE — 82962 GLUCOSE BLOOD TEST: CPT

## 2017-11-01 RX ORDER — MONTELUKAST SODIUM 10 MG/1
10 TABLET ORAL DAILY
Status: DISCONTINUED | OUTPATIENT
Start: 2017-11-02 | End: 2017-11-06 | Stop reason: HOSPADM

## 2017-11-01 RX ORDER — INSULIN LISPRO 100 [IU]/ML
INJECTION, SOLUTION INTRAVENOUS; SUBCUTANEOUS
Status: DISCONTINUED | OUTPATIENT
Start: 2017-11-01 | End: 2017-11-06 | Stop reason: HOSPADM

## 2017-11-01 RX ORDER — ZIPRASIDONE HYDROCHLORIDE 20 MG/1
160 CAPSULE ORAL
Status: DISCONTINUED | OUTPATIENT
Start: 2017-11-01 | End: 2017-11-04

## 2017-11-01 RX ORDER — PREGABALIN 150 MG/1
150 CAPSULE ORAL 2 TIMES DAILY
Status: DISCONTINUED | OUTPATIENT
Start: 2017-11-01 | End: 2017-11-06 | Stop reason: HOSPADM

## 2017-11-01 RX ORDER — PANTOPRAZOLE SODIUM 40 MG/1
40 TABLET, DELAYED RELEASE ORAL
Status: DISCONTINUED | OUTPATIENT
Start: 2017-11-02 | End: 2017-11-06 | Stop reason: HOSPADM

## 2017-11-01 RX ORDER — HYDROCODONE BITARTRATE AND ACETAMINOPHEN 10; 325 MG/1; MG/1
1 TABLET ORAL
Status: DISCONTINUED | OUTPATIENT
Start: 2017-11-01 | End: 2017-11-06 | Stop reason: HOSPADM

## 2017-11-01 RX ORDER — SODIUM CHLORIDE 0.9 % (FLUSH) 0.9 %
5-10 SYRINGE (ML) INJECTION EVERY 8 HOURS
Status: DISCONTINUED | OUTPATIENT
Start: 2017-11-01 | End: 2017-11-06 | Stop reason: HOSPADM

## 2017-11-01 RX ORDER — FOLIC ACID 1 MG/1
1 TABLET ORAL DAILY
Status: DISCONTINUED | OUTPATIENT
Start: 2017-11-02 | End: 2017-11-06 | Stop reason: HOSPADM

## 2017-11-01 RX ORDER — SUCRALFATE 1 G/1
1 TABLET ORAL
Status: DISCONTINUED | OUTPATIENT
Start: 2017-11-01 | End: 2017-11-06 | Stop reason: HOSPADM

## 2017-11-01 RX ORDER — CARVEDILOL 12.5 MG/1
12.5 TABLET ORAL 2 TIMES DAILY WITH MEALS
Status: DISCONTINUED | OUTPATIENT
Start: 2017-11-01 | End: 2017-11-06 | Stop reason: HOSPADM

## 2017-11-01 RX ORDER — ATORVASTATIN CALCIUM 10 MG/1
20 TABLET, FILM COATED ORAL
Status: DISCONTINUED | OUTPATIENT
Start: 2017-11-01 | End: 2017-11-06 | Stop reason: HOSPADM

## 2017-11-01 RX ORDER — ALPRAZOLAM 0.5 MG/1
2 TABLET ORAL
Status: DISCONTINUED | OUTPATIENT
Start: 2017-11-01 | End: 2017-11-06 | Stop reason: HOSPADM

## 2017-11-01 RX ORDER — NIACIN 500 MG/1
500 TABLET, EXTENDED RELEASE ORAL
Status: DISCONTINUED | OUTPATIENT
Start: 2017-11-02 | End: 2017-11-06 | Stop reason: HOSPADM

## 2017-11-01 RX ORDER — SODIUM CHLORIDE 9 MG/ML
500 INJECTION, SOLUTION INTRAVENOUS ONCE
Status: COMPLETED | OUTPATIENT
Start: 2017-11-01 | End: 2017-11-01

## 2017-11-01 RX ORDER — LEVOTHYROXINE SODIUM 112 UG/1
112 TABLET ORAL EVERY EVENING
Status: DISCONTINUED | OUTPATIENT
Start: 2017-11-02 | End: 2017-11-05

## 2017-11-01 RX ORDER — VANCOMYCIN 2 GRAM/500 ML IN 0.9 % SODIUM CHLORIDE INTRAVENOUS
2000 EVERY 12 HOURS
Status: DISCONTINUED | OUTPATIENT
Start: 2017-11-01 | End: 2017-11-02

## 2017-11-01 RX ORDER — SODIUM CHLORIDE 0.9 % (FLUSH) 0.9 %
5-10 SYRINGE (ML) INJECTION AS NEEDED
Status: DISCONTINUED | OUTPATIENT
Start: 2017-11-01 | End: 2017-11-06 | Stop reason: HOSPADM

## 2017-11-01 RX ORDER — IPRATROPIUM BROMIDE 21 UG/1
2 SPRAY, METERED NASAL EVERY 12 HOURS
Status: DISCONTINUED | OUTPATIENT
Start: 2017-11-01 | End: 2017-11-06 | Stop reason: HOSPADM

## 2017-11-01 RX ORDER — ACETAMINOPHEN 500 MG
1000 TABLET ORAL
Status: COMPLETED | OUTPATIENT
Start: 2017-11-01 | End: 2017-11-01

## 2017-11-01 RX ORDER — ALBUTEROL SULFATE 2.5 MG/.5ML
2.5 SOLUTION RESPIRATORY (INHALATION)
Status: DISCONTINUED | OUTPATIENT
Start: 2017-11-01 | End: 2017-11-02

## 2017-11-01 RX ORDER — GABAPENTIN 300 MG/1
300 CAPSULE ORAL 3 TIMES DAILY
Status: DISCONTINUED | OUTPATIENT
Start: 2017-11-01 | End: 2017-11-01

## 2017-11-01 RX ORDER — LISINOPRIL 20 MG/1
40 TABLET ORAL DAILY
Status: DISCONTINUED | OUTPATIENT
Start: 2017-11-02 | End: 2017-11-06 | Stop reason: HOSPADM

## 2017-11-01 RX ORDER — OXYBUTYNIN CHLORIDE 5 MG/1
5 TABLET ORAL 2 TIMES DAILY
Status: DISCONTINUED | OUTPATIENT
Start: 2017-11-01 | End: 2017-11-06 | Stop reason: HOSPADM

## 2017-11-01 RX ORDER — PREDNISONE 20 MG/1
40 TABLET ORAL
Status: DISCONTINUED | OUTPATIENT
Start: 2017-11-02 | End: 2017-11-03

## 2017-11-01 RX ADMIN — TOBRAMYCIN SULFATE 360 MG: 40 INJECTION, SOLUTION INTRAMUSCULAR; INTRAVENOUS at 22:34

## 2017-11-01 RX ADMIN — SODIUM CHLORIDE 500 ML: 900 INJECTION, SOLUTION INTRAVENOUS at 15:29

## 2017-11-01 RX ADMIN — OXYBUTYNIN CHLORIDE 5 MG: 5 TABLET ORAL at 21:20

## 2017-11-01 RX ADMIN — ACETAMINOPHEN 1000 MG: 500 TABLET, FILM COATED ORAL at 15:53

## 2017-11-01 RX ADMIN — AZTREONAM 2 G: 2 INJECTION, POWDER, LYOPHILIZED, FOR SOLUTION INTRAMUSCULAR; INTRAVENOUS at 15:29

## 2017-11-01 RX ADMIN — PREGABALIN 150 MG: 150 CAPSULE ORAL at 22:32

## 2017-11-01 RX ADMIN — Medication: at 21:13

## 2017-11-01 RX ADMIN — ALBUTEROL SULFATE 2.5 MG: 2.5 SOLUTION RESPIRATORY (INHALATION) at 20:03

## 2017-11-01 RX ADMIN — Medication 5 ML: at 21:22

## 2017-11-01 RX ADMIN — VANCOMYCIN HYDROCHLORIDE 2000 MG: 10 INJECTION, POWDER, LYOPHILIZED, FOR SOLUTION INTRAVENOUS at 16:04

## 2017-11-01 RX ADMIN — CARVEDILOL 12.5 MG: 12.5 TABLET, FILM COATED ORAL at 21:19

## 2017-11-01 RX ADMIN — INSULIN LISPRO 8 UNITS: 100 INJECTION, SOLUTION INTRAVENOUS; SUBCUTANEOUS at 22:31

## 2017-11-01 RX ADMIN — ATORVASTATIN CALCIUM 20 MG: 10 TABLET, FILM COATED ORAL at 22:00

## 2017-11-01 RX ADMIN — SUCRALFATE 1 G: 1 TABLET ORAL at 21:17

## 2017-11-01 RX ADMIN — HYDROCODONE BITARTRATE AND ACETAMINOPHEN 1 TABLET: 10; 325 TABLET ORAL at 22:32

## 2017-11-01 NOTE — ED PROVIDER NOTES
HPI Comments: Recently in hospital for pneumonia for about 5 days-discharged end of Sept.? Here with sob, fever to 100.5 for ems, mild tachycardia, cough. Also sore spot left lower leg from skin tear. Patient is a 78 y.o. female presenting with shortness of breath. The history is provided by the patient. Shortness of Breath   This is a new problem. The problem occurs continuously. The current episode started 12 to 24 hours ago. Associated symptoms include a fever, cough and leg pain. Pertinent negatives include no headaches, no rhinorrhea, no sore throat, no sputum production, no chest pain, no vomiting, no abdominal pain and no leg swelling. She has tried nothing for the symptoms. She has had prior hospitalizations. She has had prior ED visits. Associated medical issues include asthma, chronic lung disease and PE. Associated medical issues do not include heart failure. Past Medical History:   Diagnosis Date    Acute renal failure ([address] July, 2014    The patient was admitted with acute renal failure secondary to volume depletion. She was found to have a gastric ulcer on admission.  Bilateral pulmonary embolism ([address] September, 2012    Restarted on anticoagulation for lifetime    Calculus of ureter May, 2015    CAP (community acquired pneumonia) October, 2012    RML pneumonia    CVA (cerebral infarction) January, 2012    Reportedly has mild left arm residual weakness    Gastric ulcer July, 2014          Gram-positive bacteremia 1/2 cx 12/26/2016    HCAP (healthcare-associated pneumonia) January, 2013    RLL pneumonia    HCAP (healthcare-associated pneumonia) February, 2013    RLL pneumonia    Left leg DVT ([address] 1991    On coumadin until 2008    Psychiatric disorder     PUD (peptic ulcer disease) ? ??       Past Surgical History:   Procedure Laterality Date    HX APPENDECTOMY      HX CHOLECYSTECTOMY      HX ENDOSCOPY  July, 2014    Gastric ulcers x 2    HX GASTRIC BYPASS      HX HYSTERECTOMY      HX ORTHOPAEDIC      rt knee growth, right shoulder, left thumb    HX OTHER SURGICAL      vagus nerve stimulator    HX TONSILLECTOMY           Family History:   Problem Relation Age of Onset    Hypertension Mother     Cancer Father      prostate    Heart Attack Father     Heart Disease Father      CAD    Hypertension Father     Hypertension Sister     Stroke Sister     Heart Disease Brother      CAD with CABG    Heart Attack Brother     Hypertension Brother        Social History     Social History    Marital status:      Spouse name: N/A    Number of children: N/A    Years of education: N/A     Occupational History          Disabled     Social History Main Topics    Smoking status: Never Smoker    Smokeless tobacco: Never Used      Comment: exposed to second hand smoke at work for 17 years    Alcohol use No    Drug use: No    Sexual activity: Not on file     Other Topics Concern    Not on file     Social History Narrative    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 , one child who is healthy. Has always lived in [address]. Dog as a pet. No history of pet bird. There is no significant environmental or industrial exposure. There is no known exposure to TB. ALLERGIES: Latex; Bactrim [sulfamethoxazole-trimethoprim]; Lamictal [lamotrigine]; Pcn [penicillins]; and Tapentadol    Review of Systems   Constitutional: Positive for fever. Negative for chills. HENT: Negative for congestion, rhinorrhea and sore throat. Eyes: Negative for photophobia and redness. Respiratory: Positive for cough and shortness of breath. Negative for sputum production. Cardiovascular: Negative for chest pain and leg swelling. Gastrointestinal: Negative for abdominal pain, diarrhea, nausea and vomiting. Endocrine: Negative for polydipsia and polyuria. Genitourinary: Negative for dysuria. Musculoskeletal: Negative for back pain and myalgias. Neurological: Negative for weakness, numbness and headaches. Vitals:    11/01/17 1434   BP: 122/60   Pulse: 92   Resp: 22   Temp: 99.7 °F (37.6 °C)   SpO2: 92%   Weight: 127 kg (280 lb)   Height: 5' 1\" (1.549 m)            Physical Exam   Constitutional: She is oriented to person, place, and time. She appears well-developed and well-nourished. Eyes: Conjunctivae are normal. Pupils are equal, round, and reactive to light. Neck: Normal range of motion. Neck supple. Cardiovascular: Normal rate, regular rhythm and normal heart sounds. No murmur heard. Pulmonary/Chest: No respiratory distress. She has rales (bilateral lower lobe rales). Abdominal: Soft. She exhibits no distension. There is no tenderness. There is no rebound and no guarding. Musculoskeletal: Normal range of motion. She exhibits tenderness (sore tender mildly injected left lower lateral leg surrounding abrasions-palm sized area). She exhibits no edema. Neurological: She is alert and oriented to person, place, and time. She has normal strength. No sensory deficit. Skin: Skin is warm and dry. Nursing note and vitals reviewed. MDM  Number of Diagnoses or Management Options  Diagnosis management comments: Probable sepsis from HAP less likely cellulitis LLE. No urinary or GI symptoms.          Amount and/or Complexity of Data Reviewed  Clinical lab tests: reviewed and ordered (Results for orders placed or performed during the hospital encounter of 11/01/17  -CBC WITH AUTOMATED DIFF       Result                                            Value                         Ref Range                       WBC                                               20.3 (H)                      4.3 - 11.1 K/uL                 RBC                                               3.82 (L)                      4.05 - 5.25 M/uL                HGB                                               9.8 (L)                       11.7 - 15.4 g/dL                HCT                                               31.7 (L)                      35.8 - 46.3 %                   MCV                                               83.0                          79.6 - 97.8 FL                  MCH                                               25.7 (L)                      26.1 - 32.9 PG                  MCHC                                              30.9 (L)                      31.4 - 35.0 g/dL                RDW                                               18.2 (H)                      11.9 - 14.6 %                   PLATELET                                          380                           150 - 450 K/uL                  MPV                                               9.0 (L)                       10.8 - 14.1 FL                  DF                                                AUTOMATED                                                     NEUTROPHILS                                       87 (H)                        43 - 78 %                       LYMPHOCYTES                                       6 (L)                         13 - 44 %                       MONOCYTES                                         6                             4.0 - 12.0 %                    EOSINOPHILS                                       1                             0.5 - 7.8 %                     BASOPHILS                                         0                             0.0 - 2.0 %                     IMMATURE GRANULOCYTES                             0                             0.0 - 5.0 %                     ABS. NEUTROPHILS                                  17.7 (H)                      1.7 - 8.2 K/UL                  ABS.  LYMPHOCYTES                                  1.2                           0.5 - 4.6 K/UL                  ABS. MONOCYTES                                    1.2                           0.1 - 1.3 K/UL ABS. EOSINOPHILS                                  0.2                           0.0 - 0.8 K/UL                  ABS. BASOPHILS                                    0.0                           0.0 - 0.2 K/UL                  ABS. IMM.  GRANS.                                  0.1                           0.0 - 0.5 K/UL             -METABOLIC PANEL, COMPREHENSIVE       Result                                            Value                         Ref Range                       Sodium                                            144                           136 - 145 mmol/L                Potassium                                         3.7                           3.5 - 5.1 mmol/L                Chloride                                          104                           98 - 107 mmol/L                 CO2                                               33 (H)                        21 - 32 mmol/L                  Anion gap                                         7                             7 - 16 mmol/L                   Glucose                                           99                            65 - 100 mg/dL                  BUN                                               24 (H)                        8 - 23 MG/DL                    Creatinine                                        1.31 (H)                      0.6 - 1.0 MG/DL                 GFR est AA                                        52 (L)                        >60 ml/min/1.73m2               GFR est non-AA                                    43 (L)                        >60 ml/min/1.73m2               Calcium                                           8.4                           8.3 - 10.4 MG/DL                Bilirubin, total                                  0.4                           0.2 - 1.1 MG/DL                 ALT (SGPT)                                        17                            12 - 65 U/L AST (SGOT)                                        13 (L)                        15 - 37 U/L                     Alk. phosphatase                                  83                            50 - 136 U/L                    Protein, total                                    5.7 (L)                       6.3 - 8.2 g/dL                  Albumin                                           2.6 (L)                       3.2 - 4.6 g/dL                  Globulin                                          3.1                           2.3 - 3.5 g/dL                  A-G Ratio                                         0.8 (L)                       1.2 - 3.5                  -PROCALCITONIN       Result                                            Value                         Ref Range                       Procalcitonin                                     0.1                           ng/mL                      -POC LACTIC ACID       Result                                            Value                         Ref Range                       Lactic Acid (POC)                                 1.5                           0.5 - 1.9 mmol/L           -EKG, 12 LEAD, INITIAL       Result                                            Value                         Ref Range                       Ventricular Rate                                  71                            BPM                             Atrial Rate                                       71                            BPM                             P-R Interval                                      246                           ms                              QRS Duration                                      80                            ms                              Q-T Interval                                      372                           ms                              QTC Calculation (Bezet)                           404                           ms Calculated P Axis                                 35                            degrees                         Calculated R Axis                                 -17                           degrees                         Calculated T Axis                                 29                            degrees                         Diagnosis                                                                                                   !! AGE AND GENDER SPECIFIC ECG ANALYSIS !! Sinus rhythm with 1st degree A-V block   Otherwise normal ECG   When compared with ECG of 28-SEP-2017 11:40,   FL interval has increased   Confirmed by El Lucia MD (), TOMI FARRAR ([id number]) on 11/1/2017 3:55:49 PM     )  Tests in the radiology section of CPT®: ordered and reviewed (Xr Chest Port    Result Date: 11/1/2017  Portable AP upright chest dated 11/1/2017 Comparison x-ray 9/28/2017 CLINICAL INFORMATION: Shortness of breath Heart is enlarged, thoracic aorta mildly tortuous. Vascularity is prominent and mildly congested. There are hazy infiltrates right mid and both lower lung zones. No pleural effusion. IMPRESSION: 1. Mild cardiomegaly with mild prominence of vascularity in the lungs.  2. Bilateral infiltrates    )  Discuss the patient with other providers: yes      ED Course       Procedures

## 2017-11-01 NOTE — IP AVS SNAPSHOT
[address] 
[phone] Patient: Ernesto Haynes MRN: [id number] DDU:[id number][birth year]70[birth year][id number] About your hospitalization You were admitted on:  November 1, 2017 You last received care in the:  [facility] You were discharged on:  November 6, 2017 Why you were hospitalized Your primary diagnosis was:  Not on File Your diagnoses also included:  Restrictive Lung Disease, Speech Abnormality, Hcap (Healthcare-Associated Pneumonia), Morbid Obesity (Hcc), Polypharmacy, Acute On Chronic Respiratory Failure (Hcc), Debility, Fibromyalgia, Cellulitis Of Left Lower Extremity, Sepsis (Hcc), Drug Abuse Things You Need To Do (next 8 weeks) Wednesday Nov 08, 2017 Follow up with Frank Reyes DO  
2:45 PM  
  
Phone:  [phone] Where:  [facility], [address] [place] [address] Wednesday Nov 22, 2017 HOSPITAL with Yolette Snider NP at 11:00 AM (Arrive by 10:30 AM) Where:  [facility] ([facility]) Discharge Orders None A check elvi indicates which time of day the medication should be taken. My Medications TAKE these medications as instructed Instructions Each Dose to Equal  
 Morning Noon Evening Bedtime ADDERALL XR 20 mg XR capsule Generic drug:  amphetamine-dextroamphetamine XR Your next dose is:  Tomorrow Morning Take 20 mg by mouth daily. 20 mg  
    
  
   
   
   
  
 * PROAIR HFA 90 mcg/actuation inhaler Generic drug:  albuterol Take 2 Puffs by inhalation every four (4) hours as needed for Wheezing. 2 Puff * albuterol sulfate 2.5 mg/0.5 mL Nebu nebulizer solution Commonly known as:  PROVENTIL;VENTOLIN  
   
 0.5 mL by Nebulization route every four (4) hours. Dx J45.909  
 2.5 mg  
    
   
   
   
  
 ALPRAZolam 2 mg tablet Commonly known as:  Nallely Samano Your last dose was:  Yesterday 11/05/17 at 2140 Your next dose is: Take on as needed schedule Take 1 Tab by mouth three (3) times daily as needed for Anxiety. Max Daily Amount: 6 mg.  
 2 mg  
    
   
   
   
  
 atorvastatin 10 mg tablet Commonly known as:  LIPITOR Your next dose is: Take tonight Take 2 Tabs by mouth nightly. 20 mg  
    
   
   
   
  
  
 ATROVENT 0.03 % nasal spray Generic drug:  ipratropium Your next dose is: This evening 2 Sprays every twelve (12) hours. 2 Spray Biotin 2,500 mcg Cap Your next dose is:  Tomorrow Morning Take  by mouth. CARAFATE 1 gram tablet Generic drug:  sucralfate Your next dose is: This evening Take 1 g by mouth four (4) times daily. 1 g  
    
  
   
  
   
  
   
  
  
 COQ10  PO Take  by mouth. COREG 12.5 mg tablet Generic drug:  carvedilol Your next dose is: This evening Take 12.5 mg by mouth two (2) times daily (with meals). 12.5 mg  
    
  
   
   
  
   
  
 ELIQUIS 2.5 mg tablet Generic drug:  apixaban Your next dose is: This evening Take 2.5 mg by mouth two (2) times a day. Indications: PULMONARY THROMBOEMBOLISM PREVENTION  
 2.5 mg  
    
  
   
   
  
   
  
 fluticasone-vilanterol 200-25 mcg/dose inhaler Commonly known as:  BREO ELLIPTA Your next dose is:  Tomorrow Morning Take 1 Puff by inhalation daily. RINSE MOUTH WELL AFTER USE  
 1 Puff  
    
  
   
   
   
  
 folic acid 917 mcg tablet Your next dose is:  Tomorrow Morning Take 800 mcg by mouth daily. 800 mcg  
    
  
   
   
   
  
 furosemide 40 mg tablet Commonly known as:  LASIX Take 1 Tab by mouth daily as needed. 40 mg  
    
   
   
   
  
 GEODON 80 mg capsule Generic drug:  ziprasidone Your next dose is: Take tonight Take 160 mg by mouth nightly. 160 mg  
    
   
   
   
  
  
 IMITREX PO  
   
 take 100 mg by mouth as needed. 100 mg  
    
   
   
   
  
 lisinopril 40 mg tablet Commonly known as:  Cookie Pinch Your next dose is:  Tomorrow Morning Take 1 Tab by mouth daily. 40 mg  
    
  
   
   
   
  
 Magnesium Oxide 500 mg Cap Your next dose is:  Tomorrow Morning Take 500 mg by mouth. 500 mg  
    
  
   
   
   
  
 mirtazapine 30 mg tablet Commonly known as:  [place] Brightly Your next dose is: Take tonight Take 45 mg by mouth nightly. 45 mg  
    
   
   
   
  
  
 montelukast 10 mg tablet Commonly known as:  SINGULAIR Your next dose is:  Tomorrow Morning Take 10 mg by mouth daily. 10 mg  
    
  
   
   
   
  
 MOVANTIK 25 mg Tab tablet Generic drug:  naloxegol Your next dose is:  Tomorrow Morning Take  by mouth Daily (before breakfast). NEBULIZER  
   
 by Does Not Apply route. nicotinic acid 500 mg tablet Commonly known as:  NIACIN Your next dose is:  Tomorrow Morning Take 500 mg by mouth Daily (before breakfast). 500 mg NORCO  mg tablet Generic drug:  HYDROcodone-acetaminophen Take 1 Tab by mouth every six (6) hours as needed for Pain. 1 Tab  
    
   
   
   
  
 nystatin powder Commonly known as:  MYCOSTATIN Apply  to affected area two (2) times a day. oxybutynin chloride XL 10 mg CR tablet Commonly known as:  DITROPAN XL Your next dose is:  Tomorrow Morning Take 10 mg by mouth daily. 10 mg OXYGEN-AIR DELIVERY SYSTEMS  
   
 2 lpm cont. pantoprazole 40 mg tablet Commonly known as:  PROTONIX Your next dose is: This evening Take 1 Tab by mouth Before breakfast and dinner.   
 40 mg  
    
  
   
   
  
   
  
 POTASSIUM CHLORIDE SR 10 MEQ TAB Your next dose is:  Tomorrow Morning Take 1 Tab by mouth daily. 1 Tab  
    
  
   
   
   
  
 predniSONE 20 mg tablet Commonly known as:  Ofelia Dino Your next dose is:  Tomorrow Morning Take 1 tablet daily for 5 days then, take 1/2 tablet daily for 5 days then stop. pregabalin 150 mg capsule Commonly known as:  Palmiramaryan Marianne Take  by mouth. SUPER B COMPLEX PO Take 1 Tab by mouth daily. 1 Tab SYNTHROID 125 mcg tablet Generic drug:  levothyroxine Your next dose is:  Tomorrow Morning Take 112 mcg by mouth every evening. 112 mcg VITAMIN D3 1,000 unit Cap Generic drug:  cholecalciferol Your next dose is:  Tomorrow Morning Take  by mouth. ZOFRAN ODT 8 mg disintegrating tablet Generic drug:  ondansetron Take 8 mg by mouth every eight (8) hours as needed for Nausea. 8 mg * Notice: This list has 2 medication(s) that are the same as other medications prescribed for you. Read the directions carefully, and ask your doctor or other care provider to review them with you. Where to Get Your Medications Information on where to get these meds will be given to you by the nurse or doctor. ! Ask your nurse or doctor about these medications  
  furosemide 40 mg tablet  
 predniSONE 20 mg tablet Discharge Instructions Pneumonia: Care Instructions Your Care Instructions Pneumonia is an infection of the lungs. Most cases are caused by infections from bacteria or viruses. Pneumonia may be mild or very severe. If it is caused by bacteria, you will be treated with antibiotics. It may take a few weeks to a few months to recover fully from pneumonia, depending on how sick you were and whether your overall health is good. Follow-up care is a key part of your treatment and safety. Be sure to make and go to all appointments, and call your doctor if you are having problems. It's also a good idea to know your test results and keep a list of the medicines you take. How can you care for yourself at home? · Take your antibiotics exactly as directed. Do not stop taking the medicine just because you are feeling better. You need to take the full course of antibiotics. · Take your medicines exactly as prescribed. Call your doctor if you think you are having a problem with your medicine. · Get plenty of rest and sleep. You may feel weak and tired for a while, but your energy level will improve with time. · To prevent dehydration, drink plenty of fluids, enough so that your urine is light yellow or clear like water. Choose water and other caffeine-free clear liquids until you feel better. If you have kidney, heart, or liver disease and have to limit fluids, talk with your doctor before you increase the amount of fluids you drink. · Take care of your cough so you can rest. A cough that brings up mucus from your lungs is common with pneumonia. It is one way your body gets rid of the infection. But if coughing keeps you from resting or causes severe fatigue and chest-wall pain, talk to your doctor. He or she may suggest that you take a medicine to reduce the cough. · Use a vaporizer or humidifier to add moisture to your bedroom. Follow the directions for cleaning the machine. · Do not smoke or allow others to smoke around you. Smoke will make your cough last longer. If you need help quitting, talk to your doctor about stop-smoking programs and medicines. These can increase your chances of quitting for good. · Take an over-the-counter pain medicine, such as acetaminophen (Tylenol), ibuprofen (Advil, Motrin), or naproxen (Aleve). Read and follow all instructions on the label. · Do not take two or more pain medicines at the same time unless the doctor told you to. Many pain medicines have acetaminophen, which is Tylenol. Too much acetaminophen (Tylenol) can be harmful. · If you were given a spirometer to measure how well your lungs are working, use it as instructed. This can help your doctor tell how your recovery is going. · To prevent pneumonia in the future, talk to your doctor about getting a flu vaccine (once a year) and a pneumococcal vaccine (one time only for most people). When should you call for help? Call 911 anytime you think you may need emergency care. For example, call if: 
? · You have severe trouble breathing. ?Call your doctor now or seek immediate medical care if: 
? · You cough up dark brown or bloody mucus (sputum). ? · You have new or worse trouble breathing. ? · You are dizzy or lightheaded, or you feel like you may faint. ? Watch closely for changes in your health, and be sure to contact your doctor if: 
? · You have a new or higher fever. ? · You are coughing more deeply or more often. ? · You are not getting better after 2 days (48 hours). ? · You do not get better as expected. Where can you learn more? Go to http://era-fransisco.info/. Enter [id number] in the search box to learn more about \"Pneumonia: Care Instructions. \" Current as of: May 12, 2017 Content Version: 11.4 © 4784-8027 NPR. Care instructions adapted under license by [employer] (which disclaims liability or warranty for this information). If you have questions about a medical condition or this instruction, always ask your healthcare professional. [place] [address] any warranty or liability for your use of this information. Sepsis: Care Instructions Your Care Instructions Sepsis is an infection that has spread throughout your body.  It is a life-threatening condition and often causes extremely low blood pressure. This can lead to problems with many different organs. The cause of sepsis is not always clear, but it can happen as part of a long-term or sudden illness. Sometimes even a mild illness can lead to sepsis. Follow-up care is a key part of your treatment and safety. Be sure to make and go to all appointments, and call your doctor if you are having problems. It's also a good idea to know your test results and keep a list of the medicines you take. How can you care for yourself at home? · If your doctor prescribed antibiotics, take them as directed. Do not stop taking them just because you feel better. You need to take the full course of antibiotics. · Drink plenty of fluids, enough so that your urine is light yellow or clear like water. Choose water or caffeine-free clear liquids until you feel better. If you have kidney, heart, or liver disease and have to limit fluids, talk with your doctor before you increase your fluid intake. You can try rehydration drinks, such as Gatorade or Powerade. · Do not drink alcohol. · Eat a healthy diet. Include fruits, vegetables, and whole grains in your diet every day. · Walking is an easy way to get exercise. Gradually increase the amount you walk every day. Make sure your doctor knows that you are starting an exercise program. 
· Do not smoke or use other tobacco products. If you need help quitting, talk to your doctor about stop-smoking programs and medicines. These can increase your chances of quitting for good. When should you call for help? Call 911 anytime you think you may need emergency care. For example, call if: 
? · You passed out (lost consciousness). ?Call your doctor now or seek immediate medical care if: 
? · You have a fever or chills. ? · You have cool, pale, or clammy skin. ? · You are dizzy or lightheaded, or you feel like you may faint. ? · You have any new symptoms, such as a cough, pain in one part of your body, or urinary problems. ? Watch closely for changes in your health, and be sure to contact your doctor if: 
? · You do not get better as expected. Where can you learn more? Go to http://era-fransisco.info/. Enter H571 in the search box to learn more about \"Sepsis: Care Instructions. \" Current as of: March 20, 2017 Content Version: 11.4 © 1488-6990 [employer]. Care instructions adapted under license by [employer] (which disclaims liability or warranty for this information). If you have questions about a medical condition or this instruction, always ask your healthcare professional. [address] any warranty or liability for your use of this information. DISCHARGE SUMMARY from Nurse PATIENT INSTRUCTIONS: 
 
 
F-face looks uneven A-arms unable to move or move unevenly S-speech slurred or non-existent T-time-call 911 as soon as signs and symptoms begin-DO NOT go Back to bed or wait to see if you get better-TIME IS BRAIN. Warning Signs of HEART ATTACK Call 911 if you have these symptoms: 
? Chest discomfort. Most heart attacks involve discomfort in the center of the chest that lasts more than a few minutes, or that goes away and comes back. It can feel like uncomfortable pressure, squeezing, fullness, or pain. ? Discomfort in other areas of the upper body. Symptoms can include pain or discomfort in one or both arms, the back, neck, jaw, or stomach. ? Shortness of breath with or without chest discomfort. ? Other signs may include breaking out in a cold sweat, nausea, or lightheadedness. Don't wait more than five minutes to call [address]! Fast action can save your life.  Calling 911 is almost always the fastest way to get lifesaving treatment. Emergency Medical Services staff can begin treatment when they arrive  up to an hour sooner than if someone gets to the hospital by car. The discharge information has been reviewed with the patient. The patient verbalized understanding. Discharge medications reviewed with the patient and appropriate educational materials and side effects teaching were provided. ___________________________________________________________________________________________________________________________________ [employer] Announcement We are excited to announce that we are making your provider's discharge notes available to you in [employer] You will see these notes when they are completed and signed by the physician that discharged you from your recent hospital stay. If you have any questions or concerns about any information you see in [employer] please call the Health Information Department where you were seen or reach out to your Primary Care Provider for more information about your plan of care. Introducing [facility]! Lu Astorga introduces [employer] patient portal. Now you can access parts of your medical record, email your doctor's office, and request medication refills online. 1. In your internet browser, go to [email or web address] 2. Click on the First Time User? Click Here link in the Sign In box. You will see the New Member Sign Up page. 3. Enter your [employer] Access Code exactly as it appears below. You will not need to use this code after youve completed the sign-up process. If you do not sign up before the expiration date, you must request a new code. · [employer] Access Code: [email or web address] Expires: 11/20/2017 10:54 AM 
 
4. Enter the last four digits of your Social Security Number (xxxx) and Date of Birth (mm/dd/yyyy) as indicated and click Submit. You will be taken to the next sign-up page. 5. Create a [employer] ID. This will be your [employer] login ID and cannot be changed, so think of one that is secure and easy to remember. 6. Create a [employer] password. You can change your password at any time. 7. Enter your Password Reset Question and Answer. This can be used at a later time if you forget your password. 8. Enter your e-mail address. You will receive e-mail notification when new information is available in [address] 9. Click Sign Up. You can now view and download portions of your medical record. 10. Click the Download Summary menu link to download a portable copy of your medical information. If you have questions, please visit the Frequently Asked Questions section of the [employer] website. Remember, [employer] is NOT to be used for urgent needs. For medical emergencies, dial 911. Now available from your iPhone and Android! Providers Seen During Your Hospitalization Provider Specialty Primary office phone Ananth Edward MD Emergency Medicine [phone] Yakelin Soto MD Pulmonary Disease [phone] Your Primary Care Physician (PCP) Primary Care Physician Office Phone Office Fax Anya Paredes [phone] You are allergic to the following Allergen Reactions Latex Contact Dermatitis Bactrim (Sulfamethoxazole-Trimethoprim) Nausea Only Lamictal (Lamotrigine) Atopic Dermatitis Pcn (Penicillins) Rash Tapentadol Rash Recent Documentation Height Weight Breastfeeding? BMI OB Status Smoking Status 1.549 m 112.2 kg No 46.73 kg/m2 Hysterectomy Never Smoker Emergency Contacts Name Discharge Info Relation Home Work Mobile Trung Carvalho  Daughter [26] [phone] Shayy Serra  Spouse [3] [phone] [facility]   [phone] Patient Belongings The following personal items are in your possession at time of discharge: Dental Appliances: None  Visual Aid: Glasses      Home Medications: None   Jewelry: None  Clothing: Pajamas    Other Valuables: None Please provide this summary of care documentation to your next provider. Signatures-by signing, you are acknowledging that this After Visit Summary has been reviewed with you and you have received a copy. Patient Signature:  ____________________________________________________________ Date:  ____________________________________________________________  
  
[facility] Provider Signature:  ____________________________________________________________ Date:  ____________________________________________________________

## 2017-11-01 NOTE — PROGRESS NOTES
Please note that patient is an Asthma Bundled Payment patient. She initially agreed, but turned down home care and Health  services when called to follow up. Recent Pulmonary visit on 10/27/17- Sleep study being arranged. Seen patient in the ER. She is very drowsy, but oriented. Discussed events that lead to ER. 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 Reports she hit her leg on her grandchild's chair, and started to feel bad. Discussed Health  follow up again, and she agrees. Consider use of VibraLung, and we can provide in the home.       Kasandra Khan RN- [facility], BSN  Care Transition/ Bundled Payment Navigator  [phone]

## 2017-11-01 NOTE — ED TRIAGE NOTES
Pt arrives to the ED from home. Pt states she had a fever and her lungs \"felt raspy\". Pt temp 100.5 orally, HR 95, o2 sat 92% 4L nasal cannula. EMS did not obtain IV access. Pt alert and oriented during triage. States she had pneumonia approx 1 month prior to ED admission, and finished the entire cycle of antibiotics. Today pt complains of left ankle pain, stating she bumped it about a week ago. Are is red and warm to touch.

## 2017-11-01 NOTE — H&P
[facility] H&P :  11/1/2017    Date of Admission:  11/1/2017      Subjective:     Patient is a 78 y.o.  female presents with dyspnea. She is well known to us has been hospitalized a number of times for asthma exacerbation. She recently was discharged on home oxygen. She has restrictive lung disease with morbid obesity, chronic pain, chronic left vocal cord paralysis, GERD, hx of PE on lifelong anticoagulation, previous gastric bypass 13 years ago, Fe def anemia (has been on iron infusions) and multiple bouts of pneumonia in 2012, 2013 and 2017.        Of note, she was also admitted in April 2017, 7/25-7/27/17 and 9/28/17- 10/2/2017. She was seen in our office post- hospital follow up on 10/27. Her previous drug screens have been positive for amphetamines, benzos, and opiates. She has received antibiotics for bronchitis while here and another round from here PCP since discharge. Her SOB had improved at that appointment. She uses pro-air and nebulizer treatments. She is on oxygen at 2 L continuous. She is taking Mucinex twice daily. PSG was ordered. Today, she is presents with SOB. Lethargic with flat affect on multiple sedating medications. Bilateral infiltrates on CXR and hypoxemia. We were asked to admit for further acute care.      Patient Active Problem List    Diagnosis Date Noted    HCAP (healthcare-associated pneumonia) 11/01/2017    Atelectasis 10/02/2017    Restrictive lung disease 09/29/2017    Morbid obesity - sedentary lifestyle 09/28/2017    Hypothyroidism 09/28/2017    Asthma 09/28/2017    CAP (community acquired pneumonia) 09/28/2017    Asthma with acute exacerbation 09/28/2017    Hx of pulmonary embolus 09/28/2017    Bilateral lower extremity edema 07/25/2017    Acute on chronic respiratory failure ([facility] [address] 07/25/2017    Sore throat 07/25/2017    PATRICIA (obstructive sleep apnea) 04/10/2017    QT prolongation 04/10/2017    Speech abnormality 02/08/2017    Polypharmacy 02/08/2017    Calculus of ureter 05/01/2015    Stress incontinence in female 10/14/2014    Anxiety state 05/05/2014    Chronic rhinitis 05/05/2014    Chronic narcotic use 02/07/2013    History of peptic ulcer disease 01/13/2013    Debility 10/01/2012    Hypertension 09/16/2012    GERD (gastroesophageal reflux disease) 09/16/2012    Arthritis 09/16/2012    Bipolar affective disorder ([address] 09/16/2012    Fibromyalgia        Past Medical History:   Diagnosis Date    Acute renal failure ([address] July, 2014    The patient was admitted with acute renal failure secondary to volume depletion. She was found to have a gastric ulcer on admission.  Bilateral pulmonary embolism ([address] September, 2012    Restarted on anticoagulation for lifetime    Calculus of ureter May, 2015    CAP (community acquired pneumonia) October, 2012    RML pneumonia    CVA (cerebral infarction) January, 2012    Reportedly has mild left arm residual weakness    Gastric ulcer July, 2014          Gram-positive bacteremia 1/2 cx 12/26/2016    HCAP (healthcare-associated pneumonia) January, 2013    RLL pneumonia    HCAP (healthcare-associated pneumonia) February, 2013    RLL pneumonia    Left leg DVT ([address] 1991    On coumadin until 2008    Psychiatric disorder     PUD (peptic ulcer disease) ? ??      Past Surgical History:   Procedure Laterality Date    HX APPENDECTOMY      HX CHOLECYSTECTOMY      HX ENDOSCOPY  July, 2014    Gastric ulcers x 2    HX GASTRIC BYPASS      HX HYSTERECTOMY      HX ORTHOPAEDIC      rt knee growth, right shoulder, left thumb    HX OTHER SURGICAL      vagus nerve stimulator    HX TONSILLECTOMY          Social History   Substance Use Topics    Smoking status: Never Smoker    Smokeless tobacco: Never Used      Comment: exposed to second hand smoke at work for 17 years    Alcohol use No      Family History   Problem Relation Age of Onset    Hypertension Mother     Cancer Father prostate    Heart Attack Father     Heart Disease Father      CAD    Hypertension Father     Hypertension Sister     Stroke Sister     Heart Disease Brother      CAD with CABG    Heart Attack Brother     Hypertension Brother       Allergies   Allergen Reactions    Latex Contact Dermatitis    Bactrim [Sulfamethoxazole-Trimethoprim] Nausea Only    Lamictal [Lamotrigine] Atopic Dermatitis    Pcn [Penicillins] Rash    Tapentadol Rash      Prior to Admission Medications   Prescriptions Last Dose Informant Patient Reported? Taking? ALPRAZolam (XANAX) 2 mg tablet   No No   Sig: Take 1 Tab by mouth three (3) times daily as needed for Anxiety. Max Daily Amount: 6 mg. Biotin 2,500 mcg cap   Yes No   Sig: Take  by mouth. Cholecalciferol, Vitamin D3, (VITAMIN D3) 1,000 unit cap   Yes No   Sig: Take  by mouth. FERROUS FUMARATE/VIT BCOMP&C (SUPER B COMPLEX PO)   Yes No   Sig: Take 1 Tab by mouth daily. HYDROcodone-acetaminophen (NORCO)  mg tablet   Yes No   Sig: Take 1 Tab by mouth every six (6) hours as needed for Pain. Magnesium Oxide 500 mg cap   Yes No   Sig: Take 500 mg by mouth. NEBULIZER   Yes No   Sig: by Does Not Apply route. OXYGEN-AIR DELIVERY SYSTEMS   Yes No   Si lpm cont. POTASSIUM CHLORIDE SR 10 MEQ TAB   Yes No   Sig: Take 1 Tab by mouth daily. SUMATRIPTAN SUCCINATE (IMITREX PO)   Yes No   Sig: take 100 mg by mouth as needed. UBIDECARENONE/VITAMIN E MIXED (COQ10  PO)   Yes No   Sig: Take  by mouth. albuterol (PROAIR HFA) 90 mcg/actuation inhaler   Yes No   Sig: Take 2 Puffs by inhalation every four (4) hours as needed for Wheezing. albuterol sulfate (PROVENTIL;VENTOLIN) 2.5 mg/0.5 mL nebu nebulizer solution   No No   Si.5 mL by Nebulization route every four (4) hours. Dx J45.909   apixaban (ELIQUIS) 2.5 mg tablet   Yes No   Sig: Take 2.5 mg by mouth two (2) times a day.  Indications: PULMONARY THROMBOEMBOLISM PREVENTION   atorvastatin (LIPITOR) 10 mg tablet No No   Sig: Take 2 Tabs by mouth nightly. carvedilol (COREG) 12.5 mg tablet   Yes No   Sig: Take 12.5 mg by mouth two (2) times daily (with meals). fluticasone-vilanterol (BREO ELLIPTA) 200-25 mcg/dose inhaler   No No   Sig: Take 1 Puff by inhalation daily. RINSE MOUTH WELL AFTER USE   folic acid 146 mcg tablet   Yes No   Sig: Take 800 mcg by mouth daily. gabapentin (NEURONTIN) 300 mg capsule   Yes No   Sig: Take 300 mg by mouth three (3) times daily. ipratropium (ATROVENT) 0.03 % nasal spray   Yes No   Si Sprays every twelve (12) hours. levothyroxine (SYNTHROID) 125 mcg tablet   Yes No   Sig: Take 112 mcg by mouth every evening. lisinopril (PRINIVIL, ZESTRIL) 40 mg tablet   No No   Sig: Take 1 Tab by mouth daily. mirtazapine (REMERON) 30 mg tablet   Yes No   Sig: Take 45 mg by mouth nightly. montelukast (SINGULAIR) 10 mg tablet   Yes No   Sig: Take 10 mg by mouth daily. naloxegol (MOVANTIK) 25 mg tab tablet   Yes No   Sig: Take  by mouth Daily (before breakfast). nicotinic acid (NIACIN) 500 mg tablet   Yes No   Sig: Take 500 mg by mouth Daily (before breakfast). nystatin (MYCOSTATIN) powder   No No   Sig: Apply  to affected area two (2) times a day. ondansetron (ZOFRAN ODT) 8 mg disintegrating tablet   Yes No   Sig: Take 8 mg by mouth every eight (8) hours as needed for Nausea. oxybutynin chloride XL (DITROPAN XL) 10 mg CR tablet   Yes No   Sig: Take 10 mg by mouth daily. pantoprazole (PROTONIX) 40 mg tablet   No No   Sig: Take 1 Tab by mouth Before breakfast and dinner. predniSONE (DELTASONE) 20 mg tablet   No No   Simg per day for 3 days, then 20mg per day for 3 days then 10mg per day for 3 days, then stop. Patient taking differently: Take 10 mg by mouth daily. pregabalin (LYRICA) 150 mg capsule   Yes No   Sig: Take  by mouth.   sucralfate (CARAFATE) 1 gram tablet   Yes No   Sig: Take 1 g by mouth four (4) times daily.    ziprasidone (GEODON) 80 mg capsule   Yes No Sig: Take 160 mg by mouth nightly. Facility-Administered Medications: None       MEDS SCHEDULED:    Current Facility-Administered Medications   Medication Dose Route Frequency    aztreonam (AZACTAM) 2 g in 0.9% sodium chloride (MBP/ADV) 100 mL  2 g IntraVENous Q8H    tobramycin (NEBCIN) 360 mg in 0.9% sodium chloride 100 mL IVPB  360 mg IntraVENous Q24H    vancomycin (VANCOCIN) 2000 mg in  ml infusion  2,000 mg IntraVENous Q12H    [START ON 11/2/2017] TOBRAMYCIN INFORMATION NOTE   Other ONCE     Current Outpatient Prescriptions   Medication Sig    albuterol (PROAIR HFA) 90 mcg/actuation inhaler Take 2 Puffs by inhalation every four (4) hours as needed for Wheezing.  OXYGEN-AIR DELIVERY SYSTEMS 2 lpm cont.  fluticasone-vilanterol (BREO ELLIPTA) 200-25 mcg/dose inhaler Take 1 Puff by inhalation daily. RINSE MOUTH WELL AFTER USE    albuterol sulfate (PROVENTIL;VENTOLIN) 2.5 mg/0.5 mL nebu nebulizer solution 0.5 mL by Nebulization route every four (4) hours. Dx J45.909    nystatin (MYCOSTATIN) powder Apply  to affected area two (2) times a day.  predniSONE (DELTASONE) 20 mg tablet 40mg per day for 3 days, then 20mg per day for 3 days then 10mg per day for 3 days, then stop. (Patient taking differently: Take 10 mg by mouth daily.)    sucralfate (CARAFATE) 1 gram tablet Take 1 g by mouth four (4) times daily.  Biotin 2,500 mcg cap Take  by mouth.  UBIDECARENONE/VITAMIN E MIXED (COQ10  PO) Take  by mouth.  gabapentin (NEURONTIN) 300 mg capsule Take 300 mg by mouth three (3) times daily.  ipratropium (ATROVENT) 0.03 % nasal spray 2 Sprays every twelve (12) hours.  pregabalin (LYRICA) 150 mg capsule Take  by mouth.  montelukast (SINGULAIR) 10 mg tablet Take 10 mg by mouth daily.  naloxegol (MOVANTIK) 25 mg tab tablet Take  by mouth Daily (before breakfast).  carvedilol (COREG) 12.5 mg tablet Take 12.5 mg by mouth two (2) times daily (with meals).     apixaban (ELIQUIS) 2.5 mg tablet Take 2.5 mg by mouth two (2) times a day. Indications: PULMONARY THROMBOEMBOLISM PREVENTION    atorvastatin (LIPITOR) 10 mg tablet Take 2 Tabs by mouth nightly.  ALPRAZolam (XANAX) 2 mg tablet Take 1 Tab by mouth three (3) times daily as needed for Anxiety. Max Daily Amount: 6 mg.  mirtazapine (REMERON) 30 mg tablet Take 45 mg by mouth nightly.  ondansetron (ZOFRAN ODT) 8 mg disintegrating tablet Take 8 mg by mouth every eight (8) hours as needed for Nausea.  oxybutynin chloride XL (DITROPAN XL) 10 mg CR tablet Take 10 mg by mouth daily.  Magnesium Oxide 500 mg cap Take 500 mg by mouth.  lisinopril (PRINIVIL, ZESTRIL) 40 mg tablet Take 1 Tab by mouth daily.  HYDROcodone-acetaminophen (NORCO)  mg tablet Take 1 Tab by mouth every six (6) hours as needed for Pain.  POTASSIUM CHLORIDE SR 10 MEQ TAB Take 1 Tab by mouth daily.  Cholecalciferol, Vitamin D3, (VITAMIN D3) 1,000 unit cap Take  by mouth.  folic acid 214 mcg tablet Take 800 mcg by mouth daily.  nicotinic acid (NIACIN) 500 mg tablet Take 500 mg by mouth Daily (before breakfast).  pantoprazole (PROTONIX) 40 mg tablet Take 1 Tab by mouth Before breakfast and dinner.  FERROUS FUMARATE/VIT BCOMP&C (SUPER B COMPLEX PO) Take 1 Tab by mouth daily.  NEBULIZER by Does Not Apply route.  ziprasidone (GEODON) 80 mg capsule Take 160 mg by mouth nightly.  SUMATRIPTAN SUCCINATE (IMITREX PO) take 100 mg by mouth as needed.  levothyroxine (SYNTHROID) 125 mcg tablet Take 112 mcg by mouth every evening.          Review of Systems  Constitutional: positive for weight gain, obesity  Respiratory: positive for cough or dyspnea on exertion  Cardiovascular: negative for chest pain, chest pressure/discomfort, palpitations, irregular heart beats, near-syncope  Gastrointestinal: negative for nausea, vomiting, change in bowel habits, melena, diarrhea, constipation, abdominal pain and jaundice  Musculoskeletal:positive for debility    Objective:     Vitals:    11/01/17 1434 11/01/17 1528 11/01/17 1531   BP: 122/60 112/67    Pulse: 92 83    Resp: 22 29    Temp: 99.7 °F (37.6 °C)  (!) 102 °F (38.9 °C)   SpO2: 92% 91%    Weight: 280 lb (127 kg)     Height: 5' 1\" (1.549 m)                 PHYSICAL EXAM     Physical Exam:   General:  Flat affect, lethargic, flat affect   Eyes:  Conjunctivae/corneas clear. Nose: Nares patent. Mucosa pink/moist.    Mouth/Throat: Lips, mucosa, and tongue pink and intact. Neck: Supple, symmetrical.   Respiratory:   Scattered rhonchi to auscultation bilaterally on NC   Cardiovascular:  Regular rate and rhythm, S1, S2, no murmur, click, rub or gallop. Telemetry monitor:NSR   GI:   Soft, non-tender. Bowel sounds active X 4 Q. Extremities: Extremities symmetrical, atraumatic, no cyanosis, 1+edema. Pulses: 2+ and symmetric all extremities. Skin: Skin color, texture, turgor normal.       Neurologic: 2+ strength bilateral upper and lower extremities. Alert and oriented. CHEST X-RAYS:    CULTURES: ordered    LABS    Recent Labs      11/01/17   1447   WBC  20.3*   HGB  9.8*   HCT  31.7*   PLT  380     Recent Labs      11/01/17   1447   NA  144   K  3.7   CL  104   GLU  99   CO2  33*   BUN  24*   CREA  1.31*     Echocardiogram April 2017  SUMMARY:    -  Left ventricle: Systolic function was hyperdynamic. Ejection fraction was  estimated in the range of 75 % to 80 %. There were no regional wall motion  abnormalities. There was mild concentric hypertrophy. -  Atrial septum: Agitated saline contrast injection (bubble study) was  performed.  There was no right-to-left shunt, at rest or induced by the   Valsalva  maneuver.             Assessment:     Hospital Problems  Date Reviewed: 10/27/2017          Codes Class Noted POA    HCAP (healthcare-associated pneumonia) ICD-10-CM: J18.9  ICD-9-CM: 464  11/1/2017 Yes        Restrictive lung disease (Chronic) ICD-10-CM: J98.4  ICD-9-CM: 518.89  9/29/2017 Yes        Morbid obesity - sedentary lifestyle (Chronic) ICD-10-CM: E66.01  ICD-9-CM: 278.01  9/28/2017 Yes        Acute on chronic respiratory failure (HCC) ICD-10-CM: J96.20  ICD-9-CM: 518.84  7/25/2017 Yes        Speech abnormality (Chronic) ICD-10-CM: R47.9  ICD-9-CM: 784.59  2/8/2017 Yes        Polypharmacy (Chronic) ICD-10-CM: Y55.301  ICD-9-CM: V58.69  2/8/2017 Yes                Plan:     Admit for acute care  Oxygen  Check UDS, IgE, IgG, check aspergillosis parcipitants if IgE elevated. Absolute eosinophils are not elevated. Antibiotics for HCAP  Autoset CPAP while here. Needs O/P PSG! Limit sedating agents- on multiple meds and suspect contributing to disposition  PT/OT, suspect she needs rehab or more skilled care  May need to repeat Chest CT  Speech consult  May need bronchoscopy, ? Veronica Chance NP-C  More than 50% of time documented was spent in face-to-face contact with the patient and in the care of the patient on the floor/unit where the patient is located. Lungs:  Mild exp wheeze. Heart:  RRR with no Murmur/Rubs/Gallops    Additional Comments:    Patient presenting with fever. She states her breathing does not feel any worse than usual. Prescribed 2L O2, currently on 3L. WBC up, febrile, CXR with infiltrates. PNA certainly possible, but no purulent secretions. Also has wound on left LE where she hit it on a child's toy. This is another possible source of infection. Will admit, obtain cultures, cover for HAP given recent admit and abx. Oral steroids for mild wheeze. Cont home equivalent nebs. Meets criteria for sepsis but does not appear critically ill. I have spoken with and examined the patient. I agree with the above assessment and plan as documented.     Caterina Sanchez MD

## 2017-11-01 NOTE — ED NOTES
TRANSFER - OUT REPORT:    Verbal report given to Evie(name) on Gerda Rodas  being transferred to 809(unit) for routine progression of care       Report consisted of patients Situation, Background, Assessment and   Recommendations(SBAR). Information from the following report(s) SBAR, Kardex, ED Summary, [facility] and Recent Results was reviewed with the receiving nurse. Lines:   Peripheral IV 11/01/17 Right (Active)       Peripheral IV 11/01/17 Left Arm (Active)   Site Assessment Clean, dry, & intact 11/1/2017  2:58 PM   Phlebitis Assessment 0 11/1/2017  2:58 PM   Infiltration Assessment 0 11/1/2017  2:58 PM   Dressing Status Clean, dry, & intact 11/1/2017  2:58 PM        Opportunity for questions and clarification was provided.

## 2017-11-01 NOTE — PROGRESS NOTES
TRANSFER - IN REPORT:    Verbal report received from ross(name) on Ernesto Haynes  being received from er(unit) for routine progression of care      Report consisted of patients Situation, Background, Assessment and   Recommendations(SBAR). Information from the following report(s) ED Summary was reviewed with the receiving nurse. Opportunity for questions and clarification was provided. Assessment completed upon patients arrival to unit and care assumed.  Awaiting arrival, report given to nelly Shine, primary nurse

## 2017-11-02 LAB
ANION GAP SERPL CALC-SCNC: 5 MMOL/L (ref 7–16)
BUN SERPL-MCNC: 24 MG/DL (ref 8–23)
CALCIUM SERPL-MCNC: 8.1 MG/DL (ref 8.3–10.4)
CHLORIDE SERPL-SCNC: 108 MMOL/L (ref 98–107)
CO2 SERPL-SCNC: 32 MMOL/L (ref 21–32)
CREAT SERPL-MCNC: 1.16 MG/DL (ref 0.6–1)
ERYTHROCYTE [DISTWIDTH] IN BLOOD BY AUTOMATED COUNT: 18.2 % (ref 11.9–14.6)
GLUCOSE BLD STRIP.AUTO-MCNC: 202 MG/DL (ref 65–100)
GLUCOSE BLD STRIP.AUTO-MCNC: 202 MG/DL (ref 65–100)
GLUCOSE BLD STRIP.AUTO-MCNC: 90 MG/DL (ref 65–100)
GLUCOSE BLD STRIP.AUTO-MCNC: 93 MG/DL (ref 65–100)
GLUCOSE SERPL-MCNC: 86 MG/DL (ref 65–100)
HCT VFR BLD AUTO: 28.4 % (ref 35.8–46.3)
HGB BLD-MCNC: 8.6 G/DL (ref 11.7–15.4)
IGA SERPL-MCNC: 128 MG/DL (ref 70–400)
IGM SERPL-MCNC: 54 MG/DL (ref 40–230)
MCH RBC QN AUTO: 25.8 PG (ref 26.1–32.9)
MCHC RBC AUTO-ENTMCNC: 30.3 G/DL (ref 31.4–35)
MCV RBC AUTO: 85.3 FL (ref 79.6–97.8)
PLATELET # BLD AUTO: 347 K/UL (ref 150–450)
PMV BLD AUTO: 9.2 FL (ref 10.8–14.1)
POTASSIUM SERPL-SCNC: 4 MMOL/L (ref 3.5–5.1)
RBC # BLD AUTO: 3.33 M/UL (ref 4.05–5.25)
SODIUM SERPL-SCNC: 145 MMOL/L (ref 136–145)
TOBRAMYCIN SERPL-MCNC: 6.8 UG/ML
WBC # BLD AUTO: 22.2 K/UL (ref 4.3–11.1)

## 2017-11-02 PROCEDURE — 74011250636 HC RX REV CODE- 250/636: Performed by: INTERNAL MEDICINE

## 2017-11-02 PROCEDURE — 74011000250 HC RX REV CODE- 250: Performed by: INTERNAL MEDICINE

## 2017-11-02 PROCEDURE — 65270000029 HC RM PRIVATE

## 2017-11-02 PROCEDURE — 85027 COMPLETE CBC AUTOMATED: CPT | Performed by: INTERNAL MEDICINE

## 2017-11-02 PROCEDURE — 97165 OT EVAL LOW COMPLEX 30 MIN: CPT

## 2017-11-02 PROCEDURE — 74011000258 HC RX REV CODE- 258: Performed by: EMERGENCY MEDICINE

## 2017-11-02 PROCEDURE — 77030027688 HC DRSG MEPILEX 16-48IN NO BORD MOLN -A

## 2017-11-02 PROCEDURE — 74011636637 HC RX REV CODE- 636/637: Performed by: INTERNAL MEDICINE

## 2017-11-02 PROCEDURE — 82784 ASSAY IGA/IGD/IGG/IGM EACH: CPT | Performed by: INTERNAL MEDICINE

## 2017-11-02 PROCEDURE — 80200 ASSAY OF TOBRAMYCIN: CPT | Performed by: INTERNAL MEDICINE

## 2017-11-02 PROCEDURE — 74011000258 HC RX REV CODE- 258: Performed by: INTERNAL MEDICINE

## 2017-11-02 PROCEDURE — 82962 GLUCOSE BLOOD TEST: CPT

## 2017-11-02 PROCEDURE — 97161 PT EVAL LOW COMPLEX 20 MIN: CPT

## 2017-11-02 PROCEDURE — 74011250636 HC RX REV CODE- 250/636: Performed by: EMERGENCY MEDICINE

## 2017-11-02 PROCEDURE — 77030027138 HC INCENT SPIROMETER -A

## 2017-11-02 PROCEDURE — 99232 SBSQ HOSP IP/OBS MODERATE 35: CPT | Performed by: INTERNAL MEDICINE

## 2017-11-02 PROCEDURE — 77010033678 HC OXYGEN DAILY

## 2017-11-02 PROCEDURE — 87389 HIV-1 AG W/HIV-1&-2 AB AG IA: CPT | Performed by: INTERNAL MEDICINE

## 2017-11-02 PROCEDURE — 80048 BASIC METABOLIC PNL TOTAL CA: CPT | Performed by: INTERNAL MEDICINE

## 2017-11-02 PROCEDURE — 92610 EVALUATE SWALLOWING FUNCTION: CPT

## 2017-11-02 PROCEDURE — 94760 N-INVAS EAR/PLS OXIMETRY 1: CPT

## 2017-11-02 PROCEDURE — 94640 AIRWAY INHALATION TREATMENT: CPT

## 2017-11-02 PROCEDURE — 36415 COLL VENOUS BLD VENIPUNCTURE: CPT | Performed by: INTERNAL MEDICINE

## 2017-11-02 PROCEDURE — 74011000250 HC RX REV CODE- 250: Performed by: EMERGENCY MEDICINE

## 2017-11-02 PROCEDURE — 74011250637 HC RX REV CODE- 250/637: Performed by: INTERNAL MEDICINE

## 2017-11-02 RX ORDER — ALBUTEROL SULFATE 2.5 MG/.5ML
2.5 SOLUTION RESPIRATORY (INHALATION)
Status: DISCONTINUED | OUTPATIENT
Start: 2017-11-02 | End: 2017-11-06 | Stop reason: HOSPADM

## 2017-11-02 RX ORDER — VANCOMYCIN 2 GRAM/500 ML IN 0.9 % SODIUM CHLORIDE INTRAVENOUS
2000
Status: DISCONTINUED | OUTPATIENT
Start: 2017-11-02 | End: 2017-11-04

## 2017-11-02 RX ADMIN — AZTREONAM 2 G: 2 INJECTION, POWDER, LYOPHILIZED, FOR SOLUTION INTRAMUSCULAR; INTRAVENOUS at 00:22

## 2017-11-02 RX ADMIN — PREGABALIN 150 MG: 150 CAPSULE ORAL at 17:29

## 2017-11-02 RX ADMIN — Medication 5 ML: at 13:17

## 2017-11-02 RX ADMIN — PANTOPRAZOLE SODIUM 40 MG: 40 TABLET, DELAYED RELEASE ORAL at 05:40

## 2017-11-02 RX ADMIN — Medication 10 ML: at 21:36

## 2017-11-02 RX ADMIN — PANTOPRAZOLE SODIUM 40 MG: 40 TABLET, DELAYED RELEASE ORAL at 16:24

## 2017-11-02 RX ADMIN — IPRATROPIUM BROMIDE 2 SPRAY: 21 SPRAY NASAL at 21:35

## 2017-11-02 RX ADMIN — SUCRALFATE 1 G: 1 TABLET ORAL at 13:11

## 2017-11-02 RX ADMIN — PREGABALIN 150 MG: 150 CAPSULE ORAL at 10:05

## 2017-11-02 RX ADMIN — LEVOTHYROXINE SODIUM 112 MCG: 125 TABLET ORAL at 19:00

## 2017-11-02 RX ADMIN — TOBRAMYCIN SULFATE 360 MG: 40 INJECTION, SOLUTION INTRAMUSCULAR; INTRAVENOUS at 21:43

## 2017-11-02 RX ADMIN — CARVEDILOL 12.5 MG: 12.5 TABLET, FILM COATED ORAL at 10:06

## 2017-11-02 RX ADMIN — Medication: at 10:04

## 2017-11-02 RX ADMIN — CARVEDILOL 12.5 MG: 12.5 TABLET, FILM COATED ORAL at 16:24

## 2017-11-02 RX ADMIN — ALBUTEROL SULFATE 2.5 MG: 2.5 SOLUTION RESPIRATORY (INHALATION) at 11:29

## 2017-11-02 RX ADMIN — PREDNISONE 40 MG: 20 TABLET ORAL at 10:06

## 2017-11-02 RX ADMIN — SUCRALFATE 1 G: 1 TABLET ORAL at 16:24

## 2017-11-02 RX ADMIN — APIXABAN 2.5 MG: 2.5 TABLET, FILM COATED ORAL at 17:29

## 2017-11-02 RX ADMIN — VANCOMYCIN HYDROCHLORIDE 2000 MG: 10 INJECTION, POWDER, LYOPHILIZED, FOR SOLUTION INTRAVENOUS at 23:46

## 2017-11-02 RX ADMIN — ATORVASTATIN CALCIUM 20 MG: 10 TABLET, FILM COATED ORAL at 21:35

## 2017-11-02 RX ADMIN — ALBUTEROL SULFATE 2.5 MG: 2.5 SOLUTION RESPIRATORY (INHALATION) at 19:57

## 2017-11-02 RX ADMIN — MIRTAZAPINE 45 MG: 30 TABLET, FILM COATED ORAL at 00:23

## 2017-11-02 RX ADMIN — OXYBUTYNIN CHLORIDE 5 MG: 5 TABLET ORAL at 17:29

## 2017-11-02 RX ADMIN — LISINOPRIL 40 MG: 20 TABLET ORAL at 10:05

## 2017-11-02 RX ADMIN — ALBUTEROL SULFATE 2.5 MG: 2.5 SOLUTION RESPIRATORY (INHALATION) at 15:49

## 2017-11-02 RX ADMIN — SUCRALFATE 1 G: 1 TABLET ORAL at 05:40

## 2017-11-02 RX ADMIN — SUCRALFATE 1 G: 1 TABLET ORAL at 21:35

## 2017-11-02 RX ADMIN — ZIPRASIDONE HYDROCHLORIDE 160 MG: 20 CAPSULE ORAL at 00:31

## 2017-11-02 RX ADMIN — Medication 10 ML: at 04:30

## 2017-11-02 RX ADMIN — VANCOMYCIN HYDROCHLORIDE 2000 MG: 10 INJECTION, POWDER, LYOPHILIZED, FOR SOLUTION INTRAVENOUS at 04:25

## 2017-11-02 RX ADMIN — CEFEPIME HYDROCHLORIDE 2 G: 2 INJECTION, POWDER, FOR SOLUTION INTRAVENOUS at 21:34

## 2017-11-02 RX ADMIN — CEFEPIME HYDROCHLORIDE 2 G: 2 INJECTION, POWDER, FOR SOLUTION INTRAVENOUS at 13:09

## 2017-11-02 RX ADMIN — MONTELUKAST SODIUM 10 MG: 10 TABLET, FILM COATED ORAL at 10:06

## 2017-11-02 RX ADMIN — FOLIC ACID 1 MG: 1 TABLET ORAL at 10:06

## 2017-11-02 RX ADMIN — OXYBUTYNIN CHLORIDE 5 MG: 5 TABLET ORAL at 10:07

## 2017-11-02 RX ADMIN — INSULIN LISPRO 4 UNITS: 100 INJECTION, SOLUTION INTRAVENOUS; SUBCUTANEOUS at 21:35

## 2017-11-02 RX ADMIN — ALPRAZOLAM 2 MG: 0.5 TABLET ORAL at 16:24

## 2017-11-02 RX ADMIN — APIXABAN 2.5 MG: 2.5 TABLET, FILM COATED ORAL at 10:07

## 2017-11-02 RX ADMIN — NIACIN 500 MG: 500 TABLET, EXTENDED RELEASE ORAL at 05:41

## 2017-11-02 RX ADMIN — HYDROCODONE BITARTRATE AND ACETAMINOPHEN 1 TABLET: 10; 325 TABLET ORAL at 21:41

## 2017-11-02 RX ADMIN — INSULIN LISPRO 4 UNITS: 100 INJECTION, SOLUTION INTRAVENOUS; SUBCUTANEOUS at 16:25

## 2017-11-02 RX ADMIN — ALBUTEROL SULFATE 2.5 MG: 2.5 SOLUTION RESPIRATORY (INHALATION) at 07:24

## 2017-11-02 NOTE — PROGRESS NOTES
Problem: Mobility Impaired (Adult and Pediatric)  Goal: *Acute Goals and Plan of Care (Insert Text)  Discharge Goals:  (1.)Ms. Hieu Oliver will perform transfer with INDEPENDENCE within 7 day(s). (2.)Ms. Hieu Oliver will demonstrate good dynamic standing balance within 7 day(s). (3.)Ms. Hieu Oliver will ambulate with INDEPENDENCE for 300+ feet with the least restrictive device within 7 day(s). (4.)Ms. Hieu Oliver will tolerate 25+ minutes of therapeutic activity/exercise while maintaining stable vitals to improve functional strength and endurance within 7 day(s). _______________________________________________________________________________________________      PHYSICAL THERAPY: Initial Assessment, AM 11/2/2017  INPATIENT: Hospital Day: 2  [place] / Product Type: Medicare /      NAME/AGE/GENDER: Esequiel Saucedo is a 78 y.o. female   PRIMARY DIAGNOSIS: Sepsis ([facility] <principal problem not specified> <principal problem not specified>        ICD-10: Treatment Diagnosis:   · Generalized Muscle Weakness (M62.81)  · Difficulty in walking, Not elsewhere classified (R26.2)   Precaution/Allergies:  Latex; Bactrim [sulfamethoxazole-trimethoprim]; Lamictal [lamotrigine]; Pcn [penicillins]; and Tapentadol      ASSESSMENT:     Ms. Hieu Oliver is a 78year old female admitted with sepsis. Patient seen this AM for initial physical therapy evaluation: presents supine in bed, oriented x4, and endorses 6/10 acute on chronic back pain. Patient has been stay with her daughter in a single story residence with 3 steps to enter. At baseline, patient is modified with ADLs, ambulates without utilizing an assistive device, chronic 2L02 nasal cannula, no recent falls and endorses decreased activity tolerance secondary to recent illness/hospitlization for asthma exacerbation and pneumonia. Today, B UE/LE strength 4/5 proximally and 4+/5 distally, B UE/LE ROM WFL, and sensation is intact to light touch B UE/LE.  Patient performed bed mobility with supervision, transfers with SBA, and ambulation x5ft to bedside chair with SBA. Demonstrated a slow, shuffled, step-to gait pattern with good static balance and fair dynamic balance; endorses fatigue this AM. Ms. Misha Padilla presents with decreased functional activity toelrance from baseline. Recommend continued skilled PT services to address stated deficits. Patient would also benefit from energy conservation training. Will follow and progress toward stated goals during acute stay. This section established at most recent assessment   PROBLEM LIST (Impairments causing functional limitations):  1. Decreased Transfer Abilities  2. Decreased Ambulation Ability/Technique  3. Decreased Balance  4. Increased Pain  5. Decreased Activity Tolerance  6. Decreased Work Simplification/Energy Conservation Techniques  7. Increased Fatigue  8. Increased Shortness of Breath  9. Decreased Knowledge of Precautions   INTERVENTIONS PLANNED: (Benefits and precautions of physical therapy have been discussed with the patient.)  1. Balance Exercise  2. Bed Mobility  3. Family Education  4. Gait Training  5. Home Exercise Program (HEP)  6. Range of Motion (ROM)  7. Therapeutic Activites  8. Therapeutic Exercise/Strengthening  9. Transfer Training  10. Group Therapy     TREATMENT PLAN: Frequency/Duration: 3 times a week for duration of hospital stay  Rehabilitation Potential For Stated Goals: Good     RECOMMENDED REHABILITATION/EQUIPMENT: (at time of discharge pending progress): Due to the probability of continued deficits (see above) this patient will likely need continued skilled physical therapy after discharge. TBD  Equipment:    None at this time TBD              HISTORY:   History of Present Injury/Illness (Reason for Referral):  Weakness; Difficulty in walking  Past Medical History/Comorbidities:   Ms. Misha Padilla  has a past medical history of Acute renal failure [facility]) (July, 2014);  Bilateral pulmonary embolism [facility]) (September, 2012); Calculus of ureter (May, 2015); CAP (community acquired pneumonia) (October, 2012); CVA (cerebral infarction) (January, 2012); Gastric ulcer (July, 2014); Gram-positive bacteremia 1/2 cx (12/26/2016); HCAP (healthcare-associated pneumonia) (January, 2013); HCAP (healthcare-associated pneumonia) (February, 2013); Left leg DVT ([address] (1991); Psychiatric disorder; and PUD (peptic ulcer disease) (???). She also has no past medical history of Aneurysm ([address] Arrhythmia; Autoimmune disease ([address] CAD (coronary artery disease); Cancer ([address] Chronic pain; Coagulation disorder ([address] COPD; Diabetes ([address] Heart failure ([address] Liver disease; Seizures ([address] or Unspecified sleep apnea. Ms. Drea Cesar  has a past surgical history that includes appendectomy; cholecystectomy; gastric bypass; tonsillectomy; other surgical; orthopaedic; hysterectomy; and endoscopy (July, 2014). Social History/Living Environment:   Home Environment: Private residence  One/Two Story Residence: One story  Living Alone: No  Support Systems: Family member(s)  Patient Expects to be Discharged to[de-identified] Private residence  Current DME Used/Available at Home: Patsy Adri, straight, Shower chair  Tub or Shower Type: Tub/Shower combination  Prior Level of Function/Work/Activity:  Patient has been stay with her daughter in a single story residence with 3 steps to enter. At baseline, patient is modified with ADLs, ambulates without utilizing an assistive device, chronic 2L02 nasal cannula, no recent falls and endorses decreased activity tolerance secondary to recent illness/hospitlization for asthma exacerbation and pneumonia.    Number of Personal Factors/Comorbidities that affect the Plan of Care: 1-2: MODERATE COMPLEXITY   EXAMINATION:   Most Recent Physical Functioning:   Gross Assessment:  AROM: Within functional limits (B UE/LE)  Strength: Generally decreased, functional (Proximal B UE/LE 4/5; distally 4+/5)  Coordination: Within functional limits (B UE/LE)  Sensation: Intact (B UE/LE)               Posture:  Posture (WDL): Exceptions to WDL  Posture Assessment: Forward head, Rounded shoulders  Balance:  Sitting: Intact  Standing: Impaired  Standing - Static: Good  Standing - Dynamic : Fair Bed Mobility:  Supine to Sit: Supervision  Scooting: Supervision  Wheelchair Mobility:     Transfers:  Sit to Stand: Stand-by asssistance  Stand to Sit: Stand-by asssistance  Bed to Chair: Stand-by asssistance  Gait:     Base of Support: Widened;Center of gravity altered  Speed/Judith: Shuffled; Slow  Gait Abnormalities: Decreased step clearance; Step to gait  Distance (ft): 5 Feet (ft)  Assistive Device:  (None)  Interventions: Safety awareness training; Tactile cues; Verbal cues      Body Structures Involved:  1. Lungs  2. Muscles Body Functions Affected:  1. Respiratory  2. Movement Related Activities and Participation Affected:  1. Mobility  2. Self Care   Number of elements that affect the Plan of Care: 4+: HIGH COMPLEXITY   CLINICAL PRESENTATION:   Presentation: Stable and uncomplicated: LOW COMPLEXITY   CLINICAL DECISION MAKIN[address] [place] [address] AM-PAC 6 Clicks   Basic Mobility Inpatient Short Form  How much difficulty does the patient currently have. .. Unable A Lot A Little None   1. Turning over in bed (including adjusting bedclothes, sheets and blankets)? [] 1   [] 2   [] 3   [x] 4   2. Sitting down on and standing up from a chair with arms ( e.g., wheelchair, bedside commode, etc.)   [] 1   [] 2   [] 3   [x] 4   3. Moving from lying on back to sitting on the side of the bed? [] 1   [] 2   [] 3   [x] 4   How much help from another person does the patient currently need. .. Total A Lot A Little None   4. Moving to and from a bed to a chair (including a wheelchair)? [] 1   [] 2   [x] 3   [] 4   5. Need to walk in hospital room? [] 1   [] 2   [x] 3   [] 4   6. Climbing 3-5 steps with a railing?    [] 1   [] 2   [x] 3   [] 4   © 2007, Trustees of [address] [place] [address] [address] [address], under license to [employer]. All rights reserved      Score:  Initial: 21 Most Recent: 21 (Date: 11/2/17 )    Interpretation of Tool:  Represents activities that are increasingly more difficult (i.e. Bed mobility, Transfers, Gait). Score 24 23 22-20 19-15 14-10 9-7 6     Modifier CH CI CJ CK CL CM CN      ? Mobility - Walking and Moving Around:     - CURRENT STATUS: CJ - 20%-39% impaired, limited or restricted    - GOAL STATUS: CI - 1%-19% impaired, limited or restricted    - D/C STATUS:  ---------------To be determined---------------  [place] / [place] / Product Type: Medicare /      Medical Necessity:     · Patient demonstrates good rehab potential due to higher previous functional level. Reason for Services/Other Comments:  · Patient continues to require skilled intervention due to decreased functional activity toelrance from baseline. .   Use of outcome tool(s) and clinical judgement create a POC that gives a: Clear prediction of patient's progress: LOW COMPLEXITY            TREATMENT:   (In addition to Assessment/Re-Assessment sessions the following treatments were rendered)   Pre-treatment Symptoms/Complaints:    Pain: Initial:   Pain Intensity 1: 6  Pain Location 1: Back  Pain Orientation 1: Lower  Pain Intervention(s) 1: Ambulation/Increased Activity, Emotional support, Position, Rest, Repositioned  Post Session:  6/10 unchanged; comfortable in bedside chair. Assessment/Reassessment only, no treatment provided today    Braces/Orthotics/Lines/Etc:   · O2 Device: Nasal cannula  Treatment/Session Assessment:    · Response to Treatment:  See above.   · Interdisciplinary Collaboration:   o Physical Therapist  o Registered Nurse  · After treatment position/precautions:   o Up in chair  o Bed alarm/tab alert on  o Bed/Chair-wheels locked  o Call light within reach  o RN notified  o posey alarm activated; instructed patient to call for assistance out of bed/chair; needs met; needs within reach   · Compliance with Program/Exercises: Will assess as treatment progresses. · Recommendations/Intent for next treatment session: \"Next visit will focus on advancements to more challenging activities and reduction in assistance provided\".     Total Treatment Duration:  PT Patient Time In/Time Out  Time In: 0944  Time Out: [address] [facility],2 And 3 S Floors, DPT

## 2017-11-02 NOTE — PROGRESS NOTES
Asthma education reviewed with patient and Asthma action plan filled out with patient and placed with her belongings to go home. Patient has trouble with congestion and effective airway clearance despite respiratory medication and Mucinex daily. Patient has breath actuated small volume neb with Aerobika device at bedside and knows to take them home with her at discharge. Patient could benefit from [address] [place] [address] and is agreeable to try and use at home with Health  program if physician agrees treatment is needed.

## 2017-11-02 NOTE — PROGRESS NOTES
Staci Soto  Admission Date: 11/1/2017             Daily Progress Note: 11/2/2017    The patient's chart is reviewed and the patient is discussed with the staff. Patient is a 78 y.o.  female presents with dyspnea. She is well known to us has been hospitalized a number of times for asthma exacerbation. She recently was discharged on home oxygen. She has restrictive lung disease with morbid obesity, chronic pain, chronic left vocal cord paralysis, GERD, hx of PE on lifelong anticoagulation, previous gastric bypass 13 years ago, Fe def anemia (has been on iron infusions) and multiple bouts of pneumonia in 2012, 2013 and 2017.        Of note, she was also admitted in April 2017, 7/25-7/27/17 and 9/28/17- 10/2/2017. She was seen in our office post- hospital follow up on 10/27. Her previous drug screens have been positive for amphetamines, benzos, and opiates. She has received antibiotics for bronchitis while here and another round from here PCP since discharge. Her SOB had improved at that appointment. She uses pro-air and nebulizer treatments. She is on oxygen at 2 L continuous. She is taking Mucinex twice daily. PSG was ordered. Today, she is presents with SOB. Lethargic with flat affect on multiple sedating medications. Bilateral infiltrates on CXR and hypoxemia. We were asked to admit for further acute care. Admitted to [facility] and need for oxygen      Subjective:     Patient is awake and still does not feels well. She did report that had recent vomiting after issues with eating [place] food. Not expectorating.  Using oxygen at home and at 2 LPM and now on 2 LPM.     Current Facility-Administered Medications   Medication Dose Route Frequency    aztreonam (AZACTAM) 2 g in 0.9% sodium chloride (MBP/ADV) 100 mL  2 g IntraVENous Q8H    tobramycin (NEBCIN) 360 mg in 0.9% sodium chloride 100 mL IVPB  360 mg IntraVENous Q24H    vancomycin (VANCOCIN) 2000 mg in  ml infusion  2,000 mg IntraVENous Q12H    TOBRAMYCIN INFORMATION NOTE   Other ONCE    albuterol CONCENTRATE 2.5mg/0.5 mL neb soln  2.5 mg Nebulization QID RT    ALPRAZolam (XANAX) tablet 2 mg  2 mg Oral TID PRN    apixaban (ELIQUIS) tablet 2.5 mg  2.5 mg Oral BID    atorvastatin (LIPITOR) tablet 20 mg  20 mg Oral QHS    carvedilol (COREG) tablet 12.5 mg  12.5 mg Oral BID WITH MEALS    folic acid (FOLVITE) tablet 1 mg  1 mg Oral DAILY    HYDROcodone-acetaminophen (NORCO)  mg tablet 1 Tab  1 Tab Oral Q6H PRN    ipratropium (ATROVENT) 0.03 % nasal spray 2 Spray  2 Spray Both Nostrils Q12H    levothyroxine (SYNTHROID) tablet 112 mcg  112 mcg Oral QPM    lisinopril (PRINIVIL, ZESTRIL) tablet 40 mg  40 mg Oral DAILY    mirtazapine (REMERON) tablet 45 mg  45 mg Oral QHS    montelukast (SINGULAIR) tablet 10 mg  10 mg Oral DAILY    naloxegol (MOVANTIK) tablet 25 mg (patient supplied) (Patient Supplied)  25 mg Oral ACB    niacin ER (NIASPAN) tablet 500 mg  500 mg Oral ACB    oxybutynin (DITROPAN) tablet 5 mg  5 mg Oral BID    pantoprazole (PROTONIX) tablet 40 mg  40 mg Oral ACB&D    pregabalin (LYRICA) capsule 150 mg  150 mg Oral BID    sucralfate (CARAFATE) tablet 1 g  1 g Oral AC&HS    ziprasidone (GEODON) capsule 160 mg  160 mg Oral QHS    sodium chloride (NS) flush 5-10 mL  5-10 mL IntraVENous Q8H    sodium chloride (NS) flush 5-10 mL  5-10 mL IntraVENous PRN    predniSONE (DELTASONE) tablet 40 mg  40 mg Oral DAILY WITH BREAKFAST    lip protectant (BLISTEX) ointment   Topical PRN    insulin lispro (HUMALOG) injection   SubCUTAneous AC&HS       Review of Systems:  -Fever  -Headaches  -Chest pain  -Dyspnea, -wheezing,+ cough  -Abdominal pain, -constipation  +Leg swelling -- due to left leg bruise  All other organ systems grossly normal.      Objective:     Vitals:    11/01/17 2247 11/02/17 0437 11/02/17 0455 11/02/17 0726   BP: 100/65  100/63    Pulse: 72  74    Resp: 16  18    Temp: 98.1 °F (36.7 °C)  98.8 °F (37.1 °C)    SpO2: 95%  96% 96%   Weight:  249 lb 14.4 oz (113.4 kg)     Height:         Intake and Output:   10/31 1901 - 11/02 0700  In: 360 [P.O.:360]  Out: 700 [Urine:700]       Physical Exam:   Constitution:  the patient is well developed and in no acute distress  EENMT:  Sclera clear, pupils equal, oral mucosa moist, narrow  Respiratory: slightly coarse sounds. No wheezing  Cardiovascular:  RRR without M,G,R  Gastrointestinal: soft and non-tender; with positive bowel sounds. Musculoskeletal: warm without cyanosis. There is + erythema and healing bruise in left lower leg above left ankle  Skin:  no jaundice or rashes, wounds as per above  Neurologic: no gross neuro deficits     Psychiatric:  alert and oriented x 3    CXR:  Yesterday with b/l basal infiltrates        LAB  Recent Labs      11/02/17   0555  11/01/17 2011   GLUCPOC  90  339*      Recent Labs      11/02/17   0332  11/01/17   1447   WBC  22.2*  20.3*   HGB  8.6*  9.8*   HCT  28.4*  31.7*   PLT  347  380     Recent Labs      11/02/17   0332  11/01/17   1447   NA  145  144   K  4.0  3.7   CL  108*  104   CO2  32  33*   GLU  86  99   BUN  24*  24*   CREA  1.16*  1.31*   CA  8.1*  8.4   ALB   --   2.6*   TBILI   --   0.4   ALT   --   17   SGOT   --   13*     No results for input(s): PH, PCO2, PO2, HCO3 in the last 72 hours. No results for input(s): LCAD, LAC in the last 72 hours.       Assessment:  (Medical Decision Making)     Hospital Problems  Date Reviewed: 11/1/2017          Codes Class Noted POA    HCAP (healthcare-associated pneumonia) ICD-10-CM: J18.9  ICD-9-CM: 693  11/1/2017 Yes        Cellulitis of left lower extremity ICD-10-CM: L03.116  ICD-9-CM: 682.6  11/1/2017 Unknown        Sepsis ([facility] ICD-10-CM: A41.9  ICD-9-CM: 038.9, 995.91  11/1/2017 Unknown        Restrictive lung disease (Chronic) ICD-10-CM: J98.4  ICD-9-CM: 518.89  9/29/2017 Yes        Morbid obesity - sedentary lifestyle (Chronic) ICD-10-CM: E66.01  ICD-9-CM: 278.01  9/28/2017 Yes        Acute on chronic respiratory failure [facility]) ICD-10-CM: J96.20  ICD-9-CM: 518.84  7/25/2017 Yes        Speech abnormality (Chronic) ICD-10-CM: R47.9  ICD-9-CM: 784.59  2/8/2017 Yes        Polypharmacy (Chronic) ICD-10-CM: Q98.855  ICD-9-CM: V58.69  2/8/2017 Yes        Debility (Chronic) ICD-10-CM: R53.81  ICD-9-CM: 799.3  10/1/2012 Yes        Fibromyalgia (Chronic) ICD-10-CM: M79.7  ICD-9-CM: 729.1  Unknown Yes              Drug abuse  --again positive for amphetamines, benzo and opiates. Home meds include only last 2. Now reports takes Adderall at home which would explain amphetamines. Reports takes it for ADHD and told her to get family to bring in med list. She denies smoking any drugs including meth. Plan:     --taper oxygen back to home levels  --IgG is low and f/u IgE and will check IgA and IgM level to r/o CVID. Will also check HIV test. She is agreeable  --continue abx D2 for now, cultures negative  --PT/OT today  --will start IS and make nebs with IPPD. Repeat x-ray tomorrow and if still with infiltrates may need CT of chest and possbile bronch with BAL in the future. --limiting sedating meds  --speech consult  --f/u left leg -- appears to be healing and so far cultures are negative  --taper prednisone tomorrow  --continue remaining treatment.       Sandrita Cortez MD

## 2017-11-02 NOTE — PROGRESS NOTES
Speech language pathology: bedside swallow note: Initial Assessment and Discharge    NAME/AGE/GENDER: Gold Daniel is a 78 y.o. female  DATE: 11/2/2017  PRIMARY DIAGNOSIS: Sepsis ([facility] [address]       ICD-10: Treatment Diagnosis: dysphagia; oropharyngeal R13.12\    INTERDISCIPLINARY COLLABORATION: Registered Nurse  PRECAUTIONS/ALLERGIES: Latex; Bactrim [sulfamethoxazole-trimethoprim]; Lamictal [lamotrigine]; Pcn [penicillins]; and Tapentadol ASSESSMENT:Based on the objective data described below, Ms. Zahida Andrew presents with a swallow within functional limits. Patient previously seen by speech therapy in February with recommendations for regular textures/thin liquids. Patient reports no difficulty with swallowing other than at time initiating a swallow with larger pills. She reports eating \"bad Mexican\" last week with associated emesis episode. Several hospitalizations for asthma exacerbation with hx of restrictive lung disease with morbid obesity, GERD, chronic pain, and polypharmacy abuse. Patient is fully oriented. Tolerated thin liquids via the cup and serial swallows by straw with prompt initiation of the swallow and no overt signs/sx of aspiration. Mastication time grossly within functional limits with the mixed and solid trials. Patient has upper and lower dentures; reports she can eat anything except steak with them in place. Recommend continue current regular textures/thin liquids. No further speech therapy indicated at this time. ?????? ? ? This section established at most recent assessment??????????  PROBLEM LIST (Impairments causing functional limitations):  1. dysphagia  REHABILITATION POTENTIAL FOR STATED GOALS: n/a  PLAN OF CARE:   Patient will benefit from skilled intervention to address the following impairments.   RECOMMENDATIONS AND PLANNED INTERVENTIONS (Benefits and precautions of therapy have been discussed with the patient.):  · PO:  Regular  · Liquids:  regular thin  MEDICATIONS:  · With liquid  COMPENSATORY STRATEGIES/MODIFICATIONS INCLUDING:  · Small sips and bites  OTHER RECOMMENDATIONS (including follow up treatment recommendations):   · n/a  RECOMMENDED DIET MODIFICATIONS DISCUSSED WITH:  · Nursing  · Patient  FREQUENCY/DURATION: Will not continue to follow patient to address above goals. RECOMMENDED REHABILITATION/EQUIPMENT: (at time of discharge pending progress): Due to the probability of continued deficits (see above) this patient will not likely need continued skilled speech therapy after discharge. SUBJECTIVE:   Cooperative. History of Present Injury/Illness: Ms. Pamela Arenas  has a past medical history of Acute renal failure [facility]) (July, 2014); Bilateral pulmonary embolism [facility]) (September, 2012); Calculus of ureter (May, 2015); CAP (community acquired pneumonia) (October, 2012); CVA (cerebral infarction) (January, 2012); Gastric ulcer (July, 2014); Gram-positive bacteremia 1/2 cx (12/26/2016); HCAP (healthcare-associated pneumonia) (January, 2013); HCAP (healthcare-associated pneumonia) (February, 2013); Left leg DVT ([address] (1991); Psychiatric disorder; and PUD (peptic ulcer disease) (???). She also has no past medical history of Aneurysm ([address] Arrhythmia; Autoimmune disease ([address] CAD (coronary artery disease); Cancer ([address] Chronic pain; Coagulation disorder ([address] COPD; Diabetes ([address] Heart failure ([address] Liver disease; Seizures ([address] or Unspecified sleep apnea. She also  has a past surgical history that includes appendectomy; cholecystectomy; gastric bypass; tonsillectomy; other surgical; orthopaedic; hysterectomy; and endoscopy (July, 2014). Present Symptoms:   Pain Intensity 1: 0  Pain Location 1: Back  Pain Orientation 1: Lower  Pain Intervention(s) 1: Medication (see MAR)  Current Medications:   No current facility-administered medications on file prior to encounter.       Current Outpatient Prescriptions on File Prior to Encounter   Medication Sig Dispense Refill    albuterol (PROAIR HFA) 90 mcg/actuation inhaler Take 2 Puffs by inhalation every four (4) hours as needed for Wheezing.  OXYGEN-AIR DELIVERY SYSTEMS 2 lpm cont.  fluticasone-vilanterol (BREO ELLIPTA) 200-25 mcg/dose inhaler Take 1 Puff by inhalation daily. RINSE MOUTH WELL AFTER USE 3 Inhaler 3    albuterol sulfate (PROVENTIL;VENTOLIN) 2.5 mg/0.5 mL nebu nebulizer solution 0.5 mL by Nebulization route every four (4) hours. Dx J45.909 180 mL 4    predniSONE (DELTASONE) 20 mg tablet 40mg per day for 3 days, then 20mg per day for 3 days then 10mg per day for 3 days, then stop. (Patient taking differently: Take 10 mg by mouth daily.) 11 Tab 0    sucralfate (CARAFATE) 1 gram tablet Take 1 g by mouth four (4) times daily.  pregabalin (LYRICA) 150 mg capsule Take  by mouth.  montelukast (SINGULAIR) 10 mg tablet Take 10 mg by mouth daily.  naloxegol (MOVANTIK) 25 mg tab tablet Take  by mouth Daily (before breakfast).  carvedilol (COREG) 12.5 mg tablet Take 12.5 mg by mouth two (2) times daily (with meals).  apixaban (ELIQUIS) 2.5 mg tablet Take 2.5 mg by mouth two (2) times a day. Indications: PULMONARY THROMBOEMBOLISM PREVENTION      atorvastatin (LIPITOR) 10 mg tablet Take 2 Tabs by mouth nightly. 30 Tab 11    mirtazapine (REMERON) 30 mg tablet Take 45 mg by mouth nightly.  oxybutynin chloride XL (DITROPAN XL) 10 mg CR tablet Take 10 mg by mouth daily.  lisinopril (PRINIVIL, ZESTRIL) 40 mg tablet Take 1 Tab by mouth daily. 30 Tab 5    POTASSIUM CHLORIDE SR 10 MEQ TAB Take 1 Tab by mouth daily.  Cholecalciferol, Vitamin D3, (VITAMIN D3) 1,000 unit cap Take  by mouth.  nicotinic acid (NIACIN) 500 mg tablet Take 500 mg by mouth Daily (before breakfast).  pantoprazole (PROTONIX) 40 mg tablet Take 1 Tab by mouth Before breakfast and dinner. 60 Tab 2    FERROUS FUMARATE/VIT BCOMP&C (SUPER B COMPLEX PO) Take 1 Tab by mouth daily.  NEBULIZER by Does Not Apply route.  ziprasidone (GEODON) 80 mg capsule Take 160 mg by mouth nightly.  levothyroxine (SYNTHROID) 125 mcg tablet Take 112 mcg by mouth every evening.  nystatin (MYCOSTATIN) powder Apply  to affected area two (2) times a day. 1 Bottle 0    Biotin 2,500 mcg cap Take  by mouth.  UBIDECARENONE/VITAMIN E MIXED (COQ10  PO) Take  by mouth.  ipratropium (ATROVENT) 0.03 % nasal spray 2 Sprays every twelve (12) hours.  ALPRAZolam (XANAX) 2 mg tablet Take 1 Tab by mouth three (3) times daily as needed for Anxiety. Max Daily Amount: 6 mg. 30 Tab 0    ondansetron (ZOFRAN ODT) 8 mg disintegrating tablet Take 8 mg by mouth every eight (8) hours as needed for Nausea.  Magnesium Oxide 500 mg cap Take 500 mg by mouth.  HYDROcodone-acetaminophen (NORCO)  mg tablet Take 1 Tab by mouth every six (6) hours as needed for Pain.  folic acid 831 mcg tablet Take 800 mcg by mouth daily.  SUMATRIPTAN SUCCINATE (IMITREX PO) take 100 mg by mouth as needed. Current Dietary Status:  regular      History of reflux: yes   Reflux medication: protonix  Social History/Home Situation: home with family  Home Environment: Private residence  One/Two Story Residence: One story  Living Alone: No  Support Systems: Family member(s)  Patient Expects to be Discharged to[de-identified] Private residence  Current DME Used/Available at Home: Nebulizer, Shower chair  OBJECTIVE:   Respiratory Status:  Nasal cannula  3 l/min  CXR Results:Mild cardiomegaly with mild prominence of vascularity in the lungs.   2. Bilateral infiltrates  MRI/CT Results: n/a  Oral Motor Structure/Speech:  Oral-Motor Structure/Motor Speech  Labial: No impairment  Dentition: Upper & lower dentures  Oral Hygiene: adequate  Lingual: Decreased rate    Cognitive and Communication Status:  Neurologic State: Alert  Orientation Level: Oriented X4  Cognition: Appropriate for age attention/concentration  Perception: Appears intact  Perseveration: No perseveration noted  Safety/Judgement: Awareness of environment    BEDSIDE SWALLOW EVALUATION  Oral Assessment:  Oral Assessment  Labial: No impairment  Dentition: Upper & lower dentures  Oral Hygiene: adequate  Lingual: Decreased rate  P.O. Trials:  Patient Position: upright in bed    The patient was given tsp to straw amounts of the following:   Consistency Presented: Thin liquid;Mixed consistency; Solid  How Presented: Successive swallows;Straw;Self-fed/presented;Cup/sip    ORAL PHASE:  Bolus Acceptance: No impairment  Bolus Formation/Control: Impaired  Propulsion: No impairment  Type of Impairment: Delayed (mild)  Oral Residue: None    PHARYNGEAL PHASE:  Initiation of Swallow: No impairment  Laryngeal Elevation: Functional  Aspiration Signs/Symptoms: None  Vocal Quality: No impairment           Pharyngeal Phase Characteristics: No impairment, issues, or problems     OTHER OBSERVATIONS:  Rate/bite size: WNL   Endurance:   WNL     Tool Used: Dysphagia Outcome and Severity Scale (ANAHI)    Score Comments   Normal Diet  [] 7 With no strategies or extra time needed   Functional Swallow  [x] 6 May have mild oral or pharyngeal delay       Mild Dysphagia    [] 5 Which may require one diet consistency restricted (those who demonstrate penetration which is entirely cleared on MBS would be included)   Mild-Moderate Dysphagia  [] 4 With 1-2 diet consistencies restricted       Moderate Dysphagia  [] 3 With 2 or more diet consistencies restricted       Moderately Severe Dysphagia  [] 2 With partial PO strategies (trials with ST only)       Severe Dysphagia  [] 1 With inability to tolerate any PO safely          Score:  Initial: 6 Most Recent: X (Date: -- )   Interpretation of Tool: The Dysphagia Outcome and Severity Scale (ANAHI) is a simple, easy-to-use, 7-point scale developed to systematically rate the functional severity of dysphagia based on objective assessment and make recommendations for diet level, independence level, and type of nutrition. Score 7 6 5 4 3 2 1   Modifier CH CI CJ CK CL CM CN   ?  Swallowing:     - CURRENT STATUS: CI - 1%-19% impaired, limited or restricted    - GOAL STATUS:  CI - 1%-19% impaired, limited or restricted    - D/C STATUS:  CI - 1%-19% impaired, limited or restricted  [place] / [place] / Product Type: Medicare /     TREATMENT:    (In addition to Assessment/Re-Assessment sessions the following treatments were rendered)  Assessment/Reassessment only, no treatment provided today  __________________________________________________________________________________________________  Safety:   After treatment position/precautions:  · RN notified  · Upright in Bed    Total Treatment Duration:  Time In: 6291  Time Out: Sandra MS, CCC-SLP

## 2017-11-02 NOTE — PROGRESS NOTES
Pharmacokinetic Consult to Pharmacist    Zaynab Christopher is a 78 y.o. female being treated for HAP with vancomycin, tobramycin, and cefepime. Height: 5' 1\" (154.9 cm)  Weight: 113.4 kg (249 lb 14.4 oz)  Lab Results   Component Value Date/Time    BUN 24 11/02/2017 03:32 AM    Creatinine 1.16 11/02/2017 03:32 AM    WBC 22.2 11/02/2017 03:32 AM    Procalcitonin 0.1 11/01/2017 02:47 PM    Lactic acid 0.6 04/11/2017 06:25 AM    Lactic acid 3.0 02/07/2013 08:54 AM    Lactic Acid (POC) 1.5 11/01/2017 02:50 PM      Estimated Creatinine Clearance: 55.7 mL/min (based on Cr of 1.16). CULTURES:  11/1 Blood Cx: NGTD, prelim   Flu: negative   Sputum Cx: pending    Lab Results   Component Value Date/Time    Tobramycin,random 6.8 11/02/2017 03:32 AM       Day 2 of vancomycin. Goal trough is 15-20. Vancomycin adjusted to 2000 mg IV q18h. Plan to check a steady state level as indicated. Day 2 of tobramycin. Unable to assess random level based on Ane Knuckles nomogram due to timing. Will continue current dose of 360 mg IV q24h and check a 10 hour random level after the next dose. Pharmacy will continue to follow. Please call with any questions.     Thank you,  Chandni Byrne, PharmD  Clinical Pharmacist  [phone]

## 2017-11-02 NOTE — PROGRESS NOTES
Report received from [facility], RN. No distress on 3L via NC. No complaints. Family at bedside. Instructed pt to call should needs arise. Pt voiced understanding. Call light within reach. Will monitor.

## 2017-11-02 NOTE — PROGRESS NOTES
Morning bedsdie rounding report received from Ramila De La Fuente RN. Morning assessment completed. Patient resting with closed eyes, easily arousable. Demonstrating no signs of dyspnea or distress on 3L oxygen via nasal cannula. Patient verbalizes no concerns at this time. Left lower extremity wth 2+ edema, dark purple blotches circled with marking pen by previous Nurse.

## 2017-11-02 NOTE — PROGRESS NOTES
Problem: Self Care Deficits Care Plan (Adult)  Goal: *Acute Goals and Plan of Care (Insert Text)  1. Patient will complete lower body bathing and dressing with supervision and adaptive equipment as needed. 2. Patient will complete toileting with supervision. 3. Patient will tolerate 30 minutes of OT treatment with 2-3 rest breaks to increase activity tolerance for ADLs. 4. Patient will complete functional transfers with supervision and adaptive equipment as needed. 5. Patient will verbalize with minimal cues 5 ways to complete energy conservation with ADL. Timeframe: 7 visits       OCCUPATIONAL THERAPY: Initial Assessment and PM 11/2/2017  INPATIENT: Hospital Day: 2  [place] / Product Type: Medicare /      NAME/AGE/GENDER: Yaz Otero is a 78 y.o. female   PRIMARY DIAGNOSIS:  Sepsis ([facility] [address] <principal problem not specified> <principal problem not specified>        ICD-10: Treatment Diagnosis:    · Generalized Muscle Weakness (M62.81)  · Other lack of cordination (R27.8)  · History of falling (Z91.81)   Precautions/Allergies:     Latex; Bactrim [sulfamethoxazole-trimethoprim]; Lamictal [lamotrigine]; Pcn [penicillins]; and Tapentadol      ASSESSMENT:     Ms. Migue Phelan presents to the hospital with sepsis. Pt has recent hospital admission in September for pneumonia. Pt was supine in bed upon arrival. Pt is alert, pleasant, and agreeable to participating in OT evaluation. Pt reports chronic back pain reporting 8/10 pain. Pt sat to the edge of bed with CGA. Pt able to stand with CGA edge of bed and participate in side steps, backward steps, forward steps, and marching in place. Pt demonstrated fair balance during session. Pt does report fatigue after activity and returned back to supine afterwards. Pt typically modified independent with ADL, but does need assistance with bathing. Pt reports getting fatigued with activity at home.  Pt is currently functioning below baseline at this time with ADL/functional transfers and will benefit from OT services to address stated goals and plan of care. This section established at most recent assessment   PROBLEM LIST (Impairments causing functional limitations):  1. Decreased Strength  2. Decreased ADL/Functional Activities  3. Decreased Transfer Abilities  4. Decreased Ambulation Ability/Technique  5. Decreased Balance  6. Increased Pain  7. Decreased Activity Tolerance  8. Decreased Work Simplification/Energy Conservation Techniques  9. Edema/Girth  10. Decreased Skin Integrity/Hygeine  11. Decreased [place] with Home Exercise Program   INTERVENTIONS PLANNED: (Benefits and precautions of occupational therapy have been discussed with the patient.)  1. Activities of daily living training  2. Adaptive equipment training  3. Balance training  4. Clothing management  5. Neuromuscular re-eduation  6. Therapeutic activity  7. Therapeutic exercise     TREATMENT PLAN: Frequency/Duration: Follow patient 3 times per week to address above goals. Rehabilitation Potential For Stated Goals: Excellent     RECOMMENDED REHABILITATION/EQUIPMENT: (at time of discharge pending progress): Due to the probability of continued deficits (see above) this patient will likely need continued skilled occupational therapy after discharge. Equipment:    TBD]              OCCUPATIONAL PROFILE AND HISTORY:   History of Present Injury/Illness (Reason for Referral):  See H&P  Past Medical History/Comorbidities:   Ms. Luis Stokes  has a past medical history of Acute renal failure ([facility] [address] (July, 2014); Bilateral pulmonary embolism [facility]) (September, 2012); Calculus of ureter (May, 2015); CAP (community acquired pneumonia) (October, 2012); CVA (cerebral infarction) (January, 2012); Gastric ulcer (July, 2014); Gram-positive bacteremia 1/2 cx (12/26/2016); HCAP (healthcare-associated pneumonia) (January, 2013); HCAP (healthcare-associated pneumonia) (February, 2013);  Left leg DVT ([facility] [address] (1991); Psychiatric disorder; and PUD (peptic ulcer disease) (???). She also has no past medical history of Aneurysm ([facility] [address] Arrhythmia; Autoimmune disease ([facility] [address] CAD (coronary artery disease); Cancer ([facility] [address] Chronic pain; Coagulation disorder ([facility] [address] COPD; Diabetes ([facility] [address] Heart failure ([facility] [address] Liver disease; Seizures ([facility] [address] or Unspecified sleep apnea. Ms. Linette Guzman  has a past surgical history that includes appendectomy; cholecystectomy; gastric bypass; tonsillectomy; other surgical; orthopaedic; hysterectomy; and endoscopy (July, 2014). Social History/Living Environment:   Home Environment: Private residence  # Steps to Enter: 3  One/Two Story Residence: One story  Living Alone: No  Support Systems: Family member(s)  Patient Expects to be Discharged to[de-identified] Private residence  Current DME Used/Available at Home: Mari Barboza, nataly, Shower chair  Tub or Shower Type: Tub/Shower combination  Prior Level of Function/Work/Activity:  Pt lives at home with her daughter/son-in-law and their 4 children. Pt reports that typically she completes ADL with modified independence but daughter does assist her with bathing. Reports 1 fall and 1 other near fall in the past 6 months. Personal Factors:          Past/Current Experience:  Hx of recent hospitalization        Other factors that influence how disability is experienced by the patient:  Multiple co-morbidities (see above)   Number of Personal Factors/Comorbidities that affect the Plan of Care: Expanded review of therapy/medical records (1-2):  MODERATE COMPLEXITY   ASSESSMENT OF OCCUPATIONAL PERFORMANCE[de-identified]   Activities of Daily Living:           Basic ADLs (From Assessment) Complex ADLs (From Assessment)   Basic ADL  Feeding: Supervision  Oral Facial Hygiene/Grooming: Supervision  Bathing: Minimum assistance  Upper Body Dressing: Minimum assistance  Lower Body Dressing: Moderate assistance  Toileting:  Moderate assistance Instrumental ADL  Meal Preparation: Maximum assistance  Homemaking: Maximum assistance   Grooming/Bathing/Dressing Activities of Daily Living     Cognitive Retraining  Safety/Judgement: Awareness of environment                 Functional Transfers  Toilet Transfer : Contact guard assistance  Tub Transfer: Minimum assistance  Shower Transfer: Contact guard assistance     Bed/Mat Mobility  Supine to Sit: Stand-by asssistance  Sit to Supine: Stand-by asssistance  Sit to Stand: Contact guard assistance  Bed to Chair: Contact guard assistance  Scooting: Supervision       Most Recent Physical Functioning:   Gross Assessment:  AROM: Within functional limits  Strength: Generally decreased, functional  Coordination: Within functional limits  Sensation: Intact               Posture:  Posture (WDL): Exceptions to WDL  Posture Assessment: Forward head, Rounded shoulders  Balance:  Sitting: Intact  Standing: Impaired  Standing - Static: Good  Standing - Dynamic : Fair Bed Mobility:  Supine to Sit: Stand-by asssistance  Sit to Supine: Stand-by asssistance  Scooting: Supervision  Wheelchair Mobility:     Transfers:  Sit to Stand: Contact guard assistance  Stand to Sit: Contact guard assistance  Bed to Chair: Contact guard assistance              Patient Vitals for the past 6 hrs:   BP SpO2 O2 Flow Rate (L/min) Pulse   11/02/17 1131 - 97 % 3 l/min -   11/02/17 1155 101/67 98 % - 75   11/02/17 1459 119/79 95 % - 78       Mental Status  Neurologic State: Alert  Orientation Level: Oriented X4  Cognition: Appropriate for age attention/concentration, Follows commands  Perception: Appears intact  Perseveration: No perseveration noted  Safety/Judgement: Awareness of environment                          Physical Skills Involved:  1. Range of Motion  2. Balance  3. Strength  4. Activity Tolerance  5. Pain (Chronic)  6. Edema  7. Skin Integrity Cognitive Skills Affected (resulting in the inability to perform in a timely and safe manner):  1.  [facility] Psychosocial Skills Affected:  1. n/a Number of elements that affect the Plan of Care: 5+:  HIGH COMPLEXITY   CLINICAL DECISION MAKIN[address] [place] [id number] AM-PAC 6 Clicks   Daily Activity Inpatient Short Form  How much help from another person does the patient currently need. .. Total A Lot A Little None   1. Putting on and taking off regular lower body clothing? [] 1   [x] 2   [] 3   [] 4   2. Bathing (including washing, rinsing, drying)? [] 1   [] 2   [x] 3   [] 4   3. Toileting, which includes using toilet, bedpan or urinal?   [] 1   [x] 2   [] 3   [] 4   4. Putting on and taking off regular upper body clothing? [] 1   [] 2   [x] 3   [] 4   5. Taking care of personal grooming such as brushing teeth? [] 1   [] 2   [x] 3   [] 4   6. Eating meals? [] 1   [] 2   [x] 3   [] 4   © , Trustees of [address] [place] [id number], under license to [employer]. All rights reserved      Score:  Initial: 16 Most Recent: X (Date: -- )    Interpretation of Tool:  Represents activities that are increasingly more difficult (i.e. Bed mobility, Transfers, Gait). Score 24 23 22-20 19-15 14-10 9-7 6     Modifier CH CI CJ CK CL CM CN      ? Self Care:     - CURRENT STATUS: CK - 40%-59% impaired, limited or restricted    - GOAL STATUS: CJ - 20%-39% impaired, limited or restricted    - D/C STATUS:  ---------------To be determined---------------  [place] / Product Type: Medicare /      Medical Necessity:     · Patient demonstrates excellent rehab potential due to higher previous functional level. Reason for Services/Other Comments:  · Patient continues to require skilled intervention due to decreased indepedence with ADL/functional transfers.    Use of outcome tool(s) and clinical judgement create a POC that gives a: LOW COMPLEXITY         TREATMENT:   (In addition to Assessment/Re-Assessment sessions the following treatments were rendered)     Pre-treatment Symptoms/Complaints:  =  Pain: Initial: Pain Intensity 1: 8  Pain Location 1: Back  Pain Orientation 1: Lower  Pain Intervention(s) 1: Repositioned  Post Session: same     Assessment/Reassessment only, no treatment provided today    Braces/Orthotics/Lines/Etc:   · IV  · O2 Device: Nasal cannula  Treatment/Session Assessment:    · Response to Treatment:  Evaluation only. · Interdisciplinary Collaboration:   o Occupational Therapist  o Registered Nurse  · After treatment position/precautions:   o Supine in bed  o Bed alarm/tab alert on  o Bed/Chair-wheels locked  o Call light within reach  o RN notified   · Compliance with Program/Exercises: Will assess as treatment progresses. · Recommendations/Intent for next treatment session: \"Next visit will focus on advancements to more challenging activities and reduction in assistance provided\".   Total Treatment Duration:  OT Patient Time In/Time Out  Time In: 1357  Time Out: [address], OT

## 2017-11-02 NOTE — WOUND CARE
Bilateral lower legs with trace edema,left lower leg with backwards L shape line of blisters 9x0.5cm with ecchymosis surrounding, patient states she bumped a child's chair when walking at home, no current open areas. Patient is not agreeable to tight static compression. Recommend foam bandage light compression. Discussion with Dr. Tammy Bernard, will add THANH hose for light compression. Will monitor.

## 2017-11-03 ENCOUNTER — APPOINTMENT (OUTPATIENT)
Dept: GENERAL RADIOLOGY | Age: 67
DRG: 871 | End: 2017-11-03
Attending: INTERNAL MEDICINE
Payer: MEDICARE

## 2017-11-03 LAB
ANION GAP SERPL CALC-SCNC: 6 MMOL/L (ref 7–16)
BUN SERPL-MCNC: 14 MG/DL (ref 8–23)
CALCIUM SERPL-MCNC: 8.2 MG/DL (ref 8.3–10.4)
CHLORIDE SERPL-SCNC: 111 MMOL/L (ref 98–107)
CO2 SERPL-SCNC: 29 MMOL/L (ref 21–32)
CREAT SERPL-MCNC: 0.92 MG/DL (ref 0.6–1)
ERYTHROCYTE [DISTWIDTH] IN BLOOD BY AUTOMATED COUNT: 17.8 % (ref 11.9–14.6)
GLUCOSE BLD STRIP.AUTO-MCNC: 106 MG/DL (ref 65–100)
GLUCOSE BLD STRIP.AUTO-MCNC: 108 MG/DL (ref 65–100)
GLUCOSE BLD STRIP.AUTO-MCNC: 151 MG/DL (ref 65–100)
GLUCOSE BLD STRIP.AUTO-MCNC: 97 MG/DL (ref 65–100)
GLUCOSE SERPL-MCNC: 107 MG/DL (ref 65–100)
HCT VFR BLD AUTO: 27.5 % (ref 35.8–46.3)
HGB BLD-MCNC: 8.3 G/DL (ref 11.7–15.4)
IGE SERPL-ACNC: <1 IU/ML (ref 0–100)
MCH RBC QN AUTO: 25.3 PG (ref 26.1–32.9)
MCHC RBC AUTO-ENTMCNC: 30.2 G/DL (ref 31.4–35)
MCV RBC AUTO: 83.8 FL (ref 79.6–97.8)
PLATELET # BLD AUTO: 341 K/UL (ref 150–450)
PMV BLD AUTO: 8.5 FL (ref 10.8–14.1)
POTASSIUM SERPL-SCNC: 4.3 MMOL/L (ref 3.5–5.1)
RBC # BLD AUTO: 3.28 M/UL (ref 4.05–5.25)
SODIUM SERPL-SCNC: 146 MMOL/L (ref 136–145)
WBC # BLD AUTO: 16.3 K/UL (ref 4.3–11.1)

## 2017-11-03 PROCEDURE — 74011250637 HC RX REV CODE- 250/637: Performed by: INTERNAL MEDICINE

## 2017-11-03 PROCEDURE — 74011250636 HC RX REV CODE- 250/636: Performed by: INTERNAL MEDICINE

## 2017-11-03 PROCEDURE — 74011000258 HC RX REV CODE- 258: Performed by: INTERNAL MEDICINE

## 2017-11-03 PROCEDURE — 74011636637 HC RX REV CODE- 636/637: Performed by: INTERNAL MEDICINE

## 2017-11-03 PROCEDURE — 80048 BASIC METABOLIC PNL TOTAL CA: CPT | Performed by: INTERNAL MEDICINE

## 2017-11-03 PROCEDURE — 36415 COLL VENOUS BLD VENIPUNCTURE: CPT | Performed by: INTERNAL MEDICINE

## 2017-11-03 PROCEDURE — 74011000250 HC RX REV CODE- 250: Performed by: INTERNAL MEDICINE

## 2017-11-03 PROCEDURE — 99232 SBSQ HOSP IP/OBS MODERATE 35: CPT | Performed by: INTERNAL MEDICINE

## 2017-11-03 PROCEDURE — 71020 XR CHEST PA LAT: CPT

## 2017-11-03 PROCEDURE — 65270000029 HC RM PRIVATE

## 2017-11-03 PROCEDURE — 77010033678 HC OXYGEN DAILY

## 2017-11-03 PROCEDURE — 82962 GLUCOSE BLOOD TEST: CPT

## 2017-11-03 PROCEDURE — 94640 AIRWAY INHALATION TREATMENT: CPT

## 2017-11-03 PROCEDURE — 94760 N-INVAS EAR/PLS OXIMETRY 1: CPT

## 2017-11-03 PROCEDURE — 85027 COMPLETE CBC AUTOMATED: CPT | Performed by: INTERNAL MEDICINE

## 2017-11-03 RX ORDER — DEXTROAMPHETAMINE SACCHARATE, AMPHETAMINE ASPARTATE, DEXTROAMPHETAMINE SULFATE AND AMPHETAMINE SULFATE 2.5; 2.5; 2.5; 2.5 MG/1; MG/1; MG/1; MG/1
20 TABLET ORAL DAILY
Status: DISCONTINUED | OUTPATIENT
Start: 2017-11-03 | End: 2017-11-06 | Stop reason: HOSPADM

## 2017-11-03 RX ORDER — DEXTROAMPHETAMINE SACCHARATE, AMPHETAMINE ASPARTATE MONOHYDRATE, DEXTROAMPHETAMINE SULFATE AND AMPHETAMINE SULFATE 5; 5; 5; 5 MG/1; MG/1; MG/1; MG/1
20 CAPSULE, EXTENDED RELEASE ORAL DAILY
COMMUNITY
End: 2018-04-02

## 2017-11-03 RX ORDER — FUROSEMIDE 40 MG/1
40 TABLET ORAL DAILY
Status: DISCONTINUED | OUTPATIENT
Start: 2017-11-03 | End: 2017-11-06 | Stop reason: HOSPADM

## 2017-11-03 RX ORDER — DEXTROAMPHETAMINE SACCHARATE, AMPHETAMINE ASPARTATE, DEXTROAMPHETAMINE SULFATE AND AMPHETAMINE SULFATE 3.75; 3.75; 3.75; 3.75 MG/1; MG/1; MG/1; MG/1
15 TABLET ORAL DAILY
Status: DISCONTINUED | OUTPATIENT
Start: 2017-11-03 | End: 2017-11-03

## 2017-11-03 RX ADMIN — MONTELUKAST SODIUM 10 MG: 10 TABLET, FILM COATED ORAL at 09:04

## 2017-11-03 RX ADMIN — VANCOMYCIN HYDROCHLORIDE 2000 MG: 10 INJECTION, POWDER, LYOPHILIZED, FOR SOLUTION INTRAVENOUS at 17:47

## 2017-11-03 RX ADMIN — CARVEDILOL 12.5 MG: 12.5 TABLET, FILM COATED ORAL at 09:07

## 2017-11-03 RX ADMIN — Medication 10 ML: at 06:14

## 2017-11-03 RX ADMIN — HYDROCODONE BITARTRATE AND ACETAMINOPHEN 1 TABLET: 10; 325 TABLET ORAL at 09:03

## 2017-11-03 RX ADMIN — ALBUTEROL SULFATE 2.5 MG: 2.5 SOLUTION RESPIRATORY (INHALATION) at 16:37

## 2017-11-03 RX ADMIN — ATORVASTATIN CALCIUM 20 MG: 10 TABLET, FILM COATED ORAL at 21:48

## 2017-11-03 RX ADMIN — CEFEPIME HYDROCHLORIDE 2 G: 2 INJECTION, POWDER, FOR SOLUTION INTRAVENOUS at 09:41

## 2017-11-03 RX ADMIN — OXYBUTYNIN CHLORIDE 5 MG: 5 TABLET ORAL at 17:38

## 2017-11-03 RX ADMIN — CEFEPIME HYDROCHLORIDE 2 G: 2 INJECTION, POWDER, FOR SOLUTION INTRAVENOUS at 21:47

## 2017-11-03 RX ADMIN — HYDROCODONE BITARTRATE AND ACETAMINOPHEN 1 TABLET: 10; 325 TABLET ORAL at 22:55

## 2017-11-03 RX ADMIN — ZIPRASIDONE HYDROCHLORIDE 160 MG: 20 CAPSULE ORAL at 00:06

## 2017-11-03 RX ADMIN — PREGABALIN 150 MG: 150 CAPSULE ORAL at 17:38

## 2017-11-03 RX ADMIN — ALPRAZOLAM 2 MG: 0.5 TABLET ORAL at 15:06

## 2017-11-03 RX ADMIN — MIRTAZAPINE 45 MG: 30 TABLET, FILM COATED ORAL at 00:06

## 2017-11-03 RX ADMIN — PANTOPRAZOLE SODIUM 40 MG: 40 TABLET, DELAYED RELEASE ORAL at 17:37

## 2017-11-03 RX ADMIN — ALBUTEROL SULFATE 2.5 MG: 2.5 SOLUTION RESPIRATORY (INHALATION) at 08:26

## 2017-11-03 RX ADMIN — OXYBUTYNIN CHLORIDE 5 MG: 5 TABLET ORAL at 09:04

## 2017-11-03 RX ADMIN — SUCRALFATE 1 G: 1 TABLET ORAL at 21:49

## 2017-11-03 RX ADMIN — SUCRALFATE 1 G: 1 TABLET ORAL at 12:26

## 2017-11-03 RX ADMIN — Medication 5 ML: at 15:08

## 2017-11-03 RX ADMIN — INSULIN LISPRO 2 UNITS: 100 INJECTION, SOLUTION INTRAVENOUS; SUBCUTANEOUS at 06:30

## 2017-11-03 RX ADMIN — IPRATROPIUM BROMIDE 2 SPRAY: 21 SPRAY NASAL at 21:46

## 2017-11-03 RX ADMIN — APIXABAN 2.5 MG: 2.5 TABLET, FILM COATED ORAL at 17:38

## 2017-11-03 RX ADMIN — FOLIC ACID 1 MG: 1 TABLET ORAL at 09:04

## 2017-11-03 RX ADMIN — SUCRALFATE 1 G: 1 TABLET ORAL at 17:38

## 2017-11-03 RX ADMIN — NIACIN 500 MG: 500 TABLET, EXTENDED RELEASE ORAL at 06:13

## 2017-11-03 RX ADMIN — LEVOTHYROXINE SODIUM 112 MCG: 125 TABLET ORAL at 17:38

## 2017-11-03 RX ADMIN — PREGABALIN 150 MG: 150 CAPSULE ORAL at 09:02

## 2017-11-03 RX ADMIN — SUCRALFATE 1 G: 1 TABLET ORAL at 06:14

## 2017-11-03 RX ADMIN — CARVEDILOL 12.5 MG: 12.5 TABLET, FILM COATED ORAL at 17:38

## 2017-11-03 RX ADMIN — DEXTROAMPHETAMINE SACCHARATE, AMPHETAMINE ASPARTATE, DEXTROAMPHETAMINE SULFATE AND AMPHETAMINE SULFATE 20 MG: 2.5; 2.5; 2.5; 2.5 TABLET ORAL at 12:13

## 2017-11-03 RX ADMIN — Medication 10 ML: at 21:48

## 2017-11-03 RX ADMIN — FUROSEMIDE 40 MG: 40 TABLET ORAL at 09:41

## 2017-11-03 RX ADMIN — LISINOPRIL 40 MG: 20 TABLET ORAL at 09:03

## 2017-11-03 RX ADMIN — ALBUTEROL SULFATE 2.5 MG: 2.5 SOLUTION RESPIRATORY (INHALATION) at 20:11

## 2017-11-03 RX ADMIN — PANTOPRAZOLE SODIUM 40 MG: 40 TABLET, DELAYED RELEASE ORAL at 06:13

## 2017-11-03 RX ADMIN — APIXABAN 2.5 MG: 2.5 TABLET, FILM COATED ORAL at 09:04

## 2017-11-03 NOTE — PROGRESS NOTES
Respiratory Care Services     Policy Number: -[id number]    Title: Bronchial Hygiene Protocol    Effective Date: 01/1999    Revised Date: 12/2014    Reviewed Date: 06/2013, 05/2014, 03/2015, 03/2016, 06/2017     I. Purpose: The Respiratory Care Practitioner (RCP) will utilize the following protocol to select and initiate bronchial hygiene therapy to open and maintain obstructed airways when indicated. II. Patients: All patients who are ordered bronchial hygiene therapy. III. Clinical Area: All general patient floors     IV. Protocol: The following conditions or diseases are indications for bronchial hygiene                           therapy. A. Oscillating PEP Therapy        Indications should include:   1. Atelectasis caused by mucus plugging or foreign body  2. Chronic mucociliary clearance disorders  3. Retained secretions which may be associated with the following conditions:  a. Bronchitis  b. Bronchiectasis  c. Pneumonia  B. Chest physical therapy (Percussion/Vibration and Postural Drainage)          Indications are:   1. Atelectasis caused by mucus plugging or foreign body  2. Chronic mucociliary clearance disorders  3. Neuromuscular diseases with impaired cough efficiency  4. Retained secretions which may be associated with the following conditions:  a. Bronchitis/emphysema/asthma  b. Bronchiectasis/cystic fibrosis  c. Post operative atelectasis  d. Following foreign body extraction      C. PAP (Positive airway pressure therapy), i.e. EZPAP indications include:  1. Patients with post-operative atelectasis or to prevent post operative atelectasis. 2. Patients who cannot perform deep breathing exercises due to pain. 3. Patients requiring lung expansion therapy who cannot follow instructions. 4. Patients requiring lung expansion therapy with poor inspiratory capacity <10cc/kg. 5. Patients requiring aerosol therapy in conjunction with opening their airways.   D. Bronchoscopy and nasotracheal suctioning indications should include:  1. Inability to cough effectively  2. Excessive secretions  3. Artificial airway      V. Equipment:   A. Buelah Copper. Vest therapy   Julianne Pope    VI. Guidelines:   Monitor patients vital signs and evaluate patients clinical status. The need to                     change therapy modality may be indicated by:  A. Change in patients sensorium (patient now confused or obtunded, and unable to follow directions). B. Significant worsening of dyspnea  C. A significant deterioration is evident on patients chest radiograph or increased sputum production. D. Increased thickening of secretions (e.g. mucolytic therapy may be indicated.)  E. Development of wheezing  F. Decreasing SaO2  G. Chest pain    VII. Clinical Responsibility: The Therapy Assessment Protocol guidelines will be used to                re-evaluate all patients on bronchial hygiene therapy (See Therapy Assessment Protocol). Karyle Balding will perform changes in therapy according to protocol. .  B. Bronchial hygiene therapy may be discontinued when goals of therapy are met, e.g., secretions easily expectorated for 48 hours, atelectasis is resolved, etc.  C. PAP Therapy may be utilized in place of IPPB therapy per discretion of the RCP, as approved by the Pulmonary Medicine and [address] [place] [address]. VIII. Outcome Criteria:  Outcome criteria for bronchial hygiene therapy should include:  A. Decrease in sputum production  B. Improved breath sounds  C. Improved arterial oxygen tension and/or SaO2  D. Improved chest X-ray  E. Subjective response to therapy    IX. Documentation  A. Document assessment findings in the respiratory assessment section of the patients EMR. B. Document changes in therapy per protocol in the respiratory orders section and in the care plan section of the patients EMR. C. Document patient education in the patient education section of the patients EMR.     X. Related Protocols:  A. Respiratory Patient Care Protocols  B. Incentive Spirometry Protocol  C. Aerosolized Medication Protocol  D.  Oxygen Therapy Protocol        Reference:

## 2017-11-03 NOTE — PROGRESS NOTES
Patient is familiar to Case Management from previous visits. She was last discharged on 10/02/2017 when she was discharged home with new home O2 through [address] [place] [address]. We also arranged home health services through [facility] for RN, PT, and OT follow-up. However, patient declined their services when [facility] attempted to make an appointment to see her. Patient has had numerous admissions (four in the last six months) and continues to have issues. She is being followed by the Pulmonary Care Navigator and the health  will continue to attempt to see patient. Case Management will follow for discharge planning needs.     Care Management Interventions  Transition of Care Consult (CM Consult): Discharge Planning  Discharge Durable Medical Equipment: No  Physical Therapy Consult: Yes  Occupational Therapy Consult: Yes  Speech Therapy Consult: Yes  Current Support Network: Lives with Spouse, Own Home  Confirm Follow Up Transport: Family  Plan discussed with Pt/Family/Caregiver: Yes  Freedom of Choice Offered: Yes  Discharge Location  Discharge Placement: Home with home health

## 2017-11-03 NOTE — PROGRESS NOTES
Report received from [facility], RN. No distress on 3L via NC. Family at beside. Instructed pt to call should needs arise. PT voiced understanding. Will monitor. Door open. SR up x 3, bed low locked.

## 2017-11-03 NOTE — PROGRESS NOTES
Spiritual Care visit. Initial visit. Visited by [facility].     Entered by Anisha Ahuja M.Ed., .B. ,Staff

## 2017-11-03 NOTE — PROGRESS NOTES
Hibiclens bath to BLE. Foam mepilex applied to LLE.   Large THANH knees highs applied, SCDs initiated

## 2017-11-03 NOTE — PROGRESS NOTES
Manolo Cousin  Admission Date: 11/1/2017             Daily Progress Note: 11/3/2017    The patient's chart is reviewed and the patient is discussed with the staff. Patient is a 78 y.o.  female presents with dyspnea. She is well known to us has been hospitalized a number of times for asthma exacerbation. She recently was discharged on home oxygen. She has restrictive lung disease with morbid obesity, chronic pain, chronic left vocal cord paralysis, GERD, hx of PE on lifelong anticoagulation, previous gastric bypass 13 years ago, Fe def anemia (has been on iron infusions) and multiple bouts of pneumonia in 2012, 2013 and 2017.        Of note, she was also admitted in April 2017, 7/25-7/27/17 and 9/28/17- 10/2/2017. She was seen in our office post- hospital follow up on 10/27. Her previous drug screens have been positive for amphetamines, benzos, and opiates. She has received antibiotics for bronchitis while here and another round from here PCP since discharge. Her SOB had improved at that appointment. She uses pro-air and nebulizer treatments. She is on oxygen at 2 L continuous. She is taking Mucinex twice daily. PSG was ordered. Today, she is presents with SOB. Lethargic with flat affect on multiple sedating medications. Bilateral infiltrates on CXR and hypoxemia. We were asked to admit for further acute care. Admitted to [facility] and need for oxygen      Subjective:     Patient awake and less dyspnea today. Back on her 2 LPM oxygen. Wants to know if she will get her lasix. Also family brought in her Adderall 20 mg daily bottle. Not expectorating.      Current Facility-Administered Medications   Medication Dose Route Frequency    albuterol CONCENTRATE 2.5mg/0.5 mL neb soln  2.5 mg Nebulization QID RT    cefepime (MAXIPIME) 2 g in 0.9% sodium chloride (MBP/ADV) 100 mL  2 g IntraVENous Q12H    vancomycin (VANCOCIN) 2000 mg in  ml infusion  2,000 mg IntraVENous Q18H    Tobramycin random level reminder   Other ONCE    tobramycin (NEBCIN) 360 mg in 0.9% sodium chloride 100 mL IVPB  360 mg IntraVENous Q24H    ALPRAZolam (XANAX) tablet 2 mg  2 mg Oral TID PRN    apixaban (ELIQUIS) tablet 2.5 mg  2.5 mg Oral BID    atorvastatin (LIPITOR) tablet 20 mg  20 mg Oral QHS    carvedilol (COREG) tablet 12.5 mg  12.5 mg Oral BID WITH MEALS    folic acid (FOLVITE) tablet 1 mg  1 mg Oral DAILY    HYDROcodone-acetaminophen (NORCO)  mg tablet 1 Tab  1 Tab Oral Q6H PRN    ipratropium (ATROVENT) 0.03 % nasal spray 2 Spray  2 Spray Both Nostrils Q12H    levothyroxine (SYNTHROID) tablet 112 mcg  112 mcg Oral QPM    lisinopril (PRINIVIL, ZESTRIL) tablet 40 mg  40 mg Oral DAILY    mirtazapine (REMERON) tablet 45 mg  45 mg Oral QHS    montelukast (SINGULAIR) tablet 10 mg  10 mg Oral DAILY    naloxegol (MOVANTIK) tablet 25 mg (patient supplied) (Patient Supplied)  25 mg Oral ACB    niacin ER (NIASPAN) tablet 500 mg  500 mg Oral ACB    oxybutynin (DITROPAN) tablet 5 mg  5 mg Oral BID    pantoprazole (PROTONIX) tablet 40 mg  40 mg Oral ACB&D    pregabalin (LYRICA) capsule 150 mg  150 mg Oral BID    sucralfate (CARAFATE) tablet 1 g  1 g Oral AC&HS    ziprasidone (GEODON) capsule 160 mg  160 mg Oral QHS    sodium chloride (NS) flush 5-10 mL  5-10 mL IntraVENous Q8H    sodium chloride (NS) flush 5-10 mL  5-10 mL IntraVENous PRN    predniSONE (DELTASONE) tablet 40 mg  40 mg Oral DAILY WITH BREAKFAST    lip protectant (BLISTEX) ointment   Topical PRN    insulin lispro (HUMALOG) injection   SubCUTAneous AC&HS       Review of Systems:  -Fever  -Headaches  -Chest pain  -Dyspnea, -wheezing,+ cough  -Abdominal pain, -constipation  +Leg swelling -- due to left leg bruise  All other organ systems grossly normal.      Objective:     Vitals:    11/02/17 1830 11/02/17 1959 11/03/17 0008 11/03/17 0749   BP: 125/76  (!) 153/91 158/83   Pulse: 79  98 89   Resp: 20  20 20   Temp: 98.6 °F (37 °C)  98 °F (36.7 °C) 99 °F (37.2 °C)   SpO2: 96% 95% 96% 97%   Weight:       Height:         Intake and Output:   11/01 1901 - 11/03 0700  In: 360 [P.O.:360]  Out: 700 [Urine:700]       Physical Exam:   Constitution:  the patient is well developed and in no acute distress  EENMT:  Sclera clear, pupils equal, oral mucosa moist, narrow  Respiratory: slightly coarse sounds. Slight wheezing on 2 lPM  Cardiovascular:  RRR without M,G,R  Gastrointestinal: soft and non-tender; with positive bowel sounds. Musculoskeletal: warm without cyanosis. There is + erythema and healing bruise in left lower leg above left ankle  Skin:  no jaundice or rashes, wounds as per above  Neurologic: no gross neuro deficits     Psychiatric:  alert and oriented x 3    CXR:  Pending today. On Admission with b/l basal infiltrates        LAB  Recent Labs      11/03/17   0530  11/02/17   2045  11/02/17   1558  11/02/17   1151  11/02/17   0555   GLUCPOC  151*  202*  202*  93  90      Recent Labs      11/03/17   0355  11/02/17   0332  11/01/17   1447   WBC  16.3*  22.2*  20.3*   HGB  8.3*  8.6*  9.8*   HCT  27.5*  28.4*  31.7*   PLT  341  347  380     Recent Labs      11/03/17   0355  11/02/17   0332  11/01/17   1447   NA  146*  145  144   K  4.3  4.0  3.7   CL  111*  108*  104   CO2  29  32  33*   GLU  107*  86  99   BUN  14  24*  24*   CREA  0.92  1.16*  1.31*   CA  8.2*  8.1*  8.4   ALB   --    --   2.6*   TBILI   --    --   0.4   ALT   --    --   17   SGOT   --    --   13*     No results for input(s): PH, PCO2, PO2, HCO3 in the last 72 hours. No results for input(s): LCAD, LAC in the last 72 hours.       Assessment:  (Medical Decision Making)     Hospital Problems  Date Reviewed: 11/1/2017          Codes Class Noted POA    HCAP (healthcare-associated pneumonia) ICD-10-CM: J18.9  ICD-9-CM: 362  11/1/2017 Yes        Cellulitis of left lower extremity ICD-10-CM: L03.116  ICD-9-CM: 682.6  11/1/2017 Unknown        Sepsis ([facility] ICD-10-CM: A41.9  ICD-9-CM: 038.9, 995.91  11/1/2017 Unknown        Restrictive lung disease (Chronic) ICD-10-CM: J98.4  ICD-9-CM: 518.89  9/29/2017 Yes        Morbid obesity - sedentary lifestyle (Chronic) ICD-10-CM: E66.01  ICD-9-CM: 278.01  9/28/2017 Yes        Acute on chronic respiratory failure (HCC) ICD-10-CM: J96.20  ICD-9-CM: 518.84  7/25/2017 Yes        Speech abnormality (Chronic) ICD-10-CM: R47.9  ICD-9-CM: 784.59  2/8/2017 Yes        Polypharmacy (Chronic) ICD-10-CM: S01.142  ICD-9-CM: V58.69  2/8/2017 Yes        Debility (Chronic) ICD-10-CM: R53.81  ICD-9-CM: 799.3  10/1/2012 Yes        Fibromyalgia (Chronic) ICD-10-CM: M79.7  ICD-9-CM: 729.1  Unknown Yes              Drug abuse  --again positive for amphetamines, benzo and opiates. Home meds include only last 2. Now brought in her bottle and also does take Adderall 20 mg daily and will restart. She takes for ADHD      Plan:     --taper oxygen back to home levels  --IgG is low with noted normal f/u IgE and will check IgA and IgM level. HIV test pending.   --continue abx D3 for now, cultures negative if negative by tomorrow will taper to PO  --cxr today and if still with infiltrates may need CT of chest and possbile bronch with BAL in the future. --continue IS and nebs with IPPB  --speech consult done and on normal diet  --f/u left leg -- appears to be healing and so far cultures are negative  --taper prednisone tomorrow  --PT/OT  --hopefully home in next 24-48 hours  --continue remaining treatment.       Faviola Lane MD

## 2017-11-03 NOTE — PROGRESS NOTES
Resp even and unlabored with eyes closed. No distress. Door open. SR up x 3, bed low locked, Call light within reach.

## 2017-11-03 NOTE — PROGRESS NOTES
OT NOTE:    Charts reviewed, tx session attempted. Pt off floor this AM for x-ray. Will re-attempt at later time/date as schedule permits.     Thanks,    Ethan Arroyo, OTR/L

## 2017-11-04 LAB
ANION GAP SERPL CALC-SCNC: 7 MMOL/L (ref 7–16)
BUN SERPL-MCNC: 11 MG/DL (ref 8–23)
CALCIUM SERPL-MCNC: 8.4 MG/DL (ref 8.3–10.4)
CHLORIDE SERPL-SCNC: 107 MMOL/L (ref 98–107)
CO2 SERPL-SCNC: 32 MMOL/L (ref 21–32)
CREAT SERPL-MCNC: 0.92 MG/DL (ref 0.6–1)
ERYTHROCYTE [DISTWIDTH] IN BLOOD BY AUTOMATED COUNT: 18.2 % (ref 11.9–14.6)
GLUCOSE BLD STRIP.AUTO-MCNC: 105 MG/DL (ref 65–100)
GLUCOSE BLD STRIP.AUTO-MCNC: 130 MG/DL (ref 65–100)
GLUCOSE BLD STRIP.AUTO-MCNC: 134 MG/DL (ref 65–100)
GLUCOSE BLD STRIP.AUTO-MCNC: 156 MG/DL (ref 65–100)
GLUCOSE SERPL-MCNC: 150 MG/DL (ref 65–100)
HCT VFR BLD AUTO: 30.2 % (ref 35.8–46.3)
HGB BLD-MCNC: 9.2 G/DL (ref 11.7–15.4)
HIV 1+2 AB+HIV1 P24 AG SERPL QL IA: NON REACTIVE
MCH RBC QN AUTO: 25.6 PG (ref 26.1–32.9)
MCHC RBC AUTO-ENTMCNC: 30.5 G/DL (ref 31.4–35)
MCV RBC AUTO: 83.9 FL (ref 79.6–97.8)
PLATELET # BLD AUTO: 368 K/UL (ref 150–450)
PMV BLD AUTO: 9.1 FL (ref 10.8–14.1)
POTASSIUM SERPL-SCNC: 3.6 MMOL/L (ref 3.5–5.1)
RBC # BLD AUTO: 3.6 M/UL (ref 4.05–5.25)
SODIUM SERPL-SCNC: 146 MMOL/L (ref 136–145)
WBC # BLD AUTO: 12.7 K/UL (ref 4.3–11.1)

## 2017-11-04 PROCEDURE — 74011000258 HC RX REV CODE- 258: Performed by: INTERNAL MEDICINE

## 2017-11-04 PROCEDURE — 80048 BASIC METABOLIC PNL TOTAL CA: CPT | Performed by: INTERNAL MEDICINE

## 2017-11-04 PROCEDURE — 74011000250 HC RX REV CODE- 250: Performed by: INTERNAL MEDICINE

## 2017-11-04 PROCEDURE — 94760 N-INVAS EAR/PLS OXIMETRY 1: CPT

## 2017-11-04 PROCEDURE — 74011250636 HC RX REV CODE- 250/636: Performed by: INTERNAL MEDICINE

## 2017-11-04 PROCEDURE — 94640 AIRWAY INHALATION TREATMENT: CPT

## 2017-11-04 PROCEDURE — 77030027688 HC DRSG MEPILEX 16-48IN NO BORD MOLN -A

## 2017-11-04 PROCEDURE — 74011636637 HC RX REV CODE- 636/637: Performed by: INTERNAL MEDICINE

## 2017-11-04 PROCEDURE — 65270000029 HC RM PRIVATE

## 2017-11-04 PROCEDURE — 74011250637 HC RX REV CODE- 250/637: Performed by: INTERNAL MEDICINE

## 2017-11-04 PROCEDURE — 99232 SBSQ HOSP IP/OBS MODERATE 35: CPT | Performed by: INTERNAL MEDICINE

## 2017-11-04 PROCEDURE — 77010033678 HC OXYGEN DAILY

## 2017-11-04 PROCEDURE — 36415 COLL VENOUS BLD VENIPUNCTURE: CPT | Performed by: INTERNAL MEDICINE

## 2017-11-04 PROCEDURE — 85027 COMPLETE CBC AUTOMATED: CPT | Performed by: INTERNAL MEDICINE

## 2017-11-04 PROCEDURE — 82962 GLUCOSE BLOOD TEST: CPT

## 2017-11-04 RX ORDER — ZIPRASIDONE HYDROCHLORIDE 20 MG/1
80 CAPSULE ORAL
Status: DISCONTINUED | OUTPATIENT
Start: 2017-11-04 | End: 2017-11-05

## 2017-11-04 RX ADMIN — ZIPRASIDONE HYDROCHLORIDE 80 MG: 20 CAPSULE ORAL at 22:59

## 2017-11-04 RX ADMIN — SUCRALFATE 1 G: 1 TABLET ORAL at 06:06

## 2017-11-04 RX ADMIN — MIRTAZAPINE 45 MG: 30 TABLET, FILM COATED ORAL at 00:26

## 2017-11-04 RX ADMIN — ALBUTEROL SULFATE 2.5 MG: 2.5 SOLUTION RESPIRATORY (INHALATION) at 20:01

## 2017-11-04 RX ADMIN — CARVEDILOL 12.5 MG: 12.5 TABLET, FILM COATED ORAL at 16:54

## 2017-11-04 RX ADMIN — ALPRAZOLAM 2 MG: 0.5 TABLET ORAL at 12:48

## 2017-11-04 RX ADMIN — PANTOPRAZOLE SODIUM 40 MG: 40 TABLET, DELAYED RELEASE ORAL at 16:53

## 2017-11-04 RX ADMIN — ALBUTEROL SULFATE 2.5 MG: 2.5 SOLUTION RESPIRATORY (INHALATION) at 11:59

## 2017-11-04 RX ADMIN — SUCRALFATE 1 G: 1 TABLET ORAL at 12:15

## 2017-11-04 RX ADMIN — MONTELUKAST SODIUM 10 MG: 10 TABLET, FILM COATED ORAL at 09:18

## 2017-11-04 RX ADMIN — OXYBUTYNIN CHLORIDE 5 MG: 5 TABLET ORAL at 09:18

## 2017-11-04 RX ADMIN — ZIPRASIDONE HYDROCHLORIDE 160 MG: 20 CAPSULE ORAL at 00:26

## 2017-11-04 RX ADMIN — PREGABALIN 150 MG: 150 CAPSULE ORAL at 16:59

## 2017-11-04 RX ADMIN — CARVEDILOL 12.5 MG: 12.5 TABLET, FILM COATED ORAL at 09:18

## 2017-11-04 RX ADMIN — APIXABAN 2.5 MG: 2.5 TABLET, FILM COATED ORAL at 09:18

## 2017-11-04 RX ADMIN — ATORVASTATIN CALCIUM 20 MG: 10 TABLET, FILM COATED ORAL at 23:00

## 2017-11-04 RX ADMIN — APIXABAN 2.5 MG: 2.5 TABLET, FILM COATED ORAL at 16:55

## 2017-11-04 RX ADMIN — Medication 5 ML: at 16:58

## 2017-11-04 RX ADMIN — CEFTRIAXONE SODIUM 1 G: 1 INJECTION, POWDER, FOR SOLUTION INTRAMUSCULAR; INTRAVENOUS at 09:36

## 2017-11-04 RX ADMIN — PREDNISONE 30 MG: 20 TABLET ORAL at 09:18

## 2017-11-04 RX ADMIN — Medication 10 ML: at 23:06

## 2017-11-04 RX ADMIN — SUCRALFATE 1 G: 1 TABLET ORAL at 23:00

## 2017-11-04 RX ADMIN — LISINOPRIL 40 MG: 20 TABLET ORAL at 09:18

## 2017-11-04 RX ADMIN — PREGABALIN 150 MG: 150 CAPSULE ORAL at 09:18

## 2017-11-04 RX ADMIN — FUROSEMIDE 40 MG: 40 TABLET ORAL at 09:18

## 2017-11-04 RX ADMIN — Medication 10 ML: at 06:07

## 2017-11-04 RX ADMIN — DEXTROAMPHETAMINE SACCHARATE, AMPHETAMINE ASPARTATE, DEXTROAMPHETAMINE SULFATE AND AMPHETAMINE SULFATE 20 MG: 2.5; 2.5; 2.5; 2.5 TABLET ORAL at 09:32

## 2017-11-04 RX ADMIN — SUCRALFATE 1 G: 1 TABLET ORAL at 16:53

## 2017-11-04 RX ADMIN — ALBUTEROL SULFATE 2.5 MG: 2.5 SOLUTION RESPIRATORY (INHALATION) at 08:02

## 2017-11-04 RX ADMIN — HYDROCODONE BITARTRATE AND ACETAMINOPHEN 1 TABLET: 10; 325 TABLET ORAL at 20:39

## 2017-11-04 RX ADMIN — NIACIN 500 MG: 500 TABLET, EXTENDED RELEASE ORAL at 06:08

## 2017-11-04 RX ADMIN — FOLIC ACID 1 MG: 1 TABLET ORAL at 09:18

## 2017-11-04 RX ADMIN — MIRTAZAPINE 45 MG: 30 TABLET, FILM COATED ORAL at 23:00

## 2017-11-04 RX ADMIN — OXYBUTYNIN CHLORIDE 5 MG: 5 TABLET ORAL at 16:57

## 2017-11-04 RX ADMIN — INSULIN LISPRO 2 UNITS: 100 INJECTION, SOLUTION INTRAVENOUS; SUBCUTANEOUS at 16:52

## 2017-11-04 RX ADMIN — ALBUTEROL SULFATE 2.5 MG: 2.5 SOLUTION RESPIRATORY (INHALATION) at 15:46

## 2017-11-04 NOTE — PROGRESS NOTES
Resting quietly without c/o SOB or pain, respirations even and unlabored. Safety maintained through shift. Call bell and personal items within reach. Up in chair for 6 hours.

## 2017-11-04 NOTE — PROGRESS NOTES
Bethany Chun  Admission Date: 11/1/2017             Daily Progress Note: 11/4/2017    The patient's chart is reviewed and the patient is discussed with the staff. Patient is a 78 y.o.  female presents with dyspnea. She is well known to us has been hospitalized a number of times for asthma exacerbation. She recently was discharged on home oxygen. She has restrictive lung disease with morbid obesity, chronic pain, chronic left vocal cord paralysis, GERD, hx of PE on lifelong anticoagulation, previous gastric bypass 13 years ago, Fe def anemia (has been on iron infusions) and multiple bouts of pneumonia in 2012, 2013 and 2017.        Of note, she was also admitted in April 2017, 7/25-7/27/17 and 9/28/17- 10/2/2017. She was seen in our office post- hospital follow up on 10/27. Her previous drug screens have been positive for amphetamines, benzos, and opiates. She has received antibiotics for bronchitis while here and another round from here PCP since discharge. Her SOB had improved at that appointment. She uses pro-air and nebulizer treatments. She is on oxygen at 2 L continuous. She is taking Mucinex twice daily. PSG was ordered. Today, she is presents with SOB. Lethargic with flat affect on multiple sedating medications. Bilateral infiltrates on CXR and hypoxemia. We were asked to admit for further acute care. Admitted to [facility] and need for oxygen      Subjective:     Patient awake but seems confused. Feels funny and not herself. No headaches. No chest pain. No fevers. Did eat today.      Current Facility-Administered Medications   Medication Dose Route Frequency    predniSONE (DELTASONE) tablet 30 mg  30 mg Oral DAILY WITH BREAKFAST    furosemide (LASIX) tablet 40 mg  40 mg Oral DAILY    Vancomycin trough reminder   Other ONCE    dextroamphetamine-amphetamine (ADDERALL) tablet 20 mg (Patient Supplied)  20 mg Oral DAILY    albuterol CONCENTRATE 2.5mg/0.5 mL neb soln  2.5 mg Nebulization QID RT    cefepime (MAXIPIME) 2 g in 0.9% sodium chloride (MBP/ADV) 100 mL  2 g IntraVENous Q12H    vancomycin (VANCOCIN) 2000 mg in  ml infusion  2,000 mg IntraVENous Q18H    ALPRAZolam (XANAX) tablet 2 mg  2 mg Oral TID PRN    apixaban (ELIQUIS) tablet 2.5 mg  2.5 mg Oral BID    atorvastatin (LIPITOR) tablet 20 mg  20 mg Oral QHS    carvedilol (COREG) tablet 12.5 mg  12.5 mg Oral BID WITH MEALS    folic acid (FOLVITE) tablet 1 mg  1 mg Oral DAILY    HYDROcodone-acetaminophen (NORCO)  mg tablet 1 Tab  1 Tab Oral Q6H PRN    ipratropium (ATROVENT) 0.03 % nasal spray 2 Spray  2 Spray Both Nostrils Q12H    levothyroxine (SYNTHROID) tablet 112 mcg  112 mcg Oral QPM    lisinopril (PRINIVIL, ZESTRIL) tablet 40 mg  40 mg Oral DAILY    mirtazapine (REMERON) tablet 45 mg  45 mg Oral QHS    montelukast (SINGULAIR) tablet 10 mg  10 mg Oral DAILY    naloxegol (MOVANTIK) tablet 25 mg (patient supplied) (Patient Supplied)  25 mg Oral ACB    niacin ER (NIASPAN) tablet 500 mg  500 mg Oral ACB    oxybutynin (DITROPAN) tablet 5 mg  5 mg Oral BID    pantoprazole (PROTONIX) tablet 40 mg  40 mg Oral ACB&D    pregabalin (LYRICA) capsule 150 mg  150 mg Oral BID    sucralfate (CARAFATE) tablet 1 g  1 g Oral AC&HS    ziprasidone (GEODON) capsule 160 mg  160 mg Oral QHS    sodium chloride (NS) flush 5-10 mL  5-10 mL IntraVENous Q8H    sodium chloride (NS) flush 5-10 mL  5-10 mL IntraVENous PRN    lip protectant (BLISTEX) ointment   Topical PRN    insulin lispro (HUMALOG) injection   SubCUTAneous AC&HS       Review of Systems:  -Fever  -Headaches  -Chest pain  -Dyspnea, -wheezing,+ cough  -Abdominal pain, -constipation  +Leg swelling -- due to left leg bruise  All other organ systems grossly normal.      Objective:     Vitals:    11/04/17 0335 11/04/17 0547 11/04/17 0744 11/04/17 0802   BP: 118/76  150/76    Pulse: (!) 108  (!) 111    Resp: 16  16    Temp: 98.1 °F (36.7 °C)  98.4 °F (36.9 °C)    SpO2: 95%  96% 96%   Weight:  248 lb 1.6 oz (112.5 kg)     Height:         Intake and Output:   11/02 1901 - 11/04 0700  In: 240 [P.O.:240]  Out: 300 [Urine:300]       Physical Exam:   Constitution:  the patient is well developed and in no acute distress  EENMT:  Sclera clear, pupils equal, oral mucosa moist, narrow  Respiratory: slightly coarse with decreased effort. on 2 lPM  Cardiovascular:  RRR without M,G,R  Gastrointestinal: soft and non-tender; with positive bowel sounds. Musculoskeletal: warm without cyanosis. There is + erythema and healing bruise in left lower leg above left ankle  Skin:  no jaundice or rashes, wounds as per above  Neurologic: no gross neuro deficits     Psychiatric:  alert and oriented x 3 but slightly confused     CXR:  Yesterday with b/l infiltrates but less dense today          On Admission with b/l basal infiltrates        LAB  Recent Labs      11/04/17   0525  11/03/17   2128  11/03/17   1607  11/03/17   1156  11/03/17   0530   GLUCPOC  105*  108*  106*  97  151*      Recent Labs      11/04/17   0321  11/03/17   0355  11/02/17   0332  11/01/17   1447   WBC  12.7*  16.3*  22.2*  20.3*   HGB  9.2*  8.3*  8.6*  9.8*   HCT  30.2*  27.5*  28.4*  31.7*   PLT  368  341  347  380     Recent Labs      11/04/17   0321  11/03/17   0355  11/02/17   0332  11/01/17   1447   NA  146*  146*  145  144   K  3.6  4.3  4.0  3.7   CL  107  111*  108*  104   CO2  32  29  32  33*   GLU  150*  107*  86  99   BUN  11  14  24*  24*   CREA  0.92  0.92  1.16*  1.31*   CA  8.4  8.2*  8.1*  8.4   ALB   --    --    --   2.6*   TBILI   --    --    --   0.4   ALT   --    --    --   17   SGOT   --    --    --   13*     No results for input(s): PH, PCO2, PO2, HCO3 in the last 72 hours. No results for input(s): LCAD, LAC in the last 72 hours.       Assessment:  (Medical Decision Making)     Hospital Problems  Date Reviewed: 11/1/2017          Codes Class Noted POA    HCAP (healthcare-associated pneumonia) ICD-10-CM: J18.9  ICD-9-CM: 710  11/1/2017 Yes        Cellulitis of left lower extremity ICD-10-CM: L03.116  ICD-9-CM: 682.6  11/1/2017 Unknown        Sepsis ([facility] ICD-10-CM: A41.9  ICD-9-CM: 038.9, 995.91  11/1/2017 Unknown        Restrictive lung disease (Chronic) ICD-10-CM: J98.4  ICD-9-CM: 518.89  9/29/2017 Yes        Morbid obesity - sedentary lifestyle (Chronic) ICD-10-CM: E66.01  ICD-9-CM: 278.01  9/28/2017 Yes        Acute on chronic respiratory failure (HCC) ICD-10-CM: J96.20  ICD-9-CM: 518.84  7/25/2017 Yes        Speech abnormality (Chronic) ICD-10-CM: R47.9  ICD-9-CM: 784.59  2/8/2017 Yes        Polypharmacy (Chronic) ICD-10-CM: U90.029  ICD-9-CM: V58.69  2/8/2017 Yes        Debility (Chronic) ICD-10-CM: R53.81  ICD-9-CM: 799.3  10/1/2012 Yes        Fibromyalgia (Chronic) ICD-10-CM: M79.7  ICD-9-CM: 729.1  Unknown Yes              Drug abuse  --again positive for amphetamines, benzo and opiates. Home meds include only last 2. Now brought in her bottle and also does take Adderall 20 mg daily and will restart. She takes for ADHD      Plan:     --confusion today, will have to monitor. appears to to pay attention or be focused, will monitor for now  --taper oxygen back to home levels on 2 lPM  --IgG is low with noted normal f/u IgE and will check IgA and IgM level. HIV test negative. Hold off IVIG for now. Will have to repeat in the future. --continue abx D4 for now, cultures negative will decreased to just rocephin for now  --cxr yesterday with improving b/l infiltrates  --continue IS and nebs with IPPB  --f/u left leg -- appears to be healing and so far cultures are negative  --taper prednisone to 30 mg daily  --PT/OT  --hopefully home soon -- confused today so will hold off.   --apparently takes ? ?geodon 160 mg QHS. Will cut in half and told nurse to check dose with family given very high dose. --continue remaining treatment.       Sneha Rodriguez MD

## 2017-11-05 LAB
ANION GAP SERPL CALC-SCNC: 8 MMOL/L (ref 7–16)
BASOPHILS # BLD: 0 K/UL (ref 0–0.2)
BASOPHILS NFR BLD: 0 % (ref 0–2)
BUN SERPL-MCNC: 12 MG/DL (ref 8–23)
CALCIUM SERPL-MCNC: 8.8 MG/DL (ref 8.3–10.4)
CHLORIDE SERPL-SCNC: 103 MMOL/L (ref 98–107)
CO2 SERPL-SCNC: 33 MMOL/L (ref 21–32)
CREAT SERPL-MCNC: 0.89 MG/DL (ref 0.6–1)
DIFFERENTIAL METHOD BLD: ABNORMAL
EOSINOPHIL # BLD: 0.1 K/UL (ref 0–0.8)
EOSINOPHIL NFR BLD: 1 % (ref 0.5–7.8)
ERYTHROCYTE [DISTWIDTH] IN BLOOD BY AUTOMATED COUNT: 17.8 % (ref 11.9–14.6)
GLUCOSE BLD STRIP.AUTO-MCNC: 100 MG/DL (ref 65–100)
GLUCOSE BLD STRIP.AUTO-MCNC: 109 MG/DL (ref 65–100)
GLUCOSE BLD STRIP.AUTO-MCNC: 129 MG/DL (ref 65–100)
GLUCOSE BLD STRIP.AUTO-MCNC: 216 MG/DL (ref 65–100)
GLUCOSE SERPL-MCNC: 95 MG/DL (ref 65–100)
HCT VFR BLD AUTO: 31.4 % (ref 35.8–46.3)
HGB BLD-MCNC: 9.6 G/DL (ref 11.7–15.4)
IMM GRANULOCYTES # BLD: 0.1 K/UL (ref 0–0.5)
IMM GRANULOCYTES NFR BLD: 1 % (ref 0–5)
LYMPHOCYTES # BLD: 4.4 K/UL (ref 0.5–4.6)
LYMPHOCYTES NFR BLD: 32 % (ref 13–44)
MCH RBC QN AUTO: 25.1 PG (ref 26.1–32.9)
MCHC RBC AUTO-ENTMCNC: 30.6 G/DL (ref 31.4–35)
MCV RBC AUTO: 82.2 FL (ref 79.6–97.8)
MONOCYTES # BLD: 1.6 K/UL (ref 0.1–1.3)
MONOCYTES NFR BLD: 12 % (ref 4–12)
NEUTS SEG # BLD: 7.5 K/UL (ref 1.7–8.2)
NEUTS SEG NFR BLD: 54 % (ref 43–78)
PLATELET # BLD AUTO: 381 K/UL (ref 150–450)
PMV BLD AUTO: 9.1 FL (ref 10.8–14.1)
POTASSIUM SERPL-SCNC: 3.3 MMOL/L (ref 3.5–5.1)
RBC # BLD AUTO: 3.82 M/UL (ref 4.05–5.25)
SODIUM SERPL-SCNC: 144 MMOL/L (ref 136–145)
WBC # BLD AUTO: 13.6 K/UL (ref 4.3–11.1)

## 2017-11-05 PROCEDURE — 85025 COMPLETE CBC W/AUTO DIFF WBC: CPT | Performed by: INTERNAL MEDICINE

## 2017-11-05 PROCEDURE — 99232 SBSQ HOSP IP/OBS MODERATE 35: CPT | Performed by: INTERNAL MEDICINE

## 2017-11-05 PROCEDURE — 74011250637 HC RX REV CODE- 250/637: Performed by: INTERNAL MEDICINE

## 2017-11-05 PROCEDURE — 77010033678 HC OXYGEN DAILY

## 2017-11-05 PROCEDURE — 74011636637 HC RX REV CODE- 636/637: Performed by: INTERNAL MEDICINE

## 2017-11-05 PROCEDURE — 80048 BASIC METABOLIC PNL TOTAL CA: CPT | Performed by: INTERNAL MEDICINE

## 2017-11-05 PROCEDURE — 94640 AIRWAY INHALATION TREATMENT: CPT

## 2017-11-05 PROCEDURE — 97530 THERAPEUTIC ACTIVITIES: CPT

## 2017-11-05 PROCEDURE — 65270000029 HC RM PRIVATE

## 2017-11-05 PROCEDURE — 97535 SELF CARE MNGMENT TRAINING: CPT

## 2017-11-05 PROCEDURE — 94760 N-INVAS EAR/PLS OXIMETRY 1: CPT

## 2017-11-05 PROCEDURE — 36415 COLL VENOUS BLD VENIPUNCTURE: CPT | Performed by: INTERNAL MEDICINE

## 2017-11-05 PROCEDURE — 74011000250 HC RX REV CODE- 250: Performed by: INTERNAL MEDICINE

## 2017-11-05 PROCEDURE — 74011000258 HC RX REV CODE- 258: Performed by: INTERNAL MEDICINE

## 2017-11-05 PROCEDURE — 82962 GLUCOSE BLOOD TEST: CPT

## 2017-11-05 PROCEDURE — 74011250636 HC RX REV CODE- 250/636: Performed by: INTERNAL MEDICINE

## 2017-11-05 RX ORDER — POLYETHYLENE GLYCOL 3350 17 G/17G
17 POWDER, FOR SOLUTION ORAL
Status: DISCONTINUED | OUTPATIENT
Start: 2017-11-05 | End: 2017-11-06 | Stop reason: HOSPADM

## 2017-11-05 RX ORDER — LEVOTHYROXINE SODIUM 112 UG/1
112 TABLET ORAL
Status: DISCONTINUED | OUTPATIENT
Start: 2017-11-06 | End: 2017-11-06 | Stop reason: HOSPADM

## 2017-11-05 RX ORDER — ZIPRASIDONE HYDROCHLORIDE 20 MG/1
120 CAPSULE ORAL
Status: DISCONTINUED | OUTPATIENT
Start: 2017-11-05 | End: 2017-11-06 | Stop reason: HOSPADM

## 2017-11-05 RX ORDER — POLYETHYLENE GLYCOL 3350 17 G/17G
17 POWDER, FOR SOLUTION ORAL DAILY
Status: DISCONTINUED | OUTPATIENT
Start: 2017-11-05 | End: 2017-11-05 | Stop reason: DRUGHIGH

## 2017-11-05 RX ADMIN — SUCRALFATE 1 G: 1 TABLET ORAL at 05:21

## 2017-11-05 RX ADMIN — CARVEDILOL 12.5 MG: 12.5 TABLET, FILM COATED ORAL at 16:45

## 2017-11-05 RX ADMIN — ALBUTEROL SULFATE 2.5 MG: 2.5 SOLUTION RESPIRATORY (INHALATION) at 11:30

## 2017-11-05 RX ADMIN — SUCRALFATE 1 G: 1 TABLET ORAL at 11:09

## 2017-11-05 RX ADMIN — ALPRAZOLAM 2 MG: 0.5 TABLET ORAL at 09:11

## 2017-11-05 RX ADMIN — POLYETHYLENE GLYCOL 3350 17 G: 17 POWDER, FOR SOLUTION ORAL at 21:43

## 2017-11-05 RX ADMIN — PANTOPRAZOLE SODIUM 40 MG: 40 TABLET, DELAYED RELEASE ORAL at 16:44

## 2017-11-05 RX ADMIN — PREGABALIN 150 MG: 150 CAPSULE ORAL at 16:54

## 2017-11-05 RX ADMIN — HYDROCODONE BITARTRATE AND ACETAMINOPHEN 1 TABLET: 10; 325 TABLET ORAL at 21:40

## 2017-11-05 RX ADMIN — Medication 10 ML: at 05:20

## 2017-11-05 RX ADMIN — NIACIN 500 MG: 500 TABLET, EXTENDED RELEASE ORAL at 05:21

## 2017-11-05 RX ADMIN — OXYBUTYNIN CHLORIDE 5 MG: 5 TABLET ORAL at 16:54

## 2017-11-05 RX ADMIN — Medication 5 ML: at 16:47

## 2017-11-05 RX ADMIN — MIRTAZAPINE 45 MG: 30 TABLET, FILM COATED ORAL at 23:50

## 2017-11-05 RX ADMIN — ATORVASTATIN CALCIUM 20 MG: 10 TABLET, FILM COATED ORAL at 21:40

## 2017-11-05 RX ADMIN — SUCRALFATE 1 G: 1 TABLET ORAL at 21:40

## 2017-11-05 RX ADMIN — APIXABAN 2.5 MG: 2.5 TABLET, FILM COATED ORAL at 16:45

## 2017-11-05 RX ADMIN — MONTELUKAST SODIUM 10 MG: 10 TABLET, FILM COATED ORAL at 09:11

## 2017-11-05 RX ADMIN — FOLIC ACID 1 MG: 1 TABLET ORAL at 09:10

## 2017-11-05 RX ADMIN — ALPRAZOLAM 2 MG: 0.5 TABLET ORAL at 00:20

## 2017-11-05 RX ADMIN — FUROSEMIDE 40 MG: 40 TABLET ORAL at 09:10

## 2017-11-05 RX ADMIN — IPRATROPIUM BROMIDE 2 SPRAY: 21 SPRAY NASAL at 09:00

## 2017-11-05 RX ADMIN — Medication 10 ML: at 21:42

## 2017-11-05 RX ADMIN — ALBUTEROL SULFATE 2.5 MG: 2.5 SOLUTION RESPIRATORY (INHALATION) at 08:14

## 2017-11-05 RX ADMIN — INSULIN LISPRO 4 UNITS: 100 INJECTION, SOLUTION INTRAVENOUS; SUBCUTANEOUS at 16:46

## 2017-11-05 RX ADMIN — PREGABALIN 150 MG: 150 CAPSULE ORAL at 09:10

## 2017-11-05 RX ADMIN — ZIPRASIDONE HYDROCHLORIDE 120 MG: 20 CAPSULE ORAL at 23:50

## 2017-11-05 RX ADMIN — PANTOPRAZOLE SODIUM 40 MG: 40 TABLET, DELAYED RELEASE ORAL at 05:19

## 2017-11-05 RX ADMIN — IPRATROPIUM BROMIDE 2 SPRAY: 21 SPRAY NASAL at 00:20

## 2017-11-05 RX ADMIN — ALBUTEROL SULFATE 2.5 MG: 2.5 SOLUTION RESPIRATORY (INHALATION) at 19:34

## 2017-11-05 RX ADMIN — IPRATROPIUM BROMIDE 2 SPRAY: 21 SPRAY NASAL at 21:41

## 2017-11-05 RX ADMIN — ALBUTEROL SULFATE 2.5 MG: 2.5 SOLUTION RESPIRATORY (INHALATION) at 15:17

## 2017-11-05 RX ADMIN — SUCRALFATE 1 G: 1 TABLET ORAL at 16:44

## 2017-11-05 RX ADMIN — CEFTRIAXONE SODIUM 1 G: 1 INJECTION, POWDER, FOR SOLUTION INTRAMUSCULAR; INTRAVENOUS at 09:11

## 2017-11-05 RX ADMIN — CARVEDILOL 12.5 MG: 12.5 TABLET, FILM COATED ORAL at 09:10

## 2017-11-05 RX ADMIN — OXYBUTYNIN CHLORIDE 5 MG: 5 TABLET ORAL at 09:11

## 2017-11-05 RX ADMIN — APIXABAN 2.5 MG: 2.5 TABLET, FILM COATED ORAL at 09:11

## 2017-11-05 RX ADMIN — PREDNISONE 30 MG: 20 TABLET ORAL at 09:11

## 2017-11-05 RX ADMIN — ALPRAZOLAM 2 MG: 0.5 TABLET ORAL at 21:40

## 2017-11-05 RX ADMIN — LISINOPRIL 40 MG: 20 TABLET ORAL at 09:11

## 2017-11-05 RX ADMIN — HYDROCODONE BITARTRATE AND ACETAMINOPHEN 1 TABLET: 10; 325 TABLET ORAL at 11:08

## 2017-11-05 NOTE — PROGRESS NOTES
At 2039 - Norco 10/325 mg tablet 1 tab PRN given for pain on lower back pain for pain score of 7/10.

## 2017-11-05 NOTE — PROGRESS NOTES
Pt refuses to perform vibra-lung because of vagal nerve stimulator. Pt performed nebulizing treatment with in line flutter valve. Pt tolerated.

## 2017-11-05 NOTE — PROGRESS NOTES
Marian Blandon  Admission Date: 11/1/2017             Daily Progress Note: 11/5/2017    The patient's chart is reviewed and the patient is discussed with the staff. Patient is a 78 y.o.  female presents with dyspnea. She is well known to us has been hospitalized a number of times for asthma exacerbation. She recently was discharged on home oxygen. She has restrictive lung disease with morbid obesity, chronic pain, chronic left vocal cord paralysis, GERD, hx of PE on lifelong anticoagulation, previous gastric bypass 13 years ago, Fe def anemia (has been on iron infusions) and multiple bouts of pneumonia in 2012, 2013 and 2017.        Of note, she was also admitted in April 2017, 7/25-7/27/17 and 9/28/17- 10/2/2017. She was seen in our office post- hospital follow up on 10/27. Her previous drug screens have been positive for amphetamines, benzos, and opiates. She has received antibiotics for bronchitis while here and another round from here PCP since discharge. Her SOB had improved at that appointment. She uses pro-air and nebulizer treatments. She is on oxygen at 2 L continuous. She is taking Mucinex twice daily. PSG was ordered. Today, she is presents with SOB. Lethargic with flat affect on multiple sedating medications. Bilateral infiltrates on CXR and hypoxemia. We were asked to admit for further acute care. Admitted to [facility] and need for oxygen      Subjective:     patient is more alert today and upset that went down on her Geodon from 160 mg to 80 mg. Now more focused and not as confused. Looking at me when talking about issues.      Current Facility-Administered Medications   Medication Dose Route Frequency    cefTRIAXone (ROCEPHIN) 1 g in 0.9% sodium chloride (MBP/ADV) 50 mL  1 g IntraVENous Q24H    ziprasidone (GEODON) capsule 80 mg  80 mg Oral QHS    predniSONE (DELTASONE) tablet 30 mg  30 mg Oral DAILY WITH BREAKFAST    furosemide (LASIX) tablet 40 mg  40 mg Oral DAILY    dextroamphetamine-amphetamine (ADDERALL) tablet 20 mg (Patient Supplied)  20 mg Oral DAILY    albuterol CONCENTRATE 2.5mg/0.5 mL neb soln  2.5 mg Nebulization QID RT    ALPRAZolam (XANAX) tablet 2 mg  2 mg Oral TID PRN    apixaban (ELIQUIS) tablet 2.5 mg  2.5 mg Oral BID    atorvastatin (LIPITOR) tablet 20 mg  20 mg Oral QHS    carvedilol (COREG) tablet 12.5 mg  12.5 mg Oral BID WITH MEALS    folic acid (FOLVITE) tablet 1 mg  1 mg Oral DAILY    HYDROcodone-acetaminophen (NORCO)  mg tablet 1 Tab  1 Tab Oral Q6H PRN    ipratropium (ATROVENT) 0.03 % nasal spray 2 Spray  2 Spray Both Nostrils Q12H    levothyroxine (SYNTHROID) tablet 112 mcg  112 mcg Oral QPM    lisinopril (PRINIVIL, ZESTRIL) tablet 40 mg  40 mg Oral DAILY    mirtazapine (REMERON) tablet 45 mg  45 mg Oral QHS    montelukast (SINGULAIR) tablet 10 mg  10 mg Oral DAILY    naloxegol (MOVANTIK) tablet 25 mg (patient supplied) (Patient Supplied)  25 mg Oral ACB    niacin ER (NIASPAN) tablet 500 mg  500 mg Oral ACB    oxybutynin (DITROPAN) tablet 5 mg  5 mg Oral BID    pantoprazole (PROTONIX) tablet 40 mg  40 mg Oral ACB&D    pregabalin (LYRICA) capsule 150 mg  150 mg Oral BID    sucralfate (CARAFATE) tablet 1 g  1 g Oral AC&HS    sodium chloride (NS) flush 5-10 mL  5-10 mL IntraVENous Q8H    sodium chloride (NS) flush 5-10 mL  5-10 mL IntraVENous PRN    lip protectant (BLISTEX) ointment   Topical PRN    insulin lispro (HUMALOG) injection   SubCUTAneous AC&HS       Review of Systems:  -Fever  -Headaches  -Chest pain  -Dyspnea, -wheezing,+ cough  -Abdominal pain, +constipation  +Leg swelling -- due to left leg bruise  All other organ systems grossly normal.      Objective:     Vitals:    11/04/17 2340 11/05/17 0315 11/05/17 0801 11/05/17 0814   BP: 141/81 125/81 147/75    Pulse: 98 88 72    Resp: 16 16 17    Temp: 98.5 °F (36.9 °C) 98.5 °F (36.9 °C) 98.6 °F (37 °C)    SpO2: 93% 94% 93% 94%   Weight:       Height: Intake and Output:           Physical Exam:   Constitution:  the patient is well developed and in no acute distress  EENMT:  Sclera clear, pupils equal, oral mucosa moist, narrow  Respiratory: distant sounds on 3 lPM  Cardiovascular:  RRR without M,G,R  Gastrointestinal: soft and non-tender; with positive bowel sounds. Musculoskeletal: warm without cyanosis. There is + erythema and healing bruise in left lower leg above left ankle (healing well when examined yesterday)  Skin:  no jaundice or rashes, wounds as per above  Neurologic: no gross neuro deficits     Psychiatric:  alert and oriented x 3 but slightly confused     CXR:  Yesterday with b/l infiltrates but less dense today          On Admission with b/l basal infiltrates        LAB  Recent Labs      11/05/17   0510  11/04/17   2055  11/04/17   1549  11/04/17   1140  11/04/17   0525   GLUCPOC  109*  130*  156*  134*  105*      Recent Labs      11/05/17   0434  11/04/17   0321  11/03/17   0355   WBC  13.6*  12.7*  16.3*   HGB  9.6*  9.2*  8.3*   HCT  31.4*  30.2*  27.5*   PLT  381  368  341     Recent Labs      11/05/17   0434  11/04/17   0321  11/03/17   0355   NA  144  146*  146*   K  3.3*  3.6  4.3   CL  103  107  111*   CO2  33*  32  29   GLU  95  150*  107*   BUN  12  11  14   CREA  0.89  0.92  0.92   CA  8.8  8.4  8.2*     No results for input(s): PH, PCO2, PO2, HCO3 in the last 72 hours. No results for input(s): LCAD, LAC in the last 72 hours.       Assessment:  (Medical Decision Making)     Hospital Problems  Date Reviewed: 11/1/2017          Codes Class Noted POA    HCAP (healthcare-associated pneumonia) ICD-10-CM: J18.9  ICD-9-CM: 234  11/1/2017 Yes        Cellulitis of left lower extremity ICD-10-CM: L03.116  ICD-9-CM: 682.6  11/1/2017 Unknown        Sepsis ([facility] ICD-10-CM: A41.9  ICD-9-CM: 038.9, 995.91  11/1/2017 Unknown        Restrictive lung disease (Chronic) ICD-10-CM: J98.4  ICD-9-CM: 518.89  9/29/2017 Yes        Morbid obesity - sedentary lifestyle (Chronic) ICD-10-CM: E66.01  ICD-9-CM: 278.01  9/28/2017 Yes        Acute on chronic respiratory failure ([facility] ICD-10-CM: J96.20  ICD-9-CM: 518.84  7/25/2017 Yes        Speech abnormality (Chronic) ICD-10-CM: R47.9  ICD-9-CM: 784.59  2/8/2017 Yes        Polypharmacy (Chronic) ICD-10-CM: F34.111  ICD-9-CM: V58.69  2/8/2017 Yes        Debility (Chronic) ICD-10-CM: R53.81  ICD-9-CM: 799.3  10/1/2012 Yes        Fibromyalgia (Chronic) ICD-10-CM: M79.7  ICD-9-CM: 729.1  Unknown Yes              Drug abuse  --again positive for amphetamines, benzo and opiates. Home meds include only last 2. Now brought in her bottle and also does take Adderall 20 mg daily and will restart. She takes for ADHD      Plan:     --confusion less today and on 80mg geodon and will go up to 120 mg QHS. She normally takes 160 mg and has bottle with her today  --continue PT/OT  --taper oxygen back to home levels on 2-3 lPM  --IgG is low with noted normal f/u IgE and will check IgA and IgM level. HIV test negative. Hold off IVIG for now. Will have to repeat in the future. --continue abx D5 for now, cultures negative and did decreased to just rocephin for yesterday and will continue to 2 more days. cxr yesterday with improving b/l infiltrates  --continue IS and nebs with IPPB  --f/u left leg -- appears to be healing and so far cultures are negative  --taper prednisone to 30 mg daily and then to 20 mg tomorrow.   --PT/OT  --hopefully home next 24-48 hours if mentally fine. --continue remaining treatment.       Jd Mendez MD

## 2017-11-05 NOTE — PROGRESS NOTES
BSSR received from [facility], RN. Patient is AAXOx4, no acute distress, O2 at 1L/min via NC, denies pain at this time. Call light within reach.

## 2017-11-05 NOTE — PROGRESS NOTES
Resting quietly without c/o SOB or pain, respirations even and unlabored. Safety maintained through shift. Call bell and personal items within reach. Up in chair for 6 hours today.

## 2017-11-05 NOTE — PROGRESS NOTES
Problem: Self Care Deficits Care Plan (Adult)  Goal: *Acute Goals and Plan of Care (Insert Text)  1. Patient will complete lower body bathing and dressing with supervision and adaptive equipment as needed. 2. Patient will complete toileting with supervision. 3. Patient will tolerate 30 minutes of OT treatment with 2-3 rest breaks to increase activity tolerance for ADLs. 4. Patient will complete functional transfers with supervision and adaptive equipment as needed. 5. Patient will verbalize with minimal cues 5 ways to complete energy conservation with ADL. Timeframe: 7 visits       OCCUPATIONAL THERAPY: Daily Note, Treatment Day: 1st and AM 11/5/2017  INPATIENT: Hospital Day: 5  [place] / [place] / Product Type: Medicare /      NAME/AGE/GENDER: Geronimo Fonseca is a 78 y.o. female   PRIMARY DIAGNOSIS:  Sepsis ([facility] <principal problem not specified> <principal problem not specified>        ICD-10: Treatment Diagnosis:    · Generalized Muscle Weakness (M62.81)  · Other lack of cordination (R27.8)  · History of falling (Z91.81)   Precautions/Allergies:     Latex; Bactrim [sulfamethoxazole-trimethoprim]; Lamictal [lamotrigine]; Pcn [penicillins]; and Tapentadol      ASSESSMENT:   Ms. Stefanie Chen was admitted for the above diagnosis. Pt presents supine upon arrival. Pt required very little assistance with supine to sit transfer. Pt demonstrates fair sitting balance at edge of bed. She was able to stand and transfer to bedside commode with CGA. Performed toileting with supervision (bladder hygiene). Pt practiced functional mobility out in hallway with HHA. She did better than she expected. Would benefit from using rolling walker due to decrease activity tolerance. Pt returned to recliner and performed a few UE exercises. Left with all needs in reach. Will continue to benefit from skilled OT during stay.   This section established at most recent assessment   PROBLEM LIST (Impairments causing functional limitations):  1. Decreased Strength  2. Decreased ADL/Functional Activities  3. Decreased Transfer Abilities  4. Decreased Ambulation Ability/Technique  5. Decreased Balance  6. Increased Pain  7. Decreased Activity Tolerance  8. Decreased Work Simplification/Energy Conservation Techniques  9. Edema/Girth  10. Decreased Skin Integrity/Hygeine  11. Decreased [place] with Home Exercise Program   INTERVENTIONS PLANNED: (Benefits and precautions of occupational therapy have been discussed with the patient.)  1. Activities of daily living training  2. Adaptive equipment training  3. Balance training  4. Clothing management  5. Neuromuscular re-eduation  6. Therapeutic activity  7. Therapeutic exercise     TREATMENT PLAN: Frequency/Duration: Follow patient 3 times per week to address above goals. Rehabilitation Potential For Stated Goals: Excellent     RECOMMENDED REHABILITATION/EQUIPMENT: (at time of discharge pending progress): Due to the probability of continued deficits (see above) this patient will likely need continued skilled occupational therapy after discharge. Equipment:    TBD]              OCCUPATIONAL PROFILE AND HISTORY:   History of Present Injury/Illness (Reason for Referral):  See H&P  Past Medical History/Comorbidities:   Ms. Gomez Wilkinson  has a past medical history of Acute renal failure ([facility] (July, 2014); Bilateral pulmonary embolism [facility]) (September, 2012); Calculus of ureter (May, 2015); CAP (community acquired pneumonia) (October, 2012); CVA (cerebral infarction) (January, 2012); Gastric ulcer (July, 2014); Gram-positive bacteremia 1/2 cx (12/26/2016); HCAP (healthcare-associated pneumonia) (January, 2013); HCAP (healthcare-associated pneumonia) (February, 2013); Left leg DVT ([address] (1991); Psychiatric disorder; and PUD (peptic ulcer disease) (???). She also has no past medical history of Aneurysm ([address] Arrhythmia; Autoimmune disease ([address] CAD (coronary artery disease);  Cancer ([facility] Chronic pain; Coagulation disorder ([facility] COPD; Diabetes ([facility] Heart failure ([facility] Liver disease; Seizures ([facility] or Unspecified sleep apnea. Ms. Linette Guzman  has a past surgical history that includes appendectomy; cholecystectomy; gastric bypass; tonsillectomy; other surgical; orthopaedic; hysterectomy; and endoscopy (July, 2014). Social History/Living Environment:   Home Environment: Private residence  # Steps to Enter: 3  One/Two Story Residence: One story  Living Alone: No  Support Systems: Family member(s)  Patient Expects to be Discharged to[de-identified] Private residence  Current DME Used/Available at Home: Mari Barboza, nataly, Shower chair  Tub or Shower Type: Tub/Shower combination  Prior Level of Function/Work/Activity:  Pt lives at home with her daughter/son-in-law and their 4 children. Pt reports that typically she completes ADL with modified independence but daughter does assist her with bathing. Reports 1 fall and 1 other near fall in the past 6 months. Personal Factors:          Past/Current Experience:  Hx of recent hospitalization        Other factors that influence how disability is experienced by the patient:  Multiple co-morbidities (see above)   Number of Personal Factors/Comorbidities that affect the Plan of Care: Expanded review of therapy/medical records (1-2):  MODERATE COMPLEXITY   ASSESSMENT OF OCCUPATIONAL PERFORMANCE[de-identified]   Activities of Daily Living:           Basic ADLs (From Assessment) Complex ADLs (From Assessment)   Basic ADL  Feeding: Supervision  Oral Facial Hygiene/Grooming: Supervision  Bathing: Minimum assistance  Upper Body Dressing: Minimum assistance  Lower Body Dressing: Moderate assistance  Toileting:  Moderate assistance Instrumental ADL  Meal Preparation: Maximum assistance  Homemaking: Maximum assistance   Grooming/Bathing/Dressing Activities of Daily Living     Cognitive Retraining  Safety/Judgement: Awareness of environment           Toileting  Bladder Hygiene: Supervision/set-up Functional Transfers  Toilet Transfer : Contact guard assistance     Bed/Mat Mobility  Supine to Sit: Supervision  Sit to Stand: Stand-by asssistance       Most Recent Physical Functioning:   Gross Assessment:                  Posture:  Posture (WDL): Exceptions to WDL  Posture Assessment: Forward head, Rounded shoulders  Balance:  Sitting: Impaired  Sitting - Static: Good (unsupported)  Sitting - Dynamic: Fair (occasional)  Standing: Impaired  Standing - Static: Fair  Standing - Dynamic : Fair Bed Mobility:  Supine to Sit: Supervision  Wheelchair Mobility:     Transfers:  Sit to Stand: Stand-by asssistance              Patient Vitals for the past 6 hrs:   BP BP Patient Position SpO2 O2 Flow Rate (L/min) Pulse   17 0801 147/75 At rest 93 % - 72   17 0814 - - 94 % 2 l/min -   17 1130 - - 94 % 2 l/min -   17 1148 105/59 At rest 93 % - 98       Mental Status  Neurologic State: Alert, Appropriate for age  Orientation Level: Oriented X4  Cognition: Appropriate decision making, Follows commands  Perception: Appears intact  Perseveration: No perseveration noted  Safety/Judgement: Awareness of environment                          Physical Skills Involved:  1. Range of Motion  2. Balance  3. Strength  4. Activity Tolerance  5. Pain (Chronic)  6. Edema  7. Skin Integrity Cognitive Skills Affected (resulting in the inability to perform in a timely and safe manner):  1. WFL Psychosocial Skills Affected:  1. n/a   Number of elements that affect the Plan of Care: 5+:  HIGH COMPLEXITY   CLINICAL DECISION MAKIN [facility] [address] [address] AM-PAC 6 Clicks   Daily Activity Inpatient Short Form  How much help from another person does the patient currently need. .. Total A Lot A Little None   1. Putting on and taking off regular lower body clothing? [] 1   [x] 2   [] 3   [] 4   2. Bathing (including washing, rinsing, drying)? [] 1   [] 2   [x] 3   [] 4   3.   Toileting, which includes using toilet, bedpan or urinal?   [] 1   [x] 2   [] 3   [] 4   4. Putting on and taking off regular upper body clothing? [] 1   [] 2   [x] 3   [] 4   5. Taking care of personal grooming such as brushing teeth? [] 1   [] 2   [x] 3   [] 4   6. Eating meals? [] 1   [] 2   [x] 3   [] 4   © 2007, Trustees of [address] [place] [address] [address] [address], under license to [employer]. All rights reserved      Score:  Initial: 16 Most Recent: X (Date: -- )    Interpretation of Tool:  Represents activities that are increasingly more difficult (i.e. Bed mobility, Transfers, Gait). Score 24 23 22-20 19-15 14-10 9-7 6     Modifier CH CI CJ CK CL CM CN      ? Self Care:     - CURRENT STATUS: CK - 40%-59% impaired, limited or restricted    - GOAL STATUS: CJ - 20%-39% impaired, limited or restricted    - D/C STATUS:  ---------------To be determined---------------  [place] / [place] / Product Type: Medicare /      Medical Necessity:     · Patient demonstrates excellent rehab potential due to higher previous functional level. Reason for Services/Other Comments:  · Patient continues to require skilled intervention due to decreased indepedence with ADL/functional transfers. Use of outcome tool(s) and clinical judgement create a POC that gives a: LOW COMPLEXITY         TREATMENT:   (In addition to Assessment/Re-Assessment sessions the following treatments were rendered)     Pre-treatment Symptoms/Complaints:  =  Pain: Initial:   Pain Intensity 1: 0  Post Session: same     Self Care: (10 minutes): Procedure(s) (per grid) utilized to improve and/or restore self-care/home management as related to toileting. Required minimal tactile cueing to facilitate activities of daily living skills. Therapeutic Activity: (15 minutes): Therapeutic activities including Chair transfers, Toilet transfers and functional mobility on level ground to improve mobility, strength, balance and activity tolerance.   Required minimal assistance to promote static and dynamic balance in standing. Braces/Orthotics/Lines/Etc:   · IV  · O2 Device: Nasal cannula  Treatment/Session Assessment:    · Response to Treatment:  Tolerated well   · Interdisciplinary Collaboration:   o Certified Occupational Therapy Assistant  o Registered Nurse  · After treatment position/precautions:   o Up in chair  o Bed/Chair-wheels locked  o Call light within reach   · Compliance with Program/Exercises: Will assess as treatment progresses. · Recommendations/Intent for next treatment session: \"Next visit will focus on advancements to more challenging activities and reduction in assistance provided\".   Total Treatment Duration:OT Patient Time In/Time Out  Time In: 7156  Time Out: [address]

## 2017-11-06 ENCOUNTER — PATIENT OUTREACH (OUTPATIENT)
Dept: CASE MANAGEMENT | Age: 67
End: 2017-11-06

## 2017-11-06 VITALS
DIASTOLIC BLOOD PRESSURE: 73 MMHG | TEMPERATURE: 97.7 F | HEART RATE: 80 BPM | WEIGHT: 247.3 LBS | BODY MASS INDEX: 46.69 KG/M2 | RESPIRATION RATE: 18 BRPM | SYSTOLIC BLOOD PRESSURE: 131 MMHG | OXYGEN SATURATION: 97 % | HEIGHT: 61 IN

## 2017-11-06 PROBLEM — J98.4 RESTRICTIVE LUNG DISEASE: Chronic | Status: ACTIVE | Noted: 2017-11-01

## 2017-11-06 PROBLEM — M79.7 FIBROMYALGIA: Chronic | Status: ACTIVE | Noted: 2017-11-01

## 2017-11-06 PROBLEM — F19.10 DRUG ABUSE (HCC): Chronic | Status: ACTIVE | Noted: 2017-11-06

## 2017-11-06 PROBLEM — J96.20 ACUTE ON CHRONIC RESPIRATORY FAILURE (HCC): Status: ACTIVE | Noted: 2017-11-01

## 2017-11-06 PROBLEM — E66.01 MORBID OBESITY (HCC): Chronic | Status: ACTIVE | Noted: 2017-11-01

## 2017-11-06 PROBLEM — Z79.899 POLYPHARMACY: Chronic | Status: ACTIVE | Noted: 2017-11-01

## 2017-11-06 PROBLEM — R53.81 DEBILITY: Chronic | Status: ACTIVE | Noted: 2017-11-01

## 2017-11-06 PROBLEM — R47.9 SPEECH ABNORMALITY: Chronic | Status: ACTIVE | Noted: 2017-11-01

## 2017-11-06 LAB
ANION GAP SERPL CALC-SCNC: 9 MMOL/L (ref 7–16)
BACTERIA SPEC CULT: NORMAL
BACTERIA SPEC CULT: NORMAL
BUN SERPL-MCNC: 14 MG/DL (ref 8–23)
CALCIUM SERPL-MCNC: 8.8 MG/DL (ref 8.3–10.4)
CHLORIDE SERPL-SCNC: 102 MMOL/L (ref 98–107)
CO2 SERPL-SCNC: 32 MMOL/L (ref 21–32)
CREAT SERPL-MCNC: 0.85 MG/DL (ref 0.6–1)
ERYTHROCYTE [DISTWIDTH] IN BLOOD BY AUTOMATED COUNT: 17.7 % (ref 11.9–14.6)
GLUCOSE BLD STRIP.AUTO-MCNC: 101 MG/DL (ref 65–100)
GLUCOSE BLD STRIP.AUTO-MCNC: 111 MG/DL (ref 65–100)
GLUCOSE SERPL-MCNC: 98 MG/DL (ref 65–100)
HCT VFR BLD AUTO: 30.1 % (ref 35.8–46.3)
HGB BLD-MCNC: 9.1 G/DL (ref 11.7–15.4)
MCH RBC QN AUTO: 25 PG (ref 26.1–32.9)
MCHC RBC AUTO-ENTMCNC: 30.2 G/DL (ref 31.4–35)
MCV RBC AUTO: 82.7 FL (ref 79.6–97.8)
PLATELET # BLD AUTO: 364 K/UL (ref 150–450)
PMV BLD AUTO: 9.1 FL (ref 10.8–14.1)
POTASSIUM SERPL-SCNC: 3.4 MMOL/L (ref 3.5–5.1)
RBC # BLD AUTO: 3.64 M/UL (ref 4.05–5.25)
SERVICE CMNT-IMP: NORMAL
SERVICE CMNT-IMP: NORMAL
SODIUM SERPL-SCNC: 143 MMOL/L (ref 136–145)
WBC # BLD AUTO: 12.8 K/UL (ref 4.3–11.1)

## 2017-11-06 PROCEDURE — 74011250637 HC RX REV CODE- 250/637: Performed by: INTERNAL MEDICINE

## 2017-11-06 PROCEDURE — 36415 COLL VENOUS BLD VENIPUNCTURE: CPT | Performed by: INTERNAL MEDICINE

## 2017-11-06 PROCEDURE — 99239 HOSP IP/OBS DSCHRG MGMT >30: CPT | Performed by: INTERNAL MEDICINE

## 2017-11-06 PROCEDURE — 80048 BASIC METABOLIC PNL TOTAL CA: CPT | Performed by: INTERNAL MEDICINE

## 2017-11-06 PROCEDURE — 94760 N-INVAS EAR/PLS OXIMETRY 1: CPT

## 2017-11-06 PROCEDURE — 82962 GLUCOSE BLOOD TEST: CPT

## 2017-11-06 PROCEDURE — 94640 AIRWAY INHALATION TREATMENT: CPT

## 2017-11-06 PROCEDURE — 74011636637 HC RX REV CODE- 636/637: Performed by: INTERNAL MEDICINE

## 2017-11-06 PROCEDURE — 97116 GAIT TRAINING THERAPY: CPT

## 2017-11-06 PROCEDURE — 74011000258 HC RX REV CODE- 258: Performed by: INTERNAL MEDICINE

## 2017-11-06 PROCEDURE — 85027 COMPLETE CBC AUTOMATED: CPT | Performed by: INTERNAL MEDICINE

## 2017-11-06 PROCEDURE — 74011250636 HC RX REV CODE- 250/636: Performed by: INTERNAL MEDICINE

## 2017-11-06 PROCEDURE — 74011000250 HC RX REV CODE- 250: Performed by: INTERNAL MEDICINE

## 2017-11-06 PROCEDURE — 97110 THERAPEUTIC EXERCISES: CPT

## 2017-11-06 PROCEDURE — 77010033678 HC OXYGEN DAILY

## 2017-11-06 RX ORDER — PREDNISONE 20 MG/1
TABLET ORAL
Qty: 10 TAB | Refills: 0 | Status: SHIPPED | OUTPATIENT
Start: 2017-11-06 | End: 2017-12-03

## 2017-11-06 RX ORDER — FUROSEMIDE 40 MG/1
40 TABLET ORAL
Qty: 20 TAB | Refills: 0 | Status: SHIPPED | OUTPATIENT
Start: 2017-11-06 | End: 2018-08-20 | Stop reason: SINTOL

## 2017-11-06 RX ADMIN — FOLIC ACID 1 MG: 1 TABLET ORAL at 09:16

## 2017-11-06 RX ADMIN — OXYBUTYNIN CHLORIDE 5 MG: 5 TABLET ORAL at 09:16

## 2017-11-06 RX ADMIN — LISINOPRIL 40 MG: 20 TABLET ORAL at 09:16

## 2017-11-06 RX ADMIN — ALBUTEROL SULFATE 2.5 MG: 2.5 SOLUTION RESPIRATORY (INHALATION) at 07:17

## 2017-11-06 RX ADMIN — ALBUTEROL SULFATE 2.5 MG: 2.5 SOLUTION RESPIRATORY (INHALATION) at 12:10

## 2017-11-06 RX ADMIN — CARVEDILOL 12.5 MG: 12.5 TABLET, FILM COATED ORAL at 09:16

## 2017-11-06 RX ADMIN — PREGABALIN 150 MG: 150 CAPSULE ORAL at 09:16

## 2017-11-06 RX ADMIN — MONTELUKAST SODIUM 10 MG: 10 TABLET, FILM COATED ORAL at 09:16

## 2017-11-06 RX ADMIN — PREDNISONE 30 MG: 20 TABLET ORAL at 09:16

## 2017-11-06 RX ADMIN — APIXABAN 2.5 MG: 2.5 TABLET, FILM COATED ORAL at 09:16

## 2017-11-06 RX ADMIN — FUROSEMIDE 40 MG: 40 TABLET ORAL at 09:16

## 2017-11-06 RX ADMIN — CEFTRIAXONE SODIUM 1 G: 1 INJECTION, POWDER, FOR SOLUTION INTRAMUSCULAR; INTRAVENOUS at 09:16

## 2017-11-06 RX ADMIN — NIACIN 500 MG: 500 TABLET, EXTENDED RELEASE ORAL at 06:00

## 2017-11-06 RX ADMIN — SUCRALFATE 1 G: 1 TABLET ORAL at 06:01

## 2017-11-06 RX ADMIN — LEVOTHYROXINE SODIUM 112 MCG: 112 TABLET ORAL at 06:00

## 2017-11-06 RX ADMIN — DEXTROAMPHETAMINE SACCHARATE, AMPHETAMINE ASPARTATE, DEXTROAMPHETAMINE SULFATE AND AMPHETAMINE SULFATE 20 MG: 2.5; 2.5; 2.5; 2.5 TABLET ORAL at 09:32

## 2017-11-06 RX ADMIN — PANTOPRAZOLE SODIUM 40 MG: 40 TABLET, DELAYED RELEASE ORAL at 06:01

## 2017-11-06 NOTE — PROGRESS NOTES
Patient did not agree to [employer] home visits. She has our number if she changes her mind. Will follow by phone.

## 2017-11-06 NOTE — PROGRESS NOTES
Patient has not agreed to [address] home visit. She has our number if she changes her mind. Will follow up by phone tomorrow.      Jimbo Santamaria, RN- [facility], BSN  Care Transition/ Bundled Payment Navigator  [phone]

## 2017-11-06 NOTE — PROGRESS NOTES
Report received from lElen Cooper RN. Pt sitting up in bed. No distress. Instructed pt to call should needs arise. Pt voiced understanding. Will monitor.

## 2017-11-06 NOTE — DISCHARGE SUMMARY
Discharge Note    Staci Soto  Admission date:  11/1/2017  Discharge date:  11/6/2017     Admitting Diagnosis:  Sepsis [facility])    Discharge Diagnoses:   Hospital Problems  Date Reviewed: 11/6/2017          Codes Class Noted POA    Drug abuse (Chronic) ICD-10-CM: F19.10  ICD-9-CM: 305.90  11/6/2017 Yes        Fibromyalgia (Chronic) ICD-10-CM: M79.7  ICD-9-CM: 729.1  11/1/2017 Yes        Morbid obesity - sedentary lifestyle (Chronic) ICD-10-CM: E66.01  ICD-9-CM: 278.01  11/1/2017 Yes        Debility (Chronic) ICD-10-CM: R53.81  ICD-9-CM: 799.3  11/1/2017 Yes        Speech abnormality (Chronic) ICD-10-CM: R47.9  ICD-9-CM: 784.59  11/1/2017 Yes        Polypharmacy (Chronic) ICD-10-CM: G95.959  ICD-9-CM: V58.69  11/1/2017 Yes        Acute on chronic respiratory failure ([facility] [address] ICD-10-CM: J96.20  ICD-9-CM: 518.84  11/1/2017 Yes        Restrictive lung disease (Chronic) ICD-10-CM: J98.4  ICD-9-CM: 518.89  11/1/2017 Yes        HCAP (healthcare-associated pneumonia) ICD-10-CM: J18.9  ICD-9-CM: 397  11/1/2017 Yes        Cellulitis of left lower extremity ICD-10-CM: L03.116  ICD-9-CM: 682.6  11/1/2017 Yes        Sepsis ([facility] [place] [address] ICD-10-CM: A41.9  ICD-9-CM: 038.9, 995.91  11/1/2017 Yes              Consultants:  None    Studies/Procedures:  CXR    Condition on Discharge:  stable    Disposition:  home      Hospital course:  Patient is a 78 y.o. CF presents with dyspnea, has had multiple admissions for asthma exacerbations and was recently discharged on home O2. She was lethargic with flat affect on multiple sedating medications. Bilateral infiltrates on CXR and hypoxemia and was admitted for pneumonia. We were asked to admit for further acute care. She has restrictive lung disease, morbid obesity, chronic pain, chronic left vocal cord paralysis, GERD, hx of PE on lifelong anticoagulation, previous gastric bypass 13 years ago, Fe def anemia (has been on iron infusions) and multiple bouts of pneumonia in 2012, 2013 and 2017.  She was admitted in April 2017, 7/25-7/27/17 and 9/28/17- 10/2/2017. She was seen in our office post- hospital follow up on 10/27. Her previous drug screens have been positive for amphetamines, benzos, and opiates.    She has received antibiotics for bronchitis while here and another round from here PCP since discharge. Her SOB had improved at that appointment. She uses pro-air and nebulizer treatments. She is on oxygen at 2 L continuous.  She is taking Mucinex twice daily. PSG was ordered. Was admitted and placed on antibiotics for HCAP due to recent admissions. UDS was positive for amphetamines, benzodiazepines and opiates. HIV screen was non-reactive. IgA normal at 128, IgE < 1 and IgG low 492. Had leg blisters and wound therapy consutled--THANH hose applied. PT/OT consulted for mobility. Was weaned back to her home O2 flow of 2l. She had some mild confusion during hospital stay that resolved at the time of discharge. Symptoms improved and she is now ready to go home. She has completed antibiotics course and will continue Prednisone taper. She will continue home o2 and be seen in our office in 1-2 weeks for follow up. She will notify our office prior to that is symptoms worsen. Physical Exam:   Constitution:  the patient is obese and in no acute distress, NC 2L  EENMT:  Sclera clear, pupils equal, oral mucosa moist  Respiratory: few scattered crackles, no wheezing  Cardiovascular:  RRR without M,G,R  Gastrointestinal: soft, obese and non-tender; with positive bowel sounds. Musculoskeletal: warm without cyanosis. There is no lower leg edema.   Skin:  no jaundice or rashes, no wounds   Neurologic: no gross neuro deficits     Psychiatric:  alert and oriented x 3      LAB  Recent Labs      11/06/17   0346  11/05/17   0434  11/04/17   0321   WBC  12.8*  13.6*  12.7*   HGB  9.1*  9.6*  9.2*   HCT  30.1*  31.4*  30.2*   PLT  364  381  368     Recent Labs      11/06/17   0346  11/05/17   0434 11/04/17   0321   NA  143  144  146*   K  3.4*  3.3*  3.6   CL  102  103  107   CO2  32  33*  32   BUN  14  12  11   CREA  0.85  0.89  0.92     No results for input(s): PH, PCO2, PO2, HCO3 in the last 72 hours. Discharge Medications:   Current Discharge Medication List      START taking these medications    Details   furosemide (LASIX) 40 mg tablet Take 1 Tab by mouth daily as needed. Qty: 20 Tab, Refills: 0         CONTINUE these medications which have CHANGED    Details   predniSONE (DELTASONE) 20 mg tablet Take 1 tablet daily for 5 days then, take 1/2 tablet daily for 5 days then stop. Qty: 10 Tab, Refills: 0         CONTINUE these medications which have NOT CHANGED    Details   amphetamine-dextroamphetamine XR (ADDERALL XR) 20 mg XR capsule Take 20 mg by mouth daily. albuterol (PROAIR HFA) 90 mcg/actuation inhaler Take 2 Puffs by inhalation every four (4) hours as needed for Wheezing. OXYGEN-AIR DELIVERY SYSTEMS 2 lpm cont. fluticasone-vilanterol (BREO ELLIPTA) 200-25 mcg/dose inhaler Take 1 Puff by inhalation daily. RINSE MOUTH WELL AFTER USE  Qty: 3 Inhaler, Refills: 3      albuterol sulfate (PROVENTIL;VENTOLIN) 2.5 mg/0.5 mL nebu nebulizer solution 0.5 mL by Nebulization route every four (4) hours. Dx J45.909  Qty: 180 mL, Refills: 4    Comments: DX: 799.02 hypoxemia, 518.83 chronic respitory failure      sucralfate (CARAFATE) 1 gram tablet Take 1 g by mouth four (4) times daily. pregabalin (LYRICA) 150 mg capsule Take  by mouth.      montelukast (SINGULAIR) 10 mg tablet Take 10 mg by mouth daily. naloxegol (MOVANTIK) 25 mg tab tablet Take  by mouth Daily (before breakfast). carvedilol (COREG) 12.5 mg tablet Take 12.5 mg by mouth two (2) times daily (with meals). apixaban (ELIQUIS) 2.5 mg tablet Take 2.5 mg by mouth two (2) times a day. Indications: PULMONARY THROMBOEMBOLISM PREVENTION      atorvastatin (LIPITOR) 10 mg tablet Take 2 Tabs by mouth nightly.   Qty: 30 Tab, Refills: 11      mirtazapine (REMERON) 30 mg tablet Take 45 mg by mouth nightly. oxybutynin chloride XL (DITROPAN XL) 10 mg CR tablet Take 10 mg by mouth daily. lisinopril (PRINIVIL, ZESTRIL) 40 mg tablet Take 1 Tab by mouth daily. Qty: 30 Tab, Refills: 5      POTASSIUM CHLORIDE SR 10 MEQ TAB Take 1 Tab by mouth daily. Cholecalciferol, Vitamin D3, (VITAMIN D3) 1,000 unit cap Take  by mouth. nicotinic acid (NIACIN) 500 mg tablet Take 500 mg by mouth Daily (before breakfast). pantoprazole (PROTONIX) 40 mg tablet Take 1 Tab by mouth Before breakfast and dinner. Qty: 60 Tab, Refills: 2      FERROUS FUMARATE/VIT BCOMP&C (SUPER B COMPLEX PO) Take 1 Tab by mouth daily. NEBULIZER by Does Not Apply route. ziprasidone (GEODON) 80 mg capsule Take 160 mg by mouth nightly. levothyroxine (SYNTHROID) 125 mcg tablet Take 112 mcg by mouth every evening. nystatin (MYCOSTATIN) powder Apply  to affected area two (2) times a day. Qty: 1 Bottle, Refills: 0      Biotin 2,500 mcg cap Take  by mouth. UBIDECARENONE/VITAMIN E MIXED (COQ10  PO) Take  by mouth. ipratropium (ATROVENT) 0.03 % nasal spray 2 Sprays every twelve (12) hours. ALPRAZolam (XANAX) 2 mg tablet Take 1 Tab by mouth three (3) times daily as needed for Anxiety. Max Daily Amount: 6 mg. Qty: 30 Tab, Refills: 0      ondansetron (ZOFRAN ODT) 8 mg disintegrating tablet Take 8 mg by mouth every eight (8) hours as needed for Nausea. Magnesium Oxide 500 mg cap Take 500 mg by mouth. HYDROcodone-acetaminophen (NORCO)  mg tablet Take 1 Tab by mouth every six (6) hours as needed for Pain. folic acid 845 mcg tablet Take 800 mcg by mouth daily. SUMATRIPTAN SUCCINATE (IMITREX PO) take 100 mg by mouth as needed. Followup/Outpt Studies:  --Follow up appointment with [facility] in 1-2 weeks. --Continue Prednisone taper and home O2  --Would benefit from outpatient PSG.   --Total discharge greater than 30 minutes in duration. More than 50% of the time documented was spent in face-to-face contact with the patient and in the care of the patient on the floor/unit where the patient is located. Carl Carpio NP        Lungs:  Coarse   Heart:  RRR with no Murmur/Rubs/Gallops    Additional Comments:  Going home, follow up in the office     I have spoken with and examined the patient. I agree with the above assessment and plan as documented.     Karen Celis MD

## 2017-11-06 NOTE — PROGRESS NOTES
Morning bedside rounding report received from Mary Garza RN. Patient resting in bed with closed eyes, easily arousable. Demonstrating no signs of dyspnea or distress on 3L oxygen via nasal cannula. Voices no concerns at this time. Will continue to monitor.

## 2017-11-06 NOTE — PROGRESS NOTES
Pt complains of pain 7/10 in back. Norco 10 mg po given for pain. Will monitor. PT complains of anxiety. Xanax 2 mg po given for anxiety. WIll monitor.

## 2017-11-06 NOTE — PROGRESS NOTES
Problem: Mobility Impaired (Adult and Pediatric)  Goal: *Acute Goals and Plan of Care (Insert Text)  Discharge Goals:  (1.)Ms. Tori Harp will perform transfer with INDEPENDENCE within 7 day(s). (2.)Ms. Tori Harp will demonstrate good dynamic standing balance within 7 day(s). (3.)Ms. Tori Harp will ambulate with INDEPENDENCE for 300+ feet with the least restrictive device within 7 day(s). (4.)Ms. Tori Harp will tolerate 25+ minutes of therapeutic activity/exercise while maintaining stable vitals to improve functional strength and endurance within 7 day(s). _______________________________________________________________________________________________      PHYSICAL THERAPY: Daily Note, Treatment Day: 1st, AM 11/6/2017  INPATIENT: Hospital Day: 6  [place] / Product Type: Medicare /      NAME/AGE/GENDER: Gregory Luther is a 78 y.o. female   PRIMARY DIAGNOSIS: Sepsis ([facility] [address] <principal problem not specified> <principal problem not specified>        ICD-10: Treatment Diagnosis:   · Generalized Muscle Weakness (M62.81)  · Difficulty in walking, Not elsewhere classified (R26.2)   Precaution/Allergies:  Latex; Bactrim [sulfamethoxazole-trimethoprim]; Lamictal [lamotrigine]; Pcn [penicillins]; and Tapentadol      ASSESSMENT:     Ms. Tori Harp presents today sitting up in the recliner chair in her room agreeable to have therapy. She reports that she is expected to go home sometime today. She remains on 2L of O2 with her resting O2 sats at 95%. She demonstrates good sitting balance and was able to participate in BLE AROM strength exercises in sitting. Sit to stand is supervision and she ambulated in the room 25' x 2 without an assistive device with verbal cues for more upright standing posture. She reports she does not use any type of assistive device at home but she has a cane if needed.   Her O2 sats remained in the mid-90s after ambulation and she expressed some concern and disappointment in having to use O2 at home at discharge. She was pleasant and cooperative and appears ready for discharge home but would like to find a way to wean off the O2 at some point after going home. She was instructed to discuss all of this with her physician. This section established at most recent assessment   PROBLEM LIST (Impairments causing functional limitations):  1. Decreased Transfer Abilities  2. Decreased Ambulation Ability/Technique  3. Decreased Balance  4. Increased Pain  5. Decreased Activity Tolerance  6. Decreased Work Simplification/Energy Conservation Techniques  7. Increased Fatigue  8. Increased Shortness of Breath  9. Decreased Knowledge of Precautions   INTERVENTIONS PLANNED: (Benefits and precautions of physical therapy have been discussed with the patient.)  1. Balance Exercise  2. Bed Mobility  3. Family Education  4. Gait Training  5. Home Exercise Program (HEP)  6. Range of Motion (ROM)  7. Therapeutic Activites  8. Therapeutic Exercise/Strengthening  9. Transfer Training  10. Group Therapy     TREATMENT PLAN: Frequency/Duration: 3 times a week for duration of hospital stay  Rehabilitation Potential For Stated Goals: Good     RECOMMENDED REHABILITATION/EQUIPMENT: (at time of discharge pending progress): Due to the probability of continued deficits (see above) this patient will likely need continued skilled physical therapy after discharge. TBD  Equipment:    None at this time TBD              HISTORY:   History of Present Injury/Illness (Reason for Referral):  Weakness; Difficulty in walking  Past Medical History/Comorbidities:   Ms. Teja Garcia  has a past medical history of Acute renal failure [facility]) (July, 2014); Bilateral pulmonary embolism [facility]) (September, 2012); Calculus of ureter (May, 2015); CAP (community acquired pneumonia) (October, 2012); CVA (cerebral infarction) (January, 2012); Gastric ulcer (July, 2014);  Gram-positive bacteremia 1/2 cx (12/26/2016); HCAP (healthcare-associated pneumonia) (January, 2013); HCAP (healthcare-associated pneumonia) (February, 2013); Left leg DVT ([facility] [address] (1991); Psychiatric disorder; and PUD (peptic ulcer disease) (???). She also has no past medical history of Aneurysm ([facility] [address] Arrhythmia; Autoimmune disease ([facility] [address] CAD (coronary artery disease); Cancer ([facility] [address] Chronic pain; Coagulation disorder ([facility] [address] COPD; Diabetes ([facility] [address] Heart failure ([facility] [address] Liver disease; Seizures ([facility] [address] or Unspecified sleep apnea. Ms. Teja Garcia  has a past surgical history that includes appendectomy; cholecystectomy; gastric bypass; tonsillectomy; other surgical; orthopaedic; hysterectomy; and endoscopy (July, 2014). Social History/Living Environment:   Home Environment: Private residence  # Steps to Enter: 3  One/Two Story Residence: One story  Living Alone: No  Support Systems: Family member(s)  Patient Expects to be Discharged to[de-identified] Private residence  Current DME Used/Available at Home: Christian Cotta, straight, Shower chair  Tub or Shower Type: Tub/Shower combination  Prior Level of Function/Work/Activity:  Patient has been stay with her daughter in a single story residence with 3 steps to enter. At baseline, patient is modified with ADLs, ambulates without utilizing an assistive device, chronic 2L02 nasal cannula, no recent falls and endorses decreased activity tolerance secondary to recent illness/hospitlization for asthma exacerbation and pneumonia.    Number of Personal Factors/Comorbidities that affect the Plan of Care: 1-2: MODERATE COMPLEXITY   EXAMINATION:   Most Recent Physical Functioning:   Gross Assessment:                  Posture:     Balance:  Sitting: Intact  Sitting - Static: Good (unsupported)  Sitting - Dynamic: Good (unsupported)  Standing: Intact  Standing - Static: Good  Standing - Dynamic : Good Bed Mobility:     Wheelchair Mobility:     Transfers:  Sit to Stand: Supervision  Stand to Sit: Supervision  Stand Pivot Transfers: Supervision  Bed to Chair: Supervision  Gait:     Base of Support: Widened  Speed/Judith: Pace decreased (<100 feet/min)  Step Length: Left shortened;Right shortened  Gait Abnormalities: Other (slightly stooped posture)  Distance (ft): 25 Feet (ft) (25 x 2)  Assistive Device: Other (comment) (none)  Ambulation - Level of Assistance: Supervision  Interventions: Safety awareness training;Verbal cues      Body Structures Involved:  1. Lungs  2. Muscles Body Functions Affected:  1. Respiratory  2. Movement Related Activities and Participation Affected:  1. Mobility  2. Self Care   Number of elements that affect the Plan of Care: 4+: HIGH COMPLEXITY   CLINICAL PRESENTATION:   Presentation: Stable and uncomplicated: LOW COMPLEXITY   CLINICAL DECISION MAKIN[address] [place] [id number] AM-PAC 6 Clicks   Basic Mobility Inpatient Short Form  How much difficulty does the patient currently have. .. Unable A Lot A Little None   1. Turning over in bed (including adjusting bedclothes, sheets and blankets)? [] 1   [] 2   [] 3   [x] 4   2. Sitting down on and standing up from a chair with arms ( e.g., wheelchair, bedside commode, etc.)   [] 1   [] 2   [] 3   [x] 4   3. Moving from lying on back to sitting on the side of the bed? [] 1   [] 2   [] 3   [x] 4   How much help from another person does the patient currently need. .. Total A Lot A Little None   4. Moving to and from a bed to a chair (including a wheelchair)? [] 1   [] 2   [x] 3   [] 4   5. Need to walk in hospital room? [] 1   [] 2   [x] 3   [] 4   6. Climbing 3-5 steps with a railing? [] 1   [] 2   [x] 3   [] 4   © , Trustees of [address] [place] [address] [address] [address], under license to [employer]. All rights reserved      Score:  Initial: 21 Most Recent: 21 (Date: 17 )    Interpretation of Tool:  Represents activities that are increasingly more difficult (i.e. Bed mobility, Transfers, Gait). Score 24 23 22-20 19-15 14-10 9-7 6     Modifier CH CI CJ CK CL CM CN      ?  Mobility - Walking and Moving Around:     - CURRENT STATUS: CJ - 20%-39% impaired, limited or restricted    - GOAL STATUS: CI - 1%-19% impaired, limited or restricted    - D/C STATUS:  ---------------To be determined---------------  [place] / [place] / Product Type: Medicare /      Medical Necessity:     · Patient demonstrates good rehab potential due to higher previous functional level. Reason for Services/Other Comments:  · Patient continues to require skilled intervention due to decreased functional activity to[place] from baseline. .   Use of outcome tool(s) and clinical judgement create a POC that gives a: Clear prediction of patient's progress: LOW COMPLEXITY            TREATMENT:   (In addition to Assessment/Re-Assessment sessions the following treatments were rendered)   Pre-treatment Symptoms/Complaints:  Patient had no complaints of pain in therapy today. Pain: Initial: 0/10     Post Session:  0/10     Gait Training (  15 minutes):  Gait training to improve and/or restore physical functioning as related to mobility, strength and balance. Ambulated 25 Feet (ft) (25 x 2) with Supervision using a Other (comment) (none) and minimal Safety awareness training;Verbal cues related to her standing and gait  to promote proper body alignment, promote proper body posture and promote proper body breathing techniques. Therapeutic Activity: (    10 minutes): Therapeutic activities including Chair transfers, Ambulation on level ground and BLE exercises to improve mobility, strength and coordination. Required minimal Safety awareness training;Verbal cues to promote static and dynamic balance in standing.       Date:  11/6/17 Date:   Date:     Activity/Exercise Parameters Parameters Parameters   Ankle pumps 2 x 10     Toe ups 2 x 10     Seated knee extension 2 x 10     Seated hip flexion/marching 2 x 10                         Braces/Orthotics/Lines/Etc:   · O2 Device: Nasal cannula 2L sats at 95% at rest  Treatment/Session Assessment:    · Response to Treatment:  See above. · Interdisciplinary Collaboration:   o Physical Therapist  o Registered Nurse  · After treatment position/precautions:   o Up in chair  o Bed alarm/tab alert on  o Bed/Chair-wheels locked  o Call light within reach  o RN notified  · Compliance with Program/Exercises: Will assess as treatment progresses. · Recommendations/Intent for next treatment session: \"Next visit will focus on advancements to more challenging activities and reduction in assistance provided\".     Total Treatment Duration:PT Patient Time In/Time Out  Time In: 1175  Time Out: 1400 [facility]

## 2017-11-08 ENCOUNTER — PATIENT OUTREACH (OUTPATIENT)
Dept: CASE MANAGEMENT | Age: 67
End: 2017-11-08

## 2017-11-19 ENCOUNTER — HOSPITAL ENCOUNTER (OUTPATIENT)
Dept: SLEEP MEDICINE | Age: 67
Discharge: HOME | End: 2017-11-19
Payer: MEDICARE

## 2017-11-19 PROCEDURE — 95810 POLYSOM 6/> YRS 4/> PARAM: CPT

## 2017-12-03 ENCOUNTER — APPOINTMENT (OUTPATIENT)
Dept: GENERAL RADIOLOGY | Age: 67
DRG: 871 | End: 2017-12-03
Attending: EMERGENCY MEDICINE
Payer: MEDICARE

## 2017-12-03 ENCOUNTER — APPOINTMENT (OUTPATIENT)
Dept: CT IMAGING | Age: 67
DRG: 871 | End: 2017-12-03
Attending: EMERGENCY MEDICINE
Payer: MEDICARE

## 2017-12-03 ENCOUNTER — HOSPITAL ENCOUNTER (INPATIENT)
Age: 67
LOS: 3 days | Discharge: HOME | DRG: 871 | End: 2017-12-06
Attending: EMERGENCY MEDICINE | Admitting: INTERNAL MEDICINE
Payer: MEDICARE

## 2017-12-03 DIAGNOSIS — J18.9 HCAP (HEALTHCARE-ASSOCIATED PNEUMONIA): ICD-10-CM

## 2017-12-03 DIAGNOSIS — F31.70 BIPOLAR AFFECTIVE DISORDER IN REMISSION (HCC): Chronic | ICD-10-CM

## 2017-12-03 DIAGNOSIS — J38.00 VOCAL CORD PARALYSIS: Chronic | ICD-10-CM

## 2017-12-03 DIAGNOSIS — E66.01 MORBID OBESITY (HCC): Chronic | ICD-10-CM

## 2017-12-03 DIAGNOSIS — J96.11 CHRONIC RESPIRATORY FAILURE WITH HYPOXIA (HCC): Chronic | ICD-10-CM

## 2017-12-03 DIAGNOSIS — J18.9 COMMUNITY ACQUIRED PNEUMONIA OF RIGHT LUNG, UNSPECIFIED PART OF LUNG: Primary | ICD-10-CM

## 2017-12-03 DIAGNOSIS — Z79.01 CHRONIC ANTICOAGULATION: Chronic | ICD-10-CM

## 2017-12-03 DIAGNOSIS — J98.4 CHRONIC LUNG DISEASE: ICD-10-CM

## 2017-12-03 DIAGNOSIS — Z79.899 POLYPHARMACY: Chronic | ICD-10-CM

## 2017-12-03 DIAGNOSIS — R00.0 TACHYCARDIA: ICD-10-CM

## 2017-12-03 DIAGNOSIS — J45.40 MODERATE PERSISTENT ASTHMA WITHOUT COMPLICATION: Chronic | ICD-10-CM

## 2017-12-03 DIAGNOSIS — M79.7 FIBROMYALGIA: Chronic | ICD-10-CM

## 2017-12-03 DIAGNOSIS — Z86.711 HX OF PULMONARY EMBOLUS: Chronic | ICD-10-CM

## 2017-12-03 DIAGNOSIS — G47.33 OSA (OBSTRUCTIVE SLEEP APNEA): Chronic | ICD-10-CM

## 2017-12-03 DIAGNOSIS — J98.4 RESTRICTIVE LUNG DISEASE: Chronic | ICD-10-CM

## 2017-12-03 DIAGNOSIS — F19.10 DRUG ABUSE (HCC): Chronic | ICD-10-CM

## 2017-12-03 PROBLEM — J96.10 CHRONIC RESPIRATORY FAILURE (HCC): Status: ACTIVE | Noted: 2017-11-01

## 2017-12-03 PROBLEM — R50.9 FEVER: Status: ACTIVE | Noted: 2017-12-03

## 2017-12-03 PROBLEM — J96.10 CHRONIC RESPIRATORY FAILURE (HCC): Chronic | Status: ACTIVE | Noted: 2017-11-01

## 2017-12-03 LAB
ALBUMIN SERPL-MCNC: 3 G/DL (ref 3.2–4.6)
ALBUMIN/GLOB SERPL: 0.9 {RATIO} (ref 1.2–3.5)
ALP SERPL-CCNC: 91 U/L (ref 50–136)
ALT SERPL-CCNC: 19 U/L (ref 12–65)
ANION GAP SERPL CALC-SCNC: 8 MMOL/L (ref 7–16)
AST SERPL-CCNC: 15 U/L (ref 15–37)
ATRIAL RATE: 142 BPM
BASOPHILS # BLD: 0 K/UL (ref 0–0.2)
BASOPHILS NFR BLD: 0 % (ref 0–2)
BILIRUB SERPL-MCNC: 0.3 MG/DL (ref 0.2–1.1)
BNP SERPL-MCNC: 36 PG/ML
BUN SERPL-MCNC: 13 MG/DL (ref 8–23)
CALCIUM SERPL-MCNC: 8.6 MG/DL (ref 8.3–10.4)
CALCULATED P AXIS, ECG09: 62 DEGREES
CALCULATED R AXIS, ECG10: -18 DEGREES
CALCULATED T AXIS, ECG11: 56 DEGREES
CHLORIDE SERPL-SCNC: 113 MMOL/L (ref 98–107)
CO2 SERPL-SCNC: 27 MMOL/L (ref 21–32)
CREAT SERPL-MCNC: 1.04 MG/DL (ref 0.6–1)
DIAGNOSIS, 93000: NORMAL
DIFFERENTIAL METHOD BLD: ABNORMAL
EOSINOPHIL # BLD: 0.1 K/UL (ref 0–0.8)
EOSINOPHIL NFR BLD: 1 % (ref 0.5–7.8)
ERYTHROCYTE [DISTWIDTH] IN BLOOD BY AUTOMATED COUNT: 17.8 % (ref 11.9–14.6)
FLUAV AG NPH QL IA: NEGATIVE
FLUBV AG NPH QL IA: NEGATIVE
GLOBULIN SER CALC-MCNC: 3.2 G/DL (ref 2.3–3.5)
GLUCOSE SERPL-MCNC: 90 MG/DL (ref 65–100)
HCT VFR BLD AUTO: 36.6 % (ref 35.8–46.3)
HGB BLD-MCNC: 11 G/DL (ref 11.7–15.4)
IMM GRANULOCYTES # BLD: 0 K/UL (ref 0–0.5)
IMM GRANULOCYTES NFR BLD AUTO: 0 % (ref 0–5)
LACTATE BLD-SCNC: 2.2 MMOL/L (ref 0.5–1.9)
LYMPHOCYTES # BLD: 2 K/UL (ref 0.5–4.6)
LYMPHOCYTES NFR BLD: 20 % (ref 13–44)
MCH RBC QN AUTO: 25.6 PG (ref 26.1–32.9)
MCHC RBC AUTO-ENTMCNC: 30.1 G/DL (ref 31.4–35)
MCV RBC AUTO: 85.3 FL (ref 79.6–97.8)
MONOCYTES # BLD: 0.6 K/UL (ref 0.1–1.3)
MONOCYTES NFR BLD: 6 % (ref 4–12)
NEUTS SEG # BLD: 7.3 K/UL (ref 1.7–8.2)
NEUTS SEG NFR BLD: 73 % (ref 43–78)
P-R INTERVAL, ECG05: 148 MS
PLATELET # BLD AUTO: 359 K/UL (ref 150–450)
PMV BLD AUTO: 10.3 FL (ref 10.8–14.1)
POTASSIUM SERPL-SCNC: 3.3 MMOL/L (ref 3.5–5.1)
PROCALCITONIN SERPL-MCNC: 0.8 NG/ML
PROT SERPL-MCNC: 6.2 G/DL (ref 6.3–8.2)
Q-T INTERVAL, ECG07: 268 MS
QRS DURATION, ECG06: 74 MS
QTC CALCULATION (BEZET), ECG08: 412 MS
RBC # BLD AUTO: 4.29 M/UL (ref 4.05–5.25)
SODIUM SERPL-SCNC: 148 MMOL/L (ref 136–145)
VENTRICULAR RATE, ECG03: 142 BPM
WBC # BLD AUTO: 10 K/UL (ref 4.3–11.1)

## 2017-12-03 PROCEDURE — 71010 XR CHEST PORT: CPT

## 2017-12-03 PROCEDURE — 93005 ELECTROCARDIOGRAM TRACING: CPT | Performed by: EMERGENCY MEDICINE

## 2017-12-03 PROCEDURE — 81003 URINALYSIS AUTO W/O SCOPE: CPT | Performed by: EMERGENCY MEDICINE

## 2017-12-03 PROCEDURE — 74011636320 HC RX REV CODE- 636/320: Performed by: EMERGENCY MEDICINE

## 2017-12-03 PROCEDURE — 83880 ASSAY OF NATRIURETIC PEPTIDE: CPT | Performed by: EMERGENCY MEDICINE

## 2017-12-03 PROCEDURE — 74011000258 HC RX REV CODE- 258: Performed by: EMERGENCY MEDICINE

## 2017-12-03 PROCEDURE — 74011250636 HC RX REV CODE- 250/636: Performed by: INTERNAL MEDICINE

## 2017-12-03 PROCEDURE — 74011250636 HC RX REV CODE- 250/636: Performed by: EMERGENCY MEDICINE

## 2017-12-03 PROCEDURE — 71260 CT THORAX DX C+: CPT

## 2017-12-03 PROCEDURE — 99223 1ST HOSP IP/OBS HIGH 75: CPT | Performed by: INTERNAL MEDICINE

## 2017-12-03 PROCEDURE — 87205 SMEAR GRAM STAIN: CPT | Performed by: EMERGENCY MEDICINE

## 2017-12-03 PROCEDURE — 83605 ASSAY OF LACTIC ACID: CPT

## 2017-12-03 PROCEDURE — 74011000258 HC RX REV CODE- 258: Performed by: INTERNAL MEDICINE

## 2017-12-03 PROCEDURE — 87076 CULTURE ANAEROBE IDENT EACH: CPT

## 2017-12-03 PROCEDURE — 99285 EMERGENCY DEPT VISIT HI MDM: CPT | Performed by: EMERGENCY MEDICINE

## 2017-12-03 PROCEDURE — 87804 INFLUENZA ASSAY W/OPTIC: CPT | Performed by: EMERGENCY MEDICINE

## 2017-12-03 PROCEDURE — 87153 DNA/RNA SEQUENCING: CPT

## 2017-12-03 PROCEDURE — 74011250637 HC RX REV CODE- 250/637: Performed by: INTERNAL MEDICINE

## 2017-12-03 PROCEDURE — 84145 PROCALCITONIN (PCT): CPT | Performed by: EMERGENCY MEDICINE

## 2017-12-03 PROCEDURE — 65270000029 HC RM PRIVATE

## 2017-12-03 PROCEDURE — 74011250637 HC RX REV CODE- 250/637: Performed by: EMERGENCY MEDICINE

## 2017-12-03 PROCEDURE — 96361 HYDRATE IV INFUSION ADD-ON: CPT | Performed by: EMERGENCY MEDICINE

## 2017-12-03 PROCEDURE — 96365 THER/PROPH/DIAG IV INF INIT: CPT | Performed by: EMERGENCY MEDICINE

## 2017-12-03 PROCEDURE — 87040 BLOOD CULTURE FOR BACTERIA: CPT | Performed by: EMERGENCY MEDICINE

## 2017-12-03 PROCEDURE — 85025 COMPLETE CBC W/AUTO DIFF WBC: CPT | Performed by: EMERGENCY MEDICINE

## 2017-12-03 PROCEDURE — 80053 COMPREHEN METABOLIC PANEL: CPT | Performed by: EMERGENCY MEDICINE

## 2017-12-03 RX ORDER — MONTELUKAST SODIUM 10 MG/1
10 TABLET ORAL DAILY
Status: DISCONTINUED | OUTPATIENT
Start: 2017-12-04 | End: 2017-12-06 | Stop reason: HOSPADM

## 2017-12-03 RX ORDER — ONDANSETRON 2 MG/ML
4 INJECTION INTRAMUSCULAR; INTRAVENOUS
Status: DISCONTINUED | OUTPATIENT
Start: 2017-12-03 | End: 2017-12-06 | Stop reason: HOSPADM

## 2017-12-03 RX ORDER — SODIUM CHLORIDE 0.9 % (FLUSH) 0.9 %
10 SYRINGE (ML) INJECTION
Status: DISCONTINUED | OUTPATIENT
Start: 2017-12-03 | End: 2017-12-03 | Stop reason: CLARIF

## 2017-12-03 RX ORDER — HYDROCODONE BITARTRATE AND ACETAMINOPHEN 10; 325 MG/1; MG/1
1 TABLET ORAL
Status: DISCONTINUED | OUTPATIENT
Start: 2017-12-03 | End: 2017-12-06 | Stop reason: HOSPADM

## 2017-12-03 RX ORDER — ACETAMINOPHEN 500 MG
1000 TABLET ORAL
Status: DISCONTINUED | OUTPATIENT
Start: 2017-12-03 | End: 2017-12-03 | Stop reason: SDUPTHER

## 2017-12-03 RX ORDER — CARVEDILOL 12.5 MG/1
12.5 TABLET ORAL 2 TIMES DAILY WITH MEALS
Status: DISCONTINUED | OUTPATIENT
Start: 2017-12-03 | End: 2017-12-06 | Stop reason: HOSPADM

## 2017-12-03 RX ORDER — ZIPRASIDONE HYDROCHLORIDE 20 MG/1
160 CAPSULE ORAL
Status: DISCONTINUED | OUTPATIENT
Start: 2017-12-03 | End: 2017-12-03

## 2017-12-03 RX ORDER — OXYBUTYNIN CHLORIDE 5 MG/1
5 TABLET ORAL 2 TIMES DAILY
Status: DISCONTINUED | OUTPATIENT
Start: 2017-12-04 | End: 2017-12-06 | Stop reason: HOSPADM

## 2017-12-03 RX ORDER — ALPRAZOLAM 0.5 MG/1
2 TABLET ORAL
Status: DISCONTINUED | OUTPATIENT
Start: 2017-12-03 | End: 2017-12-06 | Stop reason: HOSPADM

## 2017-12-03 RX ORDER — SODIUM CHLORIDE 9 MG/ML
500 INJECTION, SOLUTION INTRAVENOUS CONTINUOUS
Status: DISCONTINUED | OUTPATIENT
Start: 2017-12-03 | End: 2017-12-04

## 2017-12-03 RX ORDER — ALBUTEROL SULFATE 0.83 MG/ML
2.5 SOLUTION RESPIRATORY (INHALATION)
Status: DISCONTINUED | OUTPATIENT
Start: 2017-12-03 | End: 2017-12-06 | Stop reason: HOSPADM

## 2017-12-03 RX ORDER — IPRATROPIUM BROMIDE AND ALBUTEROL SULFATE 2.5; .5 MG/3ML; MG/3ML
3 SOLUTION RESPIRATORY (INHALATION)
Status: DISCONTINUED | OUTPATIENT
Start: 2017-12-03 | End: 2017-12-06 | Stop reason: HOSPADM

## 2017-12-03 RX ORDER — SODIUM CHLORIDE 0.9 % (FLUSH) 0.9 %
5-10 SYRINGE (ML) INJECTION EVERY 8 HOURS
Status: DISCONTINUED | OUTPATIENT
Start: 2017-12-03 | End: 2017-12-06 | Stop reason: HOSPADM

## 2017-12-03 RX ORDER — ATORVASTATIN CALCIUM 10 MG/1
20 TABLET, FILM COATED ORAL
Status: DISCONTINUED | OUTPATIENT
Start: 2017-12-03 | End: 2017-12-06 | Stop reason: HOSPADM

## 2017-12-03 RX ORDER — LISINOPRIL 20 MG/1
40 TABLET ORAL DAILY
Status: DISCONTINUED | OUTPATIENT
Start: 2017-12-04 | End: 2017-12-06 | Stop reason: HOSPADM

## 2017-12-03 RX ORDER — DEXTROAMPHETAMINE SACCHARATE, AMPHETAMINE ASPARTATE MONOHYDRATE, DEXTROAMPHETAMINE SULFATE AND AMPHETAMINE SULFATE 5; 5; 5; 5 MG/1; MG/1; MG/1; MG/1
20 CAPSULE, EXTENDED RELEASE ORAL DAILY
Status: DISCONTINUED | OUTPATIENT
Start: 2017-12-04 | End: 2017-12-06 | Stop reason: HOSPADM

## 2017-12-03 RX ORDER — ACETAMINOPHEN 500 MG
1000 TABLET ORAL ONCE
Status: COMPLETED | OUTPATIENT
Start: 2017-12-03 | End: 2017-12-03

## 2017-12-03 RX ORDER — PREGABALIN 75 MG/1
150 CAPSULE ORAL EVERY 12 HOURS
Status: DISCONTINUED | OUTPATIENT
Start: 2017-12-03 | End: 2017-12-06 | Stop reason: HOSPADM

## 2017-12-03 RX ORDER — LEVOTHYROXINE SODIUM 112 UG/1
112 TABLET ORAL
Status: DISCONTINUED | OUTPATIENT
Start: 2017-12-04 | End: 2017-12-06 | Stop reason: HOSPADM

## 2017-12-03 RX ORDER — AZITHROMYCIN 250 MG/1
1000 TABLET, FILM COATED ORAL
Status: COMPLETED | OUTPATIENT
Start: 2017-12-03 | End: 2017-12-03

## 2017-12-03 RX ORDER — LEVOTHYROXINE SODIUM 112 UG/1
112 TABLET ORAL EVERY EVENING
Status: DISCONTINUED | OUTPATIENT
Start: 2017-12-03 | End: 2017-12-03

## 2017-12-03 RX ORDER — PREGABALIN 150 MG/1
150 CAPSULE ORAL 2 TIMES DAILY
Status: DISCONTINUED | OUTPATIENT
Start: 2017-12-03 | End: 2017-12-03

## 2017-12-03 RX ORDER — SODIUM CHLORIDE 0.9 % (FLUSH) 0.9 %
5-10 SYRINGE (ML) INJECTION AS NEEDED
Status: DISCONTINUED | OUTPATIENT
Start: 2017-12-03 | End: 2017-12-06 | Stop reason: HOSPADM

## 2017-12-03 RX ORDER — SODIUM CHLORIDE 0.9 % (FLUSH) 0.9 %
10 SYRINGE (ML) INJECTION
Status: COMPLETED | OUTPATIENT
Start: 2017-12-03 | End: 2017-12-03

## 2017-12-03 RX ORDER — VANCOMYCIN 2 GRAM/500 ML IN 0.9 % SODIUM CHLORIDE INTRAVENOUS
2000 ONCE
Status: COMPLETED | OUTPATIENT
Start: 2017-12-03 | End: 2017-12-03

## 2017-12-03 RX ORDER — MIRTAZAPINE 15 MG/1
45 TABLET, FILM COATED ORAL
Status: DISCONTINUED | OUTPATIENT
Start: 2017-12-03 | End: 2017-12-06 | Stop reason: HOSPADM

## 2017-12-03 RX ORDER — SODIUM CHLORIDE 9 MG/ML
125 INJECTION, SOLUTION INTRAVENOUS CONTINUOUS
Status: DISCONTINUED | OUTPATIENT
Start: 2017-12-03 | End: 2017-12-04

## 2017-12-03 RX ORDER — BUDESONIDE 0.5 MG/2ML
500 INHALANT ORAL
Status: DISCONTINUED | OUTPATIENT
Start: 2017-12-03 | End: 2017-12-06 | Stop reason: HOSPADM

## 2017-12-03 RX ORDER — SUCRALFATE 1 G/1
1 TABLET ORAL
Status: DISCONTINUED | OUTPATIENT
Start: 2017-12-03 | End: 2017-12-03

## 2017-12-03 RX ORDER — NIACIN 500 MG/1
500 TABLET, EXTENDED RELEASE ORAL
Status: DISCONTINUED | OUTPATIENT
Start: 2017-12-04 | End: 2017-12-06 | Stop reason: HOSPADM

## 2017-12-03 RX ORDER — SUCRALFATE 1 G/1
1 TABLET ORAL
Status: DISCONTINUED | OUTPATIENT
Start: 2017-12-03 | End: 2017-12-06 | Stop reason: HOSPADM

## 2017-12-03 RX ORDER — FUROSEMIDE 40 MG/1
40 TABLET ORAL DAILY
Status: DISCONTINUED | OUTPATIENT
Start: 2017-12-04 | End: 2017-12-06 | Stop reason: HOSPADM

## 2017-12-03 RX ORDER — PANTOPRAZOLE SODIUM 40 MG/1
40 TABLET, DELAYED RELEASE ORAL
Status: DISCONTINUED | OUTPATIENT
Start: 2017-12-03 | End: 2017-12-06 | Stop reason: HOSPADM

## 2017-12-03 RX ORDER — ZIPRASIDONE HYDROCHLORIDE 20 MG/1
80 CAPSULE ORAL EVERY 12 HOURS
Status: DISCONTINUED | OUTPATIENT
Start: 2017-12-03 | End: 2017-12-04

## 2017-12-03 RX ADMIN — HYDROCODONE BITARTRATE AND ACETAMINOPHEN 1 TABLET: 10; 325 TABLET ORAL at 17:06

## 2017-12-03 RX ADMIN — DIATRIZOATE MEGLUMINE AND DIATRIZOATE SODIUM 15 ML: 600; 100 SOLUTION ORAL; RECTAL at 13:54

## 2017-12-03 RX ADMIN — IOPAMIDOL 80 ML: 755 INJECTION, SOLUTION INTRAVENOUS at 14:50

## 2017-12-03 RX ADMIN — SODIUM CHLORIDE 125 ML/HR: 900 INJECTION, SOLUTION INTRAVENOUS at 14:01

## 2017-12-03 RX ADMIN — CARVEDILOL 12.5 MG: 12.5 TABLET, FILM COATED ORAL at 17:07

## 2017-12-03 RX ADMIN — Medication 10 ML: at 17:07

## 2017-12-03 RX ADMIN — ONDANSETRON 4 MG: 2 INJECTION INTRAMUSCULAR; INTRAVENOUS at 18:19

## 2017-12-03 RX ADMIN — ZIPRASIDONE HYDROCHLORIDE 80 MG: 20 CAPSULE ORAL at 23:33

## 2017-12-03 RX ADMIN — ATORVASTATIN CALCIUM 20 MG: 10 TABLET, FILM COATED ORAL at 21:20

## 2017-12-03 RX ADMIN — AZITHROMYCIN 1000 MG: 250 TABLET, FILM COATED ORAL at 14:00

## 2017-12-03 RX ADMIN — SODIUM CHLORIDE 2 G: 900 INJECTION, SOLUTION INTRAVENOUS at 21:20

## 2017-12-03 RX ADMIN — SODIUM CHLORIDE 500 ML: 900 INJECTION, SOLUTION INTRAVENOUS at 12:24

## 2017-12-03 RX ADMIN — ACETAMINOPHEN 1000 MG: 500 TABLET, FILM COATED ORAL at 12:24

## 2017-12-03 RX ADMIN — SUCRALFATE 1 G: 1 TABLET ORAL at 18:20

## 2017-12-03 RX ADMIN — Medication 10 ML: at 21:23

## 2017-12-03 RX ADMIN — Medication 10 ML: at 14:50

## 2017-12-03 RX ADMIN — SUCRALFATE 1 G: 1 TABLET ORAL at 21:20

## 2017-12-03 RX ADMIN — PANTOPRAZOLE SODIUM 40 MG: 40 TABLET, DELAYED RELEASE ORAL at 17:06

## 2017-12-03 RX ADMIN — APIXABAN 2.5 MG: 2.5 TABLET, FILM COATED ORAL at 17:07

## 2017-12-03 RX ADMIN — MIRTAZAPINE 45 MG: 15 TABLET, FILM COATED ORAL at 23:34

## 2017-12-03 RX ADMIN — CEFTRIAXONE SODIUM 1 G: 1 INJECTION, POWDER, FOR SOLUTION INTRAMUSCULAR; INTRAVENOUS at 13:20

## 2017-12-03 RX ADMIN — VANCOMYCIN HYDROCHLORIDE 2000 MG: 10 INJECTION, POWDER, LYOPHILIZED, FOR SOLUTION INTRAVENOUS at 17:29

## 2017-12-03 RX ADMIN — SODIUM CHLORIDE 100 ML: 900 INJECTION, SOLUTION INTRAVENOUS at 14:50

## 2017-12-03 RX ADMIN — PREGABALIN 150 MG: 75 CAPSULE ORAL at 21:20

## 2017-12-03 NOTE — H&P
HISTORY AND PHYSICAL      Zaynab White    12/3/2017    Date of Admission:  12/3/2017    The patient's chart is reviewed and the patient is discussed with the staff. Subjective:     Patient is a 78 y.o.  female presents with rigors. Patient has a history of bipolar disorder, chronic narcotic use / drug abuse (amphetamines/benzos/opiates), fibromyalgia, GERD, PUD, hx PE on chronic anticoagulation, HTN, hypothyroid, morbid obesity, PATRICIA, L vocal cord paralysis, Asthma, restrictive lung disease, chronic respiratory failure maintained on o2 at 2 lpm/BID nebulizer/mucinex. Patient has had multiple hospitalizations this year, most recently 11/1-11/6 for sepsis and completed a course of abx while inpatient. IgA normal at 128, IgE < 1 and IgG low 492. She was seen in our office in follow up and continued her steroid taper, brio, BID nebulized albuterol, atrovent nasal spray, and Singulair. She completed an outpatient sleep study with letter stating PPV was not ordered. Patient states that she was in her usual state of health yesterday. She woke this am and was cold and progressed to rigors and so came to the hospital for further evaluation. She denies fevers, increased sob, mucus production, increased reflux, chest pain, palpitations, or increased LE swelling. She denies any recent sick contacts. In the ER she was febrile to 102.5, tachycardic with HR persistently in the 130s, WBC 10.0 and PCT 0.8.       Review of Systems  Constitutional: positive for fevers, chills and sweats  Eyes: negative  Ears, nose, mouth, throat, and face: negative  Respiratory: negative  Cardiovascular: positive for lower extremity edema, unchanged from baseline, negative for chest pain, palpitations  Gastrointestinal: negative for reflux symptoms, nausea and vomiting  Genitourinary:negative  Integument/breast: negative  Hematologic/lymphatic: negative  Musculoskeletal:negative  Neurological: negative  Behavioral/Psych: negative  Endocrine: negative    Patient Active Problem List   Diagnosis Code    Fibromyalgia M79.7    Hypertension I10    GERD (gastroesophageal reflux disease) K21.9    Morbid obesity - sedentary lifestyle E66.01    Hypothyroidism E03.9    Asthma J45.909    Arthritis M19.90    B[facility] affective disorder ([facility] F31.9    Debility R53.81    History of peptic ulcer disease Z87.11    Chronic narcotic use     Anxiety state F41.1    Chronic rhinitis J31.0    Stress incontinence in female N39.3    Calculus of ureter N20.1    CAP (community acquired pneumonia) J18.9    Asthma with acute exacerbation J45. 0    Speech abnormality R47.9    Polypharmacy Z79.899    PATRICIA (obstructive sleep apnea) G47.33    Hx of pulmonary embolus Z86.711    QT prolongation R94.31    Bilateral lower extremity edema R60.0    Acute on chronic respiratory failure (HCC) J96.20    Sore throat J02.9    Restrictive lung disease J98.4    Atelectasis J98.11    HCAP (healthcare-associated pneumonia) J18.9    Cellulitis of left lower extremity L03. 116    Sepsis ([facility] A41.9    Drug abuse F19.10       Prior to Admission Medications   Prescriptions Last Dose Informant Patient Reported? Taking? ALPRAZolam (XANAX) 2 mg tablet   No No   Sig: Take 1 Tab by mouth three (3) times daily as needed for Anxiety. Max Daily Amount: 6 mg. Biotin 2,500 mcg cap   Yes No   Sig: Take  by mouth. Cholecalciferol, Vitamin D3, (VITAMIN D3) 1,000 unit cap   Yes No   Sig: Take  by mouth. FERROUS FUMARATE/VIT BCOMP&C (SUPER B COMPLEX PO)   Yes No   Sig: Take 1 Tab by mouth daily. HYDROcodone-acetaminophen (NORCO)  mg tablet   Yes No   Sig: Take 1 Tab by mouth every six (6) hours as needed for Pain. Magnesium Oxide 500 mg cap   Yes No   Sig: Take 500 mg by mouth. NEBULIZER   Yes No   Sig: by Does Not Apply route. OXYGEN-AIR DELIVERY SYSTEMS   Yes No   Si lpm cont.    POTASSIUM CHLORIDE SR 10 MEQ TAB Yes No   Sig: Take 1 Tab by mouth daily. SUMATRIPTAN SUCCINATE (IMITREX PO)   Yes No   Sig: take 100 mg by mouth as needed. UBIDECARENONE/VITAMIN E MIXED (COQ10  PO)   Yes No   Sig: Take  by mouth. albuterol (PROAIR HFA) 90 mcg/actuation inhaler   Yes No   Sig: Take 2 Puffs by inhalation every four (4) hours as needed for Wheezing. albuterol (PROVENTIL VENTOLIN) 2.5 mg /3 mL (0.083 %) nebulizer solution   No No   Sig: 3 mL by Nebulization route four (4) times daily. And every 4 hours as needed cough, wheezing, shortness of breath, J45.909, Medicare part B   albuterol sulfate (PROVENTIL;VENTOLIN) 2.5 mg/0.5 mL nebu nebulizer solution   No No   Si.5 mL by Nebulization route every four (4) hours. Dx J45.909   amphetamine-dextroamphetamine XR (ADDERALL XR) 20 mg XR capsule   Yes No   Sig: Take 20 mg by mouth daily. apixaban (ELIQUIS) 2.5 mg tablet   Yes No   Sig: Take 2.5 mg by mouth two (2) times a day. Indications: PULMONARY THROMBOEMBOLISM PREVENTION   atorvastatin (LIPITOR) 10 mg tablet   No No   Sig: Take 2 Tabs by mouth nightly. carvedilol (COREG) 12.5 mg tablet   Yes No   Sig: Take 12.5 mg by mouth two (2) times daily (with meals). fluticasone-vilanterol (BREO ELLIPTA) 200-25 mcg/dose inhaler   No No   Sig: Take 1 Puff by inhalation daily. RINSE MOUTH WELL AFTER USE   folic acid 096 mcg tablet   Yes No   Sig: Take 800 mcg by mouth daily. furosemide (LASIX) 40 mg tablet   No No   Sig: Take 1 Tab by mouth daily as needed. ipratropium (ATROVENT) 0.03 % nasal spray   Yes No   Si Sprays every twelve (12) hours. levothyroxine (SYNTHROID) 125 mcg tablet   Yes No   Sig: Take 112 mcg by mouth every evening. lisinopril (PRINIVIL, ZESTRIL) 40 mg tablet   No No   Sig: Take 1 Tab by mouth daily. mirtazapine (REMERON) 30 mg tablet   Yes No   Sig: Take 45 mg by mouth nightly. montelukast (SINGULAIR) 10 mg tablet   Yes No   Sig: Take 10 mg by mouth daily.    naloxegol (MOVANTIK) 25 mg tab tablet   Yes No   Sig: Take  by mouth Daily (before breakfast). nicotinic acid (NIACIN) 500 mg tablet   Yes No   Sig: Take 500 mg by mouth Daily (before breakfast). nystatin (MYCOSTATIN) powder   No No   Sig: Apply  to affected area two (2) times a day. ondansetron (ZOFRAN ODT) 8 mg disintegrating tablet   Yes No   Sig: Take 8 mg by mouth every eight (8) hours as needed for Nausea. oxybutynin chloride XL (DITROPAN XL) 10 mg CR tablet   Yes No   Sig: Take 10 mg by mouth daily. pantoprazole (PROTONIX) 40 mg tablet   No No   Sig: Take 1 Tab by mouth Before breakfast and dinner. predniSONE (DELTASONE) 20 mg tablet   No No   Sig: Take 1 tablet daily for 5 days then, take 1/2 tablet daily for 5 days then stop. pregabalin (LYRICA) 150 mg capsule   Yes No   Sig: Take  by mouth.   sucralfate (CARAFATE) 1 gram tablet   Yes No   Sig: Take 1 g by mouth four (4) times daily. ziprasidone (GEODON) 80 mg capsule   Yes No   Sig: Take 160 mg by mouth nightly. Facility-Administered Medications: None       Past Medical History:   Diagnosis Date    Acute renal failure ([address] July, 2014    The patient was admitted with acute renal failure secondary to volume depletion. She was found to have a gastric ulcer on admission.  Bilateral pulmonary embolism ([address] September, 2012    Restarted on anticoagulation for lifetime    Calculus of ureter May, 2015    CAP (community acquired pneumonia) October, 2012    RML pneumonia    CVA (cerebral infarction) January, 2012    Reportedly has mild left arm residual weakness    Gastric ulcer July, 2014          Gram-positive bacteremia 1/2 cx 12/26/2016    HCAP (healthcare-associated pneumonia) January, 2013    RLL pneumonia    HCAP (healthcare-associated pneumonia) February, 2013    RLL pneumonia    Left leg DVT ([address] 1991    On coumadin until 2008    Psychiatric disorder     PUD (peptic ulcer disease) ? ??     Past Surgical History:   Procedure Laterality Date    HX APPENDECTOMY      HX CHOLECYSTECTOMY      HX ENDOSCOPY  July, 2014    Gastric ulcers x 2    HX GASTRIC BYPASS      HX HYSTERECTOMY      HX ORTHOPAEDIC      rt knee growth, right shoulder, left thumb    HX OTHER SURGICAL      vagus nerve stimulator    HX TONSILLECTOMY       Social History     Social History    Marital status:      Spouse name: N/A    Number of children: N/A    Years of education: N/A     Occupational History          Disabled     Social History Main Topics    Smoking status: Never Smoker    Smokeless tobacco: Never Used      Comment: exposed to second hand smoke at work for 17 years    Alcohol use No    Drug use: No    Sexual activity: Not on file     Other Topics Concern    Not on file     Social History Narrative    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 , one child who is healthy. Has always lived in [facility]. Dog as a pet. No history of pet bird. There is no significant environmental or industrial exposure. There is no known exposure to TB.          Family History   Problem Relation Age of Onset    Hypertension Mother     Cancer Father      prostate    Heart Attack Father     Heart Disease Father      CAD    Hypertension Father     Hypertension Sister     Stroke Sister     Heart Disease Brother      CAD with CABG    Heart Attack Brother     Hypertension Brother      Allergies   Allergen Reactions    Latex Contact Dermatitis    Bactrim [Sulfamethoxazole-Trimethoprim] Nausea Only    Lamictal [Lamotrigine] Atopic Dermatitis    Pcn [Penicillins] Rash    Tapentadol Rash       Current Facility-Administered Medications   Medication Dose Route Frequency    0.9% sodium chloride infusion 500 mL  500 mL IntraVENous CONTINUOUS    azithromycin (ZITHROMAX) tablet 1,000 mg  1,000 mg Oral NOW    0.9% sodium chloride infusion  125 mL/hr IntraVENous CONTINUOUS    saline peripheral flush soln 10 mL  10 mL InterCATHeter RAD ONCE    sodium chloride 0.9 % bolus infusion 100 mL  100 mL IntraVENous RAD ONCE     Current Outpatient Prescriptions   Medication Sig    albuterol (PROVENTIL VENTOLIN) 2.5 mg /3 mL (0.083 %) nebulizer solution 3 mL by Nebulization route four (4) times daily. And every 4 hours as needed cough, wheezing, shortness of breath, J45.909, Medicare part B    furosemide (LASIX) 40 mg tablet Take 1 Tab by mouth daily as needed.  predniSONE (DELTASONE) 20 mg tablet Take 1 tablet daily for 5 days then, take 1/2 tablet daily for 5 days then stop.  amphetamine-dextroamphetamine XR (ADDERALL XR) 20 mg XR capsule Take 20 mg by mouth daily.  albuterol (PROAIR HFA) 90 mcg/actuation inhaler Take 2 Puffs by inhalation every four (4) hours as needed for Wheezing.  OXYGEN-AIR DELIVERY SYSTEMS 2 lpm cont.  fluticasone-vilanterol (BREO ELLIPTA) 200-25 mcg/dose inhaler Take 1 Puff by inhalation daily. RINSE MOUTH WELL AFTER USE    albuterol sulfate (PROVENTIL;VENTOLIN) 2.5 mg/0.5 mL nebu nebulizer solution 0.5 mL by Nebulization route every four (4) hours. Dx J45.909    nystatin (MYCOSTATIN) powder Apply  to affected area two (2) times a day.  sucralfate (CARAFATE) 1 gram tablet Take 1 g by mouth four (4) times daily.  Biotin 2,500 mcg cap Take  by mouth.  UBIDECARENONE/VITAMIN E MIXED (COQ10  PO) Take  by mouth.  ipratropium (ATROVENT) 0.03 % nasal spray 2 Sprays every twelve (12) hours.  pregabalin (LYRICA) 150 mg capsule Take  by mouth.  montelukast (SINGULAIR) 10 mg tablet Take 10 mg by mouth daily.  naloxegol (MOVANTIK) 25 mg tab tablet Take  by mouth Daily (before breakfast).  carvedilol (COREG) 12.5 mg tablet Take 12.5 mg by mouth two (2) times daily (with meals).  apixaban (ELIQUIS) 2.5 mg tablet Take 2.5 mg by mouth two (2) times a day.  Indications: PULMONARY THROMBOEMBOLISM PREVENTION    atorvastatin (LIPITOR) 10 mg tablet Take 2 Tabs by mouth nightly.  ALPRAZolam (XANAX) 2 mg tablet Take 1 Tab by mouth three (3) times daily as needed for Anxiety. Max Daily Amount: 6 mg.  mirtazapine (REMERON) 30 mg tablet Take 45 mg by mouth nightly.  ondansetron (ZOFRAN ODT) 8 mg disintegrating tablet Take 8 mg by mouth every eight (8) hours as needed for Nausea.  oxybutynin chloride XL (DITROPAN XL) 10 mg CR tablet Take 10 mg by mouth daily.  Magnesium Oxide 500 mg cap Take 500 mg by mouth.  lisinopril (PRINIVIL, ZESTRIL) 40 mg tablet Take 1 Tab by mouth daily.  HYDROcodone-acetaminophen (NORCO)  mg tablet Take 1 Tab by mouth every six (6) hours as needed for Pain.  POTASSIUM CHLORIDE SR 10 MEQ TAB Take 1 Tab by mouth daily.  Cholecalciferol, Vitamin D3, (VITAMIN D3) 1,000 unit cap Take  by mouth.  folic acid 887 mcg tablet Take 800 mcg by mouth daily.  nicotinic acid (NIACIN) 500 mg tablet Take 500 mg by mouth Daily (before breakfast).  pantoprazole (PROTONIX) 40 mg tablet Take 1 Tab by mouth Before breakfast and dinner.  FERROUS FUMARATE/VIT BCOMP&C (SUPER B COMPLEX PO) Take 1 Tab by mouth daily.  NEBULIZER by Does Not Apply route.  ziprasidone (GEODON) 80 mg capsule Take 160 mg by mouth nightly.  SUMATRIPTAN SUCCINATE (IMITREX PO) take 100 mg by mouth as needed.  levothyroxine (SYNTHROID) 125 mcg tablet Take 112 mcg by mouth every evening. Objective:     Vitals:    12/03/17 1217 12/03/17 1300 12/03/17 1345   BP: 161/84  (!) 163/93   Pulse: (!) 144  (!) 130   Resp: 18  (!) 35   Temp: (!) 101.3 °F (38.5 °C) (!) 102.5 °F (39.2 °C)    SpO2: 93%  91%       PHYSICAL EXAM     Constitutional:  the patient is well developed and in no acute distress  EENMT:  Sclera clear, pupils equal, oral mucosa moist  Respiratory: crackles and squeaks bilateral posterior, o2 at 2 lpm  Cardiovascular:  RRR without M,G,R, tachycardic  Gastrointestinal: soft and non-tender; with positive bowel sounds.   Musculoskeletal: warm without cyanosis. There is 2+ lower leg edema. Skin:  no jaundice or rashes, no open wounds   Neurologic: no gross neuro deficits     Psychiatric:  alert and oriented x 3    CXR:  12/3        Recent Labs      12/03/17   1225   WBC  10.0   HGB  11.0*   HCT  36.6   PLT  359     Recent Labs      12/03/17   1303   NA  148*   K  3.3*   CL  113*   GLU  90   CO2  27   BUN  13   CREA  1.04*   CA  8.6   ALB  3.0*   TBILI  0.3   ALT  19   SGOT  15     No results for input(s): PH, PCO2, PO2, HCO3 in the last 72 hours. No results for input(s): LCAD, LAC in the last 72 hours. Assessment:  (Medical Decision Making)     Hospital Problems  Date Reviewed: 11/22/2017          Codes Class Noted POA    Fever ICD-10-CM: R50.9  ICD-9-CM: 780.60  12/3/2017 Yes    102.5 in ER  WBC 10.0 / PCT 0.8      Tachycardia ICD-10-CM: R00.0  ICD-9-CM: 785.0  12/3/2017 Yes    HR in the 130      Vocal cord paralysis        Bipolar disorder        Polypharmacy    Multiple psych and pain medications as routine home meds      Fibromyalgia           Chronic respiratory failure (HCC) (Chronic) ICD-10-CM: J96.10  ICD-9-CM: 518.83  11/1/2017 Yes    Maintained on o2 at 2 lpm      Restrictive lung disease (Chronic) ICD-10-CM: J98.4  ICD-9-CM: 518.89  11/1/2017 Yes    Likely secondary to obesity      * (Principal)HCAP (healthcare-associated pneumonia) ICD-10-CM: J18.9  ICD-9-CM: 486  11/1/2017 Yes    Fever / chills / elevated / New L sided infiltrate on CXR      Chronic anticoagulation    Hx of PE on lifelong anticoagulation         Plan:  (Medical Decision Making)     --Will admit for further medical management  --Supplemental O2   --Respiratory nebulizer treatments  --culture  --antibiotic therapy  --SLP to eval for possible aspiration    More than 50% of the time documented was spent in face-to-face contact with the patient and in the care of the patient on the floor/unit where the patient is located.     Tammy Hunt NP        Lungs:  CTA B, no w/r/r  Heart:  RRR with no Murmur/Rubs/Gallops    Additional Comments:  Patient presenting with signs of PNA. Recurrent multiple times this year. Will cover with broad abx. CT chest done to rule out nodule/mass based on CXR appearance. Fidnings more in keeping with infiltrate. Speech eval to rule out aspiration as cause of recurrent pneumonias. I have spoken with and examined the patient. I agree with the above assessment and plan as documented.     Kasey Mayer MD

## 2017-12-03 NOTE — IP AVS SNAPSHOT
[address] [facility] 
 
 
 [address] 
[phone] Patient: Noé Stokes MRN: [id number] About your hospitalization You were admitted on:  December 3, 2017 You last received care in the:  [facility] 6 MED SURG You were discharged on:  December 6, 2017 Why you were hospitalized Your primary diagnosis was:  Hcap (Healthcare-Associated Pneumonia) Your diagnoses also included:  Fever, Tachycardia, Chronic Respiratory Failure (Hcc), Restrictive Lung Disease, Chronic Anticoagulation, Bipolar Affective Disorder (Hcc), Fibromyalgia, Vocal Cord Paralysis, Polypharmacy, Sepsis (Hcc), Ho (Obstructive Sleep Apnea), Morbid Obesity (Hcc) Things You Need To Do (next 8 weeks) Follow up with Dhiraj Larry DO Phone:  [phone] Where:  [address] [facility] Discharge Orders None A check elvi indicates which time of day the medication should be taken. My Medications TAKE these medications as instructed Instructions Each Dose to Equal  
 Morning Noon Evening Bedtime ADDERALL XR 20 mg XR capsule Generic drug:  amphetamine-dextroamphetamine XR Your next dose is:  12-7 Take 20 mg by mouth daily. 20 mg  
    
   
   
   
  
 * PROAIR HFA 90 mcg/actuation inhaler Generic drug:  albuterol Your next dose is:  As needed Take 2 Puffs by inhalation every four (4) hours as needed for Wheezing. 2 Puff * albuterol sulfate 2.5 mg/0.5 mL Nebu nebulizer solution Commonly known as:  PROVENTIL;VENTOLIN Your next dose is:  Resume home schedule  
   
 0.5 mL by Nebulization route every four (4) hours. Dx J45.909  
 2.5 mg  
    
   
   
   
  
 * albuterol 2.5 mg /3 mL (0.083 %) nebulizer solution Commonly known as:  PROVENTIL VENTOLIN Your next dose is:  As needed 3 mL by Nebulization route four (4) times daily. And every 4 hours as needed cough, wheezing, shortness of breath, J45.909, Medicare part B  
 2.5 mg  
    
   
   
   
  
 ALPRAZolam 2 mg tablet Commonly known as:  Malaika [place] Your next dose is:  As needed Take 1 Tab by mouth three (3) times daily as needed for Anxiety. Max Daily Amount: 6 mg.  
 2 mg  
    
   
   
   
  
 atorvastatin 10 mg tablet Commonly known as:  LIPITOR Your next dose is:  bedtime Take 2 Tabs by mouth nightly. 20 mg  
    
   
   
   
  
  
 ATROVENT 0.03 % nasal spray Generic drug:  ipratropium Your next dose is: This evening 2 Sprays every twelve (12) hours. 2 Spray Biotin 2,500 mcg Cap Your next dose is:  12-7 Take  by mouth. CARAFATE 1 gram tablet Generic drug:  sucralfate Your next dose is:  Before meals and at bedtime Take 1 g by mouth four (4) times daily. 1 g  
    
   
   
  
   
  
  
 COQ10  PO Your next dose is:  12-7 Take  by mouth. COREG 12.5 mg tablet Generic drug:  carvedilol Your next dose is: This evening Take 12.5 mg by mouth two (2) times daily (with meals). 12.5 mg  
    
   
   
  
   
  
 ELIQUIS 2.5 mg tablet Generic drug:  apixaban Your next dose is: This evening Take 2.5 mg by mouth two (2) times a day. Indications: PULMONARY THROMBOEMBOLISM PREVENTION  
 2.5 mg  
    
   
   
  
   
  
 fluticasone-vilanterol 200-25 mcg/dose inhaler Commonly known as:  BREO ELLIPTA Your next dose is:  12-7 Take 1 Puff by inhalation daily. RINSE MOUTH WELL AFTER USE  
 1 Puff  
    
   
   
   
  
 folic acid 990 mcg tablet Your next dose is:  12-7 Take 800 mcg by mouth daily. 800 mcg  
    
   
   
   
  
 furosemide 40 mg tablet Commonly known as:  LASIX Your next dose is:  As needed Take 1 Tab by mouth daily as needed. 40 mg  
    
   
   
   
  
 GEODON 80 mg capsule Generic drug:  ziprasidone Your next dose is:  bedtime Take 80 mg by mouth nightly. 80 mg  
    
   
   
   
  
  
 IMITREX PO Your next dose is:  As needed  
   
 take 100 mg by mouth as needed. 100 mg  
    
   
   
   
  
 levoFLOXacin 750 mg tablet Commonly known as:  Luanne Tran Start taking on:  12/7/2017 Your next dose is:  12-7 Take 1 Tab by mouth every twenty-four (24) hours for 4 days. 750 mg  
    
   
   
   
  
 lisinopril 40 mg tablet Commonly known as:  Thersia Kubas Your next dose is:  12-7 Take 1 Tab by mouth daily. 40 mg  
    
   
   
   
  
 Magnesium Oxide 500 mg Cap Your next dose is:  12-7 Take 500 mg by mouth. 500 mg  
    
   
   
   
  
 mirtazapine 30 mg tablet Commonly known as:  Kai Harrow Your next dose is:  bedtime Take 45 mg by mouth nightly. 45 mg  
    
   
   
   
  
  
 montelukast 10 mg tablet Commonly known as:  SINGULAIR Your next dose is:  12-7 Take 10 mg by mouth daily. 10 mg  
    
   
   
   
  
 MOVANTIK 25 mg Tab tablet Generic drug:  naloxegol Your next dose is:  12-7 Take  by mouth Daily (before breakfast). NEBULIZER  
   
 by Does Not Apply route. nicotinic acid 500 mg tablet Commonly known as:  NIACIN Your next dose is:  12-7 Take 500 mg by mouth Daily (before breakfast). 500 mg NORCO  mg tablet Generic drug:  HYDROcodone-acetaminophen Your next dose is:  As needed Take 1 Tab by mouth every six (6) hours as needed for Pain. 1 Tab  
    
   
   
   
  
 nystatin powder Commonly known as:  MYCOSTATIN Your next dose is: This evening Apply  to affected area two (2) times a day. oxybutynin chloride XL 10 mg CR tablet Commonly known as:  DITROPAN XL Your next dose is:  12-7 Take 10 mg by mouth daily. 10 mg OXYGEN-AIR DELIVERY SYSTEMS  
   
 2 lpm cont. pantoprazole 40 mg tablet Commonly known as:  PROTONIX Take 1 Tab by mouth Before breakfast and dinner. 40 mg POTASSIUM CHLORIDE SR 10 MEQ TAB Your next dose is:  12-7 Take 1 Tab by mouth daily. 1 Tab  
    
   
   
   
  
 pregabalin 150 mg capsule Commonly known as:  Negrita Hira Your next dose is: This evening Take  by mouth two (2) times a day. SUPER B COMPLEX PO Your next dose is:  12-7 Take 1 Tab by mouth daily. 1 Tab SYNTHROID 125 mcg tablet Generic drug:  levothyroxine Your next dose is:  12-7 Take 112 mcg by mouth every evening. 112 mcg VITAMIN D3 1,000 unit Cap Generic drug:  cholecalciferol Your next dose is:  12-7 Take  by mouth. ZOFRAN ODT 8 mg disintegrating tablet Generic drug:  ondansetron Your next dose is:  As needed Take 8 mg by mouth every eight (8) hours as needed for Nausea. 8 mg * Notice: This list has 3 medication(s) that are the same as other medications prescribed for you. Read the directions carefully, and ask your doctor or other care provider to review them with you. Where to Get Your Medications These medications were sent to [address], [address] [address] Phone:  [phone]  
  levoFLOXacin 750 mg tablet Discharge Instructions DISCHARGE SUMMARY from Nurse PATIENT INSTRUCTIONS: 
 
 
F-face looks uneven A-arms unable to move or move unevenly S-speech slurred or non-existent T-time-call 911 as soon as signs and symptoms begin-DO NOT go Back to bed or wait to see if you get better-TIME IS BRAIN. Warning Signs of HEART ATTACK Call 911 if you have these symptoms: 
? Chest discomfort. Most heart attacks involve discomfort in the center of the chest that lasts more than a few minutes, or that goes away and comes back. It can feel like uncomfortable pressure, squeezing, fullness, or pain. ? Discomfort in other areas of the upper body. Symptoms can include pain or discomfort in one or both arms, the back, neck, jaw, or stomach. ? Shortness of breath with or without chest discomfort. ? Other signs may include breaking out in a cold sweat, nausea, or lightheadedness. Don't wait more than five minutes to call [address]! Fast action can save your life. Calling 911 is almost always the fastest way to get lifesaving treatment. Emergency Medical Services staff can begin treatment when they arrive  up to an hour sooner than if someone gets to the hospital by car. The discharge information has been reviewed with the patient. The patient verbalized understanding. Discharge medications reviewed with the patient and appropriate educational materials and side effects teaching were provided. ___________________________________________________________________________________________________________________________________ Pneumonia: Care Instructions Your Care Instructions Pneumonia is an infection of the lungs. Most cases are caused by infections from bacteria or viruses. Pneumonia may be mild or very severe. If it is caused by bacteria, you will be treated with antibiotics.  It may take a few weeks to a few months to recover fully from pneumonia, depending on how sick you were and whether your overall health is good. Follow-up care is a key part of your treatment and safety. Be sure to make and go to all appointments, and call your doctor if you are having problems. It's also a good idea to know your test results and keep a list of the medicines you take. How can you care for yourself at home? · Take your antibiotics exactly as directed. Do not stop taking the medicine just because you are feeling better. You need to take the full course of antibiotics. · Take your medicines exactly as prescribed. Call your doctor if you think you are having a problem with your medicine. · Get plenty of rest and sleep. You may feel weak and tired for a while, but your energy level will improve with time. · To prevent dehydration, drink plenty of fluids, enough so that your urine is light yellow or clear like water. Choose water and other caffeine-free clear liquids until you feel better. If you have kidney, heart, or liver disease and have to limit fluids, talk with your doctor before you increase the amount of fluids you drink. · Take care of your cough so you can rest. A cough that brings up mucus from your lungs is common with pneumonia. It is one way your body gets rid of the infection. But if coughing keeps you from resting or causes severe fatigue and chest-wall pain, talk to your doctor. He or she may suggest that you take a medicine to reduce the cough. · Use a vaporizer or humidifier to add moisture to your bedroom. Follow the directions for cleaning the machine. · Do not smoke or allow others to smoke around you. Smoke will make your cough last longer. If you need help quitting, talk to your doctor about stop-smoking programs and medicines. These can increase your chances of quitting for good.  
· Take an over-the-counter pain medicine, such as acetaminophen (Tylenol), ibuprofen (Advil, Motrin), or naproxen (Aleve). Read and follow all instructions on the label. · Do not take two or more pain medicines at the same time unless the doctor told you to. Many pain medicines have acetaminophen, which is Tylenol. Too much acetaminophen (Tylenol) can be harmful. · If you were given a spirometer to measure how well your lungs are working, use it as instructed. This can help your doctor tell how your recovery is going. · To prevent pneumonia in the future, talk to your doctor about getting a flu vaccine (once a year) and a pneumococcal vaccine (one time only for most people). When should you call for help? Call 911 anytime you think you may need emergency care. For example, call if: 
? · You have severe trouble breathing. ?Call your doctor now or seek immediate medical care if: 
? · You cough up dark brown or bloody mucus (sputum). ? · You have new or worse trouble breathing. ? · You are dizzy or lightheaded, or you feel like you may faint. ? Watch closely for changes in your health, and be sure to contact your doctor if: 
? · You have a new or higher fever. ? · You are coughing more deeply or more often. ? · You are not getting better after 2 days (48 hours). ? · You do not get better as expected. Where can you learn more? Go to http://era-fransisco.info/. Enter [id number] in the search box to learn more about \"Pneumonia: Care Instructions. \" Current as of: May 12, 2017 Content Version: 11.4 © 1727-9299 [employer]. Care instructions adapted under license by [employer] (which disclaims liability or warranty for this information). If you have questions about a medical condition or this instruction, always ask your healthcare professional. [address] any warranty or liability for your use of this information. [employer] Announcement We are excited to announce that we are making your provider's discharge notes available to you in [employer]. You will see these notes when they are completed and signed by the physician that discharged you from your recent hospital stay. If you have any questions or concerns about any information you see in [employer], please call the Health Information Department where you were seen or reach out to your Primary Care Provider for more information about your plan of care. Introducing [facility] SERVICES! Martina Underwood introduces [employer] patient portal. Now you can access parts of your medical record, email your doctor's office, and request medication refills online. 1. In your internet browser, go to [email or web address] 2. Click on the First Time User? Click Here link in the Sign In box. You will see the New Member Sign Up page. 3. Enter your [employer] Access Code exactly as it appears below. You will not need to use this code after youve completed the sign-up process. If you do not sign up before the expiration date, you must request a new code. · [employer] Access Code: [email or web address] Expires: 2/19/2018  8:49 AM 
 
4. Enter the last four digits of your Social Security Number (xxxx) and Date of Birth (mm/dd/yyyy) as indicated and click Submit. You will be taken to the next sign-up page. 5. Create a [employer] ID. This will be your [employer] login ID and cannot be changed, so think of one that is secure and easy to remember. 6. Create a [employer] password. You can change your password at any time. 7. Enter your Password Reset Question and Answer. This can be used at a later time if you forget your password. 8. Enter your e-mail address. You will receive e-mail notification when new information is available in [address] 9. Click Sign Up. You can now view and download portions of your medical record.  
10. Click the Download Summary menu link to download a portable copy of your medical information. If you have questions, please visit the Frequently Asked Questions section of the MyChart website. Remember, MyChart is NOT to be used for urgent needs. For medical emergencies, dial 911. Now available from your iPhone and Android! Unresulted Labs-Please follow up with your PCP about these lab tests Order Current Status CULTURE, BLOOD Preliminary result CULTURE, BLOOD Preliminary result Providers Seen During Your Hospitalization Provider Specialty Primary office phone Melissasonia Maddeny, [address] [facility] [phone] Micheline Kennedy MD Pulmonary Disease [phone] Your Primary Care Physician (PCP) Primary Care Physician Office Phone Office Fax Derek Nicola [phone] You are allergic to the following Allergen Reactions Latex Contact Dermatitis Bactrim (Sulfamethoxazole-Trimethoprim) Nausea Only Lamictal (Lamotrigine) Atopic Dermatitis Pcn (Penicillins) Rash Tapentadol Rash Recent Documentation Height Weight Breastfeeding? BMI OB Status Smoking Status 1.549 m 105.2 kg No 43.84 kg/m2 Hysterectomy Never Smoker Emergency Contacts Name Discharge Info Relation Home Work Mobile Jennifer Sheridan  Daughter [83] [phone] Benito Kwok  Spouse [3] [phone] Jono Watts   [phone] Patient Belongings The following personal items are in your possession at time of discharge: 
  Dental Appliances: Lowers, Uppers  Visual Aid: None      Home Medications: None   Jewelry: Bracelet, Earrings, Necklace, Ring, With patient  Clothing: Pajamas, Undergarments    Other Valuables: None Please provide this summary of care documentation to your next provider. Signatures-by signing, you are acknowledging that this After Visit Summary has been reviewed with you and you have received a copy. Patient Signature:  ____________________________________________________________ Date:  ____________________________________________________________  
  
Paulene Greener Provider Signature:  ____________________________________________________________ Date:  ____________________________________________________________

## 2017-12-03 NOTE — ED PROVIDER NOTES
Patient is a 78 y.o. female presenting with shortness of breath. The history is provided by the patient and the EMS personnel. Shortness of Breath   This is a new problem. The average episode lasts 1 day. The problem has been rapidly worsening. Associated symptoms include a fever and cough. Pertinent negatives include no headaches, no rhinorrhea, no ear pain, no wheezing, no chest pain, no abdominal pain and no leg swelling. She has tried nothing for the symptoms. She has had prior hospitalizations. She has had prior ED visits. Past Medical History:   Diagnosis Date    Acute renal failure ([address] July, 2014    The patient was admitted with acute renal failure secondary to volume depletion. She was found to have a gastric ulcer on admission.  Bilateral pulmonary embolism ([address] September, 2012    Restarted on anticoagulation for lifetime    Calculus of ureter May, 2015    CAP (community acquired pneumonia) October, 2012    RML pneumonia    CVA (cerebral infarction) January, 2012    Reportedly has mild left arm residual weakness    Gastric ulcer July, 2014          Gram-positive bacteremia 1/2 cx 12/26/2016    HCAP (healthcare-associated pneumonia) January, 2013    RLL pneumonia    HCAP (healthcare-associated pneumonia) February, 2013    RLL pneumonia    Left leg DVT ([address] 1991    On coumadin until 2008    Psychiatric disorder     PUD (peptic ulcer disease) ? ??       Past Surgical History:   Procedure Laterality Date    HX APPENDECTOMY      HX CHOLECYSTECTOMY      HX ENDOSCOPY  July, 2014    Gastric ulcers x 2    HX GASTRIC BYPASS      HX HYSTERECTOMY      HX ORTHOPAEDIC      rt knee growth, right shoulder, left thumb    HX OTHER SURGICAL      vagus nerve stimulator    HX TONSILLECTOMY           Family History:   Problem Relation Age of Onset    Hypertension Mother     Cancer Father      prostate    Heart Attack Father     Heart Disease Father      CAD    Hypertension Father    [facility] Hypertension Sister     Stroke Sister     Heart Disease Brother      CAD with CABG    Heart Attack Brother     Hypertension Brother        Social History     Social History    Marital status:      Spouse name: N/A    Number of children: N/A    Years of education: N/A     Occupational History          Disabled     Social History Main Topics    Smoking status: Never Smoker    Smokeless tobacco: Never Used      Comment: exposed to second hand smoke at work for 17 years    Alcohol use No    Drug use: No    Sexual activity: Not on file     Other Topics Concern    Not on file     Social History Narrative    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 , one child who is healthy. Has always lived in [address] Dog as a pet. No history of pet bird. There is no significant environmental or industrial exposure. There is no known exposure to TB. ALLERGIES: Latex; Bactrim [sulfamethoxazole-trimethoprim]; Lamictal [lamotrigine]; Pcn [penicillins]; and Tapentadol    Review of Systems   Constitutional: Positive for fever. Negative for chills. HENT: Negative. Negative for congestion, ear pain, postnasal drip and rhinorrhea. Eyes: Negative for pain and visual disturbance. Respiratory: Positive for cough and shortness of breath. Negative for wheezing. Cardiovascular: Negative for chest pain and leg swelling. Gastrointestinal: Negative. Negative for abdominal distention and abdominal pain. Endocrine: Negative. Negative for polydipsia, polyphagia and polyuria. Genitourinary: Negative. Negative for difficulty urinating, flank pain and frequency. Musculoskeletal: Negative. Negative for arthralgias and myalgias. Skin: Negative. Neurological: Negative. Negative for dizziness and headaches. Hematological: Negative.         Vitals:    12/03/17 1217   BP: 161/84   Pulse: (!) 144   Resp: 18 Temp: (!) 101.3 °F (38.5 °C)   SpO2: 93%            Physical Exam   Constitutional: She is oriented to person, place, and time. She appears well-developed and well-nourished. Non-toxic appearance. She does not have a sickly appearance. She does not appear ill. No distress. HENT:   Head: Normocephalic and atraumatic. Cardiovascular: Intact distal pulses. Pulmonary/Chest: Effort normal. No accessory muscle usage. No respiratory distress. She has no decreased breath sounds. She has no wheezes. She has rhonchi. She has no rales. Abdominal: Soft. Neurological: She is alert and oriented to person, place, and time. Skin: Skin is warm and dry. Psychiatric: She has a normal mood and affect. Her behavior is normal.   Nursing note and vitals reviewed. MDM  Number of Diagnoses or Management Options  Diagnosis management comments: Vision has fever up to 102 in the ER with concomitant tachycardia which appears to be sinus in nature. Patient given Tylenol 1 g to address fever. IV fluids also started for insensible fluid losses. Patient had flu test obtained initially which was negative and empiric antibiotic started. X-ray appears to demonstrate new infiltrates consistent with pneumonia. Lactic acid mildly elevated. Patient's heart rate improving with IV fluids and as fever is improving. CT requested to further evaluate possible hilar mass versus lymphadenopathy. Pulmonary consult to evaluate since patient already has chronic lung disease and is considered to have poor reserve.        Amount and/or Complexity of Data Reviewed  Clinical lab tests: ordered and reviewed  Tests in the radiology section of CPT®: ordered and reviewed  Review and summarize past medical records: yes  Discuss the patient with other providers: yes  Independent visualization of images, tracings, or specimens: yes    Risk of Complications, Morbidity, and/or Mortality  Presenting problems: high  Management options: high      ED Course       Procedures

## 2017-12-03 NOTE — PROGRESS NOTES
Pharmacokinetic Consult to Pharmacist    Octavio Seen is a 78 y.o. female being treated for HAP with vancomycin and cefepime. Height: 5' 1\" (154.9 cm)  Weight: 104.3 kg (230 lb)  Lab Results   Component Value Date/Time    BUN 13 12/03/2017 01:03 PM    Creatinine 1.04 12/03/2017 01:03 PM    WBC 10.0 12/03/2017 12:25 PM    Procalcitonin 0.8 12/03/2017 01:03 PM    Lactic acid 0.6 04/11/2017 06:25 AM    Lactic acid 3.0 02/07/2013 08:54 AM    Lactic Acid (POC) 2.2 12/03/2017 12:31 PM      Estimated Creatinine Clearance: 59.1 mL/min (based on Cr of 1.04). Day 1 of vancomycin. Goal trough is 15-20. Dosing stated with 2g x 1 and then 1g q12h. Will continue to follow patient. Thank you,  Eunice Scott, Pharm. D.   Clinical Pharmacist  [phone]

## 2017-12-03 NOTE — ED NOTES
TRANSFER - OUT REPORT:    Verbal report given to Dottie KRUGER(name) on Bethany Chun  being transferred to [facility](unit) for routine progression of care       Report consisted of patients Situation, Background, Assessment and   Recommendations(SBAR). Information from the following report(s) SBAR was reviewed with the receiving nurse. Lines:   Peripheral IV 12/03/17 Left Hand (Active)   Site Assessment Clean, dry, & intact 12/3/2017 12:35 PM   Phlebitis Assessment 0 12/3/2017 12:35 PM   Infiltration Assessment 0 12/3/2017 12:35 PM   Dressing Status Clean, dry, & intact 12/3/2017 12:35 PM   Hub Color/Line Status Blue 12/3/2017 12:35 PM   Action Taken Blood drawn 12/3/2017 12:35 PM       Peripheral IV 12/03/17 Right Arm (Active)   Site Assessment Clean, dry, & intact 12/3/2017 12:52 PM   Phlebitis Assessment 0 12/3/2017 12:52 PM   Infiltration Assessment 0 12/3/2017 12:52 PM   Dressing Status Clean, dry, & intact 12/3/2017 12:52 PM   Hub Color/Line Status Pink 12/3/2017 12:52 PM        Opportunity for questions and clarification was provided.       Patient transported with:   O2 @ 4 liters

## 2017-12-03 NOTE — ED TRIAGE NOTES
Pt arrives to ER with complaints of Shob, wears 2-3L At all times. Pt is febrile on arrival, was discharged with pneumonia on Wednesday. Completed all antibiotics and prednisone.

## 2017-12-03 NOTE — PROGRESS NOTES
TRANSFER - IN REPORT:    Verbal report received from [facility]  rn on Timothy Krueger  being received from er for routine progression of care      Report consisted of patients Situation, Background, Assessment and   Recommendations(SBAR). Information from the following report(s) SBAR, Kardex, ED Summary, Intake/Output, MAR and Recent Results was reviewed with the receiving nurse. Opportunity for questions and clarification was provided. Assessment completed upon patients arrival to unit and care assumed. Primary Nurse Peewee Ramos RN and Hossein Hensley RN performed a dual skin assessment on this patient No impairment noted  Juwan score is 23  Pt stated that  Her left hip was bumped in CT, however, no bruise or broken skin noted.

## 2017-12-04 PROBLEM — R60.0 BILATERAL LOWER EXTREMITY EDEMA: Status: RESOLVED | Noted: 2017-07-25 | Resolved: 2017-12-04

## 2017-12-04 PROBLEM — J02.9 SORE THROAT: Status: RESOLVED | Noted: 2017-07-25 | Resolved: 2017-12-04

## 2017-12-04 PROBLEM — J98.11 ATELECTASIS: Status: RESOLVED | Noted: 2017-10-02 | Resolved: 2017-12-04

## 2017-12-04 PROBLEM — A41.9 SEPSIS (HCC): Status: ACTIVE | Noted: 2017-12-03

## 2017-12-04 LAB
ANION GAP SERPL CALC-SCNC: 11 MMOL/L (ref 7–16)
BUN SERPL-MCNC: 14 MG/DL (ref 8–23)
CALCIUM SERPL-MCNC: 7.9 MG/DL (ref 8.3–10.4)
CHLORIDE SERPL-SCNC: 114 MMOL/L (ref 98–107)
CO2 SERPL-SCNC: 22 MMOL/L (ref 21–32)
CREAT SERPL-MCNC: 0.89 MG/DL (ref 0.6–1)
ERYTHROCYTE [DISTWIDTH] IN BLOOD BY AUTOMATED COUNT: 18.2 % (ref 11.9–14.6)
GLUCOSE SERPL-MCNC: 76 MG/DL (ref 65–100)
HCT VFR BLD AUTO: 30.4 % (ref 35.8–46.3)
HGB BLD-MCNC: 8.9 G/DL (ref 11.7–15.4)
MCH RBC QN AUTO: 25.9 PG (ref 26.1–32.9)
MCHC RBC AUTO-ENTMCNC: 29.3 G/DL (ref 31.4–35)
MCV RBC AUTO: 88.4 FL (ref 79.6–97.8)
PLATELET # BLD AUTO: 274 K/UL (ref 150–450)
PMV BLD AUTO: 10 FL (ref 10.8–14.1)
POTASSIUM SERPL-SCNC: 4.1 MMOL/L (ref 3.5–5.1)
RBC # BLD AUTO: 3.44 M/UL (ref 4.05–5.25)
SODIUM SERPL-SCNC: 147 MMOL/L (ref 136–145)
WBC # BLD AUTO: 17.1 K/UL (ref 4.3–11.1)

## 2017-12-04 PROCEDURE — 74011250637 HC RX REV CODE- 250/637: Performed by: NURSE PRACTITIONER

## 2017-12-04 PROCEDURE — 92610 EVALUATE SWALLOWING FUNCTION: CPT

## 2017-12-04 PROCEDURE — 65270000029 HC RM PRIVATE

## 2017-12-04 PROCEDURE — 74011000258 HC RX REV CODE- 258: Performed by: INTERNAL MEDICINE

## 2017-12-04 PROCEDURE — 74011250637 HC RX REV CODE- 250/637: Performed by: INTERNAL MEDICINE

## 2017-12-04 PROCEDURE — 74011250636 HC RX REV CODE- 250/636: Performed by: INTERNAL MEDICINE

## 2017-12-04 PROCEDURE — G8998 SWALLOW D/C STATUS: HCPCS

## 2017-12-04 PROCEDURE — 77010033678 HC OXYGEN DAILY

## 2017-12-04 PROCEDURE — 94640 AIRWAY INHALATION TREATMENT: CPT

## 2017-12-04 PROCEDURE — 74011000250 HC RX REV CODE- 250: Performed by: INTERNAL MEDICINE

## 2017-12-04 PROCEDURE — 77030027138 HC INCENT SPIROMETER -A

## 2017-12-04 PROCEDURE — 80048 BASIC METABOLIC PNL TOTAL CA: CPT | Performed by: INTERNAL MEDICINE

## 2017-12-04 PROCEDURE — G8996 SWALLOW CURRENT STATUS: HCPCS

## 2017-12-04 PROCEDURE — G8997 SWALLOW GOAL STATUS: HCPCS

## 2017-12-04 PROCEDURE — 74011250636 HC RX REV CODE- 250/636: Performed by: EMERGENCY MEDICINE

## 2017-12-04 PROCEDURE — 36415 COLL VENOUS BLD VENIPUNCTURE: CPT | Performed by: INTERNAL MEDICINE

## 2017-12-04 PROCEDURE — 82787 IGG 1 2 3 OR 4 EACH: CPT | Performed by: NURSE PRACTITIONER

## 2017-12-04 PROCEDURE — 85027 COMPLETE CBC AUTOMATED: CPT | Performed by: INTERNAL MEDICINE

## 2017-12-04 PROCEDURE — 99232 SBSQ HOSP IP/OBS MODERATE 35: CPT | Performed by: INTERNAL MEDICINE

## 2017-12-04 PROCEDURE — 94760 N-INVAS EAR/PLS OXIMETRY 1: CPT

## 2017-12-04 RX ORDER — ZIPRASIDONE HYDROCHLORIDE 20 MG/1
80 CAPSULE ORAL
Status: DISCONTINUED | OUTPATIENT
Start: 2017-12-04 | End: 2017-12-06 | Stop reason: HOSPADM

## 2017-12-04 RX ADMIN — OXYBUTYNIN CHLORIDE 5 MG: 5 TABLET ORAL at 16:54

## 2017-12-04 RX ADMIN — APIXABAN 2.5 MG: 2.5 TABLET, FILM COATED ORAL at 16:54

## 2017-12-04 RX ADMIN — ZIPRASIDONE HYDROCHLORIDE 80 MG: 20 CAPSULE ORAL at 23:55

## 2017-12-04 RX ADMIN — MIRTAZAPINE 45 MG: 15 TABLET, FILM COATED ORAL at 23:55

## 2017-12-04 RX ADMIN — CARVEDILOL 12.5 MG: 12.5 TABLET, FILM COATED ORAL at 08:24

## 2017-12-04 RX ADMIN — LEVOTHYROXINE SODIUM 112 MCG: 112 TABLET ORAL at 05:18

## 2017-12-04 RX ADMIN — SUCRALFATE 1 G: 1 TABLET ORAL at 05:18

## 2017-12-04 RX ADMIN — ATORVASTATIN CALCIUM 20 MG: 10 TABLET, FILM COATED ORAL at 22:02

## 2017-12-04 RX ADMIN — FUROSEMIDE 40 MG: 40 TABLET ORAL at 08:23

## 2017-12-04 RX ADMIN — MONTELUKAST SODIUM 10 MG: 10 TABLET, FILM COATED ORAL at 08:24

## 2017-12-04 RX ADMIN — CARVEDILOL 12.5 MG: 12.5 TABLET, FILM COATED ORAL at 16:53

## 2017-12-04 RX ADMIN — IPRATROPIUM BROMIDE AND ALBUTEROL SULFATE 3 ML: .5; 3 SOLUTION RESPIRATORY (INHALATION) at 11:33

## 2017-12-04 RX ADMIN — Medication 10 ML: at 05:19

## 2017-12-04 RX ADMIN — IPRATROPIUM BROMIDE AND ALBUTEROL SULFATE 3 ML: .5; 3 SOLUTION RESPIRATORY (INHALATION) at 20:04

## 2017-12-04 RX ADMIN — PANTOPRAZOLE SODIUM 40 MG: 40 TABLET, DELAYED RELEASE ORAL at 05:18

## 2017-12-04 RX ADMIN — PREGABALIN 150 MG: 75 CAPSULE ORAL at 22:02

## 2017-12-04 RX ADMIN — SODIUM CHLORIDE 125 ML/HR: 900 INJECTION, SOLUTION INTRAVENOUS at 04:06

## 2017-12-04 RX ADMIN — ONDANSETRON 4 MG: 2 INJECTION INTRAMUSCULAR; INTRAVENOUS at 15:22

## 2017-12-04 RX ADMIN — SUCRALFATE 1 G: 1 TABLET ORAL at 11:16

## 2017-12-04 RX ADMIN — SUCRALFATE 1 G: 1 TABLET ORAL at 16:54

## 2017-12-04 RX ADMIN — BUDESONIDE 500 MCG: 0.5 INHALANT RESPIRATORY (INHALATION) at 20:04

## 2017-12-04 RX ADMIN — LISINOPRIL 40 MG: 20 TABLET ORAL at 08:23

## 2017-12-04 RX ADMIN — HYDROCODONE BITARTRATE AND ACETAMINOPHEN 1 TABLET: 10; 325 TABLET ORAL at 22:02

## 2017-12-04 RX ADMIN — PANTOPRAZOLE SODIUM 40 MG: 40 TABLET, DELAYED RELEASE ORAL at 16:54

## 2017-12-04 RX ADMIN — IPRATROPIUM BROMIDE AND ALBUTEROL SULFATE 3 ML: .5; 3 SOLUTION RESPIRATORY (INHALATION) at 08:33

## 2017-12-04 RX ADMIN — ALPRAZOLAM 2 MG: 0.5 TABLET ORAL at 16:52

## 2017-12-04 RX ADMIN — IPRATROPIUM BROMIDE AND ALBUTEROL SULFATE 3 ML: .5; 3 SOLUTION RESPIRATORY (INHALATION) at 16:16

## 2017-12-04 RX ADMIN — APIXABAN 2.5 MG: 2.5 TABLET, FILM COATED ORAL at 08:23

## 2017-12-04 RX ADMIN — SODIUM CHLORIDE 2 G: 900 INJECTION, SOLUTION INTRAVENOUS at 22:02

## 2017-12-04 RX ADMIN — HYDROCODONE BITARTRATE AND ACETAMINOPHEN 1 TABLET: 10; 325 TABLET ORAL at 08:24

## 2017-12-04 RX ADMIN — Medication 10 ML: at 13:44

## 2017-12-04 RX ADMIN — NIACIN 500 MG: 500 TABLET, EXTENDED RELEASE ORAL at 08:24

## 2017-12-04 RX ADMIN — BUDESONIDE 500 MCG: 0.5 INHALANT RESPIRATORY (INHALATION) at 08:33

## 2017-12-04 RX ADMIN — PREGABALIN 150 MG: 75 CAPSULE ORAL at 08:24

## 2017-12-04 RX ADMIN — Medication 10 ML: at 22:04

## 2017-12-04 RX ADMIN — SODIUM CHLORIDE 2 G: 900 INJECTION, SOLUTION INTRAVENOUS at 08:22

## 2017-12-04 RX ADMIN — OXYBUTYNIN CHLORIDE 5 MG: 5 TABLET ORAL at 08:25

## 2017-12-04 RX ADMIN — VANCOMYCIN HYDROCHLORIDE 1000 MG: 1 INJECTION, POWDER, LYOPHILIZED, FOR SOLUTION INTRAVENOUS at 05:30

## 2017-12-04 RX ADMIN — HYDROCODONE BITARTRATE AND ACETAMINOPHEN 1 TABLET: 10; 325 TABLET ORAL at 13:43

## 2017-12-04 RX ADMIN — ZIPRASIDONE HYDROCHLORIDE 80 MG: 20 CAPSULE ORAL at 08:23

## 2017-12-04 RX ADMIN — SUCRALFATE 1 G: 1 TABLET ORAL at 22:02

## 2017-12-04 NOTE — CDMP QUERY
Please clarify if this patient is being treated/managed for:    SEPSIS (present on admission) 2/2 pneumonia being managed with Maxipime IV. =>Other Explanation of clinical findings  =>Unable to Determine (no explanation of clinical findings)    The medical record reflects the following:    Risk Factors: pneumonia    Clinical Indicators:tachycardia, Tmax 102.5, Max RR 35, lactic acidosis, leukocytosis with left shift    Treatment: maxipime IV    Please clarify and document your clinical opinion in the progress notes and discharge summary including the definitive and/or presumptive diagnosis, (suspected or probable), related to the above clinical findings. Please include clinical findings supporting your diagnosis.     thank you,  Li Alston RN  [phone]

## 2017-12-04 NOTE — PROGRESS NOTES
Speech language pathology: bedside swallow note: Initial Assessment and Discharge    NAME/AGE/GENDER: Cherelle Cabrera is a 78 y.o. female  DATE: 12/4/2017  PRIMARY DIAGNOSIS: HCAP (healthcare-associated pneumonia)       ICD-10: Treatment Diagnosis: R13.12 Oropharyngeal Dysphagia. INTERDISCIPLINARY COLLABORATION: Registered Nurse  PRECAUTIONS/ALLERGIES: Latex; Bactrim [sulfamethoxazole-trimethoprim]; Lamictal [lamotrigine]; Pcn [penicillins]; and Tapentadol ASSESSMENT:Based on the objective data described below, Ms. Doretha Jarvis presents with swallow function that is within normal limits. She reports eating regular diet at home and denies swallowing difficulty. She was recently evaluated on 11/2/17 by speech therapy following aspiration event. Regular diet/thin liquids were ordered at that time, and speech therapy was not indicated. At this time, chest x-ray indicated persistent lower lobe consolidation. Patient self-fed thin liquid via consecutive cup and straw sips, puree, mixed, and solids. Timely mastication of chewable textures with appropriate swallow initiation. No oral residue noted after solids. She consumed consecutive sips of thin without overt signs or symptoms of airway compromise. Recommend continue with regular diet/thin liquids. Mediations as tolerated. No additional speech therapy warranted at this time. ?????? ? ? This section established at most recent assessment??????????  PROBLEM LIST (Impairments causing functional limitations):  1. Oropharyngeal dysphagia- no symptoms identified  REHABILITATION POTENTIAL FOR STATED GOALS: Excellent  PLAN OF CARE:   Patient will benefit from skilled intervention to address the following impairments.   RECOMMENDATIONS AND PLANNED INTERVENTIONS (Benefits and precautions of therapy have been discussed with the patient.):  · PO:  Regular  · Liquids:  regular thin  MEDICATIONS:  · With liquid  COMPENSATORY STRATEGIES/MODIFICATIONS INCLUDING:  · None  OTHER RECOMMENDATIONS (including follow up treatment recommendations):   · None  RECOMMENDED DIET MODIFICATIONS DISCUSSED WITH:  · Nursing  · Patient  FREQUENCY/DURATION: Swallow function is within normal limits. No s/s airway compromise observed. Continue regular diet/thin liquids. No further speech therapy warranted. RECOMMENDED REHABILITATION/EQUIPMENT: (at time of discharge pending progress): Due to the probability of continued deficits (see above) this patient will not likely need continued skilled speech therapy after discharge. SUBJECTIVE:   \"I am swallowing just fine\"  History of Present Injury/Illness: Ms. Kirstie Yung  has a past medical history of Acute renal failure [facility]) (July, 2014); Arthritis; Asthma; Bilateral pulmonary embolism [facility]) (September, 2012); Calculus of ureter (May, 2015); CAP (community acquired pneumonia) (October, 2012); Chronic pain; CVA (cerebral infarction) (January, 2012); Gastric ulcer (July, 2014); GERD (gastroesophageal reflux disease); Gram-positive bacteremia 1/2 cx (12/26/2016); HCAP (healthcare-associated pneumonia) (January, 2013); HCAP (healthcare-associated pneumonia) (February, 2013); Hypertension; Left leg DVT ([address] (1991); Morbid obesity ([address] Psychiatric disorder; PUD (peptic ulcer disease) (???); Stroke [facility]); and Thyroid disease. She also has no past medical history of Aneurysm ([address] Arrhythmia; Autoimmune disease ([address] CAD (coronary artery disease); Cancer ([address] Chronic kidney disease; Chronic obstructive pulmonary disease ([address] Coagulation disorder ([address] COPD; Diabetes ([address] Endocarditis; Heart failure ([address] Liver disease; Nicotine vapor product user; Non-nicotine vapor product user; Rheumatic fever; Seizures ([address] or Unspecified sleep apnea. .  She also  has a past surgical history that includes appendectomy; cholecystectomy; gastric bypass; tonsillectomy; other surgical; orthopaedic; hysterectomy; and endoscopy (July, 2014).    Present Symptoms: None   Pain Intensity 1: 0  Pain Location 1: Back, Head  Pain Orientation 1: Lower  Pain Intervention(s) 1: Medication (see MAR)  Current Medications:   No current facility-administered medications on file prior to encounter. Current Outpatient Prescriptions on File Prior to Encounter   Medication Sig Dispense Refill    albuterol (PROVENTIL VENTOLIN) 2.5 mg /3 mL (0.083 %) nebulizer solution 3 mL by Nebulization route four (4) times daily. And every 4 hours as needed cough, wheezing, shortness of breath, J45.909, Medicare part B 120 Each 0    furosemide (LASIX) 40 mg tablet Take 1 Tab by mouth daily as needed. 20 Tab 0    amphetamine-dextroamphetamine XR (ADDERALL XR) 20 mg XR capsule Take 20 mg by mouth daily.  albuterol (PROAIR HFA) 90 mcg/actuation inhaler Take 2 Puffs by inhalation every four (4) hours as needed for Wheezing.  OXYGEN-AIR DELIVERY SYSTEMS 2 lpm cont.  fluticasone-vilanterol (BREO ELLIPTA) 200-25 mcg/dose inhaler Take 1 Puff by inhalation daily. RINSE MOUTH WELL AFTER USE 3 Inhaler 3    albuterol sulfate (PROVENTIL;VENTOLIN) 2.5 mg/0.5 mL nebu nebulizer solution 0.5 mL by Nebulization route every four (4) hours. Dx J45.909 180 mL 4    nystatin (MYCOSTATIN) powder Apply  to affected area two (2) times a day. 1 Bottle 0    sucralfate (CARAFATE) 1 gram tablet Take 1 g by mouth four (4) times daily.  Biotin 2,500 mcg cap Take  by mouth.  UBIDECARENONE/VITAMIN E MIXED (COQ10  PO) Take  by mouth.  ipratropium (ATROVENT) 0.03 % nasal spray 2 Sprays every twelve (12) hours.  pregabalin (LYRICA) 150 mg capsule Take  by mouth two (2) times a day.  montelukast (SINGULAIR) 10 mg tablet Take 10 mg by mouth daily.  naloxegol (MOVANTIK) 25 mg tab tablet Take  by mouth Daily (before breakfast).  carvedilol (COREG) 12.5 mg tablet Take 12.5 mg by mouth two (2) times daily (with meals).  apixaban (ELIQUIS) 2.5 mg tablet Take 2.5 mg by mouth two (2) times a day.  Indications: PULMONARY THROMBOEMBOLISM PREVENTION      atorvastatin (LIPITOR) 10 mg tablet Take 2 Tabs by mouth nightly. 30 Tab 11    ALPRAZolam (XANAX) 2 mg tablet Take 1 Tab by mouth three (3) times daily as needed for Anxiety. Max Daily Amount: 6 mg. 30 Tab 0    mirtazapine (REMERON) 30 mg tablet Take 45 mg by mouth nightly.  ondansetron (ZOFRAN ODT) 8 mg disintegrating tablet Take 8 mg by mouth every eight (8) hours as needed for Nausea.  oxybutynin chloride XL (DITROPAN XL) 10 mg CR tablet Take 10 mg by mouth daily.  Magnesium Oxide 500 mg cap Take 500 mg by mouth.  lisinopril (PRINIVIL, ZESTRIL) 40 mg tablet Take 1 Tab by mouth daily. 30 Tab 5    HYDROcodone-acetaminophen (NORCO)  mg tablet Take 1 Tab by mouth every six (6) hours as needed for Pain.  POTASSIUM CHLORIDE SR 10 MEQ TAB Take 1 Tab by mouth daily.  Cholecalciferol, Vitamin D3, (VITAMIN D3) 1,000 unit cap Take  by mouth.  folic acid 468 mcg tablet Take 800 mcg by mouth daily.  nicotinic acid (NIACIN) 500 mg tablet Take 500 mg by mouth Daily (before breakfast).  pantoprazole (PROTONIX) 40 mg tablet Take 1 Tab by mouth Before breakfast and dinner. 60 Tab 2    FERROUS FUMARATE/VIT BCOMP&C (SUPER B COMPLEX PO) Take 1 Tab by mouth daily.  NEBULIZER by Does Not Apply route.  ziprasidone (GEODON) 80 mg capsule Take 80 mg by mouth nightly.  SUMATRIPTAN SUCCINATE (IMITREX PO) take 100 mg by mouth as needed.  levothyroxine (SYNTHROID) 125 mcg tablet Take 112 mcg by mouth every evening. Current Dietary Status:  Regular/thin       Social History/Home Situation:    Home Environment: Private residence (lives with daughterWarner Tarango)  # Steps to Enter: 3  One/Two Story Residence: One story  Living Alone: No  Support Systems: Friends \ neighbors, Family member(s)  Patient Expects to be Discharged to[de-identified] Private residence  Current DME Used/Available at Home: Oxygen, portable, Turner-Ahmadi, Shower chair, Cane, straight, Blood pressure cuff  OBJECTIVE:   Respiratory Status:  Nasal cannula (2l LHR)  2 l/min  CXR Results:Persistent lower lobe consolidation posteriorly. Oral Motor Structure/Speech:  Oral-Motor Structure/Motor Speech  Labial: No impairment  Dentition: Upper & lower dentures  Lingual: No impairment  Velum: No impairment    Cognitive and Communication Status:  Neurologic State: Agitated  Orientation Level: Oriented X4  Cognition: Appropriate decision making     Perseveration: No perseveration noted       BEDSIDE SWALLOW EVALUATION  Oral Assessment:  Oral Assessment  Labial: No impairment  Dentition: Upper & lower dentures  Lingual: No impairment  Velum: No impairment  P.O. Trials:  Patient Position: Up in bedside chair    The patient was given tsp-small bite amounts of the following:   Consistency Presented: Thin liquid;Mixed consistency; Solid;Puree  How Presented: Self-fed/presented;Cup/sip;Straw;Spoon; Successive swallows    ORAL PHASE:  Bolus Acceptance: No impairment  Bolus Formation/Control: No impairment  Propulsion: No impairment     Oral Residue: None    PHARYNGEAL PHASE:  Initiation of Swallow: No impairment  Laryngeal Elevation: Functional  Aspiration Signs/Symptoms: None  Vocal Quality: No impairment  Cues for Modifications: None  Effective Modifications: None          OTHER OBSERVATIONS:  Rate/bite size: WNL   Endurance: WNL   Comments:        Tool Used: Dysphagia Outcome and Severity Scale (ANAHI)    Score Comments   Normal Diet  [x] 7 With no strategies or extra time needed   Functional Swallow  [] 6 May have mild oral or pharyngeal delay       Mild Dysphagia    [] 5 Which may require one diet consistency restricted (those who demonstrate penetration which is entirely cleared on MBS would be included)   Mild-Moderate Dysphagia  [] 4 With 1-2 diet consistencies restricted       Moderate Dysphagia  [] 3 With 2 or more diet consistencies restricted       Moderately Severe Dysphagia  [] 2 With partial PO strategies (trials with ST only)       Severe Dysphagia  [] 1 With inability to tolerate any PO safely          Score:  Initial: 7 Most Recent: X (Date: -- )   Interpretation of Tool: The Dysphagia Outcome and Severity Scale (ANAHI) is a simple, easy-to-use, 7-point scale developed to systematically rate the functional severity of dysphagia based on objective assessment and make recommendations for diet level, independence level, and type of nutrition. Score 7 6 5 4 3 2 1   Modifier CH CI CJ CK CL CM CN   ? Swallowing:     - CURRENT STATUS: CH - 0% impaired, limited or restricted    - GOAL STATUS:  CH - 0% impaired, limited or restricted    - D/C STATUS:  CH - 0% impaired, limited or restricted  [place] / [place] / Product Type: Medicare /     TREATMENT:    (In addition to Assessment/Re-Assessment sessions the following treatments were rendered)  Assessment/Reassessment only, no treatment provided today  MODALITIES:                                                                    ORAL MOTOR  EXERCISES:                                                                                                                                                                      LARYNGEAL / PHARYNGEAL EXERCISES:                                                                                                                                     __________________________________________________________________________________________________  Safety:   After treatment position/precautions:  · Up in chair  Recommendations/Intent for next treatment session:  Recommend continue with regular diet/thin liquids. No further speech therapy warranted at this time.    Total Treatment Duration:  Time In: 0958  Time Out: 1012    HUGO Dobbins, CCC-SLP, CBIS

## 2017-12-04 NOTE — PROGRESS NOTES
Pt denies any pain and slept all night. She did not  have any BM. Hourly rounds were done and pt needs were met. Report will be given to day shift nurse and will continue to monitor.

## 2017-12-04 NOTE — PROGRESS NOTES
Patient is an Asthma Bundled payment patient, and is being followed for 90 days- end 12/31/17. She has turned down all services in the past- Health  and [address]. Lives with , daughter, and grandchildren. Home oxygen- [employer]. Admit with HCAP. Will provide education. We are available if needed.      Tamiko Lee, RN- [facility], BSN  Care Transition/ Bundled Payment Navigator  [phone]

## 2017-12-04 NOTE — PROGRESS NOTES
Hemal Miller  Admission Date: 12/3/2017             Daily Progress Note: 12/4/2017    The patient's chart is reviewed and the patient is discussed with the staff. Admitted with fever and chills. Has been hospitalized numerous times this year with pneumonia. No evidence of aspiration per ST. She denies reflux. IgG level low last admission - 492. No treatment to date. Subjective:     Feels better. Reports being very active at home - still drives, helps around the house, denies excessive somnolence or reflux.      Current Facility-Administered Medications   Medication Dose Route Frequency    0.9% sodium chloride infusion 500 mL  500 mL IntraVENous CONTINUOUS    0.9% sodium chloride infusion  125 mL/hr IntraVENous CONTINUOUS    albuterol (PROVENTIL VENTOLIN) nebulizer solution 2.5 mg  2.5 mg Nebulization Q4H PRN    amphetamine-dextroamphetamine XR (ADDERALL XR) 20 mg capsule 20 mg (patient supplied) (Patient Supplied)  20 mg Oral DAILY    ALPRAZolam (XANAX) tablet 2 mg  2 mg Oral TID PRN    apixaban (ELIQUIS) tablet 2.5 mg  2.5 mg Oral BID    atorvastatin (LIPITOR) tablet 20 mg  20 mg Oral QHS    carvedilol (COREG) tablet 12.5 mg  12.5 mg Oral BID WITH MEALS    furosemide (LASIX) tablet 40 mg  40 mg Oral DAILY    HYDROcodone-acetaminophen (NORCO)  mg tablet 1 Tab  1 Tab Oral Q6H PRN    lisinopril (PRINIVIL, ZESTRIL) tablet 40 mg  40 mg Oral DAILY    mirtazapine (REMERON) tablet 45 mg  45 mg Oral QHS    montelukast (SINGULAIR) tablet 10 mg  10 mg Oral DAILY    naloxegol (MOVANTIK) tablet 25 mg (patient supplied) (Patient Supplied)  25 mg Oral ACB    niacin ER (NIASPAN) tablet 500 mg  500 mg Oral ACB    oxybutynin (DITROPAN) tablet 5 mg  5 mg Oral BID    pantoprazole (PROTONIX) tablet 40 mg  40 mg Oral ACB&D    sodium chloride (NS) flush 5-10 mL  5-10 mL IntraVENous Q8H    sodium chloride (NS) flush 5-10 mL  5-10 mL IntraVENous PRN    budesonide (PULMICORT) 500 mcg/2 ml nebulizer suspension  500 mcg Nebulization BID RT    albuterol-ipratropium (DUO-NEB) 2.5 MG-0.5 MG/3 ML  3 mL Nebulization QID RT    pregabalin (LYRICA) capsule 150 mg  150 mg Oral Q12H    cefepime (MAXIPIME) 2 g in 0.9% sodium chloride (MBP/ADV) 100 mL ADV  2 g IntraVENous Q12H    vancomycin (VANCOCIN) 1,000 mg in 0.9% sodium chloride (MBP/ADV) 250 mL  1 g IntraVENous Q12H    levothyroxine (SYNTHROID) tablet 112 mcg  112 mcg Oral 6am    sucralfate (CARAFATE) tablet 1 g  1 g Oral AC&HS    ondansetron (ZOFRAN) injection 4 mg  4 mg IntraVENous Q6H PRN    ziprasidone (GEODON) capsule 80 mg  80 mg Oral Q12H         Objective:     Vitals:    12/04/17 0457 12/04/17 0642 12/04/17 0833 12/04/17 1041   BP:  161/71  100/65   Pulse:  87  82   Resp:  20  18   Temp:  98.1 °F (36.7 °C)  97.4 °F (36.3 °C)   SpO2:  97% 95% 96%   Weight: 232 lb (105.2 kg)      Height:         Intake and Output:   12/02 1901 - 12/04 0700  In: 1930 [I.V.:1930]  Out: 250 [Urine:250]  12/04 0701 - 12/04 1900  In: 240 [P.O.:240]  Out: -     Physical Exam:   Constitutional:  the patient is well developed and in no acute distress  HEENT:  Sclera clear, pupils equal, oral mucosa moist  Lungs: clear bilaterally. Wearing nasal cannula. Cardiovascular:  RRR without M,G,R  Abd/GI: soft and non-tender; with positive bowel sounds. Ext: warm without cyanosis. There is 1+ lower leg edema. Musculoskeletal: moves all four extremities with equal strength  Skin:  no jaundice or rashes, no wounds   Neuro: no gross neuro deficits   Musculoskeletal: can ambulate. No deformity  Psychiatric: Calm. ROS: chronic anxiety, pain.  Dyspnea with exertion  Lines: peripheral IV    CHEST XRAY:        LAB  Recent Labs      12/04/17   0430  12/03/17   1225   WBC  17.1*  10.0   HGB  8.9*  11.0*   HCT  30.4*  36.6   PLT  274  359     Recent Labs      12/04/17   0430  12/03/17   1303   NA  147*  148*   K  4.1  3.3*   CL  114*  113*   CO2  22  27   GLU  76  90   BUN  14  13 CREA  0.89  1.04*   ALB   --   3.0*   SGOT   --   15           Assessment:  (Medical Decision Making)     Hospital Problems  Date Reviewed: 12/3/2017          Codes Class Noted POA    Fever ICD-10-CM: R50.9  ICD-9-CM: 780.60  12/3/2017 Yes    None since admission. Associated with pneumonia    Tachycardia ICD-10-CM: R00.0  ICD-9-CM: 785.0  12/3/2017 Yes    As above    Chronic anticoagulation (Chronic) ICD-10-CM: Z79.01  ICD-9-CM: V58.61  12/3/2017 Yes    On Eliquis    Vocal cord paralysis (Chronic) ICD-10-CM: J38.00  ICD-9-CM: 478.30  12/3/2017 Yes    noted    Fibromyalgia (Chronic) ICD-10-CM: M79.7  ICD-9-CM: 729.1  11/1/2017 Yes    Chronic pain    Polypharmacy (Chronic) ICD-10-CM: O27.113  ICD-9-CM: V58.69  11/1/2017 Yes    She denies somnolence at home     Chronic respiratory failure (HCC) (Chronic) ICD-10-CM: J96.10  ICD-9-CM: 518.83  11/1/2017 Yes    Uses oxygen with sleep at home    Restrictive lung disease (Chronic) ICD-10-CM: J98.4  ICD-9-CM: 518.89  11/1/2017 Yes    Secondary to obesity    * (Principal)HCAP (healthcare-associated pneumonia) ICD-10-CM: J18.9  ICD-9-CM: 137  11/1/2017 Yes    Repeat admissions this year - no positive cultures in the past    Bipolar affective disorder ([facility] [address] (Chronic) ICD-10-CM: F31.9  ICD-9-CM: 296.80  9/16/2012 Yes    On home meds          Plan:  (Medical Decision Making)   1. Day 2 vanc and maxipeme. Blood cultures negative so far. Will stop vanc and monitor. She denies reflux/regurgitation and also denies daytime somnolence (wondered if she could be aspirating there with all the sedatives she takes). ST has seen here - no evidence of aspiration per their assessment. 2. Can stop IV fluids  3. PT consult  4. Check IgG subclasses - ? True deficiency    Staci Gash, NP    More than 50% of time documented was spent face-to-face contact with the patient and in the care of the patient on the floor/unit where the patient is located. Lungs: CTA b/l. But distant sounds. Heart S1 and S2 audible, no murmers or rubs appreciated  Other     Still not sure if aspirating vs true IGG deficiency. Checking subclasses. She did pass her swallowing evaluation. I have spoken with and examined the patient. I have reviewed the history, examination, assessment, and plan and agree with the above. Jd Mendez MD      This note was signed electronically.

## 2017-12-04 NOTE — PROGRESS NOTES
Initial visit by  to convey care and concern and encourage patient that  services are available if desired. Offered spiritual interventions during the visit, including prayer. Provided business card for future reference.      Ibeth Felder 69  Board Certified

## 2017-12-05 LAB
IGG SERPL-MCNC: 665 MG/DL (ref 700–1600)
IGG1 SER-MCNC: 357 MG/DL (ref 248–810)
IGG2 SER-MCNC: 232 MG/DL (ref 130–555)
IGG3 SER-MCNC: 29 MG/DL (ref 15–102)
IGG4 SER-MCNC: 9 MG/DL (ref 2–96)

## 2017-12-05 PROCEDURE — 74011250637 HC RX REV CODE- 250/637: Performed by: INTERNAL MEDICINE

## 2017-12-05 PROCEDURE — 99233 SBSQ HOSP IP/OBS HIGH 50: CPT | Performed by: INTERNAL MEDICINE

## 2017-12-05 PROCEDURE — 94640 AIRWAY INHALATION TREATMENT: CPT

## 2017-12-05 PROCEDURE — 74011250637 HC RX REV CODE- 250/637: Performed by: NURSE PRACTITIONER

## 2017-12-05 PROCEDURE — 74011000258 HC RX REV CODE- 258: Performed by: INTERNAL MEDICINE

## 2017-12-05 PROCEDURE — 74011000250 HC RX REV CODE- 250: Performed by: INTERNAL MEDICINE

## 2017-12-05 PROCEDURE — 74011250636 HC RX REV CODE- 250/636: Performed by: INTERNAL MEDICINE

## 2017-12-05 PROCEDURE — 77010033678 HC OXYGEN DAILY

## 2017-12-05 PROCEDURE — 94760 N-INVAS EAR/PLS OXIMETRY 1: CPT

## 2017-12-05 PROCEDURE — 65270000029 HC RM PRIVATE

## 2017-12-05 RX ORDER — NYSTATIN 100000 [USP'U]/G
POWDER TOPICAL
Status: DISCONTINUED | OUTPATIENT
Start: 2017-12-05 | End: 2017-12-06 | Stop reason: HOSPADM

## 2017-12-05 RX ADMIN — SODIUM CHLORIDE 2 G: 900 INJECTION, SOLUTION INTRAVENOUS at 08:27

## 2017-12-05 RX ADMIN — BUDESONIDE 500 MCG: 0.5 INHALANT RESPIRATORY (INHALATION) at 07:14

## 2017-12-05 RX ADMIN — MIRTAZAPINE 45 MG: 15 TABLET, FILM COATED ORAL at 23:55

## 2017-12-05 RX ADMIN — IPRATROPIUM BROMIDE AND ALBUTEROL SULFATE 3 ML: .5; 3 SOLUTION RESPIRATORY (INHALATION) at 11:05

## 2017-12-05 RX ADMIN — HYDROCODONE BITARTRATE AND ACETAMINOPHEN 1 TABLET: 10; 325 TABLET ORAL at 08:39

## 2017-12-05 RX ADMIN — IPRATROPIUM BROMIDE AND ALBUTEROL SULFATE 3 ML: .5; 3 SOLUTION RESPIRATORY (INHALATION) at 07:14

## 2017-12-05 RX ADMIN — SUCRALFATE 1 G: 1 TABLET ORAL at 16:09

## 2017-12-05 RX ADMIN — LEVOTHYROXINE SODIUM 112 MCG: 112 TABLET ORAL at 05:38

## 2017-12-05 RX ADMIN — PANTOPRAZOLE SODIUM 40 MG: 40 TABLET, DELAYED RELEASE ORAL at 05:38

## 2017-12-05 RX ADMIN — SUCRALFATE 1 G: 1 TABLET ORAL at 21:24

## 2017-12-05 RX ADMIN — SUCRALFATE 1 G: 1 TABLET ORAL at 05:38

## 2017-12-05 RX ADMIN — CARVEDILOL 12.5 MG: 12.5 TABLET, FILM COATED ORAL at 16:09

## 2017-12-05 RX ADMIN — OXYBUTYNIN CHLORIDE 5 MG: 5 TABLET ORAL at 08:27

## 2017-12-05 RX ADMIN — PREGABALIN 150 MG: 75 CAPSULE ORAL at 08:27

## 2017-12-05 RX ADMIN — PANTOPRAZOLE SODIUM 40 MG: 40 TABLET, DELAYED RELEASE ORAL at 16:09

## 2017-12-05 RX ADMIN — Medication 10 ML: at 05:38

## 2017-12-05 RX ADMIN — SODIUM CHLORIDE 2 G: 900 INJECTION, SOLUTION INTRAVENOUS at 21:24

## 2017-12-05 RX ADMIN — APIXABAN 2.5 MG: 2.5 TABLET, FILM COATED ORAL at 17:07

## 2017-12-05 RX ADMIN — BUDESONIDE 500 MCG: 0.5 INHALANT RESPIRATORY (INHALATION) at 19:55

## 2017-12-05 RX ADMIN — LISINOPRIL 40 MG: 20 TABLET ORAL at 08:27

## 2017-12-05 RX ADMIN — SUCRALFATE 1 G: 1 TABLET ORAL at 11:29

## 2017-12-05 RX ADMIN — NIACIN 500 MG: 500 TABLET, EXTENDED RELEASE ORAL at 05:38

## 2017-12-05 RX ADMIN — ALPRAZOLAM 2 MG: 0.5 TABLET ORAL at 20:24

## 2017-12-05 RX ADMIN — FUROSEMIDE 40 MG: 40 TABLET ORAL at 08:27

## 2017-12-05 RX ADMIN — Medication 10 ML: at 21:24

## 2017-12-05 RX ADMIN — ZIPRASIDONE HYDROCHLORIDE 80 MG: 20 CAPSULE ORAL at 23:55

## 2017-12-05 RX ADMIN — HYDROCODONE BITARTRATE AND ACETAMINOPHEN 1 TABLET: 10; 325 TABLET ORAL at 15:17

## 2017-12-05 RX ADMIN — CARVEDILOL 12.5 MG: 12.5 TABLET, FILM COATED ORAL at 08:27

## 2017-12-05 RX ADMIN — Medication 10 ML: at 13:58

## 2017-12-05 RX ADMIN — APIXABAN 2.5 MG: 2.5 TABLET, FILM COATED ORAL at 08:27

## 2017-12-05 RX ADMIN — OXYBUTYNIN CHLORIDE 5 MG: 5 TABLET ORAL at 17:07

## 2017-12-05 RX ADMIN — ATORVASTATIN CALCIUM 20 MG: 10 TABLET, FILM COATED ORAL at 21:24

## 2017-12-05 RX ADMIN — MONTELUKAST SODIUM 10 MG: 10 TABLET, FILM COATED ORAL at 08:27

## 2017-12-05 RX ADMIN — IPRATROPIUM BROMIDE AND ALBUTEROL SULFATE 3 ML: .5; 3 SOLUTION RESPIRATORY (INHALATION) at 19:55

## 2017-12-05 RX ADMIN — PREGABALIN 150 MG: 75 CAPSULE ORAL at 23:55

## 2017-12-05 RX ADMIN — IPRATROPIUM BROMIDE AND ALBUTEROL SULFATE 3 ML: .5; 3 SOLUTION RESPIRATORY (INHALATION) at 14:59

## 2017-12-05 RX ADMIN — NYSTATIN: 100000 POWDER TOPICAL at 15:17

## 2017-12-05 RX ADMIN — ALPRAZOLAM 2 MG: 0.5 TABLET ORAL at 16:09

## 2017-12-05 NOTE — PROGRESS NOTES
Hourly rounds done. Pt c/o pain, medicated per MAR. Denies nausea, vomiting. Voiding total output 800 mL, yellow/straw/clear. No BM this shift. All needs met at this time.

## 2017-12-05 NOTE — PROGRESS NOTES
Shawn Ayoub  Admission Date: 12/3/2017             Daily Progress Note: 12/5/2017    The patient's chart is reviewed and the patient is discussed with the staff. Admitted with fever and chills. Has been hospitalized numerous times this year with pneumonia. No evidence of aspiration per ST. She denies reflux. IgG level low last admission - 492. No treatment to date.      Subjective:        No events overnight  States she feels much better    Current Facility-Administered Medications   Medication Dose Route Frequency    nystatin (MYCOSTATIN) 100,000 unit/gram powder   Topical BID PRN    ziprasidone (GEODON) capsule 80 mg  80 mg Oral QHS    albuterol (PROVENTIL VENTOLIN) nebulizer solution 2.5 mg  2.5 mg Nebulization Q4H PRN    amphetamine-dextroamphetamine XR (ADDERALL XR) 20 mg capsule 20 mg (patient supplied) (Patient Supplied)  20 mg Oral DAILY    ALPRAZolam (XANAX) tablet 2 mg  2 mg Oral TID PRN    apixaban (ELIQUIS) tablet 2.5 mg  2.5 mg Oral BID    atorvastatin (LIPITOR) tablet 20 mg  20 mg Oral QHS    carvedilol (COREG) tablet 12.5 mg  12.5 mg Oral BID WITH MEALS    furosemide (LASIX) tablet 40 mg  40 mg Oral DAILY    HYDROcodone-acetaminophen (NORCO)  mg tablet 1 Tab  1 Tab Oral Q6H PRN    lisinopril (PRINIVIL, ZESTRIL) tablet 40 mg  40 mg Oral DAILY    mirtazapine (REMERON) tablet 45 mg  45 mg Oral QHS    montelukast (SINGULAIR) tablet 10 mg  10 mg Oral DAILY    naloxegol (MOVANTIK) tablet 25 mg (patient supplied) (Patient Supplied)  25 mg Oral ACB    niacin ER (NIASPAN) tablet 500 mg  500 mg Oral ACB    oxybutynin (DITROPAN) tablet 5 mg  5 mg Oral BID    pantoprazole (PROTONIX) tablet 40 mg  40 mg Oral ACB&D    sodium chloride (NS) flush 5-10 mL  5-10 mL IntraVENous Q8H    sodium chloride (NS) flush 5-10 mL  5-10 mL IntraVENous PRN    budesonide (PULMICORT) 500 mcg/2 ml nebulizer suspension  500 mcg Nebulization BID RT    albuterol-ipratropium (DUO-NEB) 2.5 MG-0.5 MG/3 ML  3 mL Nebulization QID RT    pregabalin (LYRICA) capsule 150 mg  150 mg Oral Q12H    cefepime (MAXIPIME) 2 g in 0.9% sodium chloride (MBP/ADV) 100 mL ADV  2 g IntraVENous Q12H    levothyroxine (SYNTHROID) tablet 112 mcg  112 mcg Oral 6am    sucralfate (CARAFATE) tablet 1 g  1 g Oral AC&HS    ondansetron (ZOFRAN) injection 4 mg  4 mg IntraVENous Q6H PRN         Objective:     Vitals:    12/05/17 0648 12/05/17 0714 12/05/17 1024 12/05/17 1106   BP: 136/83  139/83    Pulse: 77  (!) 106    Resp: 18  18    Temp: 98.4 °F (36.9 °C)  98.2 °F (36.8 °C)    SpO2: 98% 98% 100% 97%   Weight:       Height:         Intake and Output:   12/03 1901 - 12/05 0700  In: 0882 [P.O.:360; I.V.:1930]  Out: 3100 [Urine:3100]  12/05 0701 - 12/05 1900  In: 266 [P.O.:120; I.V.:103]  Out: 900 [Urine:900]    Physical Exam:   Constitutional:  the patient is well developed and in no acute distress on RA O2 sat  95%  HEENT:  Sclera clear, pupils equal, oral mucosa moist  Lungs: clear bilaterally. Wearing nasal cannula. Cardiovascular:  RRR without M,G,R  Abd/GI: soft and non-tender; with positive bowel sounds. Ext: warm without cyanosis. There is 1+ lower leg edema. Musculoskeletal: moves all four extremities with equal strength  Skin:  no jaundice or rashes, no wounds   Neuro: no gross neuro deficits   Musculoskeletal: can ambulate. No deformity  Psychiatric: Calm. ROS: chronic anxiety, pain.  Dyspnea with exertion  Lines: peripheral IV    CHEST XRAY:        LAB  Recent Labs      12/04/17   0430  12/03/17   1225   WBC  17.1*  10.0   HGB  8.9*  11.0*   HCT  30.4*  36.6   PLT  274  359     Recent Labs      12/04/17   0430  12/03/17   1303   NA  147*  148*   K  4.1  3.3*   CL  114*  113*   CO2  22  27   GLU  76  90   BUN  14  13   CREA  0.89  1.04*   ALB   --   3.0*   SGOT   --   15     Immunoglobulin G, Qt. 665 (L) 700 - 1600 mg/dL Final      IgG, Subclass 1 357  248 - 810 mg/dL Final     IgG, Subclass 2 232  130 - 555 mg/dL Final     IgG, Subclass 3 29  15 - 102 mg/dL Final     IgG, Subclass 4 9  2 - 96 mg/dL Final             Assessment:  (Medical Decision Making)     Patient Active Problem List   Diagnosis Code    Fibromyalgia M79.7    Hypertension I10    GERD (gastroesophageal reflux disease) K21.9    Morbid obesity - sedentary lifestyle E66.01    Hypothyroidism E03.9    Asthma J45.909    Arthritis M19.90    Bipolar affective disorder (HCC) F31.9    Debility R53.81    History of peptic ulcer disease Z87.11    Chronic narcotic use     Anxiety state F41.1    Chronic rhinitis J31.0    Stress incontinence in female N39.3    Speech abnormality R47.9    Polypharmacy Z79.899    PATRICIA (obstructive sleep apnea) G47.33    Hx of pulmonary embolus Z86.711    QT prolongation R94.31    Chronic respiratory failure (HCC) J96.10    Restrictive lung disease J98.4    HCAP (healthcare-associated pneumonia) J18.9    Sepsis (HCC) A41.9    Drug abuse F19.10    Fever R50.9    Tachycardia R00.0    Chronic anticoagulation Z79.01    Vocal cord paralysis J38.00         Plan:  (Medical Decision Making)     Hospital Problems  Date Reviewed: 12/3/2017          Codes Class Noted POA    Fever ICD-10-CM: R50.9  ICD-9-CM: 780.60  12/3/2017 Yes    None since admission.  Associated with pneumonia    Tachycardia ICD-10-CM: R00.0  ICD-9-CM: 785.0  12/3/2017 Yes    As above    Chronic anticoagulation (Chronic) ICD-10-CM: Z79.01  ICD-9-CM: V58.61  12/3/2017 Yes    On Eliquis    Vocal cord paralysis (Chronic) ICD-10-CM: J38.00  ICD-9-CM: 478.30  12/3/2017 Yes    noted    Fibromyalgia (Chronic) ICD-10-CM: M79.7  ICD-9-CM: 729.1  11/1/2017 Yes    Chronic pain    Polypharmacy (Chronic) ICD-10-CM: R19.787  ICD-9-CM: V58.69  11/1/2017 Yes    She denies somnolence at home     Chronic respiratory failure (HCC) (Chronic) ICD-10-CM: J96.10  ICD-9-CM: 518.83  11/1/2017 Yes    Uses oxygen with sleep at home    Restrictive lung disease (Chronic) ICD-10-CM: J98.4  ICD-9-CM: 518.89  11/1/2017 Yes    Secondary to obesity    * (Principal)HCAP (healthcare-associated pneumonia) ICD-10-CM: J18.9  ICD-9-CM: 922  11/1/2017 Yes    Repeat admissions this year - no positive cultures in the past    Bipolar affective disorder ([facility] [address] [facility] (Chronic) ICD-10-CM: F31.9  ICD-9-CM: 296.80  9/16/2012 Yes    On home meds            1. Day 3 maxipeme. Blood cultures negative so far. Vancomycin stopped yesterday. She denies reflux/regurgitation and also denies daytime somnolence (wondered if she could be aspirating there with all the sedatives she takes). ST has seen here - no evidence of aspiration per their assessment but this is when she is not under sedative influence. Discussed with patient. Plan to stop maxipine after today's dose and give 4 days of levaquin PO   D/C home in AM  2. Can stop IV fluids  3. PT consult  4. IgG subclasses - normal , there is no evidence of immunodeficiency in this patient. Librado Hernandez MD    More than 50% of time documented was spent face-to-face contact with the patient and in the care of the patient on the floor/unit where the patient is located. This note was signed electronically.

## 2017-12-06 VITALS
HEIGHT: 61 IN | RESPIRATION RATE: 18 BRPM | DIASTOLIC BLOOD PRESSURE: 76 MMHG | SYSTOLIC BLOOD PRESSURE: 109 MMHG | OXYGEN SATURATION: 95 % | BODY MASS INDEX: 43.8 KG/M2 | HEART RATE: 81 BPM | WEIGHT: 232 LBS | TEMPERATURE: 98 F

## 2017-12-06 PROCEDURE — 74011000250 HC RX REV CODE- 250: Performed by: INTERNAL MEDICINE

## 2017-12-06 PROCEDURE — 94640 AIRWAY INHALATION TREATMENT: CPT

## 2017-12-06 PROCEDURE — 77010033678 HC OXYGEN DAILY

## 2017-12-06 PROCEDURE — 74011250637 HC RX REV CODE- 250/637: Performed by: INTERNAL MEDICINE

## 2017-12-06 PROCEDURE — 74011250636 HC RX REV CODE- 250/636: Performed by: INTERNAL MEDICINE

## 2017-12-06 PROCEDURE — 99239 HOSP IP/OBS DSCHRG MGMT >30: CPT | Performed by: INTERNAL MEDICINE

## 2017-12-06 RX ORDER — LEVOFLOXACIN 750 MG/1
750 TABLET ORAL EVERY 24 HOURS
Qty: 4 TAB | Refills: 0 | Status: SHIPPED | OUTPATIENT
Start: 2017-12-07 | End: 2017-12-11

## 2017-12-06 RX ADMIN — LEVOFLOXACIN 750 MG: 500 TABLET, FILM COATED ORAL at 08:40

## 2017-12-06 RX ADMIN — OXYBUTYNIN CHLORIDE 5 MG: 5 TABLET ORAL at 08:40

## 2017-12-06 RX ADMIN — HYDROCODONE BITARTRATE AND ACETAMINOPHEN 1 TABLET: 10; 325 TABLET ORAL at 07:43

## 2017-12-06 RX ADMIN — APIXABAN 2.5 MG: 2.5 TABLET, FILM COATED ORAL at 08:40

## 2017-12-06 RX ADMIN — SUCRALFATE 1 G: 1 TABLET ORAL at 11:13

## 2017-12-06 RX ADMIN — SUCRALFATE 1 G: 1 TABLET ORAL at 07:30

## 2017-12-06 RX ADMIN — LEVOTHYROXINE SODIUM 112 MCG: 112 TABLET ORAL at 05:30

## 2017-12-06 RX ADMIN — Medication 10 ML: at 05:30

## 2017-12-06 RX ADMIN — IPRATROPIUM BROMIDE AND ALBUTEROL SULFATE 3 ML: .5; 3 SOLUTION RESPIRATORY (INHALATION) at 07:35

## 2017-12-06 RX ADMIN — PREGABALIN 150 MG: 75 CAPSULE ORAL at 08:41

## 2017-12-06 RX ADMIN — BUDESONIDE 500 MCG: 0.5 INHALANT RESPIRATORY (INHALATION) at 07:35

## 2017-12-06 RX ADMIN — ONDANSETRON 4 MG: 2 INJECTION INTRAMUSCULAR; INTRAVENOUS at 12:45

## 2017-12-06 RX ADMIN — CARVEDILOL 12.5 MG: 12.5 TABLET, FILM COATED ORAL at 07:42

## 2017-12-06 RX ADMIN — IPRATROPIUM BROMIDE AND ALBUTEROL SULFATE 3 ML: .5; 3 SOLUTION RESPIRATORY (INHALATION) at 11:24

## 2017-12-06 RX ADMIN — MONTELUKAST SODIUM 10 MG: 10 TABLET, FILM COATED ORAL at 08:40

## 2017-12-06 RX ADMIN — FUROSEMIDE 40 MG: 40 TABLET ORAL at 08:40

## 2017-12-06 RX ADMIN — NIACIN 500 MG: 500 TABLET, EXTENDED RELEASE ORAL at 07:42

## 2017-12-06 RX ADMIN — PANTOPRAZOLE SODIUM 40 MG: 40 TABLET, DELAYED RELEASE ORAL at 05:30

## 2017-12-06 RX ADMIN — HYDROCODONE BITARTRATE AND ACETAMINOPHEN 1 TABLET: 10; 325 TABLET ORAL at 14:10

## 2017-12-06 RX ADMIN — LISINOPRIL 40 MG: 20 TABLET ORAL at 08:40

## 2017-12-06 NOTE — PROGRESS NOTES
Discharge instructions and prescriptions given and reviewed with pt and , verbalizes understanding, pt denies need for home health, pt discharged home.

## 2017-12-06 NOTE — DISCHARGE INSTRUCTIONS
DISCHARGE SUMMARY from Nurse    PATIENT INSTRUCTIONS:    After general anesthesia or intravenous sedation, for 24 hours or while taking prescription Narcotics:  · Limit your activities  · Do not drive and operate hazardous machinery  · Do not make important personal or business decisions  · Do  not drink alcoholic beverages  · If you have not urinated within 8 hours after discharge, please contact your surgeon on call. Report the following to your surgeon:  · Excessive pain, swelling, redness or odor of or around the surgical area  · Temperature over 100.5  · Nausea and vomiting lasting longer than 4 hours or if unable to take medications  · Any signs of decreased circulation or nerve impairment to extremity: change in color, persistent  numbness, tingling, coldness or increase pain  · Any questions    What to do at Home:  Recommended activity: Activity as tolerated    If you experience any of the following symptoms increased shortness of breath, fever greater than 101, excessive sputum production, please follow up with your primary care physician. *  Please give a list of your current medications to your Primary Care Provider. *  Please update this list whenever your medications are discontinued, doses are      changed, or new medications (including over-the-counter products) are added. *  Please carry medication information at all times in case of emergency situations. These are general instructions for a healthy lifestyle:    No smoking/ No tobacco products/ Avoid exposure to second hand smoke  Surgeon General's Warning:  Quitting smoking now greatly reduces serious risk to your health.     Obesity, smoking, and sedentary lifestyle greatly increases your risk for illness    A healthy diet, regular physical exercise & weight monitoring are important for maintaining a healthy lifestyle    You may be retaining fluid if you have a history of heart failure or if you experience any of the following symptoms:  Weight gain of 3 pounds or more overnight or 5 pounds in a week, increased swelling in our hands or feet or shortness of breath while lying flat in bed. Please call your doctor as soon as you notice any of these symptoms; do not wait until your next office visit. Recognize signs and symptoms of STROKE:    F-face looks uneven    A-arms unable to move or move unevenly    S-speech slurred or non-existent    T-time-call 911 as soon as signs and symptoms begin-DO NOT go       Back to bed or wait to see if you get better-TIME IS BRAIN. Warning Signs of HEART ATTACK     Call 911 if you have these symptoms:   Chest discomfort. Most heart attacks involve discomfort in the center of the chest that lasts more than a few minutes, or that goes away and comes back. It can feel like uncomfortable pressure, squeezing, fullness, or pain.  Discomfort in other areas of the upper body. Symptoms can include pain or discomfort in one or both arms, the back, neck, jaw, or stomach.  Shortness of breath with or without chest discomfort.  Other signs may include breaking out in a cold sweat, nausea, or lightheadedness. Don't wait more than five minutes to call 911 - MINUTES MATTER! Fast action can save your life. Calling 911 is almost always the fastest way to get lifesaving treatment. Emergency Medical Services staff can begin treatment when they arrive -- up to an hour sooner than if someone gets to the hospital by car. The discharge information has been reviewed with the patient. The patient verbalized understanding. Discharge medications reviewed with the patient and appropriate educational materials and side effects teaching were provided. ___________________________________________________________________________________________________________________________________  Pneumonia: Care Instructions  Your Care Instructions    Pneumonia is an infection of the lungs.  Most cases are caused by infections from bacteria or viruses. Pneumonia may be mild or very severe. If it is caused by bacteria, you will be treated with antibiotics. It may take a few weeks to a few months to recover fully from pneumonia, depending on how sick you were and whether your overall health is good. Follow-up care is a key part of your treatment and safety. Be sure to make and go to all appointments, and call your doctor if you are having problems. It's also a good idea to know your test results and keep a list of the medicines you take. How can you care for yourself at home? · Take your antibiotics exactly as directed. Do not stop taking the medicine just because you are feeling better. You need to take the full course of antibiotics. · Take your medicines exactly as prescribed. Call your doctor if you think you are having a problem with your medicine. · Get plenty of rest and sleep. You may feel weak and tired for a while, but your energy level will improve with time. · To prevent dehydration, drink plenty of fluids, enough so that your urine is light yellow or clear like water. Choose water and other caffeine-free clear liquids until you feel better. If you have kidney, heart, or liver disease and have to limit fluids, talk with your doctor before you increase the amount of fluids you drink. · Take care of your cough so you can rest. A cough that brings up mucus from your lungs is common with pneumonia. It is one way your body gets rid of the infection. But if coughing keeps you from resting or causes severe fatigue and chest-wall pain, talk to your doctor. He or she may suggest that you take a medicine to reduce the cough. · Use a vaporizer or humidifier to add moisture to your bedroom. Follow the directions for cleaning the machine. · Do not smoke or allow others to smoke around you. Smoke will make your cough last longer. If you need help quitting, talk to your doctor about stop-smoking programs and medicines.  These can increase your chances of quitting for good. · Take an over-the-counter pain medicine, such as acetaminophen (Tylenol), ibuprofen (Advil, Motrin), or naproxen (Aleve). Read and follow all instructions on the label. · Do not take two or more pain medicines at the same time unless the doctor told you to. Many pain medicines have acetaminophen, which is Tylenol. Too much acetaminophen (Tylenol) can be harmful. · If you were given a spirometer to measure how well your lungs are working, use it as instructed. This can help your doctor tell how your recovery is going. · To prevent pneumonia in the future, talk to your doctor about getting a flu vaccine (once a year) and a pneumococcal vaccine (one time only for most people). When should you call for help? Call 911 anytime you think you may need emergency care. For example, call if:  ? · You have severe trouble breathing. ?Call your doctor now or seek immediate medical care if:  ? · You cough up dark brown or bloody mucus (sputum). ? · You have new or worse trouble breathing. ? · You are dizzy or lightheaded, or you feel like you may faint. ? Watch closely for changes in your health, and be sure to contact your doctor if:  ? · You have a new or higher fever. ? · You are coughing more deeply or more often. ? · You are not getting better after 2 days (48 hours). ? · You do not get better as expected. Where can you learn more? Go to http://era-fransisco.info/. Enter [id number] in the search box to learn more about \"Pneumonia: Care Instructions. \"  Current as of: May 12, 2017  Content Version: 11.4  © 0982-2168 [employer]. Care instructions adapted under license by [employer] (which disclaims liability or warranty for this information).  If you have questions about a medical condition or this instruction, always ask your healthcare professional. [address] any warranty or liability for your use of this information.

## 2017-12-06 NOTE — DISCHARGE SUMMARY
DISCHARGE NOTE    Sergio Wagner  Admission date:  12/3/2017  Discharge date:  12/6/2017    Admitting Diagnosis:  HCAP (healthcare-associated pneumonia)    Discharge Diagnoses:    Hospital Problems  Date Reviewed: 12/3/2017          Codes Class Noted POA    Sepsis ([facility] ICD-10-CM: A41.9  ICD-9-CM: 038.9, 995.91  12/3/2017 Yes        Fever ICD-10-CM: R50.9  ICD-9-CM: 780.60  12/3/2017 Yes        Tachycardia ICD-10-CM: R00.0  ICD-9-CM: 785.0  12/3/2017 Yes        Chronic anticoagulation (Chronic) ICD-10-CM: Z79.01  ICD-9-CM: V58.61  12/3/2017 Yes        Vocal cord paralysis (Chronic) ICD-10-CM: J38.00  ICD-9-CM: 478.30  12/3/2017 Yes        Fibromyalgia (Chronic) ICD-10-CM: M79.7  ICD-9-CM: 729.1  11/1/2017 Yes        Morbid obesity - sedentary lifestyle (Chronic) ICD-10-CM: E66.01  ICD-9-CM: 278.01  11/1/2017 Yes        Polypharmacy (Chronic) ICD-10-CM: L52.657  ICD-9-CM: V58.69  11/1/2017 Yes        Chronic respiratory failure (HCC) (Chronic) ICD-10-CM: J96.10  ICD-9-CM: 518.83  11/1/2017 Yes        Restrictive lung disease (Chronic) ICD-10-CM: J98.4  ICD-9-CM: 518.89  11/1/2017 Yes        * (Principal)HCAP (healthcare-associated pneumonia) ICD-10-CM: J18.9  ICD-9-CM: 107  11/1/2017 Yes        PATRICIA (obstructive sleep apnea) (Chronic) ICD-10-CM: G97.41  ICD-9-CM: 327.23  4/10/2017 Yes    Overview Signed 4/10/2017 10:06 PM by Jeanine Mayorga MD     Probable. 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 Bilateral pulmonary opacities which are favored to represent infiltrates at  this time. Given the bilateral distribution, these may represent pulmonary edema  or infiltrates from an atypical pneumonia.  Given the masslike appearance of the  left-sided suspected infiltrate, it would be recommended that a follow-up chest  x-ray be performed to ensure complete resolution of this finding. Condition on Discharge:  Stable     Disposition:  Home       Presenting Illness: Patient is a 78 y.o.  female presents with rigors. Patient has a history of bipolar disorder, chronic narcotic use / drug abuse (amphetamines/benzos/opiates), fibromyalgia, GERD, PUD, hx PE on chronic anticoagulation, HTN, hypothyroid, morbid obesity, PATRICIA, L vocal cord paralysis, Asthma, restrictive lung disease, chronic respiratory failure maintained on o2 at 2 lpm/BID nebulizer/mucinex. Patient has had multiple hospitalizations this year, most recently 11/1-11/6 for sepsis and completed a course of abx while inpatient. IgA normal at 128, IgE < 1 and IgG low 492. She was seen in our office in follow up and continued her steroid taper, brio, BID nebulized albuterol, atrovent nasal spray, and Singulair. She completed an outpatient sleep study with letter stating PPV was not ordered.       Patient states that she was in her usual state of health yesterday. She woke this am and was cold and progressed to rigors and so came to the hospital for further evaluation. She denies fevers, increased sob, mucus production, increased reflux, chest pain, palpitations, or increased LE swelling. She denies any recent sick contacts. In the ER she was febrile to 102.5, tachycardic with HR persistently in the 130s, WBC 10.0 and PCT 0.8. Hospital course: She was admitted and started on ABX- Vancomycin and Maxipime. She had swallowing test which was normal. It was felt that she takes too much sedating medications and aspirating while somnolent. She was changed to Po levaquin ,weaned off oxygen and stable for discharge to finish 7 day course of ABX.       Physical Exam:   Constitution:  the patient is well developed and in no acute distress  EENMT:  Sclera clear, pupils equal, oral mucosa moist  Respiratory: coarsde  Cardiovascular:  RRR without M,G,R  Gastrointestinal: soft and non-tender; with positive bowel sounds. Musculoskeletal: warm without cyanosis. There is no lower leg edema. Skin:  no jaundice or rashes, no3 wounds   Neurologic: no gross neuro deficits     Psychiatric:  alert and oriented x 3        LAB  Recent Labs      12/04/17   0430   WBC  17.1*   HGB  8.9*   HCT  30.4*   PLT  274     Recent Labs      12/04/17   0430   NA  147*   K  4.1   CL  114*   CO2  22   BUN  14   CREA  0.89   CA  7.9*     No results for input(s): PH, PCO2, PO2, HCO3 in the last 72 hours. Discharge Medications:   Current Discharge Medication List      START taking these medications    Details   levoFLOXacin (LEVAQUIN) 750 mg tablet Take 1 Tab by mouth every twenty-four (24) hours for 4 days. Qty: 4 Tab, Refills: 0         CONTINUE these medications which have NOT CHANGED    Details   albuterol (PROVENTIL VENTOLIN) 2.5 mg /3 mL (0.083 %) nebulizer solution 3 mL by Nebulization route four (4) times daily. And every 4 hours as needed cough, wheezing, shortness of breath, J45.909, Medicare part B  Qty: 120 Each, Refills: 0      furosemide (LASIX) 40 mg tablet Take 1 Tab by mouth daily as needed. Qty: 20 Tab, Refills: 0      amphetamine-dextroamphetamine XR (ADDERALL XR) 20 mg XR capsule Take 20 mg by mouth daily. albuterol (PROAIR HFA) 90 mcg/actuation inhaler Take 2 Puffs by inhalation every four (4) hours as needed for Wheezing. OXYGEN-AIR DELIVERY SYSTEMS 2 lpm cont. fluticasone-vilanterol (BREO ELLIPTA) 200-25 mcg/dose inhaler Take 1 Puff by inhalation daily. RINSE MOUTH WELL AFTER USE  Qty: 3 Inhaler, Refills: 3      albuterol sulfate (PROVENTIL;VENTOLIN) 2.5 mg/0.5 mL nebu nebulizer solution 0.5 mL by Nebulization route every four (4) hours. Dx J45.909  Qty: 180 mL, Refills: 4    Comments: DX: 799.02 hypoxemia, 518.83 chronic respitory failure      nystatin (MYCOSTATIN) powder Apply  to affected area two (2) times a day.   Qty: 1 Bottle, Refills: 0      sucralfate (CARAFATE) 1 gram tablet Take 1 g by mouth four (4) times daily. Biotin 2,500 mcg cap Take  by mouth. UBIDECARENONE/VITAMIN E MIXED (COQ10  PO) Take  by mouth. ipratropium (ATROVENT) 0.03 % nasal spray 2 Sprays every twelve (12) hours. pregabalin (LYRICA) 150 mg capsule Take  by mouth two (2) times a day. montelukast (SINGULAIR) 10 mg tablet Take 10 mg by mouth daily. naloxegol (MOVANTIK) 25 mg tab tablet Take  by mouth Daily (before breakfast). carvedilol (COREG) 12.5 mg tablet Take 12.5 mg by mouth two (2) times daily (with meals). apixaban (ELIQUIS) 2.5 mg tablet Take 2.5 mg by mouth two (2) times a day. Indications: PULMONARY THROMBOEMBOLISM PREVENTION      atorvastatin (LIPITOR) 10 mg tablet Take 2 Tabs by mouth nightly. Qty: 30 Tab, Refills: 11      ALPRAZolam (XANAX) 2 mg tablet Take 1 Tab by mouth three (3) times daily as needed for Anxiety. Max Daily Amount: 6 mg. Qty: 30 Tab, Refills: 0      mirtazapine (REMERON) 30 mg tablet Take 45 mg by mouth nightly. ondansetron (ZOFRAN ODT) 8 mg disintegrating tablet Take 8 mg by mouth every eight (8) hours as needed for Nausea. oxybutynin chloride XL (DITROPAN XL) 10 mg CR tablet Take 10 mg by mouth daily. Magnesium Oxide 500 mg cap Take 500 mg by mouth.      lisinopril (PRINIVIL, ZESTRIL) 40 mg tablet Take 1 Tab by mouth daily. Qty: 30 Tab, Refills: 5      HYDROcodone-acetaminophen (NORCO)  mg tablet Take 1 Tab by mouth every six (6) hours as needed for Pain. POTASSIUM CHLORIDE SR 10 MEQ TAB Take 1 Tab by mouth daily. Cholecalciferol, Vitamin D3, (VITAMIN D3) 1,000 unit cap Take  by mouth. folic acid 075 mcg tablet Take 800 mcg by mouth daily. nicotinic acid (NIACIN) 500 mg tablet Take 500 mg by mouth Daily (before breakfast). pantoprazole (PROTONIX) 40 mg tablet Take 1 Tab by mouth Before breakfast and dinner.   Qty: 60 Tab, Refills: 2      FERROUS FUMARATE/VIT BCOMP&C (SUPER B COMPLEX PO) Take 1 Tab by mouth daily. NEBULIZER by Does Not Apply route. ziprasidone (GEODON) 80 mg capsule Take 80 mg by mouth nightly. SUMATRIPTAN SUCCINATE (IMITREX PO) take 100 mg by mouth as needed. levothyroxine (SYNTHROID) 125 mcg tablet Take 112 mcg by mouth every evening. Condition on Discharge:  Stable      Followup/Outpt Studies:  --Follow up appointment with [facility] in 2 weeks   --Total discharge greater than 30 minutes in duration. More than 50% of the time documented was spent in face-to-face contact with the patient and in the care of the patient on the floor/unit where the patient is located.     Zelda Barrett MD

## 2017-12-08 LAB
BACTERIA SPEC CULT: NORMAL
SERVICE CMNT-IMP: NORMAL

## 2017-12-12 NOTE — PROGRESS NOTES
Chronic Pain - Established Visit    Referring Physician: No ref. provider found    Chief Complaint:   Chief Complaint   Patient presents with    Hand Pain     bilateral        SUBJECTIVE: Disclaimer: This note has been generated using voice-recognition software. There may be typographical errors that have been missed during proof-reading    Interval History 12/12/2017:  The patient returns today for follow up of neck, back and hand pain. She did have some benefit with PT and is now performing home exercises.  She also reports some benefit with Celebrex 100 mg BID, without adverse effects.  She is having muscle pain and spasms across the neck.  She believes that Zanaflex helped with this in the past.  Her pain today is 8/10.  The patient denies any bowel or bladder incontinence or signs of saddle paresthesia.  The patient denies any major medical changes since last office visit.    Initial encounter:    Aminah DeAnthonyelizabeth is a 73 y.o. female with a pmhx of DM2, HLD(on statin therapy) HTN and GERD who presents to the clinic for the evaluation of diffuse back, neck, arm , leg  pain. The pain started 1 month ago and symptoms have been worsening. She associates her symptoms with gardening. Her daughter is accompanying her and serves as her . Patient describes pain as burning tingling sensation that is made worse with bending over. At onset of pain she presented to hospital where she received bilateral steroid injections to the shoulders. This resulted in pain relief for 1 month. She is currently taking gabapentin 300mg daily and tramadol. She reports no pain relief. EMG to upper extremities did not reveal any concerning findings. Labs: CK, ESR, and c- reactive protein are within normal limits.      Brief history:    Pain Description:    The pain is located in the back area and radiates to the neck, arms and legs.      At BEST  9/10      At WORST  9/10 on the WORST day.      On average pain is  Pt in room alert and oriented. rr are even and unlabored lung sounds diminished. Pt has stated she did not sleep well last pm. Pt states she hurts in her back chest and breast area and she asked for pain meds. Pt is stable. Pt is able to ambulate with assistance. No increased bleeding or bruising noted. Safety measures in place will continue to monitor. rated as 9/10.     Today the pain is rated as 9/10    The pain is described as aching, burning and tingling      Symptoms interfere with daily activity and sleeping.     Exacerbating factors: Bending and Night Time.      Mitigating factors nothing.     Patient denies .  Patient denies any suicidal or homicidal ideations    Pain Medications:  Current:  Gabapentin  Ultram  Celebrex 100 mg BID    Tried in Past:  NSAIDs -Never  TCA -Never  SNRI -Never  Anti-convulsants -Never  Muscle Relaxants -Never  Opioids-Never    Physical Therapy/Home Exercise: yes       report:  Reviewed and consistent with medication use as prescribed.    Pain Procedures: steroid injection to bilateral shoulders    Chiropractor -never  Acupuncture - never  TENS unit -never  Spinal decompression -never  Joint replacement -never    Imaging:   XRAY L SPINE 05/16/2017  Narrative     Technique: AP and lateral views of the lumbar spine    Comparison: 07/10/2015    Results:Continued grade 2 anterolisthesis of L5 on S1. The lumbar vertebral heights and contours are relatively maintained without evidence for acute fracture. Further evaluation as warrented clinically.   Impression      See Above .      Electronically signed by: SELWYN LOVE DO  Date: 05/16/17  Time: 13:17          View Encounter             Past Medical History:   Diagnosis Date    Arthritis     Cataract     left eye    Choroidal rupture of left eye     Diabetes mellitus type II     GERD (gastroesophageal reflux disease)     Hyperlipidemia     Hypertension     Hypothyroidism     Macular scar     right eye    Retinal degeneration     chorioretinal OD    Thyroid disease     Vitamin B 12 deficiency      Past Surgical History:   Procedure Laterality Date    BLADDER SUSPENSION      CATARACT EXTRACTION      right eye     CHOLECYSTECTOMY      TOTAL ABDOMINAL HYSTERECTOMY      DUB     Social History     Social History    Marital status:      Spouse name: N/A    Number  of children: N/A    Years of education: N/A     Occupational History    Not on file.     Social History Main Topics    Smoking status: Never Smoker    Smokeless tobacco: Never Used    Alcohol use No    Drug use: No    Sexual activity: Yes     Partners: Male     Birth control/ protection: Post-menopausal      Comment:       Other Topics Concern    Are You Pregnant Or Think You May Be? No    Breast-Feeding No     Social History Narrative     with 7 kids     Family History   Problem Relation Age of Onset    Cancer Mother      uterine    COPD Father     Cancer Sister      liver    Hypertension Daughter     Thyroid disease Daughter     Diabetes Son     Hypertension Sister     Hypertension Sister     Cancer Daughter      uterine    Thyroid disease Daughter     Hypertension Daughter     Breast cancer Neg Hx     Colon cancer Neg Hx     Amblyopia Neg Hx     Blindness Neg Hx     Cataracts Neg Hx     Glaucoma Neg Hx     Macular degeneration Neg Hx     Retinal detachment Neg Hx     Strabismus Neg Hx     Stroke Neg Hx        Review of patient's allergies indicates:   Allergen Reactions    Bufferin [aspirin, buffered] Itching    Ibuprofen Itching     Itching of eyes, head       Current Outpatient Prescriptions   Medication Sig    alcohol swabs (ALCOHOL PADS) PadM Apply 1 each topically as needed.    aspirin (ECOTRIN) 81 MG EC tablet Take 81 mg by mouth once daily.    atorvastatin (LIPITOR) 40 MG tablet Take 1 tablet (40 mg total) by mouth once daily.    biotin 1 mg tablet Take 1,000 mcg by mouth 3 (three) times daily.    blood sugar diagnostic Strp 1 strip by Misc.(Non-Drug; Combo Route) route 3 (three) times daily. accu-chek MATTIE plus test strip    blood-glucose meter Misc Accu Check Sahra Smartview meter    carvedilol (COREG) 12.5 MG tablet Take 1 tablet (12.5 mg total) by mouth 2 (two) times daily.    celecoxib (CELEBREX) 100 MG capsule Take 1 a day for 7 days, then increase to  BID if not helping    clotrimazole-betamethasone 1-0.05% (LOTRISONE) cream Apply topically 2 (two) times daily.    econazole nitrate 1 % cream AAA bid after cool blow dry when flared    gabapentin (NEURONTIN) 300 MG capsule Take 1 capsule (300 mg total) by mouth every evening.    lancets (ACCU-CHEK MULTICLIX LANCET) Misc To use as directed with Accu Chek Sahra FastClix lancing device    levothyroxine (SYNTHROID) 112 MCG tablet Take 1 tablet (112 mcg total) by mouth before breakfast.    lisinopril (PRINIVIL,ZESTRIL) 40 MG tablet Take 1 tablet (40 mg total) by mouth once daily.    metformin (GLUCOPHAGE) 1000 MG tablet Take 1 tablet (1,000 mg total) by mouth 2 (two) times daily with meals.    polyethylene glycol (GLYCOLAX) 17 gram PwPk Take 17 g by mouth once daily.    tamsulosin (FLOMAX) 0.4 mg Cp24 Take 1 capsule (0.4 mg total) by mouth once daily.    tramadol (ULTRAM) 50 mg tablet Take 1 tablet (50 mg total) by mouth every 6 (six) hours as needed.    conjugated estrogens (PREMARIN) vaginal cream Place 0.5 g vaginally 3 (three) times a week.    fluocinonide (LIDEX) 0.05 % external solution Apply topically 2 (two) times daily.     No current facility-administered medications for this visit.      REVIEW OF SYSTEMS:    GENERAL:  No weight loss, malaise or fevers.  HEENT:   No recent changes in vision or hearing  NECK:  Negative for lumps, no difficulty with swallowing.  RESPIRATORY:  Negative for cough, wheezing or shortness of breath, patient denies any recent URI.  CARDIOVASCULAR:  Negative for chest pain, leg swelling or palpitations.  GI:  Negative for abdominal discomfort, blood in stools or black stools or change in bowel habits.  MUSCULOSKELETAL:  See HPI.  SKIN:  Negative for lesions, rash, and itching.  PSYCH:  No mood disorder or recent psychosocial stressors.  Patient's sleep is disturbed secondary to pain.  HEMATOLOGY/LYMPHOLOGY:  Negative for prolonged bleeding, bruising easily or swollen nodes.   "Patient is not currently taking any anti-coagulants  ENDO: No history of diabetes or thyroid dysfunction  NEURO:   No history of headaches, syncope, paralysis, seizures or tremors.  All other reviewed and negative other than HPI.    OBJECTIVE:    /63   Pulse 76   Temp 97.4 °F (36.3 °C) (Oral)   Resp 20   Ht 5' 5" (1.651 m)   Wt 73 kg (160 lb 14.4 oz)   BMI 26.78 kg/m²     PHYSICAL EXAMINATION:    GENERAL: Well appearing, in no acute distress, alert and oriented x3.  PSYCH:  Mood and affect appropriate.  SKIN: Skin color, texture, turgor normal, no rashes or lesions.  HEAD/FACE:  Normocephalic, atraumatic. Cranial nerves grossly intact.  NECK:TTP over the cervical paraspinous muscles. Spurling Negative. Pain with lateral rotation.  Negative facet loading.  PULM: No evidence of respiratory difficulty, symmetric chest rise.  BACK: Positive straight leg raising in the supine position bilaterally. TTP over lumbar spine and spinous processes. Pain with extension.  Positive facet loading bilaterally.  EXTREMITIES: Peripheral joint ROM is full and pain free without obvious instability or laxity in all four extremities. No deformities, edema, or skin discoloration. Good capillary refill.  MUSCULOSKELETAL: Shoulder, hip, and knee provocative maneuvers are negative.  TTP over SI joints bilaterally.  FABERs test is negative.  FADIRs test is negative.   Bilateral upper and lower extremity strength is normal and symmetric.  No atrophy or tone abnormalities are noted.  NEURO: Bilateral upper and lower extremity coordination and muscle stretch reflexes are physiologic and symmetric.  Plantar response are downgoing. No clonus.  No loss of sensation is noted.  GAIT: Normal.    Lab Results   Component Value Date    HGBA1C 7.6 (H) 11/29/2016     Lab Results   Component Value Date    WBC 3.69 (L) 11/21/2016    HGB 11.1 (L) 11/21/2016    HCT 34.2 (L) 11/21/2016    MCV 78 (L) 11/21/2016     11/21/2016     Lab Results "   Component Value Date    CREATININE 0.8 11/21/2016     ASSESSMENT: 73 y.o. year old female with pain, consistent with diffuse myalgia.    Encounter Diagnoses   Name Primary?    Spondylolisthesis, unspecified spinal region Yes    Chronic bilateral low back pain without sciatica     Chronic pain of both shoulders     Myalgia       PLAN:     - PCP is holding statin at this time.    - Previous imaging was reviewed and discussed with the patient today.    - She will continue home exercise regimen.    - Will increase Celebrex to 200 mg BID.  Labs reviewed today.    - Will restart Zanaflex 4 mg BID muscle pain and spasms.    - RTC in 6-8 weeks or sooner if needed.    - Dr. Springer was consulted on the patient and agrees with this plan.      The above plan and management options were discussed at length with patient. Patient is in agreement with the above and verbalized understanding.     CHANDRIKA Todd  12/12/2017

## 2017-12-14 LAB
BACTERIA SPEC CULT: ABNORMAL
GRAM STN SPEC: ABNORMAL
SERVICE CMNT-IMP: ABNORMAL

## 2017-12-18 LAB
Lab: NORMAL
REFERENCE LAB,REFLB: NORMAL
TEST DESCRIPTION:,ATST: NORMAL

## 2017-12-20 ENCOUNTER — PATIENT OUTREACH (OUTPATIENT)
Dept: CASE MANAGEMENT | Age: 67
End: 2017-12-20

## 2017-12-28 ENCOUNTER — PATIENT OUTREACH (OUTPATIENT)
Dept: CASE MANAGEMENT | Age: 67
End: 2017-12-28

## 2018-01-16 PROBLEM — R94.31 QT PROLONGATION: Chronic | Status: RESOLVED | Noted: 2017-04-10 | Resolved: 2018-01-16

## 2018-01-16 PROBLEM — G47.34 NOCTURNAL HYPOXEMIA: Chronic | Status: ACTIVE | Noted: 2018-01-16

## 2018-01-16 PROBLEM — G47.10 HYPERSOMNOLENCE: Chronic | Status: ACTIVE | Noted: 2017-04-10

## 2018-01-16 PROBLEM — J18.9 HCAP (HEALTHCARE-ASSOCIATED PNEUMONIA): Status: RESOLVED | Noted: 2017-11-01 | Resolved: 2018-01-16

## 2018-01-16 PROBLEM — J96.10 CHRONIC RESPIRATORY FAILURE (HCC): Chronic | Status: RESOLVED | Noted: 2017-11-01 | Resolved: 2018-01-16

## 2018-01-16 PROBLEM — A41.9 SEPSIS (HCC): Status: RESOLVED | Noted: 2017-12-03 | Resolved: 2018-01-16

## 2018-01-30 ENCOUNTER — HOSPITAL ENCOUNTER (OUTPATIENT)
Dept: LAB | Age: 68
Discharge: HOME | End: 2018-01-30
Payer: MEDICARE

## 2018-01-30 LAB
FLUAV AG NPH QL IA: NEGATIVE
FLUBV AG NPH QL IA: NEGATIVE

## 2018-01-30 PROCEDURE — 87804 INFLUENZA ASSAY W/OPTIC: CPT | Performed by: NURSE PRACTITIONER

## 2018-01-30 NOTE — LETTER
2/7/2018 10:56 AM 
 
Ms. Genet Cosby [address] [facility] [address] Dear Genet Cosby: 
 
Please find your most recent results below. Resulted Orders INFLUENZA A & B AG (RAPID TEST) Result Value Ref Range Influenza A Ag NEGATIVE  NEG Comment:  
   NEGATIVE FOR THE PRESENCE OF INFLUENZA A ANTIGEN 
INFECTION DUE TO INFLUENZA A CANNOT BE RULED OUT. BECAUSE THE ANTIGEN PRESENT IN THE SAMPLE MAY BE BELOW 
THE DETECTION LIMIT OF THE TEST. A NEGATIVE TEST IS PRESUMPTIVE AND IT IS RECOMMENDED THAT THESE RESULTS BE CONFIRMED BY VIRAL CULTURE OR AN FDA-CLEARED INFLUENZA A AND B MOLECULAR ASSAY. Influenza B Ag NEGATIVE  NEG Comment:  
   NEGATIVE FOR THE PRESENCE OF INFLUENZA B ANTIGEN 
INFECTION DUE TO INFLUENZA B CANNOT BE RULED OUT. BECAUSE THE ANTIGEN PRESENT IN THE SAMPLE MAY BE BELOW 
THE DETECTION LIMIT OF THE TEST. A NEGATIVE TEST IS PRESUMPTIVE AND IT IS RECOMMENDED THAT THESE RESULTS BE CONFIRMED BY VIRAL CULTURE OR AN FDA-CLEARED INFLUENZA A AND B MOLECULAR ASSAY. RECOMMENDATIONS: 
flu swab was negative, but can still have the flu.  No change in treatment plan Please call me if you have any questions: [phone] Sincerely, [facility]

## 2018-01-30 NOTE — PROGRESS NOTES
Please let patient know that flu swab was negative, but can still have the flu. No change in treatment plan.

## 2018-04-02 ENCOUNTER — APPOINTMENT (OUTPATIENT)
Dept: GENERAL RADIOLOGY | Age: 68
DRG: 871 | End: 2018-04-02
Attending: EMERGENCY MEDICINE
Payer: MEDICARE

## 2018-04-02 ENCOUNTER — HOSPITAL ENCOUNTER (INPATIENT)
Age: 68
LOS: 4 days | Discharge: HOME | DRG: 871 | End: 2018-04-06
Attending: EMERGENCY MEDICINE | Admitting: INTERNAL MEDICINE
Payer: MEDICARE

## 2018-04-02 DIAGNOSIS — A41.9 SEPSIS DUE TO PNEUMONIA (HCC): ICD-10-CM

## 2018-04-02 DIAGNOSIS — G47.34 NOCTURNAL HYPOXEMIA: Chronic | ICD-10-CM

## 2018-04-02 DIAGNOSIS — F19.10 DRUG ABUSE (HCC): Chronic | ICD-10-CM

## 2018-04-02 DIAGNOSIS — J15.9 COMMUNITY ACQUIRED BACTERIAL PNEUMONIA: ICD-10-CM

## 2018-04-02 DIAGNOSIS — J45.40 MODERATE PERSISTENT ASTHMA WITHOUT COMPLICATION: Chronic | ICD-10-CM

## 2018-04-02 DIAGNOSIS — Z86.711 HX OF PULMONARY EMBOLUS: Chronic | ICD-10-CM

## 2018-04-02 DIAGNOSIS — E66.01 MORBID OBESITY (HCC): Chronic | ICD-10-CM

## 2018-04-02 DIAGNOSIS — Z79.01 CHRONIC ANTICOAGULATION: Chronic | ICD-10-CM

## 2018-04-02 DIAGNOSIS — J98.4 RESTRICTIVE LUNG DISEASE: Chronic | ICD-10-CM

## 2018-04-02 DIAGNOSIS — F31.70 BIPOLAR AFFECTIVE DISORDER IN REMISSION (HCC): Chronic | ICD-10-CM

## 2018-04-02 DIAGNOSIS — R53.81 DEBILITY: Chronic | ICD-10-CM

## 2018-04-02 DIAGNOSIS — J18.9 SEPSIS DUE TO PNEUMONIA (HCC): ICD-10-CM

## 2018-04-02 DIAGNOSIS — Z79.899 POLYPHARMACY: Chronic | ICD-10-CM

## 2018-04-02 DIAGNOSIS — R50.9 ACUTE FEBRILE ILLNESS: Primary | ICD-10-CM

## 2018-04-02 DIAGNOSIS — R06.02 SHORTNESS OF BREATH: ICD-10-CM

## 2018-04-02 LAB
ALBUMIN SERPL-MCNC: 2.9 G/DL (ref 3.2–4.6)
ALBUMIN/GLOB SERPL: 0.7 {RATIO} (ref 1.2–3.5)
ALP SERPL-CCNC: 135 U/L (ref 50–136)
ALT SERPL-CCNC: 16 U/L (ref 12–65)
ANION GAP SERPL CALC-SCNC: 6 MMOL/L (ref 7–16)
AST SERPL-CCNC: 22 U/L (ref 15–37)
ATRIAL RATE: 110 BPM
ATRIAL RATE: 91 BPM
BASOPHILS # BLD: 0 K/UL (ref 0–0.2)
BASOPHILS NFR BLD: 0 % (ref 0–2)
BILIRUB SERPL-MCNC: 0.3 MG/DL (ref 0.2–1.1)
BUN SERPL-MCNC: 14 MG/DL (ref 8–23)
CALCIUM SERPL-MCNC: 9.5 MG/DL (ref 8.3–10.4)
CALCULATED P AXIS, ECG09: 43 DEGREES
CALCULATED P AXIS, ECG09: 64 DEGREES
CALCULATED R AXIS, ECG10: -23 DEGREES
CALCULATED R AXIS, ECG10: -28 DEGREES
CALCULATED T AXIS, ECG11: 20 DEGREES
CALCULATED T AXIS, ECG11: 24 DEGREES
CHLORIDE SERPL-SCNC: 100 MMOL/L (ref 98–107)
CO2 SERPL-SCNC: 38 MMOL/L (ref 21–32)
CREAT SERPL-MCNC: 1.33 MG/DL (ref 0.6–1)
DIAGNOSIS, 93000: NORMAL
DIAGNOSIS, 93000: NORMAL
DIFFERENTIAL METHOD BLD: ABNORMAL
EOSINOPHIL # BLD: 0.6 K/UL (ref 0–0.8)
EOSINOPHIL NFR BLD: 4 % (ref 0.5–7.8)
ERYTHROCYTE [DISTWIDTH] IN BLOOD BY AUTOMATED COUNT: 18.8 % (ref 11.9–14.6)
FLUAV AG NPH QL IA: NEGATIVE
FLUBV AG NPH QL IA: NEGATIVE
GLOBULIN SER CALC-MCNC: 4.2 G/DL (ref 2.3–3.5)
GLUCOSE SERPL-MCNC: 114 MG/DL (ref 65–100)
HCT VFR BLD AUTO: 37.7 % (ref 35.8–46.3)
HGB BLD-MCNC: 11.5 G/DL (ref 11.7–15.4)
IMM GRANULOCYTES # BLD: 0 K/UL (ref 0–0.5)
IMM GRANULOCYTES NFR BLD AUTO: 0 % (ref 0–5)
LACTATE BLD-SCNC: 1.4 MMOL/L (ref 0.5–1.9)
LYMPHOCYTES # BLD: 1.8 K/UL (ref 0.5–4.6)
LYMPHOCYTES NFR BLD: 12 % (ref 13–44)
MCH RBC QN AUTO: 25.5 PG (ref 26.1–32.9)
MCHC RBC AUTO-ENTMCNC: 30.5 G/DL (ref 31.4–35)
MCV RBC AUTO: 83.6 FL (ref 79.6–97.8)
MONOCYTES # BLD: 1.2 K/UL (ref 0.1–1.3)
MONOCYTES NFR BLD: 8 % (ref 4–12)
NEUTS SEG # BLD: 11.7 K/UL (ref 1.7–8.2)
NEUTS SEG NFR BLD: 76 % (ref 43–78)
P-R INTERVAL, ECG05: 160 MS
P-R INTERVAL, ECG05: 196 MS
PLATELET # BLD AUTO: 311 K/UL (ref 150–450)
PMV BLD AUTO: 10.2 FL (ref 10.8–14.1)
POTASSIUM SERPL-SCNC: 3.8 MMOL/L (ref 3.5–5.1)
PROCALCITONIN SERPL-MCNC: 0.2 NG/ML
PROT SERPL-MCNC: 7.1 G/DL (ref 6.3–8.2)
Q-T INTERVAL, ECG07: 304 MS
Q-T INTERVAL, ECG07: 326 MS
QRS DURATION, ECG06: 72 MS
QRS DURATION, ECG06: 76 MS
QTC CALCULATION (BEZET), ECG08: 400 MS
QTC CALCULATION (BEZET), ECG08: 409 MS
RBC # BLD AUTO: 4.51 M/UL (ref 4.05–5.25)
SODIUM SERPL-SCNC: 144 MMOL/L (ref 136–145)
TROPONIN I BLD-MCNC: 0 NG/ML (ref 0.02–0.05)
VENTRICULAR RATE, ECG03: 109 BPM
VENTRICULAR RATE, ECG03: 91 BPM
WBC # BLD AUTO: 15.3 K/UL (ref 4.3–11.1)

## 2018-04-02 PROCEDURE — 74011250637 HC RX REV CODE- 250/637: Performed by: INTERNAL MEDICINE

## 2018-04-02 PROCEDURE — 85025 COMPLETE CBC W/AUTO DIFF WBC: CPT | Performed by: EMERGENCY MEDICINE

## 2018-04-02 PROCEDURE — 71045 X-RAY EXAM CHEST 1 VIEW: CPT

## 2018-04-02 PROCEDURE — 84145 PROCALCITONIN (PCT): CPT | Performed by: EMERGENCY MEDICINE

## 2018-04-02 PROCEDURE — 94760 N-INVAS EAR/PLS OXIMETRY 1: CPT

## 2018-04-02 PROCEDURE — 74011250636 HC RX REV CODE- 250/636: Performed by: EMERGENCY MEDICINE

## 2018-04-02 PROCEDURE — 83605 ASSAY OF LACTIC ACID: CPT

## 2018-04-02 PROCEDURE — 94640 AIRWAY INHALATION TREATMENT: CPT

## 2018-04-02 PROCEDURE — 74011250637 HC RX REV CODE- 250/637: Performed by: EMERGENCY MEDICINE

## 2018-04-02 PROCEDURE — 87804 INFLUENZA ASSAY W/OPTIC: CPT | Performed by: EMERGENCY MEDICINE

## 2018-04-02 PROCEDURE — 80053 COMPREHEN METABOLIC PANEL: CPT | Performed by: EMERGENCY MEDICINE

## 2018-04-02 PROCEDURE — 96365 THER/PROPH/DIAG IV INF INIT: CPT | Performed by: EMERGENCY MEDICINE

## 2018-04-02 PROCEDURE — 77010033711 HC HIGH FLOW OXYGEN

## 2018-04-02 PROCEDURE — 99285 EMERGENCY DEPT VISIT HI MDM: CPT | Performed by: EMERGENCY MEDICINE

## 2018-04-02 PROCEDURE — 65270000029 HC RM PRIVATE

## 2018-04-02 PROCEDURE — 74011000250 HC RX REV CODE- 250: Performed by: INTERNAL MEDICINE

## 2018-04-02 PROCEDURE — 99223 1ST HOSP IP/OBS HIGH 75: CPT | Performed by: INTERNAL MEDICINE

## 2018-04-02 PROCEDURE — 93005 ELECTROCARDIOGRAM TRACING: CPT | Performed by: EMERGENCY MEDICINE

## 2018-04-02 PROCEDURE — 96368 THER/DIAG CONCURRENT INF: CPT | Performed by: EMERGENCY MEDICINE

## 2018-04-02 PROCEDURE — 74011000258 HC RX REV CODE- 258: Performed by: EMERGENCY MEDICINE

## 2018-04-02 PROCEDURE — 87040 BLOOD CULTURE FOR BACTERIA: CPT | Performed by: EMERGENCY MEDICINE

## 2018-04-02 PROCEDURE — 77010033678 HC OXYGEN DAILY

## 2018-04-02 PROCEDURE — 84484 ASSAY OF TROPONIN QUANT: CPT

## 2018-04-02 RX ORDER — PANTOPRAZOLE SODIUM 40 MG/1
40 TABLET, DELAYED RELEASE ORAL
Status: DISCONTINUED | OUTPATIENT
Start: 2018-04-02 | End: 2018-04-06 | Stop reason: HOSPADM

## 2018-04-02 RX ORDER — SUCRALFATE 1 G/1
1 TABLET ORAL 4 TIMES DAILY
Status: DISCONTINUED | OUTPATIENT
Start: 2018-04-02 | End: 2018-04-06 | Stop reason: HOSPADM

## 2018-04-02 RX ORDER — BUDESONIDE 0.5 MG/2ML
500 INHALANT ORAL
Status: DISCONTINUED | OUTPATIENT
Start: 2018-04-02 | End: 2018-04-06 | Stop reason: HOSPADM

## 2018-04-02 RX ORDER — SODIUM CHLORIDE 0.9 % (FLUSH) 0.9 %
5-10 SYRINGE (ML) INJECTION AS NEEDED
Status: DISCONTINUED | OUTPATIENT
Start: 2018-04-02 | End: 2018-04-06 | Stop reason: HOSPADM

## 2018-04-02 RX ORDER — DIPHENHYDRAMINE HYDROCHLORIDE 50 MG/ML
12.5 INJECTION, SOLUTION INTRAMUSCULAR; INTRAVENOUS
Status: DISCONTINUED | OUTPATIENT
Start: 2018-04-02 | End: 2018-04-06 | Stop reason: HOSPADM

## 2018-04-02 RX ORDER — OXYBUTYNIN CHLORIDE 5 MG/1
5 TABLET ORAL 2 TIMES DAILY
Status: DISCONTINUED | OUTPATIENT
Start: 2018-04-02 | End: 2018-04-06 | Stop reason: HOSPADM

## 2018-04-02 RX ORDER — ZIPRASIDONE HYDROCHLORIDE 20 MG/1
80 CAPSULE ORAL
Status: DISCONTINUED | OUTPATIENT
Start: 2018-04-02 | End: 2018-04-06 | Stop reason: HOSPADM

## 2018-04-02 RX ORDER — NALOXONE HYDROCHLORIDE 0.4 MG/ML
0.4 INJECTION, SOLUTION INTRAMUSCULAR; INTRAVENOUS; SUBCUTANEOUS AS NEEDED
Status: DISCONTINUED | OUTPATIENT
Start: 2018-04-02 | End: 2018-04-06 | Stop reason: HOSPADM

## 2018-04-02 RX ORDER — PREGABALIN 150 MG/1
150 CAPSULE ORAL 2 TIMES DAILY
Status: DISCONTINUED | OUTPATIENT
Start: 2018-04-02 | End: 2018-04-06 | Stop reason: HOSPADM

## 2018-04-02 RX ORDER — SUMATRIPTAN 50 MG/1
100 TABLET, FILM COATED ORAL
Status: DISCONTINUED | OUTPATIENT
Start: 2018-04-02 | End: 2018-04-06 | Stop reason: HOSPADM

## 2018-04-02 RX ORDER — IPRATROPIUM BROMIDE AND ALBUTEROL SULFATE 2.5; .5 MG/3ML; MG/3ML
3 SOLUTION RESPIRATORY (INHALATION)
Status: DISCONTINUED | OUTPATIENT
Start: 2018-04-02 | End: 2018-04-03

## 2018-04-02 RX ORDER — HYDROCODONE BITARTRATE AND ACETAMINOPHEN 5; 325 MG/1; MG/1
1 TABLET ORAL
Status: DISCONTINUED | OUTPATIENT
Start: 2018-04-02 | End: 2018-04-06 | Stop reason: HOSPADM

## 2018-04-02 RX ORDER — MONTELUKAST SODIUM 10 MG/1
10 TABLET ORAL DAILY
Status: DISCONTINUED | OUTPATIENT
Start: 2018-04-03 | End: 2018-04-06 | Stop reason: HOSPADM

## 2018-04-02 RX ORDER — BISACODYL 5 MG
5 TABLET, DELAYED RELEASE (ENTERIC COATED) ORAL DAILY PRN
Status: DISCONTINUED | OUTPATIENT
Start: 2018-04-02 | End: 2018-04-06 | Stop reason: HOSPADM

## 2018-04-02 RX ORDER — ALPRAZOLAM 0.5 MG/1
2 TABLET ORAL
Status: DISCONTINUED | OUTPATIENT
Start: 2018-04-02 | End: 2018-04-06 | Stop reason: HOSPADM

## 2018-04-02 RX ORDER — ACETAMINOPHEN 500 MG
1000 TABLET ORAL ONCE
Status: COMPLETED | OUTPATIENT
Start: 2018-04-02 | End: 2018-04-02

## 2018-04-02 RX ORDER — ACETAMINOPHEN 325 MG/1
650 TABLET ORAL
Status: DISCONTINUED | OUTPATIENT
Start: 2018-04-02 | End: 2018-04-06 | Stop reason: HOSPADM

## 2018-04-02 RX ORDER — ALBUTEROL SULFATE 2.5 MG/.5ML
2.5 SOLUTION RESPIRATORY (INHALATION)
Status: DISCONTINUED | OUTPATIENT
Start: 2018-04-02 | End: 2018-04-03 | Stop reason: SDUPTHER

## 2018-04-02 RX ORDER — SODIUM CHLORIDE 0.9 % (FLUSH) 0.9 %
5-10 SYRINGE (ML) INJECTION EVERY 8 HOURS
Status: DISCONTINUED | OUTPATIENT
Start: 2018-04-02 | End: 2018-04-06 | Stop reason: HOSPADM

## 2018-04-02 RX ORDER — AZITHROMYCIN 250 MG/1
500 TABLET, FILM COATED ORAL DAILY
Status: DISCONTINUED | OUTPATIENT
Start: 2018-04-03 | End: 2018-04-06

## 2018-04-02 RX ORDER — LEVOFLOXACIN 5 MG/ML
750 INJECTION, SOLUTION INTRAVENOUS EVERY 24 HOURS
Status: DISCONTINUED | OUTPATIENT
Start: 2018-04-02 | End: 2018-04-02

## 2018-04-02 RX ORDER — LEVOTHYROXINE SODIUM 112 UG/1
112 TABLET ORAL EVERY EVENING
Status: DISCONTINUED | OUTPATIENT
Start: 2018-04-02 | End: 2018-04-06 | Stop reason: HOSPADM

## 2018-04-02 RX ORDER — ONDANSETRON 2 MG/ML
4 INJECTION INTRAMUSCULAR; INTRAVENOUS
Status: DISCONTINUED | OUTPATIENT
Start: 2018-04-02 | End: 2018-04-06 | Stop reason: HOSPADM

## 2018-04-02 RX ORDER — ATORVASTATIN CALCIUM 10 MG/1
20 TABLET, FILM COATED ORAL
Status: DISCONTINUED | OUTPATIENT
Start: 2018-04-02 | End: 2018-04-06 | Stop reason: HOSPADM

## 2018-04-02 RX ADMIN — IPRATROPIUM BROMIDE AND ALBUTEROL SULFATE 3 ML: .5; 3 SOLUTION RESPIRATORY (INHALATION) at 16:56

## 2018-04-02 RX ADMIN — ATORVASTATIN CALCIUM 20 MG: 10 TABLET, FILM COATED ORAL at 22:19

## 2018-04-02 RX ADMIN — IPRATROPIUM BROMIDE AND ALBUTEROL SULFATE 3 ML: .5; 3 SOLUTION RESPIRATORY (INHALATION) at 23:31

## 2018-04-02 RX ADMIN — PANTOPRAZOLE SODIUM 40 MG: 40 TABLET, DELAYED RELEASE ORAL at 18:02

## 2018-04-02 RX ADMIN — BUDESONIDE 500 MCG: 0.5 INHALANT RESPIRATORY (INHALATION) at 20:21

## 2018-04-02 RX ADMIN — SUMATRIPTAN SUCCINATE 100 MG: 50 TABLET ORAL at 18:02

## 2018-04-02 RX ADMIN — SUCRALFATE 1 G: 1 TABLET ORAL at 18:02

## 2018-04-02 RX ADMIN — Medication 10 ML: at 22:19

## 2018-04-02 RX ADMIN — ZIPRASIDONE HYDROCHLORIDE 80 MG: 20 CAPSULE ORAL at 22:19

## 2018-04-02 RX ADMIN — OXYBUTYNIN CHLORIDE 5 MG: 5 TABLET ORAL at 18:19

## 2018-04-02 RX ADMIN — ACETAMINOPHEN 1000 MG: 500 TABLET, FILM COATED ORAL at 12:39

## 2018-04-02 RX ADMIN — Medication 10 ML: at 18:01

## 2018-04-02 RX ADMIN — ALPRAZOLAM 2 MG: 0.5 TABLET ORAL at 23:58

## 2018-04-02 RX ADMIN — LEVOTHYROXINE SODIUM 112 MCG: 112 TABLET ORAL at 18:02

## 2018-04-02 RX ADMIN — LEVOFLOXACIN 750 MG: 5 INJECTION, SOLUTION INTRAVENOUS at 12:44

## 2018-04-02 RX ADMIN — CEFTRIAXONE SODIUM 2 G: 2 INJECTION, POWDER, FOR SOLUTION INTRAMUSCULAR; INTRAVENOUS at 12:41

## 2018-04-02 RX ADMIN — PREGABALIN 150 MG: 150 CAPSULE ORAL at 18:02

## 2018-04-02 RX ADMIN — SUCRALFATE 1 G: 1 TABLET ORAL at 22:19

## 2018-04-02 RX ADMIN — IPRATROPIUM BROMIDE AND ALBUTEROL SULFATE 3 ML: .5; 3 SOLUTION RESPIRATORY (INHALATION) at 20:21

## 2018-04-02 RX ADMIN — APIXABAN 2.5 MG: 2.5 TABLET, FILM COATED ORAL at 18:02

## 2018-04-02 NOTE — PROGRESS NOTES
Reviewed notes for spiritual concerns    Wing Sotomayor, staff Ibeth gillespie 18, [address] [facility]  /   [email or web address]

## 2018-04-02 NOTE — IP AVS SNAPSHOT
[address] [facility] 
 
 
 [address] [facility] [address] 
[phone] Patient: Antonia Durant MRN: [id number] GPY:[id number] About your hospitalization You were admitted on:  April 2, 2018 You last received care in the:  [facility] 6 MED SURG You were discharged on:  April 6, 2018 Why you were hospitalized Your primary diagnosis was:  Community Acquired Bacterial Pneumonia Your diagnoses also included:  Sepsis Due To Pneumonia (Hcc), Arthritis, Bipolar Affective Disorder (Hcc), Chronic Anticoagulation, Fibromyalgia, Gerd (Gastroesophageal Reflux Disease), Debility, Hx Of Pulmonary Embolus, Hypertension, Hypothyroidism, Morbid Obesity (Hcc), Polypharmacy, Restrictive Lung Disease, Stress Incontinence In Female Follow-up Information Follow up With Details Comments Contact Info Additional Information Celia Golden DO   [address] [facility] [address] [facility] [address] 
[phone] [facility] Radiology On 5/1/2018 at 1:20 for chest x-ray [address] [facility] 
Marquise Jarrett [address], [address] [place] [place] [address]- 2nd floor of Outpatient Medical Office Building Delvin Arcos NP On 5/1/2018 at 2:10 to see NP [facility] [address] [place] PULMONOLOGY [facility] [address] 
[phone] Your Scheduled Appointments Tuesday May 01, 2018  2:40 PM EDT  
(Arrive by 2:10 PM) [facility] [address] [facility]  
[phone] Discharge Orders None A check elvi indicates which time of day the medication should be taken. My Medications START taking these medications Instructions Each Dose to Equal  
 Morning Noon Evening Bedtime  
 levoFLOXacin 750 mg tablet Commonly known as:  Alba Bring Your last dose was: Your next dose is: Take 1 Tab by mouth every twenty-four (24) hours. 750 mg CONTINUE taking these medications Instructions Each Dose to Equal  
 Morning Noon Evening Bedtime * albuterol sulfate 2.5 mg/0.5 mL Nebu nebulizer solution Commonly known as:  PROVENTIL;VENTOLIN Your last dose was: Your next dose is: 0.5 mL by Nebulization route every four (4) hours. Dx J45.909  
 2.5 mg  
    
   
   
   
  
 * albuterol 2.5 mg /3 mL (0.083 %) nebulizer solution Commonly known as:  PROVENTIL VENTOLIN Your last dose was: Your next dose is:    
   
   
 3 mL by Nebulization route four (4) times daily. And every 4 hours as needed cough, wheezing, shortness of breath, J45.909, Medicare part B  
 2.5 mg  
    
   
   
   
  
 * albuterol 90 mcg/actuation inhaler Commonly known as:  PROAIR HFA Your last dose was: Your next dose is: Take 2 Puffs by inhalation every four (4) hours as needed for Wheezing. 2 Puff ALPRAZolam 2 mg tablet Commonly known as:  Jhonatan Dc Your last dose was: Your next dose is: Take 1 Tab by mouth three (3) times daily as needed for Anxiety. Max Daily Amount: 6 mg.  
 2 mg  
    
   
   
   
  
 atorvastatin 10 mg tablet Commonly known as:  LIPITOR Your last dose was: Your next dose is: Take 2 Tabs by mouth nightly. 20 mg Biotin 2,500 mcg Cap Your last dose was: Your next dose is: Take  by mouth. CARAFATE 1 gram tablet Generic drug:  sucralfate Your last dose was: Your next dose is: Take 1 g by mouth four (4) times daily. 1 g  
    
   
   
   
  
 COREG 12.5 mg tablet Generic drug:  carvedilol Your last dose was: Your next dose is: Take 12.5 mg by mouth two (2) times daily (with meals).   
 12.5 mg  
 ELIQUIS 2.5 mg tablet Generic drug:  apixaban Your last dose was: Your next dose is: Take 2.5 mg by mouth two (2) times a day. Indications: PULMONARY THROMBOEMBOLISM PREVENTION  
 2.5 mg  
    
   
   
   
  
 fluticasone-vilanterol 200-25 mcg/dose inhaler Commonly known as:  BREO ELLIPTA Your last dose was: Your next dose is: Take 1 Puff by inhalation daily. RINSE MOUTH WELL AFTER USE  
 1 Puff  
    
   
   
   
  
 folic acid 979 mcg tablet Your last dose was: Your next dose is: Take 800 mcg by mouth daily. 800 mcg  
    
   
   
   
  
 furosemide 40 mg tablet Commonly known as:  LASIX Your last dose was: Your next dose is: Take 1 Tab by mouth daily as needed. 40 mg  
    
   
   
   
  
 GEODON 80 mg capsule Generic drug:  ziprasidone Your last dose was: Your next dose is: Take 80 mg by mouth nightly. 80 mg  
    
   
   
   
  
 IMITREX PO Your last dose was: Your next dose is:    
   
   
 take 100 mg by mouth as needed. 100 mg  
    
   
   
   
  
 lisinopril 40 mg tablet Commonly known as:  Jazmin Austin Your last dose was: Your next dose is: Take 1 Tab by mouth daily. 40 mg  
    
   
   
   
  
 Magnesium Oxide 500 mg Cap Your last dose was: Your next dose is: Take 500 mg by mouth. 500 mg  
    
   
   
   
  
 montelukast 10 mg tablet Commonly known as:  SINGULAIR Your last dose was: Your next dose is: Take 10 mg by mouth daily. 10 mg  
    
   
   
   
  
 MOVANTIK 25 mg Tab tablet Generic drug:  naloxegol Your last dose was: Your next dose is: Take  by mouth Daily (before breakfast). NEBULIZER Your last dose was: Your next dose is: by Does Not Apply route. NORCO  mg tablet Generic drug:  HYDROcodone-acetaminophen Your last dose was: Your next dose is: Take 1 Tab by mouth every six (6) hours as needed for Pain. 1 Tab  
    
   
   
   
  
 nystatin powder Commonly known as:  MYCOSTATIN Your last dose was: Your next dose is:    
   
   
 Apply  to affected area two (2) times a day. oxybutynin chloride XL 10 mg CR tablet Commonly known as:  DITROPAN XL Your last dose was: Your next dose is: Take 10 mg by mouth daily. 10 mg OXYGEN-AIR DELIVERY SYSTEMS Your last dose was: Your next dose is:    
   
   
 2 lpm cont. pantoprazole 40 mg tablet Commonly known as:  PROTONIX Your last dose was: Your next dose is: Take 1 Tab by mouth Before breakfast and dinner. 40 mg POTASSIUM CHLORIDE SR 10 MEQ TAB Your last dose was: Your next dose is: Take 1 Tab by mouth daily. 1 Tab  
    
   
   
   
  
 pregabalin 150 mg capsule Commonly known as:  Dean Mall Your last dose was: Your next dose is: Take  by mouth two (2) times a day. SUPER B COMPLEX PO Your last dose was: Your next dose is: Take 1 Tab by mouth daily. 1 Tab SYNTHROID 125 mcg tablet Generic drug:  levothyroxine Your last dose was: Your next dose is: Take 112 mcg by mouth every evening. 112 mcg VITAMIN D3 1,000 unit Cap Generic drug:  cholecalciferol Your last dose was: Your next dose is: Take  by mouth. ZOFRAN ODT 8 mg disintegrating tablet Generic drug:  ondansetron Your last dose was: Your next dose is: Take 8 mg by mouth every eight (8) hours as needed for Nausea. 8 mg * Notice: This list has 3 medication(s) that are the same as other medications prescribed for you. Read the directions carefully, and ask your doctor or other care provider to review them with you. ASK your doctor about these medications Instructions Each Dose to Equal  
 Morning Noon Evening Bedtime COQ10  PO Your last dose was: Your next dose is: Take  by mouth. Where to Get Your Medications These medications were sent to [address], [address] [facility],[address] [place] [address] Drive, [facility] [id number] Phone:  [phone]  
  levoFLOXacin 750 mg tablet Opioid Education Prescription Opioids: What You Need to Know: 
 
 
 
F-face looks uneven A-arms unable to move or move unevenly S-speech slurred or non-existent T-time-call 911 as soon as signs and symptoms begin-DO NOT go Back to bed or wait to see if you get better-TIME IS BRAIN. Warning Signs of HEART ATTACK Call 911 if you have these symptoms: 
? Chest discomfort. Most heart attacks involve discomfort in the center of the chest that lasts more than a few minutes, or that goes away and comes back. It can feel like uncomfortable pressure, squeezing, fullness, or pain. ? Discomfort in other areas of the upper body. Symptoms can include pain or discomfort in one or both arms, the back, neck, jaw, or stomach. ? Shortness of breath with or without chest discomfort. ? Other signs may include breaking out in a cold sweat, nausea, or lightheadedness. Don't wait more than five minutes to call [address]! Fast action can save your life. Calling 911 is almost always the fastest way to get lifesaving treatment. Emergency Medical Services staff can begin treatment when they arrive  up to an hour sooner than if someone gets to the hospital by car. The discharge information has been reviewed with the patient. The patient verbalized understanding. Discharge medications reviewed with the patient and appropriate educational materials and side effects teaching were provided. ___________________________________________________________________________________________________________________________________ Introducing [facility]! Amina Thakur introduces [employer] patient portal. Now you can access parts of your medical record, email your doctor's office, and request medication refills online. 1. In your internet browser, go to [email or web address] 2. Click on the First Time User? Click Here link in the Sign In box. You will see the New Member Sign Up page. 3. Enter your [employer] Access Code exactly as it appears below. You will not need to use this code after youve completed the sign-up process. If you do not sign up before the expiration date, you must request a new code. · [employer] Access Code: [email or web address] Expires: 7/1/2018 12:23 PM 
 
4. Enter the last four digits of your Social Security Number (xxxx) and Date of Birth (mm/dd/yyyy) as indicated and click Submit. You will be taken to the next sign-up page. 5. Create a MyChart ID.  This will be your [employer] login ID and cannot be changed, so think of one that is secure and easy to remember. 6. Create a [employer] password. You can change your password at any time. 7. Enter your Password Reset Question and Answer. This can be used at a later time if you forget your password. 8. Enter your e-mail address. You will receive e-mail notification when new information is available in [address] 9. Click Sign Up. You can now view and download portions of your medical record. 10. Click the Download Summary menu link to download a portable copy of your medical information. If you have questions, please visit the Frequently Asked Questions section of the [employer] website. Remember, [employer] is NOT to be used for urgent needs. For medical emergencies, dial 911. Now available from your iPhone and Android! Introducing Glen Alvarado As a [employer] patient, I wanted to make you aware of our electronic visit tool called Glen LaneJobzle. [employer] 24/7 allows you to connect within minutes with a medical provider 24 hours a day, seven days a week via a mobile device or tablet or logging into a secure website from your computer. You can access [facility] [employer] from anywhere in the [place]. A virtual visit might be right for you when you have a simple condition and feel like you just dont want to get out of bed, or cant get away from work for an appointment, when your regular [employer] provider is not available (evenings, weekends or holidays), or when youre out of town and need minor care. Electronic visits cost only $49 and if the [employer] 24/7 provider determines a prescription is needed to treat your condition, one can be electronically transmitted to a nearby pharmacy*. Please take a moment to enroll today if you have not already done so. The enrollment process is free and takes just a few minutes.   To enroll, please download the Curly Cho 24/YeahMobi suki to your tablet or phone, or visit [email or web address] to enroll on your computer. And, as an [address] [place] [address] patient with a [employer] account, the results of your visits will be scanned into your electronic medical record and your primary care provider will be able to view the scanned results. We urge you to continue to see your regular [facility] provider for your ongoing medical care. And while your primary care provider may not be the one available when you seek a [employer] virtual visit, the peace of mind you get from getting a real diagnosis real time can be priceless. For more information on [employer], view our Frequently Asked Questions (FAQs) at [email or web address]. Sincerely, 
 
Conor Smith MD 
Chief Medical Officer Syed Rama Cavazos *:  certain medications cannot be prescribed via [employer] Unresulted Labs-Please follow up with your PCP about these lab tests Order Current Status CULTURE, BLOOD Preliminary result CULTURE, BLOOD Preliminary result Providers Seen During Your Hospitalization Provider Specialty Primary office phone Roberto Jewell MD Emergency Medicine [phone] Jennifer Ocampo MD Internal Medicine [phone] Immunizations Administered for This Admission Name Date  
 TB Skin Test (PPD) Intradermal  Deferred () Your Primary Care Physician (PCP) Primary Care Physician Office Phone Office Fax Pablito Hunt [phone] You are allergic to the following Allergen Reactions Latex Contact Dermatitis Bactrim (Sulfamethoxazole-Trimethoprim) Nausea Only Lamictal (Lamotrigine) Atopic Dermatitis Pcn (Penicillins) Rash Tapentadol Rash Recent Documentation Height Weight BMI OB Status Smoking Status 1.549 m 117.9 kg 49.13 kg/m2 Hysterectomy Never Smoker Emergency Contacts Name Discharge Info Relation Home Work Mobile Anamika Gay  Daughter [17] [phone] Andi Conrad  Spouse [3] [phone] Patient Belongings The following personal items are in your possession at time of discharge: 
  Dental Appliances: Lowers, Uppers  Visual Aid: At bedside      Home Medications: None   Jewelry: Ring  Clothing: Bathrobe, Pajamas, Slippers, Undergarments    Other Valuables: None Please provide this summary of care documentation to your next provider. Signatures-by signing, you are acknowledging that this After Visit Summary has been reviewed with you and you have received a copy. Patient Signature:  ____________________________________________________________ Date:  ____________________________________________________________  
  
[facility] Provider Signature:  ____________________________________________________________ Date:  ____________________________________________________________

## 2018-04-02 NOTE — H&P
HOSPITALIST H&P/CONSULT  NAME:  Dhiraj Dunn   Age:  78 y.o.  :   1950   MRN:   [id number]  PCP: Attila Delgado DO  Consulting MD:  Treatment Team: Attending Provider: Annamarie Tran MD; Consulting Provider: Cassidy Littlejohn MD  HPI:     78 y.o.  female with PMH of Morbid Obesity, bipolar disorder, chronic narcotic use / drug abuse (amphetamines/benzos/opiates), fibromyalgia, GERD, PUD, hx PE on chronic anticoagulation, HTN, hypothyroid, morbid obesity, PATRICIA, Asthma, restrictive lung disease, chronic respiratory failure 4lnc at night (non compliant) presented to the ER with cc of Fever, chills, SOB and diaphoresis this morning. Per daughter pt woke up with symptoms and was fine until yesterday. ALso had one episode of vomiting last night. Pt denies CP, Abd pain, dysuria, Diarrhea, LOC or dizziness. Daughter States she was recently diagnosed with pneumonia, finished antibiotic one week ago. Temp was 103.5, o2 sat was 80% NC 4L, /80, -120. WBC was 15.3, LA nl, Cr 1.33 mild CAMILA, CXR showed RLL infiltrate. Started on ABx and placed on NRB and then Optiflow. Hospitalist asked to admit. Per daughter she has been giving her 4 tabs of lasix 40mg daily due to leg swelling. Pt walks with a walker. Reports some pain in left lower leg,  Had a blister on left lower leg that has popped and now has some redness and pain there. Last Echo showed Nl EF in 2017.    10 point ROS done and is negative except as noted in HPI. Past Medical History:   Diagnosis Date    Acute renal failure ([facility] [address]     The patient was admitted with acute renal failure secondary to volume depletion. She was found to have a gastric ulcer on admission.     Arthritis     Asthma     Asthma with acute exacerbation 2017    Bilateral pulmonary embolism [facility])     Restarted on anticoagulation for lifetime    Calculus of ureter May, 2015    CAP (community acquired pneumonia)  RML pneumonia    Cellulitis of left lower extremity 2017    Chronic pain     fibromyalgia. back pain    CVA (cerebral infarction)     Reportedly has mild left arm residual weakness    Gastric ulcer           GERD (gastroesophageal reflux disease)     Gram-positive bacteremia 1/2 cx 2016    HCAP (healthcare-associated pneumonia)     RLL pneumonia    HCAP (healthcare-associated pneumonia)     RLL pneumonia    Hypertension     Left leg DVT ([facility] [address]     On coumadin until     Morbid obesity ([facility] [address]     Psychiatric disorder     PUD (peptic ulcer disease) ? ??    Stroke [facility])     2009 mini stroke    Thyroid disease       Past Surgical History:   Procedure Laterality Date    HX APPENDECTOMY      HX CHOLECYSTECTOMY      HX ENDOSCOPY      Gastric ulcers x 2    HX GASTRIC BYPASS      HX HYSTERECTOMY      HX ORTHOPAEDIC      rt knee growth, right shoulder, left thumb    HX OTHER SURGICAL      vagus nerve stimulator    HX TONSILLECTOMY        Prior to Admission Medications   Prescriptions Last Dose Informant Patient Reported? Taking? ALPRAZolam (XANAX) 2 mg tablet   No No   Sig: Take 1 Tab by mouth three (3) times daily as needed for Anxiety. Max Daily Amount: 6 mg. Biotin 2,500 mcg cap   Yes No   Sig: Take  by mouth. Cholecalciferol, Vitamin D3, (VITAMIN D3) 1,000 unit cap   Yes No   Sig: Take  by mouth. FERROUS FUMARATE/VIT BCOMP&C (SUPER B COMPLEX PO)   Yes No   Sig: Take 1 Tab by mouth daily. HYDROcodone-acetaminophen (NORCO)  mg tablet   Yes No   Sig: Take 1 Tab by mouth every six (6) hours as needed for Pain. Magnesium Oxide 500 mg cap   Yes No   Sig: Take 500 mg by mouth. NEBULIZER   Yes No   Sig: by Does Not Apply route. OXYGEN-AIR DELIVERY SYSTEMS   Yes No   Si lpm cont. POTASSIUM CHLORIDE SR 10 MEQ TAB   Yes No   Sig: Take 1 Tab by mouth daily.    SUMATRIPTAN SUCCINATE (IMITREX PO)   Yes No   Sig: take 100 mg by mouth as needed. UBIDECARENONE/VITAMIN E MIXED (COQ10  PO)   Yes No   Sig: Take  by mouth. albuterol (PROAIR HFA) 90 mcg/actuation inhaler   No No   Sig: Take 2 Puffs by inhalation every four (4) hours as needed for Wheezing. albuterol (PROVENTIL VENTOLIN) 2.5 mg /3 mL (0.083 %) nebulizer solution   No No   Sig: 3 mL by Nebulization route four (4) times daily. And every 4 hours as needed cough, wheezing, shortness of breath, J45.909, Medicare part B   albuterol sulfate (PROVENTIL;VENTOLIN) 2.5 mg/0.5 mL nebu nebulizer solution   No No   Si.5 mL by Nebulization route every four (4) hours. Dx J45.909   apixaban (ELIQUIS) 2.5 mg tablet   Yes No   Sig: Take 2.5 mg by mouth two (2) times a day. Indications: PULMONARY THROMBOEMBOLISM PREVENTION   atorvastatin (LIPITOR) 10 mg tablet   No No   Sig: Take 2 Tabs by mouth nightly. carvedilol (COREG) 12.5 mg tablet   Yes No   Sig: Take 12.5 mg by mouth two (2) times daily (with meals). fluticasone-vilanterol (BREO ELLIPTA) 200-25 mcg/dose inhaler   No No   Sig: Take 1 Puff by inhalation daily. RINSE MOUTH WELL AFTER USE   folic acid 302 mcg tablet   Yes No   Sig: Take 800 mcg by mouth daily. furosemide (LASIX) 40 mg tablet   No No   Sig: Take 1 Tab by mouth daily as needed. levothyroxine (SYNTHROID) 125 mcg tablet   Yes No   Sig: Take 112 mcg by mouth every evening. lisinopril (PRINIVIL, ZESTRIL) 40 mg tablet   No No   Sig: Take 1 Tab by mouth daily. montelukast (SINGULAIR) 10 mg tablet   Yes No   Sig: Take 10 mg by mouth daily. naloxegol (MOVANTIK) 25 mg tab tablet   Yes No   Sig: Take  by mouth Daily (before breakfast). nystatin (MYCOSTATIN) powder   No No   Sig: Apply  to affected area two (2) times a day. ondansetron (ZOFRAN ODT) 8 mg disintegrating tablet   Yes No   Sig: Take 8 mg by mouth every eight (8) hours as needed for Nausea.    oxybutynin chloride XL (DITROPAN XL) 10 mg CR tablet   Yes No   Sig: Take 10 mg by mouth daily.   pantoprazole (PROTONIX) 40 mg tablet   No No   Sig: Take 1 Tab by mouth Before breakfast and dinner. pregabalin (LYRICA) 150 mg capsule   Yes No   Sig: Take  by mouth two (2) times a day. sucralfate (CARAFATE) 1 gram tablet   Yes No   Sig: Take 1 g by mouth four (4) times daily. ziprasidone (GEODON) 80 mg capsule   Yes No   Sig: Take 80 mg by mouth nightly. Facility-Administered Medications: None     Home meds reconciled.   Allergies   Allergen Reactions    Latex Contact Dermatitis    Bactrim [Sulfamethoxazole-Trimethoprim] Nausea Only    Lamictal [Lamotrigine] Atopic Dermatitis    Pcn [Penicillins] Rash    Tapentadol Rash      Social History   Substance Use Topics    Smoking status: Never Smoker    Smokeless tobacco: Never Used      Comment: exposed to second hand smoke at work for 16 years    Alcohol use No      Family History   Problem Relation Age of Onset    Hypertension Mother     Cancer Father      prostate    Heart Attack Father     Heart Disease Father      CAD    Hypertension Father     Hypertension Sister     Stroke Sister     Heart Disease Brother      CAD with CABG    Heart Attack Brother     Hypertension Brother       Immunization History   Administered Date(s) Administered    Influenza Vaccine 10/01/2014, 2016, 2017    Influenza Vaccine Split 2012    Pneumococcal Conjugate (PCV-13) 2017    Pneumococcal Polysaccharide (PPSV-23) 2017    TB Skin Test (PPD) Intradermal 2013, 2016, 2017    ZZZ-RETIRED (DO NOT USE) Pneumococcal Vaccine (Unspecified Type) 2010     Objective:     Visit Vitals    /65 (BP 1 Location: Right arm, BP Patient Position: At rest)    Pulse 86    Temp 98.5 °F (36.9 °C)    Resp 24    Ht 5' 1\" (1.549 m)    Wt 117.9 kg (260 lb)    SpO2 97%    BMI 49.13 kg/m2      Temp (24hrs), Av.5 °F (38.1 °C), Min:98.5 °F (36.9 °C), Max:103.5 °F (39.7 °C)    Oxygen Therapy  O2 Sat (%): 97 % (04/02/18 1658)  Pulse via Oximetry: 81 beats per minute (04/02/18 1658)  O2 Device: Humidifier; Hi flow nasal cannula (04/02/18 1658)  O2 Flow Rate (L/min): 50 l/min (04/02/18 1658)  O2 Temperature: 87.8 °F (31 °C) (04/02/18 1658)  FIO2 (%): 50 % (04/02/18 1658)  Physical Exam:  General:    Alert, cooperative, mild distress   Head:   NCAT. No obvious deformity  Nose:  Nares normal. No drainage  Lungs:   CTABL. No wheezing/rhonchi/rales Anteriorly  Heart:   RRR. No m/r/g. Tachycardic  Abdomen:   S/nt/nd. Bowel sounds normal.   Extremities: No cyanosis. Skin:     Left leg has some redness from a healed blister above the ankle  Neurologic: Moves all extremities. no gross focal deficits      Data Review:   Recent Results (from the past 24 hour(s))   POC TROPONIN-I    Collection Time: 04/02/18 12:39 PM   Result Value Ref Range    Troponin-I (POC) 0 (L) 0.02 - 0.05 ng/ml   CBC WITH AUTOMATED DIFF    Collection Time: 04/02/18 12:40 PM   Result Value Ref Range    WBC 15.3 (H) 4.3 - 11.1 K/uL    RBC 4.51 4.05 - 5.25 M/uL    HGB 11.5 (L) 11.7 - 15.4 g/dL    HCT 37.7 35.8 - 46.3 %    MCV 83.6 79.6 - 97.8 FL    MCH 25.5 (L) 26.1 - 32.9 PG    MCHC 30.5 (L) 31.4 - 35.0 g/dL    RDW 18.8 (H) 11.9 - 14.6 %    PLATELET 718 659 - 072 K/uL    MPV 10.2 (L) 10.8 - 14.1 FL    DF AUTOMATED      NEUTROPHILS 76 43 - 78 %    LYMPHOCYTES 12 (L) 13 - 44 %    MONOCYTES 8 4.0 - 12.0 %    EOSINOPHILS 4 0.5 - 7.8 %    BASOPHILS 0 0.0 - 2.0 %    IMMATURE GRANULOCYTES 0 0.0 - 5.0 %    ABS. NEUTROPHILS 11.7 (H) 1.7 - 8.2 K/UL    ABS. LYMPHOCYTES 1.8 0.5 - 4.6 K/UL    ABS. MONOCYTES 1.2 0.1 - 1.3 K/UL    ABS. EOSINOPHILS 0.6 0.0 - 0.8 K/UL    ABS. BASOPHILS 0.0 0.0 - 0.2 K/UL    ABS. IMM.  GRANS. 0.0 0.0 - 0.5 K/UL   METABOLIC PANEL, COMPREHENSIVE    Collection Time: 04/02/18 12:40 PM   Result Value Ref Range    Sodium 144 136 - 145 mmol/L    Potassium 3.8 3.5 - 5.1 mmol/L    Chloride 100 98 - 107 mmol/L    CO2 38 (H) 21 - 32 mmol/L    Anion gap 6 (L) 7 - 16 mmol/L    Glucose 114 (H) 65 - 100 mg/dL    BUN 14 8 - 23 MG/DL    Creatinine 1.33 (H) 0.6 - 1.0 MG/DL    GFR est AA 51 (L) >60 ml/min/1.73m2    GFR est non-AA 42 (L) >60 ml/min/1.73m2    Calcium 9.5 8.3 - 10.4 MG/DL    Bilirubin, total 0.3 0.2 - 1.1 MG/DL    ALT (SGPT) 16 12 - 65 U/L    AST (SGOT) 22 15 - 37 U/L    Alk. phosphatase 135 50 - 136 U/L    Protein, total 7.1 6.3 - 8.2 g/dL    Albumin 2.9 (L) 3.2 - 4.6 g/dL    Globulin 4.2 (H) 2.3 - 3.5 g/dL    A-G Ratio 0.7 (L) 1.2 - 3.5     PROCALCITONIN    Collection Time: 04/02/18 12:40 PM   Result Value Ref Range    Procalcitonin 0.2 ng/mL   POC LACTIC ACID    Collection Time: 04/02/18 12:41 PM   Result Value Ref Range    Lactic Acid (POC) 1.4 0.5 - 1.9 mmol/L   EKG, 12 LEAD, INITIAL    Collection Time: 04/02/18 12:48 PM   Result Value Ref Range    Ventricular Rate 109 BPM    Atrial Rate 110 BPM    P-R Interval 196 ms    QRS Duration 72 ms    Q-T Interval 304 ms    QTC Calculation (Bezet) 409 ms    Calculated P Axis 43 degrees    Calculated R Axis -28 degrees    Calculated T Axis 20 degrees    Diagnosis       !! AGE AND GENDER SPECIFIC ECG ANALYSIS !! Sinus tachycardia  Inferior infarct , age undetermined  Anterolateral infarct , age undetermined  Abnormal ECG  !!! Poor data quality, interpretation may be adversely affected  Confirmed by CALEB LARSON (), Shy Castro ([id number]) on 4/2/2018 1:15:17 PM     INFLUENZA A & B AG (RAPID TEST)    Collection Time: 04/02/18 12:52 PM   Result Value Ref Range    Influenza A Ag NEGATIVE  NEG      Influenza B Ag NEGATIVE  NEG       Imaging /Procedures /Studies:  I personally reviewed all labs, imaging, and other studies this admission:  CXR Results  (Last 48 hours)               04/02/18 1325  XR CHEST SNGL V Final result    Impression:  IMPRESSION: Linear infiltrate within the right lower lobe       Narrative:  Portable chest x-ray April 2, 2018.        INDICATION: Dyspnea       COMPARISON: 12/20/2017       There is a linear infiltrate in the right lower lobe. Heart is unremarkable. Soft tissues and the bony structures are intact. There is a generator   superimposed over the left costophrenic angle. CT Results  (Last 48 hours)    None          Assessment and Plan: Active Hospital Problems    Diagnosis Date Noted    Community acquired bacterial pneumonia 04/02/2018    Sepsis due to pneumonia [facility]) 04/02/2018    Chronic anticoagulation 12/03/2017    Fibromyalgia 11/01/2017    Debility 11/01/2017    Morbid obesity - sedentary lifestyle 11/01/2017    Polypharmacy 11/01/2017    Restrictive lung disease 11/01/2017    Hx of pulmonary embolus 09/28/2017     Chronic Eliquis      Hypothyroidism 09/28/2017    Stress incontinence in female 10/14/2014    Arthritis 09/16/2012    Bipolar affective disorder ([facility] [address] 09/16/2012    GERD (gastroesophageal reflux disease) 09/16/2012    Hypertension 09/16/2012       PLAN:    · Admit to inpt medical bed  · Will start on Rocephin and azithro. Follow Cultures   · C/w Optiflow, Nebs, BDs, Pulm consulted   · Resume eliquis. She is on 2.5mg BiD, should be on 5mg for PE. Will defer to Pulm  · Hold lasix, on IVF, monitor Cr.   · Hold All BP meds for now, Resume prn. · Monitor Resp status  · Resume pertinent home meds  · She is on geodon 80mg qhs and also xanax 2mg tid prn. FEN:  Cardiac diet  DVT ppx: On eliquis  Code status:  Full code confirmed.   Estimated LOS:  2-3 days  Risk assessment:  High Risk  Plan of care discussed with: patient and daughter     Signed By: Raghu Murdock MD     April 2, 2018

## 2018-04-02 NOTE — ED TRIAGE NOTES
Pt arrives to the ER via EMS from home as a sepsis alert. Per pt, pt has high susceptibility to penumonia. Pt was recently diagnosed with pneumonia, finished antibiotic one week ago. Temp was 103.5F, o2 sat was 80% NC 4L which is what pt is normally on. Pressure 150/80, -120. Pt is in sinus tach per EMS. . Pt on NRB at 96%. Pt was given one duoneb with ems. Pt was given 200mL of fluid as well. Pt has 20g in wrist. Pt has hx of COPD and asthma.

## 2018-04-02 NOTE — IP AVS SNAPSHOT
[address] 
[phone] Patient: Cheo Allison MRN: [id number] THI:[birth year] A check elvi indicates which time of day the medication should be taken. My Medications START taking these medications Instructions Each Dose to Equal  
 Morning Noon Evening Bedtime  
 levoFLOXacin 750 mg tablet Commonly known as:  Jose Heaht Your last dose was: Your next dose is: Take 1 Tab by mouth every twenty-four (24) hours. 750 mg CONTINUE taking these medications Instructions Each Dose to Equal  
 Morning Noon Evening Bedtime * albuterol sulfate 2.5 mg/0.5 mL Nebu nebulizer solution Commonly known as:  PROVENTIL;VENTOLIN Your last dose was: Your next dose is: 0.5 mL by Nebulization route every four (4) hours. Dx J45.909  
 2.5 mg  
    
   
   
   
  
 * albuterol 2.5 mg /3 mL (0.083 %) nebulizer solution Commonly known as:  PROVENTIL VENTOLIN Your last dose was: Your next dose is:    
   
   
 3 mL by Nebulization route four (4) times daily. And every 4 hours as needed cough, wheezing, shortness of breath, J45.909, Medicare part B  
 2.5 mg  
    
   
   
   
  
 * albuterol 90 mcg/actuation inhaler Commonly known as:  PROAIR HFA Your last dose was: Your next dose is: Take 2 Puffs by inhalation every four (4) hours as needed for Wheezing. 2 Puff ALPRAZolam 2 mg tablet Commonly known as:  Amanda Belling Your last dose was: Your next dose is: Take 1 Tab by mouth three (3) times daily as needed for Anxiety. Max Daily Amount: 6 mg.  
 2 mg  
    
   
   
   
  
 atorvastatin 10 mg tablet Commonly known as:  LIPITOR Your last dose was: Your next dose is: Take 2 Tabs by mouth nightly.   
 20 mg  
    
   
   
   
  
 Biotin 2,500 mcg Cap Your last dose was: Your next dose is: Take  by mouth. CARAFATE 1 gram tablet Generic drug:  sucralfate Your last dose was: Your next dose is: Take 1 g by mouth four (4) times daily. 1 g  
    
   
   
   
  
 COREG 12.5 mg tablet Generic drug:  carvedilol Your last dose was: Your next dose is: Take 12.5 mg by mouth two (2) times daily (with meals). 12.5 mg  
    
   
   
   
  
 ELIQUIS 2.5 mg tablet Generic drug:  apixaban Your last dose was: Your next dose is: Take 2.5 mg by mouth two (2) times a day. Indications: PULMONARY THROMBOEMBOLISM PREVENTION  
 2.5 mg  
    
   
   
   
  
 fluticasone-vilanterol 200-25 mcg/dose inhaler Commonly known as:  BREO ELLIPTA Your last dose was: Your next dose is: Take 1 Puff by inhalation daily. RINSE MOUTH WELL AFTER USE  
 1 Puff  
    
   
   
   
  
 folic acid 121 mcg tablet Your last dose was: Your next dose is: Take 800 mcg by mouth daily. 800 mcg  
    
   
   
   
  
 furosemide 40 mg tablet Commonly known as:  LASIX Your last dose was: Your next dose is: Take 1 Tab by mouth daily as needed. 40 mg  
    
   
   
   
  
 GEODON 80 mg capsule Generic drug:  ziprasidone Your last dose was: Your next dose is: Take 80 mg by mouth nightly. 80 mg  
    
   
   
   
  
 IMITREX PO Your last dose was: Your next dose is:    
   
   
 take 100 mg by mouth as needed. 100 mg  
    
   
   
   
  
 lisinopril 40 mg tablet Commonly known as:  Ke Zana Your last dose was: Your next dose is: Take 1 Tab by mouth daily. 40 mg  
    
   
   
   
  
 Magnesium Oxide 500 mg Cap Your last dose was: Your next dose is: Take 500 mg by mouth. 500 mg  
    
   
   
   
  
 montelukast 10 mg tablet Commonly known as:  SINGULAIR Your last dose was: Your next dose is: Take 10 mg by mouth daily. 10 mg  
    
   
   
   
  
 MOVANTIK 25 mg Tab tablet Generic drug:  naloxegol Your last dose was: Your next dose is: Take  by mouth Daily (before breakfast). NEBULIZER Your last dose was: Your next dose is:    
   
   
 by Does Not Apply route. NORCO  mg tablet Generic drug:  HYDROcodone-acetaminophen Your last dose was: Your next dose is: Take 1 Tab by mouth every six (6) hours as needed for Pain. 1 Tab  
    
   
   
   
  
 nystatin powder Commonly known as:  MYCOSTATIN Your last dose was: Your next dose is:    
   
   
 Apply  to affected area two (2) times a day. oxybutynin chloride XL 10 mg CR tablet Commonly known as:  DITROPAN XL Your last dose was: Your next dose is: Take 10 mg by mouth daily. 10 mg OXYGEN-AIR DELIVERY SYSTEMS Your last dose was: Your next dose is:    
   
   
 2 lpm cont. pantoprazole 40 mg tablet Commonly known as:  PROTONIX Your last dose was: Your next dose is: Take 1 Tab by mouth Before breakfast and dinner. 40 mg POTASSIUM CHLORIDE SR 10 MEQ TAB Your last dose was: Your next dose is: Take 1 Tab by mouth daily. 1 Tab  
    
   
   
   
  
 pregabalin 150 mg capsule Commonly known as:  Shauna Mahan Your last dose was: Your next dose is: Take  by mouth two (2) times a day. SUPER B COMPLEX PO Your last dose was: Your next dose is: Take 1 Tab by mouth daily. 1 Tab SYNTHROID 125 mcg tablet Generic drug:  levothyroxine Your last dose was: Your next dose is: Take 112 mcg by mouth every evening. 112 mcg VITAMIN D3 1,000 unit Cap Generic drug:  cholecalciferol Your last dose was: Your next dose is: Take  by mouth. ZOFRAN ODT 8 mg disintegrating tablet Generic drug:  ondansetron Your last dose was: Your next dose is: Take 8 mg by mouth every eight (8) hours as needed for Nausea. 8 mg * Notice: This list has 3 medication(s) that are the same as other medications prescribed for you. Read the directions carefully, and ask your doctor or other care provider to review them with you. ASK your doctor about these medications Instructions Each Dose to Equal  
 Morning Noon Evening Bedtime COQ10  PO Your last dose was: Your next dose is: Take  by mouth. Where to Get Your Medications These medications were [address], [address] [place] [address], [facility] [id number] Phone:  [phone]  
  levoFLOXacin 750 mg tablet

## 2018-04-02 NOTE — ROUTINE PROCESS
TRANSFER - OUT REPORT:    Verbal report given to SLICK Hinds(name) on Lethea Blades  being transferred to 609(unit) for routine progression of care       Report consisted of patients Situation, Background, Assessment and   Recommendations(SBAR). Information from the following report(s) ED Summary was reviewed with the receiving nurse. Opportunity for questions and clarification was provided.       Patient transported with:   [facility]

## 2018-04-02 NOTE — PROGRESS NOTES
TRANSFER - IN REPORT:    Verbal report received from [facility] on Sammy Said  being received from ER for routine progression of care      Report consisted of patients Situation, Background, Assessment and   Recommendations(SBAR). Information from the following report(s) SBAR was reviewed with the receiving nurse. Opportunity for questions and clarification was provided. Assessment completed upon patients arrival to unit and care assumed. Dual skin assessment completed with [facility]. Half dollar sized scaly area on left lower calf with redness/ cellulitus. Both calfs and feet with 2+ edema. Perineal folds where pt wears incontinence briefs redness observed. Oriented to room and call light. Voiced understanding.

## 2018-04-02 NOTE — CONSULTS
CONSULT NOTE    Flakito Hollingsworth    4/2/2018    Date of Admission:  4/2/2018    The patient's chart is reviewed and the patient is discussed with the staff. Subjective:     Patient is a 78 y.o.  female seen and evaluated at the request of Dr. Susie Olsen for the evaluation of pneumonia. Patient was admitted today. Apparently she fell okay yesterday however this morning she woke up with shortness of breath, chills and fever. She had fever 102 at home. When she presented to emergency room she was found to have pneumonia she was admitted by hospitalist.  Patient had similar presentation in December 2017 with pneumonia as well. Patient reports she is feeling little better. She is on oxygen at night. She has history of asthma. She is on nebulizes. She does not have sleep apnea. She is only on oxygen at night. She denies any urinary or GI symptoms. .      Review of Systems  A comprehensive review of systems was negative except for that written in the HPI. Patient Active Problem List   Diagnosis Code    Fibromyalgia M79.7    Hypertension I10    GERD (gastroesophageal reflux disease) K21.9    Morbid obesity - sedentary lifestyle E66.01    Hypothyroidism E03.9    Asthma J45.909    Arthritis M19.90    Bipolar affective disorder ([facility] [address] F31.9    Debility R53.81    History of peptic ulcer disease Z87.11    Chronic narcotic use     Anxiety state F41.1    Chronic rhinitis J31.0    Stress incontinence in female N39.3    Speech abnormality R47.9    Polypharmacy Z79.899    Hypersomnolence G47.10    Hx of pulmonary embolus Z86.711    Restrictive lung disease J98.4    Drug abuse F19.10    Fever R50.9    Tachycardia R00.0    Chronic anticoagulation Z79.01    Vocal cord paralysis J38.00    Nocturnal hypoxemia G47.34    Community acquired bacterial pneumonia J15.9    Sepsis due to pneumonia (HCC) J18.9, A41.9       .     Prior to Admission Medications   Prescriptions Last Dose Informant Patient Reported? Taking? ALPRAZolam (XANAX) 2 mg tablet   No No   Sig: Take 1 Tab by mouth three (3) times daily as needed for Anxiety. Max Daily Amount: 6 mg. Biotin 2,500 mcg cap   Yes No   Sig: Take  by mouth. Cholecalciferol, Vitamin D3, (VITAMIN D3) 1,000 unit cap   Yes No   Sig: Take  by mouth. FERROUS FUMARATE/VIT BCOMP&C (SUPER B COMPLEX PO)   Yes No   Sig: Take 1 Tab by mouth daily. HYDROcodone-acetaminophen (NORCO)  mg tablet   Yes No   Sig: Take 1 Tab by mouth every six (6) hours as needed for Pain. Magnesium Oxide 500 mg cap   Yes No   Sig: Take 500 mg by mouth. NEBULIZER   Yes No   Sig: by Does Not Apply route. OXYGEN-AIR DELIVERY SYSTEMS   Yes No   Si lpm cont. POTASSIUM CHLORIDE SR 10 MEQ TAB   Yes No   Sig: Take 1 Tab by mouth daily. SUMATRIPTAN SUCCINATE (IMITREX PO)   Yes No   Sig: take 100 mg by mouth as needed. UBIDECARENONE/VITAMIN E MIXED (COQ10  PO)   Yes No   Sig: Take  by mouth. albuterol (PROAIR HFA) 90 mcg/actuation inhaler   No No   Sig: Take 2 Puffs by inhalation every four (4) hours as needed for Wheezing. albuterol (PROVENTIL VENTOLIN) 2.5 mg /3 mL (0.083 %) nebulizer solution   No No   Sig: 3 mL by Nebulization route four (4) times daily. And every 4 hours as needed cough, wheezing, shortness of breath, J45.909, Medicare part B   albuterol sulfate (PROVENTIL;VENTOLIN) 2.5 mg/0.5 mL nebu nebulizer solution   No No   Si.5 mL by Nebulization route every four (4) hours. Dx J45.909   apixaban (ELIQUIS) 2.5 mg tablet   Yes No   Sig: Take 2.5 mg by mouth two (2) times a day. Indications: PULMONARY THROMBOEMBOLISM PREVENTION   atorvastatin (LIPITOR) 10 mg tablet   No No   Sig: Take 2 Tabs by mouth nightly. carvedilol (COREG) 12.5 mg tablet   Yes No   Sig: Take 12.5 mg by mouth two (2) times daily (with meals). fluticasone-vilanterol (BREO ELLIPTA) 200-25 mcg/dose inhaler   No No   Sig: Take 1 Puff by inhalation daily.  RINSE MOUTH WELL AFTER USE   folic acid 969 mcg tablet   Yes No   Sig: Take 800 mcg by mouth daily. furosemide (LASIX) 40 mg tablet   No No   Sig: Take 1 Tab by mouth daily as needed. levothyroxine (SYNTHROID) 125 mcg tablet   Yes No   Sig: Take 112 mcg by mouth every evening. lisinopril (PRINIVIL, ZESTRIL) 40 mg tablet   No No   Sig: Take 1 Tab by mouth daily. montelukast (SINGULAIR) 10 mg tablet   Yes No   Sig: Take 10 mg by mouth daily. naloxegol (MOVANTIK) 25 mg tab tablet   Yes No   Sig: Take  by mouth Daily (before breakfast). nystatin (MYCOSTATIN) powder   No No   Sig: Apply  to affected area two (2) times a day. ondansetron (ZOFRAN ODT) 8 mg disintegrating tablet   Yes No   Sig: Take 8 mg by mouth every eight (8) hours as needed for Nausea. oxybutynin chloride XL (DITROPAN XL) 10 mg CR tablet   Yes No   Sig: Take 10 mg by mouth daily. pantoprazole (PROTONIX) 40 mg tablet   No No   Sig: Take 1 Tab by mouth Before breakfast and dinner. pregabalin (LYRICA) 150 mg capsule   Yes No   Sig: Take  by mouth two (2) times a day. sucralfate (CARAFATE) 1 gram tablet   Yes No   Sig: Take 1 g by mouth four (4) times daily. ziprasidone (GEODON) 80 mg capsule   Yes No   Sig: Take 80 mg by mouth nightly. Facility-Administered Medications: None       Past Medical History:   Diagnosis Date    Acute renal failure ([facility] [address] July, 2014    The patient was admitted with acute renal failure secondary to volume depletion. She was found to have a gastric ulcer on admission.  Arthritis     Asthma     Asthma with acute exacerbation 9/28/2017    Bilateral pulmonary embolism [facility]) September, 2012    Restarted on anticoagulation for lifetime    Calculus of ureter May, 2015    CAP (community acquired pneumonia) October, 2012    RML pneumonia    Cellulitis of left lower extremity 11/1/2017    Chronic pain     fibromyalgia.  back pain    CVA (cerebral infarction) January, 2012    Reportedly has mild left arm residual weakness    Gastric ulcer July, 2014          GERD (gastroesophageal reflux disease)     Gram-positive bacteremia 1/2 cx 12/26/2016    HCAP (healthcare-associated pneumonia) January, 2013    RLL pneumonia    HCAP (healthcare-associated pneumonia) February, 2013    RLL pneumonia    Hypertension     Left leg DVT ([facility] [address] 1991    On coumadin until 2008    Morbid obesity ([facility] [address]     Psychiatric disorder     PUD (peptic ulcer disease) ? ??    Stroke [facility])     2009 mini stroke    Thyroid disease      Past Surgical History:   Procedure Laterality Date    HX APPENDECTOMY      HX CHOLECYSTECTOMY      HX ENDOSCOPY  July, 2014    Gastric ulcers x 2    HX GASTRIC BYPASS      HX HYSTERECTOMY      HX ORTHOPAEDIC      rt knee growth, right shoulder, left thumb    HX OTHER SURGICAL      vagus nerve stimulator    HX TONSILLECTOMY       Social History     Social History    Marital status:      Spouse name: N/A    Number of children: N/A    Years of education: N/A     Occupational History          Disabled     Social History Main Topics    Smoking status: Never Smoker    Smokeless tobacco: Never Used      Comment: exposed to second hand smoke at work for 17 years    Alcohol use No    Drug use: No    Sexual activity: Not on file     Other Topics Concern    Not on file     Social History Narrative    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 , one child who is healthy. Has always lived in [address] [place] [address]. Dog as a pet. No history of pet bird. There is no significant environmental or industrial exposure. There is no known exposure to TB.          Family History   Problem Relation Age of Onset    Hypertension Mother     Cancer Father      prostate    Heart Attack Father     Heart Disease Father      CAD    Hypertension Father     Hypertension Sister     Stroke Sister     Heart Disease Brother      CAD with CABG    Heart Attack Brother     Hypertension Brother      Allergies   Allergen Reactions    Latex Contact Dermatitis    Bactrim [Sulfamethoxazole-Trimethoprim] Nausea Only    Lamictal [Lamotrigine] Atopic Dermatitis    Pcn [Penicillins] Rash    Tapentadol Rash       Current Facility-Administered Medications   Medication Dose Route Frequency    sodium chloride (NS) flush 5-10 mL  5-10 mL IntraVENous PRN    cefTRIAXone (ROCEPHIN) 2 g in 0.9% sodium chloride (MBP/ADV) 50 mL  2 g IntraVENous Q24H    sodium chloride (NS) flush 5-10 mL  5-10 mL IntraVENous Q8H    sodium chloride (NS) flush 5-10 mL  5-10 mL IntraVENous PRN    tuberculin injection 5 Units  5 Units IntraDERMal ONCE    acetaminophen (TYLENOL) tablet 650 mg  650 mg Oral Q4H PRN    HYDROcodone-acetaminophen (NORCO) 5-325 mg per tablet 1 Tab  1 Tab Oral Q4H PRN    naloxone (NARCAN) injection 0.4 mg  0.4 mg IntraVENous PRN    diphenhydrAMINE (BENADRYL) injection 12.5 mg  12.5 mg IntraVENous Q4H PRN    ondansetron (ZOFRAN) injection 4 mg  4 mg IntraVENous Q4H PRN    bisacodyl (DULCOLAX) tablet 5 mg  5 mg Oral DAILY PRN    albuterol-ipratropium (DUO-NEB) 2.5 MG-0.5 MG/3 ML  3 mL Nebulization Q4H RT    [START ON 4/3/2018] azithromycin (ZITHROMAX) tablet 500 mg  500 mg Oral DAILY    ALPRAZolam (XANAX) tablet 2 mg  2 mg Oral Q8H PRN    apixaban (ELIQUIS) tablet 2.5 mg  2.5 mg Oral BID    atorvastatin (LIPITOR) tablet 20 mg  20 mg Oral QHS    levothyroxine (SYNTHROID) tablet 112 mcg  112 mcg Oral QPM    [START ON 4/3/2018] montelukast (SINGULAIR) tablet 10 mg  10 mg Oral DAILY    [START ON 4/3/2018] naloxegol (MOVANTIK) tablet 25 mg (Patient Supplied)  25 mg Oral DAILY    oxybutynin (DITROPAN) tablet 5 mg  5 mg Oral BID    pantoprazole (PROTONIX) tablet 40 mg  40 mg Oral ACB&D    pregabalin (LYRICA) capsule 150 mg  150 mg Oral BID    sucralfate (CARAFATE) tablet 1 g  1 g Oral QID    SUMAtriptan (IMITREX) tablet 100 mg  100 mg Oral Q8H PRN    ziprasidone (GEODON) capsule 80 mg  80 mg Oral QHS    budesonide (PULMICORT) 500 mcg/2 ml nebulizer suspension  500 mcg Nebulization BID RT    And    albuterol CONCENTRATE 2.5mg/0.5 mL neb soln  2.5 mg Nebulization Q6H RT         Objective:     Vitals:    04/02/18 1503 04/02/18 1526 04/02/18 1630 04/02/18 1658   BP: 107/53  112/65    Pulse: 91  86    Resp: 25  24    Temp:  99.6 °F (37.6 °C) 98.5 °F (36.9 °C)    SpO2: 95%  97% 97%   Weight:       Height:           PHYSICAL EXAM     Constitutional:  the patient is well developed and in no acute distress  EENMT:  Sclera clear, pupils equal, oral mucosa moist  Respiratory: few wheezes  Cardiovascular:  RRR without M,G,R  Gastrointestinal: soft and non-tender; with positive bowel sounds. Musculoskeletal: warm without cyanosis. There is plus 1 lower leg edema.   Skin:  no jaundice or rashes, no wounds   Neurologic: no gross neuro deficits     Psychiatric:  alert and oriented x 3    CXR:        Recent Labs      04/02/18   1240   WBC  15.3*   HGB  11.5*   HCT  37.7   PLT  311     Recent Labs      04/02/18   1240   NA  144   K  3.8   CL  100   GLU  114*   CO2  38*   BUN  14   CREA  1.33*   CA  9.5   ALB  2.9*   TBILI  0.3   ALT  16   SGOT  22       Assessment:  (Medical Decision Making)     Hospital Problems  Date Reviewed: 1/30/2018          Codes Class Noted POA    * (Principal)Community acquired bacterial pneumonia she was treated for pneumonia in 12/2017 which is more then 90 days therefore CAP coverage is adequate  ICD-10-CM: J15.9  ICD-9-CM: 482.9  4/2/2018 Yes        Sepsis due to pneumonia [facility]) ICD-10-CM: J18.9, A41.9  ICD-9-CM: 486, 995.91  4/2/2018 Yes        Chronic anticoagulation (Chronic) on Chronic Eliquis 2.5 mg bid, I think is on 2.5 mg dose for chronic prevention not treatment  ICD-10-CM: Z79.01  ICD-9-CM: V58.61  12/3/2017 Yes        Arthritis (Chronic) ICD-10-CM: M19.90  ICD-9-CM: 716.90  9/16/2012 Yes        Bipolar affective disorder (HCC) (Chronic) ICD-10-CM: F31.9  ICD-9-CM: 296.80  9/16/2012 Yes    Chronic respiratroy failure- on 03 at night only       Asthma - on nebs at home         Plan:  (Medical Decision Making)     -- agree with ABX for CAP  - wean optiflow as tolerated  -pulmicort/albuterol    More than 50% of the time documented was spent in face-to-face contact with the patient and in the care of the patient on the floor/unit where the patient is located. Thank you very much for this referral.  We appreciate the opportunity to participate in this patient's care. Will follow along with above stated plan.     Jonny Rosario MD

## 2018-04-02 NOTE — ED PROVIDER NOTES
CDNotes Templates                         Emergency Department     Chief Complaint:  fever  HPI:  79-year-old female comes from home. Patient is on 4 L of oxygen by nasal cannula. Having increasing shortness of breath today nausea saturations were 80% on EMS arrival.  Blood pressure is 150/80. Heart rate 110. Recently finished antibiotics for pneumonia. Today had fever 103.5. Given a DuoNeb in route to her milliliters of fluid. History of COPD and asthma    . Patient presents with fever, cough, shortness of breath. Severity of symptoms is described as moderate to severe  Symptoms were first noticed ttoday  Onset of symptoms was sudden    Historian:   patient  Review of Systems:  Include pertinent positives and negatives. CONST: Fever chills and weakness  ENT:  Denies: sore throat, nasal congestion  EYES:  Denies: vision changes, discharge  CARDIO: Denies: chest pain, palpitations  RESP:  Cough shortness of breath wheezing  GI:  Denies: abdominal pain, nausea, vomiting, diarrhea  :  No dysuria  MUSC: Denies: muscle aches, joint pain  SKIN:  Denies: rash  NEURO: Denies: headache, confusion  Past Medical History:  Past Medical History:   Diagnosis Date    Acute renal failure ([facility] [address] July, 2014    The patient was admitted with acute renal failure secondary to volume depletion. She was found to have a gastric ulcer on admission.  Arthritis     Asthma     Asthma with acute exacerbation 9/28/2017    Bilateral pulmonary embolism [facility]) September, 2012    Restarted on anticoagulation for lifetime    Calculus of ureter May, 2015    CAP (community acquired pneumonia) October, 2012    RML pneumonia    Cellulitis of left lower extremity 11/1/2017    Chronic pain     fibromyalgia.  back pain    CVA (cerebral infarction) January, 2012    Reportedly has mild left arm residual weakness    Gastric ulcer July, 2014          GERD (gastroesophageal reflux disease)     Gram-positive bacteremia 1/2 cx 12/26/2016    HCAP (healthcare-associated pneumonia) January, 2013    RLL pneumonia    HCAP (healthcare-associated pneumonia) February, 2013    RLL pneumonia    Hypertension     Left leg DVT ([facility] [address] 1991    On coumadin until 2008    Morbid obesity ([facility] [address]     Psychiatric disorder     PUD (peptic ulcer disease) ? ??    Stroke [facility])     2009 mini stroke    Thyroid disease      Past Surgical History:   Procedure Laterality Date    HX APPENDECTOMY      HX CHOLECYSTECTOMY      HX ENDOSCOPY  July, 2014    Gastric ulcers x 2    HX GASTRIC BYPASS      HX HYSTERECTOMY      HX ORTHOPAEDIC      rt knee growth, right shoulder, left thumb    HX OTHER SURGICAL      vagus nerve stimulator    HX TONSILLECTOMY       Social History   Substance Use Topics    Smoking status: Never Smoker    Smokeless tobacco: Never Used      Comment: exposed to second hand smoke at work for 16 years    Alcohol use No     Family History   Problem Relation Age of Onset    Hypertension Mother     Cancer Father      prostate    Heart Attack Father     Heart Disease Father      CAD    Hypertension Father     Hypertension Sister     Stroke Sister     Heart Disease Brother      CAD with CABG    Heart Attack Brother     Hypertension Brother      Previous Medications    ALBUTEROL (PROAIR HFA) 90 MCG/ACTUATION INHALER    Take 2 Puffs by inhalation every four (4) hours as needed for Wheezing. ALBUTEROL (PROVENTIL VENTOLIN) 2.5 MG /3 ML (0.083 %) NEBULIZER SOLUTION    3 mL by Nebulization route four (4) times daily. And every 4 hours as needed cough, wheezing, shortness of breath, J45.909, Medicare part B    ALBUTEROL SULFATE (PROVENTIL;VENTOLIN) 2.5 MG/0.5 ML NEBU NEBULIZER SOLUTION    0.5 mL by Nebulization route every four (4) hours. Dx J45.909    ALPRAZOLAM (XANAX) 2 MG TABLET    Take 1 Tab by mouth three (3) times daily as needed for Anxiety. Max Daily Amount: 6 mg.     AMPHETAMINE-DEXTROAMPHETAMINE XR (ADDERALL XR) 20 MG XR CAPSULE Take 20 mg by mouth daily. APIXABAN (ELIQUIS) 2.5 MG TABLET    Take 2.5 mg by mouth two (2) times a day. Indications: PULMONARY THROMBOEMBOLISM PREVENTION    ATORVASTATIN (LIPITOR) 10 MG TABLET    Take 2 Tabs by mouth nightly. BIOTIN 2,500 MCG CAP    Take  by mouth. CARVEDILOL (COREG) 12.5 MG TABLET    Take 12.5 mg by mouth two (2) times daily (with meals). CHOLECALCIFEROL, VITAMIN D3, (VITAMIN D3) 1,000 UNIT CAP    Take  by mouth. FERROUS FUMARATE/VIT BCOMP&C (SUPER B COMPLEX PO)    Take 1 Tab by mouth daily. FLUTICASONE-VILANTEROL (BREO ELLIPTA) 200-25 MCG/DOSE INHALER    Take 1 Puff by inhalation daily. RINSE MOUTH WELL AFTER USE    FOLIC ACID 042 MCG TABLET    Take 800 mcg by mouth daily. FUROSEMIDE (LASIX) 40 MG TABLET    Take 1 Tab by mouth daily as needed. HYDROCODONE-ACETAMINOPHEN (NORCO)  MG TABLET    Take 1 Tab by mouth every six (6) hours as needed for Pain. IPRATROPIUM (ATROVENT) 0.03 % NASAL SPRAY    2 Sprays every twelve (12) hours. LEVOFLOXACIN (LEVAQUIN) 250 MG TABLET    Take  by mouth daily. LEVOTHYROXINE (SYNTHROID) 125 MCG TABLET    Take 112 mcg by mouth every evening. LISINOPRIL (PRINIVIL, ZESTRIL) 40 MG TABLET    Take 1 Tab by mouth daily. MAGNESIUM OXIDE 500 MG CAP    Take 500 mg by mouth. MIRTAZAPINE (REMERON) 30 MG TABLET    Take 45 mg by mouth nightly. MONTELUKAST (SINGULAIR) 10 MG TABLET    Take 10 mg by mouth daily. NALOXEGOL (MOVANTIK) 25 MG TAB TABLET    Take  by mouth Daily (before breakfast). NEBULIZER    by Does Not Apply route. NICOTINIC ACID (NIACIN) 500 MG TABLET    Take 500 mg by mouth Daily (before breakfast). NYSTATIN (MYCOSTATIN) POWDER    Apply  to affected area two (2) times a day. ONDANSETRON (ZOFRAN ODT) 8 MG DISINTEGRATING TABLET    Take 8 mg by mouth every eight (8) hours as needed for Nausea. OXYBUTYNIN CHLORIDE XL (DITROPAN XL) 10 MG CR TABLET    Take 10 mg by mouth daily.     OXYGEN-AIR DELIVERY SYSTEMS    2 lpm cont. PANTOPRAZOLE (PROTONIX) 40 MG TABLET    Take 1 Tab by mouth Before breakfast and dinner. POTASSIUM CHLORIDE SR 10 MEQ TAB    Take 1 Tab by mouth daily. PREGABALIN (LYRICA) 150 MG CAPSULE    Take  by mouth two (2) times a day. SUCRALFATE (CARAFATE) 1 GRAM TABLET    Take 1 g by mouth four (4) times daily. SUMATRIPTAN SUCCINATE (IMITREX PO)    take 100 mg by mouth as needed. UBIDECARENONE/VITAMIN E MIXED (COQ10  PO)    Take  by mouth. ZIPRASIDONE (GEODON) 80 MG CAPSULE    Take 80 mg by mouth nightly. Allergies as of 04/02/2018 - Review Complete 04/02/2018   Allergen Reaction Noted    Latex Contact Dermatitis 05/12/2009    Bactrim [sulfamethoxazole-trimethoprim] Nausea Only 05/12/2009    Lamictal [lamotrigine] Atopic Dermatitis 09/20/2010    Pcn [penicillins] Rash 05/12/2009    Tapentadol Rash 09/20/2010       Physical Exam:    Vital signs:   Visit Vitals    Pulse (!) 112    Temp (!) 103.5 °F (39.7 °C)    Resp 30    Ht 5' 1\" (1.549 m)    Wt 117.9 kg (260 lb)    SpO2 (P) 93%    BMI 49.13 kg/m2       Vital signs were reviewed.   Pulse oximetry interpretation: 93%-abnormal    General Appear: Ill-appearing 60-year-old female  Ears/Nose/Throat: Mucous membranes moist.  Oropharynx is clear  Eyes:   PERRL, EOMI, no discharge  Neck:   supple, FROM, no lymphadenopathy, no meningismus  Cardiovascular: Regular tachycardic intact distal pulses  Respiratory:  Scattered wheezing and poor air movement  Abdomen:  soft, non-tender, non-distended,   Musculoskeletal: no edema  Skin:   dry, no rash  Neurologic:  alert and oriented, non-focal  _______________________________________________________________________    LABS/RADIOLOGY/EKG:    Labs:     Results for orders placed or performed during the hospital encounter of 04/02/18   INFLUENZA A & B AG (RAPID TEST)   Result Value Ref Range    Influenza A Ag NEGATIVE  NEG      Influenza B Ag NEGATIVE  NEG     CBC WITH AUTOMATED DIFF   Result Value Ref Range    WBC 15.3 (H) 4.3 - 11.1 K/uL    RBC 4.51 4.05 - 5.25 M/uL    HGB 11.5 (L) 11.7 - 15.4 g/dL    HCT 37.7 35.8 - 46.3 %    MCV 83.6 79.6 - 97.8 FL    MCH 25.5 (L) 26.1 - 32.9 PG    MCHC 30.5 (L) 31.4 - 35.0 g/dL    RDW 18.8 (H) 11.9 - 14.6 %    PLATELET 347 669 - 023 K/uL    MPV 10.2 (L) 10.8 - 14.1 FL    DF AUTOMATED      NEUTROPHILS 76 43 - 78 %    LYMPHOCYTES 12 (L) 13 - 44 %    MONOCYTES 8 4.0 - 12.0 %    EOSINOPHILS 4 0.5 - 7.8 %    BASOPHILS 0 0.0 - 2.0 %    IMMATURE GRANULOCYTES 0 0.0 - 5.0 %    ABS. NEUTROPHILS 11.7 (H) 1.7 - 8.2 K/UL    ABS. LYMPHOCYTES 1.8 0.5 - 4.6 K/UL    ABS. MONOCYTES 1.2 0.1 - 1.3 K/UL    ABS. EOSINOPHILS 0.6 0.0 - 0.8 K/UL    ABS. BASOPHILS 0.0 0.0 - 0.2 K/UL    ABS. IMM. GRANS. 0.0 0.0 - 0.5 K/UL   METABOLIC PANEL, COMPREHENSIVE   Result Value Ref Range    Sodium 144 136 - 145 mmol/L    Potassium 3.8 3.5 - 5.1 mmol/L    Chloride 100 98 - 107 mmol/L    CO2 38 (H) 21 - 32 mmol/L    Anion gap 6 (L) 7 - 16 mmol/L    Glucose 114 (H) 65 - 100 mg/dL    BUN 14 8 - 23 MG/DL    Creatinine 1.33 (H) 0.6 - 1.0 MG/DL    GFR est AA 51 (L) >60 ml/min/1.73m2    GFR est non-AA 42 (L) >60 ml/min/1.73m2    Calcium 9.5 8.3 - 10.4 MG/DL    Bilirubin, total 0.3 0.2 - 1.1 MG/DL    ALT (SGPT) 16 12 - 65 U/L    AST (SGOT) 22 15 - 37 U/L    Alk.  phosphatase 135 50 - 136 U/L    Protein, total 7.1 6.3 - 8.2 g/dL    Albumin 2.9 (L) 3.2 - 4.6 g/dL    Globulin 4.2 (H) 2.3 - 3.5 g/dL    A-G Ratio 0.7 (L) 1.2 - 3.5     POC LACTIC ACID   Result Value Ref Range    Lactic Acid (POC) 1.4 0.5 - 1.9 mmol/L   POC TROPONIN-I   Result Value Ref Range    Troponin-I (POC) 0 (L) 0.02 - 0.05 ng/ml   EKG, 12 LEAD, INITIAL   Result Value Ref Range    Ventricular Rate 109 BPM    Atrial Rate 110 BPM    P-R Interval 196 ms    QRS Duration 72 ms    Q-T Interval 304 ms    QTC Calculation (Bezet) 409 ms    Calculated P Axis 43 degrees    Calculated R Axis -28 degrees    Calculated T Axis 20 degrees Diagnosis       !! AGE AND GENDER SPECIFIC ECG ANALYSIS !! Sinus tachycardia  Inferior infarct , age undetermined  Anterolateral infarct , age undetermined  Abnormal ECG  !!! Poor data quality, interpretation may be adversely affected  Confirmed by CALEB LARSON (), Aziza Sweet ([id number]) on 4/2/2018 1:15:17 PM       Labs were reviewed and interpreted by me. Radiology studies performed:    XR CHEST SNGL V    (Results Pending)       _______________________________________________________________________  Progress:    58-year-old female recently finished antibiotics presents with fever to 103 tachycardic. Not hypotensive. Lactate is less than 4    Does not meet criteria for septic shock. She doesn't meet severe sepsis criteria    Blood and blood cultures are obtained. And about extrinsic using    On recheck she is requiring a nonrebreather. Oxygen saturations are 92-94%    Lungs continued to have diffuse wheezing laterally. We'll repeat her neb. Consult hospitalist for admission.     Nursing notes were reviewed: yes  Old medical records: obtained and reviewed:  yes  Discussed patient with another provider: yes  _______________________________________________________________________  Assessment/Plan:    acute febrile illness likely pneumonia will admit  _______________________________________________________________________  Condition:  guarded  Disposition:  aadmit  Diagnosis:  Acute febrile illness, severe sepsis      Zachary Dudley M.D.      Demetrice Hernandez; version 2.0; revised April, 2016

## 2018-04-03 LAB
ANION GAP SERPL CALC-SCNC: 5 MMOL/L (ref 7–16)
BUN SERPL-MCNC: 17 MG/DL (ref 8–23)
CALCIUM SERPL-MCNC: 8.8 MG/DL (ref 8.3–10.4)
CHLORIDE SERPL-SCNC: 100 MMOL/L (ref 98–107)
CO2 SERPL-SCNC: 37 MMOL/L (ref 21–32)
CREAT SERPL-MCNC: 1.43 MG/DL (ref 0.6–1)
ERYTHROCYTE [DISTWIDTH] IN BLOOD BY AUTOMATED COUNT: 19.5 % (ref 11.9–14.6)
GLUCOSE SERPL-MCNC: 90 MG/DL (ref 65–100)
HCT VFR BLD AUTO: 32.9 % (ref 35.8–46.3)
HGB BLD-MCNC: 9.8 G/DL (ref 11.7–15.4)
MAGNESIUM SERPL-MCNC: 2.4 MG/DL (ref 1.8–2.4)
MCH RBC QN AUTO: 25 PG (ref 26.1–32.9)
MCHC RBC AUTO-ENTMCNC: 29.8 G/DL (ref 31.4–35)
MCV RBC AUTO: 83.9 FL (ref 79.6–97.8)
PLATELET # BLD AUTO: 265 K/UL (ref 150–450)
PMV BLD AUTO: 10.2 FL (ref 10.8–14.1)
POTASSIUM SERPL-SCNC: 3.7 MMOL/L (ref 3.5–5.1)
RBC # BLD AUTO: 3.92 M/UL (ref 4.05–5.25)
SODIUM SERPL-SCNC: 142 MMOL/L (ref 136–145)
WBC # BLD AUTO: 17.5 K/UL (ref 4.3–11.1)

## 2018-04-03 PROCEDURE — 74011250636 HC RX REV CODE- 250/636: Performed by: EMERGENCY MEDICINE

## 2018-04-03 PROCEDURE — 83735 ASSAY OF MAGNESIUM: CPT | Performed by: INTERNAL MEDICINE

## 2018-04-03 PROCEDURE — 77010033711 HC HIGH FLOW OXYGEN

## 2018-04-03 PROCEDURE — 74011250637 HC RX REV CODE- 250/637: Performed by: HOSPITALIST

## 2018-04-03 PROCEDURE — 74011000250 HC RX REV CODE- 250: Performed by: INTERNAL MEDICINE

## 2018-04-03 PROCEDURE — 36415 COLL VENOUS BLD VENIPUNCTURE: CPT | Performed by: INTERNAL MEDICINE

## 2018-04-03 PROCEDURE — 74011250637 HC RX REV CODE- 250/637: Performed by: NURSE PRACTITIONER

## 2018-04-03 PROCEDURE — 74011000258 HC RX REV CODE- 258: Performed by: EMERGENCY MEDICINE

## 2018-04-03 PROCEDURE — 94640 AIRWAY INHALATION TREATMENT: CPT

## 2018-04-03 PROCEDURE — 85027 COMPLETE CBC AUTOMATED: CPT | Performed by: INTERNAL MEDICINE

## 2018-04-03 PROCEDURE — 74011250637 HC RX REV CODE- 250/637: Performed by: INTERNAL MEDICINE

## 2018-04-03 PROCEDURE — 94668 MNPJ CHEST WALL SBSQ: CPT

## 2018-04-03 PROCEDURE — 65270000029 HC RM PRIVATE

## 2018-04-03 PROCEDURE — 94760 N-INVAS EAR/PLS OXIMETRY 1: CPT

## 2018-04-03 PROCEDURE — 80048 BASIC METABOLIC PNL TOTAL CA: CPT | Performed by: INTERNAL MEDICINE

## 2018-04-03 PROCEDURE — 74011250636 HC RX REV CODE- 250/636: Performed by: INTERNAL MEDICINE

## 2018-04-03 PROCEDURE — 99232 SBSQ HOSP IP/OBS MODERATE 35: CPT | Performed by: INTERNAL MEDICINE

## 2018-04-03 RX ORDER — PROPRANOLOL HYDROCHLORIDE 10 MG/1
10 TABLET ORAL
Status: DISCONTINUED | OUTPATIENT
Start: 2018-04-03 | End: 2018-04-06 | Stop reason: HOSPADM

## 2018-04-03 RX ORDER — ALBUTEROL SULFATE 0.83 MG/ML
2.5 SOLUTION RESPIRATORY (INHALATION)
Status: DISCONTINUED | OUTPATIENT
Start: 2018-04-03 | End: 2018-04-06 | Stop reason: HOSPADM

## 2018-04-03 RX ORDER — MIRTAZAPINE 15 MG/1
15 TABLET, FILM COATED ORAL
Status: DISCONTINUED | OUTPATIENT
Start: 2018-04-03 | End: 2018-04-06 | Stop reason: HOSPADM

## 2018-04-03 RX ORDER — GUAIFENESIN 600 MG/1
1200 TABLET, EXTENDED RELEASE ORAL 2 TIMES DAILY
Status: DISCONTINUED | OUTPATIENT
Start: 2018-04-03 | End: 2018-04-06 | Stop reason: HOSPADM

## 2018-04-03 RX ADMIN — OXYBUTYNIN CHLORIDE 5 MG: 5 TABLET ORAL at 10:05

## 2018-04-03 RX ADMIN — SUCRALFATE 1 G: 1 TABLET ORAL at 12:47

## 2018-04-03 RX ADMIN — PANTOPRAZOLE SODIUM 40 MG: 40 TABLET, DELAYED RELEASE ORAL at 17:49

## 2018-04-03 RX ADMIN — GUAIFENESIN 1200 MG: 600 TABLET, EXTENDED RELEASE ORAL at 14:25

## 2018-04-03 RX ADMIN — Medication 10 ML: at 22:09

## 2018-04-03 RX ADMIN — MONTELUKAST SODIUM 10 MG: 10 TABLET, FILM COATED ORAL at 10:05

## 2018-04-03 RX ADMIN — Medication 10 ML: at 07:35

## 2018-04-03 RX ADMIN — BUDESONIDE 500 MCG: 0.5 INHALANT RESPIRATORY (INHALATION) at 19:35

## 2018-04-03 RX ADMIN — HYDROCODONE BITARTRATE AND ACETAMINOPHEN 1 TABLET: 5; 325 TABLET ORAL at 14:25

## 2018-04-03 RX ADMIN — IPRATROPIUM BROMIDE AND ALBUTEROL SULFATE 3 ML: .5; 3 SOLUTION RESPIRATORY (INHALATION) at 11:00

## 2018-04-03 RX ADMIN — MIRTAZAPINE 15 MG: 15 TABLET, FILM COATED ORAL at 22:07

## 2018-04-03 RX ADMIN — PREGABALIN 150 MG: 150 CAPSULE ORAL at 17:49

## 2018-04-03 RX ADMIN — HYDROCODONE BITARTRATE AND ACETAMINOPHEN 1 TABLET: 5; 325 TABLET ORAL at 03:46

## 2018-04-03 RX ADMIN — APIXABAN 2.5 MG: 2.5 TABLET, FILM COATED ORAL at 17:50

## 2018-04-03 RX ADMIN — OXYBUTYNIN CHLORIDE 5 MG: 5 TABLET ORAL at 17:50

## 2018-04-03 RX ADMIN — IPRATROPIUM BROMIDE AND ALBUTEROL SULFATE 3 ML: .5; 3 SOLUTION RESPIRATORY (INHALATION) at 07:18

## 2018-04-03 RX ADMIN — ATORVASTATIN CALCIUM 20 MG: 10 TABLET, FILM COATED ORAL at 22:07

## 2018-04-03 RX ADMIN — APIXABAN 2.5 MG: 2.5 TABLET, FILM COATED ORAL at 10:05

## 2018-04-03 RX ADMIN — PREGABALIN 150 MG: 150 CAPSULE ORAL at 10:05

## 2018-04-03 RX ADMIN — SUCRALFATE 1 G: 1 TABLET ORAL at 10:05

## 2018-04-03 RX ADMIN — BUDESONIDE 500 MCG: 0.5 INHALANT RESPIRATORY (INHALATION) at 07:18

## 2018-04-03 RX ADMIN — ZIPRASIDONE HYDROCHLORIDE 80 MG: 20 CAPSULE ORAL at 22:07

## 2018-04-03 RX ADMIN — PANTOPRAZOLE SODIUM 40 MG: 40 TABLET, DELAYED RELEASE ORAL at 07:33

## 2018-04-03 RX ADMIN — Medication 10 ML: at 14:17

## 2018-04-03 RX ADMIN — ONDANSETRON 4 MG: 2 INJECTION INTRAMUSCULAR; INTRAVENOUS at 16:41

## 2018-04-03 RX ADMIN — LEVOTHYROXINE SODIUM 112 MCG: 112 TABLET ORAL at 17:49

## 2018-04-03 RX ADMIN — SUCRALFATE 1 G: 1 TABLET ORAL at 22:07

## 2018-04-03 RX ADMIN — ONDANSETRON 4 MG: 2 INJECTION INTRAMUSCULAR; INTRAVENOUS at 12:48

## 2018-04-03 RX ADMIN — SUCRALFATE 1 G: 1 TABLET ORAL at 17:49

## 2018-04-03 RX ADMIN — ALBUTEROL SULFATE 2.5 MG: 2.5 SOLUTION RESPIRATORY (INHALATION) at 19:34

## 2018-04-03 RX ADMIN — PROPRANOLOL HYDROCHLORIDE 10 MG: 10 TABLET ORAL at 14:25

## 2018-04-03 RX ADMIN — AZITHROMYCIN 500 MG: 250 TABLET, FILM COATED ORAL at 10:05

## 2018-04-03 RX ADMIN — ALPRAZOLAM 2 MG: 0.5 TABLET ORAL at 16:41

## 2018-04-03 RX ADMIN — CEFTRIAXONE SODIUM 2 G: 2 INJECTION, POWDER, FOR SOLUTION INTRAMUSCULAR; INTRAVENOUS at 12:49

## 2018-04-03 NOTE — PROGRESS NOTES
Cole Sullivan  Admission Date: 4/2/2018             Daily Progress Note: 4/3/2018    The patient's chart is reviewed and the patient is discussed with the staff. Patient is a 78 y.o.  female, PMH CVA, pneumonia, and bilateral PEs here with pneumonia. The patient had fever, chills, and temp 102 at home. When she presented to emergency room she was found to have pneumonia she was admitted by hospitalist.  Patient had similar presentation in December 2017 with pneumonia as well. She does not have sleep apnea and wears O2 at night. Subjective:     Resting quietly in bed, states feels \"better than yesterday\". Daughter at bedside.     Current Facility-Administered Medications   Medication Dose Route Frequency    mirtazapine (REMERON) tablet 15 mg  15 mg Oral QHS    propranolol (INDERAL) tablet 10 mg  10 mg Oral Q6H PRN    sodium chloride (NS) flush 5-10 mL  5-10 mL IntraVENous PRN    cefTRIAXone (ROCEPHIN) 2 g in 0.9% sodium chloride (MBP/ADV) 50 mL  2 g IntraVENous Q24H    sodium chloride (NS) flush 5-10 mL  5-10 mL IntraVENous Q8H    sodium chloride (NS) flush 5-10 mL  5-10 mL IntraVENous PRN    tuberculin injection 5 Units  5 Units IntraDERMal ONCE    acetaminophen (TYLENOL) tablet 650 mg  650 mg Oral Q4H PRN    HYDROcodone-acetaminophen (NORCO) 5-325 mg per tablet 1 Tab  1 Tab Oral Q4H PRN    naloxone (NARCAN) injection 0.4 mg  0.4 mg IntraVENous PRN    diphenhydrAMINE (BENADRYL) injection 12.5 mg  12.5 mg IntraVENous Q4H PRN    ondansetron (ZOFRAN) injection 4 mg  4 mg IntraVENous Q4H PRN    bisacodyl (DULCOLAX) tablet 5 mg  5 mg Oral DAILY PRN    albuterol-ipratropium (DUO-NEB) 2.5 MG-0.5 MG/3 ML  3 mL Nebulization Q4H RT    azithromycin (ZITHROMAX) tablet 500 mg  500 mg Oral DAILY    ALPRAZolam (XANAX) tablet 2 mg  2 mg Oral Q8H PRN    apixaban (ELIQUIS) tablet 2.5 mg  2.5 mg Oral BID    atorvastatin (LIPITOR) tablet 20 mg  20 mg Oral QHS    levothyroxine (SYNTHROID) tablet 112 mcg  112 mcg Oral QPM    montelukast (SINGULAIR) tablet 10 mg  10 mg Oral DAILY    naloxegol (MOVANTIK) tablet 25 mg (Patient Supplied)  25 mg Oral DAILY    oxybutynin (DITROPAN) tablet 5 mg  5 mg Oral BID    pantoprazole (PROTONIX) tablet 40 mg  40 mg Oral ACB&D    pregabalin (LYRICA) capsule 150 mg  150 mg Oral BID    sucralfate (CARAFATE) tablet 1 g  1 g Oral QID    SUMAtriptan (IMITREX) tablet 100 mg  100 mg Oral Q8H PRN    ziprasidone (GEODON) capsule 80 mg  80 mg Oral QHS    budesonide (PULMICORT) 500 mcg/2 ml nebulizer suspension  500 mcg Nebulization BID RT    And    albuterol CONCENTRATE 2.5mg/0.5 mL neb soln  2.5 mg Nebulization Q6H RT       Review of Systems    Constitutional: negative for fever, chills, sweats  Cardiovascular: negative for chest pain, palpitations, syncope, edema  Gastrointestinal:  negative for vomiting, diarrhea, abdominal pain, or melena  Neurologic:  negative for focal weakness, numbness, headache    Objective:     Vitals:    04/03/18 0718 04/03/18 0847 04/03/18 1100 04/03/18 1209   BP:  138/75  112/77   Pulse:  77  77   Resp:  20  20   Temp:  98.1 °F (36.7 °C)  98.6 °F (37 °C)   SpO2: 93% 95% 93% 97%   Weight:       Height:         Intake and Output:           Physical Exam:   Constitution:  the patient is well developed and in no acute distress  EENMT:  Sclera clear, pupils equal, oral mucosa moist  Respiratory: No crackles, scattered wheezes  Cardiovascular:  RRR without M,G,R  Gastrointestinal: soft and non-tender; with positive bowel sounds. Musculoskeletal: warm without cyanosis. There is generalized lower leg edema. Skin:  no jaundice or rashes, no wounds   Neurologic: no gross neuro deficits     Psychiatric:  alert and oriented x 3, following commands    CXR:   4/2/2018    IMPRESSION: Linear infiltrate within the right lower lobe    LAB  No results for input(s): GLUCPOC in the last 72 hours.     No lab exists for component: [place]   Recent Labs      04/03/18   0554  04/02/18   1240   WBC  17.5*  15.3*   HGB  9.8*  11.5*   HCT  32.9*  37.7   PLT  265  311     Recent Labs      04/03/18   0554  04/02/18   1240   NA  142  144   K  3.7  3.8   CL  100  100   CO2  37*  38*   GLU  90  114*   BUN  17  14   CREA  1.43*  1.33*   MG  2.4   --    CA  8.8  9.5   ALB   --   2.9*   TBILI   --   0.3   ALT   --   16   SGOT   --   22     No results for input(s): PH, PCO2, PO2, HCO3 in the last 72 hours. No results for input(s): LCAD, LAC in the last 72 hours.       Assessment:  (Medical Decision Making)     Hospital Problems  Date Reviewed: 4/3/2018          Codes Class Noted POA    * (Principal)Community acquired bacterial pneumonia ICD-10-CM: J15.9  ICD-9-CM: 482.9  4/2/2018 Yes    Continue IV antibiotics    Sepsis due to pneumonia [facility]) ICD-10-CM: J18.9, A41.9  ICD-9-CM: 480, 995.91  4/2/2018 Yes    Blood cultures pending    Chronic anticoagulation (Chronic) ICD-10-CM: Z79.01  ICD-9-CM: V58.61  12/3/2017 Yes    Chronic    Fibromyalgia (Chronic) ICD-10-CM: M79.7  ICD-9-CM: 729.1  11/1/2017 Yes    Chronic    Morbid obesity - sedentary lifestyle (Chronic) ICD-10-CM: E66.01  ICD-9-CM: 278.01  11/1/2017 Yes    Chronic    Debility (Chronic) ICD-10-CM: R53.81  ICD-9-CM: 799.3  11/1/2017 Yes    Chronic    Polypharmacy (Chronic) ICD-10-CM: Z40.492  ICD-9-CM: V58.69  11/1/2017 Yes    Chronic    Restrictive lung disease (Chronic) ICD-10-CM: J98.4  ICD-9-CM: 518.89  11/1/2017 Yes    Continue nebulizers    Hypothyroidism (Chronic) ICD-10-CM: E03.9  ICD-9-CM: 244.9  9/28/2017 Yes    Chronic    Hx of pulmonary embolus (Chronic) ICD-10-CM: Z86.711  ICD-9-CM: V12.55  9/28/2017 Yes    Overview Signed 9/28/2017  3:18 PM by John Paul Basurto NP     Chronic Eliquis         Chronic, continue anticoagulation    Stress incontinence in female (Chronic) ICD-10-CM: N39.3  ICD-9-CM: 625.6  10/14/2014 Yes    Chronic    Hypertension (Chronic) ICD-10-CM: I10  ICD-9-CM: 401.9  9/16/2012 Yes    Chronic    GERD (gastroesophageal reflux disease) (Chronic) ICD-10-CM: K21.9  ICD-9-CM: 530.81  9/16/2012 Yes    Chronic, continue protonix    Arthritis (Chronic) ICD-10-CM: M19.90  ICD-9-CM: 716.90  9/16/2012 Yes    Chronic    Bipolar affective disorder (HCC) (Chronic) ICD-10-CM: F31.9  ICD-9-CM: 296.80  9/16/2012 Yes    Chronic, continue meds          Plan:  (Medical Decision Making)     --Continue nebulizers  --Blood cultures with no growth at 17 hours, will continue to follow  --Continue antibiotics, WBC up to 17.5 today, will follow  --Wean OptiFlow as tolerated      More than 50% of the time documented was spent in face-to-face contact with the patient and in the care of the patient on the floor/unit where the patient is located. Marcela Pugh NP     Lungs:  Decreased BS with scattered rhonchi  Heart:  RRR with no Murmur/Rubs/Gallops    Additional Comments:  Pt had recent sleep study with AHI of only 3.1. Had desats to 73% and did NOT go into REM sleep. Will add mucolytics and repeat ANN on oxygen at home after discharge to assure adequate oxygenation. She could have very severe desats during REM. I have spoken with and examined the patient. I agree with the above assessment and plan as documented.     John Narvaez MD

## 2018-04-03 NOTE — PROGRESS NOTES
Hourly rounds completed throughout this shift. Pt c/o nausea this shift, zofran given per MAR. Pt reports nausea starting to calm down. Pain medicine given per MAR. Pt had a bm today. Pt resting in bed; denies needs at this time. Will continue to monitor and report to oncoming night shift nurse.

## 2018-04-03 NOTE — PROGRESS NOTES
responded to pt's desire for a HCPOA.  arrived to assist pt and found that pt was sick to her stomach holding vomit bag in her hand.  suggested in light of her not feeling well today to revisit completing the [facility] tomorrow. Pt agreed.  provided spiritual care through presence, pastoral conversation, and assurance of prayer.

## 2018-04-03 NOTE — PROGRESS NOTES
Hospitalist Progress Note    4/3/2018  Admit Date: 2018 12:18 PM   NAME: Ana Sanches   :  1950   MRN:  [id number]   Attending: Destiny Rizvi MD  PCP:  Alivia Muro DO    SUBJECTIVE:   Patient seen and examined at bedside this morning. She was wearing Opti flow and denied any difficulty breathing but did say she had a dry cough.      Review of Systems negative with exception of pertinent positives noted above  PHYSICAL EXAM     Visit Vitals    /75 (BP 1 Location: Left arm, BP Patient Position: At rest)    Pulse 77    Temp 98.1 °F (36.7 °C)    Resp 20    Ht 5' 1\" (1.549 m)    Wt 117.9 kg (260 lb)    SpO2 93%    BMI 49.13 kg/m2      Temp (24hrs), Av.7 °F (37.6 °C), Min:98.1 °F (36.7 °C), Max:103.5 °F (39.7 °C)    Oxygen Therapy  O2 Sat (%): 93 % (18 1100)  Pulse via Oximetry: 77 beats per minute (18 1100)  O2 Device: Hi flow nasal cannula (18 1101)  O2 Flow Rate (L/min): 6 l/min (18 1101)  O2 Temperature: 87.8 °F (31 °C) (18 1100)  FIO2 (%): 45 % (18 1100)  No intake or output data in the 24 hours ending 18 1118     General: No acute distress    Lungs:  Minimal bilateral expiratory wheezing  Heart:  Regular rate and rhythm,  No murmur, rub, or gallop  Abdomen: Soft, Non distended, Non tender, Positive bowel sounds  Extremities: No cyanosis, clubbing, 1+ LE edema  Neurologic:  No focal deficits    ASSESSMENT      Active Hospital Problems    Diagnosis Date Noted    Community acquired bacterial pneumonia 2018    Sepsis due to pneumonia [facility]) 2018    Chronic anticoagulation 2017    Fibromyalgia 2017    Debility 2017    Morbid obesity - sedentary lifestyle 2017    Polypharmacy 2017    Restrictive lung disease 2017    Hx of pulmonary embolus 2017     Chronic Eliquis      Hypothyroidism 2017    Stress incontinence in female 10/14/2014    Arthritis 2012    Bipolar affective disorder ([facility] [address] 09/16/2012    GERD (gastroesophageal reflux disease) 09/16/2012    Hypertension 09/16/2012     Plan:  · CAP- continue patient on Rocephin and Azithromycin. Continue nebulizers. Follow up pulmonary recommendations, wean optiflow as tolerated. · History of PE- continue with Eliquis. · Bipolar Disorder- continue with Geodon and Remeron  · Fibromyalgia- continue with Lyrica. · Hypothyroidism- continue with Synthroid.     DVT Prophylaxis: Eliquis    Signed By: Inga Toro MD     April 3, 2018

## 2018-04-03 NOTE — ACP (ADVANCE CARE PLANNING)
responded to pt's desire for a HCPOA.  arrived to assist pt and found that pt was sick to her stomach holding vomit bag in her hand.  suggested in light of her not feeling well today to revisit completing the [facility] tomorrow. Pt agreed.  provided spiritual care through presence, pastoral conversation, and assurance of prayer.

## 2018-04-03 NOTE — INTERDISCIPLINARY ROUNDS
Interdisciplinary Rounds completed 04/03/2018. Nursing, Case Management, and Physician present.     Plan of care reviewed and updated.

## 2018-04-03 NOTE — PROGRESS NOTES
Hourly complete this shift. Patient voiding during the night. No new complaints at this time. Will continue to monitor. Report to day shift nurse.

## 2018-04-04 LAB
ALBUMIN SERPL-MCNC: 2.2 G/DL (ref 3.2–4.6)
ALBUMIN/GLOB SERPL: 0.6 {RATIO} (ref 1.2–3.5)
ALP SERPL-CCNC: 108 U/L (ref 50–136)
ALT SERPL-CCNC: 12 U/L (ref 12–65)
ANION GAP SERPL CALC-SCNC: 7 MMOL/L (ref 7–16)
AST SERPL-CCNC: 23 U/L (ref 15–37)
BILIRUB SERPL-MCNC: 0.2 MG/DL (ref 0.2–1.1)
BUN SERPL-MCNC: 8 MG/DL (ref 8–23)
CALCIUM SERPL-MCNC: 8.8 MG/DL (ref 8.3–10.4)
CHLORIDE SERPL-SCNC: 104 MMOL/L (ref 98–107)
CO2 SERPL-SCNC: 34 MMOL/L (ref 21–32)
CREAT SERPL-MCNC: 0.96 MG/DL (ref 0.6–1)
ERYTHROCYTE [DISTWIDTH] IN BLOOD BY AUTOMATED COUNT: 18.9 % (ref 11.9–14.6)
GLOBULIN SER CALC-MCNC: 3.9 G/DL (ref 2.3–3.5)
GLUCOSE SERPL-MCNC: 90 MG/DL (ref 65–100)
HCT VFR BLD AUTO: 33.6 % (ref 35.8–46.3)
HGB BLD-MCNC: 9.9 G/DL (ref 11.7–15.4)
MCH RBC QN AUTO: 25.1 PG (ref 26.1–32.9)
MCHC RBC AUTO-ENTMCNC: 29.5 G/DL (ref 31.4–35)
MCV RBC AUTO: 85.3 FL (ref 79.6–97.8)
PLATELET # BLD AUTO: 246 K/UL (ref 150–450)
PMV BLD AUTO: 10.1 FL (ref 10.8–14.1)
POTASSIUM SERPL-SCNC: 3.7 MMOL/L (ref 3.5–5.1)
PROT SERPL-MCNC: 6.1 G/DL (ref 6.3–8.2)
RBC # BLD AUTO: 3.94 M/UL (ref 4.05–5.25)
SODIUM SERPL-SCNC: 145 MMOL/L (ref 136–145)
WBC # BLD AUTO: 12.3 K/UL (ref 4.3–11.1)

## 2018-04-04 PROCEDURE — 94760 N-INVAS EAR/PLS OXIMETRY 1: CPT

## 2018-04-04 PROCEDURE — 74011250636 HC RX REV CODE- 250/636: Performed by: EMERGENCY MEDICINE

## 2018-04-04 PROCEDURE — 74011250637 HC RX REV CODE- 250/637: Performed by: INTERNAL MEDICINE

## 2018-04-04 PROCEDURE — 74011000258 HC RX REV CODE- 258: Performed by: EMERGENCY MEDICINE

## 2018-04-04 PROCEDURE — 99232 SBSQ HOSP IP/OBS MODERATE 35: CPT | Performed by: INTERNAL MEDICINE

## 2018-04-04 PROCEDURE — 36415 COLL VENOUS BLD VENIPUNCTURE: CPT | Performed by: INTERNAL MEDICINE

## 2018-04-04 PROCEDURE — 74011000250 HC RX REV CODE- 250: Performed by: INTERNAL MEDICINE

## 2018-04-04 PROCEDURE — 74011250637 HC RX REV CODE- 250/637: Performed by: HOSPITALIST

## 2018-04-04 PROCEDURE — 85027 COMPLETE CBC AUTOMATED: CPT | Performed by: INTERNAL MEDICINE

## 2018-04-04 PROCEDURE — 80053 COMPREHEN METABOLIC PANEL: CPT | Performed by: INTERNAL MEDICINE

## 2018-04-04 PROCEDURE — 94640 AIRWAY INHALATION TREATMENT: CPT

## 2018-04-04 PROCEDURE — 77010033678 HC OXYGEN DAILY

## 2018-04-04 PROCEDURE — 65270000029 HC RM PRIVATE

## 2018-04-04 PROCEDURE — 74011250636 HC RX REV CODE- 250/636: Performed by: INTERNAL MEDICINE

## 2018-04-04 RX ADMIN — Medication 10 ML: at 06:34

## 2018-04-04 RX ADMIN — CEFTRIAXONE SODIUM 2 G: 2 INJECTION, POWDER, FOR SOLUTION INTRAMUSCULAR; INTRAVENOUS at 12:19

## 2018-04-04 RX ADMIN — BUDESONIDE 500 MCG: 0.5 INHALANT RESPIRATORY (INHALATION) at 07:17

## 2018-04-04 RX ADMIN — SUCRALFATE 1 G: 1 TABLET ORAL at 12:19

## 2018-04-04 RX ADMIN — ALBUTEROL SULFATE 2.5 MG: 2.5 SOLUTION RESPIRATORY (INHALATION) at 15:38

## 2018-04-04 RX ADMIN — ATORVASTATIN CALCIUM 20 MG: 10 TABLET, FILM COATED ORAL at 22:04

## 2018-04-04 RX ADMIN — ALBUTEROL SULFATE 2.5 MG: 2.5 SOLUTION RESPIRATORY (INHALATION) at 07:17

## 2018-04-04 RX ADMIN — ZIPRASIDONE HYDROCHLORIDE 80 MG: 20 CAPSULE ORAL at 22:05

## 2018-04-04 RX ADMIN — GUAIFENESIN 1200 MG: 600 TABLET, EXTENDED RELEASE ORAL at 09:24

## 2018-04-04 RX ADMIN — APIXABAN 2.5 MG: 2.5 TABLET, FILM COATED ORAL at 17:20

## 2018-04-04 RX ADMIN — PANTOPRAZOLE SODIUM 40 MG: 40 TABLET, DELAYED RELEASE ORAL at 06:32

## 2018-04-04 RX ADMIN — SUCRALFATE 1 G: 1 TABLET ORAL at 22:05

## 2018-04-04 RX ADMIN — HYDROCODONE BITARTRATE AND ACETAMINOPHEN 1 TABLET: 5; 325 TABLET ORAL at 09:51

## 2018-04-04 RX ADMIN — PREGABALIN 150 MG: 150 CAPSULE ORAL at 17:20

## 2018-04-04 RX ADMIN — HYDROCODONE BITARTRATE AND ACETAMINOPHEN 1 TABLET: 5; 325 TABLET ORAL at 17:18

## 2018-04-04 RX ADMIN — MONTELUKAST SODIUM 10 MG: 10 TABLET, FILM COATED ORAL at 09:24

## 2018-04-04 RX ADMIN — Medication 10 ML: at 22:13

## 2018-04-04 RX ADMIN — Medication 10 ML: at 14:00

## 2018-04-04 RX ADMIN — ALBUTEROL SULFATE 2.5 MG: 2.5 SOLUTION RESPIRATORY (INHALATION) at 20:14

## 2018-04-04 RX ADMIN — ALPRAZOLAM 2 MG: 0.5 TABLET ORAL at 10:46

## 2018-04-04 RX ADMIN — AZITHROMYCIN 500 MG: 250 TABLET, FILM COATED ORAL at 09:25

## 2018-04-04 RX ADMIN — PREGABALIN 150 MG: 150 CAPSULE ORAL at 09:24

## 2018-04-04 RX ADMIN — SUCRALFATE 1 G: 1 TABLET ORAL at 17:20

## 2018-04-04 RX ADMIN — APIXABAN 2.5 MG: 2.5 TABLET, FILM COATED ORAL at 09:24

## 2018-04-04 RX ADMIN — BUDESONIDE 500 MCG: 0.5 INHALANT RESPIRATORY (INHALATION) at 20:14

## 2018-04-04 RX ADMIN — OXYBUTYNIN CHLORIDE 5 MG: 5 TABLET ORAL at 09:25

## 2018-04-04 RX ADMIN — OXYBUTYNIN CHLORIDE 5 MG: 5 TABLET ORAL at 17:21

## 2018-04-04 RX ADMIN — HYDROCODONE BITARTRATE AND ACETAMINOPHEN 1 TABLET: 5; 325 TABLET ORAL at 22:04

## 2018-04-04 RX ADMIN — ONDANSETRON 4 MG: 2 INJECTION INTRAMUSCULAR; INTRAVENOUS at 17:18

## 2018-04-04 RX ADMIN — LEVOTHYROXINE SODIUM 112 MCG: 112 TABLET ORAL at 17:20

## 2018-04-04 RX ADMIN — SUCRALFATE 1 G: 1 TABLET ORAL at 09:24

## 2018-04-04 RX ADMIN — GUAIFENESIN 1200 MG: 600 TABLET, EXTENDED RELEASE ORAL at 17:20

## 2018-04-04 RX ADMIN — MIRTAZAPINE 15 MG: 15 TABLET, FILM COATED ORAL at 22:05

## 2018-04-04 RX ADMIN — ALBUTEROL SULFATE 2.5 MG: 2.5 SOLUTION RESPIRATORY (INHALATION) at 11:27

## 2018-04-04 RX ADMIN — PANTOPRAZOLE SODIUM 40 MG: 40 TABLET, DELAYED RELEASE ORAL at 17:20

## 2018-04-04 NOTE — PROGRESS NOTES
Care Management Interventions  PCP Verified by CM: Yes  Transition of Care Consult (CM Consult): Discharge Planning  Physical Therapy Consult: Yes  Occupational Therapy Consult: Yes  Current Support Network: Relative's Home (Lives with her daughter and daughter;s sig other and twins age 3)  Confirm Follow Up Transport: Family  Plan discussed with Pt/Family/Caregiver: Yes  Freedom of Choice Offered: Yes  Discharge Location  Discharge Placement: Home     Met with patient at the bedside. She is alert and oriented times four. Could not remember what o2 company she uses. Reports issues with the o2 company feeling they need to replace her concentrater completely. Will follow up. Lives with daughter who is in the home all day and a set of [de-identified] year old twins. Daughter takes care for the cooking and housework and transport although patient states she drives when feeling well. Patient reporting she recently bought a device off the tv to help with her breathing. I was unable to understand what she was talking  About. PT and OT and PPD placed. Patient has no intention of going to rehab. Following therapy notes for possible HH and will follow up with o2 company.    YEMI Man

## 2018-04-04 NOTE — PROGRESS NOTES
Chon July  Admission Date: 4/2/2018             Daily Progress Note: 4/4/2018    The patient's chart is reviewed and the patient is discussed with the staff.   Patient is a 78 y.o.  female, PMH CVA, pneumonia, and bilateral PEs here with pneumonia.   The patient had fever, chills, and temp 102 at home.  When she presented to emergency room she was found to have pneumonia she was admitted by hospitalist. Jcarlos Waller had similar presentation in December 2017 with pneumonia as well.  She does not have sleep apnea and wears O2 at night.      Subjective:     Feels better  Has cough but not much sputum production   On home 02     Current Facility-Administered Medications   Medication Dose Route Frequency    mirtazapine (REMERON) tablet 15 mg  15 mg Oral QHS    propranolol (INDERAL) tablet 10 mg  10 mg Oral Q6H PRN    albuterol (PROVENTIL VENTOLIN) nebulizer solution 2.5 mg  2.5 mg Nebulization QID RT    guaiFENesin ER (MUCINEX) tablet 1,200 mg  1,200 mg Oral BID    sodium chloride (NS) flush 5-10 mL  5-10 mL IntraVENous PRN    cefTRIAXone (ROCEPHIN) 2 g in 0.9% sodium chloride (MBP/ADV) 50 mL  2 g IntraVENous Q24H    sodium chloride (NS) flush 5-10 mL  5-10 mL IntraVENous Q8H    sodium chloride (NS) flush 5-10 mL  5-10 mL IntraVENous PRN    tuberculin injection 5 Units  5 Units IntraDERMal ONCE    acetaminophen (TYLENOL) tablet 650 mg  650 mg Oral Q4H PRN    HYDROcodone-acetaminophen (NORCO) 5-325 mg per tablet 1 Tab  1 Tab Oral Q4H PRN    naloxone (NARCAN) injection 0.4 mg  0.4 mg IntraVENous PRN    diphenhydrAMINE (BENADRYL) injection 12.5 mg  12.5 mg IntraVENous Q4H PRN    ondansetron (ZOFRAN) injection 4 mg  4 mg IntraVENous Q4H PRN    bisacodyl (DULCOLAX) tablet 5 mg  5 mg Oral DAILY PRN    azithromycin (ZITHROMAX) tablet 500 mg  500 mg Oral DAILY    ALPRAZolam (XANAX) tablet 2 mg  2 mg Oral Q8H PRN    apixaban (ELIQUIS) tablet 2.5 mg  2.5 mg Oral BID    atorvastatin (LIPITOR) tablet 20 mg  20 mg Oral QHS    levothyroxine (SYNTHROID) tablet 112 mcg  112 mcg Oral QPM    montelukast (SINGULAIR) tablet 10 mg  10 mg Oral DAILY    naloxegol (MOVANTIK) tablet 25 mg (Patient Supplied)  25 mg Oral DAILY    oxybutynin (DITROPAN) tablet 5 mg  5 mg Oral BID    pantoprazole (PROTONIX) tablet 40 mg  40 mg Oral ACB&D    pregabalin (LYRICA) capsule 150 mg  150 mg Oral BID    sucralfate (CARAFATE) tablet 1 g  1 g Oral QID    SUMAtriptan (IMITREX) tablet 100 mg  100 mg Oral Q8H PRN    ziprasidone (GEODON) capsule 80 mg  80 mg Oral QHS    budesonide (PULMICORT) 500 mcg/2 ml nebulizer suspension  500 mcg Nebulization BID RT       Review of Systems    Constitutional: negative for fever, chills, sweats  Cardiovascular: negative for chest pain, palpitations, syncope, edema  Gastrointestinal:  negative for dysphagia, reflux, vomiting, diarrhea, abdominal pain, or melena  Neurologic:  negative for focal weakness, numbness, headache    Objective:     Vitals:    04/04/18 0504 04/04/18 0719 04/04/18 0747 04/04/18 1129   BP: 143/80  134/81    Pulse: 93  99    Resp: 18  18    Temp: 99.4 °F (37.4 °C)  98.5 °F (36.9 °C)    SpO2: 92% 94% 94% 96%   Weight:       Height:         Intake and Output:   04/02 1901 - 04/04 0700  In: 480 [P.O.:480]  Out: -        Physical Exam:   Constitution:  the patient is well developed and in no acute distress  EENMT:  Sclera clear, pupils equal, oral mucosa moist  Respiratory: rhonchi  Cardiovascular:  RRR without M,G,R  Gastrointestinal: soft and non-tender; with positive bowel sounds. Musculoskeletal: warm without cyanosis. There is no lower leg edema.   Skin:  no jaundice or rashes, no wounds   Neurologic: no gross neuro deficits     Psychiatric:  alert and oriented x 3    CXR:          Recent Labs      04/04/18   0506  04/03/18   0554  04/02/18   1240   WBC  12.3*  17.5*  15.3*   HGB  9.9*  9.8*  11.5*   HCT  33.6*  32.9*  37.7   PLT  246  265  311     Recent Labs 04/04/18   0506  04/03/18   0554  04/02/18   1240   NA  145  142  144   K  3.7  3.7  3.8   CL  104  100  100   CO2  34*  37*  38*   GLU  90  90  114*   BUN  8  17  14   CREA  0.96  1.43*  1.33*   MG   --   2.4   --    CA  8.8  8.8  9.5   ALB  2.2*   --   2.9*   TBILI  0.2   --   0.3   ALT  12   --   16   SGOT  23   --   22         Assessment:  (Medical Decision Making)     Hospital Problems  Date Reviewed: 4/3/2018          Codes Class Noted POA    * (Principal)Community acquired bacterial pneumonia on Rocephin/Zithromax D 3 ICD-10-CM: J15.9  ICD-9-CM: 482.9  4/2/2018 Yes        Sepsis due to pneumonia [facility]) ICD-10-CM: J18.9, A41.9  ICD-9-CM: 687, 995.91  4/2/2018 Yes        Chronic anticoagulation (Chronic) ICD-10-CM: Z79.01  ICD-9-CM: V58.61  12/3/2017 Yes        Fibromyalgia (Chronic) ICD-10-CM: M79.7  ICD-9-CM: 729.1  11/1/2017 Yes        Morbid obesity - sedentary lifestyle (Chronic) ICD-10-CM: E66.01  ICD-9-CM: 278.01  11/1/2017 Yes        Debility (Chronic) ICD-10-CM: R53.81  ICD-9-CM: 799.3  11/1/2017 Yes        Restrictive lung disease (Chronic) ICD-10-CM: J98.4  ICD-9-CM: 518.89  11/1/2017 Yes        Hx of pulmonary embolus (Chronic) ICD-10-CM: Z86.711  ICD-9-CM: V12.55  9/28/2017 Yes    Overview Signed 9/28/2017  3:18 PM by Renard Anders NP     Chronic Eliquis                 Hypertension (Chronic) ICD-10-CM: I10  ICD-9-CM: 401.9  9/16/2012 Yes        GERD (gastroesophageal reflux disease) (Chronic) ICD-10-CM: K21.9  ICD-9-CM: 530.81  9/16/2012 Yes              Plan:  (Medical Decision Making)     -- continue ABX -finish 8  days course , nebs, Mucinex 02     More than 50% of the time documented was spent in face-to-face contact with the patient and in the care of the patient on the floor/unit where the patient is located.     Olivia Eisenberg MD

## 2018-04-04 NOTE — PROGRESS NOTES
Hourly rounds complete this shift. Pt c/o sore throat this morning from coughing during the night. no c/o of nausea or pain this shift. Will continue to monitor. Report to day shift nurse.

## 2018-04-04 NOTE — PROGRESS NOTES
Interdisciplinary Rounds completed 04/04/2018. Nursing, Case Management, Physician and PT present. Plan of care reviewed and updated.

## 2018-04-04 NOTE — PROGRESS NOTES
made a follow-up visit with pt to continue HCPOA conversation.  spoke with pt's nurse who stated that pt was alert, verbal, and able to complete a HCPOA.  visited with pt. Pt was alert, verbal, and stated she was feeling much better than yesterday.  educated pt regarding [address]. Pt wasn't sure what option she was going to make. Wanted to speak with her  and family before making that decision.  left the form with the pt to review with her  and family and stated a  would be happy to come by back to assist them in completing the form following her discussion with them. Pt is expecting to be discharged on 4-5-18.  provided spiritual care through presence, pastoral conversation, education on [address], and assurance of prayer.

## 2018-04-04 NOTE — PROGRESS NOTES
Hospitalist Progress Note    2018  Admit Date: 2018 12:18 PM   NAME: Shazia Griffith   :  1950   MRN:  [id number]   Attending: Kiley Oropeza MD  PCP:  Shahnaz Cruz DO    SUBJECTIVE:   Patient seen and examined at bedside this morning.   She feels better but continues to complain of cough with rattling in her chest.    Review of Systems negative with exception of pertinent positives noted above  PHYSICAL EXAM     Visit Vitals    /81 (BP 1 Location: Left arm, BP Patient Position: At rest)    Pulse 99    Temp 98.5 °F (36.9 °C)    Resp 18    Ht 5' 1\" (1.549 m)    Wt 117.9 kg (260 lb)    SpO2 96%    BMI 49.13 kg/m2      Temp (24hrs), Av.7 °F (37.1 °C), Min:98.1 °F (36.7 °C), Max:99.4 °F (37.4 °C)    Oxygen Therapy  O2 Sat (%): 96 % (18 1129)  Pulse via Oximetry: 111 beats per minute (18 0719)  O2 Device: Nasal cannula (18 1129)  O2 Flow Rate (L/min): 4 l/min (18 1129)  O2 Temperature: 87.8 °F (31 °C) (18 1100)  FIO2 (%): 45 % (18 1100)  No intake or output data in the 24 hours ending 18 1440     General: No acute distress    Lungs:  Minimal bilateral expiratory wheezing  Heart:  Regular rate and rhythm,  No murmur, rub, or gallop  Abdomen: Soft, Non distended, Non tender, Positive bowel sounds  Extremities: No cyanosis, clubbing, 1+ LE edema  Neurologic:  No focal deficits    ASSESSMENT      Active Hospital Problems    Diagnosis Date Noted    Community acquired bacterial pneumonia 2018    Sepsis due to pneumonia ([facility] [address] 2018    Chronic anticoagulation 2017    Fibromyalgia 2017    Debility 2017    Morbid obesity - sedentary lifestyle 2017    Polypharmacy 2017    Restrictive lung disease 2017    Hx of pulmonary embolus 2017     Chronic Eliquis      Hypothyroidism 2017    Stress incontinence in female 10/14/2014    Arthritis 2012    Bipolar affective disorder ([facility] [address] 09/16/2012    GERD (gastroesophageal reflux disease) 09/16/2012    Hypertension 09/16/2012     Plan:  · CAP- continue patient on Rocephin and Azithromycin. Continue nebulizers. Follow up pulmonary recommendations, wean oxygen, on 4L now. · History of PE- continue with Eliquis. · Bipolar Disorder- continue with Geodon and Remeron  · Fibromyalgia- continue with Lyrica. · Hypothyroidism- continue with Synthroid.   · PT eval      DVT Prophylaxis: Eliquis    Signed By: Holley Morrison MD     April 4, 2018

## 2018-04-05 LAB
ALBUMIN SERPL-MCNC: 2.3 G/DL (ref 3.2–4.6)
ALBUMIN/GLOB SERPL: 0.6 {RATIO} (ref 1.2–3.5)
ALP SERPL-CCNC: 107 U/L (ref 50–136)
ALT SERPL-CCNC: 12 U/L (ref 12–65)
ANION GAP SERPL CALC-SCNC: 5 MMOL/L (ref 7–16)
AST SERPL-CCNC: 21 U/L (ref 15–37)
BILIRUB SERPL-MCNC: 0.2 MG/DL (ref 0.2–1.1)
BUN SERPL-MCNC: 6 MG/DL (ref 8–23)
CALCIUM SERPL-MCNC: 9.4 MG/DL (ref 8.3–10.4)
CHLORIDE SERPL-SCNC: 103 MMOL/L (ref 98–107)
CO2 SERPL-SCNC: 33 MMOL/L (ref 21–32)
CREAT SERPL-MCNC: 0.79 MG/DL (ref 0.6–1)
ERYTHROCYTE [DISTWIDTH] IN BLOOD BY AUTOMATED COUNT: 19 % (ref 11.9–14.6)
GLOBULIN SER CALC-MCNC: 3.9 G/DL (ref 2.3–3.5)
GLUCOSE SERPL-MCNC: 111 MG/DL (ref 65–100)
HCT VFR BLD AUTO: 32.5 % (ref 35.8–46.3)
HGB BLD-MCNC: 9.8 G/DL (ref 11.7–15.4)
MCH RBC QN AUTO: 25.5 PG (ref 26.1–32.9)
MCHC RBC AUTO-ENTMCNC: 30.2 G/DL (ref 31.4–35)
MCV RBC AUTO: 84.6 FL (ref 79.6–97.8)
PLATELET # BLD AUTO: 227 K/UL (ref 150–450)
PMV BLD AUTO: 10.6 FL (ref 10.8–14.1)
POTASSIUM SERPL-SCNC: 3.5 MMOL/L (ref 3.5–5.1)
PROT SERPL-MCNC: 6.2 G/DL (ref 6.3–8.2)
RBC # BLD AUTO: 3.84 M/UL (ref 4.05–5.25)
SODIUM SERPL-SCNC: 141 MMOL/L (ref 136–145)
WBC # BLD AUTO: 7.9 K/UL (ref 4.3–11.1)

## 2018-04-05 PROCEDURE — 80053 COMPREHEN METABOLIC PANEL: CPT | Performed by: INTERNAL MEDICINE

## 2018-04-05 PROCEDURE — 74011000250 HC RX REV CODE- 250: Performed by: INTERNAL MEDICINE

## 2018-04-05 PROCEDURE — 97162 PT EVAL MOD COMPLEX 30 MIN: CPT

## 2018-04-05 PROCEDURE — 97530 THERAPEUTIC ACTIVITIES: CPT

## 2018-04-05 PROCEDURE — 94640 AIRWAY INHALATION TREATMENT: CPT

## 2018-04-05 PROCEDURE — 77010033678 HC OXYGEN DAILY

## 2018-04-05 PROCEDURE — 74011250637 HC RX REV CODE- 250/637: Performed by: INTERNAL MEDICINE

## 2018-04-05 PROCEDURE — 97166 OT EVAL MOD COMPLEX 45 MIN: CPT

## 2018-04-05 PROCEDURE — 74011250636 HC RX REV CODE- 250/636: Performed by: INTERNAL MEDICINE

## 2018-04-05 PROCEDURE — 74011250637 HC RX REV CODE- 250/637: Performed by: NURSE PRACTITIONER

## 2018-04-05 PROCEDURE — 85027 COMPLETE CBC AUTOMATED: CPT | Performed by: INTERNAL MEDICINE

## 2018-04-05 PROCEDURE — 74011250636 HC RX REV CODE- 250/636: Performed by: EMERGENCY MEDICINE

## 2018-04-05 PROCEDURE — 65270000029 HC RM PRIVATE

## 2018-04-05 PROCEDURE — 74011000258 HC RX REV CODE- 258: Performed by: EMERGENCY MEDICINE

## 2018-04-05 PROCEDURE — 99232 SBSQ HOSP IP/OBS MODERATE 35: CPT | Performed by: INTERNAL MEDICINE

## 2018-04-05 PROCEDURE — 74011250637 HC RX REV CODE- 250/637: Performed by: HOSPITALIST

## 2018-04-05 PROCEDURE — 36415 COLL VENOUS BLD VENIPUNCTURE: CPT | Performed by: INTERNAL MEDICINE

## 2018-04-05 PROCEDURE — 94760 N-INVAS EAR/PLS OXIMETRY 1: CPT

## 2018-04-05 RX ADMIN — LEVOTHYROXINE SODIUM 112 MCG: 112 TABLET ORAL at 17:01

## 2018-04-05 RX ADMIN — METHYLPREDNISOLONE SODIUM SUCCINATE 40 MG: 40 INJECTION, POWDER, FOR SOLUTION INTRAMUSCULAR; INTRAVENOUS at 14:26

## 2018-04-05 RX ADMIN — PREGABALIN 150 MG: 150 CAPSULE ORAL at 17:01

## 2018-04-05 RX ADMIN — OXYBUTYNIN CHLORIDE 5 MG: 5 TABLET ORAL at 09:47

## 2018-04-05 RX ADMIN — ZIPRASIDONE HYDROCHLORIDE 80 MG: 20 CAPSULE ORAL at 21:20

## 2018-04-05 RX ADMIN — APIXABAN 2.5 MG: 2.5 TABLET, FILM COATED ORAL at 17:01

## 2018-04-05 RX ADMIN — MONTELUKAST SODIUM 10 MG: 10 TABLET, FILM COATED ORAL at 09:47

## 2018-04-05 RX ADMIN — Medication 10 ML: at 14:26

## 2018-04-05 RX ADMIN — ALBUTEROL SULFATE 2.5 MG: 2.5 SOLUTION RESPIRATORY (INHALATION) at 11:52

## 2018-04-05 RX ADMIN — AZITHROMYCIN 500 MG: 250 TABLET, FILM COATED ORAL at 09:47

## 2018-04-05 RX ADMIN — Medication 10 ML: at 21:21

## 2018-04-05 RX ADMIN — GUAIFENESIN 1200 MG: 600 TABLET, EXTENDED RELEASE ORAL at 17:01

## 2018-04-05 RX ADMIN — CEFTRIAXONE SODIUM 2 G: 2 INJECTION, POWDER, FOR SOLUTION INTRAMUSCULAR; INTRAVENOUS at 12:20

## 2018-04-05 RX ADMIN — PROPRANOLOL HYDROCHLORIDE 10 MG: 10 TABLET ORAL at 17:01

## 2018-04-05 RX ADMIN — PREGABALIN 150 MG: 150 CAPSULE ORAL at 09:47

## 2018-04-05 RX ADMIN — SUCRALFATE 1 G: 1 TABLET ORAL at 09:47

## 2018-04-05 RX ADMIN — Medication 10 ML: at 06:11

## 2018-04-05 RX ADMIN — GUAIFENESIN 1200 MG: 600 TABLET, EXTENDED RELEASE ORAL at 09:47

## 2018-04-05 RX ADMIN — HYDROCODONE BITARTRATE AND ACETAMINOPHEN 1 TABLET: 5; 325 TABLET ORAL at 14:35

## 2018-04-05 RX ADMIN — OXYBUTYNIN CHLORIDE 5 MG: 5 TABLET ORAL at 17:02

## 2018-04-05 RX ADMIN — ALBUTEROL SULFATE 2.5 MG: 2.5 SOLUTION RESPIRATORY (INHALATION) at 15:33

## 2018-04-05 RX ADMIN — PANTOPRAZOLE SODIUM 40 MG: 40 TABLET, DELAYED RELEASE ORAL at 06:09

## 2018-04-05 RX ADMIN — ATORVASTATIN CALCIUM 20 MG: 10 TABLET, FILM COATED ORAL at 21:20

## 2018-04-05 RX ADMIN — SUCRALFATE 1 G: 1 TABLET ORAL at 21:21

## 2018-04-05 RX ADMIN — BUDESONIDE 500 MCG: 0.5 INHALANT RESPIRATORY (INHALATION) at 19:31

## 2018-04-05 RX ADMIN — SUCRALFATE 1 G: 1 TABLET ORAL at 17:01

## 2018-04-05 RX ADMIN — ONDANSETRON 4 MG: 2 INJECTION INTRAMUSCULAR; INTRAVENOUS at 16:56

## 2018-04-05 RX ADMIN — ALPRAZOLAM 2 MG: 0.5 TABLET ORAL at 09:55

## 2018-04-05 RX ADMIN — APIXABAN 2.5 MG: 2.5 TABLET, FILM COATED ORAL at 09:47

## 2018-04-05 RX ADMIN — HYDROCODONE BITARTRATE AND ACETAMINOPHEN 1 TABLET: 5; 325 TABLET ORAL at 09:55

## 2018-04-05 RX ADMIN — ALBUTEROL SULFATE 2.5 MG: 2.5 SOLUTION RESPIRATORY (INHALATION) at 19:31

## 2018-04-05 RX ADMIN — ALBUTEROL SULFATE 2.5 MG: 2.5 SOLUTION RESPIRATORY (INHALATION) at 07:27

## 2018-04-05 RX ADMIN — MIRTAZAPINE 15 MG: 15 TABLET, FILM COATED ORAL at 21:21

## 2018-04-05 RX ADMIN — METHYLPREDNISOLONE SODIUM SUCCINATE 40 MG: 40 INJECTION, POWDER, FOR SOLUTION INTRAMUSCULAR; INTRAVENOUS at 21:21

## 2018-04-05 RX ADMIN — SUCRALFATE 1 G: 1 TABLET ORAL at 12:20

## 2018-04-05 RX ADMIN — PANTOPRAZOLE SODIUM 40 MG: 40 TABLET, DELAYED RELEASE ORAL at 17:01

## 2018-04-05 RX ADMIN — BUDESONIDE 500 MCG: 0.5 INHALANT RESPIRATORY (INHALATION) at 07:28

## 2018-04-05 RX ADMIN — ALPRAZOLAM 2 MG: 0.5 TABLET ORAL at 21:34

## 2018-04-05 NOTE — PROGRESS NOTES
Problem: Self Care Deficits Care Plan (Adult)  Goal: *Acute Goals and Plan of Care (Insert Text)  1. Patient will complete lower body bathing and dressing with minimal assistance and adaptive equipment as needed. 2. Patient will complete toileting with minimal assistance. 3. Patient will tolerate 25 minutes of OT treatment with 3 rest breaks to increase activity tolerance for ADLs. 4. Patient will complete functional transfers with supervision and good safety with adaptive equipment as needed. 5. Patient will complete functional mobility with supervision for household distances. Timeframe: 7 visits         OCCUPATIONAL THERAPY: Initial Assessment and AM 4/5/2018  INPATIENT: Hospital Day: 4  [place] / Product Type: Medicare /      NAME/AGE/GENDER: Rakesh La is a 78 y.o. female   PRIMARY DIAGNOSIS:  Community acquired bacterial pneumonia Community acquired bacterial pneumonia Community acquired bacterial pneumonia        ICD-10: Treatment Diagnosis:    · Pain in left hip (M25.552)  · Stiffness of Left Hip, Not elsewhere classified (M25.652)  · Generalized Muscle Weakness (M62.81)  · Other lack of cordination (R27.8)  · History of falling (Z91.81)   Precautions/Allergies:     Latex; Bactrim [sulfamethoxazole-trimethoprim]; Lamictal [lamotrigine]; Pcn [penicillins]; and Tapentadol      ASSESSMENT:     Ms. Sabrina Mullen presents to the hospital with pneumonia. Pt is familiar to me from previous admission as well. Pt was sitting up in chair upon arrival. Pt is pleasant and oriented x 4. Pt is slow with responses but answers questions appropriately. Pt reports her pain in 7.5/10 in her groin and states she has never had an MRI in the past for the L hip. Pt is observed to have limited ROM in B shoulders but functional strength for participating in transfers.  Pt was able to scoot with supervision and stand with cueing for hand placement with minimal assistance and additional time. Pt able to take 5-6 steps from the chair with the walker and additional time. Pt limited due to pain in the L hip, primarily when she advances the L LE forward to step. Pt unable to back up to chair and attempted to sit without chair behind her. Chair was pulled up and pt placed back in reclined position. Pt able to brush hair in the chair with SBA. Pt has had multiple hospital admissions but states she has not gone to rehab or had home health therapy. Pt is currently limited with functional transfers and functional mobility which impairs her independence with ADL. Pt is below baseline for ADL/functional transfers and will benefit from OT services to address stated goals and plan of care. This section established at most recent assessment   PROBLEM LIST (Impairments causing functional limitations):  1. Decreased Strength  2. Decreased ADL/Functional Activities  3. Decreased Transfer Abilities  4. Decreased Ambulation Ability/Technique  5. Decreased Balance  6. Increased Pain  7. Decreased Activity Tolerance  8. Increased Shortness of Breath  9. Decreased Flexibility/Joint Mobility  10. Edema/Girth  11. Decreased Skin Integrity/Hygeine  12. Decreased Orange with Home Exercise Program  13. Decreased Cognition   INTERVENTIONS PLANNED: (Benefits and precautions of occupational therapy have been discussed with the patient.)  1. Activities of daily living training  2. Adaptive equipment training  3. Balance training  4. Clothing management  5. Cognitive training  6. Donning&doffing training  7. Neuromuscular re-eduation  8. Therapeutic activity  9. Therapeutic exercise     TREATMENT PLAN: Frequency/Duration: Follow patient 3 times per week to address above goals.   Rehabilitation Potential For Stated Goals: Good     RECOMMENDED REHABILITATION/EQUIPMENT: (at time of discharge pending progress): Due to the probability of continued deficits (see above) this patient will likely need continued skilled occupational therapy after discharge. Equipment:    TBD              OCCUPATIONAL PROFILE AND HISTORY:   History of Present Injury/Illness (Reason for Referral):  See H&P  Past Medical History/Comorbidities:   Ms. Maritza Montejo  has a past medical history of Acute renal failure ([address] (July, 2014); Arthritis; Asthma; Asthma with acute exacerbation (9/28/2017); Bilateral pulmonary embolism [facility]) (September, 2012); Calculus of ureter (May, 2015); CAP (community acquired pneumonia) (October, 2012); Cellulitis of left lower extremity (11/1/2017); Chronic pain; CVA (cerebral infarction) (January, 2012); Gastric ulcer (July, 2014); GERD (gastroesophageal reflux disease); Gram-positive bacteremia 1/2 cx (12/26/2016); HCAP (healthcare-associated pneumonia) (January, 2013); HCAP (healthcare-associated pneumonia) (February, 2013); Hypertension; Left leg DVT ([address] (1991); Morbid obesity ([address] Psychiatric disorder; PUD (peptic ulcer disease) (???); Stroke [facility]); and Thyroid disease. She also has no past medical history of Aneurysm ([address] Arrhythmia; Autoimmune disease ([address] CAD (coronary artery disease); Cancer ([address] Chronic kidney disease; Chronic obstructive pulmonary disease ([address] Coagulation disorder ([address] COPD; Diabetes ([address] Endocarditis; Heart failure ([address] Liver disease; Nicotine vapor product user; Non-nicotine vapor product user; Rheumatic fever; Seizures ([address] or Unspecified sleep apnea. Ms. Maritza Montejo  has a past surgical history that includes hx appendectomy; hx cholecystectomy; hx gastric bypass; hx tonsillectomy; hx other surgical; hx orthopaedic; hx hysterectomy; and hx endoscopy (July, 2014).   Social History/Living Environment:   Home Environment: Trailer/mobile home  # Steps to Enter: 3  One/Two Story Residence: One story  Living Alone: No  Support Systems: Child(jaswant), Spouse/Significant Other/Partner  Patient Expects to be Discharged to[de-identified] Trailer/mobile home  Current DME Used/Available at Home: Cane, straight, Shower chair  Tub or Shower Type: Tub/Shower combination  Prior Level of Function/Work/Activity:  Pt lives with her daughter. Pt states her spouse lives in their home nearby. Pt normally completes grooming, toileting, and feeding herself without assistance. Pt does need help from her daughter into the shower and with bathing/dressing. Pt was using a cane for functional mobility. Personal Factors:          Past/Current Experience:  Multiple hospital admissions        Overall Behavior:  Slow responses        Other factors that influence how disability is experienced by the patient:  Multiple co-morbidities    Number of Personal Factors/Comorbidities that affect the Plan of Care: Extensive review of physical, cognitive, and psychosocial performance (3+):  HIGH COMPLEXITY   ASSESSMENT OF OCCUPATIONAL PERFORMANCE[de-identified]   Activities of Daily Living:           Basic ADLs (From Assessment) Complex ADLs (From Assessment)   Basic ADL  Feeding: Setup  Oral Facial Hygiene/Grooming: Stand-by assistance  Bathing: Moderate assistance  Type of Bath: Chlorhexidine (CHG), Bath Pack, Full  Upper Body Dressing: Moderate assistance  Lower Body Dressing: Maximum assistance  Toileting: Maximum assistance Instrumental ADL  Meal Preparation: Total assistance  Homemaking: Total assistance   Grooming/Bathing/Dressing Activities of Daily Living     Cognitive Retraining  Safety/Judgement: Fall prevention;Home safety                 Functional Transfers  Toilet Transfer : Moderate assistance  Tub Transfer: Maximum assistance  Shower Transfer:  Moderate assistance     Bed/Mat Mobility  Rolling: Contact guard assistance  Supine to Sit: Contact guard assistance  Sit to Stand: Minimum assistance  Bed to Chair: Minimum assistance  Scooting: Supervision       Most Recent Physical Functioning:   Gross Assessment:  AROM: Generally decreased, functional (B UE limited at the shoulders)  Strength: Generally decreased, functional (B UE)  Coordination: Generally decreased, functional (B UE)  Tone: Normal  Sensation: Intact (B UE)               Posture:  Posture (WDL): Exceptions to WDL  Posture Assessment: Trunk flexion  Balance:  Sitting: Impaired  Sitting - Static: Good (unsupported)  Sitting - Dynamic: Fair (occasional)  Standing: Impaired  Standing - Static: Fair  Standing - Dynamic : Fair Bed Mobility:  Rolling: Contact guard assistance  Supine to Sit: Contact guard assistance  Scooting: Supervision  Wheelchair Mobility:     Transfers:  Sit to Stand: Minimum assistance  Stand to Sit: Minimum assistance  Bed to Chair: Minimum assistance              Patient Vitals for the past 6 hrs:   BP BP Patient Position SpO2 O2 Flow Rate (L/min) Pulse   18 0731 - - 97 % 4 l/min -   18 0741 157/78 Head of bed elevated (Comment degrees) 94 % - 83       Mental Status  Neurologic State: Alert  Orientation Level: Oriented X4  Cognition: Decreased attention/concentration, Follows commands, Poor safety awareness  Perception: Appears intact  Perseveration: No perseveration noted  Safety/Judgement: Fall prevention, Home safety                          Physical Skills Involved:  1. Range of Motion  2. Balance  3. Strength  4. Activity Tolerance  5. Fine Motor Control  6. Gross Motor Control  7. Pain (acute)  8. Skin Integrity Cognitive Skills Affected (resulting in the inability to perform in a timely and safe manner):  1. Executive Function  2. Sustained Attention  3. Divided Attention Psychosocial Skills Affected:  1. Habits/Routines  2. Self-Awareness   Number of elements that affect the Plan of Care: 5+:  HIGH COMPLEXITY   CLINICAL DECISION MAKIN [facility] [address] [id number] AM-PAC 6 Clicks   Daily Activity Inpatient Short Form  How much help from another person does the patient currently need. .. Total A Lot A Little None   1. Putting on and taking off regular lower body clothing? [] 1   [x] 2   [] 3   [] 4   2. Bathing (including washing, rinsing, drying)?    [] 1 [x] 2   [] 3   [] 4   3. Toileting, which includes using toilet, bedpan or urinal?   [] 1   [x] 2   [] 3   [] 4   4. Putting on and taking off regular upper body clothing? [] 1   [x] 2   [] 3   [] 4   5. Taking care of personal grooming such as brushing teeth? [] 1   [] 2   [x] 3   [] 4   6. Eating meals? [] 1   [] 2   [x] 3   [] 4   © 2007, Trustees of [address] [place] [address] [address] [address], under license to [employer]. All rights reserved      Score:  Initial: 14 Most Recent: X (Date: -- )    Interpretation of Tool:  Represents activities that are increasingly more difficult (i.e. Bed mobility, Transfers, Gait). Score 24 23 22-20 19-15 14-10 9-7 6     Modifier CH CI CJ CK CL CM CN      ? Self Care:     - CURRENT STATUS: CL - 60%-79% impaired, limited or restricted    - GOAL STATUS: CK - 40%-59% impaired, limited or restricted    - D/C STATUS:  ---------------To be determined---------------  [place] / Product Type: Medicare /      Medical Necessity:     · Patient demonstrates good rehab potential due to higher previous functional level. Reason for Services/Other Comments:  · Patient continues to require skilled intervention due to decreased independence with ADL/functional transfers. Use of outcome tool(s) and clinical judgement create a POC that gives a: MODERATE COMPLEXITY         TREATMENT:   (In addition to Assessment/Re-Assessment sessions the following treatments were rendered)     Pre-treatment Symptoms/Complaints:    Pain: Initial:   Pain Intensity 1: 8  Pain Location 1: Groin, Leg  Pain Orientation 1: Left  Pain Intervention(s) 1: Repositioned  Post Session:  same     Assessment/Reassessment only, no treatment provided today    Braces/Orthotics/Lines/Etc:   · O2 Device: Nasal cannula  Treatment/Session Assessment:    · Response to Treatment:  Evaluation only.    · Interdisciplinary Collaboration:   o Occupational Therapist  o Registered Nurse  · After treatment position/precautions:   o Up in chair  o Bed/Chair-wheels locked  o Call light within reach  o RN notified   · Compliance with Program/Exercises: Will assess as treatment progresses. · Recommendations/Intent for next treatment session: \"Next visit will focus on advancements to more challenging activities and reduction in assistance provided\".   Total Treatment Duration:  OT Patient Time In/Time Out  Time In: 1015  Time Out: [address], OT

## 2018-04-05 NOTE — PROGRESS NOTES
Interdisciplinary Rounds completed 04/05/2018. Nursing, Case Management, Physician and PT present. Plan of care reviewed and updated.

## 2018-04-05 NOTE — PROGRESS NOTES
Franck Chol  Admission Date: 4/2/2018             Daily Progress Note: 4/5/2018    The patient's chart is reviewed and the patient is discussed with the staff. Pt is a 79 yo  female with a history of nocturnal hypoxemia on 4L QHS, prior CVA, prior PEs on Eliquis, bipolar disorder, and obesity. Pt presented to the ER at [facility] on 4/2 with fever, chills, and temp 102*. Pt's CXR revealed pneumonia and she was admitted by the hospitalist.   Subjective:     Pt sitting up in bed on 2L O2. Pt is eating lunch. Pt reports some wheezing this AM but she had a nebulizer treatment and feels less wheezy now. She complains of L hip and knee pain. She also reports a wound on her left shin.  She was supposed to see wound care on 4/3 but missed her appt because she was in the hospital.     Current Facility-Administered Medications   Medication Dose Route Frequency    mirtazapine (REMERON) tablet 15 mg  15 mg Oral QHS    propranolol (INDERAL) tablet 10 mg  10 mg Oral Q6H PRN    albuterol (PROVENTIL VENTOLIN) nebulizer solution 2.5 mg  2.5 mg Nebulization QID RT    guaiFENesin ER (MUCINEX) tablet 1,200 mg  1,200 mg Oral BID    sodium chloride (NS) flush 5-10 mL  5-10 mL IntraVENous PRN    cefTRIAXone (ROCEPHIN) 2 g in 0.9% sodium chloride (MBP/ADV) 50 mL  2 g IntraVENous Q24H    sodium chloride (NS) flush 5-10 mL  5-10 mL IntraVENous Q8H    sodium chloride (NS) flush 5-10 mL  5-10 mL IntraVENous PRN    acetaminophen (TYLENOL) tablet 650 mg  650 mg Oral Q4H PRN    HYDROcodone-acetaminophen (NORCO) 5-325 mg per tablet 1 Tab  1 Tab Oral Q4H PRN    naloxone (NARCAN) injection 0.4 mg  0.4 mg IntraVENous PRN    diphenhydrAMINE (BENADRYL) injection 12.5 mg  12.5 mg IntraVENous Q4H PRN    ondansetron (ZOFRAN) injection 4 mg  4 mg IntraVENous Q4H PRN    bisacodyl (DULCOLAX) tablet 5 mg  5 mg Oral DAILY PRN    azithromycin (ZITHROMAX) tablet 500 mg  500 mg Oral DAILY    ALPRAZolam (XANAX) tablet 2 mg  2 mg Oral Q8H PRN    apixaban (ELIQUIS) tablet 2.5 mg  2.5 mg Oral BID    atorvastatin (LIPITOR) tablet 20 mg  20 mg Oral QHS    levothyroxine (SYNTHROID) tablet 112 mcg  112 mcg Oral QPM    montelukast (SINGULAIR) tablet 10 mg  10 mg Oral DAILY    naloxegol (MOVANTIK) tablet 25 mg (Patient Supplied)  25 mg Oral DAILY    oxybutynin (DITROPAN) tablet 5 mg  5 mg Oral BID    pantoprazole (PROTONIX) tablet 40 mg  40 mg Oral ACB&D    pregabalin (LYRICA) capsule 150 mg  150 mg Oral BID    sucralfate (CARAFATE) tablet 1 g  1 g Oral QID    SUMAtriptan (IMITREX) tablet 100 mg  100 mg Oral Q8H PRN    ziprasidone (GEODON) capsule 80 mg  80 mg Oral QHS    budesonide (PULMICORT) 500 mcg/2 ml nebulizer suspension  500 mcg Nebulization BID RT       Review of Systems  +L hip/knee pain  +cough  +wheezing  Constitutional: negative for fever, chills, sweats  Cardiovascular: negative for chest pain, palpitations, syncope, edema  Gastrointestinal:  negative for dysphagia, reflux, vomiting, diarrhea, abdominal pain, or melena  Neurologic:  negative for focal weakness, numbness, headache    Objective:     Vitals:    04/05/18 0731 04/05/18 0741 04/05/18 1135 04/05/18 1154   BP:  157/78 153/85    Pulse:  83 (!) 108    Resp:  20 20    Temp:  98.2 °F (36.8 °C) 98.5 °F (36.9 °C)    SpO2: 97% 94% 90% 97%   Weight:       Height:         Intake and Output:           Physical Exam:   Constitution:  the patient is well developed and in no acute distress, on 2L O2  EENMT:  Sclera clear, pupils equal, oral mucosa moist  Respiratory: faint exp wheezing  Cardiovascular:  RRR, +SHIRA  Gastrointestinal: soft and non-tender; with positive bowel sounds. Musculoskeletal: warm without cyanosis. There is trace lower leg edema.   Skin:  no jaundice or rashes, L shin with scaling and peeling  Neurologic: no gross neuro deficits     Psychiatric:  alert and oriented x 3    CXR: 4/2      LAB  No results for input(s): GLUCPOC in the last 72 hours.    No lab exists for component: [place]   Recent Labs      04/05/18   0525  04/04/18   0506  04/03/18   0554  04/02/18   1240   WBC  7.9  12.3*  17.5*  15.3*   HGB  9.8*  9.9*  9.8*  11.5*   HCT  32.5*  33.6*  32.9*  37.7   PLT  227  246  265  311     Recent Labs      04/05/18   1107  04/04/18   0506  04/03/18   0554  04/02/18   1240   NA  141  145  142  144   K  3.5  3.7  3.7  3.8   CL  103  104  100  100   CO2  33*  34*  37*  38*   GLU  111*  90  90  114*   BUN  6*  8  17  14   CREA  0.79  0.96  1.43*  1.33*   MG   --    --   2.4   --    CA  9.4  8.8  8.8  9.5   ALB  2.3*  2.2*   --   2.9*   TBILI  0.2  0.2   --   0.3   ALT  12  12   --   16   SGOT  21  23   --   22       Assessment:  (Medical Decision Making)     Hospital Problems  Date Reviewed: 4/5/2018          Codes Class Noted POA    * (Principal)Community acquired bacterial pneumonia ICD-10-CM: J15.9  ICD-9-CM: 482.9  4/2/2018 Yes    On rocephin/zithromax    Sepsis due to pneumonia [facility]) ICD-10-CM: J18.9, A41.9  ICD-9-CM: 873, 995.91  4/2/2018 Yes    Improved, on abx    Chronic anticoagulation (Chronic) ICD-10-CM: Z79.01  ICD-9-CM: V58.61  12/3/2017 Yes    On eliquis    Fibromyalgia (Chronic) ICD-10-CM: M79.7  ICD-9-CM: 729.1  11/1/2017 Yes    Chronic     Morbid obesity - sedentary lifestyle (Chronic) ICD-10-CM: E66.01  ICD-9-CM: 278.01  11/1/2017 Yes    BMI > 40    Debility (Chronic) ICD-10-CM: R53.81  ICD-9-CM: 799.3  11/1/2017 Yes    Chronic     Polypharmacy (Chronic) ICD-10-CM: P27.972  ICD-9-CM: V58.69  11/1/2017 Yes    Chronic issue    Restrictive lung disease (Chronic) ICD-10-CM: J98.4  ICD-9-CM: 518.89  11/1/2017 Yes    Chronic, on 4L O2 qhs    Hypothyroidism (Chronic) ICD-10-CM: E03.9  ICD-9-CM: 244.9  9/28/2017 Yes    Continue synthroid    Hx of pulmonary embolus (Chronic) ICD-10-CM: I59.455  ICD-9-CM: V12.55  9/28/2017 Yes    Overview Signed 9/28/2017  3:18 PM by Gardiner Cockayne, NP     Chronic Eliquis         On Eliquis    Stress incontinence in female (Chronic) ICD-10-CM: N39.3  ICD-9-CM: 625.6  10/14/2014 Yes        Hypertension (Chronic) ICD-10-CM: I10  ICD-9-CM: 401.9  9/16/2012 Yes    Controlled     GERD (gastroesophageal reflux disease) (Chronic) ICD-10-CM: K21.9  ICD-9-CM: 530.81  9/16/2012 Yes        Arthritis (Chronic) ICD-10-CM: M19.90  ICD-9-CM: 716.90  9/16/2012 Yes    Needs to be rescheduled to see Dr. Violette Penaloza    Bipolar affective disorder [facility]) (Chronic) ICD-10-CM: F31.9  ICD-9-CM: 296.80  9/16/2012 Yes    Continue home medications           Plan:  (Medical Decision Making)     --wean O2 as tolerated, turned o2 down to 1L  --continue Rocephin/Zmax-day 4  --continue nebs  --wound care consulted  --continue eliquis  --continue PT  --hopefully home soon  --consider signing off soon    More than 50% of the time documented was spent in face-to-face contact with the patient and in the care of the patient on the floor/unit where the patient is located. TR Awad          Lungs:  Faint wheezing   Heart:  RRR with no Murmur/Rubs/Gallops    Additional Comments:  Continue ABX,  Few doses of steroids for wheezing, wean 02, will establish 02 needs with ambulation in AM, likely home in AM  I have spoken with and examined the patient. I agree with the above assessment and plan as documented.     Shayna Euceda MD

## 2018-04-05 NOTE — PROGRESS NOTES
Hourly rounds complete this shift, patient up to the bedside commode. No new complaints at this time. Will continue to monitor. Report to day shift nurse.

## 2018-04-05 NOTE — PROGRESS NOTES
Hospitalist Progress Note    2018  Admit Date: 2018 12:18 PM   NAME: Chela Mcrae   :  1950   MRN:  [id number]   Attending: Jemal Sheth MD  PCP:  Partha Wells DO    SUBJECTIVE:   Patient seen and examined at bedside this morning. She feels better but continues to complain of a cough and some nausea.     Review of Systems negative with exception of pertinent positives noted above  PHYSICAL EXAM     Visit Vitals    /85 (BP 1 Location: Left arm, BP Patient Position: Sitting)    Pulse (!) 120    Temp 98.5 °F (36.9 °C)    Resp 22    Ht 5' 1\" (1.549 m)    Wt 117.9 kg (260 lb)    SpO2 93%    BMI 49.13 kg/m2      Temp (24hrs), Av.5 °F (36.9 °C), Min:98.2 °F (36.8 °C), Max:98.9 °F (37.2 °C)    Oxygen Therapy  O2 Sat (%): 93 % (18 1425)  Pulse via Oximetry: 109 beats per minute (18 1154)  O2 Device: Room air (weaned from 2L) (18 1425)  O2 Flow Rate (L/min): 4 l/min (18 1154)  O2 Temperature: 87.8 °F (31 °C) (18 1100)  FIO2 (%): 45 % (18 1100)  No intake or output data in the 24 hours ending 18 1440     General: No acute distress    Lungs:  Minimal bilateral expiratory wheezing  Heart:  Regular rate and rhythm,  No murmur, rub, or gallop  Abdomen: Soft, Non distended, Non tender, Positive bowel sounds  Extremities: No cyanosis, clubbing, 1+ LE edema  Neurologic:  No focal deficits    ASSESSMENT      Active Hospital Problems    Diagnosis Date Noted    Community acquired bacterial pneumonia 2018    Sepsis due to pneumonia [facility]) 2018    Chronic anticoagulation 2017    Fibromyalgia 2017    Debility 2017    Morbid obesity - sedentary lifestyle 2017    Polypharmacy 2017    Restrictive lung disease 2017    Hx of pulmonary embolus 2017     Chronic Eliquis      Hypothyroidism 2017    Stress incontinence in female 10/14/2014    Arthritis 2012    Bipolar affective disorder ([facility] [address] [address] 09/16/2012    GERD (gastroesophageal reflux disease) 09/16/2012    Hypertension 09/16/2012     Plan:  · CAP- continue patient on Rocephin and Azithromycin. Continue nebulizers. Follow up pulmonary recommendations, wean oxygen, on 1L now. · History of PE- continue with Eliquis. · Bipolar Disorder- continue with Geodon and Remeron  · Fibromyalgia- continue with Lyrica. · Hypothyroidism- continue with Synthroid. · PT eval- patient will most likely be discharged home tomorrow.     DVT Prophylaxis: Eliquis    Signed By: Felix Hein MD     April 5, 2018

## 2018-04-05 NOTE — PROGRESS NOTES
Problem: Mobility Impaired (Adult and Pediatric)  Goal: *Acute Goals and Plan of Care (Insert Text)    LTG:  (1.)Ms. Jalen Sapp will move from supine to sit and sit to supine , scoot up and down and roll side to side in bed with STAND BY ASSIST within 7 treatment day(s). (2.)Ms. Jalen Sapp will transfer from bed to chair and chair to bed with STAND BY ASSIST using the least restrictive device within 7 treatment day(s). (3.)Ms. Jalen Sapp will ambulate with STAND BY ASSIST for 150+ feet with the least restrictive device within 7 treatment day(s). ________________________________________________________________________________________________      PHYSICAL THERAPY: Initial Assessment, Treatment Day: Day of Assessment, AM 4/5/2018  INPATIENT: Hospital Day: 4  [place] / Product Type: Medicare /      NAME/AGE/GENDER: Flakito Hollingsworth is a 78 y.o. female   PRIMARY DIAGNOSIS: Community acquired bacterial pneumonia Community acquired bacterial pneumonia Community acquired bacterial pneumonia        ICD-10: Treatment Diagnosis:    · Generalized Muscle Weakness (M62.81)  · Difficulty in walking, Not elsewhere classified (R26.2)   Precaution/Allergies:  Latex; Bactrim [sulfamethoxazole-trimethoprim]; Lamictal [lamotrigine]; Pcn [penicillins]; and Tapentadol      ASSESSMENT:     Ms. Jalen Sapp presents supine in bed at arrival. She reported that most of her pain is in L groin. Her O2 was on 4L at 98% and same through out session. She was able to get EOB with CGA, needed modA to scoot to EOB for feet on floor. Sit - stand need Tima and ambulating 10ft refusing any further due to groin pain. Pt required lots of tome between each trnf. Cues to breath, pt holding breath often. She returned to chair after session. Her impairments include decreased functional mobility, decreased balance, decreased strength, decreased safety awareness, increased risk for falls.  Pt would benefit from further PT while in house to address these impairments to help improve to prior level of independence. Anticipate pt will require continued skilled PT following discharge from hospital due to poor balance, safety and fall risk. This section established at most recent assessment   PROBLEM LIST (Impairments causing functional limitations):  1. Decreased Strength  2. Decreased ADL/Functional Activities  3. Decreased Transfer Abilities  4. Decreased Ambulation Ability/Technique  5. Decreased Balance  6. Increased Pain  7. Decreased Activity Tolerance  8. Increased Fatigue  9. Increased Shortness of Breath  10. Decreased Flexibility/Joint Mobility   INTERVENTIONS PLANNED: (Benefits and precautions of physical therapy have been discussed with the patient.)  1. Balance Exercise  2. Bed Mobility  3. Gait Training  4. Home Exercise Program (HEP)  5. Neuromuscular Re-education/Strengthening  6. Therapeutic Activites  7. Therapeutic Exercise/Strengthening  8. Transfer Training  9. Group Therapy     TREATMENT PLAN: Frequency/Duration: 3 times a week for duration of hospital stay  Rehabilitation Potential For Stated Goals: Jody Dumas REHABILITATION/EQUIPMENT: (at time of discharge pending progress): Due to the probability of continued deficits (see above) this patient will likely need continued skilled physical therapy after discharge. Equipment:    None at this time              HISTORY:   History of Present Injury/Illness (Reason for Referral):  78 y.o.  female with PMH of Morbid Obesity, bipolar disorder, chronic narcotic use / drug abuse (amphetamines/benzos/opiates), fibromyalgia, GERD, PUD, hx PE on chronic anticoagulation, HTN, hypothyroid, morbid obesity, PATRICIA, Asthma, restrictive lung disease, chronic respiratory failure 4lnc at night (non compliant) presented to the ER with cc of Fever, chills, SOB and diaphoresis this morning. Per daughter pt woke up with symptoms and was fine until yesterday.  ALso had one episode of vomiting last night. Pt denies CP, Abd pain, dysuria, Diarrhea, LOC or dizziness. Daughter States she was recently diagnosed with pneumonia, finished antibiotic one week ago. Temp was 103.5, o2 sat was 80% NC 4L, /80, -120. WBC was 15.3, LA nl, Cr 1.33 mild CAMILA, CXR showed RLL infiltrate. Started on ABx and placed on NRB and then Optiflow. Hospitalist asked to admit. Past Medical History/Comorbidities:   Ms. Tyson Wan  has a past medical history of Acute renal failure [facility]) (July, 2014); Arthritis; Asthma; Asthma with acute exacerbation (9/28/2017); Bilateral pulmonary embolism [facility]) (September, 2012); Calculus of ureter (May, 2015); CAP (community acquired pneumonia) (October, 2012); Cellulitis of left lower extremity (11/1/2017); Chronic pain; CVA (cerebral infarction) (January, 2012); Gastric ulcer (July, 2014); GERD (gastroesophageal reflux disease); Gram-positive bacteremia 1/2 cx (12/26/2016); HCAP (healthcare-associated pneumonia) (January, 2013); HCAP (healthcare-associated pneumonia) (February, 2013); Hypertension; Left leg DVT ([address] (1991); Morbid obesity ([address] Psychiatric disorder; PUD (peptic ulcer disease) (???); Stroke [facility]); and Thyroid disease. She also has no past medical history of Aneurysm ([address] Arrhythmia; Autoimmune disease ([address] CAD (coronary artery disease); Cancer ([address] Chronic kidney disease; Chronic obstructive pulmonary disease ([address] Coagulation disorder ([address] COPD; Diabetes ([address] Endocarditis; Heart failure ([address] Liver disease; Nicotine vapor product user; Non-nicotine vapor product user; Rheumatic fever; Seizures ([address] or Unspecified sleep apnea. Ms. Tyson Wan  has a past surgical history that includes hx appendectomy; hx cholecystectomy; hx gastric bypass; hx tonsillectomy; hx other surgical; hx orthopaedic; hx hysterectomy; and hx endoscopy (July, 2014).   Social History/Living Environment:   Home Environment: Trailer/mobile home  # Steps to Enter: 3  One/Two Story Residence: One story  Living Alone: No  Support Systems: Child(jaswant)  Patient Expects to be Discharged to[de-identified] Trailer/mobile home  Current DME Used/Available at Home: Cane, straight  Prior Level of Function/Work/Activity:  Pt states she uses SPC at home in R hand for L LE pain, but has only been using SPC for less than month. Prior to that no AD used. Number of Personal Factors/Comorbidities that affect the Plan of Care: 1-2: MODERATE COMPLEXITY   EXAMINATION:   Most Recent Physical Functioning:   Gross Assessment:  AROM: Generally decreased, functional  Strength: Generally decreased, functional  Coordination: Grossly decreased, non-functional  Tone: Normal  Sensation: Intact               Posture:  Posture (WDL): Exceptions to WDL  Posture Assessment: Trunk flexion  Balance:  Sitting: Impaired  Sitting - Static: Good (unsupported)  Sitting - Dynamic: Fair (occasional)  Standing: Impaired  Standing - Static: Fair  Standing - Dynamic : Fair Bed Mobility:  Rolling: Contact guard assistance  Supine to Sit: Contact guard assistance  Scooting: Moderate assistance  Wheelchair Mobility:     Transfers:  Sit to Stand: Minimum assistance  Stand to Sit: Minimum assistance  Gait:     Base of Support: Center of gravity altered; Widened  Stance: Weight shift;Time  Gait Abnormalities: Decreased step clearance;Shuffling gait  Distance (ft): 10 Feet (ft)  Assistive Device: Walker, rolling  Ambulation - Level of Assistance: Minimal assistance; Additional time      Body Structures Involved:  1. Lungs  2. Bones  3. Joints  4. Muscles  5. Ligaments Body Functions Affected:  1. Mental  2. Respiratory  3. Neuromusculoskeletal  4. Movement Related Activities and Participation Affected:  1. General Tasks and Demands  2. Mobility  3. Self Care  4. Domestic Life  5.  Community, Social and [place]   Number of elements that affect the Plan of Care: 3: MODERATE COMPLEXITY   CLINICAL PRESENTATION:   Presentation: Evolving clinical presentation with changing clinical characteristics: MODERATE COMPLEXITY   CLINICAL DECISION MAKIN [facility] Mobility Inpatient Short Form  How much difficulty does the patient currently have. .. Unable A Lot A Little None   1. Turning over in bed (including adjusting bedclothes, sheets and blankets)? [] 1   [] 2   [] 3   [x] 4   2. Sitting down on and standing up from a chair with arms ( e.g., wheelchair, bedside commode, etc.)   [] 1   [] 2   [x] 3   [] 4   3. Moving from lying on back to sitting on the side of the bed? [] 1   [] 2   [x] 3   [] 4   How much help from another person does the patient currently need. .. Total A Lot A Little None   4. Moving to and from a bed to a chair (including a wheelchair)? [] 1   [] 2   [x] 3   [] 4   5. Need to walk in hospital room? [] 1   [x] 2   [] 3   [] 4   6. Climbing 3-5 steps with a railing? [] 1   [x] 2   [] 3   [] 4   © , Trustees of [address] [place] [address], under license to [employer]. All rights reserved      Score:  Initial: 17 Most Recent: X (Date: -- )    Interpretation of Tool:  Represents activities that are increasingly more difficult (i.e. Bed mobility, Transfers, Gait). Score 24 23 22-20 19-15 14-10 9-7 6     Modifier CH CI CJ CK CL CM CN      ? Mobility - Walking and Moving Around:     - CURRENT STATUS: CK - 40%-59% impaired, limited or restricted    - GOAL STATUS: CJ - 20%-39% impaired, limited or restricted    - D/C STATUS:  ---------------To be determined---------------  [place] / [place] / Product Type: Medicare /      Medical Necessity:     · Skilled intervention continues to be required due to decreased function. Reason for Services/Other Comments:  · Patient continues to require skilled intervention due to medical complications and patient unable to attend/participate in therapy as expected.    Use of outcome tool(s) and clinical judgement create a POC that gives a: Questionable prediction of patient's progress: MODERATE COMPLEXITY            TREATMENT:   (In addition to Assessment/Re-Assessment sessions the following treatments were rendered)   Pre-treatment Symptoms/Complaints:  none  Pain: Initial:   Pain Intensity 1: 8  Pain Location 1: Leg, Groin  Pain Orientation 1: Left  Post Session:  8     Therapeutic Activity: (    8min): Therapeutic activities including Bed transfers, Chair transfers and Ambulation on level ground to improve mobility, strength, balance and coordination. Required maximal   to promote static and dynamic balance in standing, promote coordination of bilateral, lower extremity(s) and promote motor control of bilateral, lower extremity(s). Braces/Orthotics/Lines/Etc:   · IV  · 4L  · O2 Device: Nasal cannula  Treatment/Session Assessment:    · Response to Treatment:  See above  · Interdisciplinary Collaboration:   o Physical Therapist  o Registered Nurse  · After treatment position/precautions:   o Up in chair  o Call light within reach  o RN notified   · Compliance with Program/Exercises: Will assess as treatment progresses. · Recommendations/Intent for next treatment session: \"Next visit will focus on advancements to more challenging activities and reduction in assistance provided\".   Total Treatment Duration:  PT Patient Time In/Time Out  Time In: 0850  Time Out: 174 [facility], T

## 2018-04-05 NOTE — PROGRESS NOTES
Hourly rounds completed this shift. Patient resting in bed quietly. No needs stated at this time. Will continue to monitor and give report to oncoming RN.

## 2018-04-06 VITALS
BODY MASS INDEX: 49.09 KG/M2 | RESPIRATION RATE: 20 BRPM | WEIGHT: 260 LBS | TEMPERATURE: 97.9 F | SYSTOLIC BLOOD PRESSURE: 152 MMHG | HEART RATE: 107 BPM | OXYGEN SATURATION: 94 % | DIASTOLIC BLOOD PRESSURE: 88 MMHG | HEIGHT: 61 IN

## 2018-04-06 LAB
ALBUMIN SERPL-MCNC: 2.3 G/DL (ref 3.2–4.6)
ALBUMIN/GLOB SERPL: 0.6 {RATIO} (ref 1.2–3.5)
ALP SERPL-CCNC: 94 U/L (ref 50–136)
ALT SERPL-CCNC: 15 U/L (ref 12–65)
ANION GAP SERPL CALC-SCNC: 6 MMOL/L (ref 7–16)
AST SERPL-CCNC: 19 U/L (ref 15–37)
BILIRUB SERPL-MCNC: 0.2 MG/DL (ref 0.2–1.1)
BUN SERPL-MCNC: 7 MG/DL (ref 8–23)
CALCIUM SERPL-MCNC: 9.2 MG/DL (ref 8.3–10.4)
CHLORIDE SERPL-SCNC: 106 MMOL/L (ref 98–107)
CO2 SERPL-SCNC: 31 MMOL/L (ref 21–32)
CREAT SERPL-MCNC: 0.74 MG/DL (ref 0.6–1)
ERYTHROCYTE [DISTWIDTH] IN BLOOD BY AUTOMATED COUNT: 18.4 % (ref 11.9–14.6)
GLOBULIN SER CALC-MCNC: 4.1 G/DL (ref 2.3–3.5)
GLUCOSE SERPL-MCNC: 139 MG/DL (ref 65–100)
HCT VFR BLD AUTO: 33.8 % (ref 35.8–46.3)
HGB BLD-MCNC: 10.2 G/DL (ref 11.7–15.4)
MCH RBC QN AUTO: 25.3 PG (ref 26.1–32.9)
MCHC RBC AUTO-ENTMCNC: 30.2 G/DL (ref 31.4–35)
MCV RBC AUTO: 83.9 FL (ref 79.6–97.8)
PLATELET # BLD AUTO: 293 K/UL (ref 150–450)
PMV BLD AUTO: 10.5 FL (ref 10.8–14.1)
POTASSIUM SERPL-SCNC: 4.3 MMOL/L (ref 3.5–5.1)
PROT SERPL-MCNC: 6.4 G/DL (ref 6.3–8.2)
RBC # BLD AUTO: 4.03 M/UL (ref 4.05–5.25)
SODIUM SERPL-SCNC: 143 MMOL/L (ref 136–145)
WBC # BLD AUTO: 8.1 K/UL (ref 4.3–11.1)

## 2018-04-06 PROCEDURE — 97530 THERAPEUTIC ACTIVITIES: CPT

## 2018-04-06 PROCEDURE — 80053 COMPREHEN METABOLIC PANEL: CPT | Performed by: INTERNAL MEDICINE

## 2018-04-06 PROCEDURE — 74011250637 HC RX REV CODE- 250/637: Performed by: INTERNAL MEDICINE

## 2018-04-06 PROCEDURE — 74011000250 HC RX REV CODE- 250: Performed by: INTERNAL MEDICINE

## 2018-04-06 PROCEDURE — 94760 N-INVAS EAR/PLS OXIMETRY 1: CPT

## 2018-04-06 PROCEDURE — 94640 AIRWAY INHALATION TREATMENT: CPT

## 2018-04-06 PROCEDURE — 36415 COLL VENOUS BLD VENIPUNCTURE: CPT | Performed by: INTERNAL MEDICINE

## 2018-04-06 PROCEDURE — 85027 COMPLETE CBC AUTOMATED: CPT | Performed by: INTERNAL MEDICINE

## 2018-04-06 PROCEDURE — 99232 SBSQ HOSP IP/OBS MODERATE 35: CPT | Performed by: INTERNAL MEDICINE

## 2018-04-06 RX ORDER — LEVOFLOXACIN 750 MG/1
750 TABLET ORAL EVERY 24 HOURS
Qty: 3 TAB | Refills: 0 | Status: SHIPPED | OUTPATIENT
Start: 2018-04-06 | End: 2018-05-21

## 2018-04-06 RX ADMIN — BUDESONIDE 500 MCG: 0.5 INHALANT RESPIRATORY (INHALATION) at 07:25

## 2018-04-06 RX ADMIN — ALBUTEROL SULFATE 2.5 MG: 2.5 SOLUTION RESPIRATORY (INHALATION) at 07:25

## 2018-04-06 RX ADMIN — OXYBUTYNIN CHLORIDE 5 MG: 5 TABLET ORAL at 08:59

## 2018-04-06 RX ADMIN — PANTOPRAZOLE SODIUM 40 MG: 40 TABLET, DELAYED RELEASE ORAL at 06:34

## 2018-04-06 RX ADMIN — ALBUTEROL SULFATE 2.5 MG: 2.5 SOLUTION RESPIRATORY (INHALATION) at 11:41

## 2018-04-06 RX ADMIN — HYDROCODONE BITARTRATE AND ACETAMINOPHEN 1 TABLET: 5; 325 TABLET ORAL at 04:42

## 2018-04-06 RX ADMIN — APIXABAN 2.5 MG: 2.5 TABLET, FILM COATED ORAL at 08:59

## 2018-04-06 RX ADMIN — GUAIFENESIN 1200 MG: 600 TABLET, EXTENDED RELEASE ORAL at 08:58

## 2018-04-06 RX ADMIN — PREGABALIN 150 MG: 150 CAPSULE ORAL at 08:59

## 2018-04-06 RX ADMIN — Medication 10 ML: at 05:17

## 2018-04-06 RX ADMIN — MONTELUKAST SODIUM 10 MG: 10 TABLET, FILM COATED ORAL at 08:59

## 2018-04-06 RX ADMIN — SUCRALFATE 1 G: 1 TABLET ORAL at 08:58

## 2018-04-06 RX ADMIN — AZITHROMYCIN 500 MG: 250 TABLET, FILM COATED ORAL at 08:59

## 2018-04-06 RX ADMIN — HYDROCODONE BITARTRATE AND ACETAMINOPHEN 1 TABLET: 5; 325 TABLET ORAL at 08:59

## 2018-04-06 NOTE — PROGRESS NOTES
Oxygen Qualifier       Room air: SpO2 with O2 and liter flow   Resting SpO2  97%     Ambulating SpO2 94%      Pt required no O2. Physical therapy was present during trial, pt stated she was in pain in the groin area was only able to walk to the door and back with walker.   Completed by:    Katrin Esteban RT

## 2018-04-06 NOTE — PROGRESS NOTES
Rakesh La  Admission Date: 4/2/2018             Daily Progress Note: 4/6/2018    The patient's chart is reviewed and the patient is discussed with the staff. Pt is a 77 yo  female with a history of nocturnal hypoxemia on 4L QHS, prior CVA, prior PEs on Eliquis, bipolar disorder, and obesity. [place] [facility] [place] with fever, chills, and temp 102*.  Pt's CXR revealed pneumonia and she was admitted by the hospitalist.   Subjective:     Feels better ready to go home  On RA-also was ambulated on RA and she does not needs 02    Current Facility-Administered Medications   Medication Dose Route Frequency    mirtazapine (REMERON) tablet 15 mg  15 mg Oral QHS    propranolol (INDERAL) tablet 10 mg  10 mg Oral Q6H PRN    albuterol (PROVENTIL VENTOLIN) nebulizer solution 2.5 mg  2.5 mg Nebulization QID RT    guaiFENesin ER (MUCINEX) tablet 1,200 mg  1,200 mg Oral BID    sodium chloride (NS) flush 5-10 mL  5-10 mL IntraVENous PRN    cefTRIAXone (ROCEPHIN) 2 g in 0.9% sodium chloride (MBP/ADV) 50 mL  2 g IntraVENous Q24H    sodium chloride (NS) flush 5-10 mL  5-10 mL IntraVENous Q8H    sodium chloride (NS) flush 5-10 mL  5-10 mL IntraVENous PRN    acetaminophen (TYLENOL) tablet 650 mg  650 mg Oral Q4H PRN    HYDROcodone-acetaminophen (NORCO) 5-325 mg per tablet 1 Tab  1 Tab Oral Q4H PRN    naloxone (NARCAN) injection 0.4 mg  0.4 mg IntraVENous PRN    diphenhydrAMINE (BENADRYL) injection 12.5 mg  12.5 mg IntraVENous Q4H PRN    ondansetron (ZOFRAN) injection 4 mg  4 mg IntraVENous Q4H PRN    bisacodyl (DULCOLAX) tablet 5 mg  5 mg Oral DAILY PRN    azithromycin (ZITHROMAX) tablet 500 mg  500 mg Oral DAILY    ALPRAZolam (XANAX) tablet 2 mg  2 mg Oral Q8H PRN    apixaban (ELIQUIS) tablet 2.5 mg  2.5 mg Oral BID    atorvastatin (LIPITOR) tablet 20 mg  20 mg Oral QHS    levothyroxine (SYNTHROID) tablet 112 mcg  112 mcg Oral QPM    montelukast (SINGULAIR) tablet 10 mg 10 mg Oral DAILY    naloxegol (MOVANTIK) tablet 25 mg (Patient Supplied)  25 mg Oral DAILY    oxybutynin (DITROPAN) tablet 5 mg  5 mg Oral BID    pantoprazole (PROTONIX) tablet 40 mg  40 mg Oral ACB&D    pregabalin (LYRICA) capsule 150 mg  150 mg Oral BID    sucralfate (CARAFATE) tablet 1 g  1 g Oral QID    SUMAtriptan (IMITREX) tablet 100 mg  100 mg Oral Q8H PRN    ziprasidone (GEODON) capsule 80 mg  80 mg Oral QHS    budesonide (PULMICORT) 500 mcg/2 ml nebulizer suspension  500 mcg Nebulization BID RT       Review of Systems  +L hip/knee pain  +cough  +wheezing  Constitutional: negative for fever, chills, sweats  Cardiovascular: negative for chest pain, palpitations, syncope, edema  Gastrointestinal:  negative for dysphagia, reflux, vomiting, diarrhea, abdominal pain, or melena  Neurologic:  negative for focal weakness, numbness, headache    Objective:     Vitals:    04/05/18 2319 04/06/18 0442 04/06/18 0725 04/06/18 0902   BP: 153/79 148/84  152/88   Pulse: 93 91  (!) 107   Resp: 20 20  20   Temp: 98.3 °F (36.8 °C) 98 °F (36.7 °C)  97.9 °F (36.6 °C)   SpO2: 99% 98% 94% 93%   Weight:       Height:         Intake and Output:   04/04 1901 - 04/06 0700  In: 720 [P.O.:720]  Out: -   04/06 0701 - 04/06 1900  In: 240 [P.O.:240]  Out: -     Physical Exam:   Constitution:  the patient is well developed and in no acute distress, on 2L O2  EENMT:  Sclera clear, pupils equal, oral mucosa moist  Respiratory: faint exp wheezing  Cardiovascular:  RRR, +SHIRA  Gastrointestinal: soft and non-tender; with positive bowel sounds. Musculoskeletal: warm without cyanosis. There is trace lower leg edema. Skin:  no jaundice or rashes, L shin with scaling and peeling  Neurologic: no gross neuro deficits     Psychiatric:  alert and oriented x 3    CXR: 4/2      LAB  No results for input(s): GLUCPOC in the last 72 hours.     No lab exists for component: [place]   Recent Labs      04/06/18   0541  04/05/18   0525  04/04/18   0506   WBC 8.1  7.9  12.3*   HGB  10.2*  9.8*  9.9*   HCT  33.8*  32.5*  33.6*   PLT  293  227  246     Recent Labs      04/06/18   0541  04/05/18   1107  04/04/18   0506   NA  143  141  145   K  4.3  3.5  3.7   CL  106  103  104   CO2  31  33*  34*   GLU  139*  111*  90   BUN  7*  6*  8   CREA  0.74  0.79  0.96   CA  9.2  9.4  8.8   ALB  2.3*  2.3*  2.2*   TBILI  0.2  0.2  0.2   ALT  15  12  12   SGOT  19  21  23       Assessment:  (Medical Decision Making)     Hospital Problems  Date Reviewed: 4/5/2018          Codes Class Noted POA    * (Principal)Community acquired bacterial pneumonia ICD-10-CM: J15.9  ICD-9-CM: 482.9  4/2/2018 Yes    On rocephin/zithromax    Sepsis due to pneumonia [facility]) ICD-10-CM: J18.9, A41.9  ICD-9-CM: 046, 995.91  4/2/2018 Yes    Improved, on abx    Chronic anticoagulation (Chronic) ICD-10-CM: Z79.01  ICD-9-CM: V58.61  12/3/2017 Yes    On eliquis    Fibromyalgia (Chronic) ICD-10-CM: M79.7  ICD-9-CM: 729.1  11/1/2017 Yes    Chronic     Morbid obesity - sedentary lifestyle (Chronic) ICD-10-CM: E66.01  ICD-9-CM: 278.01  11/1/2017 Yes    BMI > 40    Debility (Chronic) ICD-10-CM: R53.81  ICD-9-CM: 799.3  11/1/2017 Yes    Chronic     Polypharmacy (Chronic) ICD-10-CM: J11.387  ICD-9-CM: V58.69  11/1/2017 Yes    Chronic issue    Restrictive lung disease (Chronic) ICD-10-CM: J98.4  ICD-9-CM: 518.89  11/1/2017 Yes    Chronic, on 4L O2 qhs    Hypothyroidism (Chronic) ICD-10-CM: E03.9  ICD-9-CM: 244.9  9/28/2017 Yes    Continue synthroid    Hx of pulmonary embolus (Chronic) ICD-10-CM: R62.139  ICD-9-CM: V12.55  9/28/2017 Yes    Overview Signed 9/28/2017  3:18 PM by Gardiner Cockayne, NP     Chronic Eliquis         On Eliquis    Stress incontinence in female (Chronic) ICD-10-CM: N39.3  ICD-9-CM: 625.6  10/14/2014 Yes        Hypertension (Chronic) ICD-10-CM: I10  ICD-9-CM: 401.9  9/16/2012 Yes    Controlled     GERD (gastroesophageal reflux disease) (Chronic) ICD-10-CM: K21.9  ICD-9-CM: 530.81  9/16/2012 Yes Arthritis (Chronic) ICD-10-CM: M19.90  ICD-9-CM: 716.90  9/16/2012 Yes    Needs to be rescheduled to see Dr. Chi William    Bipolar affective disorder [facility]) (Chronic) ICD-10-CM: F31.9  ICD-9-CM: 296.80  9/16/2012 Yes    Continue home medications           Plan:  (Medical Decision Making)     Wean off 02   --continue Rocephin/Zmax-day 5 OK to change to Po to finish 7 days course  - will follow up in our office in  3weeks with CXR  -stable for discharge        More than 50% of the time documented was spent in face-to-face contact with the patient and in the care of the patient on the floor/unit where the patient is located.     Jannet Hill MD

## 2018-04-06 NOTE — DISCHARGE INSTRUCTIONS
DISCHARGE SUMMARY from Nurse    PATIENT INSTRUCTIONS:    After general anesthesia or intravenous sedation, for 24 hours or while taking prescription Narcotics:  · Limit your activities  · Do not drive and operate hazardous machinery  · Do not make important personal or business decisions  · Do  not drink alcoholic beverages  · If you have not urinated within 8 hours after discharge, please contact your surgeon on call. Report the following to your surgeon:  · Excessive pain, swelling, redness or odor of or around the surgical area  · Temperature over 100.5  · Nausea and vomiting lasting longer than 4 hours or if unable to take medications  · Any signs of decreased circulation or nerve impairment to extremity: change in color, persistent  numbness, tingling, coldness or increase pain  · Any questions    What to do at Home:  Recommended activity: Activity as tolerated,     If you experience any of the following symptoms fever greater than 101, nausea or vomiting please follow up with your primary care doctor. *  Please give a list of your current medications to your Primary Care Provider. *  Please update this list whenever your medications are discontinued, doses are      changed, or new medications (including over-the-counter products) are added. *  Please carry medication information at all times in case of emergency situations. These are general instructions for a healthy lifestyle:    No smoking/ No tobacco products/ Avoid exposure to second hand smoke  Surgeon General's Warning:  Quitting smoking now greatly reduces serious risk to your health.     Obesity, smoking, and sedentary lifestyle greatly increases your risk for illness    A healthy diet, regular physical exercise & weight monitoring are important for maintaining a healthy lifestyle    You may be retaining fluid if you have a history of heart failure or if you experience any of the following symptoms:  Weight gain of 3 pounds or more overnight or 5 pounds in a week, increased swelling in our hands or feet or shortness of breath while lying flat in bed. Please call your doctor as soon as you notice any of these symptoms; do not wait until your next office visit. Recognize signs and symptoms of STROKE:    F-face looks uneven    A-arms unable to move or move unevenly    S-speech slurred or non-existent    T-time-call 911 as soon as signs and symptoms begin-DO NOT go       Back to bed or wait to see if you get better-TIME IS BRAIN. Warning Signs of HEART ATTACK     Call 911 if you have these symptoms:   Chest discomfort. Most heart attacks involve discomfort in the center of the chest that lasts more than a few minutes, or that goes away and comes back. It can feel like uncomfortable pressure, squeezing, fullness, or pain.  Discomfort in other areas of the upper body. Symptoms can include pain or discomfort in one or both arms, the back, neck, jaw, or stomach.  Shortness of breath with or without chest discomfort.  Other signs may include breaking out in a cold sweat, nausea, or lightheadedness. Don't wait more than five minutes to call 911 - MINUTES MATTER! Fast action can save your life. Calling 911 is almost always the fastest way to get lifesaving treatment. Emergency Medical Services staff can begin treatment when they arrive -- up to an hour sooner than if someone gets to the hospital by car. The discharge information has been reviewed with the patient. The patient verbalized understanding. Discharge medications reviewed with the patient and appropriate educational materials and side effects teaching were provided.   ___________________________________________________________________________________________________________________________________

## 2018-04-06 NOTE — DISCHARGE SUMMARY
Hospitalist Discharge Summary     Patient ID:  Chon July  [id number]  78 y.o.  1950  Admit date: 4/2/2018 12:18 PM  Discharge date and time: 4/6/2018  Attending: No att. providers found  PCP:  Jai Sorenson DO  Treatment Team: Utilization Review: Michoacano Boggs, RN; Care Manager: YEMI Wright    Principal Diagnosis Community acquired bacterial pneumonia   Principal Problem:    Community acquired bacterial pneumonia (4/2/2018)    Active Problems:    Fibromyalgia (11/1/2017)      Hypertension (9/16/2012)      GERD (gastroesophageal reflux disease) (9/16/2012)      Morbid obesity - sedentary lifestyle (11/1/2017)      Hypothyroidism (9/28/2017)      Arthritis (9/16/2012)      Bipolar affective disorder ([facility] (9/16/2012)      Debility (11/1/2017)      Stress incontinence in female (10/14/2014)      Polypharmacy (11/1/2017)      Hx of pulmonary embolus (9/28/2017)      Overview: Chronic Eliquis      Restrictive lung disease (11/1/2017)      Chronic anticoagulation (12/3/2017)      Sepsis due to pneumonia ([facility] (4/2/2018)       Hospital Course:  Please refer to the admission H&P for details of presentation. In summary, the patient is a 67/F with PMH Morbid obesity, bipolar disorder, fibromyalgia, GERD, PUD, history of PE on Eliquis, HTN, hypothyroidism who was admitted for CAP. She was started on Rocephin and Azithromycin. Pulmonary was consulted. Patient was placed on Optiflow and given breathing treatments. She was eventually weaned off of oxygen. She ambulated well with PT. She is on home O2 at 2L at night. Patient was discharged on Levaquin for 3 more days. Significant Diagnostic Studies:     Portable chest x-ray April 2, 2018.     INDICATION: Dyspnea     COMPARISON: 12/20/2017     There is a linear infiltrate in the right lower lobe. Heart is unremarkable. Soft tissues and the bony structures are intact.  There is a generator  superimposed over the left costophrenic angle.     IMPRESSION  IMPRESSION: Linear infiltrate within the right lower lobe  Labs: Results:       Chemistry Recent Labs      04/06/18   0541  04/05/18   1107  04/04/18   0506   GLU  139*  111*  90   NA  143  141  145   K  4.3  3.5  3.7   CL  106  103  104   CO2  31  33*  34*   BUN  7*  6*  8   CREA  0.74  0.79  0.96   CA  9.2  9.4  8.8   AGAP  6*  5*  7   AP  94  107  108   TP  6.4  6.2*  6.1*   ALB  2.3*  2.3*  2.2*   GLOB  4.1*  3.9*  3.9*   AGRAT  0.6*  0.6*  0.6*      CBC w/Diff Recent Labs      04/06/18   0541  04/05/18   0525  04/04/18   0506   WBC  8.1  7.9  12.3*   RBC  4.03*  3.84*  3.94*   HGB  10.2*  9.8*  9.9*   HCT  33.8*  32.5*  33.6*   PLT  293  227  246      Cardiac Enzymes No results for input(s): CPK, CKND1, BRAN in the last 72 hours. No lab exists for component: CKRMB, TROIP   Coagulation No results for input(s): PTP, INR, APTT in the last 72 hours. No lab exists for component: INREXT    Lipid Panel Lab Results   Component Value Date/Time    Cholesterol, total 159 02/09/2017 05:35 AM    HDL Cholesterol 55 02/09/2017 05:35 AM    LDL, calculated 89.6 02/09/2017 05:35 AM    VLDL, calculated 14.4 02/09/2017 05:35 AM    Triglyceride 72 02/09/2017 05:35 AM    CHOL/HDL Ratio 2.9 02/09/2017 05:35 AM      BNP No results for input(s): BNPP in the last 72 hours.    Liver Enzymes Recent Labs      04/06/18   0541   TP  6.4   ALB  2.3*   AP  94   SGOT  19      Thyroid Studies Lab Results   Component Value Date/Time    T4, Total 4.9 09/16/2012 04:20 AM    T3 Uptake 41 (H) 09/16/2012 04:20 AM    TSH 0.259 (L) 04/10/2017 11:00 PM            Discharge Exam:  Visit Vitals    /88 (BP 1 Location: Left arm, BP Patient Position: Sitting)    Pulse (!) 107  Comment: Nuurse informed    Temp 97.9 °F (36.6 °C)    Resp 20    Ht 5' 1\" (1.549 m)    Wt 117.9 kg (260 lb)    SpO2 94%    BMI 49.13 kg/m2     General:                    No acute distress    Lungs:                       Minimal bilateral expiratory wheezing  Heart:                        Regular rate and rhythm,  No murmur, rub, or gallop  Abdomen:                  Soft, Non distended, Non tender, Positive bowel sounds  Extremities:               No cyanosis, clubbing, 1+ LE edema  Neurologic:                No focal deficits       Disposition: home  Discharge Condition: stable  Patient Instructions:   Discharge Medication List as of 4/6/2018 12:11 PM      START taking these medications    Details   levoFLOXacin (LEVAQUIN) 750 mg tablet Take 1 Tab by mouth every twenty-four (24) hours. , Normal, Disp-3 Tab, R-0         CONTINUE these medications which have NOT CHANGED    Details   albuterol (PROAIR HFA) 90 mcg/actuation inhaler Take 2 Puffs by inhalation every four (4) hours as needed for Wheezing., Normal, Disp-1 Inhaler, R-11      albuterol (PROVENTIL VENTOLIN) 2.5 mg /3 mL (0.083 %) nebulizer solution 3 mL by Nebulization route four (4) times daily. And every 4 hours as needed cough, wheezing, shortness of breath, J45.909, Medicare part B, Normal, Disp-120 Each, R-0      furosemide (LASIX) 40 mg tablet Take 1 Tab by mouth daily as needed. , Print, Disp-20 Tab, R-0      OXYGEN-AIR DELIVERY SYSTEMS 2 lpm cont., Historical Med      fluticasone-vilanterol (BREO ELLIPTA) 200-25 mcg/dose inhaler Take 1 Puff by inhalation daily. RINSE MOUTH WELL AFTER USE, Normal, Disp-3 Inhaler, R-3      albuterol sulfate (PROVENTIL;VENTOLIN) 2.5 mg/0.5 mL nebu nebulizer solution 0.5 mL by Nebulization route every four (4) hours. Dx J45.909, NormalDX: 799.02 hypoxemia, 518.83 chronic respitory failureDisp-180 mL, R-4      nystatin (MYCOSTATIN) powder Apply  to affected area two (2) times a day., Print, Disp-1 Bottle, R-0      sucralfate (CARAFATE) 1 gram tablet Take 1 g by mouth four (4) times daily. , Historical Med      Biotin 2,500 mcg cap Take  by mouth., Historical Med      UBIDECARENONE/VITAMIN E MIXED (COQ10  PO) Take  by mouth., Historical Med pregabalin (LYRICA) 150 mg capsule Take  by mouth two (2) times a day., Historical Med      montelukast (SINGULAIR) 10 mg tablet Take 10 mg by mouth daily. , Historical Med      naloxegol (MOVANTIK) 25 mg tab tablet Take  by mouth Daily (before breakfast). , Historical Med      carvedilol (COREG) 12.5 mg tablet Take 12.5 mg by mouth two (2) times daily (with meals). , Historical Med      apixaban (ELIQUIS) 2.5 mg tablet Take 2.5 mg by mouth two (2) times a day. Indications: PULMONARY THROMBOEMBOLISM PREVENTION, Historical Med      atorvastatin (LIPITOR) 10 mg tablet Take 2 Tabs by mouth nightly., Normal, Disp-30 Tab, R-11      ALPRAZolam (XANAX) 2 mg tablet Take 1 Tab by mouth three (3) times daily as needed for Anxiety. Max Daily Amount: 6 mg., No Print, Disp-30 Tab, R-0      ondansetron (ZOFRAN ODT) 8 mg disintegrating tablet Take 8 mg by mouth every eight (8) hours as needed for Nausea., Historical Med      oxybutynin chloride XL (DITROPAN XL) 10 mg CR tablet Take 10 mg by mouth daily. , Historical Med      Magnesium Oxide 500 mg cap Take 500 mg by mouth., Historical Med      lisinopril (PRINIVIL, ZESTRIL) 40 mg tablet Take 1 Tab by mouth daily. , Normal, Disp-30 Tab, R-5      HYDROcodone-acetaminophen (NORCO)  mg tablet Take 1 Tab by mouth every six (6) hours as needed for Pain., Historical Med      POTASSIUM CHLORIDE SR 10 MEQ TAB Take 1 Tab by mouth daily. , Historical Med      Cholecalciferol, Vitamin D3, (VITAMIN D3) 1,000 unit cap Take  by mouth., Historical Med      folic acid 477 mcg tablet Take 800 mcg by mouth daily. , Historical Med      pantoprazole (PROTONIX) 40 mg tablet Take 1 Tab by mouth Before breakfast and dinner., Normal, Disp-60 Tab, R-2      FERROUS FUMARATE/VIT BCOMP&C (SUPER B COMPLEX PO) Take 1 Tab by mouth daily. , Historical Med      NEBULIZER by Does Not Apply route., Historical Med      ziprasidone (GEODON) 80 mg capsule Take 80 mg by mouth nightly., Historical Med      SUMATRIPTAN SUCCINATE (IMITREX PO) 100 mg, Oral, AS NEEDED, Until Discontinued, Historical Med      levothyroxine (SYNTHROID) 125 mcg tablet Take 112 mcg by mouth every evening., Historical Med             Activity: Activity as tolerated  Diet: Cardiac Diet  Wound Care: None needed    Follow-up  ·   F/U with PCP within one week.     Time spent to discharge patient 35 minutes  Signed:  Zaida Mares MD  4/6/2018  6:11 PM

## 2018-04-06 NOTE — PROGRESS NOTES
Problem: Mobility Impaired (Adult and Pediatric)  Goal: *Acute Goals and Plan of Care (Insert Text)    LTG:  (1.)Ms. Pj Casas will move from supine to sit and sit to supine , scoot up and down and roll side to side in bed with STAND BY ASSIST within 7 treatment day(s). (2.)Ms. Pj Casas will transfer from bed to chair and chair to bed with STAND BY ASSIST using the least restrictive device within 7 treatment day(s). (3.)Ms. Pj Casas will ambulate with STAND BY ASSIST for 150+ feet with the least restrictive device within 7 treatment day(s). ________________________________________________________________________________________________      PHYSICAL THERAPY: Daily Note, Treatment Day: 1st, AM 4/6/2018  INPATIENT: Hospital Day: 5  [place] / Product Type: Medicare /      NAME/AGE/GENDER: Victoria Haynes is a 78 y.o. female   PRIMARY DIAGNOSIS: Community acquired bacterial pneumonia Community acquired bacterial pneumonia Community acquired bacterial pneumonia        ICD-10: Treatment Diagnosis:    · Generalized Muscle Weakness (M62.81)  · Difficulty in walking, Not elsewhere classified (R26.2)   Precaution/Allergies:  Latex; Bactrim [sulfamethoxazole-trimethoprim]; Lamictal [lamotrigine]; Pcn [penicillins]; and Tapentadol      ASSESSMENT:     Ms. Pj Casas presents sitting in recliner chair  Upon  arrival. She reported that most of her pain is in L groin. Rates it @ 10/10. Her O2 4L at night. Saturation 94-97% taken by RT. Patient is able to scoot to the edge of the chair independently. Sit to stand with min assist to contact guard assist.  Static standing @ walker with supervision. Gait training with rolling walker x 30 feet with one sitting rest breaks due to the groin pain. Judith is very slow and patient has decreased step length on the left and has a great amount of pressure on the walker during gait. Patient states that she walks with a cane at home.   I don't see that being safe at this time due to the increased hip pain. Patient is returned to the recliner and c/o of feeling nausea. She was given a damp cloth and a ginger ale. Positioned for comfort and needs within reach. Safety issues present. due to pain. Patient states that she had an appointment with POA but was here and missed it. Encouraged patient to reschedule as soon as possible. Pt would benefit from further PT while in house to address these impairments to help improve to prior level of independence. Anticipate pt will require continued skilled PT following discharge from hospital due to poor balance, safety and fall risk. This section established at most recent assessment   PROBLEM LIST (Impairments causing functional limitations):  1. Decreased Strength  2. Decreased ADL/Functional Activities  3. Decreased Transfer Abilities  4. Decreased Ambulation Ability/Technique  5. Decreased Balance  6. Increased Pain  7. Decreased Activity Tolerance  8. Increased Fatigue  9. Increased Shortness of Breath  10. Decreased Flexibility/Joint Mobility   INTERVENTIONS PLANNED: (Benefits and precautions of physical therapy have been discussed with the patient.)  1. Balance Exercise  2. Bed Mobility  3. Gait Training  4. Home Exercise Program (HEP)  5. Neuromuscular Re-education/Strengthening  6. Therapeutic Activites  7. Therapeutic Exercise/Strengthening  8. Transfer Training  9. Group Therapy     TREATMENT PLAN: Frequency/Duration: 3 times a week for duration of hospital stay  Rehabilitation Potential For Stated Goals: Boni [facility]o REHABILITATION/EQUIPMENT: (at time of discharge pending progress): Due to the probability of continued deficits (see above) this patient will likely need continued skilled physical therapy after discharge.   Equipment:    None at this time              HISTORY:   History of Present Injury/Illness (Reason for Referral):  78 y.o.  female with PMH of Morbid Obesity, bipolar disorder, chronic narcotic use / drug abuse (amphetamines/benzos/opiates), fibromyalgia, GERD, PUD, hx PE on chronic anticoagulation, HTN, hypothyroid, morbid obesity, PATRICIA, Asthma, restrictive lung disease, chronic respiratory failure 4lnc at night (non compliant) presented to the ER with cc of Fever, chills, SOB and diaphoresis this morning. Per daughter pt woke up with symptoms and was fine until yesterday. ALso had one episode of vomiting last night. Pt denies CP, Abd pain, dysuria, Diarrhea, LOC or dizziness. Daughter States she was recently diagnosed with pneumonia, finished antibiotic one week ago. Temp was 103.5, o2 sat was 80% NC 4L, /80, -120. WBC was 15.3, LA nl, Cr 1.33 mild CAMILA, CXR showed RLL infiltrate. Started on ABx and placed on NRB and then Optiflow. Hospitalist asked to admit. Past Medical History/Comorbidities:   Ms. Pj Casas  has a past medical history of Acute renal failure [facility]) (July, 2014); Arthritis; Asthma; Asthma with acute exacerbation (9/28/2017); Bilateral pulmonary embolism [facility]) (September, 2012); Calculus of ureter (May, 2015); CAP (community acquired pneumonia) (October, 2012); Cellulitis of left lower extremity (11/1/2017); Chronic pain; CVA (cerebral infarction) (January, 2012); Gastric ulcer (July, 2014); GERD (gastroesophageal reflux disease); Gram-positive bacteremia 1/2 cx (12/26/2016); HCAP (healthcare-associated pneumonia) (January, 2013); HCAP (healthcare-associated pneumonia) (February, 2013); Hypertension; Left leg DVT ([address] (1991); Morbid obesity ([facility] [address] Psychiatric disorder; PUD (peptic ulcer disease) (???); Stroke [facility]); and Thyroid disease. She also has no past medical history of Aneurysm ([address] Arrhythmia; Autoimmune disease ([address] CAD (coronary artery disease); Cancer ([address] Chronic kidney disease; Chronic obstructive pulmonary disease ([address] Coagulation disorder ([address] COPD; Diabetes ([facility] [address] Endocarditis; Heart failure ([facility] [address]  Liver disease; Nicotine vapor product user; Non-nicotine vapor product user; Rheumatic fever; Seizures ([address] or Unspecified sleep apnea. Ms. Elenita Kaiser  has a past surgical history that includes hx appendectomy; hx cholecystectomy; hx gastric bypass; hx tonsillectomy; hx other surgical; hx orthopaedic; hx hysterectomy; and hx endoscopy (July, 2014). Social History/Living Environment:   Home Environment: Trailer/mobile home  # Steps to Enter: 3  One/Two Story Residence: One story  Living Alone: No  Support Systems: Child(jaswant), Spouse/Significant Other/Partner  Patient Expects to be Discharged to[de-identified] Trailer/mobile home  Current DME Used/Available at Home: Cane, straight, Shower chair  Tub or Shower Type: Tub/Shower combination  Prior Level of Function/Work/Activity:  Pt states she uses SPC at home in R hand for L LE pain, but has only been using SPC for less than month. Prior to that no AD used. Number of Personal Factors/Comorbidities that affect the Plan of Care: 1-2: MODERATE COMPLEXITY   EXAMINATION:   Most Recent Physical Functioning:   Gross Assessment:                  Posture:     Balance:  Sitting: Intact  Sitting - Static: Good (unsupported)  Sitting - Dynamic: Good (unsupported)  Standing: Impaired (due to pain)  Standing - Static: Fair  Standing - Dynamic : Fair Bed Mobility:     Wheelchair Mobility:     Transfers:  Sit to Stand: Contact guard assistance;Minimum assistance  Stand to Sit: Contact guard assistance  Gait:     Base of Support: Center of gravity altered  Stance: Left decreased  Gait Abnormalities: Decreased step clearance  Distance (ft): 30 Feet (ft)  Assistive Device: Walker, rolling;Gait belt  Ambulation - Level of Assistance: Contact guard assistance      Body Structures Involved:  1. Lungs  2. Bones  3. Joints  4. Muscles  5. Ligaments Body Functions Affected:  1. Mental  2. Respiratory  3. Neuromusculoskeletal  4. Movement Related Activities and Participation Affected:  1.  General Tasks and Demands  2. Mobility  3. Self Care  4. Domestic Life  5. Community, Social and [place]   Number of elements that affect the Plan of Care: 3: MODERATE COMPLEXITY   CLINICAL PRESENTATION:   Presentation: Evolving clinical presentation with changing clinical characteristics: MODERATE COMPLEXITY   CLINICAL DECISION MAKIN [facility] Mobility Inpatient Short Form  How much difficulty does the patient currently have. .. Unable A Lot A Little None   1. Turning over in bed (including adjusting bedclothes, sheets and blankets)? [] 1   [] 2   [] 3   [x] 4   2. Sitting down on and standing up from a chair with arms ( e.g., wheelchair, bedside commode, etc.)   [] 1   [] 2   [x] 3   [] 4   3. Moving from lying on back to sitting on the side of the bed? [] 1   [] 2   [x] 3   [] 4   How much help from another person does the patient currently need. .. Total A Lot A Little None   4. Moving to and from a bed to a chair (including a wheelchair)? [] 1   [] 2   [x] 3   [] 4   5. Need to walk in hospital room? [] 1   [x] 2   [] 3   [] 4   6. Climbing 3-5 steps with a railing? [] 1   [x] 2   [] 3   [] 4   © , Trustees of [address] [place] [address], under license to [employer]. All rights reserved      Score:  Initial: 17 Most Recent: X (Date: -- )    Interpretation of Tool:  Represents activities that are increasingly more difficult (i.e. Bed mobility, Transfers, Gait). Score 24 23 22-20 19-15 14-10 9-7 6     Modifier CH CI CJ CK CL CM CN      ? Mobility - Walking and Moving Around:     - CURRENT STATUS: CK - 40%-59% impaired, limited or restricted    - GOAL STATUS: CJ - 20%-39% impaired, limited or restricted    - D/C STATUS:  ---------------To be determined---------------  [place] / Product Type: Medicare /      Medical Necessity:     · Skilled intervention continues to be required due to decreased function.   Reason for Services/Other Comments:  · Patient continues to require skilled intervention due to medical complications and patient unable to attend/participate in therapy as expected. Use of outcome tool(s) and clinical judgement create a POC that gives a: Questionable prediction of patient's progress: MODERATE COMPLEXITY            TREATMENT:   (In addition to Assessment/Re-Assessment sessions the following treatments were rendered)   Pre-treatment Symptoms/Complaints:  \" I want to go home\"  Pain: Initial:   Pain Intensity 1: 10  Pain Location 1: Groin  Post Session:  10/10     Therapeutic Activity: (   25 min): Therapeutic activities including  Chair transfers and Ambulation on level ground to improve mobility, strength, balance and coordination. Required min assist to contact guard assist    to promote static and dynamic balance in standing, promote coordination of bilateral, lower extremity(s) and promote motor control of bilateral, lower extremity(s). Braces/Orthotics/Lines/Etc:   · 4L at night  · O2 Device: Nasal cannula  Treatment/Session Assessment:    · Response to Treatment:  Tolerated fair-poor due to pain  · Interdisciplinary Collaboration:   o Physical Therapy Assistant  o Registered Nurse  · After treatment position/precautions:   o Up in chair  o Bed/Chair-wheels locked  o Call light within reach  o RN notified   · Compliance with Program/Exercises: compliant  · Recommendations/Intent for next treatment session: \"Next visit will focus on advancements to more challenging activities and reduction in assistance provided\".   Total Treatment Duration:  PT Patient Time In/Time Out  Time In: 0915  Time Out: 0940    Brisa Escalona PTA

## 2018-04-06 NOTE — PROGRESS NOTES
Shift Summary  Hourly rounds performed on pt, pt resting in bed quietly with head the bed elevated, even and unlabored respirations on 4 L oxygen via nasal cannula. Patient remained on bedrest this shift. IV to her left hand still in place and patent. Fall precautions in place, bed is low and locked, call light within reach. Will continue to monitor till transfer of care is done to up coming RN.

## 2018-04-10 ENCOUNTER — PATIENT OUTREACH (OUTPATIENT)
Dept: CASE MANAGEMENT | Age: 68
End: 2018-04-10

## 2018-04-10 NOTE — PROGRESS NOTES
Transition of Care Discharge Follow-up Questionnaire   Date/Time of TAWNY Outreach: 4/9/2018 2:28 PM   What was the patient hospitalized for? Patient was hospitalized for CAP   Does the patient understand his/her diagnosis and/or treatment and what happened during the hospitalization? CM Assessed Risk for Readmission:        Patient stated Risk for Readmission:     Spoke to Patient who consented to [place] outreach, and verbalized understanding of treatment and diagnosis. Risk for readmission completed, chronic health management and diagnosis, and patients decline of additional supports being put in place. Patient states readmission would be due to Pneumonia       Did the patient receive discharge instructions? Patient states d/c instructions received. Review any discharge instructions (see notes in [facility]). Ask patient if they understand these. Do they have any questions? Patient discussed discharge plan and instruction as Patient understood it to be with Care Coordinator. Which I have documented in the pertinent areas throughout this progress note. Were home services ordered (nursing, PT, OT, ST, etc.)? No HH ordered at d/c. If so, has the first visit occurred? If not, why? (Assist with coordination of services if necessary.) N/A   Was any DME ordered? No DME ordered at d/c. Patient states she uses home O2. If so, has it been received? If not, why?  (Assist with coordination of arranging DME orders if necessary.) N/A   Complete a review of all medications (new, continued and discontinued meds per the D/C instructions and medication tab in [address] [place] [address]). Review of all meds completed with Patient and Care Coordinator. Levaquin prescribed at d/c. Were all new prescriptions filled? If not, why?  (Assist with obtainment of medications if necessary.) Yes. Does the patient understand the purpose and dosing instructions for all medications?   (If patient has questions, provide explanation and education.) Indicated by Patient to Care Coordinator, the purpose and dosing instructions for all medications were understood. Does the patient have any problems in performing ADLs? (If patient is unable to perform ADLs  what is the limiting factor(s)? Do they have a support system that can assist? If no support system is present, discuss possible assistance that they may be able to obtain.) Patient states she is independent with some ADLs and that with others daughter assists with. Does the patient have all follow-up appointments scheduled? 7 day f/up with PCP?    7-14 day f/up with specialist?    If f/up has not been made  what actions has the care coordinator made to accomplish this? Has transportation been arranged? [facility] follow-up should be within 7 days of discharge; all others should have PCP follow-up within 7 days of discharge; follow-ups with other specialists as appropriate or ordered.) PCP f/u 4/11 per patient. Pulm 5/1 as scheduled at d/c 2/10. Will also send request per protocol for earlier appt. Patient states my daughter is arranging transportation for me. Patient denied the need for transportation. Any other questions or concerns expressed by the patient? Gratitude stated and no further questions asked or needs identified. TAWNY Call Completed By:   Veda Escoto LPN/ Care Coordinator  [address]  [address] [place] [address] / [address]  [email or web address]         This note will not be viewable in [employer]. Late entry due to tech issues.

## 2018-04-10 NOTE — PROGRESS NOTES
Please note this patient denied need for [place] services as well as CCM services which were offered due to her multiple readmissions noted in CC and her health management. Patient denied all supports and services as noted in Progress note. Essie Cochran LPN/ Care Coordinator  [address]  [address] [place] [address]  [email or web address] [facility] Centerhis note will not be viewable in [address]

## 2018-05-01 ENCOUNTER — HOSPITAL ENCOUNTER (OUTPATIENT)
Dept: GENERAL RADIOLOGY | Age: 68
Discharge: HOME | End: 2018-05-01
Payer: MEDICARE

## 2018-05-01 DIAGNOSIS — J18.9 PNEUMONIA DUE TO INFECTIOUS ORGANISM, UNSPECIFIED LATERALITY, UNSPECIFIED PART OF LUNG: ICD-10-CM

## 2018-05-01 PROBLEM — J15.9 COMMUNITY ACQUIRED BACTERIAL PNEUMONIA: Status: RESOLVED | Noted: 2018-04-02 | Resolved: 2018-05-01

## 2018-05-01 PROBLEM — A41.9 SEPSIS DUE TO PNEUMONIA (HCC): Status: RESOLVED | Noted: 2018-04-02 | Resolved: 2018-05-01

## 2018-05-01 PROCEDURE — 71046 X-RAY EXAM CHEST 2 VIEWS: CPT

## 2018-05-21 ENCOUNTER — HOSPITAL ENCOUNTER (INPATIENT)
Age: 68
LOS: 3 days | Discharge: HOME HEALTH | DRG: 189 | End: 2018-05-24
Attending: EMERGENCY MEDICINE | Admitting: INTERNAL MEDICINE
Payer: MEDICARE

## 2018-05-21 ENCOUNTER — APPOINTMENT (OUTPATIENT)
Dept: GENERAL RADIOLOGY | Age: 68
DRG: 189 | End: 2018-05-21
Attending: EMERGENCY MEDICINE
Payer: MEDICARE

## 2018-05-21 DIAGNOSIS — J45.51 SEVERE PERSISTENT ASTHMA WITH EXACERBATION: ICD-10-CM

## 2018-05-21 DIAGNOSIS — G47.10 HYPERSOMNOLENCE: Chronic | ICD-10-CM

## 2018-05-21 DIAGNOSIS — M79.7 FIBROMYALGIA: Chronic | ICD-10-CM

## 2018-05-21 DIAGNOSIS — J38.00 VOCAL CORD PARALYSIS: Chronic | ICD-10-CM

## 2018-05-21 DIAGNOSIS — J96.21 ACUTE ON CHRONIC RESPIRATORY FAILURE WITH HYPOXIA AND HYPERCAPNIA (HCC): ICD-10-CM

## 2018-05-21 DIAGNOSIS — N30.00 ACUTE CYSTITIS WITHOUT HEMATURIA: ICD-10-CM

## 2018-05-21 DIAGNOSIS — F19.10 DRUG ABUSE (HCC): Chronic | ICD-10-CM

## 2018-05-21 DIAGNOSIS — E66.01 MORBID OBESITY (HCC): Chronic | ICD-10-CM

## 2018-05-21 DIAGNOSIS — R41.82 ALTERED MENTAL STATUS, UNSPECIFIED ALTERED MENTAL STATUS TYPE: Primary | ICD-10-CM

## 2018-05-21 DIAGNOSIS — G47.34 NOCTURNAL HYPOXEMIA: Chronic | ICD-10-CM

## 2018-05-21 DIAGNOSIS — Z79.899 POLYPHARMACY: Chronic | ICD-10-CM

## 2018-05-21 DIAGNOSIS — J98.4 RESTRICTIVE LUNG DISEASE: Chronic | ICD-10-CM

## 2018-05-21 DIAGNOSIS — J96.22 ACUTE ON CHRONIC RESPIRATORY FAILURE WITH HYPOXIA AND HYPERCAPNIA (HCC): ICD-10-CM

## 2018-05-21 DIAGNOSIS — R53.81 DEBILITY: Chronic | ICD-10-CM

## 2018-05-21 PROBLEM — J96.02 ACUTE RESPIRATORY FAILURE WITH HYPERCAPNIA (HCC): Status: ACTIVE | Noted: 2018-05-21

## 2018-05-21 LAB
ALBUMIN SERPL-MCNC: 2.3 G/DL (ref 3.2–4.6)
ALBUMIN/GLOB SERPL: 0.7 {RATIO} (ref 1.2–3.5)
ALP SERPL-CCNC: 91 U/L (ref 50–136)
ALT SERPL-CCNC: 16 U/L (ref 12–65)
AMPHET UR QL SCN: NEGATIVE
ANION GAP SERPL CALC-SCNC: 8 MMOL/L (ref 7–16)
APPEARANCE UR: ABNORMAL
ARTERIAL PATENCY WRIST A: POSITIVE
ARTERIAL PATENCY WRIST A: POSITIVE
AST SERPL-CCNC: 22 U/L (ref 15–37)
ATRIAL RATE: 67 BPM
BACTERIA URNS QL MICRO: ABNORMAL /HPF
BARBITURATES UR QL SCN: NEGATIVE
BASE EXCESS BLDA CALC-SCNC: 1.3 MMOL/L (ref 0–3)
BASE EXCESS BLDA CALC-SCNC: 3.5 MMOL/L (ref 0–3)
BASOPHILS # BLD: 0 K/UL (ref 0–0.2)
BASOPHILS NFR BLD: 0 % (ref 0–2)
BDY SITE: ABNORMAL
BDY SITE: ABNORMAL
BENZODIAZ UR QL: POSITIVE
BILIRUB SERPL-MCNC: 0.2 MG/DL (ref 0.2–1.1)
BILIRUB UR QL: NEGATIVE
BNP SERPL-MCNC: 48 PG/ML
BUN SERPL-MCNC: 13 MG/DL (ref 8–23)
CALCIUM SERPL-MCNC: 8.2 MG/DL (ref 8.3–10.4)
CALCULATED P AXIS, ECG09: 32 DEGREES
CALCULATED R AXIS, ECG10: -20 DEGREES
CALCULATED T AXIS, ECG11: 16 DEGREES
CANNABINOIDS UR QL SCN: NEGATIVE
CASTS URNS QL MICRO: ABNORMAL /LPF
CHLORIDE SERPL-SCNC: 112 MMOL/L (ref 98–107)
CO2 SERPL-SCNC: 27 MMOL/L (ref 21–32)
COCAINE UR QL SCN: NEGATIVE
COHGB MFR BLD: 0.6 % (ref 0.5–1.5)
COHGB MFR BLD: 0.8 % (ref 0.5–1.5)
COLOR UR: YELLOW
CREAT SERPL-MCNC: 1.21 MG/DL (ref 0.6–1)
DIAGNOSIS, 93000: NORMAL
DIFFERENTIAL METHOD BLD: ABNORMAL
DO-HGB BLD-MCNC: 4 % (ref 0–5)
DO-HGB BLD-MCNC: 5 % (ref 0–5)
EOSINOPHIL # BLD: 0.3 K/UL (ref 0–0.8)
EOSINOPHIL NFR BLD: 4 % (ref 0.5–7.8)
EPI CELLS #/AREA URNS HPF: ABNORMAL /HPF
ERYTHROCYTE [DISTWIDTH] IN BLOOD BY AUTOMATED COUNT: 17.6 % (ref 11.9–14.6)
FIO2 ON VENT: 25 %
GAS FLOW.O2 O2 DELIVERY SYS: 3 L/MIN
GLOBULIN SER CALC-MCNC: 3.3 G/DL (ref 2.3–3.5)
GLUCOSE SERPL-MCNC: 76 MG/DL (ref 65–100)
GLUCOSE UR STRIP.AUTO-MCNC: NEGATIVE MG/DL
HCO3 BLDA-SCNC: 28 MMOL/L (ref 22–26)
HCO3 BLDA-SCNC: 30 MMOL/L (ref 22–26)
HCT VFR BLD AUTO: 29.4 % (ref 35.8–46.3)
HGB BLD-MCNC: 8.6 G/DL (ref 11.7–15.4)
HGB BLDMV-MCNC: 8.6 GM/DL (ref 11.7–15)
HGB BLDMV-MCNC: 9 GM/DL (ref 11.7–15)
HGB UR QL STRIP: ABNORMAL
IMM GRANULOCYTES # BLD: 0 K/UL (ref 0–0.5)
IMM GRANULOCYTES NFR BLD AUTO: 0 % (ref 0–5)
KETONES UR QL STRIP.AUTO: NEGATIVE MG/DL
LACTATE BLD-SCNC: 0.5 MMOL/L (ref 0.5–1.9)
LEUKOCYTE ESTERASE UR QL STRIP.AUTO: ABNORMAL
LYMPHOCYTES # BLD: 2.9 K/UL (ref 0.5–4.6)
LYMPHOCYTES NFR BLD: 40 % (ref 13–44)
MCH RBC QN AUTO: 24.3 PG (ref 26.1–32.9)
MCHC RBC AUTO-ENTMCNC: 29.3 G/DL (ref 31.4–35)
MCV RBC AUTO: 83.1 FL (ref 79.6–97.8)
METHADONE UR QL: NEGATIVE
METHGB MFR BLD: 0 % (ref 0–1.5)
METHGB MFR BLD: 0.3 % (ref 0–1.5)
MONOCYTES # BLD: 0.6 K/UL (ref 0.1–1.3)
MONOCYTES NFR BLD: 8 % (ref 4–12)
NEUTS SEG # BLD: 3.4 K/UL (ref 1.7–8.2)
NEUTS SEG NFR BLD: 48 % (ref 43–78)
NITRITE UR QL STRIP.AUTO: NEGATIVE
OPIATES UR QL: POSITIVE
OXYHGB MFR BLDA: 94.2 % (ref 94–97)
OXYHGB MFR BLDA: 95 % (ref 94–97)
P-R INTERVAL, ECG05: 184 MS
PCO2 BLDA: 54 MMHG (ref 35–45)
PCO2 BLDA: 58 MMHG (ref 35–45)
PCP UR QL: NEGATIVE
PH BLDA: 7.33 [PH] (ref 7.35–7.45)
PH BLDA: 7.33 [PH] (ref 7.35–7.45)
PH UR STRIP: 5.5 [PH] (ref 5–9)
PLATELET # BLD AUTO: 168 K/UL (ref 150–450)
PMV BLD AUTO: 9.8 FL (ref 10.8–14.1)
PO2 BLDA: 85 MMHG (ref 80–105)
PO2 BLDA: 95 MMHG (ref 80–105)
POTASSIUM SERPL-SCNC: 4.2 MMOL/L (ref 3.5–5.1)
PROT SERPL-MCNC: 5.6 G/DL (ref 6.3–8.2)
PROT UR STRIP-MCNC: ABNORMAL MG/DL
Q-T INTERVAL, ECG07: 396 MS
QRS DURATION, ECG06: 84 MS
QTC CALCULATION (BEZET), ECG08: 418 MS
RBC # BLD AUTO: 3.54 M/UL (ref 4.05–5.25)
RBC #/AREA URNS HPF: ABNORMAL /HPF
SAO2 % BLD: 95 % (ref 92–98.5)
SAO2 % BLD: 96 % (ref 92–98.5)
SODIUM SERPL-SCNC: 147 MMOL/L (ref 136–145)
SP GR UR REFRACTOMETRY: 1.02 (ref 1–1.02)
UROBILINOGEN UR QL STRIP.AUTO: 1 EU/DL (ref 0.2–1)
VENTILATION MODE VENT: ABNORMAL
VENTILATION MODE VENT: ABNORMAL
VENTRICULAR RATE, ECG03: 67 BPM
WBC # BLD AUTO: 7.1 K/UL (ref 4.3–11.1)
WBC URNS QL MICRO: >100 /HPF

## 2018-05-21 PROCEDURE — 94640 AIRWAY INHALATION TREATMENT: CPT

## 2018-05-21 PROCEDURE — 87186 SC STD MICRODIL/AGAR DIL: CPT | Performed by: EMERGENCY MEDICINE

## 2018-05-21 PROCEDURE — 74011250636 HC RX REV CODE- 250/636: Performed by: EMERGENCY MEDICINE

## 2018-05-21 PROCEDURE — 96365 THER/PROPH/DIAG IV INF INIT: CPT | Performed by: EMERGENCY MEDICINE

## 2018-05-21 PROCEDURE — 94660 CPAP INITIATION&MGMT: CPT

## 2018-05-21 PROCEDURE — 36600 WITHDRAWAL OF ARTERIAL BLOOD: CPT

## 2018-05-21 PROCEDURE — 80053 COMPREHEN METABOLIC PANEL: CPT | Performed by: EMERGENCY MEDICINE

## 2018-05-21 PROCEDURE — 74011250636 HC RX REV CODE- 250/636

## 2018-05-21 PROCEDURE — 83605 ASSAY OF LACTIC ACID: CPT

## 2018-05-21 PROCEDURE — 87086 URINE CULTURE/COLONY COUNT: CPT | Performed by: EMERGENCY MEDICINE

## 2018-05-21 PROCEDURE — 71045 X-RAY EXAM CHEST 1 VIEW: CPT

## 2018-05-21 PROCEDURE — 99223 1ST HOSP IP/OBS HIGH 75: CPT | Performed by: INTERNAL MEDICINE

## 2018-05-21 PROCEDURE — 81001 URINALYSIS AUTO W/SCOPE: CPT | Performed by: EMERGENCY MEDICINE

## 2018-05-21 PROCEDURE — 80307 DRUG TEST PRSMV CHEM ANLYZR: CPT | Performed by: EMERGENCY MEDICINE

## 2018-05-21 PROCEDURE — 93005 ELECTROCARDIOGRAM TRACING: CPT | Performed by: EMERGENCY MEDICINE

## 2018-05-21 PROCEDURE — 74011000258 HC RX REV CODE- 258: Performed by: EMERGENCY MEDICINE

## 2018-05-21 PROCEDURE — 87040 BLOOD CULTURE FOR BACTERIA: CPT | Performed by: EMERGENCY MEDICINE

## 2018-05-21 PROCEDURE — 74011000250 HC RX REV CODE- 250: Performed by: INTERNAL MEDICINE

## 2018-05-21 PROCEDURE — 74011000250 HC RX REV CODE- 250: Performed by: EMERGENCY MEDICINE

## 2018-05-21 PROCEDURE — 82803 BLOOD GASES ANY COMBINATION: CPT

## 2018-05-21 PROCEDURE — 83880 ASSAY OF NATRIURETIC PEPTIDE: CPT | Performed by: EMERGENCY MEDICINE

## 2018-05-21 PROCEDURE — 85025 COMPLETE CBC W/AUTO DIFF WBC: CPT | Performed by: EMERGENCY MEDICINE

## 2018-05-21 PROCEDURE — 87088 URINE BACTERIA CULTURE: CPT | Performed by: EMERGENCY MEDICINE

## 2018-05-21 PROCEDURE — 94760 N-INVAS EAR/PLS OXIMETRY 1: CPT

## 2018-05-21 PROCEDURE — 99285 EMERGENCY DEPT VISIT HI MDM: CPT | Performed by: EMERGENCY MEDICINE

## 2018-05-21 PROCEDURE — 74011250637 HC RX REV CODE- 250/637: Performed by: INTERNAL MEDICINE

## 2018-05-21 PROCEDURE — 74011250636 HC RX REV CODE- 250/636: Performed by: INTERNAL MEDICINE

## 2018-05-21 PROCEDURE — 65620000000 HC RM CCU GENERAL

## 2018-05-21 RX ORDER — PREGABALIN 75 MG/1
150 CAPSULE ORAL 2 TIMES DAILY
Status: DISCONTINUED | OUTPATIENT
Start: 2018-05-21 | End: 2018-05-24 | Stop reason: HOSPADM

## 2018-05-21 RX ORDER — SODIUM CHLORIDE 0.9 % (FLUSH) 0.9 %
5-10 SYRINGE (ML) INJECTION EVERY 8 HOURS
Status: DISCONTINUED | OUTPATIENT
Start: 2018-05-21 | End: 2018-05-24 | Stop reason: HOSPADM

## 2018-05-21 RX ORDER — ACAR/CYST/ALA/Q10/P.SER/BROCC 800-300 MG
25 POWDER IN PACKET (EA) ORAL DAILY
COMMUNITY
End: 2018-07-21

## 2018-05-21 RX ORDER — FOLIC ACID 1 MG/1
1 TABLET ORAL DAILY
Status: DISCONTINUED | OUTPATIENT
Start: 2018-05-22 | End: 2018-05-24 | Stop reason: HOSPADM

## 2018-05-21 RX ORDER — CARVEDILOL 12.5 MG/1
12.5 TABLET ORAL 2 TIMES DAILY WITH MEALS
Status: DISCONTINUED | OUTPATIENT
Start: 2018-05-21 | End: 2018-05-24 | Stop reason: HOSPADM

## 2018-05-21 RX ORDER — FUROSEMIDE 40 MG/1
40 TABLET ORAL DAILY
Status: DISCONTINUED | OUTPATIENT
Start: 2018-05-22 | End: 2018-05-24 | Stop reason: HOSPADM

## 2018-05-21 RX ORDER — ALBUTEROL SULFATE 0.83 MG/ML
2.5 SOLUTION RESPIRATORY (INHALATION)
Status: DISCONTINUED | OUTPATIENT
Start: 2018-05-21 | End: 2018-05-24 | Stop reason: HOSPADM

## 2018-05-21 RX ORDER — NYSTATIN 100000 [USP'U]/G
POWDER TOPICAL 2 TIMES DAILY
Status: DISCONTINUED | OUTPATIENT
Start: 2018-05-21 | End: 2018-05-24 | Stop reason: HOSPADM

## 2018-05-21 RX ORDER — ATORVASTATIN CALCIUM 10 MG/1
20 TABLET, FILM COATED ORAL
Status: DISCONTINUED | OUTPATIENT
Start: 2018-05-21 | End: 2018-05-24 | Stop reason: HOSPADM

## 2018-05-21 RX ORDER — SODIUM CHLORIDE 0.9 % (FLUSH) 0.9 %
5-10 SYRINGE (ML) INJECTION AS NEEDED
Status: DISCONTINUED | OUTPATIENT
Start: 2018-05-21 | End: 2018-05-24 | Stop reason: HOSPADM

## 2018-05-21 RX ORDER — IPRATROPIUM BROMIDE AND ALBUTEROL SULFATE 2.5; .5 MG/3ML; MG/3ML
3 SOLUTION RESPIRATORY (INHALATION)
Status: COMPLETED | OUTPATIENT
Start: 2018-05-21 | End: 2018-05-21

## 2018-05-21 RX ORDER — METOCLOPRAMIDE 5 MG/1
5 TABLET ORAL
COMMUNITY
End: 2018-08-27

## 2018-05-21 RX ORDER — PANTOPRAZOLE SODIUM 40 MG/1
40 TABLET, DELAYED RELEASE ORAL
Status: DISCONTINUED | OUTPATIENT
Start: 2018-05-22 | End: 2018-05-24 | Stop reason: HOSPADM

## 2018-05-21 RX ORDER — IPRATROPIUM BROMIDE AND ALBUTEROL SULFATE 2.5; .5 MG/3ML; MG/3ML
3 SOLUTION RESPIRATORY (INHALATION)
Status: DISCONTINUED | OUTPATIENT
Start: 2018-05-21 | End: 2018-05-21 | Stop reason: SDUPTHER

## 2018-05-21 RX ORDER — ALPRAZOLAM 0.5 MG/1
1 TABLET ORAL
Status: DISCONTINUED | OUTPATIENT
Start: 2018-05-21 | End: 2018-05-24 | Stop reason: HOSPADM

## 2018-05-21 RX ORDER — LEVOTHYROXINE SODIUM 112 UG/1
112 TABLET ORAL EVERY EVENING
Status: DISCONTINUED | OUTPATIENT
Start: 2018-05-21 | End: 2018-05-24 | Stop reason: HOSPADM

## 2018-05-21 RX ORDER — IRBESARTAN 300 MG/1
300 TABLET ORAL
COMMUNITY
End: 2018-07-21

## 2018-05-21 RX ORDER — LISINOPRIL 10 MG/1
10 TABLET ORAL
COMMUNITY
End: 2018-08-20 | Stop reason: SINTOL

## 2018-05-21 RX ORDER — HYDROCODONE BITARTRATE AND ACETAMINOPHEN 5; 325 MG/1; MG/1
1 TABLET ORAL
Status: DISCONTINUED | OUTPATIENT
Start: 2018-05-21 | End: 2018-05-24 | Stop reason: HOSPADM

## 2018-05-21 RX ORDER — BUDESONIDE 0.5 MG/2ML
500 INHALANT ORAL
Status: DISCONTINUED | OUTPATIENT
Start: 2018-05-21 | End: 2018-05-22

## 2018-05-21 RX ORDER — ONDANSETRON 2 MG/ML
4 INJECTION INTRAMUSCULAR; INTRAVENOUS
Status: DISCONTINUED | OUTPATIENT
Start: 2018-05-21 | End: 2018-05-24 | Stop reason: HOSPADM

## 2018-05-21 RX ORDER — OXYBUTYNIN CHLORIDE 10 MG/1
10 TABLET, EXTENDED RELEASE ORAL DAILY
Status: DISCONTINUED | OUTPATIENT
Start: 2018-05-22 | End: 2018-05-24 | Stop reason: HOSPADM

## 2018-05-21 RX ORDER — CYANOCOBALAMIN (VITAMIN B-12) 2500 MCG
TABLET, SUBLINGUAL SUBLINGUAL
COMMUNITY
End: 2018-07-21

## 2018-05-21 RX ORDER — ACYCLOVIR 200 MG/1
400 CAPSULE ORAL DAILY
COMMUNITY
End: 2018-05-24

## 2018-05-21 RX ORDER — DULOXETIN HYDROCHLORIDE 60 MG/1
60 CAPSULE, DELAYED RELEASE ORAL 2 TIMES DAILY
Status: ON HOLD | COMMUNITY
End: 2018-08-27

## 2018-05-21 RX ORDER — SUCRALFATE 1 G/1
1 TABLET ORAL 4 TIMES DAILY
Status: DISCONTINUED | OUTPATIENT
Start: 2018-05-21 | End: 2018-05-24 | Stop reason: HOSPADM

## 2018-05-21 RX ORDER — IPRATROPIUM BROMIDE AND ALBUTEROL SULFATE 2.5; .5 MG/3ML; MG/3ML
3 SOLUTION RESPIRATORY (INHALATION)
Status: DISCONTINUED | OUTPATIENT
Start: 2018-05-21 | End: 2018-05-24 | Stop reason: HOSPADM

## 2018-05-21 RX ORDER — ONDANSETRON 2 MG/ML
INJECTION INTRAMUSCULAR; INTRAVENOUS
Status: COMPLETED
Start: 2018-05-21 | End: 2018-05-21

## 2018-05-21 RX ORDER — MONTELUKAST SODIUM 10 MG/1
10 TABLET ORAL DAILY
Status: DISCONTINUED | OUTPATIENT
Start: 2018-05-22 | End: 2018-05-24 | Stop reason: HOSPADM

## 2018-05-21 RX ORDER — MECLIZINE HYDROCHLORIDE 25 MG/1
25 TABLET ORAL
COMMUNITY

## 2018-05-21 RX ADMIN — SUCRALFATE 1 G: 1 TABLET ORAL at 21:13

## 2018-05-21 RX ADMIN — APIXABAN 5 MG: 5 TABLET, FILM COATED ORAL at 21:12

## 2018-05-21 RX ADMIN — ONDANSETRON 4 MG: 2 INJECTION INTRAMUSCULAR; INTRAVENOUS at 20:22

## 2018-05-21 RX ADMIN — METHYLPREDNISOLONE SODIUM SUCCINATE 60 MG: 125 INJECTION, POWDER, FOR SOLUTION INTRAMUSCULAR; INTRAVENOUS at 20:24

## 2018-05-21 RX ADMIN — NYSTATIN: 100000 POWDER TOPICAL at 21:15

## 2018-05-21 RX ADMIN — CARVEDILOL 12.5 MG: 12.5 TABLET, FILM COATED ORAL at 21:13

## 2018-05-21 RX ADMIN — Medication 10 ML: at 21:16

## 2018-05-21 RX ADMIN — BUDESONIDE 500 MCG: 0.5 INHALANT RESPIRATORY (INHALATION) at 21:17

## 2018-05-21 RX ADMIN — IPRATROPIUM BROMIDE AND ALBUTEROL SULFATE 3 ML: .5; 3 SOLUTION RESPIRATORY (INHALATION) at 21:17

## 2018-05-21 RX ADMIN — CEFTRIAXONE 1 G: 1 INJECTION, POWDER, FOR SOLUTION INTRAMUSCULAR; INTRAVENOUS at 14:05

## 2018-05-21 RX ADMIN — ATORVASTATIN CALCIUM 20 MG: 10 TABLET, FILM COATED ORAL at 21:12

## 2018-05-21 RX ADMIN — IPRATROPIUM BROMIDE AND ALBUTEROL SULFATE 3 ML: .5; 3 SOLUTION RESPIRATORY (INHALATION) at 12:42

## 2018-05-21 RX ADMIN — PREGABALIN 150 MG: 75 CAPSULE ORAL at 21:12

## 2018-05-21 RX ADMIN — LEVOTHYROXINE SODIUM 112 MCG: 112 TABLET ORAL at 21:13

## 2018-05-21 RX ADMIN — METHYLPREDNISOLONE SODIUM SUCCINATE 60 MG: 125 INJECTION, POWDER, FOR SOLUTION INTRAMUSCULAR; INTRAVENOUS at 23:52

## 2018-05-21 NOTE — ED NOTES
Hypotensive and short of breath at MD office this AM. 103/60, 86% RA in office, lethargic on arrival to ED. 1 albuterol neb en route to ED.

## 2018-05-21 NOTE — IP AVS SNAPSHOT
[facility] 
 
 
 [address] 
[phone] Patient: Chon Bai MRN: [id number] ANW:[id number] About your hospitalization You were admitted on:  May 21, 2018 You last received care in the:  [facility] 8 MED SURG You were discharged on:  May 24, 2018 Why you were hospitalized Your primary diagnosis was:  Acute On Chronic Respiratory Failure With Hypoxia And Hypercapnia (Hcc) Your diagnoses also included:  Fibromyalgia, Morbid Obesity (Hcc), Debility, Hypersomnolence, Restrictive Lung Disease, Polypharmacy, Drug Abuse, Acute Cystitis Without Hematuria, Severe Persistent Asthma With Exacerbation, Nocturnal Hypoxemia, Chronic Anticoagulation, Vocal Cord Paralysis Follow-up Information Follow up With Details Comments Contact Nani Sorenson,  On 5/30/2018 at 4:00 pm 23 [facility] [address] 
[phone] Discharge Orders None A check elvi indicates which time of day the medication should be taken. My Medications START taking these medications Instructions Each Dose to Equal  
 Morning Noon Evening Bedtime  
 cefpodoxime 200 mg tablet Commonly known as:  Marene Lesley Your last dose was:  05/24/18 am  
Your next dose is:  05/24/18 9pm  
   
 Take 1 Tab by mouth every twelve (12) hours. 200 mg  
    
  
   
   
   
  
  
 predniSONE 20 mg tablet Commonly known as:  Graydon Wanatah Start taking on:  5/25/2018 Your last dose was:  05/24/18 am  
Your next dose is:  05/25/18 am  
   
 Take 1 Tab by mouth daily (with breakfast) for 3 days. 20 mg CONTINUE taking these medications Instructions Each Dose to Equal  
 Morning Noon Evening Bedtime * albuterol sulfate 2.5 mg/0.5 mL Nebu nebulizer solution Commonly known as:  PROVENTIL;VENTOLIN  
   
 0.5 mL by Nebulization route every four (4) hours.  Dx J45.909  
 2.5 mg  
    
   
 * albuterol 2.5 mg /3 mL (0.083 %) nebulizer solution Commonly known as:  PROVENTIL VENTOLIN Your last dose was:  05/24/18 8am  
Your next dose is:  05/24/18 1pm  
   
 3 mL by Nebulization route four (4) times daily. And every 4 hours as needed cough, wheezing, shortness of breath, J45.909, Medicare part B  
 2.5 mg  
    
  
   
  
   
  
   
  
  
 * albuterol 90 mcg/actuation inhaler Commonly known as:  PROAIR HFA Your next dose is:  Per as needed schedule Take 2 Puffs by inhalation every four (4) hours as needed for Wheezing. 2 Puff  
    
   
   
   
  
 albuterol-ipratropium 2.5 mg-0.5 mg/3 ml Nebu Commonly known as:  Izola Cons Your next dose is:  Per as needed schedule 3 mL by Nebulization route every four (4) hours as needed. 3 mL ALPRAZolam 2 mg tablet Commonly known as:  Chavez Fischerland Your last dose was:  05/24/18 9am  
Your next dose is:  Per as needed schedule Take 1 Tab by mouth three (3) times daily as needed for Anxiety. Max Daily Amount: 6 mg.  
 2 mg  
    
   
   
   
  
 atorvastatin 10 mg tablet Commonly known as:  LIPITOR Your last dose was:  5/23/2018 bedtime Your next dose is:  05/24/18 bedtime Take 2 Tabs by mouth nightly. 20 mg  
    
   
   
   
  
  
 biotin 5,000 mcg Subl Your next dose is:  Resume home schedule  
   
 by SubLINGual route. CARAFATE 1 gram tablet Generic drug:  sucralfate Your last dose was:  05/24/18 am  
Your next dose is:  05/24/18 1pm  
   
 Take 1 g by mouth four (4) times daily. 1 g  
    
  
   
  
   
  
   
  
  
 COQ10  PO Your next dose is:  Resume home schedule Take  by mouth. COREG 12.5 mg tablet Generic drug:  carvedilol Your last dose was:  05/24/18 am  
Your next dose is:  05/24/18 5pm  
   
 Take 12.5 mg by mouth two (2) times a day. 12.5 mg  
    
  
   
   
  
   
  
 DHEA 25 mg Tab Generic drug:  prasterone (dhea) Your next dose is:  Resume home schedule Take 25 mg by mouth daily. 25 mg DULoxetine 60 mg capsule Commonly known as:  CYMBALTA Your next dose is:  Resume home schedule Take 60 mg by mouth two (2) times a day. 60 mg  
    
  
   
   
  
   
  
 ELIQUIS 5 mg tablet Generic drug:  apixaban Your last dose was:  05/24/18 9am  
Your next dose is:  ,05/24/18 6pm  
   
 Take 5 mg by mouth two (2) times a day. 5 mg  
    
  
   
   
   
  
 fluticasone-vilanterol 200-25 mcg/dose inhaler Commonly known as:  BREO ELLIPTA Your next dose is:  Resume home schedule Take 1 Puff by inhalation daily. RINSE MOUTH WELL AFTER USE  
 1 Puff  
    
  
   
   
   
  
 folic acid 686 mcg tablet Your last dose was:  05/24/18 am  
Your next dose is:  05/25/18 am  
   
 Take 800 mcg by mouth daily. 800 mcg  
    
  
   
   
   
  
 furosemide 40 mg tablet Commonly known as:  LASIX Your last dose was:  05/24/18 9am  
Your next dose is:  Per as needed schedule Take 1 Tab by mouth daily as needed. 40 mg  
    
   
   
   
  
 GEODON 80 mg capsule Generic drug:  ziprasidone Your next dose is:  Resume home schedule Take 80 mg by mouth nightly. 80 mg  
    
   
   
   
  
  
 IMITREX PO Your next dose is:  Per as needed schedule  
   
 take 100 mg by mouth as needed. 100 mg  
    
   
   
   
  
 irbesartan 300 mg tablet Commonly known as:  AVAPRO Your next dose is:  Resume home schedule Take 300 mg by mouth nightly. 300 mg  
    
   
   
   
  
  
 lisinopril 10 mg tablet Commonly known as:  Melissa Bruce Your last dose was:  05/24/18 am  
Your next dose is:  05/25/18 am  
   
 Take 10 mg by mouth nightly. 10 mg  
    
  
   
   
   
  
 Magnesium Oxide 500 mg Cap Your next dose is:  Resume home schedule Take 500 mg by mouth.   
 500 mg  
    
  
   
   
   
  
 meclizine 25 mg tablet Commonly known as:  ANTIVERT Your next dose is:  Per as needed schedule Take 25 mg by mouth three (3) times daily as needed. 25 mg  
    
   
   
   
  
 metoclopramide HCl 5 mg tablet Commonly known as:  REGLAN Your next dose is:  Resume home schedule Take 5 mg by mouth Before breakfast, lunch, and dinner. 5 mg  
    
  
   
  
   
  
   
  
 montelukast 10 mg tablet Commonly known as:  SINGULAIR Your last dose was:  05/24/18 am  
Your next dose is:  05/25/18 am  
   
 Take 10 mg by mouth daily. 10 mg  
    
  
   
   
   
  
 MOVANTIK 25 mg Tab tablet Generic drug:  naloxegol Your next dose is:  Resume home schedule Take  by mouth Daily (before breakfast). NEBULIZER  
   
 by Does Not Apply route. NORCO  mg tablet Generic drug:  HYDROcodone-acetaminophen Your next dose is:  Per as needed schedule Take 1 Tab by mouth every six (6) hours as needed for Pain. 1 Tab  
    
   
   
   
  
 nystatin powder Commonly known as:  MYCOSTATIN Your last dose was:  05/24/18 am  
Your next dose is:  05/24/18 pm  
   
 Apply  to affected area two (2) times a day. oxybutynin chloride XL 10 mg CR tablet Commonly known as:  DITROPAN XL Your next dose is:  05/25/18 am  
   
 Take 10 mg by mouth daily. 10 mg OXYGEN-AIR DELIVERY SYSTEMS  
   
 2 lpm cont. , 4 lpm at hs  
     
   
   
   
  
 pantoprazole 40 mg tablet Commonly known as:  PROTONIX Your last dose was:  05/24/18 am  
Your next dose is:  05/24/18 pm  
   
 Take 1 Tab by mouth Before breakfast and dinner. 40 mg POTASSIUM CHLORIDE SR 10 MEQ TAB Your next dose is:  Resume home schedule Take 1 Tab by mouth daily. 1 Tab  
    
  
   
   
   
  
 pregabalin 150 mg capsule Commonly known as:  Gwenda Saint Your last dose was:  05/24/18 am  
Your next dose is:  05/24/18 pm  
   
 Take  by mouth two (2) times a day. REMERON 45 mg tablet Generic drug:  mirtazapine Your next dose is:  Resume home schedule Take 45 mg by mouth nightly. 45 mg  
    
   
   
   
  
  
 SUPER B COMPLEX PO Your next dose is:  Resume home schedule Take 1 Tab by mouth daily. 1 Tab SYNTHROID 125 mcg tablet Generic drug:  levothyroxine Your last dose was:  5/23/2018 6pm  
Your next dose is:  05/24/18 6pm  
   
 Take 112 mcg by mouth every evening. 112 mcg VITAMIN D3 1,000 unit Cap Generic drug:  cholecalciferol Your next dose is:  Resume home schedule Take  by mouth. ZOFRAN ODT 8 mg disintegrating tablet Generic drug:  ondansetron Your next dose is:  Per as needed schedule Take 8 mg by mouth every eight (8) hours as needed for Nausea. 8 mg * Notice: This list has 3 medication(s) that are the same as other medications prescribed for you. Read the directions carefully, and ask your doctor or other care provider to review them with you. STOP taking these medications   
 acyclovir 200 mg capsule Commonly known as:  ZOVIRAX Where to Get Your Medications These medications were sent to [address], [address] [facility],[address] [place] [address], [facility] [address] [address] Phone:  [phone] cefpodoxime 200 mg tablet  
 predniSONE 20 mg tablet Opioid Education Prescription Opioids: What You Need to Know: 
 
 
 After general anesthesia or intravenous sedation, for 24 hours or while taking prescription Narcotics: · Limit your activities · Do not drive and operate hazardous machinery · Do not make important personal or business decisions · Do  not drink alcoholic beverages · If you have not urinated within 8 hours after discharge, please contact your surgeon on call. What to do at Home: 
Recommended activity: Activity as tolerated. If you experience any of the following symptoms temp > 101.5, unrelieved pain, nausea or vomiting, shortness of breath or fatigue not relieved with rest, please follow up with MD. 
 
*  Please give a list of your current medications to your Primary Care Provider. *  Please update this list whenever your medications are discontinued, doses are 
    changed, or new medications (including over-the-counter products) are added. *  Please carry medication information at all times in case of emergency situations. These are general instructions for a healthy lifestyle: No smoking/ No tobacco products/ Avoid exposure to second hand smoke Surgeon General's Warning:  Quitting smoking now greatly reduces serious risk to your health. Obesity, smoking, and sedentary lifestyle greatly increases your risk for illness A healthy diet, regular physical exercise & weight monitoring are important for maintaining a healthy lifestyle You may be retaining fluid if you have a history of heart failure or if you experience any of the following symptoms:  Weight gain of 3 pounds or more overnight or 5 pounds in a week, increased swelling in our hands or feet or shortness of breath while lying flat in bed. Please call your doctor as soon as you notice any of these symptoms; do not wait until your next office visit. Recognize signs and symptoms of STROKE: 
 
F-face looks uneven A-arms unable to move or move unevenly S-speech slurred or non-existent T-time-call 911 as soon as signs and symptoms begin-DO NOT go Back to bed or wait to see if you get better-TIME IS BRAIN. Warning Signs of HEART ATTACK Call 911 if you have these symptoms: 
? Chest discomfort. Most heart attacks involve discomfort in the center of the chest that lasts more than a few minutes, or that goes away and comes back. It can feel like uncomfortable pressure, squeezing, fullness, or pain. ? Discomfort in other areas of the upper body. Symptoms can include pain or discomfort in one or both arms, the back, neck, jaw, or stomach. ? Shortness of breath with or without chest discomfort. ? Other signs may include breaking out in a cold sweat, nausea, or lightheadedness. Don't wait more than five minutes to call [address]! Fast action can save your life. Calling 911 is almost always the fastest way to get lifesaving treatment. Emergency Medical Services staff can begin treatment when they arrive  up to an hour sooner than if someone gets to the hospital by car. The discharge information has been reviewed with the patient. The patient verbalized understanding. Discharge medications reviewed with the patient and appropriate educational materials and side effects teaching were provided. Asthma in Adults: Care Instructions Your Care Instructions During an asthma attack, your airways swell and narrow as a reaction to certain things (triggers). This makes it hard to breathe. You may be able to prevent asthma attacks if you avoid the things that set off your asthma symptoms. Keeping your asthma under control and treating symptoms before they get bad can help you avoid severe attacks. If you can control your asthma, you may be able to do all of your normal daily activities. You may also avoid asthma attacks and trips to the hospital. 
Follow-up care is a key part of your treatment and safety.  Be sure to make and go to all appointments, and call your doctor if you are having problems. It's also a good idea to know your test results and keep a list of the medicines you take. How can you care for yourself at home? · Follow your asthma action plan so you can manage your symptoms at home. An asthma action plan will help you prevent and control airway reactions and will tell you what to do during an asthma attack. If you do not have an asthma action plan, work with your doctor to build one. · Take your asthma medicine exactly as prescribed. Medicine plays an important role in controlling asthma. Talk to your doctor right away if you have any questions about what to take and how to take it. ¨ Use your quick-relief medicine when you have symptoms of an attack. Quick-relief medicine often is an albuterol inhaler. Some people need to use quick-relief medicine before they exercise. ¨ Take your controller medicine every day, not just when you have symptoms. Controller medicine is usually an inhaled corticosteroid. The goal is to prevent problems before they occur. Do not use your controller medicine to try to treat an attack that has already started. It does not work fast enough to help. ¨ If your doctor prescribed corticosteroid pills to use during an attack, take them as directed. They may take hours to work, but they may shorten the attack and help you breathe better. ¨ Keep your quick-relief medicine with you at all times. · Talk to your doctor before using other medicines. Some medicines, such as aspirin, can cause asthma attacks in some people. · Check yourself for asthma symptoms to know which step to follow in your action plan. Watch for things like being short of breath, having chest tightness, coughing, and wheezing. Also notice if symptoms wake you up at night or if you get tired quickly when you exercise.  
· If you have a peak flow meter, use it to check how well you are breathing. This can help you predict when an asthma attack is going to occur. Then you can take medicine to prevent the asthma attack or make it less severe. · See your doctor regularly. These visits will help you learn more about asthma and what you can do to control it. Your doctor will monitor your treatment to make sure the medicine is helping you. · Keep track of your asthma attacks and your treatment. After you have had an attack, write down what triggered it, what helped end it, and any concerns you have about your asthma action plan. Take your diary when you see your doctor. You can then review your asthma action plan and decide if it is working. · Do not smoke or allow others to smoke around you. Avoid smoky places. Smoking makes asthma worse. If you need help quitting, talk to your doctor about stop-smoking programs and medicines. These can increase your chances of quitting for good. · Learn what triggers an asthma attack for you, and avoid the triggers when you can. Common triggers include colds, smoke, air pollution, dust, pollen, mold, pets, cockroaches, stress, and cold air. · Avoid colds and the flu. Get a pneumococcal vaccine shot. If you have had one before, ask your doctor whether you need a second dose. Get a flu vaccine every fall. If you must be around people with colds or the flu, wash your hands often. When should you call for help? Call 911 anytime you think you may need emergency care. For example, call if: 
? · You have severe trouble breathing. ?Call your doctor now or seek immediate medical care if: 
? · Your symptoms do not get better after you have followed your asthma action plan. ? · You cough up yellow, dark brown, or bloody mucus (sputum). ? Watch closely for changes in your health, and be sure to contact your doctor if: 
? · Your coughing and wheezing get worse. ? · You need to use quick-relief medicine on more than 2 days a week (unless it is just for exercise). ? · You need help figuring out what is triggering your asthma attacks. Where can you learn more? Go to http://era-fransisco.info/. Enter P597 in the search box to learn more about \"Asthma in Adults: Care Instructions. \" Current as of: May 12, 2017 Content Version: 11.4 © 4941-8038 [employer]. Care instructions adapted under license by [employer] (which disclaims liability or warranty for this information). If you have questions about a medical condition or this instruction, always ask your healthcare professional. [place] [address] any warranty or liability for your use of this information. Urinary Tract Infection in Women: Care Instructions Your Care Instructions A urinary tract infection, or UTI, is a general term for an infection anywhere between the kidneys and the urethra (where urine comes out). Most UTIs are bladder infections. They often cause pain or burning when you urinate. UTIs are caused by bacteria and can be cured with antibiotics. Be sure to complete your treatment so that the infection goes away. Follow-up care is a key part of your treatment and safety. Be sure to make and go to all appointments, and call your doctor if you are having problems. It's also a good idea to know your test results and keep a list of the medicines you take. How can you care for yourself at home? · Take your antibiotics as directed. Do not stop taking them just because you feel better. You need to take the full course of antibiotics. · Drink extra water and other fluids for the next day or two. This may help wash out the bacteria that are causing the infection. (If you have kidney, heart, or liver disease and have to limit fluids, talk with your doctor before you increase your fluid intake.) · Avoid drinks that are carbonated or have caffeine. They can irritate the bladder. · Urinate often. Try to empty your bladder each time. · To relieve pain, take a hot bath or lay a heating pad set on low over your lower belly or genital area. Never go to sleep with a heating pad in place. To prevent UTIs · Drink plenty of water each day. This helps you urinate often, which clears bacteria from your system. (If you have kidney, heart, or liver disease and have to limit fluids, talk with your doctor before you increase your fluid intake.) · Urinate when you need to. · Urinate right after you have sex. · Change sanitary pads often. · Avoid douches, bubble baths, feminine hygiene sprays, and other feminine hygiene products that have deodorants. · After going to the bathroom, wipe from front to back. When should you call for help? Call your doctor now or seek immediate medical care if: 
? · Symptoms such as fever, chills, nausea, or vomiting get worse or appear for the first time. ? · You have new pain in your back just below your rib cage. This is called flank pain. ? · There is new blood or pus in your urine. ? · You have any problems with your antibiotic medicine. ? Watch closely for changes in your health, and be sure to contact your doctor if: 
? · You are not getting better after taking an antibiotic for 2 days. ? · Your symptoms go away but then come back. Where can you learn more? Go to http://era-fransisco.info/. Enter V107 in the search box to learn more about \"Urinary Tract Infection in Women: Care Instructions. \" Current as of: May 12, 2017 Content Version: 11.4 © 7982-4823 [employer]. Care instructions adapted under license by [employer] (which disclaims liability or warranty for this information). If you have questions about a medical condition or this instruction, always ask your healthcare professional. [address] any warranty or liability for your use of this information.  
 
___________________________________________________________________________ ________________________________________________________ DisplayLinkharDirect Flow Medical Announcement We are excited to announce that we are making your provider's discharge notes available to you in Rainbow. You will see these notes when they are completed and signed by the physician that discharged you from your recent hospital stay. If you have any questions or concerns about any information you see in Rainbow, please call the Health Information Department where you were seen or reach out to your Primary Care Provider for more information about your plan of care. Introducing [facility]! Sina Valencia introduces Rainbow patient portal. Now you can access parts of your medical record, email your doctor's office, and request medication refills online. 1. In your internet browser, go to [email or web address] 2. Click on the First Time User? Click Here link in the Sign In box. You will see the New Member Sign Up page. 3. Enter your Rainbow Access Code exactly as it appears below. You will not need to use this code after youve completed the sign-up process. If you do not sign up before the expiration date, you must request a new code. · Rainbow Access Code: [email or web address] Expires: 7/1/2018 12:23 PM 
 
4. Enter the last four digits of your Social Security Number (xxxx) and Date of Birth (mm/dd/yyyy) as indicated and click Submit. You will be taken to the next sign-up page. 5. Create a Rainbow ID. This will be your Rainbow login ID and cannot be changed, so think of one that is secure and easy to remember. 6. Create a Rainbow password. You can change your password at any time. 7. Enter your Password Reset Question and Answer. This can be used at a later time if you forget your password. 8. Enter your e-mail address. You will receive e-mail notification when new information is available in [address] 9. Click Sign Up. You can now view and download portions of your medical record. 10. Click the Download Summary menu link to download a portable copy of your medical information. If you have questions, please visit the Frequently Asked Questions section of the [employer] website. Remember, [employer] is NOT to be used for urgent needs. For medical emergencies, dial 911. Now available from your iPhone and Android! Introducing Glen Alvarado As a [facility] patient, I wanted to make you aware of our electronic visit tool called Glen Alvarado. AfterShip/7 allows you to connect within minutes with a medical provider 24 hours a day, seven days a week via a mobile device or tablet or logging into a secure website from your computer. You can access Glen Alvarado from anywhere in the [place]. A virtual visit might be right for you when you have a simple condition and feel like you just dont want to get out of bed, or cant get away from work for an appointment, when your [facility] is not available (evenings, weekends or holidays), or when youre out of town and need minor care. Electronic visits cost only $49 and if the AfterShip/Galenea provider determines a prescription is needed to treat your condition, one can be electronically transmitted to a nearby pharmacy*. Please take a moment to enroll today if you have not already done so. The enrollment process is free and takes just a few minutes. To enroll, please download the AfterShip/Galenea suki to your tablet or phone, or visit [email or web address] to enroll on your computer. And, as an [address] [place] [address] patient with a [employer] account, the results of your visits will be scanned into your electronic medical record and your primary care provider will be able to view the scanned results. We urge you to continue to see your [facility] for your ongoing medical care.   And while your primary care provider may not be the one available when you seek a Glen Soljavifin virtual visit, the peace of mind you get from getting a real diagnosis real time can be priceless. For more information on [employer] [employer], view our Frequently Asked Questions (FAQs) at [email or web address]. Sincerely, 
 
Anne Parks MD 
Chief Medical Officer Alek Cavazos *:  certain medications cannot be prescribed via [facility] [employer] Unresulted Labs-Please follow up with your PCP about these lab tests Order Current Status CULTURE, BLOOD Preliminary result CULTURE, BLOOD Preliminary result Providers Seen During Your Hospitalization Provider Specialty Primary office phone Ernst Parikh MD Emergency Medicine [phone] Rachelle Vazquez MD Pulmonary Disease [phone] Lo Brown, [address] [place] [address] Internal Medicine [phone] Your Primary Care Physician (PCP) Primary Care Physician Office Phone Office Fax Fredy Darling [phone] You are allergic to the following Allergen Reactions Latex Contact Dermatitis Bactrim (Sulfamethoxazole-Trimethoprim) Nausea Only Lamictal (Lamotrigine) Atopic Dermatitis Pcn (Penicillins) Rash Tapentadol Rash Recent Documentation Weight BMI OB Status Smoking Status 122.5 kg 51.03 kg/m2 Hysterectomy Never Smoker Emergency Contacts Name Discharge Info Relation Home Work Mobile Lucy Alvarado  Daughter [54] [phone] Karsten Farias  Spouse [3] [phone] Patient Belongings The following personal items are in your possession at time of discharge: 
  Dental Appliances: None         Home Medications: None   Jewelry: None  Clothing: At bedside    Other Valuables: None Please provide this summary of care documentation to your next provider.  
  
  
 
  
Signatures-by signing, you are acknowledging that this After Visit Summary has been reviewed with you and you have received a copy. Patient Signature:  ____________________________________________________________ Date:  ____________________________________________________________  
  
Rexann Ports Provider Signature:  ____________________________________________________________ Date:  ____________________________________________________________

## 2018-05-21 NOTE — ED PROVIDER NOTES
HPI Comments: 60-year-old female sent from her doctor's office secondary to low blood pressure and dyspnea. Patient with wheezing, shortness of breath, lethargy, hypoxia    Patient given an albuterol in route to the ED    She will open her eyes and answer some questions. Respirations are shallow, he is noted to be hypoxic. Patient is a 78 y.o. female presenting with hypotension. Hypotension    Associated symptoms include weakness. Past Medical History:   Diagnosis Date    Acute renal failure ([address] July, 2014    The patient was admitted with acute renal failure secondary to volume depletion. She was found to have a gastric ulcer on admission.  Arthritis     Asthma     Asthma with acute exacerbation 9/28/2017    Bilateral pulmonary embolism [facility]) September, 2012    Restarted on anticoagulation for lifetime    Calculus of ureter May, 2015    CAP (community acquired pneumonia) October, 2012    RML pneumonia    Cellulitis of left lower extremity 11/1/2017    Chronic pain     fibromyalgia. back pain    Community acquired bacterial pneumonia 4/2/2018    CVA (cerebral infarction) January, 2012    Reportedly has mild left arm residual weakness    Gastric ulcer July, 2014          GERD (gastroesophageal reflux disease)     Gram-positive bacteremia 1/2 cx 12/26/2016    HCAP (healthcare-associated pneumonia) January, 2013    RLL pneumonia    HCAP (healthcare-associated pneumonia) February, 2013    RLL pneumonia    Hypertension     Left leg DVT ([address] 1991    On coumadin until 2008    Morbid obesity ([address]     Psychiatric disorder     PUD (peptic ulcer disease) ? ??    Sepsis due to pneumonia ([address] 4/2/2018    Stroke [facility])     2009 mini stroke    Thyroid disease        Past Surgical History:   Procedure Laterality Date    HX APPENDECTOMY      HX CHOLECYSTECTOMY      HX ENDOSCOPY  July, 2014    Gastric ulcers x 2    HX GASTRIC BYPASS      HX HYSTERECTOMY      HX ORTHOPAEDIC      rt knee growth, right shoulder, left thumb    HX OTHER SURGICAL      vagus nerve stimulator    HX TONSILLECTOMY           Family History:   Problem Relation Age of Onset    Hypertension Mother     Cancer Father      prostate    Heart Attack Father     Heart Disease Father      CAD    Hypertension Father     Hypertension Sister     Stroke Sister     Heart Disease Brother      CAD with CABG    Heart Attack Brother     Hypertension Brother        Social History     Social History    Marital status:      Spouse name: N/A    Number of children: N/A    Years of education: N/A     Occupational History          Disabled     Social History Main Topics    Smoking status: Never Smoker    Smokeless tobacco: Never Used    Alcohol use No    Drug use: No    Sexual activity: Not on file     Other Topics Concern    Not on file     Social History Narrative    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 , one child who is healthy. Has always lived in [address] Dog as a pet. No history of pet bird. There is no significant environmental or industrial exposure. There is no known exposure to TB. ALLERGIES: Latex; Bactrim [sulfamethoxazole-trimethoprim]; Lamictal [lamotrigine]; Pcn [penicillins]; and Tapentadol    Review of Systems   Unable to perform ROS: Mental status change   Constitutional: Positive for activity change. Negative for chills and fever. HENT: Negative. Respiratory: Positive for chest tightness, shortness of breath and wheezing. Neurological: Positive for weakness. Psychiatric/Behavioral: Positive for decreased concentration.        Vitals:    05/21/18 1237 05/21/18 1257   BP: 119/55    Pulse: 70    Resp: 12    Temp: 98.7 °F (37.1 °C)    SpO2: 90% 95%   Weight: 120.7 kg (266 lb)             Physical Exam   Constitutional:   Nontoxic but dyspneic appearing female   HENT:   Head: Normocephalic and atraumatic. Eyes: EOM are normal. Pupils are equal, round, and reactive to light. Cardiovascular: Normal rate and regular rhythm. Pulmonary/Chest: She has wheezes. Diffuse and expiratory wheezes. No retractions are noted. Abdominal: Soft. She exhibits no distension. There is no tenderness. Musculoskeletal: She exhibits edema and tenderness. She exhibits no deformity. Neurological:   Patient well open her eyes to her name. Minimal answers to questions. Skin: Skin is warm and dry. Nursing note and vitals reviewed. MDM  Number of Diagnoses or Management Options  Diagnosis management comments: 46-year-old female with multiple medical problems. Was at her doctor today for evaluation. Noted to be hypoxic. Hypotension    No fevers reported    Note to be short of breath here. Diffuse wheezing. Lower extremity edema    X-ray is ordered. BNP is ordered    Will check an ABG now that her albuterol treatment is completed. Consider  BiPAP or CPAP.   Daisha Poole MD; 5/21/2018 @1:03 PM===========================================          ED Course       Procedures

## 2018-05-21 NOTE — H&P
[facility] PUL[facility] CONSULT :  5/21/2018    Date of Admission:  5/21/2018      Subjective:     Patient is a 78 y.o.  female presents with confusion. Well known to us and admitted 7 times within the past year. 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 Her previous drug screens have been positive for amphetamines, benzos, and opiates. She uses pro-air and nebulizer treatments. She is on oxygen at 2 L continuous. She had multiple admissions for [facility] exacerbations\" and pneumonia. IgA normal at 128, IgE < 1 and IgG low 492. Her last admission was in April 2018 with PNA again. She saw us in the office on 5/1/2018. Today, she presented with hypersomnolence. UDS positive for benzodiazepines and opiates. Her ABG shows pH 7.3, PCO 54, PO2 98, HCO3 28. She was placed on BiPAP and improved. CXR shows atelectasis. We were asked to admit for hypercapnic respiratory failure with  Acute cystitis. Her daughter helps provide information and tells us she became confused on Friday. She improved and yesterday began having more confusion and hypersomnolence but did not want to come to the ER. Today, her confusion persisted and her daughter called EMS. She has + U/A. She did continue taking her narcotics. Her daughter does report that her PCP has advised her to take her evening geodon, remeron, and sedatives but her mother has refused. Past Medical History:   Diagnosis Date    Acute renal failure ([address] July, 2014    The patient was admitted with acute renal failure secondary to volume depletion. She was found to have a gastric ulcer on admission.     Arthritis     Asthma     Asthma with acute exacerbation 9/28/2017    Bilateral pulmonary embolism [facility]) September, 2012    Restarted on anticoagulation for lifetime    Calculus of ureter May, 2015    CAP (community acquired pneumonia) October, 2012    RML pneumonia    Cellulitis of left lower extremity 11/1/2017    Chronic pain     fibromyalgia. back pain    Community acquired bacterial pneumonia 4/2/2018    CVA (cerebral infarction) January, 2012    Reportedly has mild left arm residual weakness    Gastric ulcer July, 2014          GERD (gastroesophageal reflux disease)     Gram-positive bacteremia 1/2 cx 12/26/2016    HCAP (healthcare-associated pneumonia) January, 2013    RLL pneumonia    HCAP (healthcare-associated pneumonia) February, 2013    RLL pneumonia    Hypertension     Left leg DVT ([address] 1991    On coumadin until 2008    Morbid obesity ([address]     Psychiatric disorder     PUD (peptic ulcer disease) ? ??    Sepsis due to pneumonia ([address] 4/2/2018    Stroke ([address]     2009 mini stroke    Thyroid disease       Past Surgical History:   Procedure Laterality Date    HX APPENDECTOMY      HX CHOLECYSTECTOMY      HX ENDOSCOPY  July, 2014    Gastric ulcers x 2    HX GASTRIC BYPASS      HX HYSTERECTOMY      HX ORTHOPAEDIC      rt knee growth, right shoulder, left thumb    HX OTHER SURGICAL      vagus nerve stimulator    HX TONSILLECTOMY          Social History   Substance Use Topics    Smoking status: Never Smoker    Smokeless tobacco: Never Used    Alcohol use No      Family History   Problem Relation Age of Onset    Hypertension Mother     Cancer Father      prostate    Heart Attack Father     Heart Disease Father      CAD    Hypertension Father     Hypertension Sister     Stroke Sister     Heart Disease Brother      CAD with CABG    Heart Attack Brother     Hypertension Brother       Allergies   Allergen Reactions    Latex Contact Dermatitis    Bactrim [Sulfamethoxazole-Trimethoprim] Nausea Only    Lamictal [Lamotrigine] Atopic Dermatitis    Pcn [Penicillins] Rash    Tapentadol Rash      Prior to Admission Medications Prescriptions Last Dose Informant Patient Reported? Taking? ALPRAZolam (XANAX) 2 mg tablet   No No   Sig: Take 1 Tab by mouth three (3) times daily as needed for Anxiety. Max Daily Amount: 6 mg. Biotin 2,500 mcg cap   Yes No   Sig: Take  by mouth. Cholecalciferol, Vitamin D3, (VITAMIN D3) 1,000 unit cap   Yes No   Sig: Take  by mouth. FERROUS FUMARATE/VIT BCOMP&C (SUPER B COMPLEX PO)   Yes No   Sig: Take 1 Tab by mouth daily. HYDROcodone-acetaminophen (NORCO)  mg tablet   Yes No   Sig: Take 1 Tab by mouth every six (6) hours as needed for Pain. Magnesium Oxide 500 mg cap   Yes No   Sig: Take 500 mg by mouth. NEBULIZER   Yes No   Sig: by Does Not Apply route. OXYGEN-AIR DELIVERY SYSTEMS   Yes No   Si lpm cont. , 4 lpm at hs   POTASSIUM CHLORIDE SR 10 MEQ TAB   Yes No   Sig: Take 1 Tab by mouth daily. SUMATRIPTAN SUCCINATE (IMITREX PO)   Yes No   Sig: take 100 mg by mouth as needed. UBIDECARENONE/VITAMIN E MIXED (COQ10  PO)   Yes No   Sig: Take  by mouth. albuterol (PROAIR HFA) 90 mcg/actuation inhaler   No No   Sig: Take 2 Puffs by inhalation every four (4) hours as needed for Wheezing. albuterol (PROVENTIL VENTOLIN) 2.5 mg /3 mL (0.083 %) nebulizer solution   No No   Sig: 3 mL by Nebulization route four (4) times daily. And every 4 hours as needed cough, wheezing, shortness of breath, J45.909, Medicare part B   albuterol sulfate (PROVENTIL;VENTOLIN) 2.5 mg/0.5 mL nebu nebulizer solution   No No   Si.5 mL by Nebulization route every four (4) hours. Dx J45.909   albuterol-ipratropium (DUO-NEB) 2.5 mg-0.5 mg/3 ml nebu   Yes No   Sig: 3 mL by Nebulization route every four (4) hours as needed. apixaban (ELIQUIS) 2.5 mg tablet   Yes No   Sig: Take 2.5 mg by mouth two (2) times a day. Indications: PULMONARY THROMBOEMBOLISM PREVENTION   atorvastatin (LIPITOR) 10 mg tablet   No No   Sig: Take 2 Tabs by mouth nightly.    carvedilol (COREG) 12.5 mg tablet   Yes No   Sig: Take 12.5 mg by mouth two (2) times daily (with meals). fluticasone-vilanterol (BREO ELLIPTA) 200-25 mcg/dose inhaler   No No   Sig: Take 1 Puff by inhalation daily. RINSE MOUTH WELL AFTER USE   folic acid 568 mcg tablet   Yes No   Sig: Take 800 mcg by mouth daily. furosemide (LASIX) 40 mg tablet   No No   Sig: Take 1 Tab by mouth daily as needed. levoFLOXacin (LEVAQUIN) 750 mg tablet   No No   Sig: Take 1 Tab by mouth every twenty-four (24) hours. levothyroxine (SYNTHROID) 125 mcg tablet   Yes No   Sig: Take 112 mcg by mouth every evening. mirtazapine (REMERON) 45 mg tablet   Yes No   Sig: Take 45 mg by mouth nightly. montelukast (SINGULAIR) 10 mg tablet   Yes No   Sig: Take 10 mg by mouth daily. naloxegol (MOVANTIK) 25 mg tab tablet   Yes No   Sig: Take  by mouth Daily (before breakfast). nystatin (MYCOSTATIN) powder   No No   Sig: Apply  to affected area two (2) times a day. ondansetron (ZOFRAN ODT) 8 mg disintegrating tablet   Yes No   Sig: Take 8 mg by mouth every eight (8) hours as needed for Nausea. oxybutynin chloride XL (DITROPAN XL) 10 mg CR tablet   Yes No   Sig: Take 10 mg by mouth daily. pantoprazole (PROTONIX) 40 mg tablet   No No   Sig: Take 1 Tab by mouth Before breakfast and dinner. pregabalin (LYRICA) 150 mg capsule   Yes No   Sig: Take  by mouth two (2) times a day. sucralfate (CARAFATE) 1 gram tablet   Yes No   Sig: Take 1 g by mouth four (4) times daily. ziprasidone (GEODON) 80 mg capsule   Yes No   Sig: Take 80 mg by mouth nightly. Facility-Administered Medications: None       MEDS SCHEDULED:    Current Facility-Administered Medications   Medication Dose Route Frequency    cefTRIAXone (ROCEPHIN) 1 g in 0.9% sodium chloride (MBP/ADV) 50 mL  1 g IntraVENous NOW     Current Outpatient Prescriptions   Medication Sig    albuterol-ipratropium (DUO-NEB) 2.5 mg-0.5 mg/3 ml nebu 3 mL by Nebulization route every four (4) hours as needed.     mirtazapine (REMERON) 45 mg tablet Take 45 mg by mouth nightly.  levoFLOXacin (LEVAQUIN) 750 mg tablet Take 1 Tab by mouth every twenty-four (24) hours.  albuterol (PROAIR HFA) 90 mcg/actuation inhaler Take 2 Puffs by inhalation every four (4) hours as needed for Wheezing.  albuterol (PROVENTIL VENTOLIN) 2.5 mg /3 mL (0.083 %) nebulizer solution 3 mL by Nebulization route four (4) times daily. And every 4 hours as needed cough, wheezing, shortness of breath, J45.909, Medicare part B    furosemide (LASIX) 40 mg tablet Take 1 Tab by mouth daily as needed.  OXYGEN-AIR DELIVERY SYSTEMS 2 lpm cont. , 4 lpm at hs    fluticasone-vilanterol (BREO ELLIPTA) 200-25 mcg/dose inhaler Take 1 Puff by inhalation daily. RINSE MOUTH WELL AFTER USE    albuterol sulfate (PROVENTIL;VENTOLIN) 2.5 mg/0.5 mL nebu nebulizer solution 0.5 mL by Nebulization route every four (4) hours. Dx J45.909    nystatin (MYCOSTATIN) powder Apply  to affected area two (2) times a day.  sucralfate (CARAFATE) 1 gram tablet Take 1 g by mouth four (4) times daily.  Biotin 2,500 mcg cap Take  by mouth.  UBIDECARENONE/VITAMIN E MIXED (COQ10  PO) Take  by mouth.  pregabalin (LYRICA) 150 mg capsule Take  by mouth two (2) times a day.  montelukast (SINGULAIR) 10 mg tablet Take 10 mg by mouth daily.  naloxegol (MOVANTIK) 25 mg tab tablet Take  by mouth Daily (before breakfast).  carvedilol (COREG) 12.5 mg tablet Take 12.5 mg by mouth two (2) times daily (with meals).  apixaban (ELIQUIS) 2.5 mg tablet Take 2.5 mg by mouth two (2) times a day. Indications: PULMONARY THROMBOEMBOLISM PREVENTION    atorvastatin (LIPITOR) 10 mg tablet Take 2 Tabs by mouth nightly.  ALPRAZolam (XANAX) 2 mg tablet Take 1 Tab by mouth three (3) times daily as needed for Anxiety. Max Daily Amount: 6 mg.  ondansetron (ZOFRAN ODT) 8 mg disintegrating tablet Take 8 mg by mouth every eight (8) hours as needed for Nausea.     oxybutynin chloride XL (DITROPAN XL) 10 mg CR tablet Take 10 mg by mouth daily.  Magnesium Oxide 500 mg cap Take 500 mg by mouth.  HYDROcodone-acetaminophen (NORCO)  mg tablet Take 1 Tab by mouth every six (6) hours as needed for Pain.  POTASSIUM CHLORIDE SR 10 MEQ TAB Take 1 Tab by mouth daily.  Cholecalciferol, Vitamin D3, (VITAMIN D3) 1,000 unit cap Take  by mouth.  folic acid 644 mcg tablet Take 800 mcg by mouth daily.  pantoprazole (PROTONIX) 40 mg tablet Take 1 Tab by mouth Before breakfast and dinner.  FERROUS FUMARATE/VIT BCOMP&C (SUPER B COMPLEX PO) Take 1 Tab by mouth daily.  NEBULIZER by Does Not Apply route.  ziprasidone (GEODON) 80 mg capsule Take 80 mg by mouth nightly.  SUMATRIPTAN SUCCINATE (IMITREX PO) take 100 mg by mouth as needed.  levothyroxine (SYNTHROID) 125 mcg tablet Take 112 mcg by mouth every evening. Review of Systems  Constitutional: negative for fevers, chills and sweats  Respiratory: positive for cough, asthma, wheezing or dyspnea on exertion  Cardiovascular: negative for chest pain, chest pressure/discomfort, irregular heart beats, near-syncope, syncope, paroxysmal nocturnal dyspnea, exertional chest pressure/discomfort  Gastrointestinal: negative for nausea, vomiting, change in bowel habits, melena, diarrhea, constipation, abdominal pain and jaundice  Musculoskeletal:positive for debility, fibromyalgia and chronic pain. Objective:     Vitals:    05/21/18 1257 05/21/18 1317 05/21/18 1334 05/21/18 1336   BP:  (!) 164/126 117/55    Pulse:  66 64    Resp:  27 (!) 47    Temp:       SpO2: 95% (!) 87% 97% 94%   Weight:                   PHYSICAL EXAM     Physical Exam:   General:  Alert to stimulus, morbidly obese   Eyes:  Conjunctivae/corneas clear. Nose: Nares patent. Mucosa pink/moist.    Mouth/Throat: Lips, mucosa, and tongue pink and intact.     Neck: Supple, symmetrical.   Respiratory:   Scattered wheezes to auscultation bilaterally on RA   Cardiovascular:  Regular rate and rhythm, S1, S2, no murmur, click, rub or gallop. GI:   Soft, non-tender. Bowel sounds active X 4 Q.  protuberant   Extremities: Extremities symmetrical, atraumatic, no cyanosis, no edema. Pulses: 2+ and symmetric all extremities. Skin: Skin color, texture, turgor normal. No rashes or lesions       Neurologic: Alert to stimulus, lethargic.         CHEST X-RAYS:      CULTURES:  N/A    LABS    Recent Labs      05/21/18   1309   WBC  7.1   HGB  8.6*   HCT  29.4*   PLT  168     Recent Labs      05/21/18   1309   NA  147*   K  4.2   CL  112*   GLU  76   CO2  27   BUN  13   CREA  1.21*     Recent Labs      05/21/18   1500  05/21/18   1305   PH  7.33*  7.33*   PCO2  58*  54*   PO2  85  95   HCO3  30*  28*           Assessment:     Hospital Problems  Date Reviewed: 5/21/2018          Codes Class Noted POA    Acute on chronic respiratory failure with hypoxia and hypercapnia (HCC) ICD-10-CM: J96.21, J96.22  ICD-9-CM: 518.84, 786.09, 799.02  5/21/2018 Yes        Acute cystitis without hematuria ICD-10-CM: N30.00  ICD-9-CM: 595.0  5/21/2018 Yes        Severe persistent asthma with exacerbation ICD-10-CM: J45.51  ICD-9-CM: 493.92  5/21/2018 Yes        Nocturnal hypoxemia (Chronic) ICD-10-CM: G47.34  ICD-9-CM: 327.24  1/16/2018 Yes        Chronic anticoagulation (Chronic) ICD-10-CM: Z79.01  ICD-9-CM: V58.61  12/3/2017 Yes        Vocal cord paralysis (Chronic) ICD-10-CM: J38.00  ICD-9-CM: 478.30  12/3/2017 Yes        Drug abuse (Chronic) ICD-10-CM: F19.10  ICD-9-CM: 305.90  11/6/2017 Yes        Fibromyalgia (Chronic) ICD-10-CM: M79.7  ICD-9-CM: 729.1  11/1/2017 Yes        Morbid obesity - sedentary lifestyle (Chronic) ICD-10-CM: E66.01  ICD-9-CM: 278.01  11/1/2017 Yes        Debility (Chronic) ICD-10-CM: R53.81  ICD-9-CM: 799.3  11/1/2017 Yes        Polypharmacy (Chronic) ICD-10-CM: P52.741  ICD-9-CM: V58.69  11/1/2017 Yes        Restrictive lung disease (Chronic) ICD-10-CM: J98.4  ICD-9-CM: 518.89  11/1/2017 Yes Hypersomnolence (Chronic) ICD-10-CM: G47.10  ICD-9-CM: 780.54  4/10/2017 Yes    Overview Addendum 1/16/2018  2:31 PM by Zac Taylor NP     Possible also CSA drug induced  Negative PSG                     Plan:     Admit to ICU for hypercapnia, NIPPV  -decrease sedatives for now  -IV antibiotics for UTI, send culture  -IV steroids  -Oversedated with acute cystitis. Her daughter explains that her PCP has told her to take away her PM remeron, geodon and narcotics but that her mother refuses-- need to readdress   -PT/OT, mobilize when able  -Nebulizers    [employer], NP-C  More than 50% of time documented was spent in face-to-face contact with the patient and in the care of the patient on the floor/unit where the patient is located. Lungs:  Wheeze predominately from vocal cords  Heart:  RRR with no Murmur/Rubs/Gallops    Additional Comments:    Patient with multiple possible causes of her current situation including chronic opioid and benzo use, asthma exacerbation, and UTI. Agree with the above therapies. I have spoken with and examined the patient. I agree with the above assessment and plan as documented.     Tanvir Berger MD

## 2018-05-21 NOTE — PROGRESS NOTES
Bedside shift change report given to Lele Cnaales  (oncoming nurse) by Sergio Doshi RN (offgoing nurse). Report included the following information SBAR, Kardex, ED Summary, Procedure Summary, Intake/Output, Recent Results and Med Rec Status. Pt resting in bed, VS and cardiac monitoring in place. Respirations even and unlabored on NC. Pt alert, asking for food.

## 2018-05-21 NOTE — ED TRIAGE NOTES
Hypotensive, short of breath, and audible wheezes at MD office this morning. Lethargic and hypoxic, but responsive on arrival to ED. 1 x albuterol neb en route to ED by EMS.

## 2018-05-21 NOTE — IP AVS SNAPSHOT
[address] 992 322 [facility] 
[phone] Patient: Flo Sender MRN: [id number] L:[birth year] A check elvi indicates which time of day the medication should be taken. My Medications START taking these medications Instructions Each Dose to Equal  
 Morning Noon Evening Bedtime  
 cefpodoxime 200 mg tablet Commonly known as:  Hellen Ishihara Your last dose was:  05/24/18 am  
Your next dose is:  05/24/18 9pm  
   
 Take 1 Tab by mouth every twelve (12) hours. 200 mg  
    
  
   
   
   
  
  
 predniSONE 20 mg tablet Commonly known as:  Dominique Alcaraz Start taking on:  5/25/2018 Your last dose was:  05/24/18 am  
Your next dose is:  05/25/18 am  
   
 Take 1 Tab by mouth daily (with breakfast) for 3 days. 20 mg CONTINUE taking these medications Instructions Each Dose to Equal  
 Morning Noon Evening Bedtime * albuterol sulfate 2.5 mg/0.5 mL Nebu nebulizer solution Commonly known as:  PROVENTIL;VENTOLIN  
   
 0.5 mL by Nebulization route every four (4) hours. Dx J45.909  
 2.5 mg  
    
   
   
   
  
 * albuterol 2.5 mg /3 mL (0.083 %) nebulizer solution Commonly known as:  PROVENTIL VENTOLIN Your last dose was:  05/24/18 8am  
Your next dose is:  05/24/18 1pm  
   
 3 mL by Nebulization route four (4) times daily. And every 4 hours as needed cough, wheezing, shortness of breath, J45.909, Medicare part B  
 2.5 mg  
    
  
   
  
   
  
   
  
  
 * albuterol 90 mcg/actuation inhaler Commonly known as:  PROAIR HFA Your next dose is:  Per as needed schedule Take 2 Puffs by inhalation every four (4) hours as needed for Wheezing. 2 Puff  
    
   
   
   
  
 albuterol-ipratropium 2.5 mg-0.5 mg/3 ml Nebu Commonly known as:  Tamia Quinonez Your next dose is:  Per as needed schedule 3 mL by Nebulization route every four (4) hours as needed. 3 mL ALPRAZolam 2 mg tablet Commonly known as:  Rosetta Ream Your last dose was:  05/24/18 9am  
Your next dose is:  Per as needed schedule Take 1 Tab by mouth three (3) times daily as needed for Anxiety. Max Daily Amount: 6 mg.  
 2 mg  
    
   
   
   
  
 atorvastatin 10 mg tablet Commonly known as:  LIPITOR Your last dose was:  5/23/2018 bedtime Your next dose is:  05/24/18 bedtime Take 2 Tabs by mouth nightly. 20 mg  
    
   
   
   
  
  
 biotin 5,000 mcg Subl Your next dose is:  Resume home schedule  
   
 by SubLINGual route. CARAFATE 1 gram tablet Generic drug:  sucralfate Your last dose was:  05/24/18 am  
Your next dose is:  05/24/18 1pm  
   
 Take 1 g by mouth four (4) times daily. 1 g  
    
  
   
  
   
  
   
  
  
 COQ10  PO Your next dose is:  Resume home schedule Take  by mouth. COREG 12.5 mg tablet Generic drug:  carvedilol Your last dose was:  05/24/18 am  
Your next dose is:  05/24/18 5pm  
   
 Take 12.5 mg by mouth two (2) times a day. 12.5 mg  
    
  
   
   
  
   
  
 DHEA 25 mg Tab Generic drug:  prasterone (dhea) Your next dose is:  Resume home schedule Take 25 mg by mouth daily. 25 mg DULoxetine 60 mg capsule Commonly known as:  CYMBALTA Your next dose is:  Resume home schedule Take 60 mg by mouth two (2) times a day. 60 mg  
    
  
   
   
  
   
  
 ELIQUIS 5 mg tablet Generic drug:  apixaban Your last dose was:  05/24/18 9am  
Your next dose is:  ,05/24/18 6pm  
   
 Take 5 mg by mouth two (2) times a day. 5 mg  
    
  
   
   
   
  
 fluticasone-vilanterol 200-25 mcg/dose inhaler Commonly known as:  BREO ELLIPTA Your next dose is:  Resume home schedule Take 1 Puff by inhalation daily. RINSE MOUTH WELL AFTER USE  
 1 Puff  
    
  
   
   
   
  
 folic acid 990 mcg tablet Your last dose was:  05/24/18 am  
Your next dose is:  05/25/18 am  
   
 Take 800 mcg by mouth daily. 800 mcg  
    
  
   
   
   
  
 furosemide 40 mg tablet Commonly known as:  LASIX Your last dose was:  05/24/18 9am  
Your next dose is:  Per as needed schedule Take 1 Tab by mouth daily as needed. 40 mg  
    
   
   
   
  
 GEODON 80 mg capsule Generic drug:  ziprasidone Your next dose is:  Resume home schedule Take 80 mg by mouth nightly. 80 mg  
    
   
   
   
  
  
 IMITREX PO Your next dose is:  Per as needed schedule  
   
 take 100 mg by mouth as needed. 100 mg  
    
   
   
   
  
 irbesartan 300 mg tablet Commonly known as:  AVAPRO Your next dose is:  Resume home schedule Take 300 mg by mouth nightly. 300 mg  
    
   
   
   
  
  
 lisinopril 10 mg tablet Commonly known as:  Fausto Human Your last dose was:  05/24/18 am  
Your next dose is:  05/25/18 am  
   
 Take 10 mg by mouth nightly. 10 mg  
    
  
   
   
   
  
 Magnesium Oxide 500 mg Cap Your next dose is:  Resume home schedule Take 500 mg by mouth. 500 mg  
    
  
   
   
   
  
 meclizine 25 mg tablet Commonly known as:  ANTIVERT Your next dose is:  Per as needed schedule Take 25 mg by mouth three (3) times daily as needed. 25 mg  
    
   
   
   
  
 metoclopramide HCl 5 mg tablet Commonly known as:  REGLAN Your next dose is:  Resume home schedule Take 5 mg by mouth Before breakfast, lunch, and dinner. 5 mg  
    
  
   
  
   
  
   
  
 montelukast 10 mg tablet Commonly known as:  SINGULAIR Your last dose was:  05/24/18 am  
Your next dose is:  05/25/18 am  
   
 Take 10 mg by mouth daily. 10 mg  
    
  
   
   
   
  
 MOVANTIK 25 mg Tab tablet Generic drug:  naloxegol Your next dose is:  Resume home schedule Take  by mouth Daily (before breakfast).   
     
  
   
   
   
  
 NEBULIZER  
   
 by Does Not Apply route. NORCO  mg tablet Generic drug:  HYDROcodone-acetaminophen Your next dose is:  Per as needed schedule Take 1 Tab by mouth every six (6) hours as needed for Pain. 1 Tab  
    
   
   
   
  
 nystatin powder Commonly known as:  MYCOSTATIN Your last dose was:  05/24/18 am  
Your next dose is:  05/24/18 pm  
   
 Apply  to affected area two (2) times a day. oxybutynin chloride XL 10 mg CR tablet Commonly known as:  DITROPAN XL Your next dose is:  05/25/18 am  
   
 Take 10 mg by mouth daily. 10 mg OXYGEN-AIR DELIVERY SYSTEMS  
   
 2 lpm cont. , 4 lpm at hs  
     
   
   
   
  
 pantoprazole 40 mg tablet Commonly known as:  PROTONIX Your last dose was:  05/24/18 am  
Your next dose is:  05/24/18 pm  
   
 Take 1 Tab by mouth Before breakfast and dinner. 40 mg POTASSIUM CHLORIDE SR 10 MEQ TAB Your next dose is:  Resume home schedule Take 1 Tab by mouth daily. 1 Tab  
    
  
   
   
   
  
 pregabalin 150 mg capsule Commonly known as:  Sudheer Troncosoer Your last dose was:  05/24/18 am  
Your next dose is:  05/24/18 pm  
   
 Take  by mouth two (2) times a day. REMERON 45 mg tablet Generic drug:  mirtazapine Your next dose is:  Resume home schedule Take 45 mg by mouth nightly. 45 mg  
    
   
   
   
  
  
 SUPER B COMPLEX PO Your next dose is:  Resume home schedule Take 1 Tab by mouth daily. 1 Tab SYNTHROID 125 mcg tablet Generic drug:  levothyroxine Your last dose was:  5/23/2018 6pm  
Your next dose is:  05/24/18 6pm  
   
 Take 112 mcg by mouth every evening. 112 mcg VITAMIN D3 1,000 unit Cap Generic drug:  cholecalciferol Your next dose is:  Resume home schedule Take  by mouth. ZOFRAN ODT 8 mg disintegrating tablet Generic drug:  ondansetron Your next dose is:  Per as needed schedule Take 8 mg by mouth every eight (8) hours as needed for Nausea. 8 mg * Notice: This list has 3 medication(s) that are the same as other medications prescribed for you. Read the directions carefully, and ask your doctor or other care provider to review them with you. STOP taking these medications   
 acyclovir 200 mg capsule Commonly known as:  ZOVIRAX Where to Get Your Medications These medications were sent to [address], [address] [place] [address] Drive, [facility] [id number] Phone:  [phone] cefpodoxime 200 mg tablet  
 predniSONE 20 mg tablet

## 2018-05-22 LAB
ANION GAP SERPL CALC-SCNC: 7 MMOL/L (ref 7–16)
ARTERIAL PATENCY WRIST A: POSITIVE
BASE DEFICIT BLDA-SCNC: 1 MMOL/L (ref 0–2)
BDY SITE: ABNORMAL
BUN SERPL-MCNC: 12 MG/DL (ref 8–23)
CALCIUM SERPL-MCNC: 8.4 MG/DL (ref 8.3–10.4)
CHLORIDE SERPL-SCNC: 110 MMOL/L (ref 98–107)
CO2 SERPL-SCNC: 29 MMOL/L (ref 21–32)
COHGB MFR BLD: 0.8 % (ref 0.5–1.5)
CREAT SERPL-MCNC: 1.06 MG/DL (ref 0.6–1)
DO-HGB BLD-MCNC: 9 % (ref 0–5)
ERYTHROCYTE [DISTWIDTH] IN BLOOD BY AUTOMATED COUNT: 17.5 % (ref 11.9–14.6)
FIO2 ON VENT: 25 %
GLUCOSE SERPL-MCNC: 214 MG/DL (ref 65–100)
HCO3 BLDA-SCNC: 25 MMOL/L (ref 22–26)
HCT VFR BLD AUTO: 29.6 % (ref 35.8–46.3)
HGB BLD-MCNC: 8.8 G/DL (ref 11.7–15.4)
HGB BLDMV-MCNC: 9.4 GM/DL (ref 11.7–15)
MCH RBC QN AUTO: 24.6 PG (ref 26.1–32.9)
MCHC RBC AUTO-ENTMCNC: 29.7 G/DL (ref 31.4–35)
MCV RBC AUTO: 82.7 FL (ref 79.6–97.8)
METHGB MFR BLD: 0.3 % (ref 0–1.5)
OXYHGB MFR BLDA: 89.8 % (ref 94–97)
PCO2 BLDA: 49 MMHG (ref 35–45)
PEEP RESPIRATORY: 8 CM[H2O]
PH BLDA: 7.33 [PH] (ref 7.35–7.45)
PLATELET # BLD AUTO: 305 K/UL (ref 150–450)
PMV BLD AUTO: 9.9 FL (ref 10.8–14.1)
PO2 BLDA: 66 MMHG (ref 80–105)
POTASSIUM SERPL-SCNC: 3.9 MMOL/L (ref 3.5–5.1)
RBC # BLD AUTO: 3.58 M/UL (ref 4.05–5.25)
RESP RATE: 18
SAO2 % BLD: 91 % (ref 92–98.5)
SODIUM SERPL-SCNC: 146 MMOL/L (ref 136–145)
VENTILATION MODE VENT: ABNORMAL
WBC # BLD AUTO: 7.5 K/UL (ref 4.3–11.1)

## 2018-05-22 PROCEDURE — 74011000250 HC RX REV CODE- 250: Performed by: INTERNAL MEDICINE

## 2018-05-22 PROCEDURE — 94760 N-INVAS EAR/PLS OXIMETRY 1: CPT

## 2018-05-22 PROCEDURE — 82803 BLOOD GASES ANY COMBINATION: CPT

## 2018-05-22 PROCEDURE — 80048 BASIC METABOLIC PNL TOTAL CA: CPT | Performed by: INTERNAL MEDICINE

## 2018-05-22 PROCEDURE — 77010033678 HC OXYGEN DAILY

## 2018-05-22 PROCEDURE — 74011250636 HC RX REV CODE- 250/636: Performed by: INTERNAL MEDICINE

## 2018-05-22 PROCEDURE — 74011250637 HC RX REV CODE- 250/637: Performed by: INTERNAL MEDICINE

## 2018-05-22 PROCEDURE — 74011000258 HC RX REV CODE- 258: Performed by: INTERNAL MEDICINE

## 2018-05-22 PROCEDURE — 36415 COLL VENOUS BLD VENIPUNCTURE: CPT | Performed by: INTERNAL MEDICINE

## 2018-05-22 PROCEDURE — 99233 SBSQ HOSP IP/OBS HIGH 50: CPT | Performed by: INTERNAL MEDICINE

## 2018-05-22 PROCEDURE — 85027 COMPLETE CBC AUTOMATED: CPT | Performed by: INTERNAL MEDICINE

## 2018-05-22 PROCEDURE — 94640 AIRWAY INHALATION TREATMENT: CPT

## 2018-05-22 PROCEDURE — 65270000029 HC RM PRIVATE

## 2018-05-22 PROCEDURE — 36600 WITHDRAWAL OF ARTERIAL BLOOD: CPT

## 2018-05-22 RX ORDER — LISINOPRIL 5 MG/1
10 TABLET ORAL DAILY
Status: DISCONTINUED | OUTPATIENT
Start: 2018-05-23 | End: 2018-05-24 | Stop reason: HOSPADM

## 2018-05-22 RX ORDER — LISINOPRIL 5 MG/1
10 TABLET ORAL DAILY
Status: DISCONTINUED | OUTPATIENT
Start: 2018-05-22 | End: 2018-05-22

## 2018-05-22 RX ORDER — LISINOPRIL 5 MG/1
10 TABLET ORAL ONCE
Status: DISPENSED | OUTPATIENT
Start: 2018-05-22 | End: 2018-05-23

## 2018-05-22 RX ADMIN — HYDROCODONE BITARTRATE AND ACETAMINOPHEN 1 TABLET: 5; 325 TABLET ORAL at 09:26

## 2018-05-22 RX ADMIN — ATORVASTATIN CALCIUM 20 MG: 10 TABLET, FILM COATED ORAL at 21:37

## 2018-05-22 RX ADMIN — NYSTATIN: 100000 POWDER TOPICAL at 18:06

## 2018-05-22 RX ADMIN — HYDROCODONE BITARTRATE AND ACETAMINOPHEN 1 TABLET: 5; 325 TABLET ORAL at 16:14

## 2018-05-22 RX ADMIN — CARVEDILOL 12.5 MG: 12.5 TABLET, FILM COATED ORAL at 09:21

## 2018-05-22 RX ADMIN — LEVOTHYROXINE SODIUM 112 MCG: 112 TABLET ORAL at 18:04

## 2018-05-22 RX ADMIN — PREGABALIN 150 MG: 75 CAPSULE ORAL at 18:04

## 2018-05-22 RX ADMIN — ALPRAZOLAM 1 MG: 0.25 TABLET ORAL at 13:59

## 2018-05-22 RX ADMIN — SUCRALFATE 1 G: 1 TABLET ORAL at 09:21

## 2018-05-22 RX ADMIN — MONTELUKAST SODIUM 10 MG: 10 TABLET, FILM COATED ORAL at 09:21

## 2018-05-22 RX ADMIN — METHYLPREDNISOLONE SODIUM SUCCINATE 60 MG: 125 INJECTION, POWDER, FOR SOLUTION INTRAMUSCULAR; INTRAVENOUS at 06:15

## 2018-05-22 RX ADMIN — PREGABALIN 150 MG: 75 CAPSULE ORAL at 09:21

## 2018-05-22 RX ADMIN — PANTOPRAZOLE SODIUM 40 MG: 40 TABLET, DELAYED RELEASE ORAL at 09:21

## 2018-05-22 RX ADMIN — FOLIC ACID 1 MG: 1 TABLET ORAL at 09:21

## 2018-05-22 RX ADMIN — IPRATROPIUM BROMIDE AND ALBUTEROL SULFATE 3 ML: .5; 3 SOLUTION RESPIRATORY (INHALATION) at 01:19

## 2018-05-22 RX ADMIN — HYDROCODONE BITARTRATE AND ACETAMINOPHEN 1 TABLET: 5; 325 TABLET ORAL at 02:45

## 2018-05-22 RX ADMIN — APIXABAN 5 MG: 5 TABLET, FILM COATED ORAL at 09:21

## 2018-05-22 RX ADMIN — Medication 10 ML: at 21:42

## 2018-05-22 RX ADMIN — SUCRALFATE 1 G: 1 TABLET ORAL at 13:50

## 2018-05-22 RX ADMIN — IPRATROPIUM BROMIDE AND ALBUTEROL SULFATE 3 ML: .5; 3 SOLUTION RESPIRATORY (INHALATION) at 13:56

## 2018-05-22 RX ADMIN — CARVEDILOL 12.5 MG: 12.5 TABLET, FILM COATED ORAL at 17:00

## 2018-05-22 RX ADMIN — ONDANSETRON 4 MG: 2 INJECTION INTRAMUSCULAR; INTRAVENOUS at 21:40

## 2018-05-22 RX ADMIN — ONDANSETRON 4 MG: 2 INJECTION INTRAMUSCULAR; INTRAVENOUS at 13:27

## 2018-05-22 RX ADMIN — IPRATROPIUM BROMIDE AND ALBUTEROL SULFATE 3 ML: .5; 3 SOLUTION RESPIRATORY (INHALATION) at 08:56

## 2018-05-22 RX ADMIN — SUCRALFATE 1 G: 1 TABLET ORAL at 18:04

## 2018-05-22 RX ADMIN — IPRATROPIUM BROMIDE AND ALBUTEROL SULFATE 3 ML: .5; 3 SOLUTION RESPIRATORY (INHALATION) at 21:09

## 2018-05-22 RX ADMIN — METHYLPREDNISOLONE SODIUM SUCCINATE 40 MG: 125 INJECTION, POWDER, FOR SOLUTION INTRAMUSCULAR; INTRAVENOUS at 21:41

## 2018-05-22 RX ADMIN — OXYBUTYNIN CHLORIDE 10 MG: 10 TABLET, FILM COATED, EXTENDED RELEASE ORAL at 09:26

## 2018-05-22 RX ADMIN — BUDESONIDE 500 MCG: 0.5 INHALANT RESPIRATORY (INHALATION) at 08:56

## 2018-05-22 RX ADMIN — HYDROCODONE BITARTRATE AND ACETAMINOPHEN 1 TABLET: 5; 325 TABLET ORAL at 23:50

## 2018-05-22 RX ADMIN — ALPRAZOLAM 1 MG: 0.25 TABLET ORAL at 21:51

## 2018-05-22 RX ADMIN — SUCRALFATE 1 G: 1 TABLET ORAL at 21:37

## 2018-05-22 RX ADMIN — NYSTATIN: 100000 POWDER TOPICAL at 21:52

## 2018-05-22 RX ADMIN — NYSTATIN: 100000 POWDER TOPICAL at 09:24

## 2018-05-22 RX ADMIN — APIXABAN 5 MG: 5 TABLET, FILM COATED ORAL at 18:04

## 2018-05-22 RX ADMIN — CEFTRIAXONE SODIUM 1 G: 1 INJECTION, POWDER, FOR SOLUTION INTRAMUSCULAR; INTRAVENOUS at 13:53

## 2018-05-22 RX ADMIN — PANTOPRAZOLE SODIUM 40 MG: 40 TABLET, DELAYED RELEASE ORAL at 16:30

## 2018-05-22 RX ADMIN — Medication 10 ML: at 06:15

## 2018-05-22 RX ADMIN — Medication 10 ML: at 13:54

## 2018-05-22 RX ADMIN — FUROSEMIDE 40 MG: 40 TABLET ORAL at 09:21

## 2018-05-22 NOTE — PROGRESS NOTES
Patient remains hypertensive despite scheduled Coreg and PRN pain medication. Orders received to start home dose of Lisinopril, first dose now.

## 2018-05-22 NOTE — PROGRESS NOTES
Critical Care Daily Progress Note: 5/22/2018    Flo Joy   Admission Date: 5/21/2018         The patient's chart is reviewed and the patient is discussed with the staff. Flo Joy is a 72yo F with obesity s/p gastric bypass, fibromyalgia, chronic vocal cord paralysis, h/o PE on lifelong eliquis, OHS, severe debility, 7 hospitalizations in last year. She's admitted with hypercarbia, lethargy, UDS+ for benzos/opiates, UTI. She was admitted to ICU for BiPAP       Subjective:   On 2pm nasal cannula O2 and more alert today.   Reports LE edema has been worsening for last couple of weeks  Afebrile last 24h   GNR in urine    Current Facility-Administered Medications   Medication Dose Route Frequency    ALPRAZolam (XANAX) tablet 1 mg  1 mg Oral TID PRN    apixaban (ELIQUIS) tablet 5 mg  5 mg Oral BID    atorvastatin (LIPITOR) tablet 20 mg  20 mg Oral QHS    carvedilol (COREG) tablet 12.5 mg  12.5 mg Oral BID WITH MEALS    folic acid (FOLVITE) tablet 1 mg  1 mg Oral DAILY    furosemide (LASIX) tablet 40 mg  40 mg Oral DAILY    levothyroxine (SYNTHROID) tablet 112 mcg  112 mcg Oral QPM    montelukast (SINGULAIR) tablet 10 mg  10 mg Oral DAILY    naloxegol (MOVANTIK) tablet 25 mg (Patient Supplied)  25 mg Oral ACB    nystatin (MYCOSTATIN) 100,000 unit/gram powder   Topical BID    oxybutynin chloride XL (DITROPAN XL) tablet 10 mg  10 mg Oral DAILY    pantoprazole (PROTONIX) tablet 40 mg  40 mg Oral ACB&D    pregabalin (LYRICA) capsule 150 mg  150 mg Oral BID    sucralfate (CARAFATE) tablet 1 g  1 g Oral QID    sodium chloride (NS) flush 5-10 mL  5-10 mL IntraVENous Q8H    sodium chloride (NS) flush 5-10 mL  5-10 mL IntraVENous PRN    albuterol (PROVENTIL VENTOLIN) nebulizer solution 2.5 mg  2.5 mg Nebulization Q4H PRN    budesonide (PULMICORT) 500 mcg/2 ml nebulizer suspension  500 mcg Nebulization BID RT    methylPREDNISolone (PF) (SOLU-MEDROL) injection 60 mg  60 mg IntraVENous Q6H  cefTRIAXone (ROCEPHIN) 1 g in 0.9% sodium chloride (MBP/ADV) 50 mL  1 g IntraVENous Q24H    HYDROcodone-acetaminophen (NORCO) 5-325 mg per tablet 1 Tab  1 Tab Oral Q6H PRN    albuterol-ipratropium (DUO-NEB) 2.5 MG-0.5 MG/3 ML  3 mL Nebulization Q6H RT    ondansetron (ZOFRAN) injection 4 mg  4 mg IntraVENous Q6H PRN       Review of Systems  Constitutional:  negative for fever, chills, sweats  Cardiovascular:  negative for chest pain, palpitations, syncope, edema  Gastrointestinal:  negative for dysphagia, reflux, vomiting, diarrhea, abdominal pain, or melena  Neurologic:  negative for focal weakness, numbness, headache      Objective:     Vitals:    05/22/18 0701 05/22/18 0831 05/22/18 0856 05/22/18 0901   BP: 171/81 153/79  165/78   Pulse: 94 92  (!) 101   Resp: (!) 36 26  15   Temp:       SpO2: 97% 94% 96% 95%   Weight:           Intake and Output:   05/20 1901 - 05/22 0700  In: -   Out: 1900 [Urine:1900]       Physical Exam:          Constitutional:  the patient is well developed and in no acute distress  EENMT:  Sclera clear, pupils equal, oral mucosa moist  Respiratory: wheezing bilaterally  Cardiovascular:  RRR without M,G,R  Gastrointestinal: soft and non-tender; with positive bowel sounds. Musculoskeletal: warm without cyanosis. There is 2+ pitting lower leg edema. Skin:  no jaundice or rashes, no wounds   Neurologic: no gross neuro deficits     Psychiatric:  alert and oriented x 3    LINES:  PIV    DRIPS:   none    CXR: atelectasis      LAB  No results for input(s): GLUCPOC in the last 72 hours.     No lab exists for component: [place]   Recent Labs      05/22/18   0340  05/21/18   1309   WBC  7.5  7.1   HGB  8.8*  8.6*   HCT  29.6*  29.4*   PLT  305  168     Recent Labs      05/22/18   0340  05/21/18   1309   NA  146*  147*   K  3.9  4.2   CL  110*  112*   CO2  29  27   GLU  214*  76   BUN  12  13   CREA  1.06*  1.21*   CA  8.4  8.2*   ALB   --   2.3*   TBILI   --   0.2   ALT   --   16   SGOT   -- 22     Recent Labs      05/22/18   0340  05/21/18   1500  05/21/18   1305   PH  7.33*  7.33*  7.33*   PCO2  49*  58*  54*   PO2  66*  85  95   HCO3  25  30*  28*     No results for input(s): LCAD, LAC in the last 72 hours. Assessment:  (Medical Decision Making)     Hospital Problems  Date Reviewed: 5/21/2018          Codes Class Noted POA    Acute on chronic respiratory failure with hypoxia and hypercapnia (HCC) ICD-10-CM: J96.21, J96.22  ICD-9-CM: 518.84, 786.09, 799.02  5/21/2018 Yes    Off BiPAP and no longer sedated.   Treating as though there is obstructive lung disease with steroids and bronchodilators, though spirometry has never shown obstruction    Acute cystitis without hematuria ICD-10-CM: N30.00  ICD-9-CM: 595.0  5/21/2018 Yes    F/u GNR and continue day 2 abx with ceftriaxone    Severe persistent asthma with exacerbation ICD-10-CM: J45.51  ICD-9-CM: 493.92  5/21/2018 Yes    Taper steroids, continue bronchodilators    Nocturnal hypoxemia (Chronic) ICD-10-CM: G47.34  ICD-9-CM: 327.24  1/16/2018 Yes        Chronic anticoagulation (Chronic) ICD-10-CM: Z79.01  ICD-9-CM: V58.61  12/3/2017 Yes    For h/o PE    Vocal cord paralysis (Chronic) ICD-10-CM: J38.00  ICD-9-CM: 478.30  12/3/2017 Yes        Drug abuse (Chronic) ICD-10-CM: F19.10  ICD-9-CM: 305.90  11/6/2017 Yes    UTox positive for opiates and benzos & patient is ordered xanax and norco at home    Fibromyalgia (Chronic) ICD-10-CM: M79.7  ICD-9-CM: 729.1  11/1/2017 Yes        Morbid obesity - sedentary lifestyle (Chronic) ICD-10-CM: E66.01  ICD-9-CM: 278.01  11/1/2017 Yes        Debility (Chronic) ICD-10-CM: R53.81  ICD-9-CM: 799.3  11/1/2017 Yes    PT consult    Polypharmacy (Chronic) ICD-10-CM: J68.192  ICD-9-CM: V58.69  11/1/2017 Yes    Limit sedating agents    Restrictive lung disease (Chronic) ICD-10-CM: J98.4  ICD-9-CM: 518.89  11/1/2017 Yes        Hypersomnolence (Chronic) ICD-10-CM: G47.10  ICD-9-CM: 780.54  4/10/2017 Yes    Overview Addendum 1/16/2018  2:31 PM by Leopold Lamp, NP     Possible also CSA drug induced  Negative PSG                   Plan:  (Medical Decision Making)     --Will transfer to floor today. Consult hospitalist to assume care tomorrow. We will continue to follow on consultative basis given wheezing, hypercarbia on admission  --continue day #2 of ceftriaxone  --Continue bronchdilators. Steroids tapered down today. --Continue home dose of lasix  --PT consult, OOB, IS  --remove [facility]    More than 50% of the time documented was spent in face-to-face contact with the patient and in the care of the patient on the floor/unit where the patient is located.     Pamela Hernandez MD

## 2018-05-22 NOTE — PROGRESS NOTES
TRANSFER - IN REPORT:    Verbal report received from [address] on Braulio Early  being received from CCU for routine progression of care      Report consisted of patients Situation, Background, Assessment and   Recommendations(SBAR). Information from the following report(s) SBAR, Kardex, [facility] and Recent Results was reviewed with the receiving nurse. Assessment completed upon patients arrival to unit and care assumed.

## 2018-05-22 NOTE — PROGRESS NOTES
TRANSFER - IN REPORT:    Verbal report received from [facility] RN(name) on Maryclare Signs  being received from ED(unit) for routine progression of care      Report consisted of patients Situation, Background, Assessment and   Recommendations(SBAR). Information from the following report(s) SBAR, Kardex, ED Summary, MAR, Recent Results, Med Rec Status and Cardiac Rhythm SR was reviewed with the receiving nurse. Opportunity for questions and clarification was provided. Assessment completed upon patients arrival to unit and care assumed.

## 2018-05-22 NOTE — PROGRESS NOTES
TRANSFER - OUT REPORT:    Verbal report given to SLICK Reagan(name) on Dhiraj Dunn  being transferred to 823(unit) for routine progression of care       Report consisted of patients Situation, Background, Assessment and   Recommendations(SBAR). Information from the following report(s) SBAR, Kardex, OR Summary, Procedure Summary, Intake/Output, MAR and Recent Results was reviewed with the receiving nurse. Lines:   Peripheral IV 05/21/18 Right Arm (Active)   Site Assessment Clean, dry, & intact 5/22/2018 10:13 AM   Phlebitis Assessment 0 5/22/2018 10:13 AM   Infiltration Assessment 0 5/22/2018 10:13 AM   Dressing Status Clean, dry, & intact 5/22/2018 10:13 AM   Dressing Type Tape;Transparent 5/22/2018 10:13 AM   Hub Color/Line Status Flushed 5/22/2018 10:13 AM   Alcohol Cap Used No 5/21/2018  8:17 PM       Peripheral IV 05/21/18 Left Arm (Active)   Site Assessment Clean, dry, & intact 5/22/2018 10:13 AM   Phlebitis Assessment 0 5/22/2018 10:13 AM   Infiltration Assessment 0 5/22/2018 10:13 AM   Dressing Status Clean, dry, & intact 5/22/2018 10:13 AM   Dressing Type Tape;Transparent 5/22/2018 10:13 AM   Hub Color/Line Status Blue;Patent 5/22/2018 10:13 AM   Alcohol Cap Used No 5/21/2018  8:17 PM        Opportunity for questions and clarification was provided.       Patient transported with:   O2 @ 3 liters

## 2018-05-22 NOTE — PROGRESS NOTES
Mrs. Savannah Mcclain transferred to room 823. Alert, oriented in all spheres. BP better at 127/85; pulse 99. With c/o headache at this time. Lung sounds diminished in all fields. 3 lpm NC in place and denies dyspnea at this time. Skin assessment completed with colin Luna RN. Left ankle/leg with ecchymotic area and scratch. Groin, abdominal, and breast folds with excoriated pink, moist, irritated skin. Nystatin powder seen in place. Allevyn in place to sacrum. All other skin without breakdown. Dinner tray at bedside. Denies further needs. Call light in lap and door open.

## 2018-05-22 NOTE — PROGRESS NOTES
Pt seen in CCU RRAT 21. Admitted for acute and chronic respiratory failure with hypoxia. Recent hospitalization 4/5/2018. Pt states she continues to live with daughter and grandchildren, twins. Spouse and daughter are at bedside currently. Spouse lives next door. DME include oxygen with [employer] and cane, which she has just recently started using. States she is having trouble with left groin and has seen MD regarding outpatient. Confirms Dr. Elyssa Alvarez as PCP and insurance with [facility] and supplemental. Able to get rx with drug plan. Family provides transportation. Daughter concerned over why this happened. States just seen a week ago at [facility] office by NP, was given Mucinex and sent home. Explained that CM would notify primary RN and see if MD could come to answer all questions. Linell Gottron, SLICK notified and to see pt and family. Called to [facility], health  and states they are no longer following. Also states, pt and spouse would not allow [facility] or Health  to do home visit in past.     Ask daughter if any d/c needs, none stated at present other than multiple questions regarding condition and what caused this. CM to follow and assist with any needs. Care Management Interventions  PCP Verified by CM: Yes  Mode of Transport at Discharge:  Other (see comment)  Current Support Network: Relative's Home  Confirm Follow Up Transport: Family  Plan discussed with Pt/Family/Caregiver: Yes  Freedom of Choice Offered: Yes  Discharge Location  Discharge Placement: Home

## 2018-05-22 NOTE — PROGRESS NOTES
Dual skin assessment done with Hellen Potter. Bruise noted to left ankle area. Skin excoriation noted under abdominal and groin area skin folds.

## 2018-05-23 PROBLEM — J38.00 VOCAL CORD PARALYSIS: Chronic | Status: ACTIVE | Noted: 2018-05-21

## 2018-05-23 PROBLEM — E66.01 MORBID OBESITY (HCC): Chronic | Status: ACTIVE | Noted: 2018-05-21

## 2018-05-23 PROBLEM — Z79.01 CHRONIC ANTICOAGULATION: Chronic | Status: ACTIVE | Noted: 2018-05-21

## 2018-05-23 PROBLEM — R53.81 DEBILITY: Chronic | Status: ACTIVE | Noted: 2018-05-21

## 2018-05-23 PROBLEM — M79.7 FIBROMYALGIA: Chronic | Status: ACTIVE | Noted: 2018-05-21

## 2018-05-23 PROBLEM — G47.10 HYPERSOMNOLENCE: Chronic | Status: ACTIVE | Noted: 2018-05-21

## 2018-05-23 PROBLEM — J98.4 RESTRICTIVE LUNG DISEASE: Chronic | Status: ACTIVE | Noted: 2018-05-21

## 2018-05-23 PROBLEM — G47.34 NOCTURNAL HYPOXEMIA: Chronic | Status: ACTIVE | Noted: 2018-05-21

## 2018-05-23 PROBLEM — F19.10 DRUG ABUSE (HCC): Chronic | Status: ACTIVE | Noted: 2018-05-21

## 2018-05-23 PROBLEM — Z79.899 POLYPHARMACY: Chronic | Status: ACTIVE | Noted: 2018-05-21

## 2018-05-23 LAB
ANION GAP SERPL CALC-SCNC: 8 MMOL/L (ref 7–16)
BUN SERPL-MCNC: 10 MG/DL (ref 8–23)
CALCIUM SERPL-MCNC: 8.6 MG/DL (ref 8.3–10.4)
CHLORIDE SERPL-SCNC: 106 MMOL/L (ref 98–107)
CO2 SERPL-SCNC: 32 MMOL/L (ref 21–32)
CREAT SERPL-MCNC: 0.84 MG/DL (ref 0.6–1)
ERYTHROCYTE [DISTWIDTH] IN BLOOD BY AUTOMATED COUNT: 16.9 % (ref 11.9–14.6)
GLUCOSE SERPL-MCNC: 124 MG/DL (ref 65–100)
HCT VFR BLD AUTO: 29.2 % (ref 35.8–46.3)
HGB BLD-MCNC: 8.7 G/DL (ref 11.7–15.4)
MCH RBC QN AUTO: 24.4 PG (ref 26.1–32.9)
MCHC RBC AUTO-ENTMCNC: 29.8 G/DL (ref 31.4–35)
MCV RBC AUTO: 81.8 FL (ref 79.6–97.8)
PLATELET # BLD AUTO: 331 K/UL (ref 150–450)
PMV BLD AUTO: 9.8 FL (ref 10.8–14.1)
POTASSIUM SERPL-SCNC: 3.7 MMOL/L (ref 3.5–5.1)
RBC # BLD AUTO: 3.57 M/UL (ref 4.05–5.25)
SODIUM SERPL-SCNC: 146 MMOL/L (ref 136–145)
WBC # BLD AUTO: 10 K/UL (ref 4.3–11.1)

## 2018-05-23 PROCEDURE — 74011000250 HC RX REV CODE- 250: Performed by: INTERNAL MEDICINE

## 2018-05-23 PROCEDURE — 94640 AIRWAY INHALATION TREATMENT: CPT

## 2018-05-23 PROCEDURE — 74011250636 HC RX REV CODE- 250/636: Performed by: INTERNAL MEDICINE

## 2018-05-23 PROCEDURE — 80048 BASIC METABOLIC PNL TOTAL CA: CPT | Performed by: INTERNAL MEDICINE

## 2018-05-23 PROCEDURE — 74011250637 HC RX REV CODE- 250/637: Performed by: INTERNAL MEDICINE

## 2018-05-23 PROCEDURE — 85027 COMPLETE CBC AUTOMATED: CPT | Performed by: INTERNAL MEDICINE

## 2018-05-23 PROCEDURE — 65270000029 HC RM PRIVATE

## 2018-05-23 PROCEDURE — 36415 COLL VENOUS BLD VENIPUNCTURE: CPT | Performed by: INTERNAL MEDICINE

## 2018-05-23 PROCEDURE — 94760 N-INVAS EAR/PLS OXIMETRY 1: CPT

## 2018-05-23 PROCEDURE — 99232 SBSQ HOSP IP/OBS MODERATE 35: CPT | Performed by: INTERNAL MEDICINE

## 2018-05-23 PROCEDURE — 77010033678 HC OXYGEN DAILY

## 2018-05-23 PROCEDURE — 77030021668 HC NEB PREFIL KT VYRM -A

## 2018-05-23 RX ORDER — CEFPODOXIME PROXETIL 200 MG/1
200 TABLET, FILM COATED ORAL EVERY 12 HOURS
Status: DISCONTINUED | OUTPATIENT
Start: 2018-05-23 | End: 2018-05-24 | Stop reason: HOSPADM

## 2018-05-23 RX ORDER — PREDNISONE 20 MG/1
20 TABLET ORAL
Status: DISCONTINUED | OUTPATIENT
Start: 2018-05-24 | End: 2018-05-24 | Stop reason: HOSPADM

## 2018-05-23 RX ADMIN — APIXABAN 5 MG: 5 TABLET, FILM COATED ORAL at 18:15

## 2018-05-23 RX ADMIN — NYSTATIN: 100000 POWDER TOPICAL at 08:01

## 2018-05-23 RX ADMIN — IPRATROPIUM BROMIDE AND ALBUTEROL SULFATE 3 ML: .5; 3 SOLUTION RESPIRATORY (INHALATION) at 07:32

## 2018-05-23 RX ADMIN — IPRATROPIUM BROMIDE AND ALBUTEROL SULFATE 3 ML: .5; 3 SOLUTION RESPIRATORY (INHALATION) at 20:05

## 2018-05-23 RX ADMIN — ALPRAZOLAM 1 MG: 0.25 TABLET ORAL at 11:29

## 2018-05-23 RX ADMIN — ONDANSETRON 4 MG: 2 INJECTION INTRAMUSCULAR; INTRAVENOUS at 13:04

## 2018-05-23 RX ADMIN — SUCRALFATE 1 G: 1 TABLET ORAL at 18:15

## 2018-05-23 RX ADMIN — ALPRAZOLAM 1 MG: 0.25 TABLET ORAL at 23:40

## 2018-05-23 RX ADMIN — CARVEDILOL 12.5 MG: 12.5 TABLET, FILM COATED ORAL at 07:55

## 2018-05-23 RX ADMIN — Medication 10 ML: at 21:13

## 2018-05-23 RX ADMIN — NYSTATIN: 100000 POWDER TOPICAL at 18:20

## 2018-05-23 RX ADMIN — PANTOPRAZOLE SODIUM 40 MG: 40 TABLET, DELAYED RELEASE ORAL at 18:19

## 2018-05-23 RX ADMIN — SUCRALFATE 1 G: 1 TABLET ORAL at 21:13

## 2018-05-23 RX ADMIN — METHYLPREDNISOLONE SODIUM SUCCINATE 40 MG: 125 INJECTION, POWDER, FOR SOLUTION INTRAMUSCULAR; INTRAVENOUS at 07:54

## 2018-05-23 RX ADMIN — LEVOTHYROXINE SODIUM 112 MCG: 112 TABLET ORAL at 18:19

## 2018-05-23 RX ADMIN — FUROSEMIDE 40 MG: 40 TABLET ORAL at 07:57

## 2018-05-23 RX ADMIN — PANTOPRAZOLE SODIUM 40 MG: 40 TABLET, DELAYED RELEASE ORAL at 06:45

## 2018-05-23 RX ADMIN — IPRATROPIUM BROMIDE AND ALBUTEROL SULFATE 3 ML: .5; 3 SOLUTION RESPIRATORY (INHALATION) at 01:33

## 2018-05-23 RX ADMIN — HYDROCODONE BITARTRATE AND ACETAMINOPHEN 1 TABLET: 5; 325 TABLET ORAL at 18:15

## 2018-05-23 RX ADMIN — HYDROCODONE BITARTRATE AND ACETAMINOPHEN 1 TABLET: 5; 325 TABLET ORAL at 07:56

## 2018-05-23 RX ADMIN — LISINOPRIL 10 MG: 5 TABLET ORAL at 07:56

## 2018-05-23 RX ADMIN — PREGABALIN 150 MG: 75 CAPSULE ORAL at 18:15

## 2018-05-23 RX ADMIN — OXYBUTYNIN CHLORIDE 10 MG: 10 TABLET, FILM COATED, EXTENDED RELEASE ORAL at 12:11

## 2018-05-23 RX ADMIN — APIXABAN 5 MG: 5 TABLET, FILM COATED ORAL at 07:58

## 2018-05-23 RX ADMIN — CEFPODOXIME PROXETIL 200 MG: 200 TABLET, FILM COATED ORAL at 21:13

## 2018-05-23 RX ADMIN — IPRATROPIUM BROMIDE AND ALBUTEROL SULFATE 3 ML: .5; 3 SOLUTION RESPIRATORY (INHALATION) at 14:58

## 2018-05-23 RX ADMIN — ATORVASTATIN CALCIUM 20 MG: 10 TABLET, FILM COATED ORAL at 21:13

## 2018-05-23 RX ADMIN — PREGABALIN 150 MG: 75 CAPSULE ORAL at 07:56

## 2018-05-23 RX ADMIN — CEFPODOXIME PROXETIL 200 MG: 200 TABLET, FILM COATED ORAL at 12:05

## 2018-05-23 RX ADMIN — FOLIC ACID 1 MG: 1 TABLET ORAL at 07:57

## 2018-05-23 RX ADMIN — Medication 5 ML: at 13:05

## 2018-05-23 RX ADMIN — CARVEDILOL 12.5 MG: 12.5 TABLET, FILM COATED ORAL at 18:19

## 2018-05-23 RX ADMIN — MONTELUKAST SODIUM 10 MG: 10 TABLET, FILM COATED ORAL at 07:55

## 2018-05-23 RX ADMIN — SUCRALFATE 1 G: 1 TABLET ORAL at 07:54

## 2018-05-23 RX ADMIN — SUCRALFATE 1 G: 1 TABLET ORAL at 12:04

## 2018-05-23 RX ADMIN — Medication 10 ML: at 06:07

## 2018-05-23 NOTE — PROGRESS NOTES
Patient resting quietly in bed, watching TV. No c/o distress at this time. Dinner tray at bedside, poor appetite tonight. Oxygen at 3Liters, respirations easy. Will continue to monitor.

## 2018-05-23 NOTE — PROGRESS NOTES
Patient receiving hygiene care, participating with care. Was medicated with Zofran for nausea earlier, has resolved.

## 2018-05-23 NOTE — PROGRESS NOTES
Patient awake, watching TV, eating cheese chips and drinking ginger ale. No c/o nausea this morning. Had slept several hours through the night.

## 2018-05-23 NOTE — PROGRESS NOTES
Jarad Rosenberg  Admission Date: 5/21/2018             Daily Progress Note: 5/23/2018    The patient's chart is reviewed and the patient is discussed with the staff. 74yo F with obesity, s/p gastric bypass, fibromyalgia, chronic vocal cord paralysis, h/o PE on lifelong Eliquis, OHS, severe debility, 7 hospitalizations in last year. She's admitted with hypercarbia, confusion, lethargy, UDS+ for benzos/opiates, UTI. She was admitted to ICU for BiPAP. Was treated with antibiotics and IV steroids. Refused BIPAP and changed to NC and moved to the floor. Urine culture grew E. Coli. Subjective:     Lying in bed, denies shortness of breath. No cough or sputum production. States wears home O2 at night 2L. Has been using cane for ambulation at home , has a \"pinched nerve in her back\" and is followed by Dr. Thang Parsons.       Current Facility-Administered Medications   Medication Dose Route Frequency    cefpodoxime (VANTIN) tablet 200 mg  200 mg Oral Q12H    methylPREDNISolone (PF) (SOLU-MEDROL) injection 40 mg  40 mg IntraVENous Q12H    lisinopril (PRINIVIL, ZESTRIL) tablet 10 mg  10 mg Oral DAILY    ALPRAZolam (XANAX) tablet 1 mg  1 mg Oral TID PRN    apixaban (ELIQUIS) tablet 5 mg  5 mg Oral BID    atorvastatin (LIPITOR) tablet 20 mg  20 mg Oral QHS    carvedilol (COREG) tablet 12.5 mg  12.5 mg Oral BID WITH MEALS    folic acid (FOLVITE) tablet 1 mg  1 mg Oral DAILY    furosemide (LASIX) tablet 40 mg  40 mg Oral DAILY    levothyroxine (SYNTHROID) tablet 112 mcg  112 mcg Oral QPM    montelukast (SINGULAIR) tablet 10 mg  10 mg Oral DAILY    naloxegol (MOVANTIK) tablet 25 mg (Patient Supplied)  25 mg Oral ACB    nystatin (MYCOSTATIN) 100,000 unit/gram powder   Topical BID    oxybutynin chloride XL (DITROPAN XL) tablet 10 mg  10 mg Oral DAILY    pantoprazole (PROTONIX) tablet 40 mg  40 mg Oral ACB&D    pregabalin (LYRICA) capsule 150 mg  150 mg Oral BID    sucralfate (CARAFATE) tablet 1 g  1 g Oral QID    sodium chloride (NS) flush 5-10 mL  5-10 mL IntraVENous Q8H    sodium chloride (NS) flush 5-10 mL  5-10 mL IntraVENous PRN    albuterol (PROVENTIL VENTOLIN) nebulizer solution 2.5 mg  2.5 mg Nebulization Q4H PRN    HYDROcodone-acetaminophen (NORCO) 5-325 mg per tablet 1 Tab  1 Tab Oral Q6H PRN    albuterol-ipratropium (DUO-NEB) 2.5 MG-0.5 MG/3 ML  3 mL Nebulization Q6H RT    ondansetron (ZOFRAN) injection 4 mg  4 mg IntraVENous Q6H PRN       Review of Systems  Constitutional: negative for fever, chills, sweats  Cardiovascular: negative for chest pain, palpitations, syncope, edema  Gastrointestinal:  negative for dysphagia, reflux, vomiting, diarrhea, abdominal pain, or melena  Neurologic:  negative for focal weakness, numbness, headache    Objective:     Vitals:    05/23/18 0625 05/23/18 0719 05/23/18 0734 05/23/18 1128   BP:  143/69  145/82   Pulse:  (!) 120  71   Resp:  19  18   Temp:  98.5 °F (36.9 °C)  98.6 °F (37 °C)   SpO2:  96% 96% 94%   Weight: 270 lb 1.6 oz (122.5 kg)        Intake and Output:   05/21 1901 - 05/23 0700  In: -   Out: 4300 [Urine:4300]       Physical Exam:   Constitution:  the patient is obese and in no acute distress, NC 2L, sat 94%  EENMT:  Sclera clear, pupils equal, oral mucosa moist  Respiratory: few anterior crackles, no wheezing or productive cough  Cardiovascular:  RRR without M,G,R  Gastrointestinal: soft and non-tender; with positive bowel sounds. Musculoskeletal: warm without cyanosis. There is no lower leg edema. Skin:  no jaundice or rashes, no open wounds, left lower leg red area  Neurologic: no gross neuro deficits     Psychiatric:  alert and oriented x 3    CXR: None today    CXR 5/21/18:          LAB  No results for input(s): GLUCPOC in the last 72 hours.     No lab exists for component: [address]      05/23/18   0628  05/22/18   0340  05/21/18   1309   WBC  10.0  7.5  7.1   HGB  8.7*  8.8*  8.6*   HCT  29.2*  29.6*  29.4*   PLT 331  305  168     Recent Labs      05/23/18   0628  05/22/18   0340  05/21/18   1309   NA  146*  146*  147*   K  3.7  3.9  4.2   CL  106  110*  112*   CO2  32  29  27   GLU  124*  214*  76   BUN  10  12  13   CREA  0.84  1.06*  1.21*   CA  8.6  8.4  8.2*   ALB   --    --   2.3*   TBILI   --    --   0.2   ALT   --    --   16   SGOT   --    --   22     Recent Labs      05/22/18   0340  05/21/18   1500  05/21/18   1305   PH  7.33*  7.33*  7.33*   PCO2  49*  58*  54*   PO2  66*  85  95   HCO3  25  30*  28*     No results for input(s): LCAD, LAC in the last 72 hours.       Assessment:  (Medical Decision Making)     Hospital Problems  Date Reviewed: 5/23/2018          Codes Class Noted POA    Fibromyalgia (Chronic) ICD-10-CM: M79.7  ICD-9-CM: 729.1  5/21/2018 Yes    chronic    Morbid obesity - sedentary lifestyle (Chronic) ICD-10-CM: E66.01  ICD-9-CM: 278.01  5/21/2018 Yes    chronic    Debility (Chronic) ICD-10-CM: R53.81  ICD-9-CM: 799.3  5/21/2018 Yes    Chronic--PT for mobility    Polypharmacy (Chronic) ICD-10-CM: S67.523  ICD-9-CM: V58.69  5/21/2018 Yes    chronic    Hypersomnolence (Chronic) ICD-10-CM: G47.10  ICD-9-CM: 780.54  5/21/2018 Yes     Possible also CSA drug induced  Negative PSG         resolved    Restrictive lung disease (Chronic) ICD-10-CM: J98.4  ICD-9-CM: 518.89  5/21/2018 Yes    chronic    Drug abuse (Chronic) ICD-10-CM: F19.10  ICD-9-CM: 305.90  5/21/2018 Yes    chronic    Chronic anticoagulation (Chronic) ICD-10-CM: Z79.01  ICD-9-CM: V58.61  5/21/2018 Yes    On Eliquis    Vocal cord paralysis (Chronic) ICD-10-CM: J38.00  ICD-9-CM: 478.30  5/21/2018 Yes    No hoarseness or stridor    Nocturnal hypoxemia (Chronic) ICD-10-CM: G47.34  ICD-9-CM: 327.24  5/21/2018 Yes    On home O2 flow    Acute on chronic respiratory failure with hypoxia and hypercapnia (HCC) ICD-10-CM: J96.21, J96.22  ICD-9-CM: 518.84, 786.09, 799.02  5/21/2018 Yes    On home O2 2L at night    Acute cystitis without hematuria ICD-10-CM: N30.00  ICD-9-CM: 595.0  5/21/2018 Yes    Continue antibiotics    Severe persistent asthma with exacerbation ICD-10-CM: J45.51  ICD-9-CM: 493.92  5/21/2018 Yes    No wheezing          Plan:  (Medical Decision Making)     --Blood cultures: No growth 2 days-pending  --Urine culture:  E. Coli  --Vantin   --Duoneb, Singulair  --Eliquis--chronic for hx PE  --Solu Medrol 40mg w68d--zlxm to PO Prednisone taper  --Respiratory status is stable on NC 2L. No further pulmonary needs noted at this time and we have nothing further to add. We will sign off but please call if we can be of further assistance. More than 50% of the time documented was spent in face-to-face contact with the patient and in the care of the patient on the floor/unit where the patient is located. Mari Pineda NP    Lungs: clear  Heart:  RRR with no Murmur/Rubs/Gallops    Additional Comments:  Pt much improved. Refusing BIPAP and rehab. Lungs clear and can change to po prednisone briefly. Drinks a lot of fluid and recommended she limit fluid given edema. On ABX for UTI. Nothing further to add. I have spoken with and examined the patient. I agree with the above assessment and plan as documented.     Lex Yee MD

## 2018-05-23 NOTE — PROGRESS NOTES
Morning bedside rounding report received from Travon Devi, SLICK. Patient resting in bed with open eyes, medicated with 5mg Norco PO for complaints of 7/10 back and left leg pain. Demonstrating no signs of dyspnea or distress on 3L oxygen via nasal cannula. External catheter draining clear yellow urine to suction. Patient anticipating discharge to home today. Will continue to monitor.

## 2018-05-23 NOTE — CONSULTS
Hospitalist Consult    Patient: Dodie Ypeez MRN: [id number]  SSN: [id number]    YOB: 1950  Age: 78 y.o. Sex: female      Subjective:      Dodie Yepez is a 78 y.o. female with a PMH of morbid obesity, chronic pain with chronic narcotic use, anxiety with benzo use, chronich HF, Afib, and  History of DVT who is being seen for UTI and respiratory failure. She was initially admitted to there ICU for hypercapneic respiratory failure due to polypharmacy with Xanax and [place] and placed on Bipap. She was also found to have a UTI. She improved on Bipap and antibiotics and was moved to the floor. She continues to improve. Currently on 3LNC. Review of systems negative except stated above. Past Medical History:   Diagnosis Date    Acute renal failure ([address] July, 2014    The patient was admitted with acute renal failure secondary to volume depletion. She was found to have a gastric ulcer on admission.  Arthritis     Asthma     Asthma with acute exacerbation 9/28/2017    Bilateral pulmonary embolism [facility]) September, 2012    Restarted on anticoagulation for lifetime    Calculus of ureter May, 2015    CAP (community acquired pneumonia) October, 2012    RML pneumonia    Cellulitis of left lower extremity 11/1/2017    Chronic pain     fibromyalgia. back pain    Community acquired bacterial pneumonia 4/2/2018    CVA (cerebral infarction) January, 2012    Reportedly has mild left arm residual weakness    Gastric ulcer July, 2014          GERD (gastroesophageal reflux disease)     Gram-positive bacteremia 1/2 cx 12/26/2016    HCAP (healthcare-associated pneumonia) January, 2013    RLL pneumonia    HCAP (healthcare-associated pneumonia) February, 2013    RLL pneumonia    Hypertension     Left leg DVT ([address] 1991    On coumadin until 2008    Morbid obesity ([address]     Psychiatric disorder     PUD (peptic ulcer disease) ? ??    Sepsis due to pneumonia ([address] 4/2/2018    Stroke [facility])     2009 mini stroke    Thyroid disease      Past Surgical History:   Procedure Laterality Date    HX APPENDECTOMY      HX CHOLECYSTECTOMY      HX ENDOSCOPY  July, 2014    Gastric ulcers x 2    HX GASTRIC BYPASS      HX HYSTERECTOMY      HX ORTHOPAEDIC      rt knee growth, right shoulder, left thumb    HX OTHER SURGICAL      vagus nerve stimulator    HX TONSILLECTOMY        Family History   Problem Relation Age of Onset    Hypertension Mother     Cancer Father      prostate    Heart Attack Father     Heart Disease Father      CAD    Hypertension Father     Hypertension Sister     Stroke Sister     Heart Disease Brother      CAD with CABG    Heart Attack Brother     Hypertension Brother      Social History   Substance Use Topics    Smoking status: Never Smoker    Smokeless tobacco: Never Used    Alcohol use No      Current Facility-Administered Medications   Medication Dose Route Frequency Provider Last Rate Last Dose    cefpodoxime (VANTIN) tablet 200 mg  200 mg Oral Q12H Ale Sam,    200 mg at 05/23/18 1205    [START ON 5/24/2018] predniSONE (DELTASONE) tablet 20 mg  20 mg Oral DAILY WITH BREAKFAST Deyvi Doherty NP        lisinopril (PRINIVIL, ZESTRIL) tablet 10 mg  10 mg Oral DAILY Jacquie Cates MD   10 mg at 05/23/18 0756    ALPRAZolam (XANAX) tablet 1 mg  1 mg Oral TID PRN Cinthia Duque MD   1 mg at 05/23/18 1129    apixaban (ELIQUIS) tablet 5 mg  5 mg Oral BID Cinthia Duque MD   5 mg at 05/23/18 0758    atorvastatin (LIPITOR) tablet 20 mg  20 mg Oral QHS Cinthia Duque MD   20 mg at 05/22/18 2137    carvedilol (COREG) tablet 12.5 mg  12.5 mg Oral BID WITH MEALS Cinthia Duque MD   12.5 mg at 67/23/26 1150    folic acid (FOLVITE) tablet 1 mg  1 mg Oral DAILY Cinthia Duque MD   1 mg at 05/23/18 0757    furosemide (LASIX) tablet 40 mg  40 mg Oral DAILY Cinthia Duque MD   40 mg at 05/23/18 0757    levothyroxine (SYNTHROID) tablet 112 mcg  112 mcg Oral QPM Marlene Fletcher MD   112 mcg at 05/22/18 1804    montelukast (SINGULAIR) tablet 10 mg  10 mg Oral DAILY Marlene Fletcher MD   10 mg at 05/23/18 0755    naloxegol (MOVANTIK) tablet 25 mg (Patient Supplied)  25 mg Oral ACB Marlene Fletcher MD   Stopped at 05/23/18 0730    nystatin (MYCOSTATIN) 100,000 unit/gram powder   Topical BID Marlene Fletcher MD        oxybutynin chloride XL (DITROPAN XL) tablet 10 mg  10 mg Oral DAILY Marlene Fletcher MD   10 mg at 05/23/18 1211    pantoprazole (PROTONIX) tablet 40 mg  40 mg Oral ACB&D Marlene Fletcher MD   40 mg at 05/23/18 0645    pregabalin (LYRICA) capsule 150 mg  150 mg Oral BID Marlene Fletcher MD   150 mg at 05/23/18 0756    sucralfate (CARAFATE) tablet 1 g  1 g Oral QID Marlene Fletcher MD   1 g at 05/23/18 1204    sodium chloride (NS) flush 5-10 mL  5-10 mL IntraVENous Q8H Marlene Fletcher MD   5 mL at 05/23/18 1305    sodium chloride (NS) flush 5-10 mL  5-10 mL IntraVENous PRN Marlene Fletcher MD        albuterol (PROVENTIL VENTOLIN) nebulizer solution 2.5 mg  2.5 mg Nebulization Q4H PRN Marlene Fletcher MD        HYDROcodone-acetaminophen [facility]) 5-325 mg per tablet 1 Tab  1 Tab Oral Q6H PRN Marlene Fletcher MD   1 Tab at 05/23/18 0756    albuterol-ipratropium (DUO-NEB) 2.5 MG-0.5 MG/3 ML  3 mL Nebulization Q6H RT Marlene Fletcher MD   3 mL at 05/23/18 0732    ondansetron (ZOFRAN) injection 4 mg  4 mg IntraVENous Q6H PRN Corby Fraser MD   4 mg at 05/23/18 1304        Allergies   Allergen Reactions    Latex Contact Dermatitis    Bactrim [Sulfamethoxazole-Trimethoprim] Nausea Only    Lamictal [Lamotrigine] Atopic Dermatitis    Pcn [Penicillins] Rash    Tapentadol Rash         Objective:     Vitals:    05/23/18 0625 05/23/18 0719 05/23/18 0734 05/23/18 1128   BP:  143/69  145/82   Pulse:  (!) 120  71   Resp:  19  18   Temp:  98.5 °F (36.9 °C)  98.6 °F (37 °C)   SpO2:  96% 96% 94%   Weight: 122.5 kg (270 lb 1.6 oz)           Physical Exam:   GENERAL: alert, cooperative, no distress, appears stated age  EYE: conjunctivae/corneas clear. PERRL. THROAT & NECK: normal and no erythema or exudates noted. LUNG: Diffuse end expiratory wheeze  HEART: regular rate and rhythm, S1S2, no murmur, no JVD  ABDOMEN: obese, soft, non-tender, non-distended. Bowel sounds normal.   EXTREMITIES:  No edema, 2+ pedal/radial pulses bilaterally  SKIN: no rash or abnormalities  NEUROLOGIC: A&Ox3. Cranial nerves 2-12 grossly intact. Lab/Data Review:  Recent Results (from the past 24 hour(s))   METABOLIC PANEL, BASIC    Collection Time: 05/23/18  6:28 AM   Result Value Ref Range    Sodium 146 (H) 136 - 145 mmol/L    Potassium 3.7 3.5 - 5.1 mmol/L    Chloride 106 98 - 107 mmol/L    CO2 32 21 - 32 mmol/L    Anion gap 8 7 - 16 mmol/L    Glucose 124 (H) 65 - 100 mg/dL    BUN 10 8 - 23 MG/DL    Creatinine 0.84 0.6 - 1.0 MG/DL    GFR est AA >60 >60 ml/min/1.73m2    GFR est non-AA >60 >60 ml/min/1.73m2    Calcium 8.6 8.3 - 10.4 MG/DL   CBC W/O DIFF    Collection Time: 05/23/18  6:28 AM   Result Value Ref Range    WBC 10.0 4.3 - 11.1 K/uL    RBC 3.57 (L) 4.05 - 5.25 M/uL    HGB 8.7 (L) 11.7 - 15.4 g/dL    HCT 29.2 (L) 35.8 - 46.3 %    MCV 81.8 79.6 - 97.8 FL    MCH 24.4 (L) 26.1 - 32.9 PG    MCHC 29.8 (L) 31.4 - 35.0 g/dL    RDW 16.9 (H) 11.9 - 14.6 %    PLATELET 384 860 - 754 K/uL    MPV 9.8 (L) 10.8 - 14.1 FL       Imaging:  Xr Chest Port    Result Date: 5/21/2018  IMPRESSION: Bibasal hypoventilation atelectasis. Cultures:   All Micro Results     Procedure Component Value Units Date/Time    CULTURE, URINE [[id number]]  (Abnormal) Collected:  05/21/18 1415    Order Status:  Completed Specimen:  Urine from Clean catch Updated:  05/23/18 0900     Special Requests: NO SPECIAL REQUESTS        Culture result:         >100,000 COLONIES/mL ESCHERICHIA COLI (A)      REPEATING  SUSCEPTIBILITY              50,000-100,000 MIXED SKIN AXEL ISOLATED    CULTURE, BLOOD [[id number]] Collected:  05/21/18 5431 Order Status:  Completed Specimen:  Blood from Blood Updated:  05/23/18 0718     Special Requests: --        LEFT  Antecubital       Culture result: NO GROWTH 2 DAYS       CULTURE, BLOOD [[id number]] Collected:  05/21/18 1419    Order Status:  Completed Specimen:  Blood from Blood Updated:  05/23/18 0718     Special Requests: --        LEFT  HAND       Culture result: NO GROWTH 2 DAYS             Assessment/Plan:     Principal Problem:    Acute on chronic respiratory failure with hypoxia and hypercapnia (HCC) (5/21/2018)  - Improving  - Due to polypharmacy + asthma  - Continue aerosols  - Wean oxygen as appropriate    Active Problems:    Fibromyalgia (5/21/2018)      Morbid obesity - sedentary lifestyle (5/21/2018)      Debility (5/21/2018)      Polypharmacy (5/21/2018)      Hypersomnolence (5/21/2018)  - Resolved   -Due to hypercapnia and polypharmacy      Restrictive lung disease (5/21/2018)      Drug abuse (5/21/2018)  - Patient denies and states she takes her Xanax and Norco as prescribed  - Continue lower doses in hospital      Chronic anticoagulation (5/21/2018)  - Continue Eliquis      Vocal cord paralysis (5/21/2018)      Nocturnal hypoxemia (5/21/2018)  - Wears oxygen at night      Acute cystitis without hematuria (5/21/2018)  - Culture growing E. Coli  - Change Ceftriaxone to Vantin x 5 days      Severe persistent asthma with exacerbation (5/21/2018)  - Continue Solumedrol  - Continue aerosols  - Pulmonary following    Thank you for allowing us to participate in the care of this patient. We will gladly take over as the primary care team of the patient.       Signed By: Sincere Belcher DO     May 23, 2018

## 2018-05-23 NOTE — PROGRESS NOTES
Date of Outreach Update:  Lavona Claude was seen and assessed. MEWS Score: 1 (05/23/18 1128)  Vitals:    05/23/18 0734 05/23/18 1128 05/23/18 1458 05/23/18 1515   BP:  145/82  (!) 153/101   Pulse:  71  86   Resp:  18  19   Temp:  98.6 °F (37 °C)  98.7 °F (37.1 °C)   SpO2: 96% 94% 96% 92%   Weight:             Pain Assessment  Pain Intensity 1: 7 (05/23/18 6296)  Pain Location 1: Leg, Back (LEFT)  Pain Intervention(s) 1: Medication (see MAR)  Patient Stated Pain Goal: 0      Previous Outreach assessment has been reviewed. There have been no significant clinical changes since the completion of the last dated Outreach assessment. Will continue to follow up per outreach protocol.     Signed By:   Mann Murray RN    May 23, 2018 5:06 PM

## 2018-05-23 NOTE — PROGRESS NOTES
Patient resting quietly in bed at this time. She was able to void 150 cc. Clear, yellow urine by use of bedpan. States she has a lot of pain in left leg and not sure she can stand, so encouraged until PT evaluates for use of bedpan and bedrest.  No further nausea, no wish for food at this time. Will continue to monitor.

## 2018-05-23 NOTE — PROGRESS NOTES
Patient requested pain medication for lower back pain 7/10. Medicated with Norco 1 tab. She is resting, watching TV. Will continue to monitor.

## 2018-05-23 NOTE — PROGRESS NOTES
Care Management Interventions  PCP Verified by CM: Yes  Mode of Transport at Discharge: Other (see comment)  Physical Therapy Consult: No  Occupational Therapy Consult: No  Current Support Network: Relative's Home  Confirm Follow Up Transport: Family  Plan discussed with Pt/Family/Caregiver: Yes  Freedom of Choice Offered: Yes  Discharge Location  Discharge Placement: Home with home health  Patient is alert and oriented in all spheres. Lives with her daughter. Uses a cane to ambulate. Daughter assists with ADLs. Uses home 02 (she believes provider is [facility]). Daughter drives patient to appts. History of HH. CM following for discharge needs.

## 2018-05-24 ENCOUNTER — PATIENT OUTREACH (OUTPATIENT)
Dept: CASE MANAGEMENT | Age: 68
End: 2018-05-24

## 2018-05-24 VITALS
WEIGHT: 270.1 LBS | TEMPERATURE: 98 F | DIASTOLIC BLOOD PRESSURE: 112 MMHG | OXYGEN SATURATION: 97 % | RESPIRATION RATE: 17 BRPM | SYSTOLIC BLOOD PRESSURE: 168 MMHG | HEART RATE: 88 BPM | BODY MASS INDEX: 51.03 KG/M2

## 2018-05-24 LAB
ANION GAP SERPL CALC-SCNC: 7 MMOL/L (ref 7–16)
BACTERIA SPEC CULT: ABNORMAL
BACTERIA SPEC CULT: ABNORMAL
BUN SERPL-MCNC: 10 MG/DL (ref 8–23)
CALCIUM SERPL-MCNC: 8.7 MG/DL (ref 8.3–10.4)
CHLORIDE SERPL-SCNC: 101 MMOL/L (ref 98–107)
CO2 SERPL-SCNC: 36 MMOL/L (ref 21–32)
CREAT SERPL-MCNC: 0.91 MG/DL (ref 0.6–1)
ERYTHROCYTE [DISTWIDTH] IN BLOOD BY AUTOMATED COUNT: 16.7 % (ref 11.9–14.6)
GLUCOSE SERPL-MCNC: 132 MG/DL (ref 65–100)
HCT VFR BLD AUTO: 29.2 % (ref 35.8–46.3)
HGB BLD-MCNC: 8.7 G/DL (ref 11.7–15.4)
MCH RBC QN AUTO: 24.4 PG (ref 26.1–32.9)
MCHC RBC AUTO-ENTMCNC: 29.8 G/DL (ref 31.4–35)
MCV RBC AUTO: 81.8 FL (ref 79.6–97.8)
PLATELET # BLD AUTO: 314 K/UL (ref 150–450)
PMV BLD AUTO: 9.6 FL (ref 10.8–14.1)
POTASSIUM SERPL-SCNC: 3 MMOL/L (ref 3.5–5.1)
RBC # BLD AUTO: 3.57 M/UL (ref 4.05–5.25)
SERVICE CMNT-IMP: ABNORMAL
SODIUM SERPL-SCNC: 144 MMOL/L (ref 136–145)
WBC # BLD AUTO: 9.9 K/UL (ref 4.3–11.1)

## 2018-05-24 PROCEDURE — 74011250637 HC RX REV CODE- 250/637: Performed by: INTERNAL MEDICINE

## 2018-05-24 PROCEDURE — 77010033678 HC OXYGEN DAILY

## 2018-05-24 PROCEDURE — 85027 COMPLETE CBC AUTOMATED: CPT | Performed by: INTERNAL MEDICINE

## 2018-05-24 PROCEDURE — 94640 AIRWAY INHALATION TREATMENT: CPT

## 2018-05-24 PROCEDURE — 74011000250 HC RX REV CODE- 250: Performed by: INTERNAL MEDICINE

## 2018-05-24 PROCEDURE — 36415 COLL VENOUS BLD VENIPUNCTURE: CPT | Performed by: INTERNAL MEDICINE

## 2018-05-24 PROCEDURE — 74011636637 HC RX REV CODE- 636/637: Performed by: NURSE PRACTITIONER

## 2018-05-24 PROCEDURE — 94760 N-INVAS EAR/PLS OXIMETRY 1: CPT

## 2018-05-24 PROCEDURE — 80048 BASIC METABOLIC PNL TOTAL CA: CPT | Performed by: INTERNAL MEDICINE

## 2018-05-24 RX ORDER — PREDNISONE 20 MG/1
20 TABLET ORAL
Qty: 3 TAB | Refills: 0 | Status: SHIPPED | OUTPATIENT
Start: 2018-05-25 | End: 2018-05-28

## 2018-05-24 RX ORDER — CEFPODOXIME PROXETIL 200 MG/1
200 TABLET, FILM COATED ORAL EVERY 12 HOURS
Qty: 6 TAB | Refills: 0 | Status: SHIPPED | OUTPATIENT
Start: 2018-05-24 | End: 2018-07-21

## 2018-05-24 RX ADMIN — MONTELUKAST SODIUM 10 MG: 10 TABLET, FILM COATED ORAL at 08:54

## 2018-05-24 RX ADMIN — CARVEDILOL 12.5 MG: 12.5 TABLET, FILM COATED ORAL at 08:55

## 2018-05-24 RX ADMIN — IPRATROPIUM BROMIDE AND ALBUTEROL SULFATE 3 ML: .5; 3 SOLUTION RESPIRATORY (INHALATION) at 08:43

## 2018-05-24 RX ADMIN — ALPRAZOLAM 1 MG: 0.25 TABLET ORAL at 08:55

## 2018-05-24 RX ADMIN — CEFPODOXIME PROXETIL 200 MG: 200 TABLET, FILM COATED ORAL at 08:55

## 2018-05-24 RX ADMIN — Medication 10 ML: at 06:19

## 2018-05-24 RX ADMIN — FOLIC ACID 1 MG: 1 TABLET ORAL at 08:54

## 2018-05-24 RX ADMIN — APIXABAN 5 MG: 5 TABLET, FILM COATED ORAL at 08:55

## 2018-05-24 RX ADMIN — HYDROCODONE BITARTRATE AND ACETAMINOPHEN 1 TABLET: 5; 325 TABLET ORAL at 02:42

## 2018-05-24 RX ADMIN — IPRATROPIUM BROMIDE AND ALBUTEROL SULFATE 3 ML: .5; 3 SOLUTION RESPIRATORY (INHALATION) at 02:25

## 2018-05-24 RX ADMIN — HYDROCODONE BITARTRATE AND ACETAMINOPHEN 1 TABLET: 5; 325 TABLET ORAL at 10:14

## 2018-05-24 RX ADMIN — PANTOPRAZOLE SODIUM 40 MG: 40 TABLET, DELAYED RELEASE ORAL at 06:17

## 2018-05-24 RX ADMIN — PREGABALIN 150 MG: 75 CAPSULE ORAL at 08:53

## 2018-05-24 RX ADMIN — LISINOPRIL 10 MG: 5 TABLET ORAL at 08:54

## 2018-05-24 RX ADMIN — NYSTATIN: 100000 POWDER TOPICAL at 10:15

## 2018-05-24 RX ADMIN — FUROSEMIDE 40 MG: 40 TABLET ORAL at 08:55

## 2018-05-24 RX ADMIN — SUCRALFATE 1 G: 1 TABLET ORAL at 08:54

## 2018-05-24 RX ADMIN — PREDNISONE 20 MG: 20 TABLET ORAL at 08:55

## 2018-05-24 NOTE — DISCHARGE SUMMARY
Hospitalist Discharge Summary     Patient ID:  Kellen Merchant  [id number]  89 y.o.  1950  Admit date: 5/21/2018 12:29 PM  Discharge date and time: 5/24/2018  Attending: Ovidio Jeffries DO  PCP:  Loc Jamison DO  Treatment Team: Attending Provider: Ovidio Jeffries DO; Utilization Review: Vicente Junior RN; Consulting Provider: Sendy Laughlin MD; Care Manager: Lolis Kennedy RN    Principal Diagnosis Acute on chronic respiratory failure with hypoxia and hypercapnia (HCC)    Principal Problem:    Acute on chronic respiratory failure with hypoxia and hypercapnia (HCC) (5/21/2018)    Active Problems:    Fibromyalgia (5/21/2018)      Morbid obesity - sedentary lifestyle (5/21/2018)      Debility (5/21/2018)      Polypharmacy (5/21/2018)      Hypersomnolence (5/21/2018)      Overview: Possible also CSA drug induced      Negative PSG      Restrictive lung disease (5/21/2018)      Drug abuse (5/21/2018)      Chronic anticoagulation (5/21/2018)      Vocal cord paralysis (5/21/2018)      Nocturnal hypoxemia (5/21/2018)      Acute cystitis without hematuria (5/21/2018)      Severe persistent asthma with exacerbation (5/21/2018)        From H&P: \"89 y.o.  female presents with confusion.   Well known to us and admitted 7 times within the past year. 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 Her previous drug screens have been positive for amphetamines, benzos, and opiates. She uses pro-air and nebulizer treatments. She is on oxygen at 2 L continuous. She had multiple admissions for [place] exacerbations\" and pneumonia. IgA normal at 128, IgE < 1 and IgG low 492. Her last admission was in April 2018 with PNA again.   She saw us in the office on 5/1/2018.   Today, she presented with hypersomnolence. UDS positive for benzodiazepines and opiates. Her ABG shows pH 7.3, PCO 54, PO2 98, HCO3 28. She was placed on BiPAP and improved. CXR shows atelectasis. We were asked to admit for hypercapnic respiratory failure with  Acute cystitis.   Her daughter helps provide information and tells us she became confused on Friday. She improved and yesterday began having more confusion and hypersomnolence but did not want to come to the ER. Today, her confusion persisted and her daughter called EMS. She has + U/A. She did continue taking her narcotics. Her daughter does report that her PCP has advised her to take her evening geodon, remeron, and sedatives but her mother has refused. \"    Hospital Course: The patient was admitted to the ICU on Bipap due to hypercapnia. She quickly improved and became more responsive. She was treated for asthma exacerbation and UTI. She remained stable and was transferred to the floor. She continued to improve. Her Xanax and Norco was added back without issues. She remained stable and was discharged home.      Significant Diagnostic Studies:    Labs: Results:       Chemistry Recent Labs      05/24/18   0549 05/23/18 0628 05/22/18 0340 05/21/18   1309   GLU  132*  124*  214*  76   NA  144  146*  146*  147*   K  3.0*  3.7  3.9  4.2   CL  101  106  110*  112*   CO2  36*  32  29  27   BUN  10  10  12  13   CREA  0.91  0.84  1.06*  1.21*   CA  8.7  8.6  8.4  8.2*   AGAP  7  8  7  8   AP   --    --    --   91   TP   --    --    --   5.6*   ALB   --    --    --   2.3*   GLOB   --    --    --   3.3   AGRAT   --    --    --   0.7*      CBC w/Diff Recent Labs      05/24/18   0549  05/23/18 0628 05/22/18 0340 05/21/18   1309   WBC  9.9  10.0  7.5  7.1   RBC  3.57*  3.57*  3.58*  3.54*   HGB  8.7*  8.7*  8.8*  8.6*   HCT  29.2*  29.2*  29.6*  29.4*   PLT  314  331  305  168   GRANS   --    --    --   48   LYMPH   --    --    --   40   EOS   --    --    --   4      Cardiac Enzymes No results for input(s): CPK, CKND1, BRAN in the last 72 hours. No lab exists for component: CKRMB, TROIP   Coagulation No results for input(s): PTP, INR, APTT in the last 72 hours. No lab exists for component: INREXT    Lipid Panel Lab Results   Component Value Date/Time    Cholesterol, total 159 02/09/2017 05:35 AM    HDL Cholesterol 55 02/09/2017 05:35 AM    LDL, calculated 89.6 02/09/2017 05:35 AM    VLDL, calculated 14.4 02/09/2017 05:35 AM    Triglyceride 72 02/09/2017 05:35 AM    CHOL/HDL Ratio 2.9 02/09/2017 05:35 AM      BNP No results for input(s): BNPP in the last 72 hours. Liver Enzymes Recent Labs      05/21/18   1309   TP  5.6*   ALB  2.3*   AP  91   SGOT  22      Thyroid Studies Lab Results   Component Value Date/Time    T4, Total 4.9 09/16/2012 04:20 AM    T3 Uptake 41 (H) 09/16/2012 04:20 AM    TSH 0.259 (L) 04/10/2017 11:00 PM            Imaging:  Xr Chest Port    Result Date: 5/21/2018  IMPRESSION: Bibasal hypoventilation atelectasis. Microbiology/Cultures:   All Micro Results     Procedure Component Value Units Date/Time    CULTURE, URINE [[id number]]  (Abnormal)  (Susceptibility) Collected:  05/21/18 1415    Order Status:  Completed Specimen:  Urine from Clean catch Updated:  05/24/18 0727     Special Requests: NO SPECIAL REQUESTS        Culture result:         >100,000 COLONIES/mL ESCHERICHIA COLI (A)              50,000-100,000 MIXED SKIN AXEL ISOLATED    CULTURE, BLOOD [[id number]] Collected:  05/21/18 1419    Order Status:  Completed Specimen:  Blood from Blood Updated:  05/24/18 0649     Special Requests: --        LEFT  Antecubital       Culture result: NO GROWTH 3 DAYS       CULTURE, BLOOD [[id number]] Collected:  05/21/18 1419    Order Status:  Completed Specimen:  Blood from Blood Updated:  05/24/18 0649     Special Requests: --        LEFT  HAND       Culture result: NO GROWTH 3 DAYS             Discharge Exam:  Visit Vitals    BP (!) 168/112 (BP 1 Location: Left arm, BP Patient Position: At rest)    Pulse 88    Temp 98 °F (36.7 °C)    Resp 17    Wt 122.5 kg (270 lb 1.6 oz)    SpO2 97%    BMI 51.03 kg/m2     General appearance: alert, cooperative, no distress, appears stated age  Lungs: mild end exp wheeze  Heart: regular rate and rhythm, S1, S2 normal, no murmur, click, rub or gallop  Abdomen: obese, soft, non-tender. Bowel sounds normal. No masses,  no organomegaly  Extremities: no cyanosis or edema  Neurologic: Grossly normal    Disposition: home  Discharge Condition: stable  Patient Instructions:   Current Discharge Medication List      START taking these medications    Details   cefpodoxime (VANTIN) 200 mg tablet Take 1 Tab by mouth every twelve (12) hours. Qty: 6 Tab, Refills: 0      predniSONE (DELTASONE) 20 mg tablet Take 1 Tab by mouth daily (with breakfast) for 3 days. Qty: 3 Tab, Refills: 0         CONTINUE these medications which have NOT CHANGED    Details   apixaban (ELIQUIS) 5 mg tablet Take 5 mg by mouth two (2) times a day. biotin 5,000 mcg subl by SubLINGual route. prasterone, dhea, (DHEA) 25 mg tab Take 25 mg by mouth daily. DULoxetine (CYMBALTA) 60 mg capsule Take 60 mg by mouth two (2) times a day. irbesartan (AVAPRO) 300 mg tablet Take 300 mg by mouth nightly. lisinopril (PRINIVIL, ZESTRIL) 10 mg tablet Take 10 mg by mouth nightly. meclizine (ANTIVERT) 25 mg tablet Take 25 mg by mouth three (3) times daily as needed. metoclopramide HCl (REGLAN) 5 mg tablet Take 5 mg by mouth Before breakfast, lunch, and dinner. mirtazapine (REMERON) 45 mg tablet Take 45 mg by mouth nightly. albuterol (PROAIR HFA) 90 mcg/actuation inhaler Take 2 Puffs by inhalation every four (4) hours as needed for Wheezing. Qty: 1 Inhaler, Refills: 11      OXYGEN-AIR DELIVERY SYSTEMS 2 lpm cont. , 4 lpm at hs      fluticasone-vilanterol (BREO ELLIPTA) 200-25 mcg/dose inhaler Take 1 Puff by inhalation daily.  RINSE MOUTH WELL AFTER USE  Qty: 3 Inhaler, Refills: 3      albuterol sulfate (PROVENTIL;VENTOLIN) 2.5 mg/0.5 mL nebu nebulizer solution 0.5 mL by Nebulization route every four (4) hours. Dx J45.909  Qty: 180 mL, Refills: 4    Comments: DX: 799.02 hypoxemia, 518.83 chronic respitory failure      UBIDECARENONE/VITAMIN E MIXED (COQ10  PO) Take  by mouth.      pregabalin (LYRICA) 150 mg capsule Take  by mouth two (2) times a day. montelukast (SINGULAIR) 10 mg tablet Take 10 mg by mouth daily. naloxegol (MOVANTIK) 25 mg tab tablet Take  by mouth Daily (before breakfast). carvedilol (COREG) 12.5 mg tablet Take 12.5 mg by mouth two (2) times a day. atorvastatin (LIPITOR) 10 mg tablet Take 2 Tabs by mouth nightly. Qty: 30 Tab, Refills: 11      ALPRAZolam (XANAX) 2 mg tablet Take 1 Tab by mouth three (3) times daily as needed for Anxiety. Max Daily Amount: 6 mg. Qty: 30 Tab, Refills: 0      ondansetron (ZOFRAN ODT) 8 mg disintegrating tablet Take 8 mg by mouth every eight (8) hours as needed for Nausea. oxybutynin chloride XL (DITROPAN XL) 10 mg CR tablet Take 10 mg by mouth daily. Magnesium Oxide 500 mg cap Take 500 mg by mouth. HYDROcodone-acetaminophen (NORCO)  mg tablet Take 1 Tab by mouth every six (6) hours as needed for Pain. POTASSIUM CHLORIDE SR 10 MEQ TAB Take 1 Tab by mouth daily. Cholecalciferol, Vitamin D3, (VITAMIN D3) 1,000 unit cap Take  by mouth.      pantoprazole (PROTONIX) 40 mg tablet Take 1 Tab by mouth Before breakfast and dinner. Qty: 60 Tab, Refills: 2      FERROUS FUMARATE/VIT BCOMP&C (SUPER B COMPLEX PO) Take 1 Tab by mouth daily. NEBULIZER by Does Not Apply route. levothyroxine (SYNTHROID) 125 mcg tablet Take 112 mcg by mouth every evening. albuterol-ipratropium (DUO-NEB) 2.5 mg-0.5 mg/3 ml nebu 3 mL by Nebulization route every four (4) hours as needed.       albuterol (PROVENTIL VENTOLIN) 2.5 mg /3 mL (0.083 %) nebulizer solution 3 mL by Nebulization route four (4) times daily. And every 4 hours as needed cough, wheezing, shortness of breath, J45.909, Medicare part B  Qty: 120 Each, Refills: 0      furosemide (LASIX) 40 mg tablet Take 1 Tab by mouth daily as needed. Qty: 20 Tab, Refills: 0      nystatin (MYCOSTATIN) powder Apply  to affected area two (2) times a day. Qty: 1 Bottle, Refills: 0      sucralfate (CARAFATE) 1 gram tablet Take 1 g by mouth four (4) times daily. folic acid 026 mcg tablet Take 800 mcg by mouth daily. ziprasidone (GEODON) 80 mg capsule Take 80 mg by mouth nightly. SUMATRIPTAN SUCCINATE (IMITREX PO) take 100 mg by mouth as needed.          STOP taking these medications       acyclovir (ZOVIRAX) 200 mg capsule Comments:   Reason for Stopping:               Activity: Activity as tolerated  Diet: Cardiac Diet  Wound Care: None needed    Follow-up  ·   Dr. Pratik Antonio in 3-5 days  Time spent to discharge patient 35 minutes  Signed:  Candice Lisa DO  5/24/2018  9:56 AM

## 2018-05-24 NOTE — PROGRESS NOTES
Patient sleeping soundly at this time. No s/s of distress, call light in reach. Will continue to monitor.

## 2018-05-24 NOTE — PROGRESS NOTES
Discharge instructions, follow up information, medication list, prescription information, and medication side effects discussed/ provided and explained to the patient and spouse at bedside. IV removed from left wrist, no remote telemetry on. Opportunity for questions provided. Instructed to call once ready to leave.

## 2018-05-24 NOTE — PROGRESS NOTES
05/24/18 0845   Oxygen Therapy   O2 Sat (%) 97 %   Pulse via Oximetry 86 beats per minute   O2 Device Nasal cannula   O2 Flow Rate (L/min) 3 l/min  (decrerased to 2)

## 2018-05-24 NOTE — PROGRESS NOTES
Patient awakened with pain in her back, 7/10 on pain scale. Medicated with prn pain medication per order. Will monitor.

## 2018-05-24 NOTE — PROGRESS NOTES
[facility]ARE OUTREACH NURSE PROGRESS REPORT      SUBJECTIVE: Called to assess patient secondary to recent transfer from CCU. MEWS Score: 1 (05/23/18 1128)  Vitals:    05/23/18 1458 05/23/18 1515 05/23/18 1929 05/23/18 2005   BP:  (!) 153/101 (!) 186/112 152/84   Pulse:  86 (!) 105    Resp:  19 19 19   Temp:  98.7 °F (37.1 °C) 98.9 °F (37.2 °C)    SpO2: 96% 92% 97% 97%   Weight:          LAB DATA:    Recent Labs      05/23/18   0628 05/22/18   0340  05/21/18   1309   NA  146*  146*  147*   K  3.7  3.9  4.2   CL  106  110*  112*   CO2  32  29  27   AGAP  8  7  8   GLU  124*  214*  76   BUN  10  12  13   CREA  0.84  1.06*  1.21*   GFRAA  >60  >60  57*   GFRNA  >60  55*  47*   CA  8.6  8.4  8.2*   ALB   --    --   2.3*   TP   --    --   5.6*   GLOB   --    --   3.3   AGRAT   --    --   0.7*   ALT   --    --   16        Recent Labs      05/23/18 0628 05/22/18 0340 05/21/18   1309   WBC  10.0  7.5  7.1   HGB  8.7*  8.8*  8.6*   HCT  29.2*  29.6*  29.4*   PLT  331  305  168          OBJECTIVE: On arrival to room, I found patient to be sleeping in bed. Pain Assessment  Pain Intensity 1: 0 (05/23/18 2005)  Pain Location 1: Back  Pain Intervention(s) 1: Medication (see MAR)  Patient Stated Pain Goal: 0    ASSESSMENT:  Patient in bed sleeping, awakened to voice. Alert and oriented x4, O2 sat 98% on 3L NC, lung sounds clear. No complaints at this time. NAD noted. PLAN:  Will continue to follow per outreach protocol.

## 2018-05-24 NOTE — PROGRESS NOTES
In hospital visit w/ patient - referral via inpatient case management R/T High Risk for Readmission. RRAT - 21 Acute on Chronic Respiratory Failure with Hypercapnia  Patient has had 7 hospitalizations over the past year    Patient has declined [address] stating that she has good family support  PCP out of network - patient prefers post hospital discharge appointment with him versus HA clinic.     Plan - patient will DC today  Link patient with Critical access hospital[facility] Eduin Garvin RN

## 2018-05-24 NOTE — DISCHARGE INSTRUCTIONS
DISCHARGE SUMMARY from Nurse    PATIENT INSTRUCTIONS:    After general anesthesia or intravenous sedation, for 24 hours or while taking prescription Narcotics:  · Limit your activities  · Do not drive and operate hazardous machinery  · Do not make important personal or business decisions  · Do  not drink alcoholic beverages  · If you have not urinated within 8 hours after discharge, please contact your surgeon on call. What to do at Home:  Recommended activity: Activity as tolerated. If you experience any of the following symptoms temp > 101.5, unrelieved pain, nausea or vomiting, shortness of breath or fatigue not relieved with rest, please follow up with MD.    *  Please give a list of your current medications to your Primary Care Provider. *  Please update this list whenever your medications are discontinued, doses are      changed, or new medications (including over-the-counter products) are added. *  Please carry medication information at all times in case of emergency situations. These are general instructions for a healthy lifestyle:    No smoking/ No tobacco products/ Avoid exposure to second hand smoke  Surgeon General's Warning:  Quitting smoking now greatly reduces serious risk to your health. Obesity, smoking, and sedentary lifestyle greatly increases your risk for illness    A healthy diet, regular physical exercise & weight monitoring are important for maintaining a healthy lifestyle    You may be retaining fluid if you have a history of heart failure or if you experience any of the following symptoms:  Weight gain of 3 pounds or more overnight or 5 pounds in a week, increased swelling in our hands or feet or shortness of breath while lying flat in bed. Please call your doctor as soon as you notice any of these symptoms; do not wait until your next office visit.     Recognize signs and symptoms of STROKE:    F-face looks uneven    A-arms unable to move or move unevenly    S-speech slurred or non-existent    T-time-call 911 as soon as signs and symptoms begin-DO NOT go       Back to bed or wait to see if you get better-TIME IS BRAIN. Warning Signs of HEART ATTACK     Call 911 if you have these symptoms:   Chest discomfort. Most heart attacks involve discomfort in the center of the chest that lasts more than a few minutes, or that goes away and comes back. It can feel like uncomfortable pressure, squeezing, fullness, or pain.  Discomfort in other areas of the upper body. Symptoms can include pain or discomfort in one or both arms, the back, neck, jaw, or stomach.  Shortness of breath with or without chest discomfort.  Other signs may include breaking out in a cold sweat, nausea, or lightheadedness. Don't wait more than five minutes to call 911 - MINUTES MATTER! Fast action can save your life. Calling 911 is almost always the fastest way to get lifesaving treatment. Emergency Medical Services staff can begin treatment when they arrive -- up to an hour sooner than if someone gets to the hospital by car. The discharge information has been reviewed with the patient. The patient verbalized understanding. Discharge medications reviewed with the patient and appropriate educational materials and side effects teaching were provided. Asthma in Adults: Care Instructions  Your Care Instructions    During an asthma attack, your airways swell and narrow as a reaction to certain things (triggers). This makes it hard to breathe. You may be able to prevent asthma attacks if you avoid the things that set off your asthma symptoms. Keeping your asthma under control and treating symptoms before they get bad can help you avoid severe attacks. If you can control your asthma, you may be able to do all of your normal daily activities. You may also avoid asthma attacks and trips to the hospital.  Follow-up care is a key part of your treatment and safety.  Be sure to make and go to all appointments, and call your doctor if you are having problems. It's also a good idea to know your test results and keep a list of the medicines you take. How can you care for yourself at home? · Follow your asthma action plan so you can manage your symptoms at home. An asthma action plan will help you prevent and control airway reactions and will tell you what to do during an asthma attack. If you do not have an asthma action plan, work with your doctor to build one. · Take your asthma medicine exactly as prescribed. Medicine plays an important role in controlling asthma. Talk to your doctor right away if you have any questions about what to take and how to take it. ¨ Use your quick-relief medicine when you have symptoms of an attack. Quick-relief medicine often is an albuterol inhaler. Some people need to use quick-relief medicine before they exercise. ¨ Take your controller medicine every day, not just when you have symptoms. Controller medicine is usually an inhaled corticosteroid. The goal is to prevent problems before they occur. Do not use your controller medicine to try to treat an attack that has already started. It does not work fast enough to help. ¨ If your doctor prescribed corticosteroid pills to use during an attack, take them as directed. They may take hours to work, but they may shorten the attack and help you breathe better. ¨ Keep your quick-relief medicine with you at all times. · Talk to your doctor before using other medicines. Some medicines, such as aspirin, can cause asthma attacks in some people. · Check yourself for asthma symptoms to know which step to follow in your action plan. Watch for things like being short of breath, having chest tightness, coughing, and wheezing. Also notice if symptoms wake you up at night or if you get tired quickly when you exercise. · If you have a peak flow meter, use it to check how well you are breathing.  This can help you predict when an asthma attack is going to occur. Then you can take medicine to prevent the asthma attack or make it less severe. · See your doctor regularly. These visits will help you learn more about asthma and what you can do to control it. Your doctor will monitor your treatment to make sure the medicine is helping you. · Keep track of your asthma attacks and your treatment. After you have had an attack, write down what triggered it, what helped end it, and any concerns you have about your asthma action plan. Take your diary when you see your doctor. You can then review your asthma action plan and decide if it is working. · Do not smoke or allow others to smoke around you. Avoid smoky places. Smoking makes asthma worse. If you need help quitting, talk to your doctor about stop-smoking programs and medicines. These can increase your chances of quitting for good. · Learn what triggers an asthma attack for you, and avoid the triggers when you can. Common triggers include colds, smoke, air pollution, dust, pollen, mold, pets, cockroaches, stress, and cold air. · Avoid colds and the flu. Get a pneumococcal vaccine shot. If you have had one before, ask your doctor whether you need a second dose. Get a flu vaccine every fall. If you must be around people with colds or the flu, wash your hands often. When should you call for help? Call 911 anytime you think you may need emergency care. For example, call if:  ? · You have severe trouble breathing. ?Call your doctor now or seek immediate medical care if:  ? · Your symptoms do not get better after you have followed your asthma action plan. ? · You cough up yellow, dark brown, or bloody mucus (sputum). ? Watch closely for changes in your health, and be sure to contact your doctor if:  ? · Your coughing and wheezing get worse. ? · You need to use quick-relief medicine on more than 2 days a week (unless it is just for exercise).    ? · You need help figuring out what is triggering your asthma attacks. Where can you learn more? Go to http://era-fransisco.info/. Enter P597 in the search box to learn more about \"Asthma in Adults: Care Instructions. \"  Current as of: May 12, 2017  Content Version: 11.4  © 1559-4551 [employer]. Care instructions adapted under license by [employer] (which disclaims liability or warranty for this information). If you have questions about a medical condition or this instruction, always ask your healthcare professional. [place] [address] any warranty or liability for your use of this information. Urinary Tract Infection in Women: Care Instructions  Your Care Instructions    A urinary tract infection, or UTI, is a general term for an infection anywhere between the kidneys and the urethra (where urine comes out). Most UTIs are bladder infections. They often cause pain or burning when you urinate. UTIs are caused by bacteria and can be cured with antibiotics. Be sure to complete your treatment so that the infection goes away. Follow-up care is a key part of your treatment and safety. Be sure to make and go to all appointments, and call your doctor if you are having problems. It's also a good idea to know your test results and keep a list of the medicines you take. How can you care for yourself at home? · Take your antibiotics as directed. Do not stop taking them just because you feel better. You need to take the full course of antibiotics. · Drink extra water and other fluids for the next day or two. This may help wash out the bacteria that are causing the infection. (If you have kidney, heart, or liver disease and have to limit fluids, talk with your doctor before you increase your fluid intake.)  · Avoid drinks that are carbonated or have caffeine. They can irritate the bladder. · Urinate often. Try to empty your bladder each time.   · To relieve pain, take a hot bath or lay a heating pad set on low over your lower belly or genital area. Never go to sleep with a heating pad in place. To prevent UTIs  · Drink plenty of water each day. This helps you urinate often, which clears bacteria from your system. (If you have kidney, heart, or liver disease and have to limit fluids, talk with your doctor before you increase your fluid intake.)  · Urinate when you need to. · Urinate right after you have sex. · Change sanitary pads often. · Avoid douches, bubble baths, feminine hygiene sprays, and other feminine hygiene products that have deodorants. · After going to the bathroom, wipe from front to back. When should you call for help? Call your doctor now or seek immediate medical care if:  ? · Symptoms such as fever, chills, nausea, or vomiting get worse or appear for the first time. ? · You have new pain in your back just below your rib cage. This is called flank pain. ? · There is new blood or pus in your urine. ? · You have any problems with your antibiotic medicine. ? Watch closely for changes in your health, and be sure to contact your doctor if:  ? · You are not getting better after taking an antibiotic for 2 days. ? · Your symptoms go away but then come back. Where can you learn more? Go to http://era-fransisco.info/. Enter F339 in the search box to learn more about \"Urinary Tract Infection in Women: Care Instructions. \"  Current as of: May 12, 2017  Content Version: 11.4  © 1234-9198 [employer]. Care instructions adapted under license by [employer] (which disclaims liability or warranty for this information). If you have questions about a medical condition or this instruction, always ask your healthcare professional. [address] any warranty or liability for your use of this information.     ___________________________________________________________________________________________________________________________________

## 2018-05-25 ENCOUNTER — PATIENT OUTREACH (OUTPATIENT)
Dept: CASE MANAGEMENT | Age: 68
End: 2018-05-25

## 2018-05-25 NOTE — PROGRESS NOTES
· Patient discharged from hospital yesterday 5/24/18  · Attempted 2 outreaches to patient today to begin TAWNY assessment - UTR at both home number and emergency contact for both attempts  · Will attempt outreach again next week to begin [facility] assessment  This note will not be viewable in [address]

## 2018-05-25 NOTE — PROGRESS NOTES
Transition of Care Discharge Follow-up Questionnaire   Date/Time of TAWNY Outreach: 5/25/2018 12:45pm     What was the patient hospitalized for? Patient was hospitalized at [facility] from 5/21/18 - 5/24/18 for Acute on Chronic Respiratory Failure with Hypercapnia  Patient has had 7 hospitalizations over the past year      Does the patient understand his/her diagnosis and/or treatment and what happened during the hospitalization? CM Assessed Risk for Readmission:               Patient stated Risk for Readmission:    Assessment completed with patient and daughter Kristina Wagner who consented to [facility] outreach, and verbalized understanding of treatment and diagnosis.       Patient is HRRP with an RRAT score of 21  Patient has had 7 hospitalizations over the past year including CAP, asthma, restrictive lung disease       SHOB, respiratory lung problems    Did the patient receive discharge instructions? Yes      Review any discharge instructions (see notes in [facility]). Ask patient if they understand these. Do they have any questions?    Reviewed with patient and daughter and they  verbalized understanding of discharge instructions     Were home services ordered (nursing, PT, OT, ST, etc.)? Patient declined home health services and stated she had good support at home    If so, has the first visit occurred? If not, why? (Assist with coordination of services if necessary.) N/A   Was any DME ordered? Patient states she uses home O2. If so, has it been received? If not, why?  (Assist with coordination of arranging DME orders if necessary.) N/A   Complete a review of all medications (new, continued and discontinued meds per the D/C instructions and medication tab in [facility]). Review of all meds completed  Verbalized understanding of meds    Were all new prescriptions filled? If not, why?  (Assist with obtainment of medications if necessary.) Yes.    Does the patient understand the purpose and dosing instructions for all medications? (If patient has questions, provide explanation and education.) Patient and daughter verbalize understanding of medications dosage and administration  Denies any issues with medications at this time    Does the patient have any problems in performing ADLs? (If patient is unable to perform ADLs  what is the limiting factor(s)? Do they have a support system that can assist? If no support system is present, discuss possible assistance that they may be able to obtain.) Patient states she is independent with some ADLs and that with others daughter assists as needed      Does the patient have all follow-up appointments scheduled?       7 day f/up with PCP?     7-14 day f/up with specialist?     If f/up has not been made  what actions has the care coordinator made to accomplish this?     Has transportation been arranged?     [facility] follow-up should be within 7 days of discharge; all others should have PCP follow-up within 7 days of discharge; follow-ups with other specialists as appropriate or ordered.) PCP f/u 5/30/18 at 4pm per patient and discharge paperwork.          .     Patient daughter is arranging transportation for me. Patient denied the need for transportation.      Any other questions or concerns expressed by the patient?    Will follow up in one week  Patient and daughter have my contact information   TAWNY Call Completed By:     Angus KIRAN, RN, [facility] /   c: [phone] / [email or web address]       This note will not be viewable in [employer].

## 2018-06-01 ENCOUNTER — PATIENT OUTREACH (OUTPATIENT)
Dept: CASE MANAGEMENT | Age: 68
End: 2018-06-01

## 2018-06-01 NOTE — PROGRESS NOTES
Community Care Management  Follow up Outreach Note   Outreach type: Phone call:  6/1/18 @ 12:30 pm     Home visit:     Reason for follow-up:   Patient was hospitalized at [facility] from 5/21/18 - 5/24/18 for Acute on Chronic Respiratory Failure with Hypercapnia  Patient has had 7 hospitalizations over the past year    Disease specific complaints/issues: Patient was hospitalized at [facility] from 5/21/18 - 5/24/18 for Acute on Chronic Respiratory Failure with Hypercapnia  Patient has had 7 hospitalizations over the past year     Patient progress towards goals set from last contact:   Went to f/u apt with PCP    CM Assessed Risk for Readmission:     Patient stated Risk for Readmission:    Patient is HRRP with an RRAT score of 21Patient has had 7 hospitalizations over the past year including CAP, asthma, restrictive lung disease        SHOB, respiratory lung problems     Has patient attended any PCP or specialist follow-up appointments since last contact? What was outcome of appointment? When is next follow-up scheduled? Hospital f/u with PCP was 5/30/18     Patient has transportation to MD appointments   Review medications. Any medication changes since last outreach? Does patient have any questions or issues related to their medications? Patient and daughter verbalize understanding of medications dosage and administration  Denies any issues with medications at this time     Meds reviewed and verbalizes understanding     Home health active? If yes  any issue? Progress? Declined home health    Referrals needed?  (SW, Diabetes education, HH, etc. ) Not at this time    Other issues/Miscellaneous? (Transportation, access to meals, ability to perform ADLs, adequate caregiver support, etc.) Patient states she is independent with some ADLs and that with others daughter assists as needed   A&O           Next Outreach Scheduled:  In one week       Next Steps/Goals:   f/u with CCM in one week    [address]: Destin Wen RN, BSN, [facility] /   c: [phone] / [email or web address]           This note will not be viewable in [address]

## 2018-06-08 ENCOUNTER — PATIENT OUTREACH (OUTPATIENT)
Dept: CASE MANAGEMENT | Age: 68
End: 2018-06-08

## 2018-06-08 NOTE — PROGRESS NOTES
Community Care Management  Follow up Outreach Note   Outreach type: Phone call:  6/8/18 @ 9:00am      Home visit:      Reason for follow-up: Patient was hospitalized at [facility] from 5/21/18 - 5/24/18 for Acute on Chronic Respiratory Failure with Hypercapnia  Patient has had 7 hospitalizations over the past year    Disease specific complaints/issues: Patient was hospitalized at [facility] from 5/21/18 - 5/24/18 for Acute on Chronic Respiratory Failure with Hypercapnia  Patient has had 7 hospitalizations over the past year      Patient progress towards goals set from last contact: Went to f/u apt with PCP    CM Assessed Risk for Readmission:      Patient stated Risk for Readmission:  Patient is HRRP with an RRAT score of 21Patient has had 7 hospitalizations over the past year including CAP, asthma, restrictive lung disease        SHOB, respiratory lung problems   Has patient attended any PCP or specialist follow-up appointments since last contact?     What was outcome of appointment?     When is next follow-up scheduled? Hospital f/u with PCP was 5/30/18     Patient has transportation to MD appointments   Review medications.     Any medication changes since last outreach?     Does patient have any questions or issues related to their medications? Patient and daughter verbalize understanding of medications dosage and administration  Denies any issues with medications at this time      Meds reviewed and verbalizes understanding     Home health active? If yes  any issue? Progress? Declined home health    Referrals needed?  (SW, Diabetes education, HH, etc. ) Not at this time    Other issues/Miscellaneous? (Transportation, access to meals, ability to perform ADLs, adequate caregiver support, etc.) Patient states she is independent with some ADLs and that with others daughter assists as needed   A&O            Next Outreach Scheduled:  In one week      Next Steps/Goals: f/u with CCM in one week    [address]:   Kandy Ordonez Michelle KRUGER, BSN, [facility] /   c: [phone] / [email or web address]     This note will not be viewable in [address]

## 2018-06-15 ENCOUNTER — PATIENT OUTREACH (OUTPATIENT)
Dept: CASE MANAGEMENT | Age: 68
End: 2018-06-15

## 2018-06-15 NOTE — PROGRESS NOTES
Community Care Management  Follow up Outreach Note   Outreach type: Phone call:  6/15/18 @ 10:00am       Home visit:       Reason for follow-up: Patient was hospitalized at [facility] from 5/21/18 - 5/24/18 for Acute on Chronic Respiratory Failure with Hypercapnia  Patient has had 7 hospitalizations over the past year    Disease specific complaints/issues: Patient was hospitalized at [facility] from 5/21/18 - 5/24/18 for Acute on Chronic Respiratory Failure with Hypercapnia  Patient has had 7 hospitalizations over the past year       Patient progress towards goals set from last contact: Went to f/u apt with PCP    CM Assessed Risk for Readmission:       Patient stated Risk for Readmission:  Patient is HRRP with an RRAT score of 21Patient has had 7 hospitalizations over the past year including CAP, asthma, restrictive lung disease          SHOB, respiratory lung problems   Has patient attended any PCP or specialist follow-up appointments since last contact?      What was outcome of appointment?      When is next follow-up scheduled? Hospital f/u with PCP was 5/30/18      Patient has transportation to MD appointments   Review medications.      Any medication changes since last outreach?      Does patient have any questions or issues related to their medications? Patient and daughter verbalize understanding of medications dosage and administration  Denies any issues with medications at this time       Meds reviewed and verbalizes understanding     Home health active? If yes Synetta Lambing issue? Progress? Declined home health    Referrals needed?  (SW, Diabetes education, HH, etc. ) Not at this time    Other issues/Miscellaneous? (Transportation, access to meals, ability to perform ADLs, adequate caregiver support, etc.) Patient states she is independent with some ADLs and that with others daughter assists as needed   A&O               Next Outreach Scheduled:  In one week       Next Steps/Goals: f/u with CCM in one week    [facility] Manager:   Marcel Allison RN, BSN, [facility] /   c: [phone] / [email or web address]     This note will not be viewable in [address]

## 2018-06-22 ENCOUNTER — PATIENT OUTREACH (OUTPATIENT)
Dept: CASE MANAGEMENT | Age: 68
End: 2018-06-22

## 2018-06-22 NOTE — PROGRESS NOTES
Community Care Management  Follow up Outreach Note   Outreach type: Phone call:  6/22/18 @ 12 noon    [id number] visit:       Reason for follow-up: Patient was hospitalized at [facility] from 5/21/18 - 5/24/18 for Acute on Chronic Respiratory Failure with Hypercapnia  Patient has had 7 hospitalizations over the past year    Disease specific complaints/issues: Patient was hospitalized at [facility] from 5/21/18 - 5/24/18 for Acute on Chronic Respiratory Failure with Hypercapnia  Patient has had 7 hospitalizations over the past year       Patient progress towards goals set from last contact: [address] Patient/Caregiver in strategies to prevent infection: frequent/proper hand-washing techniques, Universal precautions, Standard precautions, avoid crowds and persons with known infections, staying current with immunizations, s/s of infection, use of incentive spirometer, use of antibiotics and safe use and cleaning of equipment. CM Assessed Risk for Readmission:       Patient stated Risk for Readmission:  Patient is HRRP with an RRAT score of 21Patient has had 7 hospitalizations over the past year including CAP, asthma, restrictive lung disease          SHOB, respiratory lung problems   Has patient attended any PCP or specialist follow-up appointments since last contact?      What was outcome of appointment?      When is next follow-up scheduled? Hospital f/u with PCP was 5/30/18      Patient has transportation to MD appointments   Review medications.      Any medication changes since last outreach?      Does patient have any questions or issues related to their medications? Patient and daughter verbalize understanding of medications dosage and administration  Denies any issues with medications at this time       Meds reviewed and verbalizes understanding     Home health active? If yes Marilee Orta issue? Progress?  Declined home health but are considering asking the PCP about home health PT on next visit    Referrals needed?  (SW, Diabetes education, HH, etc. ) Not at this time    Other issues/Miscellaneous? (Transportation, access to meals, ability to perform ADLs, adequate caregiver support, etc.) Patient states she is independent with some ADLs and that with others daughter assists as needed   A&O               Next Outreach Scheduled: In one week       Next Steps/Goals: f/u with CCM in one week   Instruct Patient/Caregiver in strategies to prevent infection: frequent/proper hand-washing techniques, Universal precautions, Standard precautions, avoid crowds and persons with known infections, staying current with immunizations, s/s of infection, use of incentive spirometer, use of antibiotics and safe use and cleaning of equipment. Community Care Manager:   Enmanuel Ny RN, BSN, CCM /   c: [phone] / [email or web address]

## 2018-06-29 ENCOUNTER — PATIENT OUTREACH (OUTPATIENT)
Dept: CASE MANAGEMENT | Age: 68
End: 2018-06-29

## 2018-06-29 NOTE — PROGRESS NOTES
Community Care Management  Follow up Outreach Note   Outreach type: Phone call:  6/29/18 @ 10:00am        Home visit:       Reason for follow-up: Patient was hospitalized at [facility] from 5/21/18 - 5/24/18 for Acute on Chronic Respiratory Failure with Hypercapnia  Patient has had 7 hospitalizations over the past year    Disease specific complaints/issues: Patient was hospitalized at [facility] from 5/21/18 - 5/24/18 for Acute on Chronic Respiratory Failure with Hypercapnia  Patient has had 7 hospitalizations over the past year       Patient progress towards goals set from last contact: [place] [address] Patient/Caregiver in strategies to prevent infection: frequent/proper hand-washing techniques, Universal precautions, Standard precautions, avoid crowds and persons with known infections, staying current with immunizations, s/s of infection, use of incentive spirometer, use of antibiotics and safe use and cleaning of equipment. CM Assessed Risk for Readmission:       Patient stated Risk for Readmission:  Patient is HRRP with an RRAT score of 21Patient has had 7 hospitalizations over the past year including CAP, asthma, restrictive lung disease          SHOB, respiratory lung problems   Has patient attended any PCP or specialist follow-up appointments since last contact?      What was outcome of appointment?      When is next follow-up scheduled? Hospital f/u with PCP was 5/30/18      Patient has transportation to MD appointments   Review medications.      Any medication changes since last outreach?      Does patient have any questions or issues related to their medications? Patient and daughter verbalize understanding of medications dosage and administration  Denies any issues with medications at this time       Meds reviewed and verbalizes understanding     Home health active? If yes Dolph Racer issue? Progress?  Declined home health but are considering asking the PCP about home health PT on next visit    Referrals needed?  (SW, Diabetes education, HH, etc. ) Not at this time    Other issues/Miscellaneous? (Transportation, access to meals, ability to perform ADLs, adequate caregiver support, etc.) Patient states she is independent with some ADLs and that with others daughter assists as needed   A&O               Next Outreach Scheduled: On 7/10/18 due to holiday next week         Next Steps/Goals: Instruct Patient/Caregiver in strategies to prevent infection: frequent/proper hand-washing techniques, Universal precautions, Standard precautions, avoid crowds and persons with known infections, staying current with immunizations, s/s of infection, use of incentive spirometer, use of antibiotics and safe use and cleaning of equipment. Community Care Manager:   Eligio Al RN, BSN, [facility] /   c: [phone] / [email or web address]     This note will not be viewable in [address]

## 2018-07-10 ENCOUNTER — PATIENT OUTREACH (OUTPATIENT)
Dept: CASE MANAGEMENT | Age: 68
End: 2018-07-10

## 2018-07-10 NOTE — PROGRESS NOTES
Community Care Management  Follow up Outreach Note Outreach type: Phone call:  7/10/18 @ 10:30am   
   
Home visit:  
   
Reason for follow-up: Patient was hospitalized at [facility] from 5/21/18 - 5/24/18 for Acute on Chronic Respiratory Failure with Hypercapnia Patient has had 7 hospitalizations over the past year   
Disease specific complaints/issues: Patient was hospitalized at [facility] from 5/21/18 - 5/24/18 for Acute on Chronic Respiratory Failure with Hypercapnia Patient has had 7 hospitalizations over the past year  
   
Patient progress towards goals set from last contact: Instruct Patient/Caregiver in strategies to prevent infection: frequent/proper hand-washing techniques, Universal precautions, Standard precautions, avoid crowds and persons with known infections, staying current with immunizations, s/s of infection, use of incentive spirometer, use of antibiotics and safe use and cleaning of equipment.   
CM Assessed Risk for Readmission:  
   
Patient stated Risk for Readmission:  Patient is HRRP with an RRAT score of 21Patient has had 7 hospitalizations over the past year including CAP, asthma, restrictive lung disease    
   
SHOB, respiratory lung problems Has patient attended any PCP or specialist follow-up appointments since last contact? 
   
What was outcome of appointment? 
   
When is next follow-up scheduled? Hospital f/u with PCP was 5/30/18 
   
Patient has transportation to MD appointments Review medications. 
   
Any medication changes since last outreach? 
   
Does patient have any questions or issues related to their medications? Patient and daughter verbalize understanding of medications dosage and administration Denies any issues with medications at this time  
   
Meds reviewed and verbalizes understanding    
Home health active? If yes Jonnie Salgado issue? Progress? Declined home health but are considering asking the PCP about home health PT on next visit Referrals needed? 
(SW, Diabetes education, HH, etc. ) Not at this time Other issues/Miscellaneous? (Transportation, access to meals, ability to perform ADLs, adequate caregiver support, etc.) Patient states she is independent with some ADLs and that with others daughter assists as needed A&O 
   
   
    
Next Outreach Scheduled: In one week  
  
   
Next Steps/Goals:    
Instruct Patient/Caregiver in strategies to prevent infection: frequent/proper hand-washing techniques, Universal precautions, Standard precautions, avoid crowds and persons with known infections, staying current with immunizations, s/s of infection, use of incentive spirometer, use of antibiotics and safe use and cleaning of equipment. Community Care Manager:  
Bindu Amado RN, BSN, [facility] /  
c: [phone] / [email or web address] This note will not be viewable in [address]

## 2018-07-17 ENCOUNTER — PATIENT OUTREACH (OUTPATIENT)
Dept: CASE MANAGEMENT | Age: 68
End: 2018-07-17

## 2018-07-17 NOTE — PROGRESS NOTES
Community Care Management  Follow up Outreach Note Outreach type: Phone call:  7/17/18 @ [facility] visit:  
   
Reason for follow-up: Patient was hospitalized at [facility] from 5/21/18 - 5/24/18 for Acute on Chronic Respiratory Failure with Hypercapnia Patient has had 7 hospitalizations over the past year   
Disease specific complaints/issues: Patient was hospitalized at [facility] from 5/21/18 - 5/24/18 for Acute on Chronic Respiratory Failure with Hypercapnia Patient has had 7 hospitalizations over the past year  
   
Patient progress towards goals set from last contact: Instruct Patient/Caregiver in strategies to prevent infection: frequent/proper hand-washing techniques, Universal precautions, Standard precautions, avoid crowds and persons with known infections, staying current with immunizations, s/s of infection, use of incentive spirometer, use of antibiotics and safe use and cleaning of equipment.   
CM Assessed Risk for Readmission:  
   
Patient stated Risk for Readmission:  Patient is HRRP with an RRAT score of 21Patient has had 7 hospitalizations over the past year including CAP, asthma, restrictive lung disease    
   
SHOB, respiratory lung problems Has patient attended any PCP or specialist follow-up appointments since last contact? 
   
What was outcome of appointment? 
   
When is next follow-up scheduled? Hospital f/u with PCP was 5/30/18 
   
Patient has transportation to MD appointments Review medications. 
   
Any medication changes since last outreach? 
   
Does patient have any questions or issues related to their medications? Patient and daughter verbalize understanding of medications dosage and administration Denies any issues with medications at this time  
   
Meds reviewed and verbalizes understanding    
Home health active? If yes Annie Santiago issue? Progress? Declined home health but are considering asking the PCP about home health PT on next visit Referrals needed? 
(SW, Diabetes education, HH, etc. ) Not at this time Other issues/Miscellaneous? (Transportation, access to meals, ability to perform ADLs, adequate caregiver support, etc.) Patient states she is independent with some ADLs and that with others daughter assists as needed A&O 
   
   
    
Next Outreach Scheduled: In one week  
   
   
Next Steps/Goals:    
Instruct Patient/Caregiver in strategies to prevent infection: frequent/proper hand-washing techniques, Universal precautions, Standard precautions, avoid crowds and persons with known infections, staying current with immunizations, s/s of infection, use of incentive spirometer, use of antibiotics and safe use and cleaning of equipment. Community Care Manager:  
America Vásquez RN, BSN, [facility] /  
c: [phone] / [email or web address] This note will not be viewable in [address]

## 2018-07-18 ENCOUNTER — APPOINTMENT (OUTPATIENT)
Dept: ULTRASOUND IMAGING | Age: 68
DRG: 689 | End: 2018-07-18
Attending: EMERGENCY MEDICINE
Payer: MEDICARE

## 2018-07-18 ENCOUNTER — HOSPITAL ENCOUNTER (INPATIENT)
Age: 68
LOS: 2 days | Discharge: HOME | DRG: 689 | End: 2018-07-21
Attending: EMERGENCY MEDICINE | Admitting: FAMILY MEDICINE
Payer: MEDICARE

## 2018-07-18 ENCOUNTER — APPOINTMENT (OUTPATIENT)
Dept: GENERAL RADIOLOGY | Age: 68
DRG: 689 | End: 2018-07-18
Attending: EMERGENCY MEDICINE
Payer: MEDICARE

## 2018-07-18 DIAGNOSIS — G93.41 METABOLIC ENCEPHALOPATHY: Primary | ICD-10-CM

## 2018-07-18 DIAGNOSIS — N39.0 URINARY TRACT INFECTION WITHOUT HEMATURIA, SITE UNSPECIFIED: ICD-10-CM

## 2018-07-18 LAB
ANION GAP SERPL CALC-SCNC: 7 MMOL/L (ref 7–16)
BACTERIA URNS QL MICRO: ABNORMAL /HPF
BASOPHILS # BLD: 0.1 K/UL (ref 0–0.2)
BASOPHILS NFR BLD: 0 % (ref 0–2)
BNP SERPL-MCNC: 32 PG/ML
BUN SERPL-MCNC: 17 MG/DL (ref 8–23)
CALCIUM SERPL-MCNC: 8 MG/DL (ref 8.3–10.4)
CASTS URNS QL MICRO: 0 /LPF
CHLORIDE SERPL-SCNC: 107 MMOL/L (ref 98–107)
CO2 SERPL-SCNC: 29 MMOL/L (ref 21–32)
CREAT SERPL-MCNC: 1.15 MG/DL (ref 0.6–1)
CRYSTALS URNS QL MICRO: 0 /LPF
DIFFERENTIAL METHOD BLD: ABNORMAL
EOSINOPHIL # BLD: 0.2 K/UL (ref 0–0.8)
EOSINOPHIL NFR BLD: 2 % (ref 0.5–7.8)
EPI CELLS #/AREA URNS HPF: ABNORMAL /HPF
ERYTHROCYTE [DISTWIDTH] IN BLOOD BY AUTOMATED COUNT: 18 % (ref 11.9–14.6)
GLUCOSE SERPL-MCNC: 76 MG/DL (ref 65–100)
HCT VFR BLD AUTO: 33.5 % (ref 35.8–46.3)
HGB BLD-MCNC: 10 G/DL (ref 11.7–15.4)
IMM GRANULOCYTES # BLD: 0 K/UL (ref 0–0.5)
IMM GRANULOCYTES NFR BLD AUTO: 0 % (ref 0–5)
LYMPHOCYTES # BLD: 3.7 K/UL (ref 0.5–4.6)
LYMPHOCYTES NFR BLD: 28 % (ref 13–44)
MCH RBC QN AUTO: 24.2 PG (ref 26.1–32.9)
MCHC RBC AUTO-ENTMCNC: 29.9 G/DL (ref 31.4–35)
MCV RBC AUTO: 81.1 FL (ref 79.6–97.8)
MONOCYTES # BLD: 1 K/UL (ref 0.1–1.3)
MONOCYTES NFR BLD: 8 % (ref 4–12)
MUCOUS THREADS URNS QL MICRO: 0 /LPF
NEUTS SEG # BLD: 8.1 K/UL (ref 1.7–8.2)
NEUTS SEG NFR BLD: 62 % (ref 43–78)
OTHER OBSERVATIONS,UCOM: ABNORMAL
PLATELET # BLD AUTO: 407 K/UL (ref 150–450)
PMV BLD AUTO: 9.7 FL (ref 10.8–14.1)
POTASSIUM SERPL-SCNC: 4.7 MMOL/L (ref 3.5–5.1)
RBC # BLD AUTO: 4.13 M/UL (ref 4.05–5.25)
RBC #/AREA URNS HPF: ABNORMAL /HPF
SODIUM SERPL-SCNC: 143 MMOL/L (ref 136–145)
WBC # BLD AUTO: 13.1 K/UL (ref 4.3–11.1)
WBC URNS QL MICRO: >100 /HPF

## 2018-07-18 PROCEDURE — 87186 SC STD MICRODIL/AGAR DIL: CPT | Performed by: EMERGENCY MEDICINE

## 2018-07-18 PROCEDURE — 80048 BASIC METABOLIC PNL TOTAL CA: CPT | Performed by: EMERGENCY MEDICINE

## 2018-07-18 PROCEDURE — 87088 URINE BACTERIA CULTURE: CPT | Performed by: EMERGENCY MEDICINE

## 2018-07-18 PROCEDURE — 99284 EMERGENCY DEPT VISIT MOD MDM: CPT | Performed by: EMERGENCY MEDICINE

## 2018-07-18 PROCEDURE — 93971 EXTREMITY STUDY: CPT

## 2018-07-18 PROCEDURE — 85025 COMPLETE CBC W/AUTO DIFF WBC: CPT | Performed by: EMERGENCY MEDICINE

## 2018-07-18 PROCEDURE — 71046 X-RAY EXAM CHEST 2 VIEWS: CPT

## 2018-07-18 PROCEDURE — 87086 URINE CULTURE/COLONY COUNT: CPT | Performed by: EMERGENCY MEDICINE

## 2018-07-18 PROCEDURE — 81003 URINALYSIS AUTO W/O SCOPE: CPT | Performed by: EMERGENCY MEDICINE

## 2018-07-18 PROCEDURE — 83880 ASSAY OF NATRIURETIC PEPTIDE: CPT | Performed by: EMERGENCY MEDICINE

## 2018-07-18 PROCEDURE — 81015 MICROSCOPIC EXAM OF URINE: CPT | Performed by: EMERGENCY MEDICINE

## 2018-07-18 NOTE — ED TRIAGE NOTES
PT arrived to ED c/o swelling to the left leg. PT had recent knee surgery. PT has swelling to only her left leg.

## 2018-07-18 NOTE — IP AVS SNAPSHOT
[facility] 
 
 
 [address] 
[phone] Patient: Francisca Abbasi MRN: [id number] XTY:[id number] About your hospitalization You were admitted on:  July 19, 2018 You last received care in the:  [facility] 2 SURGICAL You were discharged on:  July 21, 2018 Why you were hospitalized Your primary diagnosis was:  Acute Metabolic Encephalopathy Your diagnoses also included:  Leukocytosis, Uti (Urinary Tract Infection), Normocytic Anemia, Hypertension, Gerd (Gastroesophageal Reflux Disease), Hypothyroidism, Bipolar Affective Disorder (Hcc), History Of Peptic Ulcer Disease, Asthma, Metabolic Encephalopathy Follow-up Information Follow up With Details Comments Contact Info Mary Ellen Snyder,  In 3 days  23 [facility] A [facility], [place], operated by [facility] [address] 
[phone] Discharge Orders None A check elvi indicates which time of day the medication should be taken. My Medications START taking these medications Instructions Each Dose to Equal  
 Morning Noon Evening Bedtime  
 phenazopyridine 95 mg tab Commonly known as:  PYRIDIUM Your last dose was: Your next dose is: Take 1 Tab by mouth three (3) times daily for 2 days. 95 mg CHANGE how you take these medications Instructions Each Dose to Equal  
 Morning Noon Evening Bedtime * albuterol 2.5 mg /3 mL (0.083 %) nebulizer solution Commonly known as:  PROVENTIL VENTOLIN What changed:  Another medication with the same name was removed. Continue taking this medication, and follow the directions you see here. Your last dose was: Your next dose is:    
   
   
 3 mL by Nebulization route four (4) times daily. And every 4 hours as needed cough, wheezing, shortness of breath, J45.909, Medicare part B  
 2.5 mg  
    
   
   
   
  
 * albuterol 90 mcg/actuation inhaler Commonly known as:  FABIAN HFA What changed:  Another medication with the same name was removed. Continue taking this medication, and follow the directions you see here. Your last dose was: Your next dose is: Take 2 Puffs by inhalation every four (4) hours as needed for Wheezing. 2 Puff  
    
   
   
   
  
 cefpodoxime 200 mg tablet Commonly known as:  Lonnie Martínez What changed:  when to take this Your last dose was: Your next dose is: Take 1 Tab by mouth two (2) times a day for 4 days. 200 mg * Notice: This list has 2 medication(s) that are the same as other medications prescribed for you. Read the directions carefully, and ask your doctor or other care provider to review them with you. CONTINUE taking these medications Instructions Each Dose to Equal  
 Morning Noon Evening Bedtime  
 albuterol-ipratropium 2.5 mg-0.5 mg/3 ml Nebu Commonly known as:  Amairs Irizarry Your last dose was: Your next dose is:    
   
   
 3 mL by Nebulization route every four (4) hours as needed. 3 mL ALPRAZolam 2 mg tablet Commonly known as:  Nory Hartman Your last dose was: Your next dose is: Take 1 Tab by mouth three (3) times daily as needed for Anxiety. Max Daily Amount: 6 mg.  
 2 mg  
    
   
   
   
  
 atorvastatin 10 mg tablet Commonly known as:  LIPITOR Your last dose was: Your next dose is: Take 2 Tabs by mouth nightly. 20 mg  
    
   
   
   
  
 CARAFATE 1 gram tablet Generic drug:  sucralfate Your last dose was: Your next dose is: Take 1 g by mouth four (4) times daily. 1 g  
    
   
   
   
  
 COREG 12.5 mg tablet Generic drug:  carvedilol Your last dose was: Your next dose is: Take 12.5 mg by mouth two (2) times a day.   
 12.5 mg  
    
   
   
   
  
 DULoxetine 60 mg capsule Commonly known as:  CYMBALTA Your last dose was: Your next dose is: Take 60 mg by mouth two (2) times a day. 60 mg  
    
   
   
   
  
 ELIQUIS 5 mg tablet Generic drug:  apixaban Your last dose was: Your next dose is: Take 5 mg by mouth two (2) times a day. 5 mg  
    
   
   
   
  
 fluticasone-vilanterol 200-25 mcg/dose inhaler Commonly known as:  BREO ELLIPTA Your last dose was: Your next dose is: Take 1 Puff by inhalation daily. RINSE MOUTH WELL AFTER USE  
 1 Puff  
    
   
   
   
  
 folic acid 724 mcg tablet Your last dose was: Your next dose is: Take 800 mcg by mouth daily. 800 mcg  
    
   
   
   
  
 furosemide 40 mg tablet Commonly known as:  LASIX Your last dose was: Your next dose is: Take 1 Tab by mouth daily as needed. 40 mg  
    
   
   
   
  
 GEODON 80 mg capsule Generic drug:  ziprasidone Your last dose was: Your next dose is: Take 80 mg by mouth nightly. 80 mg  
    
   
   
   
  
 IMITREX PO Your last dose was: Your next dose is:    
   
   
 take 100 mg by mouth as needed. 100 mg  
    
   
   
   
  
 lisinopril 10 mg tablet Commonly known as:  Geetha Shames Your last dose was: Your next dose is: Take 10 mg by mouth nightly. 10 mg  
    
   
   
   
  
 Magnesium Oxide 500 mg Cap Your last dose was: Your next dose is: Take 500 mg by mouth. 500 mg  
    
   
   
   
  
 meclizine 25 mg tablet Commonly known as:  ANTIVERT Your last dose was: Your next dose is: Take 25 mg by mouth three (3) times daily as needed. 25 mg  
    
   
   
   
  
 metoclopramide HCl 5 mg tablet Commonly known as:  REGLAN Your last dose was: Your next dose is: Take 5 mg by mouth Before breakfast, lunch, and dinner. 5 mg  
    
   
   
   
  
 montelukast 10 mg tablet Commonly known as:  SINGULAIR Your last dose was: Your next dose is: Take 10 mg by mouth daily. 10 mg  
    
   
   
   
  
 MOVANTIK 25 mg Tab tablet Generic drug:  naloxegol Your last dose was: Your next dose is: Take  by mouth Daily (before breakfast). NEBULIZER Your last dose was: Your next dose is:    
   
   
 by Does Not Apply route. NORCO  mg tablet Generic drug:  HYDROcodone-acetaminophen Your last dose was: Your next dose is: Take 1 Tab by mouth every six (6) hours as needed for Pain. 1 Tab  
    
   
   
   
  
 nystatin powder Commonly known as:  MYCOSTATIN Your last dose was: Your next dose is:    
   
   
 Apply  to affected area two (2) times a day. oxybutynin chloride XL 10 mg CR tablet Commonly known as:  DITROPAN XL Your last dose was: Your next dose is: Take 10 mg by mouth daily. 10 mg OXYGEN-AIR DELIVERY SYSTEMS Your last dose was: Your next dose is:    
   
   
 2 lpm cont. , 4 lpm at hs  
     
   
   
   
  
 pantoprazole 40 mg tablet Commonly known as:  PROTONIX Your last dose was: Your next dose is: Take 1 Tab by mouth Before breakfast and dinner. 40 mg POTASSIUM CHLORIDE SR 10 MEQ TAB Your last dose was: Your next dose is: Take 1 Tab by mouth daily. 1 Tab  
    
   
   
   
  
 pregabalin 150 mg capsule Commonly known as:  Jerry Florence Your last dose was: Your next dose is: Take  by mouth two (2) times a day. REMERON 45 mg tablet Generic drug:  mirtazapine Your last dose was: Your next dose is: Take 45 mg by mouth nightly. 45 mg  
    
   
   
   
  
 SUPER B COMPLEX PO Your last dose was: Your next dose is: Take 1 Tab by mouth daily. 1 Tab SYNTHROID 125 mcg tablet Generic drug:  levothyroxine Your last dose was: Your next dose is: Take 112 mcg by mouth every evening. 112 mcg VITAMIN D3 1,000 unit Cap Generic drug:  cholecalciferol Your last dose was: Your next dose is: Take  by mouth. ZOFRAN ODT 8 mg disintegrating tablet Generic drug:  ondansetron Your last dose was: Your next dose is: Take 8 mg by mouth every eight (8) hours as needed for Nausea. 8 mg STOP taking these medications   
 biotin 5,000 mcg Subl  
   
  
 COQ10  PO  
   
  
 DHEA 25 mg Tab Generic drug:  prasterone (dhea)  
   
  
 irbesartan 300 mg tablet Commonly known as:  AVAPRO Where to Get Your Medications Information on where to get these meds will be given to you by the nurse or doctor. ! Ask your nurse or doctor about these medications  
  cefpodoxime 200 mg tablet  
 phenazopyridine 95 mg tab Opioid Education Prescription Opioids: What You Need to Know: 
 
 
 
F-face looks uneven A-arms unable to move or move unevenly S-speech slurred or non-existent T-time-call 911 as soon as signs and symptoms begin-DO NOT go Back to bed or wait to see if you get better-TIME IS BRAIN. Warning Signs of HEART ATTACK Call 911 if you have these symptoms: 
? Chest discomfort. Most heart attacks involve discomfort in the center of the chest that lasts more than a few minutes, or that goes away and comes back. It can feel like uncomfortable pressure, squeezing, fullness, or pain. ? Discomfort in other areas of the upper body. Symptoms can include pain or discomfort in one or both arms, the back, neck, jaw, or stomach. ? Shortness of breath with or without chest discomfort. ? Other signs may include breaking out in a cold sweat, nausea, or lightheadedness. Don't wait more than five minutes to call [address]! Fast action can save your life. Calling 911 is almost always the fastest way to get lifesaving treatment. Emergency Medical Services staff can begin treatment when they arrive  up to an hour sooner than if someone gets to the hospital by car. The discharge information has been reviewed with the patient. The patient verbalized understanding. Discharge medications reviewed with the patient and appropriate educational materials and side effects teaching were provided. Learning About Metabolic Encephalopathy What is metabolic encephalopathy? Metabolic encephalopathy is a problem in the brain. It is caused by a chemical imbalance in the blood. The imbalance is caused by an illness or organs that are not working as well as they should. It is not caused by a head injury. When the imbalance affects the brain, it can lead to personality changes. It can also make it harder to think clearly and remember things. The problems may only last a short time if you get treatment right away. But this depends on the cause. If the imbalance has been building up because you've been sick for a long time, the mental changes may be more severe. They may also last longer. What happens when you have this problem? When things are working right, your body has many ways to keep the chemicals in your blood in balance. For example, your liver and kidneys remove waste from your blood. The kidneys also help keep fluids and sodium in balance. And your pancreas makes insulin. It is a hormone that helps control the amount of sugar in your blood. But the chemicals in your blood can get out of balance and damage parts of your body because of a medical problem. This may be kidney or liver failure. Or it could be diabetes that isn't controlled well. When the imbalance affects the brain, normal thinking and behavior can change. What are the symptoms? Symptoms may include: 
· Confusion. · Problems with thinking and remembering. · Being grouchy and depressed. · Feeling drowsy. · Not being able to sleep. · Passing out (fainting) now and then. How is it treated? The doctor will try to find the illness that's causing the problem. He or she may ask questions about your past health. The doctor will also do tests to find what is causing the chemical imbalance and to see how severe it is. The doctor may treat the organ system that's causing the problem. For example, if it's a kidney problem, you may have treatment to help your kidneys work better. If you have an infection, you may need antibiotics. If the doctor can't treat the cause of the problem, he or she will treat the symptoms. The doctor will carefully watch your blood chemicals to make sure that your treatment is being done safely. Follow-up care is a key part of your treatment and safety. Be sure to make and go to all appointments, and call your doctor if you are having problems.  It's also a good idea to know your test results and keep a list of the medicines you take. Where can you learn more? Go to http://era-fransisco.info/. Enter T793 in the search box to learn more about \"Learning About Metabolic Encephalopathy. \" Current as of: May 12, 2017 Content Version: 11.7 © 5999-8101 [facility]. Care instructions adapted under license by [email or web address] (which disclaims liability or warranty for this information). If you have questions about a medical condition or this instruction, always ask your healthcare professional. [place] [address] any warranty or liability for your use of this information. ___________________________________________________________________________________________________________________________________ Urinary Tract Infection in Women: Care Instructions Your Care Instructions A urinary tract infection, or UTI, is a general term for an infection anywhere between the kidneys and the urethra (where urine comes out). Most UTIs are bladder infections. They often cause pain or burning when you urinate. UTIs are caused by bacteria and can be cured with antibiotics. Be sure to complete your treatment so that the infection goes away. Follow-up care is a key part of your treatment and safety. Be sure to make and go to all appointments, and call your doctor if you are having problems. It's also a good idea to know your test results and keep a list of the medicines you take. How can you care for yourself at home? · Take your antibiotics as directed. Do not stop taking them just because you feel better. You need to take the full course of antibiotics. · Drink extra water and other fluids for the next day or two. This may help wash out the bacteria that are causing the infection. (If you have kidney, heart, or liver disease and have to limit fluids, talk with your doctor before you increase your fluid intake.) · Avoid drinks that are carbonated or have caffeine. They can irritate the bladder. · Urinate often. Try to empty your bladder each time. · To relieve pain, take a hot bath or lay a heating pad set on low over your lower belly or genital area. Never go to sleep with a heating pad in place. To prevent UTIs · Drink plenty of water each day. This helps you urinate often, which clears bacteria from your system. (If you have kidney, heart, or liver disease and have to limit fluids, talk with your doctor before you increase your fluid intake.) · Urinate when you need to. · Urinate right after you have sex. · Change sanitary pads often. · Avoid douches, bubble baths, feminine hygiene sprays, and other feminine hygiene products that have deodorants. · After going to the bathroom, wipe from front to back. When should you call for help? Call your doctor now or seek immediate medical care if: 
  · Symptoms such as fever, chills, nausea, or vomiting get worse or appear for the first time.  
  · You have new pain in your back just below your rib cage. This is called flank pain.  
  · There is new blood or pus in your urine.  
  · You have any problems with your antibiotic medicine.  
 Watch closely for changes in your health, and be sure to contact your doctor if: 
  · You are not getting better after taking an antibiotic for 2 days.  
  · Your symptoms go away but then come back. Where can you learn more? Go to http://era-fransisco.info/. Enter M392 in the search box to learn more about \"Urinary Tract Infection in Women: Care Instructions. \" Current as of: May 12, 2017 Content Version: 11.7 © 5496-1244 [employer]. Care instructions adapted under license by [employer] (which disclaims liability or warranty for this information).  If you have questions about a medical condition or this instruction, always ask your healthcare professional. [employer] disclaims any warranty or liability for your use of this information. [facility] Announcement We are excited to announce that we are making your provider's discharge notes available to you in [facility]. You will see these notes when they are completed and signed by the physician that discharged you from your recent hospital stay. If you have any questions or concerns about any information you see in [facility], please call the Health Information Department where you were seen or reach out to your Primary Care Provider for more information about your plan of care. Introducing [facility]! [employer] introduces [facility] patient portal. Now you can access parts of your medical record, email your doctor's office, and request medication refills online. 1. In your internet browser, go to [email or web address] 2. Click on the First Time User? Click Here link in the Sign In box. You will see the New Member Sign Up page. 3. Enter your [facility] Access Code exactly as it appears below. You will not need to use this code after youve completed the sign-up process. If you do not sign up before the expiration date, you must request a new code. · [facility] Access Code: [email or web address] Expires: 10/16/2018  7:09 PM 
 
4. Enter the last four digits of your Social Security Number (xxxx) and Date of Birth (mm/dd/yyyy) as indicated and click Submit. You will be taken to the next sign-up page. 5. Create a [facility] ID. This will be your [facility] login ID and cannot be changed, so think of one that is secure and easy to remember. 6. Create a [facility] password. You can change your password at any time. 7. Enter your Password Reset Question and Answer. This can be used at a later time if you forget your password. 8. Enter your e-mail address. You will receive e-mail notification when new information is available in [address] 9. Click Sign Up. You can now view and download portions of your medical record. 10. Click the Download Summary menu link to download a portable copy of your medical information. If you have questions, please visit the Frequently Asked Questions section of the Fiz website. Remember, [employer]iz is NOT to be used for urgent needs. For medical emergencies, dial 911. Now available from your iPhone and Android! Introducing [facility] As a [facility] patient, I wanted to make you aware of our electronic visit tool called [facility]. [employer] 24/7 allows you to connect within minutes with a medical provider 24 hours a day, seven days a week via a mobile device or tablet or logging into a secure website from your computer. You can access [facility] from anywhere in the [place]. A virtual visit might be right for you when you have a simple condition and feel like you just dont want to get out of bed, or cant get away from work for an appointment, when your regular [facility] provider is not available (evenings, weekends or holidays), or when youre out of town and need minor care. Electronic visits cost only $49 and if the [employer] 24/7 provider determines a prescription is needed to treat your condition, one can be electronically transmitted to a nearby pharmacy*. Please take a moment to enroll today if you have not already done so. The enrollment process is free and takes just a few minutes. To enroll, please download the [employer] 24/COINTERRA suki to your tablet or phone, or visit [email or web address] to enroll on your computer. And, as an [address] [place] [address] patient with a [employer] account, the results of your visits will be scanned into your electronic medical record and your primary care provider will be able to view the scanned results.    
We urge you to continue to see your regular [facility] provider for your ongoing medical care. And while your primary care provider may not be the one available when you seek a [facility] virtual visit, the peace of mind you get from getting a real diagnosis real time can be priceless. For more information on [facility], view our Frequently Asked Questions (FAQs) at [email or web address]. Sincerely, 
 
Zahida Sherwood MD 
Chief Medical Officer Alek Cavazos *:  certain medications cannot be prescribed via [facility] Providers Seen During Your Hospitalization Provider Specialty Primary office phone Kimmy Bailey MD Emergency Medicine [phone] Alanna Milton MD [facility] [phone] Your Primary Care Physician (PCP) Primary Care Physician Office Phone Office Fax Lou Gross [phone] You are allergic to the following Allergen Reactions Latex Contact Dermatitis Bactrim (Sulfamethoxazole-Trimethoprim) Nausea Only Lamictal (Lamotrigine) Atopic Dermatitis Pcn (Penicillins) Rash Tapentadol Rash Recent Documentation Height Weight BMI OB Status Smoking Status 1.549 m 132.3 kg 55.12 kg/m2 Hysterectomy Never Smoker Emergency Contacts Name Discharge Info Relation Home Work Mobile Jaye Briceño  Daughter [53] [phone] Read Foster  Spouse [3] [phone] Patient Belongings The following personal items are in your possession at time of discharge: 
  Dental Appliances: Lowers, Uppers, With patient  Visual Aid: At home, Glasses      Home Medications: None   Jewelry: Ring  Clothing: Footwear, Robe    Other Valuables: [place] Please provide this summary of care documentation to your next provider. Signatures-by signing, you are acknowledging that this After Visit Summary has been reviewed with you and you have received a copy.   
  
 
  
    
    
 Patient Signature: ____________________________________________________________ Date:  ____________________________________________________________  
  
Jessica Artist Provider Signature:  ____________________________________________________________ Date:  ____________________________________________________________

## 2018-07-18 NOTE — IP AVS SNAPSHOT
[address] 
[phone] Patient: Gerda Rodas MRN: [id number] P:4/58/8683 A check elvi indicates which time of day the medication should be taken. My Medications START taking these medications Instructions Each Dose to Equal  
 Morning Noon Evening Bedtime  
 phenazopyridine 95 mg tab Commonly known as:  PYRIDIUM Your last dose was: Your next dose is: Take 1 Tab by mouth three (3) times daily for 2 days. 95 mg CHANGE how you take these medications Instructions Each Dose to Equal  
 Morning Noon Evening Bedtime * albuterol 2.5 mg /3 mL (0.083 %) nebulizer solution Commonly known as:  PROVENTIL VENTOLIN What changed:  Another medication with the same name was removed. Continue taking this medication, and follow the directions you see here. Your last dose was: Your next dose is:    
   
   
 3 mL by Nebulization route four (4) times daily. And every 4 hours as needed cough, wheezing, shortness of breath, J45.909, Medicare part B  
 2.5 mg  
    
   
   
   
  
 * albuterol 90 mcg/actuation inhaler Commonly known as:  PROAIR HFA What changed:  Another medication with the same name was removed. Continue taking this medication, and follow the directions you see here. Your last dose was: Your next dose is: Take 2 Puffs by inhalation every four (4) hours as needed for Wheezing. 2 Puff  
    
   
   
   
  
 cefpodoxime 200 mg tablet Commonly known as:  Shaq Quirk What changed:  when to take this Your last dose was: Your next dose is: Take 1 Tab by mouth two (2) times a day for 4 days. 200 mg * Notice: This list has 2 medication(s) that are the same as other medications prescribed for you.  Read the directions carefully, and ask your doctor or other care provider to review them with you. CONTINUE taking these medications Instructions Each Dose to Equal  
 Morning Noon Evening Bedtime  
 albuterol-ipratropium 2.5 mg-0.5 mg/3 ml Nebu Commonly known as:  Lucinda Ashton Your last dose was: Your next dose is:    
   
   
 3 mL by Nebulization route every four (4) hours as needed. 3 mL ALPRAZolam 2 mg tablet Commonly known as:  Andrei Fine Your last dose was: Your next dose is: Take 1 Tab by mouth three (3) times daily as needed for Anxiety. Max Daily Amount: 6 mg.  
 2 mg  
    
   
   
   
  
 atorvastatin 10 mg tablet Commonly known as:  LIPITOR Your last dose was: Your next dose is: Take 2 Tabs by mouth nightly. 20 mg  
    
   
   
   
  
 CARAFATE 1 gram tablet Generic drug:  sucralfate Your last dose was: Your next dose is: Take 1 g by mouth four (4) times daily. 1 g  
    
   
   
   
  
 COREG 12.5 mg tablet Generic drug:  carvedilol Your last dose was: Your next dose is: Take 12.5 mg by mouth two (2) times a day. 12.5 mg DULoxetine 60 mg capsule Commonly known as:  CYMBALTA Your last dose was: Your next dose is: Take 60 mg by mouth two (2) times a day. 60 mg  
    
   
   
   
  
 ELIQUIS 5 mg tablet Generic drug:  apixaban Your last dose was: Your next dose is: Take 5 mg by mouth two (2) times a day. 5 mg  
    
   
   
   
  
 fluticasone-vilanterol 200-25 mcg/dose inhaler Commonly known as:  BREO ELLIPTA Your last dose was: Your next dose is: Take 1 Puff by inhalation daily. RINSE MOUTH WELL AFTER USE  
 1 Puff  
    
   
   
   
  
 folic acid 481 mcg tablet Your last dose was: Your next dose is: Take 800 mcg by mouth daily. 800 mcg  
    
   
   
   
  
 furosemide 40 mg tablet Commonly known as:  LASIX Your last dose was: Your next dose is: Take 1 Tab by mouth daily as needed. 40 mg  
    
   
   
   
  
 GEODON 80 mg capsule Generic drug:  ziprasidone Your last dose was: Your next dose is: Take 80 mg by mouth nightly. 80 mg  
    
   
   
   
  
 IMITREX PO Your last dose was: Your next dose is:    
   
   
 take 100 mg by mouth as needed. 100 mg  
    
   
   
   
  
 lisinopril 10 mg tablet Commonly known as:  Thersia Kubas Your last dose was: Your next dose is: Take 10 mg by mouth nightly. 10 mg  
    
   
   
   
  
 Magnesium Oxide 500 mg Cap Your last dose was: Your next dose is: Take 500 mg by mouth. 500 mg  
    
   
   
   
  
 meclizine 25 mg tablet Commonly known as:  ANTIVERT Your last dose was: Your next dose is: Take 25 mg by mouth three (3) times daily as needed. 25 mg  
    
   
   
   
  
 metoclopramide HCl 5 mg tablet Commonly known as:  REGLAN Your last dose was: Your next dose is: Take 5 mg by mouth Before breakfast, lunch, and dinner. 5 mg  
    
   
   
   
  
 montelukast 10 mg tablet Commonly known as:  SINGULAIR Your last dose was: Your next dose is: Take 10 mg by mouth daily. 10 mg  
    
   
   
   
  
 MOVANTIK 25 mg Tab tablet Generic drug:  naloxegol Your last dose was: Your next dose is: Take  by mouth Daily (before breakfast). NEBULIZER Your last dose was: Your next dose is:    
   
   
 by Does Not Apply route. NORCO  mg tablet Generic drug:  HYDROcodone-acetaminophen Your last dose was: Your next dose is: Take 1 Tab by mouth every six (6) hours as needed for Pain. 1 Tab  
    
   
   
   
  
 nystatin powder Commonly known as:  MYCOSTATIN Your last dose was: Your next dose is:    
   
   
 Apply  to affected area two (2) times a day. oxybutynin chloride XL 10 mg CR tablet Commonly known as:  DITROPAN XL Your last dose was: Your next dose is: Take 10 mg by mouth daily. 10 mg OXYGEN-AIR DELIVERY SYSTEMS Your last dose was: Your next dose is:    
   
   
 2 lpm cont. , 4 lpm at hs  
     
   
   
   
  
 pantoprazole 40 mg tablet Commonly known as:  PROTONIX Your last dose was: Your next dose is: Take 1 Tab by mouth Before breakfast and dinner. 40 mg POTASSIUM CHLORIDE SR 10 MEQ TAB Your last dose was: Your next dose is: Take 1 Tab by mouth daily. 1 Tab  
    
   
   
   
  
 pregabalin 150 mg capsule Commonly known as:  Deneise Grandchild Your last dose was: Your next dose is: Take  by mouth two (2) times a day. REMERON 45 mg tablet Generic drug:  mirtazapine Your last dose was: Your next dose is: Take 45 mg by mouth nightly. 45 mg  
    
   
   
   
  
 SUPER B COMPLEX PO Your last dose was: Your next dose is: Take 1 Tab by mouth daily. 1 Tab SYNTHROID 125 mcg tablet Generic drug:  levothyroxine Your last dose was: Your next dose is: Take 112 mcg by mouth every evening. 112 mcg VITAMIN D3 1,000 unit Cap Generic drug:  cholecalciferol Your last dose was: Your next dose is: Take  by mouth. ZOFRAN ODT 8 mg disintegrating tablet Generic drug:  ondansetron Your last dose was: Your next dose is: Take 8 mg by mouth every eight (8) hours as needed for Nausea. 8 mg STOP taking these medications   
 biotin 5,000 mcg Subl  
   
  
 COQ10  PO  
   
  
 DHEA 25 mg Tab Generic drug:  prasterone (dhea)  
   
  
 irbesartan 300 mg tablet Commonly known as:  AVAPRO Where to Get Your Medications Information on where to get these meds will be given to you by the nurse or doctor. ! Ask your nurse or doctor about these medications  
  cefpodoxime 200 mg tablet  
 phenazopyridine 95 mg tab

## 2018-07-19 ENCOUNTER — PATIENT OUTREACH (OUTPATIENT)
Dept: CASE MANAGEMENT | Age: 68
End: 2018-07-19

## 2018-07-19 PROBLEM — N39.0 UTI (URINARY TRACT INFECTION): Status: ACTIVE | Noted: 2018-07-19

## 2018-07-19 PROBLEM — D64.9 NORMOCYTIC ANEMIA: Status: ACTIVE | Noted: 2018-07-19

## 2018-07-19 PROBLEM — N30.00 ACUTE CYSTITIS WITHOUT HEMATURIA: Status: RESOLVED | Noted: 2018-05-21 | Resolved: 2018-07-19

## 2018-07-19 PROBLEM — D72.829 LEUKOCYTOSIS: Status: ACTIVE | Noted: 2018-07-19

## 2018-07-19 PROBLEM — G93.41 ACUTE METABOLIC ENCEPHALOPATHY: Status: ACTIVE | Noted: 2018-07-19

## 2018-07-19 PROBLEM — G93.41 METABOLIC ENCEPHALOPATHY: Status: ACTIVE | Noted: 2018-07-19

## 2018-07-19 PROBLEM — J96.21 ACUTE ON CHRONIC RESPIRATORY FAILURE WITH HYPOXIA AND HYPERCAPNIA (HCC): Status: RESOLVED | Noted: 2018-05-21 | Resolved: 2018-07-19

## 2018-07-19 PROBLEM — J96.22 ACUTE ON CHRONIC RESPIRATORY FAILURE WITH HYPOXIA AND HYPERCAPNIA (HCC): Status: RESOLVED | Noted: 2018-05-21 | Resolved: 2018-07-19

## 2018-07-19 PROBLEM — J45.51 SEVERE PERSISTENT ASTHMA WITH EXACERBATION: Status: RESOLVED | Noted: 2018-05-21 | Resolved: 2018-07-19

## 2018-07-19 PROBLEM — R00.0 TACHYCARDIA: Status: RESOLVED | Noted: 2017-12-03 | Resolved: 2018-07-19

## 2018-07-19 PROBLEM — R50.9 FEVER: Status: RESOLVED | Noted: 2017-12-03 | Resolved: 2018-07-19

## 2018-07-19 LAB
ANION GAP SERPL CALC-SCNC: 7 MMOL/L (ref 7–16)
BASOPHILS # BLD: 0 K/UL (ref 0–0.2)
BASOPHILS NFR BLD: 0 % (ref 0–2)
BUN SERPL-MCNC: 15 MG/DL (ref 8–23)
CALCIUM SERPL-MCNC: 8 MG/DL (ref 8.3–10.4)
CHLORIDE SERPL-SCNC: 109 MMOL/L (ref 98–107)
CO2 SERPL-SCNC: 28 MMOL/L (ref 21–32)
CREAT SERPL-MCNC: 1.17 MG/DL (ref 0.6–1)
DIFFERENTIAL METHOD BLD: ABNORMAL
EOSINOPHIL # BLD: 0.2 K/UL (ref 0–0.8)
EOSINOPHIL NFR BLD: 2 % (ref 0.5–7.8)
ERYTHROCYTE [DISTWIDTH] IN BLOOD BY AUTOMATED COUNT: 18 % (ref 11.9–14.6)
GLUCOSE SERPL-MCNC: 116 MG/DL (ref 65–100)
HCT VFR BLD AUTO: 30.8 % (ref 35.8–46.3)
HGB BLD-MCNC: 9.1 G/DL (ref 11.7–15.4)
IMM GRANULOCYTES # BLD: 0 K/UL (ref 0–0.5)
IMM GRANULOCYTES NFR BLD AUTO: 0 % (ref 0–5)
LYMPHOCYTES # BLD: 2.2 K/UL (ref 0.5–4.6)
LYMPHOCYTES NFR BLD: 20 % (ref 13–44)
MAGNESIUM SERPL-MCNC: 1.9 MG/DL (ref 1.8–2.4)
MCH RBC QN AUTO: 23.9 PG (ref 26.1–32.9)
MCHC RBC AUTO-ENTMCNC: 29.5 G/DL (ref 31.4–35)
MCV RBC AUTO: 81.1 FL (ref 79.6–97.8)
MONOCYTES # BLD: 0.9 K/UL (ref 0.1–1.3)
MONOCYTES NFR BLD: 8 % (ref 4–12)
NEUTS SEG # BLD: 7.8 K/UL (ref 1.7–8.2)
NEUTS SEG NFR BLD: 70 % (ref 43–78)
PLATELET # BLD AUTO: 379 K/UL (ref 150–450)
PMV BLD AUTO: 9.4 FL (ref 10.8–14.1)
POTASSIUM SERPL-SCNC: 3.6 MMOL/L (ref 3.5–5.1)
RBC # BLD AUTO: 3.8 M/UL (ref 4.05–5.25)
SODIUM SERPL-SCNC: 144 MMOL/L (ref 136–145)
WBC # BLD AUTO: 11.2 K/UL (ref 4.3–11.1)

## 2018-07-19 PROCEDURE — 65270000029 HC RM PRIVATE

## 2018-07-19 PROCEDURE — 74011250637 HC RX REV CODE- 250/637: Performed by: FAMILY MEDICINE

## 2018-07-19 PROCEDURE — 74011000258 HC RX REV CODE- 258: Performed by: EMERGENCY MEDICINE

## 2018-07-19 PROCEDURE — 74011000250 HC RX REV CODE- 250: Performed by: FAMILY MEDICINE

## 2018-07-19 PROCEDURE — 76937 US GUIDE VASCULAR ACCESS: CPT

## 2018-07-19 PROCEDURE — 80048 BASIC METABOLIC PNL TOTAL CA: CPT | Performed by: FAMILY MEDICINE

## 2018-07-19 PROCEDURE — 83735 ASSAY OF MAGNESIUM: CPT | Performed by: INTERNAL MEDICINE

## 2018-07-19 PROCEDURE — 74011250636 HC RX REV CODE- 250/636: Performed by: INTERNAL MEDICINE

## 2018-07-19 PROCEDURE — 85025 COMPLETE CBC W/AUTO DIFF WBC: CPT | Performed by: FAMILY MEDICINE

## 2018-07-19 PROCEDURE — 36415 COLL VENOUS BLD VENIPUNCTURE: CPT | Performed by: FAMILY MEDICINE

## 2018-07-19 PROCEDURE — C1751 CATH, INF, PER/CENT/MIDLINE: HCPCS

## 2018-07-19 PROCEDURE — 77030020263 HC SOL INJ SOD CL0.9% LFCR 1000ML

## 2018-07-19 PROCEDURE — 77030011256 HC DRSG MEPILEX <16IN NO BORD MOLN -A

## 2018-07-19 PROCEDURE — 74011250636 HC RX REV CODE- 250/636: Performed by: EMERGENCY MEDICINE

## 2018-07-19 PROCEDURE — 74011250636 HC RX REV CODE- 250/636: Performed by: FAMILY MEDICINE

## 2018-07-19 PROCEDURE — 94760 N-INVAS EAR/PLS OXIMETRY 1: CPT

## 2018-07-19 PROCEDURE — 94640 AIRWAY INHALATION TREATMENT: CPT

## 2018-07-19 RX ORDER — METOCLOPRAMIDE 10 MG/1
5 TABLET ORAL
Status: DISCONTINUED | OUTPATIENT
Start: 2018-07-19 | End: 2018-07-21 | Stop reason: HOSPADM

## 2018-07-19 RX ORDER — ALPRAZOLAM 0.5 MG/1
2 TABLET ORAL
Status: CANCELLED | OUTPATIENT
Start: 2018-07-19

## 2018-07-19 RX ORDER — MONTELUKAST SODIUM 10 MG/1
10 TABLET ORAL EVERY EVENING
Status: DISCONTINUED | OUTPATIENT
Start: 2018-07-19 | End: 2018-07-21 | Stop reason: HOSPADM

## 2018-07-19 RX ORDER — SODIUM CHLORIDE 0.9 % (FLUSH) 0.9 %
10 SYRINGE (ML) INJECTION EVERY 8 HOURS
Status: DISCONTINUED | OUTPATIENT
Start: 2018-07-19 | End: 2018-07-21 | Stop reason: HOSPADM

## 2018-07-19 RX ORDER — IPRATROPIUM BROMIDE AND ALBUTEROL SULFATE 2.5; .5 MG/3ML; MG/3ML
3 SOLUTION RESPIRATORY (INHALATION)
Status: DISCONTINUED | OUTPATIENT
Start: 2018-07-19 | End: 2018-07-21 | Stop reason: HOSPADM

## 2018-07-19 RX ORDER — CARVEDILOL 12.5 MG/1
12.5 TABLET ORAL 2 TIMES DAILY
Status: DISCONTINUED | OUTPATIENT
Start: 2018-07-19 | End: 2018-07-21 | Stop reason: HOSPADM

## 2018-07-19 RX ORDER — SODIUM CHLORIDE 0.9 % (FLUSH) 0.9 %
5-10 SYRINGE (ML) INJECTION AS NEEDED
Status: DISCONTINUED | OUTPATIENT
Start: 2018-07-19 | End: 2018-07-21 | Stop reason: HOSPADM

## 2018-07-19 RX ORDER — SUCRALFATE 1 G/1
1 TABLET ORAL
Status: DISCONTINUED | OUTPATIENT
Start: 2018-07-19 | End: 2018-07-21 | Stop reason: HOSPADM

## 2018-07-19 RX ORDER — MECLIZINE HYDROCHLORIDE 25 MG/1
25 TABLET ORAL
Status: CANCELLED | OUTPATIENT
Start: 2018-07-19

## 2018-07-19 RX ORDER — ALBUTEROL SULFATE 0.83 MG/ML
2.5 SOLUTION RESPIRATORY (INHALATION) 4 TIMES DAILY
Status: DISCONTINUED | OUTPATIENT
Start: 2018-07-19 | End: 2018-07-21 | Stop reason: HOSPADM

## 2018-07-19 RX ORDER — OXYBUTYNIN CHLORIDE 10 MG/1
10 TABLET, EXTENDED RELEASE ORAL DAILY
Status: DISCONTINUED | OUTPATIENT
Start: 2018-07-19 | End: 2018-07-21 | Stop reason: HOSPADM

## 2018-07-19 RX ORDER — ONDANSETRON 2 MG/ML
4 INJECTION INTRAMUSCULAR; INTRAVENOUS
Status: DISCONTINUED | OUTPATIENT
Start: 2018-07-19 | End: 2018-07-21 | Stop reason: HOSPADM

## 2018-07-19 RX ORDER — POTASSIUM CHLORIDE 750 MG/1
10 TABLET, EXTENDED RELEASE ORAL DAILY
Status: DISCONTINUED | OUTPATIENT
Start: 2018-07-19 | End: 2018-07-21 | Stop reason: HOSPADM

## 2018-07-19 RX ORDER — DULOXETIN HYDROCHLORIDE 60 MG/1
60 CAPSULE, DELAYED RELEASE ORAL 2 TIMES DAILY
Status: DISCONTINUED | OUTPATIENT
Start: 2018-07-19 | End: 2018-07-21 | Stop reason: HOSPADM

## 2018-07-19 RX ORDER — LANOLIN ALCOHOL/MO/W.PET/CERES
400 CREAM (GRAM) TOPICAL DAILY
Status: DISCONTINUED | OUTPATIENT
Start: 2018-07-19 | End: 2018-07-21 | Stop reason: HOSPADM

## 2018-07-19 RX ORDER — ALPRAZOLAM 0.5 MG/1
2 TABLET ORAL
Status: DISCONTINUED | OUTPATIENT
Start: 2018-07-19 | End: 2018-07-21 | Stop reason: HOSPADM

## 2018-07-19 RX ORDER — PANTOPRAZOLE SODIUM 40 MG/1
40 TABLET, DELAYED RELEASE ORAL
Status: DISCONTINUED | OUTPATIENT
Start: 2018-07-19 | End: 2018-07-21 | Stop reason: HOSPADM

## 2018-07-19 RX ORDER — SODIUM CHLORIDE 0.9 % (FLUSH) 0.9 %
5-10 SYRINGE (ML) INJECTION EVERY 8 HOURS
Status: DISCONTINUED | OUTPATIENT
Start: 2018-07-19 | End: 2018-07-21 | Stop reason: HOSPADM

## 2018-07-19 RX ORDER — LISINOPRIL 5 MG/1
10 TABLET ORAL
Status: DISCONTINUED | OUTPATIENT
Start: 2018-07-19 | End: 2018-07-21 | Stop reason: HOSPADM

## 2018-07-19 RX ORDER — LEVOTHYROXINE SODIUM 112 UG/1
112 TABLET ORAL EVERY EVENING
Status: DISCONTINUED | OUTPATIENT
Start: 2018-07-19 | End: 2018-07-21 | Stop reason: HOSPADM

## 2018-07-19 RX ORDER — ZIPRASIDONE HYDROCHLORIDE 20 MG/1
80 CAPSULE ORAL
Status: DISCONTINUED | OUTPATIENT
Start: 2018-07-19 | End: 2018-07-21 | Stop reason: HOSPADM

## 2018-07-19 RX ORDER — SODIUM CHLORIDE 0.9 % (FLUSH) 0.9 %
10 SYRINGE (ML) INJECTION AS NEEDED
Status: DISCONTINUED | OUTPATIENT
Start: 2018-07-19 | End: 2018-07-21 | Stop reason: HOSPADM

## 2018-07-19 RX ORDER — HYDROCODONE BITARTRATE AND ACETAMINOPHEN 10; 325 MG/1; MG/1
1 TABLET ORAL
Status: DISCONTINUED | OUTPATIENT
Start: 2018-07-19 | End: 2018-07-21 | Stop reason: HOSPADM

## 2018-07-19 RX ORDER — ATORVASTATIN CALCIUM 10 MG/1
20 TABLET, FILM COATED ORAL
Status: DISCONTINUED | OUTPATIENT
Start: 2018-07-19 | End: 2018-07-21 | Stop reason: HOSPADM

## 2018-07-19 RX ORDER — BUDESONIDE 0.5 MG/2ML
500 INHALANT ORAL
Status: DISCONTINUED | OUTPATIENT
Start: 2018-07-19 | End: 2018-07-21 | Stop reason: HOSPADM

## 2018-07-19 RX ORDER — NYSTATIN 100000 [USP'U]/G
POWDER TOPICAL 2 TIMES DAILY
Status: DISCONTINUED | OUTPATIENT
Start: 2018-07-19 | End: 2018-07-21 | Stop reason: HOSPADM

## 2018-07-19 RX ORDER — SODIUM CHLORIDE 9 MG/ML
125 INJECTION, SOLUTION INTRAVENOUS CONTINUOUS
Status: DISCONTINUED | OUTPATIENT
Start: 2018-07-19 | End: 2018-07-20

## 2018-07-19 RX ORDER — ONDANSETRON 4 MG/1
4 TABLET, ORALLY DISINTEGRATING ORAL
Status: DISCONTINUED | OUTPATIENT
Start: 2018-07-19 | End: 2018-07-19

## 2018-07-19 RX ORDER — PREGABALIN 150 MG/1
150 CAPSULE ORAL 2 TIMES DAILY
Status: DISCONTINUED | OUTPATIENT
Start: 2018-07-19 | End: 2018-07-21 | Stop reason: HOSPADM

## 2018-07-19 RX ADMIN — BUDESONIDE 500 MCG: 0.5 INHALANT RESPIRATORY (INHALATION) at 07:32

## 2018-07-19 RX ADMIN — ALBUTEROL SULFATE 2.5 MG: 2.5 SOLUTION RESPIRATORY (INHALATION) at 19:03

## 2018-07-19 RX ADMIN — MIRTAZAPINE 45 MG: 30 TABLET, FILM COATED ORAL at 21:28

## 2018-07-19 RX ADMIN — METOCLOPRAMIDE HYDROCHLORIDE 5 MG: 10 TABLET ORAL at 16:41

## 2018-07-19 RX ADMIN — APIXABAN 5 MG: 5 TABLET, FILM COATED ORAL at 09:13

## 2018-07-19 RX ADMIN — LISINOPRIL 10 MG: 5 TABLET ORAL at 21:29

## 2018-07-19 RX ADMIN — SUCRALFATE 1 G: 1 TABLET ORAL at 06:54

## 2018-07-19 RX ADMIN — PANTOPRAZOLE SODIUM 40 MG: 40 TABLET, DELAYED RELEASE ORAL at 06:53

## 2018-07-19 RX ADMIN — SUCRALFATE 1 G: 1 TABLET ORAL at 11:37

## 2018-07-19 RX ADMIN — POTASSIUM CHLORIDE 10 MEQ: 10 TABLET, EXTENDED RELEASE ORAL at 09:15

## 2018-07-19 RX ADMIN — METOCLOPRAMIDE HYDROCHLORIDE 5 MG: 10 TABLET ORAL at 06:53

## 2018-07-19 RX ADMIN — ONDANSETRON 4 MG: 2 INJECTION, SOLUTION INTRAMUSCULAR; INTRAVENOUS at 18:19

## 2018-07-19 RX ADMIN — DULOXETINE HYDROCHLORIDE 60 MG: 60 CAPSULE, DELAYED RELEASE ORAL at 09:14

## 2018-07-19 RX ADMIN — HYDROCODONE BITARTRATE AND ACETAMINOPHEN 1 TABLET: 10; 325 TABLET ORAL at 17:45

## 2018-07-19 RX ADMIN — MIRTAZAPINE 45 MG: 30 TABLET, FILM COATED ORAL at 02:49

## 2018-07-19 RX ADMIN — SODIUM CHLORIDE 125 ML/HR: 900 INJECTION, SOLUTION INTRAVENOUS at 02:16

## 2018-07-19 RX ADMIN — ALBUTEROL SULFATE 2.5 MG: 2.5 SOLUTION RESPIRATORY (INHALATION) at 16:44

## 2018-07-19 RX ADMIN — ONDANSETRON 4 MG: 2 INJECTION, SOLUTION INTRAMUSCULAR; INTRAVENOUS at 14:26

## 2018-07-19 RX ADMIN — ZIPRASIDONE HYDROCHLORIDE 80 MG: 20 CAPSULE ORAL at 21:28

## 2018-07-19 RX ADMIN — MONTELUKAST SODIUM 10 MG: 10 TABLET, FILM COATED ORAL at 19:22

## 2018-07-19 RX ADMIN — ONDANSETRON 4 MG: 4 TABLET, ORALLY DISINTEGRATING ORAL at 08:11

## 2018-07-19 RX ADMIN — NYSTATIN: 100000 POWDER TOPICAL at 09:14

## 2018-07-19 RX ADMIN — METOCLOPRAMIDE HYDROCHLORIDE 5 MG: 10 TABLET ORAL at 11:37

## 2018-07-19 RX ADMIN — ONDANSETRON 4 MG: 4 TABLET, ORALLY DISINTEGRATING ORAL at 12:27

## 2018-07-19 RX ADMIN — CARVEDILOL 12.5 MG: 12.5 TABLET, FILM COATED ORAL at 09:13

## 2018-07-19 RX ADMIN — Medication 10 ML: at 14:48

## 2018-07-19 RX ADMIN — DULOXETINE HYDROCHLORIDE 60 MG: 60 CAPSULE, DELAYED RELEASE ORAL at 19:22

## 2018-07-19 RX ADMIN — ONDANSETRON 4 MG: 4 TABLET, ORALLY DISINTEGRATING ORAL at 03:28

## 2018-07-19 RX ADMIN — ATORVASTATIN CALCIUM 20 MG: 10 TABLET, FILM COATED ORAL at 02:48

## 2018-07-19 RX ADMIN — SUCRALFATE 1 G: 1 TABLET ORAL at 21:28

## 2018-07-19 RX ADMIN — ATORVASTATIN CALCIUM 20 MG: 10 TABLET, FILM COATED ORAL at 21:29

## 2018-07-19 RX ADMIN — ZIPRASIDONE HYDROCHLORIDE 80 MG: 20 CAPSULE ORAL at 02:48

## 2018-07-19 RX ADMIN — SODIUM CHLORIDE 125 ML/HR: 900 INJECTION, SOLUTION INTRAVENOUS at 14:50

## 2018-07-19 RX ADMIN — PANTOPRAZOLE SODIUM 40 MG: 40 TABLET, DELAYED RELEASE ORAL at 16:42

## 2018-07-19 RX ADMIN — HYDROCODONE BITARTRATE AND ACETAMINOPHEN 1 TABLET: 10; 325 TABLET ORAL at 08:12

## 2018-07-19 RX ADMIN — HYDROCODONE BITARTRATE AND ACETAMINOPHEN 1 TABLET: 10; 325 TABLET ORAL at 02:47

## 2018-07-19 RX ADMIN — PREGABALIN 150 MG: 150 CAPSULE ORAL at 09:15

## 2018-07-19 RX ADMIN — ALPRAZOLAM 2 MG: 0.5 TABLET ORAL at 13:26

## 2018-07-19 RX ADMIN — BUDESONIDE 500 MCG: 0.5 INHALANT RESPIRATORY (INHALATION) at 19:03

## 2018-07-19 RX ADMIN — OXYBUTYNIN CHLORIDE 10 MG: 10 TABLET, FILM COATED, EXTENDED RELEASE ORAL at 09:15

## 2018-07-19 RX ADMIN — APIXABAN 5 MG: 5 TABLET, FILM COATED ORAL at 21:28

## 2018-07-19 RX ADMIN — SUCRALFATE 1 G: 1 TABLET ORAL at 16:42

## 2018-07-19 RX ADMIN — CARVEDILOL 12.5 MG: 12.5 TABLET, FILM COATED ORAL at 19:22

## 2018-07-19 RX ADMIN — Medication 10 ML: at 06:13

## 2018-07-19 RX ADMIN — MAGNESIUM OXIDE TAB 400 MG (241.3 MG ELEMENTAL MG) 400 MG: 400 (241.3 MG) TAB at 09:14

## 2018-07-19 RX ADMIN — LISINOPRIL 10 MG: 5 TABLET ORAL at 02:48

## 2018-07-19 RX ADMIN — CEFTRIAXONE SODIUM 1 G: 1 INJECTION, POWDER, FOR SOLUTION INTRAMUSCULAR; INTRAVENOUS at 01:07

## 2018-07-19 RX ADMIN — LEVOTHYROXINE SODIUM 112 MCG: 112 TABLET ORAL at 19:22

## 2018-07-19 RX ADMIN — ALBUTEROL SULFATE 2.5 MG: 2.5 SOLUTION RESPIRATORY (INHALATION) at 07:32

## 2018-07-19 RX ADMIN — ALBUTEROL SULFATE 2.5 MG: 2.5 SOLUTION RESPIRATORY (INHALATION) at 11:20

## 2018-07-19 RX ADMIN — Medication 10 ML: at 21:33

## 2018-07-19 RX ADMIN — PREGABALIN 150 MG: 150 CAPSULE ORAL at 21:28

## 2018-07-19 NOTE — ED PROVIDER NOTES
HPI Comments: 71-year-old lady presents with concerns about altered mental status, subjective fevers and chills, and symptoms concerning for urinary tract infection. Patient's daughter says that this happens somewhat regularly  And she is worried that infection has reoccurred. Daughter also notes the patient has had bilateral lower leg swelling the left leg being a little bit more swollen than right. She has no redness or induration in her legs. The initial triage note mentions swelling to the left leg with recent left knee surgery. She has no recent knee surgery on that she does have some swelling in that left leg. Elements of this note were created using speech recognition software. As such, errors of speech recognition may be present. Patient is a 80 y.o. female presenting with leg pain. The history is provided by the patient and a relative. Leg Pain           Past Medical History:   Diagnosis Date    Acute renal failure ([address] July, 2014    The patient was admitted with acute renal failure secondary to volume depletion. She was found to have a gastric ulcer on admission.  Arthritis     Asthma     Asthma with acute exacerbation 9/28/2017    Bilateral pulmonary embolism [facility]) September, 2012    Restarted on anticoagulation for lifetime    Calculus of ureter May, 2015    CAP (community acquired pneumonia) October, 2012    RML pneumonia    Cellulitis of left lower extremity 11/1/2017    Chronic pain     fibromyalgia.  back pain    Community acquired bacterial pneumonia 4/2/2018    CVA (cerebral infarction) January, 2012    Reportedly has mild left arm residual weakness    Gastric ulcer July, 2014          GERD (gastroesophageal reflux disease)     Gram-positive bacteremia 1/2 cx 12/26/2016    HCAP (healthcare-associated pneumonia) January, 2013    RLL pneumonia    HCAP (healthcare-associated pneumonia) February, 2013    RLL pneumonia    Hypertension     Left leg DVT [facility] [address] 1991    On coumadin until 2008    Morbid obesity ([facility] [address]     Psychiatric disorder     PUD (peptic ulcer disease) ? ??    Sepsis due to pneumonia ([facility] [address] 4/2/2018    [facility]     2009 mini stroke    Thyroid disease        Past Surgical History:   Procedure Laterality Date    HX APPENDECTOMY      HX CHOLECYSTECTOMY      HX ENDOSCOPY  July, 2014    Gastric ulcers x 2    HX GASTRIC BYPASS      HX HYSTERECTOMY      HX ORTHOPAEDIC      rt knee growth, right shoulder, left thumb    HX OTHER SURGICAL      vagus nerve stimulator    HX TONSILLECTOMY           Family History:   Problem Relation Age of Onset    Hypertension Mother     Cancer Father      prostate    Heart Attack Father     Heart Disease Father      CAD    Hypertension Father     Hypertension Sister     Stroke Sister     Heart Disease Brother      CAD with CABG    Heart Attack Brother     Hypertension Brother        Social History     Social History    Marital status:      Spouse name: N/A    Number of children: N/A    Years of education: N/A     Occupational History          Disabled     Social History Main Topics    Smoking status: Never Smoker    Smokeless tobacco: Never Used    Alcohol use No    Drug use: No    Sexual activity: Not on file     Other Topics Concern    Not on file     Social History Narrative    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 , one child who is healthy. Has always lived in [place]. Dog as a pet. No history of pet bird. There is no significant environmental or industrial exposure. There is no known exposure to TB. ALLERGIES: Latex; Bactrim [sulfamethoxazole-trimethoprim]; Lamictal [lamotrigine]; Pcn [penicillins]; and Tapentadol    Review of Systems   Constitutional: Positive for activity change, appetite change, chills, fatigue and fever. Negative for diaphoresis.    HENT: Negative for congestion, rhinorrhea and sore throat. Eyes: Negative for redness and visual disturbance. Respiratory: Negative for cough, chest tightness, shortness of breath and wheezing. Cardiovascular: Positive for leg swelling. Negative for chest pain and palpitations. Gastrointestinal: Negative for abdominal pain, blood in stool, diarrhea, nausea and vomiting. Endocrine: Negative for polydipsia and polyuria. Genitourinary: Negative for dysuria and hematuria. Musculoskeletal: Negative for arthralgias, myalgias and neck stiffness. Skin: Negative for rash. Allergic/Immunologic: Negative for environmental allergies and food allergies. Neurological: Negative for dizziness, weakness and headaches. Hematological: Negative for adenopathy. Does not bruise/bleed easily. Psychiatric/Behavioral: Positive for confusion. Negative for sleep disturbance. The patient is not nervous/anxious. Vitals:    07/18/18 1910   BP: 136/56   Pulse: 78   Resp: 18   Temp: 99 °F (37.2 °C)   SpO2: 98%   Weight: 122.5 kg (270 lb)   Height: 5' 1\" (1.549 m)            Physical Exam   Constitutional: She appears well-developed and well-nourished. HENT:   Head: Normocephalic and atraumatic. Mouth/Throat: Oropharynx is clear and moist.   Eyes: Right eye exhibits no discharge. Left eye exhibits no discharge. Neck: No JVD present. No thyromegaly present. Cardiovascular: Normal rate and normal heart sounds. Pulmonary/Chest: Effort normal. She has no wheezes. She has no rales. Abdominal: She exhibits no distension. There is no tenderness. There is no rebound. Musculoskeletal:   Mild edema in both legs left slightly greater than right   Neurological: She is alert. Patient is pleasantly confused. Skin: Skin is warm and dry. Nursing note and vitals reviewed. MDM  Number of Diagnoses or Management Options  Diagnosis management comments: White blood cell count is mildly elevated and her urine is positive for UTI.   We'll give her some IV Rocephin and sent for a culture. 12:41 AM  I discussed the case with the hospitalist who kindly agreed to see the patient.         ED Course       Procedures

## 2018-07-19 NOTE — ED NOTES
TRANSFER - OUT REPORT:    Verbal report given to SLICK Sexton on Mauricio Chung  being transferred to 209-01 for routine progression of care       Report consisted of patients Situation, Background, Assessment and   Recommendations(SBAR). Information from the following report(s) SBAR, ED Summary, [facility] and Recent Results was reviewed with the receiving nurse. Lines:       Opportunity for questions and clarification was provided.       Patient transported with:   [employer]

## 2018-07-19 NOTE — PROGRESS NOTES
MIDLINE Placement Note PRE-PROCEDURE VERIFICATION 
PROCEDURE DETAIL Time out completed all person present in agreement with time out with milady campos rn. A single lumen Midline was started for vascular access. The following documentation is in addition to the Midline properties in the lines/airways flowsheet : 
Lot #: [id number] Internal Catheter Length: 8 (cm) Vein Selection for Midline:right forearm Line is okay to use: yes.

## 2018-07-19 NOTE — ROUTINE PROCESS
Primary Nurse Alex Brown LPN and Bobbi Perez RN performed a dual skin assessment on this patient, skin is clean, dry, & intact no noted redness or open areas. Juwan scale 19.

## 2018-07-19 NOTE — H&P
HOSPITALIST INITIAL HISTORY AND PHYSICAL 
 
NAME:  Bethany Chun Age:  80 y.o. 
:   1950 MRN:   [id number] PCP: Td Tate DO Consulting MD: Treatment Team: Attending Provider: Avni Rios MD; Primary Nurse: Stanford Horn RN 
 
CHIEF COMPLAINT: weakness, confusion HISTORY OF PRESENT ILLNESS:  
Bethany Chun is a 80 y.o. female with a past medical history of BPAD, asthma, GERD who presents to the ER with complaint of weakness and lethargy for the past 3-4 days. Per the patient's daughter, she also began asking nonsensical questions yesterday evening. She also has had cough and chest congestion for the past week, as well as urinary hesitance for the past 1-2 days. The patient herself reports feeling tired and weak but denies any fevers, nausea, vomiting, abdominal pain, dysuria. Pietro Evans REVIEW OF SYSTEMS: Comprehensive ROS performed and negative except as stated in HPI. Past Medical History:  
Diagnosis Date  Acute renal failure [facility])  The patient was admitted with acute renal failure secondary to volume depletion. She was found to have a gastric ulcer on admission.  Arthritis  Asthma  Asthma with acute exacerbation 2017  Bilateral pulmonary embolism ([facility])  Restarted on anticoagulation for lifetime  Calculus of ureter May, 2015  CAP (community acquired pneumonia)  RML pneumonia  Cellulitis of left lower extremity 2017  Chronic pain   
 fibromyalgia. back pain  Community acquired bacterial pneumonia 2018  CVA (cerebral infarction)  Reportedly has mild left arm residual weakness  Gastric ulcer   GERD (gastroesophageal reflux disease)  Gram-positive bacteremia  cx 2016  HCAP (healthcare-associated pneumonia)  RLL pneumonia  HCAP (healthcare-associated pneumonia)  RLL pneumonia  Hypertension  Left leg DVT ([facility] [address]  On coumadin until   Morbid obesity ([facility] [address]  Psychiatric disorder  PUD (peptic ulcer disease) ? ??  
 Sepsis due to pneumonia ([facility] [address] 2018  Stroke ([facility] [address] 2009 mini stroke  Thyroid disease Past Surgical History:  
Procedure Laterality Date  HX APPENDECTOMY  HX CHOLECYSTECTOMY  Alaina Nancy ENDOSCOPY   Gastric ulcers x 2  
 HX GASTRIC BYPASS  HX HYSTERECTOMY  HX ORTHOPAEDIC    
 rt knee growth, right shoulder, left thumb  HX OTHER SURGICAL    
 vagus nerve stimulator  HX TONSILLECTOMY Prior to Admission Medications Prescriptions Last Dose Informant Patient Reported? Taking? ALPRAZolam (XANAX) 2 mg tablet   No No  
Sig: Take 1 Tab by mouth three (3) times daily as needed for Anxiety. Max Daily Amount: 6 mg. Cholecalciferol, Vitamin D3, (VITAMIN D3) 1,000 unit cap   Yes No  
Sig: Take  by mouth. DULoxetine (CYMBALTA) 60 mg capsule   Yes No  
Sig: Take 60 mg by mouth two (2) times a day. FERROUS FUMARATE/VIT BCOMP&C (SUPER B COMPLEX PO)   Yes No  
Sig: Take 1 Tab by mouth daily. HYDROcodone-acetaminophen (NORCO)  mg tablet   Yes No  
Sig: Take 1 Tab by mouth every six (6) hours as needed for Pain. Magnesium Oxide 500 mg cap   Yes No  
Sig: Take 500 mg by mouth. NEBULIZER   Yes No  
Sig: by Does Not Apply route. OXYGEN-AIR DELIVERY SYSTEMS   Yes No  
Si lpm cont. , 4 lpm at hs POTASSIUM CHLORIDE SR 10 MEQ TAB   Yes No  
Sig: Take 1 Tab by mouth daily. SUMATRIPTAN SUCCINATE (IMITREX PO)   Yes No  
Sig: take 100 mg by mouth as needed. UBIDECARENONE/VITAMIN E MIXED (COQ10  PO)   Yes No  
Sig: Take  by mouth. albuterol (PROAIR HFA) 90 mcg/actuation inhaler   No No  
Sig: Take 2 Puffs by inhalation every four (4) hours as needed for Wheezing. albuterol (PROVENTIL VENTOLIN) 2.5 mg /3 mL (0.083 %) nebulizer solution   No No  
Sig: 3 mL by Nebulization route four (4) times daily.  And every 4 hours as needed cough, wheezing, shortness of breath, J45.909, Medicare part B  
albuterol sulfate (PROVENTIL;VENTOLIN) 2.5 mg/0.5 mL nebu nebulizer solution   No No  
Si.5 mL by Nebulization route every four (4) hours. Dx J45.909  
albuterol-ipratropium (DUO-NEB) 2.5 mg-0.5 mg/3 ml nebu   Yes No  
Sig: 3 mL by Nebulization route every four (4) hours as needed. apixaban (ELIQUIS) 5 mg tablet   Yes No  
Sig: Take 5 mg by mouth two (2) times a day. atorvastatin (LIPITOR) 10 mg tablet   No No  
Sig: Take 2 Tabs by mouth nightly. biotin 5,000 mcg subl   Yes No  
Sig: by SubLINGual route. carvedilol (COREG) 12.5 mg tablet   Yes No  
Sig: Take 12.5 mg by mouth two (2) times a day. cefpodoxime (VANTIN) 200 mg tablet   No No  
Sig: Take 1 Tab by mouth every twelve (12) hours. fluticasone-vilanterol (BREO ELLIPTA) 200-25 mcg/dose inhaler   No No  
Sig: Take 1 Puff by inhalation daily. RINSE MOUTH WELL AFTER USE  
folic acid 628 mcg tablet   Yes No  
Sig: Take 800 mcg by mouth daily. furosemide (LASIX) 40 mg tablet   No No  
Sig: Take 1 Tab by mouth daily as needed. irbesartan (AVAPRO) 300 mg tablet   Yes No  
Sig: Take 300 mg by mouth nightly. levothyroxine (SYNTHROID) 125 mcg tablet   Yes No  
Sig: Take 112 mcg by mouth every evening. lisinopril (PRINIVIL, ZESTRIL) 10 mg tablet   Yes No  
Sig: Take 10 mg by mouth nightly. meclizine (ANTIVERT) 25 mg tablet   Yes No  
Sig: Take 25 mg by mouth three (3) times daily as needed. metoclopramide HCl (REGLAN) 5 mg tablet   Yes No  
Sig: Take 5 mg by mouth Before breakfast, lunch, and dinner. mirtazapine (REMERON) 45 mg tablet   Yes No  
Sig: Take 45 mg by mouth nightly. montelukast (SINGULAIR) 10 mg tablet   Yes No  
Sig: Take 10 mg by mouth daily. naloxegol (MOVANTIK) 25 mg tab tablet   Yes No  
Sig: Take  by mouth Daily (before breakfast). nystatin (MYCOSTATIN) powder   No No  
Sig: Apply  to affected area two (2) times a day. ondansetron (ZOFRAN ODT) 8 mg disintegrating tablet   Yes No  
Sig: Take 8 mg by mouth every eight (8) hours as needed for Nausea. oxybutynin chloride XL (DITROPAN XL) 10 mg CR tablet   Yes No  
Sig: Take 10 mg by mouth daily. pantoprazole (PROTONIX) 40 mg tablet   No No  
Sig: Take 1 Tab by mouth Before breakfast and dinner. prasterone, dhea, (DHEA) 25 mg tab   Yes No  
Sig: Take 25 mg by mouth daily. pregabalin (LYRICA) 150 mg capsule   Yes No  
Sig: Take  by mouth two (2) times a day. sucralfate (CARAFATE) 1 gram tablet   Yes No  
Sig: Take 1 g by mouth four (4) times daily. ziprasidone (GEODON) 80 mg capsule   Yes No  
Sig: Take 80 mg by mouth nightly. Facility-Administered Medications: None Allergies Allergen Reactions  Latex Contact Dermatitis  Bactrim [Sulfamethoxazole-Trimethoprim] Nausea Only  Lamictal [Lamotrigine] Atopic Dermatitis  Pcn [Penicillins] Rash  Tapentadol Rash FAMILY HISTORY: Reviewed. Negative except Family History Problem Relation Age of Onset  Hypertension Mother  Cancer Father   
  prostate  Heart Attack Father  Heart Disease Father CAD  Hypertension Father  Hypertension Sister  Stroke Sister  Heart Disease Brother CAD with CABG  
 Heart Attack Brother  Hypertension Brother Social History Substance Use Topics  Smoking status: Never Smoker  Smokeless tobacco: Never Used  Alcohol use No  
 
 
 
Objective:  
 
Visit Vitals  /65  Pulse 78  Temp 99 °F (37.2 °C)  Resp 18  Ht 5' 1\" (1.549 m)  Wt 122.5 kg (270 lb)  SpO2 94%  BMI 51.02 kg/m2 Temp (24hrs), Av °F (37.2 °C), Min:99 °F (37.2 °C), Max:99 °F (37.2 °C) Oxygen Therapy O2 Sat (%): 94 % (18) Pulse via Oximetry: 102 beats per minute (18) Physical Exam: 
General:    The patient is a very pleasant elderly female in no acute distress.    
Head: Normocephalic/atraumatic. Eyes:  No palpebral pallor or scleral icterus. ENT:  External auricular and nasal exam within normal limits. Mucous membranes are moist. 
Neck:  Supple, non-tender, no JVD. Lungs:   Clear to auscultation bilaterally with scattered biphasic wheezes. No respiratory distress or accessory muscle use. Heart:   Regular rate and rhythm, without murmurs, rubs, or gallops. Abdomen:   Soft, non-tender, non-distended with normoactive bowel sounds. Genitourinary: No tenderness over the bladder or bilateral CVAs. Extremities: Without clubbing, cyanosis. 1+ BLE up to knees. Skin:     Normal color, texture, and turgor. No rashes, lesions, or jaundice. Pulses: Radial and dorsalis pedis pulses present 2+ bilaterally. Capillary refill <2s. Neurologic: CN II-XII grossly intact and symmetrical.  
  Moving all four extremities well with no focal deficits. Psychiatric: Pleasant demeanor, appropriate affect. Alert and oriented x 3 Data Review:  
Recent Results (from the past 24 hour(s)) CBC WITH AUTOMATED DIFF Collection Time: 07/18/18  9:45 PM  
Result Value Ref Range WBC 13.1 (H) 4.3 - 11.1 K/uL  
 RBC 4.13 4.05 - 5.25 M/uL  
 HGB 10.0 (L) 11.7 - 15.4 g/dL HCT 33.5 (L) 35.8 - 46.3 % MCV 81.1 79.6 - 97.8 FL  
 MCH 24.2 (L) 26.1 - 32.9 PG  
 MCHC 29.9 (L) 31.4 - 35.0 g/dL  
 RDW 18.0 (H) 11.9 - 14.6 % PLATELET 206 938 - 551 K/uL MPV 9.7 (L) 10.8 - 14.1 FL  
 DF AUTOMATED NEUTROPHILS 62 43 - 78 % LYMPHOCYTES 28 13 - 44 % MONOCYTES 8 4.0 - 12.0 % EOSINOPHILS 2 0.5 - 7.8 % BASOPHILS 0 0.0 - 2.0 % IMMATURE GRANULOCYTES 0 0.0 - 5.0 %  
 ABS. NEUTROPHILS 8.1 1.7 - 8.2 K/UL  
 ABS. LYMPHOCYTES 3.7 0.5 - 4.6 K/UL  
 ABS. MONOCYTES 1.0 0.1 - 1.3 K/UL  
 ABS. EOSINOPHILS 0.2 0.0 - 0.8 K/UL  
 ABS. BASOPHILS 0.1 0.0 - 0.2 K/UL  
 ABS. IMM. GRANS. 0.0 0.0 - 0.5 K/UL METABOLIC PANEL, BASIC Collection Time: 07/18/18  9:45 PM  
Result Value Ref Range Sodium 143 136 - 145 mmol/L Potassium 4.7 3.5 - 5.1 mmol/L Chloride 107 98 - 107 mmol/L  
 CO2 29 21 - 32 mmol/L Anion gap 7 7 - 16 mmol/L Glucose 76 65 - 100 mg/dL BUN 17 8 - 23 MG/DL Creatinine 1.15 (H) 0.6 - 1.0 MG/DL  
 GFR est AA >60 >60 ml/min/1.73m2 GFR est non-AA 50 (L) >60 ml/min/1.73m2 Calcium 8.0 (L) 8.3 - 10.4 MG/DL  
BNP Collection Time: 07/18/18  9:45 PM  
Result Value Ref Range BNP 32 pg/mL URINE MICROSCOPIC Collection Time: 07/18/18 10:15 PM  
Result Value Ref Range WBC >100 0 /hpf  
 RBC 0-3 0 /hpf Epithelial cells 0-3 0 /hpf Bacteria 4+ (H) 0 /hpf Casts 0 0 /lpf Crystals, urine 0 0 /LPF Mucus 0 0 /lpf Other observations RESULTS VERIFIED MANUALLY Imaging Candy Benjamin /Studies: Xr Chest Pa Lat Result Date: 7/18/2018 IMPRESSION: No evidence of acute pulmonary disease. No significant change. Duplex Lower Ext Venous Right Result Date: 7/18/2018 IMPRESSION: NO ULTRASOUND EVIDENCE OF DEEP VENOUS THROMBOSIS IN THE RIGHT LEG. Assessment and Plan:  
 
Principal Problem: 
  Acute metabolic encephalopathy (7/70/5241) Secondary to UTI. Will treat per below. Active Problems: 
  UTI (urinary tract infection) (7/19/2018) IV Rocephin, IVF, urine culture pending. Leukocytosis (7/19/2018) Secondary to UTI, follow CBC. Normocytic anemia (7/19/2018) Stable, follow CBC. Asthma (9/28/2017) Wheezing on exam. q4h nebs. Bipolar affective disorder ([facility] [address] (9/16/2012) Stable, continue home meds History of peptic ulcer disease (1/13/2013) Stable, continue home meds Hypertension (9/16/2012) Stable, on both irbesartan and lisinopril per home meds. Will confirm. Will hold ARB and continue ACEi for now. Hypothyroidism (9/28/2017) Stable, continue home meds DVT Prophylaxis: Eliquis Code Status: FULL CODE   Disposition: Admit to med/surg for evaluation and treatment as per above. 
 
  Anticipated discharge: 2-3 days Signed By: Christelle Branham MD   
 July 19, 2018

## 2018-07-19 NOTE — PROGRESS NOTES
· Outreached to patient's daughter, Otis Horn, about patient's readmit  · Patient readmitted to [facility] thru the ER last night for Acute metabolic encephalopathy and UTI  · Daughter had noticed that patient was more confused yesterday  · Patient kept telling her daughter last night in the ER that she was fine and she wasn't sick and wanted to go home  · Daughter appreciated my call   PLAN:  I informed patient's daughter that I would be following up when she discharges from hospital to home  This note will not be viewable in [address]

## 2018-07-19 NOTE — PROGRESS NOTES
Pt seen and examined  Admitted after midnight    PMH of Morbid Obesity, bipolar disorder, chronic narcotic use / drug abuse (amphetamines/benzos/opiates), fibromyalgia, GERD, PUD, hx PE on chronic anticoagulation, HTN, hypothyroid, morbid obesity, PATRICIA, Asthma, restrictive lung disease, chronic respiratory failure 4lnc at night (non compliant) admitted with AMS with nausea and vomiting found to have UTI. On Rocephin for uti, follow Cx  Switch zofran to IV  On multiple psych meds which can also change mentation. Need to confirm home meds and doses. PT/OT eval. DC soon. No bill charged to pt for this encounter.

## 2018-07-20 ENCOUNTER — APPOINTMENT (OUTPATIENT)
Dept: GENERAL RADIOLOGY | Age: 68
DRG: 689 | End: 2018-07-20
Attending: NURSE PRACTITIONER
Payer: MEDICARE

## 2018-07-20 LAB
ANION GAP SERPL CALC-SCNC: 9 MMOL/L (ref 7–16)
BASOPHILS # BLD: 0 K/UL (ref 0–0.2)
BASOPHILS NFR BLD: 1 % (ref 0–2)
BNP SERPL-MCNC: 70 PG/ML
BUN SERPL-MCNC: 7 MG/DL (ref 8–23)
CALCIUM SERPL-MCNC: 7.5 MG/DL (ref 8.3–10.4)
CHLORIDE SERPL-SCNC: 113 MMOL/L (ref 98–107)
CO2 SERPL-SCNC: 25 MMOL/L (ref 21–32)
CREAT SERPL-MCNC: 0.91 MG/DL (ref 0.6–1)
DIFFERENTIAL METHOD BLD: ABNORMAL
EOSINOPHIL # BLD: 0.2 K/UL (ref 0–0.8)
EOSINOPHIL NFR BLD: 2 % (ref 0.5–7.8)
ERYTHROCYTE [DISTWIDTH] IN BLOOD BY AUTOMATED COUNT: 18.1 % (ref 11.9–14.6)
GLUCOSE SERPL-MCNC: 116 MG/DL (ref 65–100)
HCT VFR BLD AUTO: 31.6 % (ref 35.8–46.3)
HGB BLD-MCNC: 9.1 G/DL (ref 11.7–15.4)
IMM GRANULOCYTES # BLD: 0 K/UL (ref 0–0.5)
IMM GRANULOCYTES NFR BLD AUTO: 0 % (ref 0–5)
LYMPHOCYTES # BLD: 3.5 K/UL (ref 0.5–4.6)
LYMPHOCYTES NFR BLD: 43 % (ref 13–44)
MCH RBC QN AUTO: 23.7 PG (ref 26.1–32.9)
MCHC RBC AUTO-ENTMCNC: 28.8 G/DL (ref 31.4–35)
MCV RBC AUTO: 82.3 FL (ref 79.6–97.8)
MONOCYTES # BLD: 0.9 K/UL (ref 0.1–1.3)
MONOCYTES NFR BLD: 11 % (ref 4–12)
NEUTS SEG # BLD: 3.4 K/UL (ref 1.7–8.2)
NEUTS SEG NFR BLD: 43 % (ref 43–78)
PLATELET # BLD AUTO: 379 K/UL (ref 150–450)
PMV BLD AUTO: 9.8 FL (ref 10.8–14.1)
POTASSIUM SERPL-SCNC: 3.4 MMOL/L (ref 3.5–5.1)
RBC # BLD AUTO: 3.84 M/UL (ref 4.05–5.25)
SODIUM SERPL-SCNC: 147 MMOL/L (ref 136–145)
WBC # BLD AUTO: 8 K/UL (ref 4.3–11.1)

## 2018-07-20 PROCEDURE — 77030020263 HC SOL INJ SOD CL0.9% LFCR 1000ML

## 2018-07-20 PROCEDURE — 83880 ASSAY OF NATRIURETIC PEPTIDE: CPT | Performed by: NURSE PRACTITIONER

## 2018-07-20 PROCEDURE — 36591 DRAW BLOOD OFF VENOUS DEVICE: CPT

## 2018-07-20 PROCEDURE — 97110 THERAPEUTIC EXERCISES: CPT

## 2018-07-20 PROCEDURE — 71045 X-RAY EXAM CHEST 1 VIEW: CPT

## 2018-07-20 PROCEDURE — 97165 OT EVAL LOW COMPLEX 30 MIN: CPT

## 2018-07-20 PROCEDURE — 74011000250 HC RX REV CODE- 250: Performed by: FAMILY MEDICINE

## 2018-07-20 PROCEDURE — 85025 COMPLETE CBC W/AUTO DIFF WBC: CPT | Performed by: FAMILY MEDICINE

## 2018-07-20 PROCEDURE — 74011250637 HC RX REV CODE- 250/637: Performed by: FAMILY MEDICINE

## 2018-07-20 PROCEDURE — 74011250636 HC RX REV CODE- 250/636: Performed by: INTERNAL MEDICINE

## 2018-07-20 PROCEDURE — 97161 PT EVAL LOW COMPLEX 20 MIN: CPT

## 2018-07-20 PROCEDURE — 74011250636 HC RX REV CODE- 250/636: Performed by: FAMILY MEDICINE

## 2018-07-20 PROCEDURE — 65270000029 HC RM PRIVATE

## 2018-07-20 PROCEDURE — 74011250637 HC RX REV CODE- 250/637: Performed by: NURSE PRACTITIONER

## 2018-07-20 PROCEDURE — 94760 N-INVAS EAR/PLS OXIMETRY 1: CPT

## 2018-07-20 PROCEDURE — 80048 BASIC METABOLIC PNL TOTAL CA: CPT | Performed by: FAMILY MEDICINE

## 2018-07-20 PROCEDURE — 74011000258 HC RX REV CODE- 258: Performed by: FAMILY MEDICINE

## 2018-07-20 PROCEDURE — 94640 AIRWAY INHALATION TREATMENT: CPT

## 2018-07-20 PROCEDURE — 74011250636 HC RX REV CODE- 250/636: Performed by: NURSE PRACTITIONER

## 2018-07-20 RX ORDER — FUROSEMIDE 10 MG/ML
40 INJECTION INTRAMUSCULAR; INTRAVENOUS ONCE
Status: COMPLETED | OUTPATIENT
Start: 2018-07-20 | End: 2018-07-20

## 2018-07-20 RX ORDER — PHENAZOPYRIDINE HYDROCHLORIDE 95 MG/1
95 TABLET ORAL 3 TIMES DAILY
Status: DISCONTINUED | OUTPATIENT
Start: 2018-07-20 | End: 2018-07-21 | Stop reason: HOSPADM

## 2018-07-20 RX ORDER — FUROSEMIDE 40 MG/1
40 TABLET ORAL DAILY
Status: DISCONTINUED | OUTPATIENT
Start: 2018-07-21 | End: 2018-07-21 | Stop reason: HOSPADM

## 2018-07-20 RX ADMIN — HYDROCODONE BITARTRATE AND ACETAMINOPHEN 1 TABLET: 10; 325 TABLET ORAL at 09:42

## 2018-07-20 RX ADMIN — ALBUTEROL SULFATE 2.5 MG: 2.5 SOLUTION RESPIRATORY (INHALATION) at 10:58

## 2018-07-20 RX ADMIN — ALBUTEROL SULFATE 2.5 MG: 2.5 SOLUTION RESPIRATORY (INHALATION) at 14:35

## 2018-07-20 RX ADMIN — SUCRALFATE 1 G: 1 TABLET ORAL at 21:10

## 2018-07-20 RX ADMIN — NYSTATIN: 100000 POWDER TOPICAL at 09:27

## 2018-07-20 RX ADMIN — PANTOPRAZOLE SODIUM 40 MG: 40 TABLET, DELAYED RELEASE ORAL at 16:02

## 2018-07-20 RX ADMIN — SODIUM CHLORIDE 125 ML/HR: 900 INJECTION, SOLUTION INTRAVENOUS at 03:59

## 2018-07-20 RX ADMIN — ZIPRASIDONE HYDROCHLORIDE 80 MG: 20 CAPSULE ORAL at 21:12

## 2018-07-20 RX ADMIN — SUCRALFATE 1 G: 1 TABLET ORAL at 06:33

## 2018-07-20 RX ADMIN — APIXABAN 5 MG: 5 TABLET, FILM COATED ORAL at 21:11

## 2018-07-20 RX ADMIN — OXYBUTYNIN CHLORIDE 10 MG: 10 TABLET, FILM COATED, EXTENDED RELEASE ORAL at 09:24

## 2018-07-20 RX ADMIN — ONDANSETRON 4 MG: 2 INJECTION, SOLUTION INTRAMUSCULAR; INTRAVENOUS at 12:07

## 2018-07-20 RX ADMIN — Medication 10 ML: at 13:14

## 2018-07-20 RX ADMIN — PREGABALIN 150 MG: 150 CAPSULE ORAL at 09:24

## 2018-07-20 RX ADMIN — MONTELUKAST SODIUM 10 MG: 10 TABLET, FILM COATED ORAL at 18:05

## 2018-07-20 RX ADMIN — CEFTRIAXONE SODIUM 1 G: 1 INJECTION, POWDER, FOR SOLUTION INTRAMUSCULAR; INTRAVENOUS at 00:41

## 2018-07-20 RX ADMIN — ATORVASTATIN CALCIUM 20 MG: 10 TABLET, FILM COATED ORAL at 21:11

## 2018-07-20 RX ADMIN — METOCLOPRAMIDE HYDROCHLORIDE 5 MG: 10 TABLET ORAL at 11:36

## 2018-07-20 RX ADMIN — URINARY PAIN RELIEF 95 MG: 95 TABLET ORAL at 21:10

## 2018-07-20 RX ADMIN — METOCLOPRAMIDE HYDROCHLORIDE 5 MG: 10 TABLET ORAL at 16:03

## 2018-07-20 RX ADMIN — SUCRALFATE 1 G: 1 TABLET ORAL at 16:02

## 2018-07-20 RX ADMIN — ALPRAZOLAM 2 MG: 0.5 TABLET ORAL at 03:55

## 2018-07-20 RX ADMIN — METOCLOPRAMIDE HYDROCHLORIDE 5 MG: 10 TABLET ORAL at 06:34

## 2018-07-20 RX ADMIN — ALPRAZOLAM 2 MG: 0.5 TABLET ORAL at 22:33

## 2018-07-20 RX ADMIN — DULOXETINE HYDROCHLORIDE 60 MG: 60 CAPSULE, DELAYED RELEASE ORAL at 18:04

## 2018-07-20 RX ADMIN — BUDESONIDE 500 MCG: 0.5 INHALANT RESPIRATORY (INHALATION) at 07:03

## 2018-07-20 RX ADMIN — PANTOPRAZOLE SODIUM 40 MG: 40 TABLET, DELAYED RELEASE ORAL at 06:34

## 2018-07-20 RX ADMIN — Medication 10 ML: at 06:34

## 2018-07-20 RX ADMIN — LISINOPRIL 10 MG: 5 TABLET ORAL at 21:12

## 2018-07-20 RX ADMIN — DULOXETINE HYDROCHLORIDE 60 MG: 60 CAPSULE, DELAYED RELEASE ORAL at 09:24

## 2018-07-20 RX ADMIN — Medication 10 ML: at 21:14

## 2018-07-20 RX ADMIN — CARVEDILOL 12.5 MG: 12.5 TABLET, FILM COATED ORAL at 09:19

## 2018-07-20 RX ADMIN — ALBUTEROL SULFATE 2.5 MG: 2.5 SOLUTION RESPIRATORY (INHALATION) at 07:03

## 2018-07-20 RX ADMIN — ALPRAZOLAM 2 MG: 0.5 TABLET ORAL at 14:13

## 2018-07-20 RX ADMIN — PREGABALIN 150 MG: 150 CAPSULE ORAL at 21:10

## 2018-07-20 RX ADMIN — BUDESONIDE 500 MCG: 0.5 INHALANT RESPIRATORY (INHALATION) at 21:15

## 2018-07-20 RX ADMIN — LEVOTHYROXINE SODIUM 112 MCG: 112 TABLET ORAL at 18:04

## 2018-07-20 RX ADMIN — FUROSEMIDE 40 MG: 10 INJECTION, SOLUTION INTRAMUSCULAR; INTRAVENOUS at 16:02

## 2018-07-20 RX ADMIN — CARVEDILOL 12.5 MG: 12.5 TABLET, FILM COATED ORAL at 18:04

## 2018-07-20 RX ADMIN — SUCRALFATE 1 G: 1 TABLET ORAL at 11:35

## 2018-07-20 RX ADMIN — POTASSIUM CHLORIDE 10 MEQ: 10 TABLET, EXTENDED RELEASE ORAL at 09:24

## 2018-07-20 RX ADMIN — ALBUTEROL SULFATE 2.5 MG: 2.5 SOLUTION RESPIRATORY (INHALATION) at 21:15

## 2018-07-20 RX ADMIN — MAGNESIUM OXIDE TAB 400 MG (241.3 MG ELEMENTAL MG) 400 MG: 400 (241.3 MG) TAB at 09:24

## 2018-07-20 RX ADMIN — APIXABAN 5 MG: 5 TABLET, FILM COATED ORAL at 09:19

## 2018-07-20 RX ADMIN — MIRTAZAPINE 45 MG: 30 TABLET, FILM COATED ORAL at 21:10

## 2018-07-20 RX ADMIN — NYSTATIN: 100000 POWDER TOPICAL at 18:11

## 2018-07-20 NOTE — PROGRESS NOTES
Progress Note    2018  Admit Date: 2018  8:47 PM   NAME: Marian Blandon   :  1950   MRN:  [id number]   Attending: Kristin Hung MD  PCP:  Nathaly Pfeiffer DO  Treatment Team: Attending Provider: Kristin Hung MD; Utilization Review: Giles Marley RN    Full Code   SUBJECTIVE:   Ms. Gomez Wilkinson is a 81 yo female with PMH of asthma, GERD, PAD, bipolar affective disorder, hx PE on eliquis who presented with c/o weakness and lethargy for 3-4 days prior to admission. Daughter reported confusion. C/o cough, chest congestion and urinary hesitancy for 1-2 days prior to admission. Also c/o RLE edema, pain. CXR without acute findings. Right LE US neg for DVT. Had leukocytosis on admission which has since resolved. UA consistent with UTI. Urine cx with GNR. Pt started on rocephin on admission. Denies CP, SOB, n/v/d, abd pain. Past Medical History:   Diagnosis Date    Acute renal failure ([address]     The patient was admitted with acute renal failure secondary to volume depletion. She was found to have a gastric ulcer on admission.  Arthritis     Asthma     Asthma with acute exacerbation 2017    Bilateral pulmonary embolism [facility])     Restarted on anticoagulation for lifetime    Calculus of ureter May, 2015    CAP (community acquired pneumonia)     RML pneumonia    Cellulitis of left lower extremity 2017    Chronic pain     fibromyalgia.  back pain    Community acquired bacterial pneumonia 2018    CVA (cerebral infarction)     Reportedly has mild left arm residual weakness    Gastric ulcer           GERD (gastroesophageal reflux disease)     Gram-positive bacteremia / cx 2016    HCAP (healthcare-associated pneumonia)     RLL pneumonia    HCAP (healthcare-associated pneumonia)     RLL pneumonia    Hypertension     Left leg D[facility] ([facility] [address] [address] 1991    On coumadin until     Morbid obesity ([facility]     Psychiatric disorder     PUD (peptic ulcer disease) ? ??    Sepsis due to pneumonia ([facility] 4/2/2018    Stroke ([facility]     2009 mini stroke    Thyroid disease      Recent Results (from the past 24 hour(s))   METABOLIC PANEL, BASIC    Collection Time: 07/20/18  4:18 AM   Result Value Ref Range    Sodium 147 (H) 136 - 145 mmol/L    Potassium 3.4 (L) 3.5 - 5.1 mmol/L    Chloride 113 (H) 98 - 107 mmol/L    CO2 25 21 - 32 mmol/L    Anion gap 9 7 - 16 mmol/L    Glucose 116 (H) 65 - 100 mg/dL    BUN 7 (L) 8 - 23 MG/DL    Creatinine 0.91 0.6 - 1.0 MG/DL    GFR est AA >60 >60 ml/min/1.73m2    GFR est non-AA >60 >60 ml/min/1.73m2    Calcium 7.5 (L) 8.3 - 10.4 MG/DL   CBC WITH AUTOMATED DIFF    Collection Time: 07/20/18  4:18 AM   Result Value Ref Range    WBC 8.0 4.3 - 11.1 K/uL    RBC 3.84 (L) 4.05 - 5.25 M/uL    HGB 9.1 (L) 11.7 - 15.4 g/dL    HCT 31.6 (L) 35.8 - 46.3 %    MCV 82.3 79.6 - 97.8 FL    MCH 23.7 (L) 26.1 - 32.9 PG    MCHC 28.8 (L) 31.4 - 35.0 g/dL    RDW 18.1 (H) 11.9 - 14.6 %    PLATELET 009 115 - 862 K/uL    MPV 9.8 (L) 10.8 - 14.1 FL    DF AUTOMATED      NEUTROPHILS 43 43 - 78 %    LYMPHOCYTES 43 13 - 44 %    MONOCYTES 11 4.0 - 12.0 %    EOSINOPHILS 2 0.5 - 7.8 %    BASOPHILS 1 0.0 - 2.0 %    IMMATURE GRANULOCYTES 0 0.0 - 5.0 %    ABS. NEUTROPHILS 3.4 1.7 - 8.2 K/UL    ABS. LYMPHOCYTES 3.5 0.5 - 4.6 K/UL    ABS. MONOCYTES 0.9 0.1 - 1.3 K/UL    ABS. EOSINOPHILS 0.2 0.0 - 0.8 K/UL    ABS. BASOPHILS 0.0 0.0 - 0.2 K/UL    ABS. IMM.  GRANS. 0.0 0.0 - 0.5 K/UL     Allergies   Allergen Reactions    Latex Contact Dermatitis    Bactrim [Sulfamethoxazole-Trimethoprim] Nausea Only    Lamictal [Lamotrigine] Atopic Dermatitis    Pcn [Penicillins] Rash    Tapentadol Rash     Current Facility-Administered Medications   Medication Dose Route Frequency Provider Last Rate Last Dose    albuterol (PROVENTIL VENTOLIN) nebulizer solution 2.5 mg  2.5 mg Nebulization QID Valentín Gandhi MD   2.5 mg at 07/20/18 0703    albuterol-ipratropium (DUO-NEB) 2.5 MG-0.5 MG/3 ML  3 mL Nebulization Q4H PRN Reina Shane MD        ziprasidone (GEODON) capsule 80 mg  80 mg Oral QHS Reina Shane MD   80 mg at 07/19/18 2128    sucralfate (CARAFATE) tablet 1 g  1 g Oral AC&HS Reina Shane MD   1 g at 07/20/18 2054    pregabalin (LYRICA) capsule 150 mg  150 mg Oral BID Reina Shane MD   150 mg at 07/20/18 6105    potassium chloride (KLOR-CON) tablet 10 mEq  10 mEq Oral DAILY Reina Shane MD   10 mEq at 07/20/18 0924    pantoprazole (PROTONIX) tablet 40 mg  40 mg Oral ACB&D Reina Shane MD   40 mg at 07/20/18 8523    oxybutynin chloride XL (DITROPAN XL) tablet 10 mg  10 mg Oral DAILY Reina Shane MD   10 mg at 07/20/18 1380    nystatin (MYCOSTATIN) 100,000 unit/gram powder   Topical BID Reina Shane MD        montelukast (SINGULAIR) tablet 10 mg  10 mg Oral QPM Reina Shane MD   10 mg at 07/19/18 1922    mirtazapine (REMERON) tablet 45 mg  45 mg Oral QHS Reina Shane MD   45 mg at 07/19/18 2128    metoclopramide HCl (REGLAN) tablet 5 mg  5 mg Oral TIDAC Reina Shane MD   5 mg at 07/20/18 8081    magnesium oxide (MAG-OX) tablet 400 mg  400 mg Oral DAILY Reina Shane MD   400 mg at 07/20/18 0924    HYDROcodone-acetaminophen (NORCO)  mg tablet 1 Tab  1 Tab Oral Q6H PRN Reina Shane MD   1 Tab at 07/20/18 9785    levothyroxine (SYNTHROID) tablet 112 mcg  112 mcg Oral QPM Reina Shane MD   112 mcg at 07/19/18 1922    lisinopril (PRINIVIL, ZESTRIL) tablet 10 mg  10 mg Oral QHS Reina Shane MD   10 mg at 07/19/18 2129    DULoxetine (CYMBALTA) capsule 60 mg  60 mg Oral BID Reina Shane MD   60 mg at 07/20/18 5849    carvedilol (COREG) tablet 12.5 mg  12.5 mg Oral BID Reina Shane MD   12.5 mg at 07/20/18 0919    atorvastatin (LIPITOR) tablet 20 mg  20 mg Oral QHS Reina Shane MD   20 mg at 07/19/18 2129    apixaban (ELIQUIS) tablet 5 mg  5 mg Oral BID Jeannie Nguyễn Casandra Castillo MD   5 mg at 18 0919    sodium chloride (NS) flush 5-10 mL  5-10 mL IntraVENous Q8H Garrick Vogel MD   10 mL at 18 1859    sodium chloride (NS) flush 5-10 mL  5-10 mL IntraVENous PRN Garrick Vogel MD        0.9% sodium chloride infusion  125 mL/hr IntraVENous CONTINUOUS Garrick Vogel  mL/hr at 18 0359 125 mL/hr at 18 0359    cefTRIAXone (ROCEPHIN) 1 g in 0.9% sodium chloride (MBP/ADV) 50 mL  1 g IntraVENous Q24H Garrick Vogel  mL/hr at 18 0041 1 g at 18 0041    ALPRAZolam (XANAX) tablet 2 mg  2 mg Oral TID PRN Garrick Vogel MD   2 mg at 18 0355    budesonide (PULMICORT) 500 mcg/2 ml nebulizer suspension  500 mcg Nebulization BID RT Garrick Vogel MD   500 mcg at 18 0703    sodium chloride (NS) flush 10 mL  10 mL InterCATHeter Christiano Garibay MD   10 mL at 18 0302    sodium chloride (NS) flush 10 mL  10 mL InterCATHeter PRN Garrick Vogel MD        [facility] injection 4 mg  4 mg IntraVENous Q4H PRN Ramonita Boykin MD   4 mg at 18 1819       Review of Systems negative with exception of pertinent positives noted above  PHYSICAL EXAM     Visit Vitals    /84    Pulse 95    Temp 98.1 °F (36.7 °C)    Resp 18    Ht 5' 1\" (1.549 m)    Wt 122.5 kg (270 lb)    SpO2 97%    BMI 51.02 kg/m2      Temp (24hrs), Av.2 °F (36.8 °C), Min:98 °F (36.7 °C), Max:98.7 °F (37.1 °C)    Oxygen Therapy  O2 Sat (%): 97 % (18)  Pulse via Oximetry: 86 beats per minute (18)  O2 Device: Room air (18)    Intake/Output Summary (Last 24 hours) at 18 1008  Last data filed at 18 0316   Gross per 24 hour   Intake             1685 ml   Output              850 ml   Net              835 ml      General: No acute distress    Lungs: CTA bilaterally. Resp even and non labored  Heart:  S1S2 present without murmurs rubs gallops. RRR.  No edema  Abdomen: Soft, mild suprapubic tenderness on palpation, non distended. BS present. Neg CVA tenderness  Extremities: Moves ext spontaneously. No cyanosis  Neurologic:  A/O X4. No focal deficits. Results summary of Diagnostic Studies/Procedures copied from within [facility] EMR:        [address] Problems    Diagnosis Date Noted    Leukocytosis 07/19/2018    UTI (urinary tract infection) 07/19/2018    Normocytic anemia 07/19/2018    Acute metabolic encephalopathy 86/72/7273    Metabolic encephalopathy 50/58/6074    Hypothyroidism 09/28/2017    Asthma 09/28/2017    History of peptic ulcer disease 01/13/2013    Hypertension 09/16/2012    GERD (gastroesophageal reflux disease) 09/16/2012    Bipolar affective disorder ([facility] [address] 09/16/2012     Plan:    UTI:  Urine cx with GNR. On Rocephin. HTN:  Chronic, controlled, continue current regimen    Asthma: controlled. Continue nebs. GERD: controlled, continue protonix    Hx PE: On eliquis    Bipolar affective disorder:  Denies suicidal ideations. Continue current psych meds      Notes, labs, VS, diagnostic testing reviewed  Time spent with pt 20 min      DVT Prophylaxis: Eliquis  Plan of Care Discussed with: Supervising MD Dr. Liberty Vega, care team, pt   Jessie Jennings, NP         [id number]  Nursing reports wheezing and LE edema now. Pt denies SOB, CP. She is on home lasix and has not been getting lasix here.       Plan:  Check BNP  CXR  Lasix IV X1 dose  Restart home po lasix dose in AM  Strict I/O   Stop IVF

## 2018-07-20 NOTE — PROGRESS NOTES
Patient was calm as she sat in chair  Acknowledged her struggles and her multiple admissions in the past year  She said most of her admissions were related in nature  Assured her we will be mindful of her and her family as we go forward  She was thankful    Darrin Schroeder, staff [address], [id number] [facility] Jesus Alberto  /   [email or web address]

## 2018-07-20 NOTE — PROGRESS NOTES
Pt is voiding often, complains of pian when urinates. Jhonny Velasco, NP notified. NP to write orders. Purewick external cath placed.

## 2018-07-20 NOTE — PROGRESS NOTES
Per PT wheezing noted after treatment. Stop IV fluid. Pt denies SOB. Paged Jaya Santamaria, NP to be notified.  Np to place orders

## 2018-07-20 NOTE — PROGRESS NOTES
Problem: Falls - Risk of  Goal: *Absence of Falls  Document Shaheen Fall Risk and appropriate interventions in the flowsheet.    Outcome: Progressing Towards Goal  Fall Risk Interventions:  Mobility Interventions: Communicate number of staff needed for ambulation/transfer, OT consult for ADLs, Patient to call before getting OOB, PT Consult for mobility concerns, PT Consult for assist device competence         Medication Interventions: Patient to call before getting OOB, Teach patient to arise slowly    Elimination Interventions: Call light in reach, Patient to call for help with toileting needs, Toilet paper/wipes in reach    History of Falls Interventions: Consult care management for discharge planning, Door open when patient unattended

## 2018-07-20 NOTE — PROGRESS NOTES
Problem: Self Care Deficits Care Plan (Adult)  Goal: *Acute Goals and Plan of Care (Insert Text)    OCCUPATIONAL THERAPY: Initial Assessment 7/20/2018  INPATIENT: Hospital Day: 3  [place] / Product Type: Medicare /      NAME/AGE/GENDER: Delmy Bo is a 80 y.o. female   PRIMARY DIAGNOSIS:  Metabolic encephalopathy Acute metabolic encephalopathy Acute metabolic encephalopathy        ICD-10: Treatment Diagnosis:    · Generalized Muscle Weakness (M62.81)   Precautions/Allergies:    falls Latex; Bactrim [sulfamethoxazole-trimethoprim]; Lamictal [lamotrigine]; Pcn [penicillins]; and Tapentadol      ASSESSMENT:     Ms. Nabeel Yee was admitted with the above. Pt lives with her daughter in a one level home with a tub shower with a shower chair and uses a cane for mobility. Pt required assistance for LB bathing and dressing at baseline, was otherwise independent with ADLs, daughter assists with IADLs. This session, pt presented seated in chair, pleasant and agreeable to OT session. Pt donned brief with mod assistance to advance past feet, completed hygiene at sink independently and mobility in room with SBA using a cane. Pt reports that she is at her functional baseline and stated that she is eager to go home. Pt does not require further skilled OT services in this setting, recommend home with family when medically cleared. This section established at most recent assessment   PROBLEM LIST (Impairments causing functional limitations):  1. Decreased ADL/Functional Activities   INTERVENTIONS PLANNED: (Benefits and precautions of occupational therapy have been discussed with the patient.)  1. n/a          RECOMMENDED REHABILITATION/EQUIPMENT: (at time of discharge pending progress): Due to the probability of continued deficits (see above) this patient will not likely need continued skilled occupational therapy after discharge.   Equipment:    grab bars around tub shower OCCUPATIONAL PROFILE AND HISTORY:   History of Present Injury/Illness (Reason for Referral):  See H&P  Past Medical History/Comorbidities:   Ms. Brittany Macario  has a past medical history of Acute renal failure [facility]) (July, 2014); Arthritis; Asthma; Asthma with acute exacerbation (9/28/2017); Bilateral pulmonary embolism [facility]) (September, 2012); Calculus of ureter (May, 2015); CAP (community acquired pneumonia) (October, 2012); Cellulitis of left lower extremity (11/1/2017); Chronic pain; Community acquired bacterial pneumonia (4/2/2018); CVA (cerebral infarction) (January, 2012); Gastric ulcer (July, 2014); GERD (gastroesophageal reflux disease); Gram-positive bacteremia 1/2 cx (12/26/2016); HCAP (healthcare-associated pneumonia) (January, 2013); HCAP (healthcare-associated pneumonia) (February, 2013); Hypertension; Left leg DVT ([address] (1991); Morbid obesity ([address] Psychiatric disorder; PUD (peptic ulcer disease) (???); Sepsis due to pneumonia ([address] (4/2/2018); Stroke [facility]); and Thyroid disease. She also has no past medical history of Aneurysm ([address] Arrhythmia; Autoimmune disease ([address] CAD (coronary artery disease); Cancer ([address] Chronic kidney disease; Chronic obstructive pulmonary disease ([address] Coagulation disorder ([address] COPD; Diabetes ([address] Endocarditis; Heart failure ([address] Liver disease; Nicotine vapor product user; Non-nicotine vapor product user; Rheumatic fever; Seizures ([address] or Unspecified sleep apnea. Ms. Brittany Macario  has a past surgical history that includes hx appendectomy; hx cholecystectomy; hx gastric bypass; hx tonsillectomy; hx other surgical; hx orthopaedic; hx hysterectomy; and hx endoscopy (July, 2014).   Social History/Living Environment:   Home Environment: Trailer/mobile home  # Steps to Enter: 3  One/Two Story Residence: One story  Living Alone: No  Support Systems: Family member(s)  Patient Expects to be Discharged to[de-identified] Trailer/mobile home  Current DME Used/Available at Home: Cane, straight, Shower chair, Walker, rolling  Tub or Shower Type: Tub/Shower combination  Prior Level of Function/Work/Activity:  Independent with ADLs with the exception of LB bathing and dressing, cane for mobility, lives with daughter, assisted with IADLs, drives. Number of Personal Factors/Comorbidities that affect the Plan of Care: Expanded review of therapy/medical records (1-2):  MODERATE COMPLEXITY   ASSESSMENT OF OCCUPATIONAL PERFORMANCE[de-identified]   Activities of Daily Living:   Basic ADLs (From Assessment) Complex ADLs (From Assessment)   Feeding: Independent  Oral Facial Hygiene/Grooming: Independent  Bathing: Stand-by assistance  Upper Body Dressing: Setup  Lower Body Dressing: Moderate assistance  Toileting: Stand by assistance Instrumental ADL  Meal Preparation: Minimum assistance  Homemaking: Moderate assistance  Medication Management: Setup   Grooming/Bathing/Dressing Activities of Daily Living     Cognitive Retraining  Safety/Judgement: Awareness of environment; Fall prevention                       Bed/Mat Mobility  Sit to Stand: Stand-by assistance  Bed to Chair: Stand-by assistance;Contact guard assistance       Most Recent Physical Functioning:   Gross Assessment:  AROM: Within functional limits  Strength:  Within functional limits  Coordination: Within functional limits  Tone: Normal  Sensation: Intact               Posture:     Balance:  Sitting: Intact  Standing: Impaired  Standing - Static: Good  Standing - Dynamic : Fair (+) Bed Mobility:     Wheelchair Mobility:     Transfers:  Sit to Stand: Stand-by assistance  Stand to Sit: Stand-by assistance  Bed to Chair: Stand-by assistance;Contact guard assistance            Patient Vitals for the past 6 hrs:   BP SpO2 Pulse   07/20/18 0704 - 97 % -   07/20/18 0919 152/84 - 95       Mental Status  Neurologic State: Alert  Orientation Level: Oriented X4  Cognition: Follows commands  Perception: Appears intact  Perseveration: No perseveration noted  Safety/Judgement: Awareness of environment, Fall prevention                          Physical Skills Involved:  1. Activity Tolerance Cognitive Skills Affected (resulting in the inability to perform in a timely and safe manner): 1. none Psychosocial Skills Affected:  1. Environmental Adaptation   Number of elements that affect the Plan of Care: 1-3:  LOW COMPLEXITY   CLINICAL DECISION MAKIN[address] [place] [address] AM-PAC 6 Clicks   Daily Activity Inpatient Short Form  How much help from another person does the patient currently need. .. Total A Lot A Little None   1. Putting on and taking off regular lower body clothing? [] 1   [x] 2   [] 3   [] 4   2. Bathing (including washing, rinsing, drying)? [] 1   [] 2   [x] 3   [] 4   3. Toileting, which includes using toilet, bedpan or urinal?   [] 1   [] 2   [] 3   [x] 4   4. Putting on and taking off regular upper body clothing? [] 1   [] 2   [] 3   [x] 4   5. Taking care of personal grooming such as brushing teeth? [] 1   [] 2   [] 3   [x] 4   6. Eating meals? [] 1   [] 2   [] 3   [x] 4   © , Trustees of [address] [place] [address], under license to [employer]. All rights reserved      Score:  Initial: 21 Most Recent: X (Date: -- )    Interpretation of Tool:  Represents activities that are increasingly more difficult (i.e. Bed mobility, Transfers, Gait). Score 24 23 22-20 19-15 14-10 9-7 6     Modifier CH CI CJ CK CL CM CN      ?  Self Care:     - CURRENT STATUS: CJ - 20%-39% impaired, limited or restricted    - GOAL STATUS: CJ - 20%-39% impaired, limited or restricted    - D/C STATUS:  CJ - 20%-39% impaired, limited or restricted  [place] / Product Type: Medicare /         Use of outcome tool(s) and clinical judgement create a POC that gives a: LOW COMPLEXITY         TREATMENT:   (In addition to Assessment/Re-Assessment sessions the following treatments were rendered)     Pre-treatment Symptoms/Complaints:    Pain: Initial:   Pain Intensity 1: 7  Pain Location 1: Back  Pain Orientation 1: Lower  Pain Intervention(s) 1: Nurse notified  Post Session:  same     Assessment/Reassessment only, no treatment provided today    Braces/Orthotics/Lines/Etc:   · IV  · O2 Device: Room air  Treatment/Session Assessment:    · Response to Treatment:  No adverse reaction   · Interdisciplinary Collaboration:   o Occupational Therapist  o Registered Nurse  · After treatment position/precautions:   o Up in chair  o Bed/Chair-wheels locked  o Call light within reach  o RN notified     Total Treatment Duration:  OT Patient Time In/Time Out  Time In: 9202  Time Out: 6428 [facility],

## 2018-07-20 NOTE — PROGRESS NOTES
Problem: Mobility Impaired (Adult and Pediatric)  Goal: *Acute Goals and Plan of Care (Insert Text)  Discharge goals:  (1.)Ms. Amy Morrow will move from supine to sit and sit to supine , scoot up and down and roll side to side in bed with MODIFIED INDEPENDENCE within 7 treatment day(s). (2.)Ms. Amy Morrow will transfer from bed to chair and chair to bed with MODIFIED INDEPENDENCE using the least restrictive device within 7 treatment day(s). (3.)Ms. Amy Morrow will ambulate with MODIFIED INDEPENDENCE for 250+ feet with the least restrictive device within 7 treatment day(s). ________________________________________________________________________________________________      PHYSICAL THERAPY: Initial Assessment, Treatment Day: Day of Assessment, AM 7/20/2018  INPATIENT: Hospital Day: 3  [place] / Product Type: Medicare /      NAME/AGE/GENDER: Juan Woods is a 80 y.o. female   PRIMARY DIAGNOSIS: Metabolic encephalopathy Acute metabolic encephalopathy Acute metabolic encephalopathy        ICD-10: Treatment Diagnosis:    · Generalized Muscle Weakness (M62.81)  · Difficulty in walking, Not elsewhere classified (R26.2)   Precaution/Allergies:  Latex; Bactrim [sulfamethoxazole-trimethoprim]; Lamictal [lamotrigine]; Pcn [penicillins]; and Tapentadol      ASSESSMENT:     Ms. Amy Morrow is a pleasant 80 y.o. Female with primary diagnosis of metabolic encephalopathy. Pt is sitting in bedside chair upon contact, A&O x 4, and agreeable to PT Evaluation. Pt states she lives with a her daughter in single story home with 3 steps to enter with a handrail, and a shower/tub combo with shower chair. Pt states she requires occasional assistance with ADLs from daughter, and ambulates with a cane for household and community distances. Pt reports no recent falls, and states she \"can drive but hasn't recently. \" Pt states she has chronic low back pain (moderate, ~4/10 pain currently) that improves with sitting after lying down so much. B LE strength and AROM are generally decreased but WFL, with the R LE (the \"bad leg\" according to pt) weaker than the L LE. Sensation intact. Pt performed STS with SBA, demonstrating good standing balance with a cane. Pt declined ambulating in hallway but agreeable to ambulating around room, so pt ambulated 46ft with cane and SBA, demonstrating wide TERE with slow, shuffling gait with decreased step clearance but otherwise steady, before returning to bedside chair. Pt then performed seated exercises with verbal/manual/visual cuing for technique. Pt left upright in chair with all needs met and within reach. Yaritza Montanez will benefit from skilled PT (medically necessary) to address decreased strength, decreased balance, decreased functional tolerance, decreased cardiopulmonary endurance affecting participation in basic ADLs and functional tasks. This section established at most recent assessment   PROBLEM LIST (Impairments causing functional limitations):  1. Decreased Strength  2. Decreased ADL/Functional Activities  3. Decreased Transfer Abilities  4. Decreased Ambulation Ability/Technique  5. Decreased Balance  6. Increased Pain  7. Decreased Activity Tolerance  8. Decreased Pacing Skills  9. Decreased Work Simplification/Energy Conservation Techniques  10. Increased Fatigue  11. Decreased [place] with Home Exercise Program   INTERVENTIONS PLANNED: (Benefits and precautions of physical therapy have been discussed with the patient.)  1. Balance Exercise  2. Bed Mobility  3. Family Education  4. Gait Training  5. Home Exercise Program (HEP)  6. Manual Therapy  7. Neuromuscular Re-education/Strengthening  8. Range of Motion (ROM)  9. Therapeutic Activites  10. Therapeutic Exercise/Strengthening  11. Transfer Training  12.  Group Therapy     TREATMENT PLAN: Frequency/Duration: 3 times a week for duration of hospital stay  Rehabilitation Potential For Stated Goals: Good     RECOMMENDED REHABILITATION/EQUIPMENT: (at time of discharge pending progress): Due to the probability of continued deficits (see above) this patient will likely need continued skilled physical therapy after discharge. Equipment:    None at this time              HISTORY:   History of Present Injury/Illness (Reason for Referral):  Delmy Bo is a 80 y.o. female with a past medical history of BPAD, asthma, GERD who presents to the ER with complaint of weakness and lethargy for the past 3-4 days. Per the patient's daughter, she also began asking nonsensical questions yesterday evening. She also has had cough and chest congestion for the past week, as well as urinary hesitance for the past 1-2 days. The patient herself reports feeling tired and weak but denies any fevers, nausea, vomiting, abdominal pain, dysuria. .  Past Medical History/Comorbidities:   Ms. Nabeel Yee  has a past medical history of Acute renal failure [facility]) (July, 2014); Arthritis; Asthma; Asthma with acute exacerbation (9/28/2017); Bilateral pulmonary embolism [facility]) (September, 2012); Calculus of ureter (May, 2015); CAP (community acquired pneumonia) (October, 2012); Cellulitis of left lower extremity (11/1/2017); Chronic pain; Community acquired bacterial pneumonia (4/2/2018); CVA (cerebral infarction) (January, 2012); Gastric ulcer (July, 2014); GERD (gastroesophageal reflux disease); Gram-positive bacteremia 1/2 cx (12/26/2016); HCAP (healthcare-associated pneumonia) (January, 2013); HCAP (healthcare-associated pneumonia) (February, 2013); Hypertension; Left leg DVT ([address] (1991); Morbid obesity ([address] Psychiatric disorder; PUD (peptic ulcer disease) (???); Sepsis due to pneumonia ([address] (4/2/2018); Stroke [facility]); and Thyroid disease. She also has no past medical history of Aneurysm ([address] Arrhythmia; Autoimmune disease ([address] CAD (coronary artery disease); Cancer ([address] Chronic kidney disease; Chronic obstructive pulmonary disease ([address]  Coagulation disorder ([facility] COPD; Diabetes ([facility] Endocarditis; Heart failure ([facility] Liver disease; Nicotine vapor product user; Non-nicotine vapor product user; Rheumatic fever; Seizures ([facility] or Unspecified sleep apnea. Ms. Lokesh Nur  has a past surgical history that includes hx appendectomy; hx cholecystectomy; hx gastric bypass; hx tonsillectomy; hx other surgical; hx orthopaedic; hx hysterectomy; and hx endoscopy (July, 2014). Social History/Living Environment:   Home Environment: Trailer/mobile home  # Steps to Enter: 3  Rails to Enter: Yes  [employer] : Right  Wheelchair Ramp: No  One/Two Story Residence: One story  Living Alone: No  Support Systems: Child(jaswant)  Patient Expects to be Discharged to[de-identified] Trailer/mobile home  Current DME Used/Available at Home: Cane, straight, Shower chair  Tub or Shower Type: Tub/Shower combination  Prior Level of Function/Work/Activity:  Living with daughter who assists her with ADLS, ambulates with cane for household and community distances, no falls, chronic low back pain   Number of Personal Factors/Comorbidities that affect the Plan of Care: 3+: HIGH COMPLEXITY   EXAMINATION:   Most Recent Physical Functioning:   Gross Assessment:  AROM: Generally decreased, functional (R weaker than L)  Strength: Generally decreased, functional (R weaker than L)  Coordination: Within functional limits  Tone: Normal  Sensation: Intact               Posture:     Balance:  Sitting: Intact  Standing: Impaired  Standing - Static: Good  Standing - Dynamic : Fair Bed Mobility:     Wheelchair Mobility:     Transfers:  Sit to Stand: Stand-by assistance  Stand to Sit: Stand-by assistance  Gait:     Base of Support: Widened  Speed/Judith: Shuffled; Slow  Step Length: Left shortened;Right shortened  Gait Abnormalities: Decreased step clearance;Trunk sway increased  Distance (ft): 46 Feet (ft)  Assistive Device: Cane, straight  Ambulation - Level of Assistance: Stand-by assistance      Body Structures Involved:  1. Heart  2. Lungs  3. Bones  4. Joints  5. Muscles  6. Ligaments Body Functions Affected:  1. Sensory/Pain  2. Cardio  3. Respiratory  4. Neuromusculoskeletal  5. Movement Related Activities and Participation Affected:  1. Mobility  2. Self Care  3. Domestic Life  4. Interpersonal Interactions and Relationships  5. Community, Social and [place]   Number of elements that affect the Plan of Care: 4+: HIGH COMPLEXITY   CLINICAL PRESENTATION:   Presentation: Stable and uncomplicated: LOW COMPLEXITY   CLINICAL DECISION MAKIN [facility] Mobility Inpatient Short Form  How much difficulty does the patient currently have. .. Unable A Lot A Little None   1. Turning over in bed (including adjusting bedclothes, sheets and blankets)? [] 1   [] 2   [x] 3   [] 4   2. Sitting down on and standing up from a chair with arms ( e.g., wheelchair, bedside commode, etc.)   [] 1   [] 2   [x] 3   [] 4   3. Moving from lying on back to sitting on the side of the bed? [] 1   [] 2   [x] 3   [] 4   How much help from another person does the patient currently need. .. Total A Lot A Little None   4. Moving to and from a bed to a chair (including a wheelchair)? [] 1   [] 2   [x] 3   [] 4   5. Need to walk in hospital room? [] 1   [] 2   [x] 3   [] 4   6. Climbing 3-5 steps with a railing? [] 1   [x] 2   [] 3   [] 4   © , Trustees of [facility], under license to [employer]. All rights reserved      Score:  Initial: 17 Most Recent: X (Date: -- )    Interpretation of Tool:  Represents activities that are increasingly more difficult (i.e. Bed mobility, Transfers, Gait). Score 24 23 22-20 19-15 14-10 9-7 6     Modifier CH CI CJ CK CL CM CN      ?  Mobility - Walking and Moving Around:     - CURRENT STATUS: CK - 40%-59% impaired, limited or restricted    - GOAL STATUS: CJ - 20%-39% impaired, limited or restricted    - D/C STATUS:  ---------------To be determined---------------  [place] / Product Type: Medicare /      Medical Necessity:     · Patient is expected to demonstrate progress in strength, range of motion, balance and coordination to improve safety during ambulation and functional mobility. Reason for Services/Other Comments:  · Patient continues to require skilled intervention due to impaired activity tolerance, functional mobility, and strength. Use of outcome tool(s) and clinical judgement create a POC that gives a: Clear prediction of patient's progress: LOW COMPLEXITY            TREATMENT:   (In addition to Assessment/Re-Assessment sessions the following treatments were rendered)   Pre-treatment Symptoms/Complaints:  \"Back feels a little better now that I am up in the chair\"  Pain: Initial:   Pain Intensity 1: 3  Pain Location 1: Back  Pain Orientation 1: Lower  Post Session:  Increased with seated hip flexion, 3/10 end of session   In addition to PT Evaluation:  Therapeutic Exercise: (  8 minutes):  Exercises per grid below to improve mobility, strength, balance and coordination. Required minimal visual, verbal and manual cues to promote proper body alignment and promote proper body mechanics. Progressed repetitions as indicated.      Date:  7/20/2018 Date:   Date:     Activity/Exercise Parameters Parameters Parameters   Seated LAQ 10 B     Seated quad sets 10 B 3 second hold     Seated ankle pumps 20 B     Seated heel slides 10 B AAROM     Seated hip ABD/ADD 10 B AAROM                       Braces/Orthotics/Lines/Etc:   · IV  · O2 Device: Room air  Treatment/Session Assessment:    · Response to Treatment:  Ambulated 46 ft around room with SBA and cane  · Interdisciplinary Collaboration:   o Physical Therapist  o Registered Nurse  · After treatment position/precautions:   o Up in chair  o Bed/Chair-wheels locked  o Bed in low position  o Call light within reach   · Compliance with Program/Exercises: Will assess as treatment progresses. · Recommendations/Intent for next treatment session: \"Next visit will focus on advancements to more challenging activities and reduction in assistance provided\".   Total Treatment Duration:  PT Patient Time In/Time Out  Time In: 1131  Time Out: [address], PT

## 2018-07-20 NOTE — ROUTINE PROCESS
END OF SHIFT NOTE:    INTAKE/OUTPUT  07/19 0701 - 07/20 0700  In: 8790 [P.O.:1190; I.V.:1826]  Out: 1350 [Urine:1200]  Voiding: YES  Catheter: NO  Drain:              Flatus: Patient does have flatus present. Stool:  0 occurrences. Characteristics:       Emesis: 0 occurrences. Characteristics:   Emesis Assessment  Appearance: Yellow  Emesis Amount: Small    VITAL SIGNS  Patient Vitals for the past 12 hrs:   Temp Pulse Resp BP SpO2   07/20/18 0704 - - - - 97 %   07/20/18 0316 98.1 °F (36.7 °C) 87 18 150/79 93 %   07/19/18 1950 98.7 °F (37.1 °C) 90 18 126/84 92 %       Pain Assessment  Pain Intensity 1: 6 (07/19/18 1745)  Pain Location 1: Back  Pain Intervention(s) 1: Medication (see MAR)  Patient Stated Pain Goal: 4    Ambulating  Yes    Shift report given to oncoming nurse at the bedside.     Cleveland Wright LPN

## 2018-07-21 VITALS
HEIGHT: 61 IN | TEMPERATURE: 97.8 F | WEIGHT: 291.7 LBS | BODY MASS INDEX: 55.07 KG/M2 | SYSTOLIC BLOOD PRESSURE: 160 MMHG | OXYGEN SATURATION: 9 % | RESPIRATION RATE: 18 BRPM | HEART RATE: 83 BPM | DIASTOLIC BLOOD PRESSURE: 91 MMHG

## 2018-07-21 LAB
ANION GAP SERPL CALC-SCNC: 7 MMOL/L (ref 7–16)
BACTERIA SPEC CULT: ABNORMAL
BASOPHILS # BLD: 0 K/UL (ref 0–0.2)
BASOPHILS NFR BLD: 1 % (ref 0–2)
BUN SERPL-MCNC: 4 MG/DL (ref 8–23)
CALCIUM SERPL-MCNC: 7.8 MG/DL (ref 8.3–10.4)
CHLORIDE SERPL-SCNC: 111 MMOL/L (ref 98–107)
CO2 SERPL-SCNC: 29 MMOL/L (ref 21–32)
CREAT SERPL-MCNC: 0.86 MG/DL (ref 0.6–1)
DIFFERENTIAL METHOD BLD: ABNORMAL
EOSINOPHIL # BLD: 0.3 K/UL (ref 0–0.8)
EOSINOPHIL NFR BLD: 3 % (ref 0.5–7.8)
ERYTHROCYTE [DISTWIDTH] IN BLOOD BY AUTOMATED COUNT: 18.5 % (ref 11.9–14.6)
GLUCOSE SERPL-MCNC: 79 MG/DL (ref 65–100)
HCT VFR BLD AUTO: 30.6 % (ref 35.8–46.3)
HGB BLD-MCNC: 8.8 G/DL (ref 11.7–15.4)
IMM GRANULOCYTES # BLD: 0 K/UL (ref 0–0.5)
IMM GRANULOCYTES NFR BLD AUTO: 0 % (ref 0–5)
LYMPHOCYTES # BLD: 3.7 K/UL (ref 0.5–4.6)
LYMPHOCYTES NFR BLD: 46 % (ref 13–44)
MCH RBC QN AUTO: 23.7 PG (ref 26.1–32.9)
MCHC RBC AUTO-ENTMCNC: 28.8 G/DL (ref 31.4–35)
MCV RBC AUTO: 82.5 FL (ref 79.6–97.8)
MONOCYTES # BLD: 0.9 K/UL (ref 0.1–1.3)
MONOCYTES NFR BLD: 11 % (ref 4–12)
NEUTS SEG # BLD: 3.1 K/UL (ref 1.7–8.2)
NEUTS SEG NFR BLD: 39 % (ref 43–78)
PLATELET # BLD AUTO: 361 K/UL (ref 150–450)
PMV BLD AUTO: 9.7 FL (ref 10.8–14.1)
POTASSIUM SERPL-SCNC: 3.7 MMOL/L (ref 3.5–5.1)
RBC # BLD AUTO: 3.71 M/UL (ref 4.05–5.25)
SERVICE CMNT-IMP: ABNORMAL
SODIUM SERPL-SCNC: 147 MMOL/L (ref 136–145)
WBC # BLD AUTO: 8 K/UL (ref 4.3–11.1)

## 2018-07-21 PROCEDURE — 80048 BASIC METABOLIC PNL TOTAL CA: CPT | Performed by: FAMILY MEDICINE

## 2018-07-21 PROCEDURE — 74011250637 HC RX REV CODE- 250/637: Performed by: NURSE PRACTITIONER

## 2018-07-21 PROCEDURE — 74011000258 HC RX REV CODE- 258: Performed by: FAMILY MEDICINE

## 2018-07-21 PROCEDURE — 36415 COLL VENOUS BLD VENIPUNCTURE: CPT | Performed by: FAMILY MEDICINE

## 2018-07-21 PROCEDURE — 74011250637 HC RX REV CODE- 250/637: Performed by: FAMILY MEDICINE

## 2018-07-21 PROCEDURE — 74011250636 HC RX REV CODE- 250/636: Performed by: INTERNAL MEDICINE

## 2018-07-21 PROCEDURE — 85025 COMPLETE CBC W/AUTO DIFF WBC: CPT | Performed by: FAMILY MEDICINE

## 2018-07-21 PROCEDURE — 74011250636 HC RX REV CODE- 250/636: Performed by: FAMILY MEDICINE

## 2018-07-21 RX ORDER — CEFPODOXIME PROXETIL 200 MG/1
200 TABLET, FILM COATED ORAL 2 TIMES DAILY
Qty: 8 TAB | Refills: 0 | Status: SHIPPED | OUTPATIENT
Start: 2018-07-21 | End: 2018-07-25

## 2018-07-21 RX ORDER — PHENAZOPYRIDINE HYDROCHLORIDE 95 MG/1
95 TABLET ORAL 3 TIMES DAILY
Qty: 6 TAB | Refills: 0 | Status: SHIPPED | OUTPATIENT
Start: 2018-07-21 | End: 2018-07-23

## 2018-07-21 RX ADMIN — DULOXETINE HYDROCHLORIDE 60 MG: 60 CAPSULE, DELAYED RELEASE ORAL at 08:20

## 2018-07-21 RX ADMIN — ONDANSETRON 4 MG: 2 INJECTION, SOLUTION INTRAMUSCULAR; INTRAVENOUS at 10:28

## 2018-07-21 RX ADMIN — CEFTRIAXONE SODIUM 1 G: 1 INJECTION, POWDER, FOR SOLUTION INTRAMUSCULAR; INTRAVENOUS at 00:54

## 2018-07-21 RX ADMIN — PREGABALIN 150 MG: 150 CAPSULE ORAL at 08:21

## 2018-07-21 RX ADMIN — OXYBUTYNIN CHLORIDE 10 MG: 10 TABLET, FILM COATED, EXTENDED RELEASE ORAL at 08:21

## 2018-07-21 RX ADMIN — FUROSEMIDE 40 MG: 40 TABLET ORAL at 08:20

## 2018-07-21 RX ADMIN — SUCRALFATE 1 G: 1 TABLET ORAL at 07:30

## 2018-07-21 RX ADMIN — METOCLOPRAMIDE HYDROCHLORIDE 5 MG: 10 TABLET ORAL at 08:20

## 2018-07-21 RX ADMIN — APIXABAN 5 MG: 5 TABLET, FILM COATED ORAL at 08:19

## 2018-07-21 RX ADMIN — Medication 10 ML: at 06:06

## 2018-07-21 RX ADMIN — CARVEDILOL 12.5 MG: 12.5 TABLET, FILM COATED ORAL at 08:19

## 2018-07-21 RX ADMIN — PANTOPRAZOLE SODIUM 40 MG: 40 TABLET, DELAYED RELEASE ORAL at 06:05

## 2018-07-21 RX ADMIN — ALPRAZOLAM 2 MG: 0.5 TABLET ORAL at 10:28

## 2018-07-21 RX ADMIN — POTASSIUM CHLORIDE 10 MEQ: 10 TABLET, EXTENDED RELEASE ORAL at 08:21

## 2018-07-21 RX ADMIN — URINARY PAIN RELIEF 95 MG: 95 TABLET ORAL at 08:21

## 2018-07-21 RX ADMIN — MAGNESIUM OXIDE TAB 400 MG (241.3 MG ELEMENTAL MG) 400 MG: 400 (241.3 MG) TAB at 08:20

## 2018-07-21 RX ADMIN — NYSTATIN: 100000 POWDER TOPICAL at 10:31

## 2018-07-21 NOTE — DISCHARGE INSTRUCTIONS
DISCHARGE SUMMARY from Nurse    PATIENT INSTRUCTIONS:    After general anesthesia or intravenous sedation, for 24 hours or while taking prescription Narcotics:  · Limit your activities  · Do not drive and operate hazardous machinery  · Do not make important personal or business decisions  · Do  not drink alcoholic beverages  · If you have not urinated within 8 hours after discharge, please contact your surgeon on call. Report the following to your surgeon:  · Excessive pain, swelling, redness or odor of or around the surgical area  · Temperature over 100.5  · Nausea and vomiting lasting longer than 4 hours or if unable to take medications  · Any signs of decreased circulation or nerve impairment to extremity: change in color, persistent  numbness, tingling, coldness or increase pain  · Any questions    What to do at Home:  Recommended activity: Activity as tolerated and no driving for today    If you experience any of the following symptoms fever of 101 or greater, pain unrelieved by medication, uncontrollable nausea or vomiting, difficulty urinating, please follow up with PCP. *  Please give a list of your current medications to your Primary Care Provider. *  Please update this list whenever your medications are discontinued, doses are      changed, or new medications (including over-the-counter products) are added. *  Please carry medication information at all times in case of emergency situations. These are general instructions for a healthy lifestyle:    No smoking/ No tobacco products/ Avoid exposure to second hand smoke  Surgeon General's Warning:  Quitting smoking now greatly reduces serious risk to your health.     Obesity, smoking, and sedentary lifestyle greatly increases your risk for illness    A healthy diet, regular physical exercise & weight monitoring are important for maintaining a healthy lifestyle    You may be retaining fluid if you have a history of heart failure or if you experience any of the following symptoms:  Weight gain of 3 pounds or more overnight or 5 pounds in a week, increased swelling in our hands or feet or shortness of breath while lying flat in bed. Please call your doctor as soon as you notice any of these symptoms; do not wait until your next office visit. Recognize signs and symptoms of STROKE:    F-face looks uneven    A-arms unable to move or move unevenly    S-speech slurred or non-existent    T-time-call 911 as soon as signs and symptoms begin-DO NOT go       Back to bed or wait to see if you get better-TIME IS BRAIN. Warning Signs of HEART ATTACK     Call 911 if you have these symptoms:   Chest discomfort. Most heart attacks involve discomfort in the center of the chest that lasts more than a few minutes, or that goes away and comes back. It can feel like uncomfortable pressure, squeezing, fullness, or pain.  Discomfort in other areas of the upper body. Symptoms can include pain or discomfort in one or both arms, the back, neck, jaw, or stomach.  Shortness of breath with or without chest discomfort.  Other signs may include breaking out in a cold sweat, nausea, or lightheadedness. Don't wait more than five minutes to call 911 - MINUTES MATTER! Fast action can save your life. Calling 911 is almost always the fastest way to get lifesaving treatment. Emergency Medical Services staff can begin treatment when they arrive -- up to an hour sooner than if someone gets to the hospital by car. The discharge information has been reviewed with the patient. The patient verbalized understanding. Discharge medications reviewed with the patient and appropriate educational materials and side effects teaching were provided. Learning About Metabolic Encephalopathy  What is metabolic encephalopathy? Metabolic encephalopathy is a problem in the brain. It is caused by a chemical imbalance in the blood.  The imbalance is caused by an illness or organs that are not working as well as they should. It is not caused by a head injury. When the imbalance affects the brain, it can lead to personality changes. It can also make it harder to think clearly and remember things. The problems may only last a short time if you get treatment right away. But this depends on the cause. If the imbalance has been building up because you've been sick for a long time, the mental changes may be more severe. They may also last longer. What happens when you have this problem? When things are working right, your body has many ways to keep the chemicals in your blood in balance. For example, your liver and kidneys remove waste from your blood. The kidneys also help keep fluids and sodium in balance. And your pancreas makes insulin. It is a hormone that helps control the amount of sugar in your blood. But the chemicals in your blood can get out of balance and damage parts of your body because of a medical problem. This may be kidney or liver failure. Or it could be diabetes that isn't controlled well. When the imbalance affects the brain, normal thinking and behavior can change. What are the symptoms? Symptoms may include:  · Confusion. · Problems with thinking and remembering. · Being grouchy and depressed. · Feeling drowsy. · Not being able to sleep. · Passing out (fainting) now and then. How is it treated? The doctor will try to find the illness that's causing the problem. He or she may ask questions about your past health. The doctor will also do tests to find what is causing the chemical imbalance and to see how severe it is. The doctor may treat the organ system that's causing the problem. For example, if it's a kidney problem, you may have treatment to help your kidneys work better. If you have an infection, you may need antibiotics. If the doctor can't treat the cause of the problem, he or she will treat the symptoms.   The doctor will carefully watch your blood chemicals to make sure that your treatment is being done safely. Follow-up care is a key part of your treatment and safety. Be sure to make and go to all appointments, and call your doctor if you are having problems. It's also a good idea to know your test results and keep a list of the medicines you take. Where can you learn more? Go to http://era-fransisco.info/. Enter F163 in the search box to learn more about \"Learning About Metabolic Encephalopathy. \"  Current as of: May 12, 2017  Content Version: 11.7  © 4639-4167 [employer]. Care instructions adapted under license by [employer] (which disclaims liability or warranty for this information). If you have questions about a medical condition or this instruction, always ask your healthcare professional. [address] any warranty or liability for your use of this information. ___________________________________________________________________________________________________________________________________         Urinary Tract Infection in Women: Care Instructions  Your Care Instructions    A urinary tract infection, or UTI, is a general term for an infection anywhere between the kidneys and the urethra (where urine comes out). Most UTIs are bladder infections. They often cause pain or burning when you urinate. UTIs are caused by bacteria and can be cured with antibiotics. Be sure to complete your treatment so that the infection goes away. Follow-up care is a key part of your treatment and safety. Be sure to make and go to all appointments, and call your doctor if you are having problems. It's also a good idea to know your test results and keep a list of the medicines you take. How can you care for yourself at home? · Take your antibiotics as directed. Do not stop taking them just because you feel better. You need to take the full course of antibiotics.   · Drink extra water and other fluids for the next day or two. This may help wash out the bacteria that are causing the infection. (If you have kidney, heart, or liver disease and have to limit fluids, talk with your doctor before you increase your fluid intake.)  · Avoid drinks that are carbonated or have caffeine. They can irritate the bladder. · Urinate often. Try to empty your bladder each time. · To relieve pain, take a hot bath or lay a heating pad set on low over your lower belly or genital area. Never go to sleep with a heating pad in place. To prevent UTIs  · Drink plenty of water each day. This helps you urinate often, which clears bacteria from your system. (If you have kidney, heart, or liver disease and have to limit fluids, talk with your doctor before you increase your fluid intake.)  · Urinate when you need to. · Urinate right after you have sex. · Change sanitary pads often. · Avoid douches, bubble baths, feminine hygiene sprays, and other feminine hygiene products that have deodorants. · After going to the bathroom, wipe from front to back. When should you call for help? Call your doctor now or seek immediate medical care if:    · Symptoms such as fever, chills, nausea, or vomiting get worse or appear for the first time.     · You have new pain in your back just below your rib cage. This is called flank pain.     · There is new blood or pus in your urine.     · You have any problems with your antibiotic medicine.    Watch closely for changes in your health, and be sure to contact your doctor if:    · You are not getting better after taking an antibiotic for 2 days.     · Your symptoms go away but then come back. Where can you learn more? Go to http://era-fransisco.info/. Enter W259 in the search box to learn more about \"Urinary Tract Infection in Women: Care Instructions. \"  Current as of: May 12, 2017  Content Version: 11.7  © 3662-9293 [employer], [employer].  Care instructions adapted under license by [employer] (which disclaims liability or warranty for this information). If you have questions about a medical condition or this instruction, always ask your healthcare professional. [address] any warranty or liability for your use of this information.

## 2018-07-21 NOTE — DISCHARGE SUMMARY
Discharge Summary   Patient ID:  Delmy Bo  [id number]  89 y.o.  1950  Admit date: 7/18/2018  8:47 PM  Discharge date and time: 7/21/2018  Attending: Colletta Merl, MD  PCP:  Ana Rivers DO  Treatment Team: Attending Provider: Colletta Merl, MD; Utilization Review: Scottie Campbell RN  Principal Diagnosis Acute metabolic encephalopathy   Principal Problem:    Acute metabolic encephalopathy (3/47/0449)    Active Problems:    Hypertension (9/16/2012)      GERD (gastroesophageal reflux disease) (9/16/2012)      Hypothyroidism (9/28/2017)      Asthma (9/28/2017)      Bipolar affective disorder ([facility] [address] (9/16/2012)      History of peptic ulcer disease (1/13/2013)      Leukocytosis (7/19/2018)      UTI (urinary tract infection) (7/19/2018)      Normocytic anemia (8/28/7957)      Metabolic encephalopathy (4/33/2423)       * Admission Diagnoses: Metabolic encephalopathy  * Discharge Diagnoses:    Hospital Problems as of 7/21/2018  Date Reviewed: 5/23/2018          Codes Class Noted - Resolved POA    Leukocytosis ICD-10-CM: D72.829  ICD-9-CM: 288.60  7/19/2018 - Present Yes        UTI (urinary tract infection) ICD-10-CM: N39.0  ICD-9-CM: 599.0  7/19/2018 - Present Yes        Normocytic anemia ICD-10-CM: D64.9  ICD-9-CM: 285.9  7/19/2018 - Present Yes        * (Principal)Acute metabolic encephalopathy RDQ-[id number]: G93.41  ICD-9-CM: 348.31  7/19/2018 - Present Yes        Metabolic encephalopathy FFT-71-MM: G93.41  ICD-9-CM: 348.31  7/19/2018 - Present Unknown        Hypothyroidism (Chronic) ICD-10-CM: E03.9  ICD-9-CM: 244.9  9/28/2017 - Present Yes        Asthma (Chronic) ICD-10-CM: J45.909  ICD-9-CM: 493.90  9/28/2017 - Present Yes        History of peptic ulcer disease (Chronic) ICD-10-CM: Z87.11  ICD-9-CM: V12.71  1/13/2013 - Present Yes        Hypertension (Chronic) ICD-10-CM: I10  ICD-9-CM: 401.9  9/16/2012 - Present Yes        GERD (gastroesophageal reflux disease) (Chronic) ICD-10-CM: K21.9  ICD-9-CM: 530.81  9/16/2012 - Present Yes        Bipolar affective disorder ([facility] [address] (Chronic) ICD-10-CM: F31.9  ICD-9-CM: 296.80  9/16/2012 - Present Yes                Hospital Course:  Ms. Marly Orozco is a 79 yo female with PMH of asthma, GERD, PAD, bipolar affective disorder, hx PE on eliquis who presented with c/o weakness and lethargy for 3-4 days prior to admission. Daughter reported confusion. C/o cough, chest congestion and urinary hesitancy for 1-2 days prior to admission. Also c/o RLE edema, pain. CXR without acute findings. Right LE US neg for DVT. Had leukocytosis on admission which has since resolved. UA consistent with UTI. Urine cx growing e.coli susceptible to rocephin. Pt started on rocephin on admission. Denies CP, SOB, n/v/d, abd pain. Diagnostic Study/Procedure results summary copied from within [facility] EMR:  Frontal and lateral views of the chest      COMPARISON: May 21, 2018     INDICATION: Cough and shortness of breath     FINDINGS:      Lungs are underinflated. There is linear atelectasis at the left lung base. No  consolidation, pneumothorax or pulmonary edema. No pleural effusion. Cardiac  mediastinal contour is within normal limits. There is a stimulator battery pack  overlying the left hemithorax, similar to prior.     IMPRESSION  IMPRESSION:     No evidence of acute pulmonary disease. No significant change. History: Wheezing     Exam: portable chest     Comparison: 7/18/2018     Findings: Similar battery pack again noted on the left. No focal alveolar  infiltrate or pleural effusion.  No change in the appearance of the mediastinal  contour or osseous structures.     Impressions: Stable portable chest.                 RIGHT LOWER EXTREMITY DEEP VENOUS SONOGRAPHY:     CLINICAL HISTORY: Moderate right leg pain and swelling for 3 days with a remote  history of DVT.     COMPARISON: September 16, 2012.     FINDINGS: Multiple images from real time ultrasound evaluation of the deep  venous system of the right leg demonstrate normal venous flow in the posterior  tibial, popliteal, and superficial and common femoral veins. Normal  compressibility was demonstrated. Flow was also documented in the proximal  saphenous and deep femoral veins. No intraluminal echogenic material was seen to  suggest the presence of nonobstructive thrombus.     IMPRESSION  IMPRESSION:  NO ULTRASOUND EVIDENCE OF DEEP VENOUS THROMBOSIS IN THE RIGHT LEG.                 Labs: Results:       Chemistry Recent Labs      07/21/18 0527 07/20/18 0418 07/19/18 0416   GLU  79  116*  116*   NA  147*  147*  144   K  3.7  3.4*  3.6   CL  111*  113*  109*   CO2  29  25  28   BUN  4*  7*  15   CREA  0.86  0.91  1.17*   CA  7.8*  7.5*  8.0*   AGAP  7  9  7      CBC w/Diff Recent Labs      07/21/18 0527 07/20/18 0418 07/19/18 0416   WBC  8.0  8.0  11.2*   RBC  3.71*  3.84*  3.80*   HGB  8.8*  9.1*  9.1*   HCT  30.6*  31.6*  30.8*   PLT  361  379  379   GRANS  39*  43  70   LYMPH  46*  43  20   EOS  3  2  2      Cardiac Enzymes No results for input(s): CPK, CKND1, BRAN in the last 72 hours. No lab exists for component: CKRMB, TROIP   Coagulation No results for input(s): PTP, INR, APTT in the last 72 hours. No lab exists for component: INREXT    Lipid Panel @BRIEFLAB(CHOL,CHOLPOCT,765894,545385,[id number],CHOLX,CHOLP,CHLST,CHOLV,072531,HDL,HDLPOC,HDLPOCT,824296,NHDLCT,[id number],HDLC,HDLP,LDL,LDLPOCT,LDLCPOC,474158,NLDLCT,DLDL,LDLC,DLDLP,483045,VLDLC,VLDL,TGL,TGLX,TRIGL,[id number],TRIGP,TGLPOCT,493947,642456,CHHD,CHHDX)@   BNP No results for input(s): BNPP in the last 72 hours. Liver Enzymes No results for input(s): TP, ALB, TBIL, AP, SGOT, GPT in the last 72 hours.     No lab exists for component: DBIL   Thyroid Studies Lab Results   Component Value Date/Time    T4, Total 4.9 09/16/2012 04:20 AM    T3 Uptake 41 (H) 09/16/2012 04:20 AM    TSH 0.259 (L) 04/10/2017 11:00 PM            Discharge Exam:  Visit Vitals    BP (!) 160/91    Pulse 83    Temp 97.8 °F (36.6 °C)    Resp 18    Ht 5' 1\" (1.549 m)    Wt 132.3 kg (291 lb 11.2 oz)    SpO2 (!) 9%    BMI 55.12 kg/m2       General:                 No acute distress    Lungs:                    CTA bilaterally. Resp even and non labored  Heart:                     S1S2 present without murmurs rubs gallops. RRR. No edema  Abdomen:              Soft, non tender on palpation, non distended. BS present. Neg CVA tenderness  Extremities:           Moves ext spontaneously. No cyanosis  Neurologic:            A/O X4. No focal deficits. Disposition: home  Discharge Condition: stable  Patient Instructions:   Current Discharge Medication List      START taking these medications    Details   phenazopyridine (PYRIDIUM) 95 mg tab Take 1 Tab by mouth three (3) times daily for 2 days. Qty: 6 Tab, Refills: 0         CONTINUE these medications which have CHANGED    Details   cefpodoxime (VANTIN) 200 mg tablet Take 1 Tab by mouth two (2) times a day for 4 days. Qty: 8 Tab, Refills: 0         CONTINUE these medications which have NOT CHANGED    Details   apixaban (ELIQUIS) 5 mg tablet Take 5 mg by mouth two (2) times a day. DULoxetine (CYMBALTA) 60 mg capsule Take 60 mg by mouth two (2) times a day. lisinopril (PRINIVIL, ZESTRIL) 10 mg tablet Take 10 mg by mouth nightly. meclizine (ANTIVERT) 25 mg tablet Take 25 mg by mouth three (3) times daily as needed. metoclopramide HCl (REGLAN) 5 mg tablet Take 5 mg by mouth Before breakfast, lunch, and dinner. albuterol-ipratropium (DUO-NEB) 2.5 mg-0.5 mg/3 ml nebu 3 mL by Nebulization route every four (4) hours as needed. mirtazapine (REMERON) 45 mg tablet Take 45 mg by mouth nightly. albuterol (PROAIR HFA) 90 mcg/actuation inhaler Take 2 Puffs by inhalation every four (4) hours as needed for Wheezing.   Qty: 1 Inhaler, Refills: 11      albuterol (PROVENTIL VENTOLIN) 2.5 mg /3 mL (0.083 %) nebulizer solution 3 mL by Nebulization route four (4) times daily. And every 4 hours as needed cough, wheezing, shortness of breath, J45.909, Medicare part B  Qty: 120 Each, Refills: 0      furosemide (LASIX) 40 mg tablet Take 1 Tab by mouth daily as needed. Qty: 20 Tab, Refills: 0      OXYGEN-AIR DELIVERY SYSTEMS 2 lpm cont. , 4 lpm at hs      fluticasone-vilanterol (BREO ELLIPTA) 200-25 mcg/dose inhaler Take 1 Puff by inhalation daily. RINSE MOUTH WELL AFTER USE  Qty: 3 Inhaler, Refills: 3      nystatin (MYCOSTATIN) powder Apply  to affected area two (2) times a day. Qty: 1 Bottle, Refills: 0      sucralfate (CARAFATE) 1 gram tablet Take 1 g by mouth four (4) times daily. pregabalin (LYRICA) 150 mg capsule Take  by mouth two (2) times a day. montelukast (SINGULAIR) 10 mg tablet Take 10 mg by mouth daily. naloxegol (MOVANTIK) 25 mg tab tablet Take  by mouth Daily (before breakfast). carvedilol (COREG) 12.5 mg tablet Take 12.5 mg by mouth two (2) times a day. atorvastatin (LIPITOR) 10 mg tablet Take 2 Tabs by mouth nightly. Qty: 30 Tab, Refills: 11      ALPRAZolam (XANAX) 2 mg tablet Take 1 Tab by mouth three (3) times daily as needed for Anxiety. Max Daily Amount: 6 mg. Qty: 30 Tab, Refills: 0      ondansetron (ZOFRAN ODT) 8 mg disintegrating tablet Take 8 mg by mouth every eight (8) hours as needed for Nausea. oxybutynin chloride XL (DITROPAN XL) 10 mg CR tablet Take 10 mg by mouth daily. Magnesium Oxide 500 mg cap Take 500 mg by mouth. HYDROcodone-acetaminophen (NORCO)  mg tablet Take 1 Tab by mouth every six (6) hours as needed for Pain. POTASSIUM CHLORIDE SR 10 MEQ TAB Take 1 Tab by mouth daily. Cholecalciferol, Vitamin D3, (VITAMIN D3) 1,000 unit cap Take  by mouth. folic acid 023 mcg tablet Take 800 mcg by mouth daily. pantoprazole (PROTONIX) 40 mg tablet Take 1 Tab by mouth Before breakfast and dinner.   Qty: 60 Tab, Refills: 2      FERROUS FUMARATE/VIT BCOMP&C (SUPER B COMPLEX PO) Take 1 Tab by mouth daily. NEBULIZER by Does Not Apply route. ziprasidone (GEODON) 80 mg capsule Take 80 mg by mouth nightly. SUMATRIPTAN SUCCINATE (IMITREX PO) take 100 mg by mouth as needed. levothyroxine (SYNTHROID) 125 mcg tablet Take 112 mcg by mouth every evening. STOP taking these medications       biotin 5,000 mcg subl Comments:   Reason for Stopping:         prasterone, dhea, (DHEA) 25 mg tab Comments:   Reason for Stopping:         irbesartan (AVAPRO) 300 mg tablet Comments:   Reason for Stopping:         albuterol sulfate (PROVENTIL;VENTOLIN) 2.5 mg/0.5 mL nebu nebulizer solution Comments:   Reason for Stopping:         UBIDECARENONE/VITAMIN E MIXED (COQ10  PO) Comments:   Reason for Stopping:               Activity: Activity as tolerated  Diet: Cardiac Diet and Diabetic Diet  Wound Care: None needed      Full Code   Surrogate decision maker:        Follow-up  ·   FU PCP 3 days  ·  DC to complete abx course with Vantin      Notes, labs, VS, diagnostic testing reviewed  Case discussed with pt, care team, Dr. Edgar Montgomery.        Time spent to discharge patient 45 min    Signed:  Nirmala Garcias NP  7/21/2018  8:16 AM

## 2018-07-21 NOTE — PROGRESS NOTES
END OF SHIFT NOTE:    INTAKE/OUTPUT  07/20 0701 - 07/21 0700  In: 2358 [P.O.:440; I.V.:887]  Out: 2825 [Urine:2825]  Voiding: YES  Catheter: external catheter  Drain:              Flatus: Patient does have flatus present. Stool:  0 occurrences. Characteristics:  Stool Assessment  Stool Color: Brown  Stool Appearance: Formed, Soft  Stool Amount: Large  Stool Source/Status: Rectum    Emesis: 0 occurrences. Characteristics:   Emesis Assessment  Appearance: Yellow  Emesis Amount: Small    VITAL SIGNS  Patient Vitals for the past 12 hrs:   Temp Pulse Resp BP SpO2   07/21/18 0330 98.3 °F (36.8 °C) 83 18 137/83 91 %   07/20/18 2300 98.8 °F (37.1 °C) (!) 102 18 116/64 92 %   07/20/18 2115 - - - - 96 %       Pain Assessment  Pain Intensity 1: 0 (07/20/18 1450)  Pain Location 1: Back  Pain Intervention(s) 1: Nurse notified  Patient Stated Pain Goal: 4    Ambulating  No    Shift report given to oncoming nurse at the bedside.     Queen Estefanía RN

## 2018-07-21 NOTE — PROGRESS NOTES
Discharge teaching complete. Patient and daughter verbalized understanding of diet, activity, s/sx as reviewed, and follow up appointment. Rx's and discharge instructions given to patient.

## 2018-07-24 ENCOUNTER — PATIENT OUTREACH (OUTPATIENT)
Dept: CASE MANAGEMENT | Age: 68
End: 2018-07-24

## 2018-07-24 NOTE — PROGRESS NOTES
Transition of Care Discharge Follow-up Questionnaire Date/Time of [facility] Outreach: 7/242018 3:15 PM  
What was the patient hospitalized for? 7/18/18 - 7/21/18 for acute metabolic encephalopathy Hx of hospitalized for CAP Does the patient understand his/her diagnosis and/or treatment and what happened during the hospitalization? 
  
CM Assessed Risk for Readmission: 
  
  
  
Patient stated Risk for Readmission: 
  Spoke to Patient who consented to [facility] outreach, and verbalized understanding of treatment and diagnosis. 
  
RRAT score 19, chronic health management and diagnosis, and patients decline of additional supports being put in place. Patient states readmission would be due to Pneumonia or [facility] or medical problem 
   
Did the patient receive discharge instructions? Patient states d/c instructions received. Review any discharge instructions (see notes in [facility]). Ask patient if they understand these. Do they have any questions? 
  Patient discussed discharge plan and instruction per [facility] Were home services ordered (nursing, PT, OT, ST, etc.)? No HH ordered at d/c. If so, has the first visit occurred? If not, why? (Assist with coordination of services if necessary.) N/A Was any DME ordered? No DME ordered at d/c. Patient states she uses home O2. If so, has it been received? If not, why?  (Assist with coordination of arranging DME orders if necessary.) N/A Complete a review of all medications (new, continued and discontinued meds per the D/C instructions and medication tab in [address] [place] [address]). Review of all meds - verbalizes understanding of meds Were all new prescriptions filled? If not, why?  (Assist with obtainment of medications if necessary.) Yes. Does the patient understand the purpose and dosing instructions for all medications? (If patient has questions, provide explanation and education.) Verbalized understanding and dosing instructions for all medications Does the patient have any problems in performing ADLs? (If patient is unable to perform ADLs  what is the limiting factor(s)? Do they have a support system that can assist? If no support system is present, discuss possible assistance that they may be able to obtain.) Patient states she is independent with some ADLs and that with others daughter assists with. Does the patient have all follow-up appointments scheduled?   
  
7 day f/up with PCP? 
  
7-14 day f/up with specialist? 
  
If f/up has not been made  what actions has the care coordinator made to accomplish this? 
  
Has transportation been arranged? 
  
[facility] follow-up should be within 7 days of discharge; all others should have PCP follow-up within 7 days of discharge; follow-ups with other specialists as appropriate or ordered.) PCP f/u 7/24/18   
  
  
 
  
 Patient denied the need for transportation. 
   
Any other questions or concerns expressed by the patient? 
  Gratitude stated and no further questions asked or needs identified. TAWNY Call Completed By:  
Al KIRAN RN, [facility] /  
c: [phone] / [email or web address]  
  This note will not be viewable in [address]

## 2018-07-31 ENCOUNTER — PATIENT OUTREACH (OUTPATIENT)
Dept: CASE MANAGEMENT | Age: 68
End: 2018-07-31

## 2018-07-31 NOTE — PROGRESS NOTES
Attempted outreach to patient for f/u - UTR at home or [employer] cell phone Will attempt outreach again tomorrow. This note will not be viewable in [address]

## 2018-08-01 ENCOUNTER — PATIENT OUTREACH (OUTPATIENT)
Dept: CASE MANAGEMENT | Age: 68
End: 2018-08-01

## 2018-08-01 NOTE — PROGRESS NOTES
Community Care Management  Follow up Outreach Note Outreach type: Phone call:  8/1/18 @ [employer] [place] visit:  
   
Reason for follow-up: 7/18/18 - 7/21/18 for acute metabolic encephalopathy Hx of hospitalized for CAP and UTI Patient was hospitalized at [facility] from 5/21/18 - 5/24/18 for Acute on Chronic Respiratory Failure with Hypercapnia Patient has had 7 hospitalizations over the past year   
Disease specific complaints/issues: 7/18/18 - 7/21/18 for acute metabolic encephalopathy Hx of hospitalized for CAP and UTI Patient was hospitalized at [facility] from 5/21/18 - 5/24/18 for Acute on Chronic Respiratory Failure with Hypercapnia Patient has had 7 hospitalizations over the past year  
   
Patient progress towards goals set from last contact: States that she will report any s/sx of UTI to PCP immediately Instruct Patient/Caregiver in strategies to prevent infection: frequent/proper hand-washing techniques, Universal precautions, Standard precautions, avoid crowds and persons with known infections, staying current with immunizations, s/s of infection, use of incentive spirometer, use of antibiotics and safe use and cleaning of equipment.   
CM Assessed Risk for Readmission:  
   
Patient stated Risk for Readmission: Patient is HRRP with an RRAT score of 19 Patient has had 7 hospitalizations over the past year including CAP, asthma, restrictive lung disease, acute metabolic encephalopathy, UTI 
   
   
SHOB, respiratory lung problems Has patient attended any PCP or specialist follow-up appointments since last contact? 
   
What was outcome of appointment? 
   
When is next follow-up scheduled?  
   
Patient has transportation to MD appointments Review medications. 
   
Any medication changes since last outreach? 
   
Does patient have any questions or issues related to their medications? Patient and daughter verbalize understanding of medications dosage and administration Denies any issues with medications at this time  
   
Meds reviewed and verbalizes understanding    
Home health active? If yes Adele Fatima issue? Progress? Declined home health Referrals needed? 
(SW, Diabetes education, HH, etc. ) Not at this time Other issues/Miscellaneous? (Transportation, access to meals, ability to perform ADLs, adequate caregiver support, etc.) Patient states she is independent with some ADLs and that with others daughter assists as needed A&O 
   
   
    
Next Outreach Scheduled: In one week  
    
   
Next Steps/Goals:  Report s/sx of UTI to PCP immediately   
Instruct Patient/Caregiver in strategies to prevent infection: frequent/proper hand-washing techniques, Universal precautions, Standard precautions, avoid crowds and persons with known infections, staying current with immunizations, s/s of infection, use of incentive spirometer, use of antibiotics and safe use and cleaning of equipment. Community Care Manager:  
Jimbo Jones RN, BSN, CCM /  
c: [phone] / [email or web address] This note will not be viewable in [address]

## 2018-08-08 ENCOUNTER — PATIENT OUTREACH (OUTPATIENT)
Dept: CASE MANAGEMENT | Age: 68
End: 2018-08-08

## 2018-08-08 NOTE — PROGRESS NOTES
Community Care Management  Follow up Outreach Note   Outreach type: Phone call:  8/8/18        Home visit:       Reason for follow-up: 7/18/18 - 7/21/18 for acute metabolic encephalopathy   Hx of hospitalized for CAP and UTI  Patient was hospitalized at [facility] from 5/21/18 - 5/24/18 for Acute on Chronic Respiratory Failure with Hypercapnia  Patient has had 7 hospitalizations over the past year    Disease specific complaints/issues: 7/18/18 - 7/21/18 for acute metabolic encephalopathy   Hx of hospitalized for CAP and UTI  Patient was hospitalized at [facility] from 5/21/18 - 5/24/18 for Acute on Chronic Respiratory Failure with Hypercapnia  Patient has had 7 hospitalizations over the past year       Patient progress towards goals set from last contact: States that she will report any s/sx of UTI to PCP immediately  Instruct Patient/Caregiver in strategies to prevent infection: frequent/proper hand-washing techniques, Universal precautions, Standard precautions, avoid crowds and persons with known infections, staying current with immunizations, s/s of infection, use of incentive spirometer, use of antibiotics and safe use and cleaning of equipment.    CM Assessed Risk for Readmission:       Patient stated Risk for Readmission: Patient is HRRP with an RRAT score of 19  Patient has had 7 hospitalizations over the past year including CAP, asthma, restrictive lung disease, acute metabolic encephalopathy, UTI          SHOB, respiratory lung problems   Has patient attended any PCP or specialist follow-up appointments since last contact?      What was outcome of appointment?      When is next follow-up scheduled?        Patient has transportation to MD appointments   Review medications.      Any medication changes since last outreach?      Does patient have any questions or issues related to their medications?  Patient and daughter verbalize understanding of medications dosage and administration  Denies any issues with medications at this time       Meds reviewed and verbalizes understanding     Home health active? If yes Suzy Mcginnis issue? Progress? Declined home health     Referrals needed?  (SW, Diabetes education, HH, etc. ) Not at this time    Other issues/Miscellaneous? (Transportation, access to meals, ability to perform ADLs, adequate caregiver support, etc.) Patient states she is independent with some ADLs and that with others daughter assists as needed   A&O               Next Outreach Scheduled: In one week            Next Steps/Goals:  Report s/sx of UTI to PCP immediately    Instruct Patient/Caregiver in strategies to prevent infection: frequent/proper hand-washing techniques, Universal precautions, Standard precautions, avoid crowds and persons with known infections, staying current with immunizations, s/s of infection, use of incentive spirometer, use of antibiotics and safe use and cleaning of equipment. Community Care Manager:   Brittani Wolf RN, BSN, [facility] /   c: [phone] / [email or web address]     This note will not be viewable in [address]

## 2018-08-15 ENCOUNTER — PATIENT OUTREACH (OUTPATIENT)
Dept: CASE MANAGEMENT | Age: 68
End: 2018-08-15

## 2018-08-15 NOTE — PROGRESS NOTES
Community Care Management  Follow up Outreach Note   Outreach type: Phone call: Nain Kolb visit:       Reason for follow-up: 7/18/18 - 7/21/18 for acute metabolic encephalopathy   Hx of hospitalized for CAP and UTI  Patient was hospitalized at [facility] from 5/21/18 - 5/24/18 for Acute on Chronic Respiratory Failure with Hypercapnia  Patient has had 7 hospitalizations over the past year    Disease specific complaints/issues: 7/18/18 - 7/21/18 for acute metabolic encephalopathy   Hx of hospitalized for CAP and UTI  Patient was hospitalized at [facility] from 5/21/18 - 5/24/18 for Acute on Chronic Respiratory Failure with Hypercapnia  Patient has had 7 hospitalizations over the past year       Patient progress towards goals set from last contact: States that she will report any s/sx of UTI to PCP immediately  Instruct Patient/Caregiver in strategies to prevent infection: frequent/proper hand-washing techniques, Universal precautions, Standard precautions, avoid crowds and persons with known infections, staying current with immunizations, s/s of infection, use of incentive spirometer, use of antibiotics and safe use and cleaning of equipment.    CM Assessed Risk for Readmission:       Patient stated Risk for Readmission: Patient is HRRP with an RRAT score of 19  Patient has had 7 hospitalizations over the past year including CAP, asthma, restrictive lung disease, acute metabolic encephalopathy, UTI          SHOB, respiratory lung problems   Has patient attended any PCP or specialist follow-up appointments since last contact?      What was outcome of appointment?      When is next follow-up scheduled?         Patient has transportation to MD appointments   Review medications.      Any medication changes since last outreach?      Does patient have any questions or issues related to their medications?  Patient and daughter verbalize understanding of medications dosage and administration  Denies any issues with medications at this time       Meds reviewed and verbalizes understanding     Home health active? If yes [facility] issue? Progress? Declined home health     Referrals needed?  (SW, Diabetes education, HH, etc. ) Not at this time    Other issues/Miscellaneous? (Transportation, access to meals, ability to perform ADLs, adequate caregiver support, etc.) Patient states she is independent with some ADLs and that with others daughter assists as needed   A&O               Next Outreach Scheduled: 30 day assess - patient stable at this time case closed and patient graduated            Next Steps/Goals: Case closed and patient graduated at this time - patient stable at this time    Report s/sx of UTI to PCP immediately    Instruct Patient/Caregiver in strategies to prevent infection: frequent/proper hand-washing techniques, Universal precautions, Standard precautions, avoid crowds and persons with known infections, staying current with immunizations, s/s of infection, use of incentive spirometer, use of antibiotics and safe use and cleaning of equipment. Community Care Manager:   Cyril Xiong RN, BSN, CCM /   c: [phone] / [email or web address]     This note will not be viewable in [address]

## 2018-08-20 ENCOUNTER — HOSPITAL ENCOUNTER (INPATIENT)
Age: 68
LOS: 7 days | Discharge: HOME HEALTH | DRG: 682 | End: 2018-08-27
Attending: EMERGENCY MEDICINE | Admitting: INTERNAL MEDICINE
Payer: MEDICARE

## 2018-08-20 ENCOUNTER — APPOINTMENT (OUTPATIENT)
Dept: GENERAL RADIOLOGY | Age: 68
DRG: 682 | End: 2018-08-20
Attending: EMERGENCY MEDICINE
Payer: MEDICARE

## 2018-08-20 ENCOUNTER — APPOINTMENT (OUTPATIENT)
Dept: ULTRASOUND IMAGING | Age: 68
DRG: 682 | End: 2018-08-20
Attending: EMERGENCY MEDICINE
Payer: MEDICARE

## 2018-08-20 DIAGNOSIS — M79.604 RIGHT LEG PAIN: ICD-10-CM

## 2018-08-20 DIAGNOSIS — N17.9 ACUTE KIDNEY INJURY (HCC): ICD-10-CM

## 2018-08-20 DIAGNOSIS — E87.1 HYPONATREMIA: Primary | ICD-10-CM

## 2018-08-20 DIAGNOSIS — F41.1 ANXIETY STATE: Chronic | ICD-10-CM

## 2018-08-20 PROBLEM — B37.0 THRUSH: Status: ACTIVE | Noted: 2018-08-20

## 2018-08-20 LAB
ALBUMIN SERPL-MCNC: 2.4 G/DL (ref 3.2–4.6)
ALBUMIN/GLOB SERPL: 0.6 {RATIO} (ref 1.2–3.5)
ALP SERPL-CCNC: 141 U/L (ref 50–136)
ALT SERPL-CCNC: 28 U/L (ref 12–65)
ANION GAP SERPL CALC-SCNC: 12 MMOL/L (ref 7–16)
AST SERPL-CCNC: 64 U/L (ref 15–37)
BASOPHILS # BLD: 0.1 K/UL
BASOPHILS NFR BLD: 0 % (ref 0–2)
BILIRUB SERPL-MCNC: 0.7 MG/DL (ref 0.2–1.1)
BNP SERPL-MCNC: 12 PG/ML
BUN SERPL-MCNC: 45 MG/DL (ref 8–23)
CALCIUM SERPL-MCNC: 8.5 MG/DL (ref 8.3–10.4)
CHLORIDE SERPL-SCNC: 67 MMOL/L (ref 98–107)
CO2 SERPL-SCNC: 39 MMOL/L (ref 21–32)
CREAT SERPL-MCNC: 2.56 MG/DL (ref 0.6–1)
DIFFERENTIAL METHOD BLD: ABNORMAL
EOSINOPHIL # BLD: 0 K/UL
EOSINOPHIL NFR BLD: 0 % (ref 0.5–7.8)
ERYTHROCYTE [DISTWIDTH] IN BLOOD BY AUTOMATED COUNT: 17.6 %
GLOBULIN SER CALC-MCNC: 3.9 G/DL (ref 2.3–3.5)
GLUCOSE SERPL-MCNC: 105 MG/DL (ref 65–100)
HCT VFR BLD AUTO: 31 % (ref 35.8–46.3)
HGB BLD-MCNC: 10.2 G/DL (ref 11.7–15.4)
IMM GRANULOCYTES # BLD: 0.1 K/UL
IMM GRANULOCYTES NFR BLD AUTO: 0 % (ref 0–5)
LYMPHOCYTES # BLD: 2.5 K/UL
LYMPHOCYTES NFR BLD: 17 % (ref 13–44)
MCH RBC QN AUTO: 25.1 PG (ref 26.1–32.9)
MCHC RBC AUTO-ENTMCNC: 32.9 G/DL (ref 31.4–35)
MCV RBC AUTO: 76.2 FL (ref 79.6–97.8)
MONOCYTES # BLD: 1.4 K/UL
MONOCYTES NFR BLD: 10 % (ref 4–12)
NEUTS SEG # BLD: 10.2 K/UL
NEUTS SEG NFR BLD: 72 % (ref 43–78)
NRBC # BLD: 0 K/UL (ref 0–0.2)
PLATELET # BLD AUTO: 491 K/UL (ref 150–450)
PMV BLD AUTO: 9.9 FL (ref 9.4–12.3)
POTASSIUM SERPL-SCNC: 3.5 MMOL/L (ref 3.5–5.1)
PROT SERPL-MCNC: 6.3 G/DL (ref 6.3–8.2)
RBC # BLD AUTO: 4.07 M/UL (ref 4.05–5.2)
SODIUM SERPL-SCNC: 118 MMOL/L (ref 136–145)
TROPONIN I SERPL-MCNC: <0.02 NG/ML (ref 0.02–0.05)
WBC # BLD AUTO: 14.2 K/UL (ref 4.3–11.1)

## 2018-08-20 PROCEDURE — 85025 COMPLETE CBC W/AUTO DIFF WBC: CPT

## 2018-08-20 PROCEDURE — 83880 ASSAY OF NATRIURETIC PEPTIDE: CPT

## 2018-08-20 PROCEDURE — 94760 N-INVAS EAR/PLS OXIMETRY 1: CPT

## 2018-08-20 PROCEDURE — 80053 COMPREHEN METABOLIC PANEL: CPT

## 2018-08-20 PROCEDURE — 77030011943

## 2018-08-20 PROCEDURE — 74011250636 HC RX REV CODE- 250/636: Performed by: INTERNAL MEDICINE

## 2018-08-20 PROCEDURE — 94640 AIRWAY INHALATION TREATMENT: CPT

## 2018-08-20 PROCEDURE — 74011250636 HC RX REV CODE- 250/636: Performed by: EMERGENCY MEDICINE

## 2018-08-20 PROCEDURE — 93005 ELECTROCARDIOGRAM TRACING: CPT | Performed by: EMERGENCY MEDICINE

## 2018-08-20 PROCEDURE — 93971 EXTREMITY STUDY: CPT

## 2018-08-20 PROCEDURE — 84443 ASSAY THYROID STIM HORMONE: CPT

## 2018-08-20 PROCEDURE — 65270000029 HC RM PRIVATE

## 2018-08-20 PROCEDURE — 82550 ASSAY OF CK (CPK): CPT

## 2018-08-20 PROCEDURE — 99284 EMERGENCY DEPT VISIT MOD MDM: CPT | Performed by: EMERGENCY MEDICINE

## 2018-08-20 PROCEDURE — 71045 X-RAY EXAM CHEST 1 VIEW: CPT

## 2018-08-20 PROCEDURE — 74011000250 HC RX REV CODE- 250: Performed by: INTERNAL MEDICINE

## 2018-08-20 PROCEDURE — 84484 ASSAY OF TROPONIN QUANT: CPT

## 2018-08-20 PROCEDURE — 94664 DEMO&/EVAL PT USE INHALER: CPT

## 2018-08-20 PROCEDURE — 74011250637 HC RX REV CODE- 250/637: Performed by: INTERNAL MEDICINE

## 2018-08-20 RX ORDER — AMOXICILLIN 250 MG
2 CAPSULE ORAL
Status: DISCONTINUED | OUTPATIENT
Start: 2018-08-20 | End: 2018-08-27 | Stop reason: HOSPADM

## 2018-08-20 RX ORDER — MECLIZINE HCL 12.5 MG 12.5 MG/1
25 TABLET ORAL
Status: DISCONTINUED | OUTPATIENT
Start: 2018-08-20 | End: 2018-08-27 | Stop reason: HOSPADM

## 2018-08-20 RX ORDER — IPRATROPIUM BROMIDE AND ALBUTEROL SULFATE 2.5; .5 MG/3ML; MG/3ML
3 SOLUTION RESPIRATORY (INHALATION)
Status: DISCONTINUED | OUTPATIENT
Start: 2018-08-20 | End: 2018-08-27 | Stop reason: HOSPADM

## 2018-08-20 RX ORDER — NYSTATIN 100000 [USP'U]/ML
500000 SUSPENSION ORAL 4 TIMES DAILY
Status: DISCONTINUED | OUTPATIENT
Start: 2018-08-20 | End: 2018-08-27 | Stop reason: HOSPADM

## 2018-08-20 RX ORDER — ACETAMINOPHEN 325 MG/1
650 TABLET ORAL
Status: DISCONTINUED | OUTPATIENT
Start: 2018-08-20 | End: 2018-08-27 | Stop reason: HOSPADM

## 2018-08-20 RX ORDER — SODIUM CHLORIDE 9 MG/ML
150 INJECTION, SOLUTION INTRAVENOUS CONTINUOUS
Status: DISCONTINUED | OUTPATIENT
Start: 2018-08-20 | End: 2018-08-21

## 2018-08-20 RX ORDER — LORAZEPAM 0.5 MG/1
.5-1 TABLET ORAL
Status: DISCONTINUED | OUTPATIENT
Start: 2018-08-20 | End: 2018-08-25

## 2018-08-20 RX ORDER — LEVOTHYROXINE SODIUM 112 UG/1
112 TABLET ORAL EVERY EVENING
Status: DISCONTINUED | OUTPATIENT
Start: 2018-08-20 | End: 2018-08-27 | Stop reason: HOSPADM

## 2018-08-20 RX ORDER — OXYBUTYNIN CHLORIDE 10 MG/1
10 TABLET, EXTENDED RELEASE ORAL DAILY
Status: DISCONTINUED | OUTPATIENT
Start: 2018-08-21 | End: 2018-08-27 | Stop reason: HOSPADM

## 2018-08-20 RX ORDER — ALPRAZOLAM 0.5 MG/1
2 TABLET ORAL
Status: DISCONTINUED | OUTPATIENT
Start: 2018-08-20 | End: 2018-08-23

## 2018-08-20 RX ORDER — NYSTATIN 100000 [USP'U]/G
POWDER TOPICAL 2 TIMES DAILY
Status: DISCONTINUED | OUTPATIENT
Start: 2018-08-20 | End: 2018-08-27 | Stop reason: HOSPADM

## 2018-08-20 RX ORDER — NALOXONE HYDROCHLORIDE 0.4 MG/ML
0.4 INJECTION, SOLUTION INTRAMUSCULAR; INTRAVENOUS; SUBCUTANEOUS AS NEEDED
Status: DISCONTINUED | OUTPATIENT
Start: 2018-08-20 | End: 2018-08-27 | Stop reason: HOSPADM

## 2018-08-20 RX ORDER — ZIPRASIDONE HYDROCHLORIDE 20 MG/1
80 CAPSULE ORAL
Status: DISCONTINUED | OUTPATIENT
Start: 2018-08-20 | End: 2018-08-27 | Stop reason: HOSPADM

## 2018-08-20 RX ORDER — HYDROCODONE BITARTRATE AND ACETAMINOPHEN 5; 325 MG/1; MG/1
1 TABLET ORAL
Status: DISCONTINUED | OUTPATIENT
Start: 2018-08-20 | End: 2018-08-25

## 2018-08-20 RX ORDER — LANOLIN ALCOHOL/MO/W.PET/CERES
400 CREAM (GRAM) TOPICAL DAILY
Status: DISCONTINUED | OUTPATIENT
Start: 2018-08-21 | End: 2018-08-27 | Stop reason: HOSPADM

## 2018-08-20 RX ORDER — SODIUM CHLORIDE 0.9 % (FLUSH) 0.9 %
5-10 SYRINGE (ML) INJECTION EVERY 8 HOURS
Status: DISCONTINUED | OUTPATIENT
Start: 2018-08-20 | End: 2018-08-27 | Stop reason: HOSPADM

## 2018-08-20 RX ORDER — PREGABALIN 25 MG/1
25 CAPSULE ORAL 2 TIMES DAILY
Status: DISCONTINUED | OUTPATIENT
Start: 2018-08-20 | End: 2018-08-24

## 2018-08-20 RX ORDER — ONDANSETRON 2 MG/ML
4 INJECTION INTRAMUSCULAR; INTRAVENOUS
Status: DISCONTINUED | OUTPATIENT
Start: 2018-08-20 | End: 2018-08-27 | Stop reason: HOSPADM

## 2018-08-20 RX ORDER — DULOXETIN HYDROCHLORIDE 60 MG/1
60 CAPSULE, DELAYED RELEASE ORAL 2 TIMES DAILY
Status: DISCONTINUED | OUTPATIENT
Start: 2018-08-20 | End: 2018-08-23

## 2018-08-20 RX ORDER — ZOLPIDEM TARTRATE 5 MG/1
5 TABLET ORAL
Status: DISCONTINUED | OUTPATIENT
Start: 2018-08-20 | End: 2018-08-27 | Stop reason: HOSPADM

## 2018-08-20 RX ORDER — METOCLOPRAMIDE 10 MG/1
5 TABLET ORAL
Status: DISCONTINUED | OUTPATIENT
Start: 2018-08-21 | End: 2018-08-21

## 2018-08-20 RX ORDER — HYDROCODONE BITARTRATE AND ACETAMINOPHEN 10; 325 MG/1; MG/1
1 TABLET ORAL
Status: DISCONTINUED | OUTPATIENT
Start: 2018-08-20 | End: 2018-08-27 | Stop reason: HOSPADM

## 2018-08-20 RX ORDER — SUCRALFATE 1 G/1
1 TABLET ORAL 4 TIMES DAILY
Status: DISCONTINUED | OUTPATIENT
Start: 2018-08-20 | End: 2018-08-27 | Stop reason: HOSPADM

## 2018-08-20 RX ORDER — ALBUTEROL SULFATE 0.83 MG/ML
2.5 SOLUTION RESPIRATORY (INHALATION)
Status: DISCONTINUED | OUTPATIENT
Start: 2018-08-20 | End: 2018-08-27 | Stop reason: HOSPADM

## 2018-08-20 RX ORDER — ALBUTEROL SULFATE 0.83 MG/ML
2.5 SOLUTION RESPIRATORY (INHALATION) 4 TIMES DAILY
Status: DISCONTINUED | OUTPATIENT
Start: 2018-08-20 | End: 2018-08-21

## 2018-08-20 RX ORDER — ONDANSETRON 4 MG/1
8 TABLET, ORALLY DISINTEGRATING ORAL
Status: DISCONTINUED | OUTPATIENT
Start: 2018-08-20 | End: 2018-08-21

## 2018-08-20 RX ORDER — PANTOPRAZOLE SODIUM 40 MG/1
40 TABLET, DELAYED RELEASE ORAL
Status: DISCONTINUED | OUTPATIENT
Start: 2018-08-21 | End: 2018-08-27 | Stop reason: HOSPADM

## 2018-08-20 RX ORDER — SODIUM CHLORIDE 0.9 % (FLUSH) 0.9 %
5-10 SYRINGE (ML) INJECTION AS NEEDED
Status: DISCONTINUED | OUTPATIENT
Start: 2018-08-20 | End: 2018-08-22 | Stop reason: SDUPTHER

## 2018-08-20 RX ORDER — MONTELUKAST SODIUM 10 MG/1
10 TABLET ORAL DAILY
Status: DISCONTINUED | OUTPATIENT
Start: 2018-08-21 | End: 2018-08-27 | Stop reason: HOSPADM

## 2018-08-20 RX ORDER — DIPHENHYDRAMINE HCL 25 MG
25 CAPSULE ORAL
Status: DISCONTINUED | OUTPATIENT
Start: 2018-08-20 | End: 2018-08-27 | Stop reason: HOSPADM

## 2018-08-20 RX ADMIN — SUCRALFATE 1 G: 1 TABLET ORAL at 23:59

## 2018-08-20 RX ADMIN — ZIPRASIDONE HYDROCHLORIDE 80 MG: 20 CAPSULE ORAL at 23:58

## 2018-08-20 RX ADMIN — ALBUTEROL SULFATE 2.5 MG: 2.5 SOLUTION RESPIRATORY (INHALATION) at 22:55

## 2018-08-20 RX ADMIN — APIXABAN 5 MG: 5 TABLET, FILM COATED ORAL at 23:59

## 2018-08-20 RX ADMIN — NYSTATIN: 100000 POWDER TOPICAL at 23:59

## 2018-08-20 RX ADMIN — MIRTAZAPINE 45 MG: 30 TABLET, FILM COATED ORAL at 23:58

## 2018-08-20 RX ADMIN — DULOXETINE 60 MG: 60 CAPSULE, DELAYED RELEASE ORAL at 23:59

## 2018-08-20 RX ADMIN — SODIUM CHLORIDE 150 ML/HR: 900 INJECTION, SOLUTION INTRAVENOUS at 21:55

## 2018-08-20 RX ADMIN — SODIUM CHLORIDE 1000 ML: 900 INJECTION, SOLUTION INTRAVENOUS at 20:27

## 2018-08-20 RX ADMIN — HYDROCODONE BITARTRATE AND ACETAMINOPHEN 1 TABLET: 5; 325 TABLET ORAL at 23:58

## 2018-08-20 NOTE — IP AVS SNAPSHOT
[address] [place] [address] 
[phone] Patient: Cherelle Cabrera MRN: [id number] NSS:[id number] About your hospitalization You were admitted on:  August 20, 2018 You last received care in the:  [facility] You were discharged on:  August 27, 2018 Why you were hospitalized Your primary diagnosis was:  Jerome (Acute Kidney Injury) (Hcc) Your diagnoses also included:  Hypertension, Hyponatremia, Hypothyroidism, Asthma, Gerd (Gastroesophageal Reflux Disease), Bipolar Affective Disorder (Hcc), Hx Of Pulmonary Embolus, Restrictive Lung Disease, Thrush, Uti (Urinary Tract Infection), Metabolic Encephalopathy, Debility Follow-up Information Follow up With Details Comments Contact Info Kimberly Jones DO On 9/4/2018 11:15 AM 23 [facility] [address] 
[phone] Your Scheduled Appointments Thursday September 20, 2018 12:30 PM EDT  
[facility] PHONE ASSESSMENT with [facility] PHONE APPOINTMENT [facility] Pre Assessment Formerly [facility] OR PRE ASSESSMENT) [address] [place] [address]  
[phone] Date/time displayed does not reflect when your phone assessment will be completed. Wednesday September 26, 2018 COLONOSCOPY with Wendi De La Garza MD  
[facility] ENDOSCOPY ([address] [place] [address]  
[phone] Discharge Orders None A check elvi indicates which time of day the medication should be taken. My Medications CHANGE how you take these medications Instructions Each Dose to Equal  
 Morning Noon Evening Bedtime ALPRAZolam 1 mg tablet Commonly known as:  Shirley Ndiaye What changed:   
- medication strength 
- how much to take - when to take this 
- additional instructions Your last dose was:  8/25 at 330 am  
   
 Take 1 Tab by mouth daily as needed for Anxiety. Use for 5 days as needed, then discontinue to get off benzodiazapenes completely  Indications: anxiety 1 mg DULoxetine 60 mg capsule Commonly known as:  CYMBALTA What changed:   
- when to take this 
- additional instructions Take 1 Cap by mouth nightly. Dose changed by inpatient psych 60 mg CONTINUE taking these medications Instructions Each Dose to Equal  
 Morning Noon Evening Bedtime * albuterol 2.5 mg /3 mL (0.083 %) nebulizer solution Commonly known as:  PROVENTIL VENTOLIN  
   
 3 mL by Nebulization route four (4) times daily. And every 4 hours as needed cough, wheezing, shortness of breath, J45.909, Medicare part B  
 2.5 mg  
    
  
   
  
   
  
   
  
  
 * albuterol 90 mcg/actuation inhaler Commonly known as:  PROAIR HFA Take 2 Puffs by inhalation every four (4) hours as needed for Wheezing. 2 Puff  
    
   
   
   
  
 albuterol-ipratropium 2.5 mg-0.5 mg/3 ml Nebu Commonly known as:  DUO-NEB  
   
 3 mL by Nebulization route every four (4) hours as needed. 3 mL  
    
   
   
   
  
 atorvastatin 10 mg tablet Commonly known as:  LIPITOR Take 2 Tabs by mouth nightly. 20 mg  
    
   
   
   
  
  
 CARAFATE 1 gram tablet Generic drug:  sucralfate Take 1 g by mouth four (4) times daily. 1 g  
    
  
   
  
   
  
   
  
  
 COREG 12.5 mg tablet Generic drug:  carvedilol Take 12.5 mg by mouth two (2) times a day. 12.5 mg  
    
  
   
   
  
   
  
 ELIQUIS 5 mg tablet Generic drug:  apixaban Take 5 mg by mouth two (2) times a day. 5 mg  
    
  
   
   
  
   
  
 fluticasone-vilanterol 200-25 mcg/dose inhaler Commonly known as:  BREO ELLIPTA Take 1 Puff by inhalation daily. RINSE MOUTH WELL AFTER USE  
 1 Puff GEODON 80 mg capsule Generic drug:  ziprasidone Take 80 mg by mouth nightly. 80 mg  
    
   
   
   
  
  
 IMITREX PO  
   
 take 100 mg by mouth as needed. 100 mg Magnesium Oxide 500 mg Cap Take 500 mg by mouth. 500 mg  
    
  
   
   
   
  
 meclizine 25 mg tablet Commonly known as:  ANTIVERT Take 25 mg by mouth three (3) times daily as needed. 25 mg  
    
   
   
   
  
 montelukast 10 mg tablet Commonly known as:  SINGULAIR Take 10 mg by mouth daily. 10 mg  
    
  
   
   
   
  
 MOVANTIK 25 mg Tab tablet Generic drug:  naloxegol Take  by mouth Daily (before breakfast). NEBULIZER  
   
 by Does Not Apply route. NORCO  mg tablet Generic drug:  HYDROcodone-acetaminophen Your last dose was:  8/26 at 430 pm  
   
 Take 1 Tab by mouth every six (6) hours as needed for Pain. 1 Tab  
    
   
   
   
  
 nystatin powder Commonly known as:  MYCOSTATIN Apply  to affected area two (2) times a day. oxybutynin chloride XL 10 mg CR tablet Commonly known as:  DITROPAN XL Take 10 mg by mouth daily. 10 mg OXYGEN-AIR DELIVERY SYSTEMS  
   
 2 lpm cont. , 4 lpm at hs  
     
   
   
   
  
 pantoprazole 40 mg tablet Commonly known as:  PROTONIX Take 1 Tab by mouth Before breakfast and dinner. 40 mg  
    
  
   
   
  
   
  
 REMERON 45 mg tablet Generic drug:  mirtazapine Take 45 mg by mouth nightly. 45 mg  
    
   
   
   
  
  
 SUPER B COMPLEX PO Take 1 Tab by mouth daily. 1 Tab SYNTHROID 125 mcg tablet Generic drug:  levothyroxine Take 112 mcg by mouth every evening. 112 mcg VITAMIN D3 1,000 unit Cap Generic drug:  cholecalciferol Take  by mouth. ZOFRAN ODT 8 mg disintegrating tablet Generic drug:  ondansetron Your last dose was:  8/27 at 5 am  
   
 Take 8 mg by mouth every eight (8) hours as needed for Nausea. 8 mg * Notice: This list has 2 medication(s) that are the same as other medications prescribed for you. Read the directions carefully, and ask your doctor or other care provider to review them with you. STOP taking these medications   
 folic acid 345 mcg tablet  
   
  
 furosemide 40 mg tablet Commonly known as:  LASIX  
   
  
 lisinopril 10 mg tablet Commonly known as:  PRINIVIL, ZESTRIL  
   
  
 metoclopramide HCl 5 mg tablet Commonly known as:  REGLAN  
   
  
 POTASSIUM CHLORIDE SR 10 MEQ TAB  
   
  
 pregabalin 150 mg capsule Commonly known as:  Chloe Crosser Where to Get Your Medications Information on where to get these meds will be given to you by the nurse or doctor. ! Ask your nurse or doctor about these medications ALPRAZolam 1 mg tablet DULoxetine 60 mg capsule Opioid Education Prescription Opioids: What You Need to Know: 
 
Prescription opioids can be used to help relieve moderate-to-severe pain and are often prescribed following a surgery or injury, or for certain health conditions. These medications can be an important part of treatment but also come with serious risks. Opioids are strong pain medicines. Examples include hydrocodone, oxycodone, fentanyl, and morphine. Heroin is an example of an illegal opioid. It is important to work with your health care provider to make sure you are getting the safest, most effective care. WHAT ARE THE RISKS AND SIDE EFFECTS OF OPIOID USE? Prescription opioids carry serious risks of addiction and overdose, especially with prolonged use. An opioid overdose, often marked by slow breathing, can cause sudden death. The use of prescription opioids can have a number of side effects as well, even when taken as directed. · Tolerance-meaning you might need to take more of a medication for the same pain relief · Physical dependence-meaning you have symptoms of withdrawal when the medication is stopped. Withdrawal symptoms can include nausea, sweating, chills, diarrhea, stomach cramps, and muscle aches. Withdrawal can last up to several weeks, depending on which drug you took and how long you took it. · Increased sensitivity to pain · Constipation · Nausea, vomiting, and dry mouth · Sleepiness and dizziness · Confusion · Depression · Low levels of testosterone that can result in lower sex drive, energy, and strength · Itching and sweating RISKS ARE GREATER WITH:      
· History of drug misuse, substance use disorder, or overdose · Mental health conditions (such as depression or anxiety) · Sleep apnea · Older age (72 years or older) · Pregnancy Avoid alcohol while taking prescription opioids. Also, unless specifically advised by your health care provider, medications to avoid include: · Benzodiazepines (such as Xanax or Valium) · Muscle relaxants (such as Soma or Flexeril) · Hypnotics (such as Ambien or Lunesta) · Other prescription opioids KNOW YOUR OPTIONS Talk to your health care provider about ways to manage your pain that don't involve prescription opioids. Some of these options may actually work better and have fewer risks and side effects. Options may include: 
· Pain relievers such as acetaminophen, ibuprofen, and naproxen · Some medications that are also used for depression or seizures · Physical therapy and exercise · Counseling to help patients learn how to cope better with triggers of pain and stress. · Application of heat or cold compress · Massage therapy · Relaxation techniques Be Informed Make sure you know the name of your medication, how much and how often to take it, and its potential risks & side effects.  
 
IF YOU ARE PRESCRIBED OPIOIDS FOR PAIN: 
 · Never take opioids in greater amounts or more often than prescribed. Remember the goal is not to be pain-free but to manage your pain at a tolerable level. · Follow up with your primary care provider to: · Work together to create a plan on how to manage your pain. · Talk about ways to help manage your pain that don't involve prescription opioids. · Talk about any and all concerns and side effects. · Help prevent misuse and abuse. · Never sell or share prescription opioids · Help prevent misuse and abuse. · Store prescription opioids in a secure place and out of reach of others (this may include visitors, children, friends, and family). · Safely dispose of unused/unwanted prescription opioids: Find your community drug take-back program or your pharmacy mail-back program, or flush them down the toilet, following guidance from the Food and Drug Administration (www.fda.gov/Drugs/ResourcesForYou). · Visit www.cdc.gov/drugoverdose to learn about the risks of opioid abuse and overdose. · If you believe you may be struggling with addiction, tell your health care provider and ask for guidance or call [employer] at 1-365-146-MNZX. Discharge Instructions Hyponatremia: Care Instructions Your Care Instructions Hyponatremia (say \"bs-rw-tbr-TREE-geraldine-uh\") means that you don't have enough sodium in your blood. It can cause nausea, vomiting, and headaches. Or you may not feel hungry. In serious cases, it can cause seizures, a coma, or even death. Hyponatremia is not a disease. It is a problem caused by something else, such as medicines or exercising for a long time in hot weather. You can get hyponatremia if you lose a lot of fluids and then you drink a lot of water or other liquids that don't have much sodium. You can also get it if you have kidney, liver, heart, or other health problems. Treatment is focused on getting your sodium levels back to normal. 
Follow-up care is a key part of your treatment and safety. Be sure to make and go to all appointments, and call your doctor if you are having problems. It's also a good idea to know your test results and keep a list of the medicines you take. How can you care for yourself at home? · If your doctor recommends it, drink fluids that have sodium. Sports drinks are a good choice. Or you can eat salty foods. · If your doctor recommends it, limit the amount of water you drink. And limit fluids that are mostly water. These include tea, coffee, and juice. · Take your medicines exactly as prescribed. Call your doctor if you have any problems with your medicine. · Get your sodium levels tested when your doctor tells you to. When should you call for help? Call 911 anytime you think you may need emergency care. For example, call if: 
  · You have a seizure.  
  · You passed out (lost consciousness).  
 Call your doctor now or seek immediate medical care if: 
  · You are confused or it is hard to focus.  
  · You have little or no appetite.  
  · You feel sick to your stomach or you vomit.  
  · You have a headache.  
  · You have mood changes.  
  · You feel more tired than usual.  
 Watch closely for changes in your health, and be sure to contact your doctor if: 
  · You do not get better as expected. Where can you learn more? Go to http://era-fransisco.info/. Enter J799 in the search box to learn more about \"Hyponatremia: Care Instructions. \" Current as of: October 9, 2017 Content Version: 11.7 © 1040-9624 [employer]. Care instructions adapted under license by [employer] (which disclaims liability or warranty for this information).  If you have questions about a medical condition or this instruction, always ask your healthcare professional. [employer] disclaims any warranty or liability for your use of this information. Acute Kidney Injury: Care Instructions Your Care Instructions Acute kidney injury (CAMILA) is a sudden decrease in kidney function. This can happen over a period of hours, days or, in some cases, weeks. CAMILA used to be called acute renal failure, but kidney failure doesn't always happen with CAMILA. Common causes of CAMILA are dehydration, blood loss, and medicines. When CAMILA happens, the kidneys have trouble removing waste and excess fluids from the body. The waste and fluids build up and become harmful. Kidney function may return to normal if the cause of CAMILA is treated quickly. Your chance of a full recovery depends on what caused the problem, how quickly the cause was treated, and what other medical problems you have. A machine may be used to help your kidneys remove waste and fluids for a short period of time. This is called dialysis. Follow-up care is a key part of your treatment and safety. Be sure to make and go to all appointments, and call your doctor if you are having problems. It's also a good idea to know your test results and keep a list of the medicines you take. How can you care for yourself at home? · Talk to your doctor about how much fluid you should drink. · Eat a balanced diet. Talk to your doctor or a dietitian about what type of diet may be best for you. You may need to limit sodium, potassium, and phosphorus. · If you need dialysis, follow the instructions and schedule for dialysis that your doctor gives you. · Do not smoke. Smoking can make your condition worse. If you need help quitting, talk to your doctor about stop-smoking programs and medicines. These can increase your chances of quitting for good. · Do not drink alcohol. · Review all of your medicines with your doctor.  Do not take any medicines, including nonsteroidal anti-inflammatory drugs (NSAIDs) such as ibuprofen (Advil, Motrin) or naproxen (Aleve), unless your doctor says it is safe for you to do so. · Make sure that anyone treating you for any health problem knows that you have had CAMILA. When should you call for help? Call 911 anytime you think you may need emergency care. For example, call if: 
  · You passed out (lost consciousness).  
 Call your doctor now or seek immediate medical care if: 
  · You have new or worse nausea and vomiting.  
  · You have much less urine than normal, or you have no urine.  
  · You are feeling confused or cannot think clearly.  
  · You have new or more blood in your urine.  
  · You have new swelling.  
  · You are dizzy or lightheaded, or feel like you may faint.  
 Watch closely for changes in your health, and be sure to contact your doctor if: 
  · You do not get better as expected. Where can you learn more? Go to http://eraBellybaloofransisco.info/. Enter T500 in the search box to learn more about \"Acute Kidney Injury: Care Instructions. \" Current as of: May 12, 2017 Content Version: 11.7 © 2610-0109 [employer]. Care instructions adapted under license by [employer] (which disclaims liability or warranty for this information). If you have questions about a medical condition or this instruction, always ask your healthcare professional. [address] any warranty or liability for your use of this information. Urinary Tract Infection in Women: Care Instructions Your Care Instructions A urinary tract infection, or UTI, is a general term for an infection anywhere between the kidneys and the urethra (where urine comes out). Most UTIs are bladder infections. They often cause pain or burning when you urinate. UTIs are caused by bacteria and can be cured with antibiotics. Be sure to complete your treatment so that the infection goes away. Follow-up care is a key part of your treatment and safety. Be sure to make and go to all appointments, and call your doctor if you are having problems. It's also a good idea to know your test results and keep a list of the medicines you take. How can you care for yourself at home? · Take your antibiotics as directed. Do not stop taking them just because you feel better. You need to take the full course of antibiotics. · Drink extra water and other fluids for the next day or two. This may help wash out the bacteria that are causing the infection. (If you have kidney, heart, or liver disease and have to limit fluids, talk with your doctor before you increase your fluid intake.) · Avoid drinks that are carbonated or have caffeine. They can irritate the bladder. · Urinate often. Try to empty your bladder each time. · To relieve pain, take a hot bath or lay a heating pad set on low over your lower belly or genital area. Never go to sleep with a heating pad in place. To prevent UTIs · Drink plenty of water each day. This helps you urinate often, which clears bacteria from your system. (If you have kidney, heart, or liver disease and have to limit fluids, talk with your doctor before you increase your fluid intake.) · Urinate when you need to. · Urinate right after you have sex. · Change sanitary pads often. · Avoid douches, bubble baths, feminine hygiene sprays, and other feminine hygiene products that have deodorants. · After going to the bathroom, wipe from front to back. When should you call for help? Call your doctor now or seek immediate medical care if: 
  · Symptoms such as fever, chills, nausea, or vomiting get worse or appear for the first time.  
  · You have new pain in your back just below your rib cage. This is called flank pain.  
  · There is new blood or pus in your urine.  
  · You have any problems with your antibiotic medicine.  Watch closely for changes in your health, and be sure to contact your doctor if: 
  · You are not getting better after taking an antibiotic for 2 days.  
  · Your symptoms go away but then come back. Where can you learn more? Go to http://era-fransisco.info/. Enter L354 in the search box to learn more about \"Urinary Tract Infection in Women: Care Instructions. \" Current as of: May 12, 2017 Content Version: 11.7 © 2849-9935 [employer]. Care instructions adapted under license by [employer] (which disclaims liability or warranty for this information). If you have questions about a medical condition or this instruction, always ask your healthcare professional. [address] any warranty or liability for your use of this information. DISCHARGE SUMMARY from Nurse PATIENT INSTRUCTIONS: 
 
After general anesthesia or intravenous sedation, for 24 hours or while taking prescription Narcotics: · Limit your activities · Do not drive and operate hazardous machinery · Do not make important personal or business decisions · Do  not drink alcoholic beverages · If you have not urinated within 8 hours after discharge, please contact your surgeon on call. Report the following to your surgeon: 
· Excessive pain, swelling, redness or odor of or around the surgical area · Temperature over 100.5 · Nausea and vomiting lasting longer than 4 hours or if unable to take medications · Any signs of decreased circulation or nerve impairment to extremity: change in color, persistent  numbness, tingling, coldness or increase pain · Any questions What to do at Home: *  Please give a list of your current medications to your Primary Care Provider. *  Please update this list whenever your medications are discontinued, doses are 
    changed, or new medications (including over-the-counter products) are added. *  Please carry medication information at all times in case of emergency situations. These are general instructions for a healthy lifestyle: No smoking/ No tobacco products/ Avoid exposure to second hand smoke Surgeon General's Warning:  Quitting smoking now greatly reduces serious risk to your health. Obesity, smoking, and sedentary lifestyle greatly increases your risk for illness A healthy diet, regular physical exercise & weight monitoring are important for maintaining a healthy lifestyle You may be retaining fluid if you have a history of heart failure or if you experience any of the following symptoms:  Weight gain of 3 pounds or more overnight or 5 pounds in a week, increased swelling in our hands or feet or shortness of breath while lying flat in bed. Please call your doctor as soon as you notice any of these symptoms; do not wait until your next office visit. Recognize signs and symptoms of STROKE: 
 
F-face looks uneven A-arms unable to move or move unevenly S-speech slurred or non-existent T-time-call 911 as soon as signs and symptoms begin-DO NOT go Back to bed or wait to see if you get better-TIME IS BRAIN. Warning Signs of HEART ATTACK Call 911 if you have these symptoms: 
? Chest discomfort. Most heart attacks involve discomfort in the center of the chest that lasts more than a few minutes, or that goes away and comes back. It can feel like uncomfortable pressure, squeezing, fullness, or pain. ? Discomfort in other areas of the upper body. Symptoms can include pain or discomfort in one or both arms, the back, neck, jaw, or stomach. ? Shortness of breath with or without chest discomfort. ? Other signs may include breaking out in a cold sweat, nausea, or lightheadedness. Don't wait more than five minutes to call [employer]! Fast action can save your life.  Calling 911 is almost always the fastest way to get lifesaving treatment. Emergency Medical Services staff can begin treatment when they arrive  up to an hour sooner than if someone gets to the hospital by car. The discharge information has been reviewed with the patient. The patient verbalized understanding. Discharge medications reviewed with the patient and appropriate educational materials and side effects teaching were provided. ___________________________________________________________________________________________________________________________________ [employer] Announcement We are excited to announce that we are making your provider's discharge notes available to you in [employer]. You will see these notes when they are completed and signed by the physician that discharged you from your recent hospital stay. If you have any questions or concerns about any information you see in [employer], please call the Health Information Department where you were seen or reach out to your Primary Care Provider for more information about your plan of care. Introducing [facility]! [facility] introduces [employer] patient portal. Now you can access parts of your medical record, email your doctor's office, and request medication refills online. 1. In your internet browser, go to [email or web address] 2. Click on the First Time User? Click Here link in the Sign In box. You will see the New Member Sign Up page. 3. Enter your [employer] Access Code exactly as it appears below. You will not need to use this code after youve completed the sign-up process. If you do not sign up before the expiration date, you must request a new code. · [employer] Access Code: [email or web address] Expires: 10/16/2018  7:09 PM 
 
4. Enter the last four digits of your Social Security Number (xxxx) and Date of Birth (mm/dd/yyyy) as indicated and click Submit. You will be taken to the next sign-up page. 5. Create a [employer] ID. This will be your [employer] login ID and cannot be changed, so think of one that is secure and easy to remember. 6. Create a [facility] password. You can change your password at any time. 7. Enter your Password Reset Question and Answer. This can be used at a later time if you forget your password. 8. Enter your e-mail address. You will receive e-mail notification when new information is available in [facility] 9. Click Sign Up. You can now view and download portions of your medical record. 10. Click the Download Summary menu link to download a portable copy of your medical information. If you have questions, please visit the Frequently Asked Questions section of the [employer] website. Remember, [employer] is NOT to be used for urgent needs. For medical emergencies, dial 911. Now available from your iPhone and Android! Introducing Glen Alvarado As a [facility] patient, I wanted to make you aware of our electronic visit tool called Glen Ebenezerjavikris. [facility] 24/7 allows you to connect within minutes with a medical provider 24 hours a day, seven days a week via a mobile device or tablet or logging into a secure website from your computer. You can access Glen Alvarado from anywhere in the [place]. A virtual visit might be right for you when you have a simple condition and feel like you just dont want to get out of bed, or cant get away from work for an appointment, when your regular [facility] provider is not available (evenings, weekends or holidays), or when youre out of town and need minor care. Electronic visits cost only $49 and if the [facility] 24/7 provider determines a prescription is needed to treat your condition, one can be electronically transmitted to a nearby pharmacy*. Please take a moment to enroll today if you have not already done so. The enrollment process is free and takes just a few minutes.   To enroll, please download the [employer] 24/7 suki to your tablet or phone, or visit [email or web address] to enroll on your computer. And, as an [address] [place] [address] patient with a [facility] account, the results of your visits will be scanned into your electronic medical record and your primary care provider will be able to view the scanned results. We urge you to continue to see your regular [employer] provider for your ongoing medical care. And while your primary care provider may not be the one available when you seek a [employer] virtual visit, the peace of mind you get from getting a real diagnosis real time can be priceless. For more information on [employer], view our Frequently Asked Questions (FAQs) at [email or web address]. Sincerely, 
 
Jeramie Kathleen MD 
Chief Medical Officer Syed Rama Cavazos *:  certain medications cannot be prescribed via [employer] Providers Seen During Your Hospitalization Provider Specialty Primary office phone Rita Nugent MD Emergency Medicine [phone] Mikala Mahan MD Internal Medicine [phone] Immunizations Administered for This Admission Name Date  
 TB Skin Test (PPD) Intradermal  Deferred (), 8/22/2018 Your Primary Care Physician (PCP) Primary Care Physician Office Phone Office Fax Nucamacho Bocanegraal [phone] You are allergic to the following Allergen Reactions Latex Contact Dermatitis Bactrim (Sulfamethoxazole-Trimethoprim) Nausea Only Lamictal (Lamotrigine) Atopic Dermatitis Pcn (Penicillins) Rash Tapentadol Rash Recent Documentation Height Weight BMI OB Status Smoking Status 1.524 m 126.1 kg 54.31 kg/m2 Hysterectomy Never Smoker Emergency Contacts Name Discharge Info Relation Home Work Mobile Jessika Yi  Daughter [82] [phone] Ramona Client  Spouse [3] [phone] Patient Belongings The following personal items are in your possession at time of discharge: 
  Dental Appliances: Lowers, Uppers  Visual Aid: Magnifying glass, At home      Home Medications: None   Jewelry: Ring, With patient  Clothing: Pajamas, At bedside Please provide this summary of care documentation to your next provider. Signatures-by signing, you are acknowledging that this After Visit Summary has been reviewed with you and you have received a copy. Patient Signature:  ____________________________________________________________ Date:  ____________________________________________________________  
  
[facility] Provider Signature:  ____________________________________________________________ Date:  ____________________________________________________________

## 2018-08-20 NOTE — ED TRIAGE NOTES
Pt arrived via ems. Pt states having pain to the right leg since she was seen here in the er last month. Pt states she only wears oxygen at night. Pt's room oxygen saturation was 80%. Pt placed on 4 lpm and sats clementine to 94%. Pt states she is having jerking all over her body and her ears are whistling.

## 2018-08-21 ENCOUNTER — APPOINTMENT (OUTPATIENT)
Dept: ULTRASOUND IMAGING | Age: 68
DRG: 682 | End: 2018-08-21
Attending: HOSPITALIST
Payer: MEDICARE

## 2018-08-21 ENCOUNTER — APPOINTMENT (OUTPATIENT)
Dept: GENERAL RADIOLOGY | Age: 68
DRG: 682 | End: 2018-08-21
Attending: HOSPITALIST
Payer: MEDICARE

## 2018-08-21 ENCOUNTER — PATIENT OUTREACH (OUTPATIENT)
Dept: CASE MANAGEMENT | Age: 68
End: 2018-08-21

## 2018-08-21 ENCOUNTER — ANESTHESIA EVENT (OUTPATIENT)
Dept: ENDOSCOPY | Age: 68
DRG: 682 | End: 2018-08-21
Payer: MEDICARE

## 2018-08-21 LAB
ANION GAP SERPL CALC-SCNC: 12 MMOL/L (ref 7–16)
APPEARANCE UR: ABNORMAL
BACTERIA URNS QL MICRO: ABNORMAL /HPF
BASOPHILS # BLD: 0 K/UL
BASOPHILS NFR BLD: 0 % (ref 0–2)
BILIRUB UR QL: NEGATIVE
BUN SERPL-MCNC: 38 MG/DL (ref 8–23)
CALCIUM SERPL-MCNC: 8 MG/DL (ref 8.3–10.4)
CASTS URNS QL MICRO: ABNORMAL /LPF
CHLORIDE SERPL-SCNC: 77 MMOL/L (ref 98–107)
CK SERPL-CCNC: 138 U/L (ref 21–215)
CO2 SERPL-SCNC: 37 MMOL/L (ref 21–32)
COLOR UR: YELLOW
CREAT SERPL-MCNC: 1.89 MG/DL (ref 0.6–1)
DIFFERENTIAL METHOD BLD: ABNORMAL
EOSINOPHIL # BLD: 0 K/UL
EOSINOPHIL NFR BLD: 0 % (ref 0.5–7.8)
EPI CELLS #/AREA URNS HPF: 0 /HPF
ERYTHROCYTE [DISTWIDTH] IN BLOOD BY AUTOMATED COUNT: 17.7 %
GLUCOSE SERPL-MCNC: 102 MG/DL (ref 65–100)
GLUCOSE UR STRIP.AUTO-MCNC: NEGATIVE MG/DL
HCT VFR BLD AUTO: 26 % (ref 35.8–46.3)
HGB BLD-MCNC: 8.5 G/DL (ref 11.7–15.4)
HGB UR QL STRIP: ABNORMAL
IMM GRANULOCYTES # BLD: 0.1 K/UL
IMM GRANULOCYTES NFR BLD AUTO: 0 % (ref 0–5)
KETONES UR QL STRIP.AUTO: NEGATIVE MG/DL
LEUKOCYTE ESTERASE UR QL STRIP.AUTO: ABNORMAL
LYMPHOCYTES # BLD: 3.1 K/UL
LYMPHOCYTES NFR BLD: 27 % (ref 13–44)
MAGNESIUM SERPL-MCNC: 2.2 MG/DL (ref 1.8–2.4)
MCH RBC QN AUTO: 24.9 PG (ref 26.1–32.9)
MCHC RBC AUTO-ENTMCNC: 32.7 G/DL (ref 31.4–35)
MCV RBC AUTO: 76.2 FL (ref 79.6–97.8)
MONOCYTES # BLD: 1.5 K/UL
MONOCYTES NFR BLD: 13 % (ref 4–12)
NEUTS SEG # BLD: 6.9 K/UL
NEUTS SEG NFR BLD: 60 % (ref 43–78)
NITRITE UR QL STRIP.AUTO: NEGATIVE
NRBC # BLD: 0 K/UL (ref 0–0.2)
PH UR STRIP: 5.5 [PH] (ref 5–9)
PHOSPHATE SERPL-MCNC: 2.6 MG/DL (ref 2.3–3.7)
PLATELET # BLD AUTO: 366 K/UL (ref 150–450)
PMV BLD AUTO: 10.1 FL (ref 9.4–12.3)
POTASSIUM SERPL-SCNC: 2.7 MMOL/L (ref 3.5–5.1)
PROT UR STRIP-MCNC: NEGATIVE MG/DL
RBC # BLD AUTO: 3.41 M/UL (ref 4.05–5.2)
RBC #/AREA URNS HPF: ABNORMAL /HPF
SODIUM SERPL-SCNC: 126 MMOL/L (ref 136–145)
SP GR UR REFRACTOMETRY: 1.01 (ref 1–1.02)
TSH SERPL DL<=0.005 MIU/L-ACNC: 0.83 UIU/ML (ref 0.36–3.74)
UROBILINOGEN UR QL STRIP.AUTO: 0.2 EU/DL (ref 0.2–1)
WBC # BLD AUTO: 11.7 K/UL (ref 4.3–11.1)
WBC URNS QL MICRO: >100 /HPF

## 2018-08-21 PROCEDURE — 74011250637 HC RX REV CODE- 250/637: Performed by: INTERNAL MEDICINE

## 2018-08-21 PROCEDURE — 80048 BASIC METABOLIC PNL TOTAL CA: CPT

## 2018-08-21 PROCEDURE — 83735 ASSAY OF MAGNESIUM: CPT

## 2018-08-21 PROCEDURE — 87086 URINE CULTURE/COLONY COUNT: CPT

## 2018-08-21 PROCEDURE — 76775 US EXAM ABDO BACK WALL LIM: CPT

## 2018-08-21 PROCEDURE — 85025 COMPLETE CBC W/AUTO DIFF WBC: CPT

## 2018-08-21 PROCEDURE — 94760 N-INVAS EAR/PLS OXIMETRY 1: CPT

## 2018-08-21 PROCEDURE — 77010033678 HC OXYGEN DAILY

## 2018-08-21 PROCEDURE — 81001 URINALYSIS AUTO W/SCOPE: CPT

## 2018-08-21 PROCEDURE — 87088 URINE BACTERIA CULTURE: CPT

## 2018-08-21 PROCEDURE — 74011250636 HC RX REV CODE- 250/636: Performed by: HOSPITALIST

## 2018-08-21 PROCEDURE — 74011250637 HC RX REV CODE- 250/637: Performed by: HOSPITALIST

## 2018-08-21 PROCEDURE — 77030011943

## 2018-08-21 PROCEDURE — 74011000250 HC RX REV CODE- 250: Performed by: INTERNAL MEDICINE

## 2018-08-21 PROCEDURE — 74011250636 HC RX REV CODE- 250/636: Performed by: INTERNAL MEDICINE

## 2018-08-21 PROCEDURE — 84100 ASSAY OF PHOSPHORUS: CPT

## 2018-08-21 PROCEDURE — 94640 AIRWAY INHALATION TREATMENT: CPT

## 2018-08-21 PROCEDURE — 77030020263 HC SOL INJ SOD CL0.9% LFCR 1000ML

## 2018-08-21 PROCEDURE — 36415 COLL VENOUS BLD VENIPUNCTURE: CPT

## 2018-08-21 PROCEDURE — 65270000029 HC RM PRIVATE

## 2018-08-21 PROCEDURE — 74011000258 HC RX REV CODE- 258: Performed by: HOSPITALIST

## 2018-08-21 PROCEDURE — 87186 SC STD MICRODIL/AGAR DIL: CPT

## 2018-08-21 PROCEDURE — 73562 X-RAY EXAM OF KNEE 3: CPT

## 2018-08-21 RX ORDER — ALBUTEROL SULFATE 0.83 MG/ML
2.5 SOLUTION RESPIRATORY (INHALATION)
Status: DISCONTINUED | OUTPATIENT
Start: 2018-08-21 | End: 2018-08-25

## 2018-08-21 RX ORDER — POTASSIUM CHLORIDE 20MEQ/15ML
40 LIQUID (ML) ORAL
Status: COMPLETED | OUTPATIENT
Start: 2018-08-21 | End: 2018-08-21

## 2018-08-21 RX ORDER — POLYETHYLENE GLYCOL 3350 17 G/17G
17 POWDER, FOR SOLUTION ORAL DAILY
Status: DISCONTINUED | OUTPATIENT
Start: 2018-08-22 | End: 2018-08-23

## 2018-08-21 RX ORDER — SODIUM CHLORIDE AND POTASSIUM CHLORIDE .9; .15 G/100ML; G/100ML
SOLUTION INTRAVENOUS CONTINUOUS
Status: DISCONTINUED | OUTPATIENT
Start: 2018-08-21 | End: 2018-08-23

## 2018-08-21 RX ORDER — ONDANSETRON 4 MG/1
8 TABLET, ORALLY DISINTEGRATING ORAL
Status: DISCONTINUED | OUTPATIENT
Start: 2018-08-21 | End: 2018-08-27 | Stop reason: HOSPADM

## 2018-08-21 RX ADMIN — CEFTRIAXONE SODIUM 2 G: 2 INJECTION, POWDER, FOR SOLUTION INTRAMUSCULAR; INTRAVENOUS at 15:26

## 2018-08-21 RX ADMIN — PREGABALIN 25 MG: 25 CAPSULE ORAL at 08:51

## 2018-08-21 RX ADMIN — ONDANSETRON HYDROCHLORIDE 4 MG: 2 INJECTION, SOLUTION INTRAMUSCULAR; INTRAVENOUS at 09:55

## 2018-08-21 RX ADMIN — LEVOTHYROXINE SODIUM 112 MCG: 112 TABLET ORAL at 18:17

## 2018-08-21 RX ADMIN — SODIUM CHLORIDE AND POTASSIUM CHLORIDE: 9; 1.49 INJECTION, SOLUTION INTRAVENOUS at 08:50

## 2018-08-21 RX ADMIN — METOCLOPRAMIDE HYDROCHLORIDE 5 MG: 10 TABLET ORAL at 12:25

## 2018-08-21 RX ADMIN — HYDROCODONE BITARTRATE AND ACETAMINOPHEN 1 TABLET: 10; 325 TABLET ORAL at 22:00

## 2018-08-21 RX ADMIN — SUCRALFATE 1 G: 1 TABLET ORAL at 12:27

## 2018-08-21 RX ADMIN — ALBUTEROL SULFATE 2.5 MG: 2.5 SOLUTION RESPIRATORY (INHALATION) at 08:08

## 2018-08-21 RX ADMIN — ALBUTEROL SULFATE 2.5 MG: 2.5 SOLUTION RESPIRATORY (INHALATION) at 15:04

## 2018-08-21 RX ADMIN — ALBUTEROL SULFATE 2.5 MG: 2.5 SOLUTION RESPIRATORY (INHALATION) at 19:42

## 2018-08-21 RX ADMIN — METOCLOPRAMIDE HYDROCHLORIDE 5 MG: 10 TABLET ORAL at 06:29

## 2018-08-21 RX ADMIN — DULOXETINE 60 MG: 60 CAPSULE, DELAYED RELEASE ORAL at 08:51

## 2018-08-21 RX ADMIN — Medication 10 ML: at 06:29

## 2018-08-21 RX ADMIN — NYSTATIN 500000 UNITS: 100000 SUSPENSION ORAL at 00:19

## 2018-08-21 RX ADMIN — MONTELUKAST SODIUM 10 MG: 10 TABLET, FILM COATED ORAL at 08:51

## 2018-08-21 RX ADMIN — NYSTATIN 500000 UNITS: 100000 SUSPENSION ORAL at 21:56

## 2018-08-21 RX ADMIN — HYDROCODONE BITARTRATE AND ACETAMINOPHEN 1 TABLET: 10; 325 TABLET ORAL at 16:06

## 2018-08-21 RX ADMIN — NYSTATIN 500000 UNITS: 100000 SUSPENSION ORAL at 18:17

## 2018-08-21 RX ADMIN — PREGABALIN 25 MG: 25 CAPSULE ORAL at 00:19

## 2018-08-21 RX ADMIN — HYDROCODONE BITARTRATE AND ACETAMINOPHEN 1 TABLET: 10; 325 TABLET ORAL at 09:56

## 2018-08-21 RX ADMIN — ONDANSETRON 8 MG: 4 TABLET, ORALLY DISINTEGRATING ORAL at 16:06

## 2018-08-21 RX ADMIN — SUCRALFATE 1 G: 1 TABLET ORAL at 08:51

## 2018-08-21 RX ADMIN — Medication 400 MG: at 08:51

## 2018-08-21 RX ADMIN — SODIUM CHLORIDE 150 ML/HR: 900 INJECTION, SOLUTION INTRAVENOUS at 04:16

## 2018-08-21 RX ADMIN — MIRTAZAPINE 45 MG: 30 TABLET, FILM COATED ORAL at 21:56

## 2018-08-21 RX ADMIN — Medication 10 ML: at 12:29

## 2018-08-21 RX ADMIN — OXYBUTYNIN CHLORIDE 10 MG: 10 TABLET, FILM COATED, EXTENDED RELEASE ORAL at 08:56

## 2018-08-21 RX ADMIN — ALBUTEROL SULFATE 2.5 MG: 2.5 SOLUTION RESPIRATORY (INHALATION) at 11:15

## 2018-08-21 RX ADMIN — Medication 10 ML: at 00:19

## 2018-08-21 RX ADMIN — PANTOPRAZOLE SODIUM 40 MG: 40 TABLET, DELAYED RELEASE ORAL at 16:06

## 2018-08-21 RX ADMIN — PANTOPRAZOLE SODIUM 40 MG: 40 TABLET, DELAYED RELEASE ORAL at 06:29

## 2018-08-21 RX ADMIN — PREGABALIN 25 MG: 25 CAPSULE ORAL at 18:17

## 2018-08-21 RX ADMIN — Medication 10 ML: at 21:56

## 2018-08-21 RX ADMIN — APIXABAN 5 MG: 5 TABLET, FILM COATED ORAL at 08:51

## 2018-08-21 RX ADMIN — POTASSIUM CHLORIDE 40 MEQ: 20 SOLUTION ORAL at 08:51

## 2018-08-21 RX ADMIN — DULOXETINE 60 MG: 60 CAPSULE, DELAYED RELEASE ORAL at 18:17

## 2018-08-21 RX ADMIN — HYDROCODONE BITARTRATE AND ACETAMINOPHEN 1 TABLET: 10; 325 TABLET ORAL at 04:16

## 2018-08-21 RX ADMIN — NYSTATIN: 100000 POWDER TOPICAL at 08:51

## 2018-08-21 RX ADMIN — SODIUM CHLORIDE AND POTASSIUM CHLORIDE: 9; 1.49 INJECTION, SOLUTION INTRAVENOUS at 21:55

## 2018-08-21 RX ADMIN — SUCRALFATE 1 G: 1 TABLET ORAL at 21:56

## 2018-08-21 RX ADMIN — NYSTATIN: 100000 POWDER TOPICAL at 18:19

## 2018-08-21 RX ADMIN — SUCRALFATE 1 G: 1 TABLET ORAL at 18:17

## 2018-08-21 RX ADMIN — ZIPRASIDONE HYDROCHLORIDE 80 MG: 20 CAPSULE ORAL at 21:56

## 2018-08-21 NOTE — ED NOTES
TRANSFER - OUT REPORT:    Verbal report given to SLICK Finney(name) on eBthany Chun  being transferred to 804(unit) for routine progression of care       Report consisted of patients Situation, Background, Assessment and   Recommendations(SBAR). Information from the following report(s) SBAR, ED Summary and Recent Results was reviewed with the receiving nurse. Lines:   Peripheral IV 08/20/18 Right Wrist (Active)   Site Assessment Clean, dry, & intact 8/20/2018  9:02 PM   Phlebitis Assessment 0 8/20/2018  9:02 PM   Infiltration Assessment 0 8/20/2018  9:02 PM   Dressing Status Clean, dry, & intact 8/20/2018  9:02 PM   Hub Color/Line Status Blue 8/20/2018  9:02 PM   Alcohol Cap Used No 8/20/2018  9:02 PM        Opportunity for questions and clarification was provided.       Patient transported with:   O2 @ 2 liters

## 2018-08-21 NOTE — ASSESSMENT & PLAN NOTE
Suspect hypovolemic. IVF added.   Further workup if not better  She is on also psyche meds that can contribute

## 2018-08-21 NOTE — PROGRESS NOTES
TRANSFER - IN REPORT:    Verbal report received from Lourdes(name) on Italia Shin  being received from ER(unit) for routine progression of care      Report consisted of patients Situation, Background, Assessment and   Recommendations(SBAR). Information from the following report(s) SBAR was reviewed with the receiving nurse. Opportunity for questions and clarification was provided. Assessment completed upon patients arrival to unit and care assumed.

## 2018-08-21 NOTE — PROGRESS NOTES
Zofran 4 mg IV given for complaints of nausea. Norco 10 mg 1 tab po given for complaints of back and right leg pain 7/10 will monitor.

## 2018-08-21 NOTE — PROGRESS NOTES
Hospitalist H&P Note     Admit Date:  2018  7:38 PM   Name:  Gregory Luther   Age:  80 y.o.  :  1950   MRN:  [id number]   PCP:  Carina Barrera DO  Treatment Team: Attending Provider: Juani Rivas MD; Primary Nurse: Alicia Araujo, RN; Primary Nurse: Stacy Franco, RN    HPI:   CC:  Weakness    HPI:  81 yo female with mult med probs including DVT on eleiquis, obesity, hx Gastric bypass w/ poss gastroparesis, chronic LE edema, morbid obesity with resterictive lung dz on O2 2-3 L at HS, debiltity using a cane, fibromyalgia, HTN, gastric ulcers, asthma. Presents with sveral week hx weakness, decreased po's. Continues to take Lasix and Lisinopril. PCP last week reduced her BP meds per dtr. Mild nonprod cough. No new rx. No F/C. No CP or SOB. She has been vomiting freq for months and her GI doc thinks may be related to prior gastric bypass surgery per dtr. On arrival to ER vitals stable, afebrile, 97% on 2L. Somewhat solement, flat affect. Answers Q's approp, no acute distress, nontoxic appearance. Labs:  Cr 2.56, and . WBC 14.2  Serum bicarb 39. Prior Cr this year was 0.86 on 2018. CXR NAPD    10 systems reviewed and negative except as noted in HPI. Past Medical History:   Diagnosis Date    Acute renal failure ([facility] [address]     The patient was admitted with acute renal failure secondary to volume depletion. She was found to have a gastric ulcer on admission.  Arthritis     Asthma     Asthma with acute exacerbation 2017    Bilateral pulmonary embolism [facility])     Restarted on anticoagulation for lifetime    Calculus of ureter May, 2015    CAP (community acquired pneumonia)     RML pneumonia    Cellulitis of left lower extremity 2017    Chronic pain     fibromyalgia.  back pain    Community acquired bacterial pneumonia 2018    CVA (cerebral infarction)     Reportedly has mild left arm residual weakness    Gastric ulcer July, 2014          GERD (gastroesophageal reflux disease)     Gram-positive bacteremia 1/2 cx 12/26/2016    HCAP (healthcare-associated pneumonia) January, 2013    RLL pneumonia    HCAP (healthcare-associated pneumonia) February, 2013    RLL pneumonia    Hypertension     Left leg DVT ([facility] [address] 1991    On coumadin until 2008    Morbid obesity ([facility] [address]     Psychiatric disorder     PUD (peptic ulcer disease) ? ??    Sepsis due to pneumonia ([facility] [address] 4/2/2018    Stroke ([facility] [address]     2009 mini stroke    Thyroid disease       Past Surgical History:   Procedure Laterality Date    HX APPENDECTOMY      HX CHOLECYSTECTOMY      HX ENDOSCOPY  July, 2014    Gastric ulcers x 2    HX GASTRIC BYPASS      HX HYSTERECTOMY      HX ORTHOPAEDIC      rt knee growth, right shoulder, left thumb    HX OTHER SURGICAL      vagus nerve stimulator    HX TONSILLECTOMY        Allergies   Allergen Reactions    Latex Contact Dermatitis    Bactrim [Sulfamethoxazole-Trimethoprim] Nausea Only    Lamictal [Lamotrigine] Atopic Dermatitis    Pcn [Penicillins] Rash    Tapentadol Rash      Social History   Substance Use Topics    Smoking status: Never Smoker    Smokeless tobacco: Never Used    Alcohol use No      Family History   Problem Relation Age of Onset    Hypertension Mother     Cancer Father      prostate    Heart Attack Father     Heart Disease Father      CAD    Hypertension Father     Hypertension Sister     Stroke Sister     Heart Disease Brother      CAD with CABG    Heart Attack Brother     Hypertension Brother       Immunization History   Administered Date(s) Administered    Influenza Vaccine 10/01/2014, 08/01/2016, 09/01/2017    Influenza Vaccine Split 09/21/2012    Pneumococcal Conjugate (PCV-13) 11/16/2017    Pneumococcal Polysaccharide (PPSV-23) 02/20/2017    TB Skin Test (PPD) Intradermal 01/11/2013, 12/27/2016, 04/11/2017    ZZZ-RETIRED (DO NOT USE) Pneumococcal Vaccine (Unspecified Type) 2010     PTA Medications:  Prior to Admission Medications   Prescriptions Last Dose Informant Patient Reported? Taking? ALPRAZolam (XANAX) 2 mg tablet   No No   Sig: Take 1 Tab by mouth three (3) times daily as needed for Anxiety. Max Daily Amount: 6 mg. Cholecalciferol, Vitamin D3, (VITAMIN D3) 1,000 unit cap   Yes No   Sig: Take  by mouth. DULoxetine (CYMBALTA) 60 mg capsule   Yes No   Sig: Take 60 mg by mouth two (2) times a day. FERROUS FUMARATE/VIT BCOMP&C (SUPER B COMPLEX PO)   Yes No   Sig: Take 1 Tab by mouth daily. HYDROcodone-acetaminophen (NORCO)  mg tablet   Yes No   Sig: Take 1 Tab by mouth every six (6) hours as needed for Pain. Magnesium Oxide 500 mg cap   Yes No   Sig: Take 500 mg by mouth. NEBULIZER   Yes No   Sig: by Does Not Apply route. OXYGEN-AIR DELIVERY SYSTEMS   Yes No   Si lpm cont. , 4 lpm at hs   POTASSIUM CHLORIDE SR 10 MEQ TAB   Yes No   Sig: Take 1 Tab by mouth daily. SUMATRIPTAN SUCCINATE (IMITREX PO)   Yes No   Sig: take 100 mg by mouth as needed. albuterol (PROAIR HFA) 90 mcg/actuation inhaler   No No   Sig: Take 2 Puffs by inhalation every four (4) hours as needed for Wheezing. albuterol (PROVENTIL VENTOLIN) 2.5 mg /3 mL (0.083 %) nebulizer solution   No No   Sig: 3 mL by Nebulization route four (4) times daily. And every 4 hours as needed cough, wheezing, shortness of breath, J45.909, Medicare part B   albuterol-ipratropium (DUO-NEB) 2.5 mg-0.5 mg/3 ml nebu   Yes No   Sig: 3 mL by Nebulization route every four (4) hours as needed. apixaban (ELIQUIS) 5 mg tablet   Yes No   Sig: Take 5 mg by mouth two (2) times a day. atorvastatin (LIPITOR) 10 mg tablet   No No   Sig: Take 2 Tabs by mouth nightly. carvedilol (COREG) 12.5 mg tablet   Yes No   Sig: Take 12.5 mg by mouth two (2) times a day. fluticasone-vilanterol (BREO ELLIPTA) 200-25 mcg/dose inhaler   No No   Sig: Take 1 Puff by inhalation daily.  RINSE MOUTH WELL AFTER USE   folic acid 307 mcg tablet   Yes No   Sig: Take 800 mcg by mouth daily. levothyroxine (SYNTHROID) 125 mcg tablet   Yes No   Sig: Take 112 mcg by mouth every evening. meclizine (ANTIVERT) 25 mg tablet   Yes No   Sig: Take 25 mg by mouth three (3) times daily as needed. metoclopramide HCl (REGLAN) 5 mg tablet   Yes No   Sig: Take 5 mg by mouth Before breakfast, lunch, and dinner. mirtazapine (REMERON) 45 mg tablet   Yes No   Sig: Take 45 mg by mouth nightly. montelukast (SINGULAIR) 10 mg tablet   Yes No   Sig: Take 10 mg by mouth daily. naloxegol (MOVANTIK) 25 mg tab tablet   Yes No   Sig: Take  by mouth Daily (before breakfast). nystatin (MYCOSTATIN) powder   No No   Sig: Apply  to affected area two (2) times a day. ondansetron (ZOFRAN ODT) 8 mg disintegrating tablet   Yes No   Sig: Take 8 mg by mouth every eight (8) hours as needed for Nausea. oxybutynin chloride XL (DITROPAN XL) 10 mg CR tablet   Yes No   Sig: Take 10 mg by mouth daily. pantoprazole (PROTONIX) 40 mg tablet   No No   Sig: Take 1 Tab by mouth Before breakfast and dinner. pregabalin (LYRICA) 150 mg capsule   Yes No   Sig: Take  by mouth two (2) times a day. sucralfate (CARAFATE) 1 gram tablet   Yes No   Sig: Take 1 g by mouth four (4) times daily. ziprasidone (GEODON) 80 mg capsule   Yes No   Sig: Take 80 mg by mouth nightly. Facility-Administered Medications: None       Objective:     Patient Vitals for the past 24 hrs:   Temp Pulse Resp BP SpO2   08/20/18 2051 - - - 93/52 93 %   08/20/18 1853 98.3 °F (36.8 °C) 87 20 102/64 92 %     Oxygen Therapy  O2 Sat (%): 93 % (08/20/18 2051)  Pulse via Oximetry: 82 beats per minute (08/20/18 2051)  O2 Device: Room air (08/20/18 1853)  No intake or output data in the 24 hours ending 08/20/18 2056    Physical Exam:  General:    Well nourished. Alert. Obese, slow mentation, flat affect, nontoxic  Eyes:   Normal sclera. Extraocular movements intact.   ENT:  Normocephalic, atraumatic. Drymucous membranes. thrush  CV:   RRR. No murmur  Lungs:  CTAB. No wheezing, rhonchi, or rales. dimin bases  Abdomen: Soft, nontender, nondistended. Bowel sounds normal.   Extremities: Warm and dry. No cyanosis. 1+ edema  Neurologic: CN II-XII grossly intact. Sensation intact. Skin:     No rashes or jaundice. Psych:  Normal mood and affect. I reviewed the labs, imaging, EKGs, telemetry, and other studies done this admission. Data Review:   Recent Results (from the past 24 hour(s))   EKG, 12 LEAD, INITIAL    Collection Time: 08/20/18  6:57 PM   Result Value Ref Range    Ventricular Rate 83 BPM    Atrial Rate 83 BPM    P-R Interval 166 ms    QRS Duration 90 ms    Q-T Interval 432 ms    QTC Calculation (Bezet) 507 ms    Calculated P Axis 30 degrees    Calculated R Axis -30 degrees    Calculated T Axis 18 degrees    Diagnosis       Normal sinus rhythm  Possible Left atrial enlargement  Left axis deviation  Inferior infarct , age undetermined  Possible Anterolateral infarct , age undetermined  Abnormal ECG     CBC WITH AUTOMATED DIFF    Collection Time: 08/20/18  7:02 PM   Result Value Ref Range    WBC 14.2 (H) 4.3 - 11.1 K/uL    RBC 4.07 4.05 - 5.2 M/uL    HGB 10.2 (L) 11.7 - 15.4 g/dL    HCT 31.0 (L) 35.8 - 46.3 %    MCV 76.2 (L) 79.6 - 97.8 FL    MCH 25.1 (L) 26.1 - 32.9 PG    MCHC 32.9 31.4 - 35.0 g/dL    RDW 17.6 %    PLATELET 569 (H) 763 - 450 K/uL    MPV 9.9 9.4 - 12.3 FL    ABSOLUTE NRBC 0.00 0.0 - 0.2 K/uL    DF AUTOMATED      NEUTROPHILS 72 43 - 78 %    LYMPHOCYTES 17 13 - 44 %    MONOCYTES 10 4.0 - 12.0 %    EOSINOPHILS 0 (L) 0.5 - 7.8 %    BASOPHILS 0 0.0 - 2.0 %    IMMATURE GRANULOCYTES 0 0.0 - 5.0 %    ABS. NEUTROPHILS 10.2 K/UL    ABS. LYMPHOCYTES 2.5 K/UL    ABS. MONOCYTES 1.4 K/UL    ABS. EOSINOPHILS 0.0 K/UL    ABS. BASOPHILS 0.1 K/UL    ABS. IMM.  GRANS. 0.1 K/UL   METABOLIC PANEL, COMPREHENSIVE    Collection Time: 08/20/18  7:02 PM   Result Value Ref Range    Sodium 118 (LL) 136 - 145 mmol/L    Potassium 3.5 3.5 - 5.1 mmol/L    Chloride 67 (L) 98 - 107 mmol/L    CO2 39 (H) 21 - 32 mmol/L    Anion gap 12 7 - 16 mmol/L    Glucose 105 (H) 65 - 100 mg/dL    BUN 45 (H) 8 - 23 MG/DL    Creatinine 2.56 (H) 0.6 - 1.0 MG/DL    GFR est AA 24 (L) >60 ml/min/1.73m2    GFR est non-AA 20 (L) >60 ml/min/1.73m2    Calcium 8.5 8.3 - 10.4 MG/DL    Bilirubin, total 0.7 0.2 - 1.1 MG/DL    ALT (SGPT) 28 12 - 65 U/L    AST (SGOT) 64 (H) 15 - 37 U/L    Alk. phosphatase 141 (H) 50 - 136 U/L    Protein, total 6.3 6.3 - 8.2 g/dL    Albumin 2.4 (L) 3.2 - 4.6 g/dL    Globulin 3.9 (H) 2.3 - 3.5 g/dL    A-G Ratio 0.6 (L) 1.2 - 3.5     BNP    Collection Time: 08/20/18  7:02 PM   Result Value Ref Range    BNP 12 pg/mL   TROPONIN I    Collection Time: 08/20/18  7:02 PM   Result Value Ref Range    Troponin-I, Qt. <0.02 (L) 0.02 - 0.05 NG/ML       All Micro Results     None          Other Studies:  Xr Chest Port    Result Date: 8/20/2018  EXAM: Single view chest radiograph. ADDITIONAL CLINICAL INFORMATION: 80 years sob with hx of blood clots COMPARISON: Chest radiograph dated July 20, 2018 FINDINGS: No pneumothorax or pleural effusion. No focal lung consolidation. Heart is normal in size. IMPRESSION: 1. No evidence of acute cardiopulmonary abnormality. Assessment and Plan:     Hospital Problems as of 8/20/2018  Date Reviewed: 8/20/2018          Codes Class Noted - Resolved POA    CAMILA (acute kidney injury) ([facility] [address]  -dehydration, ATN from ACE and Lasix. Check UA  Renal US if not better.   Check CK as she is on statin ICD-10-CM: N17.9  ICD-9-CM: 584.9  8/20/2018 - Present Unknown        Hyponatremia  -suspect hypovolemic hyponatremia  -dehydration, emesis  -if not better w IVF, then further testing.    -on mult psych rx which can cause low NA as well but none are new ICD-10-CM: E87.1  ICD-9-CM: 276.1  8/20/2018 - Present Yes        Restrictive lung disease (Chronic)  -stable, monitor, continue home O2 dose ICD-10-CM: J98.4  ICD-9-CM: 518.89  5/21/2018 - Present Yes        Hypothyroidism (Chronic)  -synthroid, check TSH ICD-10-CM: E03.9  ICD-9-CM: 244.9  9/28/2017 - Present Yes        Asthma (Chronic)  -stable, no exac, continue home rx ICD-10-CM: J45.909  ICD-9-CM: 493.90  9/28/2017 - Present Yes        Hx of pulmonary embolus (Chronic)  -on ELiquis ICD-10-CM: G52.795  ICD-9-CM: V12.55  9/28/2017 - Present Yes    Overview Signed 9/28/2017  3:18 PM by Eva Husbands, NP     Chronic Eliquis             Hypertension (Chronic)  -Holding ACE  -hydralazine prn ICD-10-CM: I10  ICD-9-CM: 401.9  9/16/2012 - Present Yes        GERD (gastroesophageal reflux disease) (Chronic)  -ppi ICD-10-CM: K21.9  ICD-9-CM: 530.81  9/16/2012 - Present Yes        Bipolar affective disorder ([facility] [address] (Chronic)  -continue home rx ICD-10-CM: F31.9  ICD-9-CM: 296.80  9/16/2012 - Present Yes              PLAN:  · See above  ·     DVT ppx:   On Eliquis  Anticipated DC needs:    Code status:  Full  Estimated LOS:  Greater than 2 midnights  Risk:  high    Signed:  Rekha Navas MD

## 2018-08-21 NOTE — PROGRESS NOTES
Patient is well known to Case Management from previous visits. She lives at home with her daughter who assists with cooking and cleaning. Patient ambulates with a cane. She has home O2 through [address] [place] [address]. She has repeatedly refused short-term rehab, but has had home health in the past. Patient is followed by outpatient Case Management as well. Case Management will follow for discharge planning. Care Management Interventions  PCP Verified by CM:  Yes  Transition of Care Consult (CM Consult): Discharge Planning  Discharge Durable Medical Equipment: No  Physical Therapy Consult: Yes  Occupational Therapy Consult: Yes  Speech Therapy Consult: No  Current Support Network: Own Home (Lives with her daughter.)  Confirm Follow Up Transport: Family  Plan discussed with Pt/Family/Caregiver: Yes  Freedom of Choice Offered: Yes  Discharge Location  Discharge Placement: Home with home health'

## 2018-08-21 NOTE — ASSESSMENT & PLAN NOTE
Multifactorial  Decreased po's  Lasix and ACE use  Check CK as she is on statin    IVF, avoid nephrotoxins  US kidneys if not better

## 2018-08-21 NOTE — CONSULTS
[facility] Consult Note       Primary GI Physician: Dr Charo Mendoza    Referring Physician:  Dr. Monty Montgomery     Consult Date:  8/21/2018    Admit Date:  8/20/2018    Chief Complaint:  N/V    Subjective:     History of Present Illness:  Patient is a 80 y.o. female with PMH including but not limited to chronic narcotic use, bipolar disorder, CAMILA, PE, Fibromyalgia, CVA, PUD, GERD, HTN, DVT on Eliquis, resterictive lung dz on O2 2-3 L at [address] is seen in consultation at the request of Dr. Sarah Field for N/V. The patient presented to the ED with weakness and a right leg tremor. US was negative for DVT. She was found to be hypokalemic on exam with K low at 2.7 with elevated WBC 11.7. She denies fever, diarrhea, cough, pain with urination or burning. She was last seen in our office by Dr Alea Cazares for nausea, vomiting and constipation. EGD/COLO w 2 day prep scheduled for 9/26/18. She has a hx of chronic constipation and at her last OV she was taking Movantik as needed. She has also utilized Miralax in the past.   She has a hx of chronic N/V since al least 2012. She is on Reglan, Zofran, Phenergan and Carafate out patient. She has a hx of gastric bypass in 2008. She has anastamotic ulcers on EGD in 2012 and jejunal ulcer on EGD in 2014. Last EGD 2/17/2016 with gastric bypass anatomy, no ulcers and bx at anastomosis showed fragments of gastric mucosa with changes of repair. Last colonoscopy 3/22/17 for screening was normal.  Labs 8/6/18 with TB 0.2,  (H), AST 19, ALT 12, lipase 27. Hgb has varied between 8.5 to 10.2. She denies BRRB or tarry stools. She denies NSAID or ETOH use. She continues on Eliquis. She reports no longer taking Movantik. She is not sure when her last BM was. PMH:  Past Medical History:   Diagnosis Date    Acute renal failure ([facility] [address] July, 2014    The patient was admitted with acute renal failure secondary to volume depletion.   She was found to have a gastric ulcer on admission.  Arthritis     Asthma     Asthma with acute exacerbation 9/28/2017    Bilateral pulmonary embolism [facility]) September, 2012    Restarted on anticoagulation for lifetime    Calculus of ureter May, 2015    CAP (community acquired pneumonia) October, 2012    RML pneumonia    Cellulitis of left lower extremity 11/1/2017    Chronic pain     fibromyalgia. back pain    Community acquired bacterial pneumonia 4/2/2018    CVA (cerebral infarction) January, 2012    Reportedly has mild left arm residual weakness    Gastric ulcer July, 2014          GERD (gastroesophageal reflux disease)     Gram-positive bacteremia 1/2 cx 12/26/2016    HCAP (healthcare-associated pneumonia) January, 2013    RLL pneumonia    HCAP (healthcare-associated pneumonia) February, 2013    RLL pneumonia    Hypertension     Left leg DVT ([address] 1991    On coumadin until 2008    Morbid obesity ([address]     Psychiatric disorder     PUD (peptic ulcer disease) ? ??    Sepsis due to pneumonia ([address] 4/2/2018    Stroke [facility])     2009 mini stroke    Thyroid disease        PSH:  Past Surgical History:   Procedure Laterality Date    HX APPENDECTOMY      HX CHOLECYSTECTOMY      HX ENDOSCOPY  July, 2014    Gastric ulcers x 2    HX GASTRIC BYPASS      HX HYSTERECTOMY      HX ORTHOPAEDIC      rt knee growth, right shoulder, left thumb    HX OTHER SURGICAL      vagus nerve stimulator    HX TONSILLECTOMY         Allergies: Allergies   Allergen Reactions    Latex Contact Dermatitis    Bactrim [Sulfamethoxazole-Trimethoprim] Nausea Only    Lamictal [Lamotrigine] Atopic Dermatitis    Pcn [Penicillins] Rash    Tapentadol Rash       Home Medications:  Prior to Admission medications    Medication Sig Start Date End Date Taking? Authorizing Provider   apixaban (ELIQUIS) 5 mg tablet Take 5 mg by mouth two (2) times a day.    Yes Historical Provider   DULoxetine (CYMBALTA) 60 mg capsule Take 60 mg by mouth two (2) times a day.   Yes Historical Provider   albuterol-ipratropium (DUO-NEB) 2.5 mg-0.5 mg/3 ml nebu 3 mL by Nebulization route every four (4) hours as needed. Yes Historical Provider   mirtazapine (REMERON) 45 mg tablet Take 45 mg by mouth nightly. Yes Historical Provider   albuterol (PROAIR HFA) 90 mcg/actuation inhaler Take 2 Puffs by inhalation every four (4) hours as needed for Wheezing. 3/2/18  Yes Dante Callaway MD   albuterol (PROVENTIL VENTOLIN) 2.5 mg /3 mL (0.083 %) nebulizer solution 3 mL by Nebulization route four (4) times daily. And every 4 hours as needed cough, wheezing, shortness of breath, J45.909, Medicare part B 1/29/18  Yes Agustín Zuñiga, FREDO   OXYGEN-AIR DELIVERY SYSTEMS 2 lpm cont. , 4 lpm at hs   Yes Historical Provider   fluticasone-vilanterol (BREO ELLIPTA) 200-25 mcg/dose inhaler Take 1 Puff by inhalation daily. RINSE MOUTH WELL AFTER USE 10/27/17  Yes Nathan Guo NP   nystatin (MYCOSTATIN) powder Apply  to affected area two (2) times a day. 10/2/17  Yes Mare Chun NP   sucralfate (CARAFATE) 1 gram tablet Take 1 g by mouth four (4) times daily. Yes Historical Provider   pregabalin (LYRICA) 150 mg capsule Take  by mouth two (2) times a day. Yes Historical Provider   montelukast (SINGULAIR) 10 mg tablet Take 10 mg by mouth daily. Yes Historical Provider   ALPRAZolam [facility]) 2 mg tablet Take 1 Tab by mouth three (3) times daily as needed for Anxiety. Max Daily Amount: 6 mg. 2/9/17  Yes Rosa Christiansen MD   ondansetron (ZOFRAN ODT) 8 mg disintegrating tablet Take 8 mg by mouth every eight (8) hours as needed for Nausea. Yes Gunner Lucas MD   oxybutynin chloride XL (DITROPAN XL) 10 mg CR tablet Take 10 mg by mouth daily. Yes Gunner Lucas MD   Magnesium Oxide 500 mg cap Take 500 mg by mouth. Yes Gunner Lucas MD   HYDROcodone-acetaminophen (NORCO)  mg tablet Take 1 Tab by mouth every six (6) hours as needed for Pain.    Yes Historical Provider   POTASSIUM CHLORIDE SR 10 MEQ TAB Take 1 Tab by mouth daily. Yes Historical Provider   Cholecalciferol, Vitamin D3, (VITAMIN D3) 1,000 unit cap Take  by mouth. Yes Historical Provider   folic acid 875 mcg tablet Take 800 mcg by mouth daily. Yes Historical Provider   pantoprazole (PROTONIX) 40 mg tablet Take 1 Tab by mouth Before breakfast and dinner. 7/29/14  Yes Curly Powell NP   FERROUS FUMARATE/VIT BCOMP&C (SUPER B COMPLEX PO) Take 1 Tab by mouth daily. Yes Gunner Lucas MD   ziprasidone (GEODON) 80 mg capsule Take 80 mg by mouth nightly. Yes Historical Provider   SUMATRIPTAN SUCCINATE (IMITREX PO) take 100 mg by mouth as needed. Yes Historical Provider   levothyroxine (SYNTHROID) 125 mcg tablet Take 112 mcg by mouth every evening. Yes Historical Provider   meclizine (ANTIVERT) 25 mg tablet Take 25 mg by mouth three (3) times daily as needed. Historical Provider   metoclopramide HCl (REGLAN) 5 mg tablet Take 5 mg by mouth Before breakfast, lunch, and dinner. Historical Provider   naloxegol (MOVANTIK) 25 mg tab tablet Take  by mouth Daily (before breakfast). Historical Provider   carvedilol (COREG) 12.5 mg tablet Take 12.5 mg by mouth two (2) times a day. Historical Provider   atorvastatin (LIPITOR) 10 mg tablet Take 2 Tabs by mouth nightly. 4/13/17   Bolivar Villanueva MD   NEBULIZER by Does Not Apply route.     Historical Provider       Hospital Medications:  Current Facility-Administered Medications   Medication Dose Route Frequency    0.9% sodium chloride with KCl 20 mEq/L infusion   IntraVENous CONTINUOUS    albuterol (PROVENTIL VENTOLIN) nebulizer solution 2.5 mg  2.5 mg Nebulization QID RT    sodium chloride (NS) flush 5-10 mL  5-10 mL IntraVENous Q8H    sodium chloride (NS) flush 5-10 mL  5-10 mL IntraVENous PRN    acetaminophen (TYLENOL) tablet 650 mg  650 mg Oral Q4H PRN    HYDROcodone-acetaminophen (NORCO) 5-325 mg per tablet 1 Tab  1 Tab Oral Q4H PRN    naloxone (NARCAN) injection 0.4 mg  0.4 mg IntraVENous PRN    diphenhydrAMINE (BENADRYL) capsule 25 mg  25 mg Oral Q4H PRN    ondansetron (ZOFRAN) injection 4 mg  4 mg IntraVENous Q4H PRN    senna-docusate (PERICOLACE) 8.6-50 mg per tablet 2 Tab  2 Tab Oral DAILY PRN    LORazepam (ATIVAN) tablet 0.5-1 mg  0.5-1 mg Oral Q4H PRN    zolpidem (AMBIEN) tablet 5 mg  5 mg Oral QHS PRN    apixaban (ELIQUIS) tablet 5 mg  5 mg Oral BID    DULoxetine (CYMBALTA) capsule 60 mg  60 mg Oral BID    meclizine (ANTIVERT) tablet 25 mg  25 mg Oral Q6H PRN    metoclopramide HCl (REGLAN) tablet 5 mg  5 mg Oral TIDAC    albuterol-ipratropium (DUO-NEB) 2.5 MG-0.5 MG/3 ML  3 mL Nebulization Q4H PRN    albuterol (PROVENTIL VENTOLIN) nebulizer solution 2.5 mg  2.5 mg Nebulization Q4H PRN    mirtazapine (REMERON) tablet 45 mg  45 mg Oral QHS    nystatin (MYCOSTATIN) 100,000 unit/gram powder   Topical BID    montelukast (SINGULAIR) tablet 10 mg  10 mg Oral DAILY    pregabalin (LYRICA) capsule 25 mg  25 mg Oral BID    sucralfate (CARAFATE) tablet 1 g  1 g Oral QID    ALPRAZolam (XANAX) tablet 2 mg  2 mg Oral TID PRN    magnesium oxide (MAG-OX) tablet 400 mg  400 mg Oral DAILY    ondansetron (ZOFRAN ODT) tablet 8 mg  8 mg Oral Q8H PRN    oxybutynin chloride XL (DITROPAN XL) tablet 10 mg  10 mg Oral DAILY    HYDROcodone-acetaminophen (NORCO)  mg tablet 1 Tab  1 Tab Oral Q6H PRN    pantoprazole (PROTONIX) tablet 40 mg  40 mg Oral ACB&D    ziprasidone (GEODON) capsule 80 mg  80 mg Oral QHS    levothyroxine (SYNTHROID) tablet 112 mcg  112 mcg Oral QPM    nystatin (MYCOSTATIN) 100,000 unit/mL oral suspension 500,000 Units  500,000 Units Oral QID       Social History:  Social History   Substance Use Topics    Smoking status: Never Smoker    Smokeless tobacco: Never Used    Alcohol use No       Pt denies any history of drug use, blood transfusions, or tattoos.     Family History:  Family History   Problem Relation Age of Onset    Hypertension Mother     Cancer Father      prostate    Heart Attack Father     Heart Disease Father      CAD    Hypertension Father     Hypertension Sister     Stroke Sister     Heart Disease Brother      CAD with CABG    Heart Attack Brother     Hypertension Brother        Review of Systems:  A detailed 10 system ROS is obtained, with pertinent positives as listed above. All others are negative. Diet:  Regular    Objective:     Physical Exam:  Vitals:  Visit Vitals    BP 97/61 (BP 1 Location: Right arm, BP Patient Position: At rest)    Pulse 94    Temp 98.4 °F (36.9 °C)    Resp 18    Ht 5' (1.524 m)    Wt 125.3 kg (276 lb 4.8 oz)    SpO2 94%    BMI 53.96 kg/m2     Gen:  Pt is alert, cooperative, no acute distress  Skin:  Extremities and face reveal no rashes. HEENT: Sclerae anicteric. Extra-occular muscles are intact. No oral ulcers. No abnormal pigmentation of the lips. The neck is supple. Cardiovascular: Regular rate and rhythm. No murmurs, gallops, or rubs. Respiratory:  Comfortable breathing with no accessory muscle use. Diminished BS. On 3 liter NC.  GI:  Abdomen nondistended, soft, obese and nontender. Normal active bowel sounds. Rectal:  Deferred  Musculoskeletal:  +1 pitting edema of the lower legs. Neurological:  A&O. Poor historian  Psychiatric:  Mood appears appropriate with judgement intact. Laboratory:    Recent Labs      08/21/18   0625  08/20/18   1902   WBC  11.7*  14.2*   HGB  8.5*  10.2*   HCT  26.0*  31.0*   PLT  366  491*   MCV  76.2*  76.2*   NA  126*  118*   K  2.7*  3.5   CL  77*  67*   CO2  37*  39*   BUN  38*  45*   CREA  1.89*  2.56*   CA  8.0*  8.5   MG  2.2   --    GLU  102*  105*   AP   --   141*   SGOT   --   64*   ALT   --   28   TBILI   --   0.7   ALB   --   2.4*   TP   --   6.3        Right leg venous ultrasound.  8/20/18  FINDINGS:  The right common femoral, superficial femoral, deep femoral,  popliteal, posterior tibialis, peroneal and greater saphenous veins are patent  and compressible with normal response to augmentation. IMPRESSION:  No evidence of right leg DVT. Chest XR 8/20/18  No pneumothorax or pleural effusion. No focal lung consolidation. Heart is  normal in size. IMPRESSION:   1. No evidence of acute cardiopulmonary abnormality.         Assessment:     Principal Problem:    CAMILA (acute kidney injury) ([facility] [place] [address] (8/20/2018)    Active Problems:    Hypertension (9/16/2012)      GERD (gastroesophageal reflux disease) (9/16/2012)      Hypothyroidism (9/28/2017)      Asthma (9/28/2017)      Bipolar affective disorder ([facility] [place] [address] (9/16/2012)      Hx of pulmonary embolus (9/28/2017)      Overview: Chronic Eliquis      Restrictive lung disease (5/21/2018)      Hyponatremia (8/20/2018)      Austin Balaji (8/20/2018)      80 y.o. female with PMH including but not limited to chronic narcotic use, bipolar disorder, CAMILA, PE (2012), Fibromyalgia, CVA, PUD, GERD, HTN, DVT on Eliquis, resterictive lung dz on O2 2-3 L at [facility] admitted with right leg jerking, weakness and was found to have K of 2.7.  US negative for Rt leg DVT. Has seen Dr Zhao Marrero for chronic constipation and N/V. Hx of PUD with ulcer 2012 and 2014. Negative EGD 2/2016. Normal colo 2017. Labs 8/6/18 with TB 0.2,  (H), AST 19, ALT 12, lipase 27. Hgb has varied between 8.5 to 10.2. She denies BRRB or tarry stools. She denies NSAID or ETOH use. She continues on Eliquis. Neg Chest XR. Plan:     - Stop Reglan over concerns for tardive dyskinsia symptoms (right leg jerking, tremors)  - Scheduled Zofran prior to meals as pt reports nausea is worse with eating  - Minimize narcotics as this can cause gastroparesis/nausea  - Replete K per primary  - She does not appear to have had a GES due to chronic narcotic use  - Continue PPI bid and Carafate qid  - EGD tomorrow to evaluate for causes of nausea including PUD, LEONIE Loomis NP  Patient is seen and examined in collaboration with Dr. Sincere Parks. Assessment and plan as per Dr. Sheron Garcia. I have seen and examined this patient, and agree with above assessment and plan. Pt reports chronic nausea, abdom pain, but no recent wt loss.     Sx's reportedly worse with fatty / fried foods- need to consider nutrition counseling  Plan EGD in am to r/o anastomotic stricture, ulcer, gastritis, etc  Miralax for chronic constipation    Erinn Cramer MD

## 2018-08-21 NOTE — ED PROVIDER NOTES
HPI Comments: Patient is a 60-year-old female who has multiple medical problems and is on multiple medications. Family reports that she had some tremors at baseline, but has had a lot of severe jerking motions over the last week. She has generalized weakness along with right leg pain. In regards to the right leg pain she does have some pain in a chronic from an old DVT. She is still taking her eloquis for this. They also complaining of some chronic vomiting for several weeks. She has been seeing GI for this and is scheduled for ultrasound and endoscopy. She also has had a change in her diuretics. Patient is a 80 y.o. female presenting with leg pain. The history is provided by the patient. Leg Pain           Past Medical History:   Diagnosis Date    Acute renal failure ([address] July, 2014    The patient was admitted with acute renal failure secondary to volume depletion. She was found to have a gastric ulcer on admission.  Arthritis     Asthma     Asthma with acute exacerbation 9/28/2017    Bilateral pulmonary embolism [facility]) September, 2012    Restarted on anticoagulation for lifetime    Calculus of ureter May, 2015    CAP (community acquired pneumonia) October, 2012    RML pneumonia    Cellulitis of left lower extremity 11/1/2017    Chronic pain     fibromyalgia. back pain    Community acquired bacterial pneumonia 4/2/2018    CVA (cerebral infarction) January, 2012    Reportedly has mild left arm residual weakness    Gastric ulcer July, 2014          GERD (gastroesophageal reflux disease)     Gram-positive bacteremia 1/2 cx 12/26/2016    HCAP (healthcare-associated pneumonia) January, 2013    RLL pneumonia    HCAP (healthcare-associated pneumonia) February, 2013    RLL pneumonia    Hypertension     Left leg DVT ([address] 1991    On coumadin until 2008    Morbid obesity ([address]     Psychiatric disorder     PUD (peptic ulcer disease) ? ??    Sepsis due to pneumonia ([address] 4/2/2018    Stroke [facility])     2009 mini stroke    Thyroid disease        Past Surgical History:   Procedure Laterality Date    HX APPENDECTOMY      HX CHOLECYSTECTOMY      HX ENDOSCOPY  July, 2014    Gastric ulcers x 2    HX GASTRIC BYPASS      HX HYSTERECTOMY      HX ORTHOPAEDIC      rt knee growth, right shoulder, left thumb    HX OTHER SURGICAL      vagus nerve stimulator    HX TONSILLECTOMY           Family History:   Problem Relation Age of Onset    Hypertension Mother     Cancer Father      prostate    Heart Attack Father     Heart Disease Father      CAD    Hypertension Father     Hypertension Sister     Stroke Sister     Heart Disease Brother      CAD with CABG    Heart Attack Brother     Hypertension Brother        Social History     Social History    Marital status:      Spouse name: N/A    Number of children: N/A    Years of education: N/A     Occupational History          Disabled     Social History Main Topics    Smoking status: Never Smoker    Smokeless tobacco: Never Used    Alcohol use No    Drug use: No    Sexual activity: Not on file     Other Topics Concern    Not on file     Social History Narrative    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 , one child who is healthy. Has always lived in [address]. Dog as a pet. No history of pet bird. There is no significant environmental or industrial exposure. There is no known exposure to TB. ALLERGIES: Latex; Bactrim [sulfamethoxazole-trimethoprim]; Lamictal [lamotrigine]; Pcn [penicillins]; and Tapentadol    Review of Systems   Constitutional: Negative for chills and fever. Respiratory: Negative for chest tightness, shortness of breath, wheezing and stridor. Cardiovascular: Negative for chest pain and palpitations. Gastrointestinal: Negative for abdominal pain, diarrhea, nausea and vomiting. Skin: Negative.     All other systems reviewed and are negative. Vitals:    08/20/18 1853   BP: 102/64   Pulse: 87   Resp: 20   Temp: 98.3 °F (36.8 °C)   SpO2: 92%   Weight: 113.4 kg (250 lb)   Height: 5' (1.524 m)            Physical Exam   Constitutional: She is oriented to person, place, and time. She appears well-developed and well-nourished. No distress. HENT:   Head: Normocephalic and atraumatic. Eyes: Conjunctivae are normal. No scleral icterus. Neck: Normal range of motion. Neck supple. Cardiovascular: Normal rate, regular rhythm and normal heart sounds. Pulmonary/Chest: Effort normal and breath sounds normal. No stridor. No respiratory distress. She has no wheezes. She has no rales. Abdominal: Soft. There is no tenderness. There is no rebound and no guarding. Musculoskeletal: She exhibits edema (edema to both lower extremities. ). Neurological: She is alert and oriented to person, place, and time. No focal weakness   Skin: Skin is warm and dry. No rash noted. She is not diaphoretic. Psychiatric: She has a normal mood and affect. Her behavior is normal.   Nursing note and vitals reviewed. MDM  Number of Diagnoses or Management Options  Acute kidney injury [facility]): Hyponatremia:   Right leg pain:   Diagnosis management comments: Patient's kidney function has come back showing severe injury  There also is severe hyponatremia. I'm giving her hydration with normal saline. I have spoken to family and patient and have called the hospitalist for admission for further care. Anne Suarez MD; 8/20/2018 @8:26 PM Voice dictation software was used during the making of this note. This software is not perfect and grammatical and other typographical errors may be present.   This note has not been proofread for errors.  ===================================================================        Amount and/or Complexity of Data Reviewed  Clinical lab tests: ordered and reviewed (Results for orders placed or performed during the hospital encounter of 08/20/18  -CBC WITH AUTOMATED DIFF       Result                                            Value                         Ref Range                       WBC                                               14.2 (H)                      4.3 - 11.1 K/uL                 RBC                                               4.07                          4.05 - 5.2 M/uL                 HGB                                               10.2 (L)                      11.7 - 15.4 g/dL                HCT                                               31.0 (L)                      35.8 - 46.3 %                   MCV                                               76.2 (L)                      79.6 - 97.8 FL                  MCH                                               25.1 (L)                      26.1 - 32.9 PG                  MCHC                                              32.9                          31.4 - 35.0 g/dL                RDW                                               17.6                          %                               PLATELET                                          491 (H)                       150 - 450 K/uL                  MPV                                               9.9                           9.4 - 12.3 FL                   ABSOLUTE NRBC                                     0.00                          0.0 - 0.2 K/uL                  DF                                                AUTOMATED                                                     NEUTROPHILS                                       72                            43 - 78 %                       LYMPHOCYTES                                       17                            13 - 44 %                       MONOCYTES                                         10                            4.0 - 12.0 %                    EOSINOPHILS                                       0 (L)                         0.5 - 7.8 %                     BASOPHILS                                         0                             0.0 - 2.0 %                     IMMATURE GRANULOCYTES                             0                             0.0 - 5.0 %                     ABS. NEUTROPHILS                                  10.2                          K/UL                            ABS. LYMPHOCYTES                                  2.5                           K/UL                            ABS. MONOCYTES                                    1.4                           K/UL                            ABS. EOSINOPHILS                                  0.0                           K/UL                            ABS. BASOPHILS                                    0.1                           K/UL                            ABS. IMM.  GRANS.                                  0.1                           K/UL                       -METABOLIC PANEL, COMPREHENSIVE       Result                                            Value                         Ref Range                       Sodium                                            118 (LL)                      136 - 145 mmol/L                Potassium                                         3.5                           3.5 - 5.1 mmol/L                Chloride                                          67 (L)                        98 - 107 mmol/L                 CO2                                               39 (H)                        21 - 32 mmol/L                  Anion gap                                         12                            7 - 16 mmol/L                   Glucose                                           105 (H)                       65 - 100 mg/dL                  BUN                                               45 (H)                        8 - 23 MG/DL                    Creatinine                                        2.56 (H)                      0.6 - 1.0 MG/DL GFR est AA                                        24 (L)                        >60 ml/min/1.73m2               GFR est non-AA                                    20 (L)                        >60 ml/min/1.73m2               Calcium                                           8.5                           8.3 - 10.4 MG/DL                Bilirubin, total                                  0.7                           0.2 - 1.1 MG/DL                 ALT (SGPT)                                        28                            12 - 65 U/L                     AST (SGOT)                                        64 (H)                        15 - 37 U/L                     Alk. phosphatase                                  141 (H)                       50 - 136 U/L                    Protein, total                                    6.3                           6.3 - 8.2 g/dL                  Albumin                                           2.4 (L)                       3.2 - 4.6 g/dL                  Globulin                                          3.9 (H)                       2.3 - 3.5 g/dL                  A-G Ratio                                         0.6 (L)                       1.2 - 3.5                  -BNP       Result                                            Value                         Ref Range                       BNP                                               12                            pg/mL                      -TROPONIN I       Result                                            Value                         Ref Range                       Troponin-I, Qt.                                   <0.02 (L)                     0.02 - 0.05 NG/ML         )          ED Course       Procedures

## 2018-08-21 NOTE — PROGRESS NOTES
Progress Note      Patient: French Cousin               Sex: female          MRN: [id number]           YOB: 1950      Age:  80 y.o. PCP:  Dio Aponte DO  Treatment Team: Attending Provider: Dmitry Aldana MD; Utilization Review: Eliza Galindo RN; Care Manager: Portillo Crespo, SLICK; Consulting Provider: Ann Abraham MD  Subjective:         New patient for me today. Complaints of severe right leg and right knee pain. No fevers or chills. Severe nausea and had vomiting's over the last several days. Not sure if she had fevers. No cp/sob. Objective:   Physical Exam:   Visit Vitals    BP 97/61 (BP 1 Location: Right arm, BP Patient Position: At rest)    Pulse 94    Temp 98.4 °F (36.9 °C)    Resp 18    Ht 5' (1.524 m)    Wt 125.3 kg (276 lb 4.8 oz)    SpO2 94%    BMI 53.96 kg/m2      Temp (24hrs), Av.2 °F (36.8 °C), Min:98 °F (36.7 °C), Max:98.4 °F (36.9 °C)    Oxygen Therapy  O2 Sat (%): 94 % (18 1115)  Pulse via Oximetry: 95 beats per minute (18 1115)  O2 Device: Nasal cannula (18 1115)  O2 Flow Rate (L/min): 2 l/min (18 1115)    Intake/Output Summary (Last 24 hours) at 18 1401  Last data filed at 18 1256   Gross per 24 hour   Intake              840 ml   Output              500 ml   Net              340 ml      General: Conscious, no acute distress  Eyes:  KATHLEEN, No pallor/icterus    HENT:             Oral Mucosa is dry, No sinus tenderness  Neck:               Supple, No JVD  Lungs:  CTA Bilaterally, No significant wheeze/rhonchi  Heart:  S1 S2 regular  Abdomen: Soft, obese, normal bowel sounds, NTND, No guarding/rigidity/rebound tend. Extremities: mild pedal edema  Neurologic:  AAOX2, No acute FND, Motor:  LUE: 5/5, LLE: 5/5, RUE: 5/5, RLE: 5/5. ..tremors+  Skin:                No acute rashes  Musculoskeletal: No Acute findings. Rt knee is tender to touch, no swelling/redness/warmth.    Psych: Anxious mood. LAB  Recent Results (from the past 24 hour(s))   EKG, 12 LEAD, INITIAL    Collection Time: 08/20/18  6:57 PM   Result Value Ref Range    Ventricular Rate 83 BPM    Atrial Rate 83 BPM    P-R Interval 166 ms    QRS Duration 90 ms    Q-T Interval 432 ms    QTC Calculation (Bezet) 507 ms    Calculated P Axis 30 degrees    Calculated R Axis -30 degrees    Calculated T Axis 18 degrees    Diagnosis       Normal sinus rhythm  Possible Left atrial enlargement  Left axis deviation  Inferior infarct , age undetermined  Possible Anterolateral infarct , age undetermined  Abnormal ECG     CBC WITH AUTOMATED DIFF    Collection Time: 08/20/18  7:02 PM   Result Value Ref Range    WBC 14.2 (H) 4.3 - 11.1 K/uL    RBC 4.07 4.05 - 5.2 M/uL    HGB 10.2 (L) 11.7 - 15.4 g/dL    HCT 31.0 (L) 35.8 - 46.3 %    MCV 76.2 (L) 79.6 - 97.8 FL    MCH 25.1 (L) 26.1 - 32.9 PG    MCHC 32.9 31.4 - 35.0 g/dL    RDW 17.6 %    PLATELET 552 (H) 634 - 450 K/uL    MPV 9.9 9.4 - 12.3 FL    ABSOLUTE NRBC 0.00 0.0 - 0.2 K/uL    DF AUTOMATED      NEUTROPHILS 72 43 - 78 %    LYMPHOCYTES 17 13 - 44 %    MONOCYTES 10 4.0 - 12.0 %    EOSINOPHILS 0 (L) 0.5 - 7.8 %    BASOPHILS 0 0.0 - 2.0 %    IMMATURE GRANULOCYTES 0 0.0 - 5.0 %    ABS. NEUTROPHILS 10.2 K/UL    ABS. LYMPHOCYTES 2.5 K/UL    ABS. MONOCYTES 1.4 K/UL    ABS. EOSINOPHILS 0.0 K/UL    ABS. BASOPHILS 0.1 K/UL    ABS. IMM.  GRANS. 0.1 K/UL   METABOLIC PANEL, COMPREHENSIVE    Collection Time: 08/20/18  7:02 PM   Result Value Ref Range    Sodium 118 (LL) 136 - 145 mmol/L    Potassium 3.5 3.5 - 5.1 mmol/L    Chloride 67 (L) 98 - 107 mmol/L    CO2 39 (H) 21 - 32 mmol/L    Anion gap 12 7 - 16 mmol/L    Glucose 105 (H) 65 - 100 mg/dL    BUN 45 (H) 8 - 23 MG/DL    Creatinine 2.56 (H) 0.6 - 1.0 MG/DL    GFR est AA 24 (L) >60 ml/min/1.73m2    GFR est non-AA 20 (L) >60 ml/min/1.73m2    Calcium 8.5 8.3 - 10.4 MG/DL    Bilirubin, total 0.7 0.2 - 1.1 MG/DL    ALT (SGPT) 28 12 - 65 U/L    AST (SGOT) 64 (H) 15 - 37 U/L    Alk.  phosphatase 141 (H) 50 - 136 U/L    Protein, total 6.3 6.3 - 8.2 g/dL    Albumin 2.4 (L) 3.2 - 4.6 g/dL    Globulin 3.9 (H) 2.3 - 3.5 g/dL    A-G Ratio 0.6 (L) 1.2 - 3.5     BNP    Collection Time: 08/20/18  7:02 PM   Result Value Ref Range    BNP 12 pg/mL   TROPONIN I    Collection Time: 08/20/18  7:02 PM   Result Value Ref Range    Troponin-I, Qt. <0.02 (L) 0.02 - 0.05 NG/ML   TSH 3RD GENERATION    Collection Time: 08/20/18 11:51 PM   Result Value Ref Range    TSH 0.831 0.358 - 3.740 uIU/mL   CK    Collection Time: 08/20/18 11:51 PM   Result Value Ref Range     21 - 215 U/L   URINALYSIS W/ RFLX MICROSCOPIC    Collection Time: 08/21/18  1:30 AM   Result Value Ref Range    Color YELLOW      Appearance CLOUDY      Specific gravity 1.009 1.001 - 1.023      pH (UA) 5.5 5.0 - 9.0      Protein NEGATIVE  NEG mg/dL    Glucose NEGATIVE  mg/dL    Ketone NEGATIVE  NEG mg/dL    Bilirubin NEGATIVE  NEG      Blood SMALL (A) NEG      Urobilinogen 0.2 0.2 - 1.0 EU/dL    Nitrites NEGATIVE  NEG      Leukocyte Esterase LARGE (A) NEG      WBC >100 (H) 0 /hpf    RBC 0-3 0 /hpf    Epithelial cells 0 0 /hpf    Bacteria 4+ (H) 0 /hpf    Casts 0-3 0 /lpf   METABOLIC PANEL, BASIC    Collection Time: 08/21/18  6:25 AM   Result Value Ref Range    Sodium 126 (L) 136 - 145 mmol/L    Potassium 2.7 (LL) 3.5 - 5.1 mmol/L    Chloride 77 (L) 98 - 107 mmol/L    CO2 37 (H) 21 - 32 mmol/L    Anion gap 12 7 - 16 mmol/L    Glucose 102 (H) 65 - 100 mg/dL    BUN 38 (H) 8 - 23 MG/DL    Creatinine 1.89 (H) 0.6 - 1.0 MG/DL    GFR est AA 34 (L) >60 ml/min/1.73m2    GFR est non-AA 28 (L) >60 ml/min/1.73m2    Calcium 8.0 (L) 8.3 - 10.4 MG/DL   CBC WITH AUTOMATED DIFF    Collection Time: 08/21/18  6:25 AM   Result Value Ref Range    WBC 11.7 (H) 4.3 - 11.1 K/uL    RBC 3.41 (L) 4.05 - 5.2 M/uL    HGB 8.5 (L) 11.7 - 15.4 g/dL    HCT 26.0 (L) 35.8 - 46.3 %    MCV 76.2 (L) 79.6 - 97.8 FL    MCH 24.9 (L) 26.1 - 32.9 PG    MCHC 32.7 31.4 - 35.0 g/dL    RDW 17.7 %    PLATELET 102 597 - 621 K/uL    MPV 10.1 9.4 - 12.3 FL    ABSOLUTE NRBC 0.00 0.0 - 0.2 K/uL    DF AUTOMATED      NEUTROPHILS 60 43 - 78 %    LYMPHOCYTES 27 13 - 44 %    MONOCYTES 13 (H) 4.0 - 12.0 %    EOSINOPHILS 0 (L) 0.5 - 7.8 %    BASOPHILS 0 0.0 - 2.0 %    IMMATURE GRANULOCYTES 0 0.0 - 5.0 %    ABS. NEUTROPHILS 6.9 K/UL    ABS. LYMPHOCYTES 3.1 K/UL    ABS. MONOCYTES 1.5 K/UL    ABS. EOSINOPHILS 0.0 K/UL    ABS. BASOPHILS 0.0 K/UL    ABS. IMM. GRANS. 0.1 K/UL   MAGNESIUM    Collection Time: 08/21/18  6:25 AM   Result Value Ref Range    Magnesium 2.2 1.8 - 2.4 mg/dL   PHOSPHORUS    Collection Time: 08/21/18  6:25 AM   Result Value Ref Range    Phosphorus 2.6 2.3 - 3.7 MG/DL       Xr Chest Port    Result Date: 8/20/2018  EXAM: Single view chest radiograph. ADDITIONAL CLINICAL INFORMATION: 80 years sob with hx of blood clots COMPARISON: Chest radiograph dated July 20, 2018 FINDINGS: No pneumothorax or pleural effusion. No focal lung consolidation. Heart is normal in size. IMPRESSION: 1. No evidence of acute cardiopulmonary abnormality. Duplex Lower Ext Venous Right    Result Date: 8/20/2018  EXAM:  Right leg venous ultrasound. DATE:  August 20, 2018. INDICATION:  Pain. History of DVT. COMPARISON:  None. TECHNIQUE:  Ultrasound of the right leg veins was performed utilizing grayscale, color Doppler and duplex imaging. FINDINGS:  The right common femoral, superficial femoral, deep femoral, popliteal, posterior tibialis, peroneal and greater saphenous veins are patent and compressible with normal response to augmentation. IMPRESSION:  No evidence of right leg DVT. Xr Chest Port    Result Date: 8/20/2018  IMPRESSION: 1. No evidence of acute cardiopulmonary abnormality. Duplex Lower Ext Venous Right    Result Date: 8/20/2018  IMPRESSION:  No evidence of right leg DVT.        All Micro Results     Procedure Component Value Units Date/Time    CULTURE, URINE [[id number]] Order Status:  Sent Specimen:  Urine from Clean catch           Current Medications Reviewed      Assessment/Plan        1. Acute kidney injury, most likely prerenal and possibly from UTI. Follow-up ultrasound of kidneys. Started on IV antibiotics for UTI, monitor urine output and creatinine. Continue IV fluids. 2.  Severe hyponatremia, hypokalemia, keep the patient on IV normal saline with potassium, monitor sodium and potassium levels. 3.  Possible UTI, started on IV Rocephin, follow-up urine cultures. 4.  Restrictive lung disease, chronic respiratory failure on home oxygen. 5.  Hypothyroidism, continue Synthroid, check TSH. 6.  Chronic asthma    7. History of the on anticoagulation with Eliquis. We will hold Eliquis tonight in view of EGD tomorrow. Will restart Eliquis after the EGD. 8.  Chronic nausea and recurrent episodes of vomitings. History of gastric bypass surgery in the past.  GI consulted, likely EGD tomorrow. 9.  Bipolar disorder continue Geodon,, monitor    10. Anemia of chronic disease    11. Morbid obesity    12. Fibromyalgia    13. Right leg and right knee pain. Will get x-ray of the right knee. No DVT on ultrasound venous Doppler of the right leg. The patient on IV fluids, normal saline with IV potassium, monitor urine output and electrolytes. IV protonix and carafate. GI is consulted for recommendations and likely need tomorrow. Keep her on IV Rocephin, follow-up urine cultures. High-risk patient secondary to above-mentioned medical problems and comorbid conditions.        Roscoe Majano MD  August 21, 2018

## 2018-08-21 NOTE — PROGRESS NOTES
Problem: Interdisciplinary Rounds  Goal: Interdisciplinary Rounds  Interdisciplinary team rounds were held 8/21/2018 with the following team members:Care Management, Physical Therapy, Physician and Clinical Coordinator. Plan of care discussed. See clinical pathway and/or care plan for interventions and desired outcomes.

## 2018-08-21 NOTE — PROGRESS NOTES
Patient received from ER. Patient awake in bed. Respirations present and chest expansion symmetrical.  AxO x4.  2 L NC. S1 and S2 noted. Radial and pedal pulses palpable bilaterally. Bowel sounds active. Patient states last BM was yesterday, 08/19/18. Ecchymosis noted on right buttock. No other skin abnormalities noted. No pressure injuries noted. Dual skin assessment completed with Garfield Cardoso RN. Patient c/o right leg pain. Will continue to follow and assess pain. No other complaints at this time. Daughter and grand-daughter present at bedside. Bed low, locked, and side rails x3. Call light within reach. Will continue to monitor.

## 2018-08-21 NOTE — PROGRESS NOTES
Pt resting in bed with eyes closed. Pt awakens when spoken to. Pt complains of right leg pain 6/10 at this time. Pt has no acute distress noted at this time. Pt complaints of tremors but no active tremors noted at this time. Pt encouraged to call for assistance if needed call light in reach, door open will monitor.

## 2018-08-21 NOTE — PROGRESS NOTES
Pt resting in bed. Pt reports nausea is alittle better at this time. No distress noted at this time. Call light in reach, door open will monitor.

## 2018-08-21 NOTE — PROGRESS NOTES
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

## 2018-08-21 NOTE — PROGRESS NOTES
Patient slept periodically throughout the night. Patient awake in bed. Respirations present and chest expansion symmetrical.  2 L NC. No signs of distress at this time. Bed low, locked, and side rails x3. Call light within reach. Gave report to oncoming RNNora.

## 2018-08-21 NOTE — PROGRESS NOTES
Preformed in and out catheter to obtain a urine sample. Second RN, Jodi Gonzalez, present in room during insertion. Patient tolerated procedure fairly well. C/o pain to neck and leg during. Patient pain decreased when bed put back into a comfortable position. 500 ml urine obtained with in and out cath. Urine was yellow and clear.

## 2018-08-22 ENCOUNTER — ANESTHESIA (OUTPATIENT)
Dept: ENDOSCOPY | Age: 68
DRG: 682 | End: 2018-08-22
Payer: MEDICARE

## 2018-08-22 LAB
ALBUMIN SERPL-MCNC: 2.1 G/DL (ref 3.2–4.6)
ALBUMIN/GLOB SERPL: 0.7 {RATIO} (ref 1.2–3.5)
ALP SERPL-CCNC: 125 U/L (ref 50–136)
ALT SERPL-CCNC: 21 U/L (ref 12–65)
ANION GAP SERPL CALC-SCNC: 12 MMOL/L (ref 7–16)
AST SERPL-CCNC: 26 U/L (ref 15–37)
BASOPHILS # BLD: 0 K/UL
BASOPHILS NFR BLD: 0 % (ref 0–2)
BILIRUB SERPL-MCNC: 0.2 MG/DL (ref 0.2–1.1)
BUN SERPL-MCNC: 20 MG/DL (ref 8–23)
CALCIUM SERPL-MCNC: 8 MG/DL (ref 8.3–10.4)
CHLORIDE SERPL-SCNC: 89 MMOL/L (ref 98–107)
CO2 SERPL-SCNC: 33 MMOL/L (ref 21–32)
CREAT SERPL-MCNC: 1.08 MG/DL (ref 0.6–1)
DIFFERENTIAL METHOD BLD: ABNORMAL
EOSINOPHIL # BLD: 0 K/UL
EOSINOPHIL NFR BLD: 0 % (ref 0.5–7.8)
ERYTHROCYTE [DISTWIDTH] IN BLOOD BY AUTOMATED COUNT: 18.4 %
GLOBULIN SER CALC-MCNC: 3.2 G/DL (ref 2.3–3.5)
GLUCOSE SERPL-MCNC: 100 MG/DL (ref 65–100)
HCT VFR BLD AUTO: 27.9 % (ref 35.8–46.3)
HGB BLD-MCNC: 8.7 G/DL (ref 11.7–15.4)
IMM GRANULOCYTES # BLD: 0.1 K/UL
IMM GRANULOCYTES NFR BLD AUTO: 1 % (ref 0–5)
INR PPP: 1.1
LYMPHOCYTES # BLD: 3 K/UL
LYMPHOCYTES NFR BLD: 34 % (ref 13–44)
MAGNESIUM SERPL-MCNC: 2.4 MG/DL (ref 1.8–2.4)
MCH RBC QN AUTO: 24.6 PG (ref 26.1–32.9)
MCHC RBC AUTO-ENTMCNC: 31.2 G/DL (ref 31.4–35)
MCV RBC AUTO: 79 FL (ref 79.6–97.8)
MONOCYTES # BLD: 1.2 K/UL
MONOCYTES NFR BLD: 13 % (ref 4–12)
NEUTS SEG # BLD: 4.6 K/UL
NEUTS SEG NFR BLD: 52 % (ref 43–78)
NRBC # BLD: 0 K/UL (ref 0–0.2)
PHOSPHATE SERPL-MCNC: 2 MG/DL (ref 2.3–3.7)
PLATELET # BLD AUTO: 438 K/UL (ref 150–450)
PMV BLD AUTO: 9.5 FL (ref 9.4–12.3)
POTASSIUM SERPL-SCNC: 3.1 MMOL/L (ref 3.5–5.1)
PROT SERPL-MCNC: 5.3 G/DL (ref 6.3–8.2)
PROTHROMBIN TIME: 13.8 SEC (ref 11.5–14.5)
RBC # BLD AUTO: 3.53 M/UL (ref 4.05–5.2)
SODIUM SERPL-SCNC: 134 MMOL/L (ref 136–145)
WBC # BLD AUTO: 8.8 K/UL (ref 4.3–11.1)

## 2018-08-22 PROCEDURE — 88305 TISSUE EXAM BY PATHOLOGIST: CPT

## 2018-08-22 PROCEDURE — 94640 AIRWAY INHALATION TREATMENT: CPT

## 2018-08-22 PROCEDURE — 94760 N-INVAS EAR/PLS OXIMETRY 1: CPT

## 2018-08-22 PROCEDURE — 0DB68ZX EXCISION OF STOMACH, VIA NATURAL OR ARTIFICIAL OPENING ENDOSCOPIC, DIAGNOSTIC: ICD-10-PCS | Performed by: INTERNAL MEDICINE

## 2018-08-22 PROCEDURE — 36415 COLL VENOUS BLD VENIPUNCTURE: CPT

## 2018-08-22 PROCEDURE — 76040000025: Performed by: INTERNAL MEDICINE

## 2018-08-22 PROCEDURE — 97162 PT EVAL MOD COMPLEX 30 MIN: CPT

## 2018-08-22 PROCEDURE — 74011000302 HC RX REV CODE- 302: Performed by: INTERNAL MEDICINE

## 2018-08-22 PROCEDURE — 74011250636 HC RX REV CODE- 250/636: Performed by: ANESTHESIOLOGY

## 2018-08-22 PROCEDURE — 74011250636 HC RX REV CODE- 250/636

## 2018-08-22 PROCEDURE — 84100 ASSAY OF PHOSPHORUS: CPT

## 2018-08-22 PROCEDURE — 76060000032 HC ANESTHESIA 0.5 TO 1 HR: Performed by: INTERNAL MEDICINE

## 2018-08-22 PROCEDURE — 74011000250 HC RX REV CODE- 250: Performed by: INTERNAL MEDICINE

## 2018-08-22 PROCEDURE — 86580 TB INTRADERMAL TEST: CPT | Performed by: INTERNAL MEDICINE

## 2018-08-22 PROCEDURE — 74011250637 HC RX REV CODE- 250/637: Performed by: INTERNAL MEDICINE

## 2018-08-22 PROCEDURE — 65270000029 HC RM PRIVATE

## 2018-08-22 PROCEDURE — 77010033678 HC OXYGEN DAILY

## 2018-08-22 PROCEDURE — 85610 PROTHROMBIN TIME: CPT

## 2018-08-22 PROCEDURE — 77030009426 HC FCPS BIOP ENDOSC BSC -B: Performed by: INTERNAL MEDICINE

## 2018-08-22 PROCEDURE — 88312 SPECIAL STAINS GROUP 1: CPT

## 2018-08-22 PROCEDURE — 85025 COMPLETE CBC W/AUTO DIFF WBC: CPT

## 2018-08-22 PROCEDURE — 74011000258 HC RX REV CODE- 258: Performed by: HOSPITALIST

## 2018-08-22 PROCEDURE — 80053 COMPREHEN METABOLIC PANEL: CPT

## 2018-08-22 PROCEDURE — 74011000250 HC RX REV CODE- 250: Performed by: HOSPITALIST

## 2018-08-22 PROCEDURE — 83735 ASSAY OF MAGNESIUM: CPT

## 2018-08-22 PROCEDURE — 74011250636 HC RX REV CODE- 250/636: Performed by: HOSPITALIST

## 2018-08-22 RX ORDER — SODIUM CHLORIDE 9 MG/ML
10 INJECTION, SOLUTION INTRAVENOUS CONTINUOUS
Status: DISCONTINUED | OUTPATIENT
Start: 2018-08-22 | End: 2018-08-22

## 2018-08-22 RX ORDER — PROPOFOL 10 MG/ML
INJECTION, EMULSION INTRAVENOUS AS NEEDED
Status: DISCONTINUED | OUTPATIENT
Start: 2018-08-22 | End: 2018-08-22 | Stop reason: HOSPADM

## 2018-08-22 RX ORDER — SODIUM CHLORIDE 0.9 % (FLUSH) 0.9 %
5-10 SYRINGE (ML) INJECTION AS NEEDED
Status: DISCONTINUED | OUTPATIENT
Start: 2018-08-22 | End: 2018-08-27 | Stop reason: HOSPADM

## 2018-08-22 RX ORDER — SODIUM CHLORIDE, SODIUM LACTATE, POTASSIUM CHLORIDE, CALCIUM CHLORIDE 600; 310; 30; 20 MG/100ML; MG/100ML; MG/100ML; MG/100ML
100 INJECTION, SOLUTION INTRAVENOUS CONTINUOUS
Status: DISCONTINUED | OUTPATIENT
Start: 2018-08-22 | End: 2018-08-22

## 2018-08-22 RX ORDER — LIDOCAINE HYDROCHLORIDE 20 MG/ML
INJECTION, SOLUTION EPIDURAL; INFILTRATION; INTRACAUDAL; PERINEURAL AS NEEDED
Status: DISCONTINUED | OUTPATIENT
Start: 2018-08-22 | End: 2018-08-22 | Stop reason: HOSPADM

## 2018-08-22 RX ADMIN — Medication 10 ML: at 04:45

## 2018-08-22 RX ADMIN — Medication 5 ML: at 21:47

## 2018-08-22 RX ADMIN — SUCRALFATE 1 G: 1 TABLET ORAL at 17:16

## 2018-08-22 RX ADMIN — MIRTAZAPINE 45 MG: 30 TABLET, FILM COATED ORAL at 21:46

## 2018-08-22 RX ADMIN — PREGABALIN 25 MG: 25 CAPSULE ORAL at 08:37

## 2018-08-22 RX ADMIN — NYSTATIN: 100000 POWDER TOPICAL at 08:38

## 2018-08-22 RX ADMIN — DULOXETINE 60 MG: 60 CAPSULE, DELAYED RELEASE ORAL at 17:16

## 2018-08-22 RX ADMIN — ALBUTEROL SULFATE 2.5 MG: 2.5 SOLUTION RESPIRATORY (INHALATION) at 16:23

## 2018-08-22 RX ADMIN — Medication 5 ML: at 13:41

## 2018-08-22 RX ADMIN — OXYBUTYNIN CHLORIDE 10 MG: 10 TABLET, FILM COATED, EXTENDED RELEASE ORAL at 08:37

## 2018-08-22 RX ADMIN — PANTOPRAZOLE SODIUM 40 MG: 40 TABLET, DELAYED RELEASE ORAL at 17:16

## 2018-08-22 RX ADMIN — CEFTRIAXONE SODIUM 2 G: 2 INJECTION, POWDER, FOR SOLUTION INTRAMUSCULAR; INTRAVENOUS at 17:16

## 2018-08-22 RX ADMIN — ONDANSETRON 8 MG: 4 TABLET, ORALLY DISINTEGRATING ORAL at 13:39

## 2018-08-22 RX ADMIN — SUCRALFATE 1 G: 1 TABLET ORAL at 21:46

## 2018-08-22 RX ADMIN — POTASSIUM PHOSPHATE, MONOBASIC AND POTASSIUM PHOSPHATE, DIBASIC: 224; 236 INJECTION, SOLUTION INTRAVENOUS at 13:38

## 2018-08-22 RX ADMIN — NYSTATIN: 100000 POWDER TOPICAL at 17:18

## 2018-08-22 RX ADMIN — ONDANSETRON 8 MG: 4 TABLET, ORALLY DISINTEGRATING ORAL at 17:16

## 2018-08-22 RX ADMIN — LEVOTHYROXINE SODIUM 112 MCG: 112 TABLET ORAL at 17:16

## 2018-08-22 RX ADMIN — PROPOFOL 50 MG: 10 INJECTION, EMULSION INTRAVENOUS at 11:35

## 2018-08-22 RX ADMIN — ONDANSETRON 8 MG: 4 TABLET, ORALLY DISINTEGRATING ORAL at 04:43

## 2018-08-22 RX ADMIN — SUCRALFATE 1 G: 1 TABLET ORAL at 08:37

## 2018-08-22 RX ADMIN — SUCRALFATE 1 G: 1 TABLET ORAL at 13:38

## 2018-08-22 RX ADMIN — ZIPRASIDONE HYDROCHLORIDE 80 MG: 20 CAPSULE ORAL at 21:46

## 2018-08-22 RX ADMIN — PROPOFOL 30 MG: 10 INJECTION, EMULSION INTRAVENOUS at 11:38

## 2018-08-22 RX ADMIN — HYDROCODONE BITARTRATE AND ACETAMINOPHEN 1 TABLET: 10; 325 TABLET ORAL at 04:43

## 2018-08-22 RX ADMIN — TUBERCULIN PURIFIED PROTEIN DERIVATIVE 5 UNITS: 5 INJECTION, SOLUTION INTRADERMAL at 17:16

## 2018-08-22 RX ADMIN — DULOXETINE 60 MG: 60 CAPSULE, DELAYED RELEASE ORAL at 08:37

## 2018-08-22 RX ADMIN — ALBUTEROL SULFATE 2.5 MG: 2.5 SOLUTION RESPIRATORY (INHALATION) at 20:00

## 2018-08-22 RX ADMIN — Medication 400 MG: at 08:37

## 2018-08-22 RX ADMIN — ALBUTEROL SULFATE 2.5 MG: 2.5 SOLUTION RESPIRATORY (INHALATION) at 06:25

## 2018-08-22 RX ADMIN — PANTOPRAZOLE SODIUM 40 MG: 40 TABLET, DELAYED RELEASE ORAL at 04:43

## 2018-08-22 RX ADMIN — NYSTATIN 500000 UNITS: 100000 SUSPENSION ORAL at 17:18

## 2018-08-22 RX ADMIN — NYSTATIN 500000 UNITS: 100000 SUSPENSION ORAL at 22:00

## 2018-08-22 RX ADMIN — HYDROCODONE BITARTRATE AND ACETAMINOPHEN 1 TABLET: 10; 325 TABLET ORAL at 13:38

## 2018-08-22 RX ADMIN — SODIUM CHLORIDE, SODIUM LACTATE, POTASSIUM CHLORIDE, AND CALCIUM CHLORIDE: 600; 310; 30; 20 INJECTION, SOLUTION INTRAVENOUS at 11:14

## 2018-08-22 RX ADMIN — PREGABALIN 25 MG: 25 CAPSULE ORAL at 17:16

## 2018-08-22 RX ADMIN — LIDOCAINE HYDROCHLORIDE 100 MG: 20 INJECTION, SOLUTION EPIDURAL; INFILTRATION; INTRACAUDAL; PERINEURAL at 11:35

## 2018-08-22 RX ADMIN — MONTELUKAST SODIUM 10 MG: 10 TABLET, FILM COATED ORAL at 08:37

## 2018-08-22 NOTE — ANESTHESIA PREPROCEDURE EVALUATION
Anesthetic History   No history of anesthetic complications            Review of Systems / Medical History  Patient summary reviewed and pertinent labs reviewed    Pulmonary          Pneumonia (recurrent hx)  Asthma (daily inhalers, home oxygen) : well controlled    Comments: Restrictive lung disease   Neuro/Psych       CVA       Cardiovascular    Hypertension              Exercise tolerance: <4 METS  Comments: EKG--NSR, LAD, old MI inf    4/18 ECHO nl EF   GI/Hepatic/Renal     GERD: well controlled    Renal disease (hx ARF): stones and ARF  PUD    Comments: Current treatment for UTI Endo/Other      Hypothyroidism: well controlled  Morbid obesity, arthritis and anemia (8.7)     Other Findings   Comments: DVT hx/PE on   Fibromyalgia  Bipolar  Debility  Hyponatremia/hypokalemia improving  Anxiety  Hx vocal cord paralysis    Evaluation of right leg pain negative for DVT         Physical Exam    Airway  Mallampati: II  TM Distance: < 4 cm  Neck ROM: normal range of motion   Mouth opening: Normal     Cardiovascular    Rhythm: regular  Rate: normal         Dental    Dentition: Full lower dentures and Full upper dentures  Comments: Very dry mucous membranes   Pulmonary      Decreased breath sounds: bilateral           Abdominal  GI exam deferred       Other Findings            Anesthetic Plan    ASA: 3  Anesthesia type: total IV anesthesia          Induction: Intravenous  Anesthetic plan and risks discussed with: Patient

## 2018-08-22 NOTE — PROGRESS NOTES
Pt returns to room from GI lab. Pt alert oriented times 3 at this time. Pt on 3  L NC at this time. Pt has no acute distress noted at this time. Pt made aware of low fat diet. Pt encouraged to call for assistance if needed call light in reach, door open will monitor.

## 2018-08-22 NOTE — H&P (VIEW-ONLY)
[facility] Consult Note       Primary GI Physician: Dr Francis Lloyd    Referring Physician:  Dr. Denise Howard     Consult Date:  8/21/2018    Admit Date:  8/20/2018    Chief Complaint:  N/V    Subjective:     History of Present Illness:  Patient is a 80 y.o. female with PMH including but not limited to chronic narcotic use, bipolar disorder, CAMILA, PE, Fibromyalgia, CVA, PUD, GERD, HTN, DVT on Eliquis, resterictive lung dz on O2 2-3 L at [address] is seen in consultation at the request of Dr. Chidi Gonzales for N/V. The patient presented to the ED with weakness and a right leg tremor. US was negative for DVT. She was found to be hypokalemic on exam with K low at 2.7 with elevated WBC 11.7. She denies fever, diarrhea, cough, pain with urination or burning. She was last seen in our office by Dr Ermelinda Espino for nausea, vomiting and constipation. EGD/COLO w 2 day prep scheduled for 9/26/18. She has a hx of chronic constipation and at her last OV she was taking Movantik as needed. She has also utilized Miralax in the past.   She has a hx of chronic N/V since al least 2012. She is on Reglan, Zofran, Phenergan and Carafate out patient. She has a hx of gastric bypass in 2008. She has anastamotic ulcers on EGD in 2012 and jejunal ulcer on EGD in 2014. Last EGD 2/17/2016 with gastric bypass anatomy, no ulcers and bx at anastomosis showed fragments of gastric mucosa with changes of repair. Last colonoscopy 3/22/17 for screening was normal.  Labs 8/6/18 with TB 0.2,  (H), AST 19, ALT 12, lipase 27. Hgb has varied between 8.5 to 10.2. She denies BRRB or tarry stools. She denies NSAID or ETOH use. She continues on Eliquis. She reports no longer taking Movantik. She is not sure when her last BM was. PMH:  Past Medical History:   Diagnosis Date    Acute renal failure ([facility] [address] July, 2014    The patient was admitted with acute renal failure secondary to volume depletion.   She was found to have a gastric ulcer on admission.  Arthritis     Asthma     Asthma with acute exacerbation 9/28/2017    Bilateral pulmonary embolism [facility]) September, 2012    Restarted on anticoagulation for lifetime    Calculus of ureter May, 2015    CAP (community acquired pneumonia) October, 2012    RML pneumonia    Cellulitis of left lower extremity 11/1/2017    Chronic pain     fibromyalgia. back pain    Community acquired bacterial pneumonia 4/2/2018    CVA (cerebral infarction) January, 2012    Reportedly has mild left arm residual weakness    Gastric ulcer July, 2014          GERD (gastroesophageal reflux disease)     Gram-positive bacteremia 1/2 cx 12/26/2016    HCAP (healthcare-associated pneumonia) January, 2013    RLL pneumonia    HCAP (healthcare-associated pneumonia) February, 2013    RLL pneumonia    Hypertension     Left leg DVT ([address] 1991    On coumadin until 2008    Morbid obesity ([address]     Psychiatric disorder     PUD (peptic ulcer disease) ? ??    Sepsis due to pneumonia ([address] 4/2/2018    Stroke [facility])     2009 mini stroke    Thyroid disease        PSH:  Past Surgical History:   Procedure Laterality Date    HX APPENDECTOMY      HX CHOLECYSTECTOMY      HX ENDOSCOPY  July, 2014    Gastric ulcers x 2    HX GASTRIC BYPASS      HX HYSTERECTOMY      HX ORTHOPAEDIC      rt knee growth, right shoulder, left thumb    HX OTHER SURGICAL      vagus nerve stimulator    HX TONSILLECTOMY         Allergies: Allergies   Allergen Reactions    Latex Contact Dermatitis    Bactrim [Sulfamethoxazole-Trimethoprim] Nausea Only    Lamictal [Lamotrigine] Atopic Dermatitis    Pcn [Penicillins] Rash    Tapentadol Rash       Home Medications:  Prior to Admission medications    Medication Sig Start Date End Date Taking? Authorizing Provider   apixaban (ELIQUIS) 5 mg tablet Take 5 mg by mouth two (2) times a day.    Yes Historical Provider   DULoxetine (CYMBALTA) 60 mg capsule Take 60 mg by mouth two (2) times a day.   Yes Historical Provider   albuterol-ipratropium (DUO-NEB) 2.5 mg-0.5 mg/3 ml nebu 3 mL by Nebulization route every four (4) hours as needed. Yes Historical Provider   mirtazapine (REMERON) 45 mg tablet Take 45 mg by mouth nightly. Yes Historical Provider   albuterol (PROAIR HFA) 90 mcg/actuation inhaler Take 2 Puffs by inhalation every four (4) hours as needed for Wheezing. 3/2/18  Yes Dante Callaway MD   albuterol (PROVENTIL VENTOLIN) 2.5 mg /3 mL (0.083 %) nebulizer solution 3 mL by Nebulization route four (4) times daily. And every 4 hours as needed cough, wheezing, shortness of breath, J45.909, Medicare part B 1/29/18  Yes Yudy Panda NP   OXYGEN-AIR DELIVERY SYSTEMS 2 lpm cont. , 4 lpm at hs   Yes Historical Provider   fluticasone-vilanterol (BREO ELLIPTA) 200-25 mcg/dose inhaler Take 1 Puff by inhalation daily. RINSE MOUTH WELL AFTER USE 10/27/17  Yes Verner Friends, FREDO   nystatin (MYCOSTATIN) powder Apply  to affected area two (2) times a day. 10/2/17  Yes Mare Benson NP   sucralfate (CARAFATE) 1 gram tablet Take 1 g by mouth four (4) times daily. Yes Historical Provider   pregabalin (LYRICA) 150 mg capsule Take  by mouth two (2) times a day. Yes Historical Provider   montelukast (SINGULAIR) 10 mg tablet Take 10 mg by mouth daily. Yes Historical Provider   ALPRAZolam Elizabeth Allis) 2 mg tablet Take 1 Tab by mouth three (3) times daily as needed for Anxiety. Max Daily Amount: 6 mg. 2/9/17  Yes Sammie Boone MD   ondansetron (ZOFRAN ODT) 8 mg disintegrating tablet Take 8 mg by mouth every eight (8) hours as needed for Nausea. Yes Gunner Lucas MD   oxybutynin chloride XL (DITROPAN XL) 10 mg CR tablet Take 10 mg by mouth daily. Yes Gunner Lucas MD   Magnesium Oxide 500 mg cap Take 500 mg by mouth. Yes Gunner Lucas MD   HYDROcodone-acetaminophen (NORCO)  mg tablet Take 1 Tab by mouth every six (6) hours as needed for Pain.    Yes Historical Provider   POTASSIUM CHLORIDE SR 10 MEQ TAB Take 1 Tab by mouth daily. Yes Historical Provider   Cholecalciferol, Vitamin D3, (VITAMIN D3) 1,000 unit cap Take  by mouth. Yes Historical Provider   folic acid 829 mcg tablet Take 800 mcg by mouth daily. Yes Historical Provider   pantoprazole (PROTONIX) 40 mg tablet Take 1 Tab by mouth Before breakfast and dinner. 7/29/14  Yes Courtney Hernandez NP   FERROUS FUMARATE/VIT BCOMP&C (SUPER B COMPLEX PO) Take 1 Tab by mouth daily. Yes Gunner Lucas MD   ziprasidone (GEODON) 80 mg capsule Take 80 mg by mouth nightly. Yes Historical Provider   SUMATRIPTAN SUCCINATE (IMITREX PO) take 100 mg by mouth as needed. Yes Historical Provider   levothyroxine (SYNTHROID) 125 mcg tablet Take 112 mcg by mouth every evening. Yes Historical Provider   meclizine (ANTIVERT) 25 mg tablet Take 25 mg by mouth three (3) times daily as needed. Historical Provider   metoclopramide HCl (REGLAN) 5 mg tablet Take 5 mg by mouth Before breakfast, lunch, and dinner. Historical Provider   naloxegol (MOVANTIK) 25 mg tab tablet Take  by mouth Daily (before breakfast). Historical Provider   carvedilol (COREG) 12.5 mg tablet Take 12.5 mg by mouth two (2) times a day. Historical Provider   atorvastatin (LIPITOR) 10 mg tablet Take 2 Tabs by mouth nightly. 4/13/17   Ivonne Stallings MD   NEBULIZER by Does Not Apply route.     Historical Provider       Hospital Medications:  Current Facility-Administered Medications   Medication Dose Route Frequency    0.9% sodium chloride with KCl 20 mEq/L infusion   IntraVENous CONTINUOUS    albuterol (PROVENTIL VENTOLIN) nebulizer solution 2.5 mg  2.5 mg Nebulization QID RT    sodium chloride (NS) flush 5-10 mL  5-10 mL IntraVENous Q8H    sodium chloride (NS) flush 5-10 mL  5-10 mL IntraVENous PRN    acetaminophen (TYLENOL) tablet 650 mg  650 mg Oral Q4H PRN    HYDROcodone-acetaminophen (NORCO) 5-325 mg per tablet 1 Tab  1 Tab Oral Q4H PRN    naloxone (NARCAN) injection 0.4 mg  0.4 mg IntraVENous PRN    diphenhydrAMINE (BENADRYL) capsule 25 mg  25 mg Oral Q4H PRN    ondansetron (ZOFRAN) injection 4 mg  4 mg IntraVENous Q4H PRN    senna-docusate (PERICOLACE) 8.6-50 mg per tablet 2 Tab  2 Tab Oral DAILY PRN    LORazepam (ATIVAN) tablet 0.5-1 mg  0.5-1 mg Oral Q4H PRN    zolpidem (AMBIEN) tablet 5 mg  5 mg Oral QHS PRN    apixaban (ELIQUIS) tablet 5 mg  5 mg Oral BID    DULoxetine (CYMBALTA) capsule 60 mg  60 mg Oral BID    meclizine (ANTIVERT) tablet 25 mg  25 mg Oral Q6H PRN    metoclopramide HCl (REGLAN) tablet 5 mg  5 mg Oral TIDAC    albuterol-ipratropium (DUO-NEB) 2.5 MG-0.5 MG/3 ML  3 mL Nebulization Q4H PRN    albuterol (PROVENTIL VENTOLIN) nebulizer solution 2.5 mg  2.5 mg Nebulization Q4H PRN    mirtazapine (REMERON) tablet 45 mg  45 mg Oral QHS    nystatin (MYCOSTATIN) 100,000 unit/gram powder   Topical BID    montelukast (SINGULAIR) tablet 10 mg  10 mg Oral DAILY    pregabalin (LYRICA) capsule 25 mg  25 mg Oral BID    sucralfate (CARAFATE) tablet 1 g  1 g Oral QID    ALPRAZolam (XANAX) tablet 2 mg  2 mg Oral TID PRN    magnesium oxide (MAG-OX) tablet 400 mg  400 mg Oral DAILY    ondansetron (ZOFRAN ODT) tablet 8 mg  8 mg Oral Q8H PRN    oxybutynin chloride XL (DITROPAN XL) tablet 10 mg  10 mg Oral DAILY    HYDROcodone-acetaminophen (NORCO)  mg tablet 1 Tab  1 Tab Oral Q6H PRN    pantoprazole (PROTONIX) tablet 40 mg  40 mg Oral ACB&D    ziprasidone (GEODON) capsule 80 mg  80 mg Oral QHS    levothyroxine (SYNTHROID) tablet 112 mcg  112 mcg Oral QPM    nystatin (MYCOSTATIN) 100,000 unit/mL oral suspension 500,000 Units  500,000 Units Oral QID       Social History:  Social History   Substance Use Topics    Smoking status: Never Smoker    Smokeless tobacco: Never Used    Alcohol use No       Pt denies any history of drug use, blood transfusions, or tattoos.     Family History:  Family History   Problem Relation Age of Onset    Hypertension Mother     Cancer Father      prostate    Heart Attack Father     Heart Disease Father      CAD    Hypertension Father     Hypertension Sister     Stroke Sister     Heart Disease Brother      CAD with CABG    Heart Attack Brother     Hypertension Brother        Review of Systems:  A detailed 10 system ROS is obtained, with pertinent positives as listed above. All others are negative. Diet:  Regular    Objective:     Physical Exam:  Vitals:  Visit Vitals    BP 97/61 (BP 1 Location: Right arm, BP Patient Position: At rest)    Pulse 94    Temp 98.4 °F (36.9 °C)    Resp 18    Ht 5' (1.524 m)    Wt 125.3 kg (276 lb 4.8 oz)    SpO2 94%    BMI 53.96 kg/m2     Gen:  Pt is alert, cooperative, no acute distress  Skin:  Extremities and face reveal no rashes. HEENT: Sclerae anicteric. Extra-occular muscles are intact. No oral ulcers. No abnormal pigmentation of the lips. The neck is supple. Cardiovascular: Regular rate and rhythm. No murmurs, gallops, or rubs. Respiratory:  Comfortable breathing with no accessory muscle use. Diminished BS. On 3 liter NC.  GI:  Abdomen nondistended, soft, obese and nontender. Normal active bowel sounds. Rectal:  Deferred  Musculoskeletal:  +1 pitting edema of the lower legs. Neurological:  A&O. Poor historian  Psychiatric:  Mood appears appropriate with judgement intact. Laboratory:    Recent Labs      08/21/18   0625  08/20/18   1902   WBC  11.7*  14.2*   HGB  8.5*  10.2*   HCT  26.0*  31.0*   PLT  366  491*   MCV  76.2*  76.2*   NA  126*  118*   K  2.7*  3.5   CL  77*  67*   CO2  37*  39*   BUN  38*  45*   CREA  1.89*  2.56*   CA  8.0*  8.5   MG  2.2   --    GLU  102*  105*   AP   --   141*   SGOT   --   64*   ALT   --   28   TBILI   --   0.7   ALB   --   2.4*   TP   --   6.3        Right leg venous ultrasound.  8/20/18  FINDINGS:  The right common femoral, superficial femoral, deep femoral,  popliteal, posterior tibialis, peroneal and greater saphenous veins are patent  and compressible with normal response to augmentation. IMPRESSION:  No evidence of right leg DVT. Chest XR 8/20/18  No pneumothorax or pleural effusion. No focal lung consolidation. Heart is  normal in size. IMPRESSION:   1. No evidence of acute cardiopulmonary abnormality.         Assessment:     Principal Problem:    CAMILA (acute kidney injury) ([address] (8/20/2018)    Active Problems:    Hypertension (9/16/2012)      GERD (gastroesophageal reflux disease) (9/16/2012)      Hypothyroidism (9/28/2017)      Asthma (9/28/2017)      Bipolar affective disorder ([address] (9/16/2012)      Hx of pulmonary embolus (9/28/2017)      Overview: Chronic Eliquis      Restrictive lung disease (5/21/2018)      Hyponatremia (8/20/2018)      Saravanan Dias (8/20/2018)      80 y.o. female with PMH including but not limited to chronic narcotic use, bipolar disorder, CAMILA, PE (2012), Fibromyalgia, CVA, PUD, GERD, HTN, DVT on Eliquis, resterictive lung dz on O2 2-3 L at HS admitted with right leg jerking, weakness and was found to have K of 2.7.  US negative for Rt leg DVT. Has seen Dr Luis Fernando Hassan for chronic constipation and N/V. Hx of PUD with ulcer 2012 and 2014. Negative EGD 2/2016. Normal colo 2017. Labs 8/6/18 with TB 0.2,  (H), AST 19, ALT 12, lipase 27. Hgb has varied between 8.5 to 10.2. She denies BRRB or tarry stools. She denies NSAID or ETOH use. She continues on Eliquis. Neg Chest XR. Plan:     - Stop Reglan over concerns for tardive dyskinsia symptoms (right leg jerking, tremors)  - Scheduled Zofran prior to meals as pt reports nausea is worse with eating  - Minimize narcotics as this can cause gastroparesis/nausea  - Replete K per primary  - She does not appear to have had a GES due to chronic narcotic use  - Continue PPI bid and Carafate qid  - EGD tomorrow to evaluate for causes of nausea including PUD, LEONIE Ricardo NP  Patient is seen and examined in collaboration with Dr. Jenn Garcia. Assessment and plan as per Dr. Ele Soto. I have seen and examined this patient, and agree with above assessment and plan. Pt reports chronic nausea, abdom pain, but no recent wt loss.     Sx's reportedly worse with fatty / fried foods- need to consider nutrition counseling  Plan EGD in am to r/o anastomotic stricture, ulcer, gastritis, etc  Miralax for chronic constipation    Axel Savage MD

## 2018-08-22 NOTE — PROCEDURES
EGD Procedure Note    PreOp Diagnosis:   Nausea vomiting  Epigastric pain    PostOp Diagnosis:  Small anastomotic ulcers  Postop change- gastric bypass in    Medications:  Monitored Anesthesia    Procedure:  EGD   Instrument:   After informed consent was obtained, the patient was sedated and the endoscope was inserted  in the mouth and advanced into the small bowel without difficulty. The scope was slowly withdrawn while the mucosa was carefully inspected, including a retroflexed view of the cardia. Findings:   Esophagus: The proximal and mid esophagus appeared normal.  In the distal esophagus, the Z line was normal.  Stomach: small residual gastric pouch. The anastomosis was widely patent. 2 shallow linear ulcers at the anastomosis. No bleeding stigmata present. Biopsies were obtained from the antrum and body to rule out H. pylori infection as a cause for symptoms. Small bowel: One limb ends in a short blind loop. The other limb is deeply intubated and appears normal.      Recommendations:   Follow up pathology results  Avoid NSAIDs  Continue Carafate  Nutrition consult- for dietary indiscretion, likely contributing to chronic nausea    Malia Hidalgo MD

## 2018-08-22 NOTE — PROGRESS NOTES
TRANSFER - OUT REPORT:    Verbal report given to [address] [place] [address] on Italia Shin  being transferred to GI lab for ordered procedure       Report consisted of patients Situation, Background, Assessment and   Recommendations(SBAR). Information from the following report(s) SBAR and Recent Results was reviewed with the receiving nurse. Lines:   Peripheral IV 08/20/18 Right Wrist (Active)   Site Assessment Clean, dry, & intact 8/22/2018  7:29 AM   Phlebitis Assessment 0 8/22/2018  7:29 AM   Infiltration Assessment 0 8/22/2018  7:29 AM   Dressing Status Clean, dry, & intact 8/22/2018  7:29 AM   Dressing Type Transparent 8/22/2018  7:29 AM   Hub Color/Line Status Infusing 8/22/2018  7:29 AM   Alcohol Cap Used No 8/22/2018  3:49 AM        Opportunity for questions and clarification was provided.       Patient transported with:   [employer]

## 2018-08-22 NOTE — PROGRESS NOTES
Patient slept throughout most of the night. Patient resting in bed at this time. Respirations present and chest expansion symmetrical.  No signs of distress at this time. Bed low, locked, and side rails x3. Call light within reach. Gave report to oncoming RNMabel

## 2018-08-22 NOTE — PROGRESS NOTES
Received report from [address] [place] [address]. Patient awake in bed. Respirations present and chest expansion symmetrical.  AxO x4. S1 and S2 noted. Radial and pedal pulses palpable bilaterally. Bowel sounds active. Bed low, locked, and side rails x3. Call light within reach. Will continue to monitor.

## 2018-08-22 NOTE — PROGRESS NOTES
Normal Exam   WHAT YOU NEED TO KNOW:   Your healthcare provider did not find a reason for your symptoms today  You may need to follow up with your healthcare provider or a specialist  He will work with you to try to find the cause of your symptoms  He may also run tests to find out more about your overall health  DISCHARGE INSTRUCTIONS:   Follow up with your healthcare provider or a specialist as directed:  Tell your healthcare provider about your symptoms  You may be given a complete physical exam and health checkup  Write down your questions so you remember to ask them during your visits  Maintain a healthy lifestyle:  Healthy foods and regular physical activity can improve your health  They also decrease your risk of heart disease, high blood pressure, and diabetes  · Get 30 minutes of activity every day  most days of the week  Ask your healthcare provider which activities are best for you  You can do 30 minutes at once or spread your activity throughout the day to get the recommended amount  · Eat a variety of healthy foods  Healthy foods include whole-grain breads, low-fat dairy products, beans, lean meats, and fish  Eat fruits and vegetables every day, especially those that are green, orange, and red  · Maintain a healthy weight  Ask your healthcare provider how much you should weigh  Ask him to help you create a weight loss plan if you are overweight  · Limit alcohol  Women should limit alcohol to 1 drink a day  Men should limit alcohol to 2 drinks a day  A drink of alcohol is 12 ounces of beer, 5 ounces of wine, or 1½ ounces of liquor  Do not smoke: If you smoke, it is never too late to quit  You lower your risk for many health problems if you quit  Ask your healthcare provider for information if you need help quitting  Contact your healthcare provider if:   · Your symptoms get worse, or you have new symptoms that bother you       · You have questions or concerns about your condition or Pt resting in bed with eyes closed. No distress noted at this time. Call light in reach, door open will monitor. care     · Your illness makes it difficult to follow a healthy diet  Return to the emergency department if:   · You have trouble breathing  · You have chest pain  · You feel lightheaded or faint  © 2017 [address] Information is for End User's use only and may not be sold, redistributed or otherwise used for commercial purposes  All illustrations and images included in CareNotes® are the copyrighted property of [employer]  or Dioni Bliss  The above information is an  only  It is not intended as medical advice for individual conditions or treatments  Talk to your doctor, nurse or pharmacist before following any medical regimen to see if it is safe and effective for you

## 2018-08-22 NOTE — PROGRESS NOTES
Progress Note      Patient: Yazan Escalante               Sex: female          MRN: [id number]           YOB: 1950      Age:  80 y.o. PCP:  Sukhjinder Mohr DO  Treatment Team: Attending Provider: Maribel Loera MD; Utilization Review: Dianelys Bauer, SLICK; Care Manager: Freda Gordon RN; Consulting Provider: Cassi Tamayo MD  Subjective:     Improving pain in right leg and right knee. No fevers or chills. Nausea improving. No fevers. No cp/sob. Objective:   Physical Exam:   Visit Vitals    /75    Pulse 96    Temp 98.6 °F (37 °C)    Resp 18    Ht 5' (1.524 m)    Wt 125.9 kg (277 lb 9.6 oz)    SpO2 94%    BMI 54.22 kg/m2      Temp (24hrs), Av.4 °F (36.9 °C), Min:97.8 °F (36.6 °C), Max:98.6 °F (37 °C)    Oxygen Therapy  O2 Sat (%): 94 % (18 1235)  Pulse via Oximetry: 97 beats per minute (18 1235)  O2 Device: Room air (18 1225)  O2 Flow Rate (L/min): 2 l/min (18 1200)  FIO2 (%): 28 % (18 0625)    Intake/Output Summary (Last 24 hours) at 18 1250  Last data filed at 18 1141   Gross per 24 hour   Intake              780 ml   Output              900 ml   Net             -120 ml      General: Conscious, no acute distress  Eyes:  KATHLEEN, No pallor/icterus    HENT:             Oral Mucosa is moist, No sinus tenderness  Neck:               Supple, No JVD  Lungs:  CTA Bilaterally, No significant wheeze/rhonchi  Heart:  S1 S2 regular  Abdomen: Soft, obese, normal bowel sounds, NTND, No guarding/rigidity/rebound tend. Extremities: mild pedal edema  Neurologic:  AAOX2, No acute FND, Motor:  LUE: 5/5, LLE: 5/5, RUE: 5/5, RLE: 5/5. ..minimal tremors. Skin:                No acute rashes  Musculoskeletal: No Acute findings. Rt knee is tender to touch, no swelling/redness/warmth. Psych:            Improving mood. calm and appropriate.     LAB  Recent Results (from the past 24 hour(s))   CULTURE, URINE    Collection Time: 08/21/18  2:00 PM   Result Value Ref Range    Special Requests: NO SPECIAL REQUESTS      Culture result: (A)       >100,000 COLONIES/mL GRAM NEGATIVE RODS SUBCULTURE IN PROGRESS   CBC WITH AUTOMATED DIFF    Collection Time: 08/22/18  6:40 AM   Result Value Ref Range    WBC 8.8 4.3 - 11.1 K/uL    RBC 3.53 (L) 4.05 - 5.2 M/uL    HGB 8.7 (L) 11.7 - 15.4 g/dL    HCT 27.9 (L) 35.8 - 46.3 %    MCV 79.0 (L) 79.6 - 97.8 FL    MCH 24.6 (L) 26.1 - 32.9 PG    MCHC 31.2 (L) 31.4 - 35.0 g/dL    RDW 18.4 %    PLATELET 476 948 - 261 K/uL    MPV 9.5 9.4 - 12.3 FL    ABSOLUTE NRBC 0.00 0.0 - 0.2 K/uL    DF AUTOMATED      NEUTROPHILS 52 43 - 78 %    LYMPHOCYTES 34 13 - 44 %    MONOCYTES 13 (H) 4.0 - 12.0 %    EOSINOPHILS 0 (L) 0.5 - 7.8 %    BASOPHILS 0 0.0 - 2.0 %    IMMATURE GRANULOCYTES 1 0.0 - 5.0 %    ABS. NEUTROPHILS 4.6 K/UL    ABS. LYMPHOCYTES 3.0 K/UL    ABS. MONOCYTES 1.2 K/UL    ABS. EOSINOPHILS 0.0 K/UL    ABS. BASOPHILS 0.0 K/UL    ABS. IMM. GRANS. 0.1 K/UL   METABOLIC PANEL, COMPREHENSIVE    Collection Time: 08/22/18  6:40 AM   Result Value Ref Range    Sodium 134 (L) 136 - 145 mmol/L    Potassium 3.1 (L) 3.5 - 5.1 mmol/L    Chloride 89 (L) 98 - 107 mmol/L    CO2 33 (H) 21 - 32 mmol/L    Anion gap 12 7 - 16 mmol/L    Glucose 100 65 - 100 mg/dL    BUN 20 8 - 23 MG/DL    Creatinine 1.08 (H) 0.6 - 1.0 MG/DL    GFR est AA >60 >60 ml/min/1.73m2    GFR est non-AA 54 (L) >60 ml/min/1.73m2    Calcium 8.0 (L) 8.3 - 10.4 MG/DL    Bilirubin, total 0.2 0.2 - 1.1 MG/DL    ALT (SGPT) 21 12 - 65 U/L    AST (SGOT) 26 15 - 37 U/L    Alk.  phosphatase 125 50 - 136 U/L    Protein, total 5.3 (L) 6.3 - 8.2 g/dL    Albumin 2.1 (L) 3.2 - 4.6 g/dL    Globulin 3.2 2.3 - 3.5 g/dL    A-G Ratio 0.7 (L) 1.2 - 3.5     PROTHROMBIN TIME + INR    Collection Time: 08/22/18  6:40 AM   Result Value Ref Range    Prothrombin time 13.8 11.5 - 14.5 sec    INR 1.1     MAGNESIUM    Collection Time: 08/22/18  6:40 AM   Result Value Ref Range    Magnesium 2.4 1.8 - 2.4 mg/dL   PHOSPHORUS    Collection Time: 08/22/18  6:40 AM   Result Value Ref Range    Phosphorus 2.0 (L) 2.3 - 3.7 MG/DL       [employer]    Result Date: 8/21/2018  Renal ultrasound, limited. CLINICAL INDICATION:  Acute renal failure, pyelonephritis PROCEDURE: Realtime grayscale color Doppler evaluation of the kidneys and bladder. COMPARISON: Renal ultrasound dated 4/11/2017 FINDINGS: The right kidney is normal in size and echogenicity measuring 10.1 cm. The left kidney is normal in size and echogenicity measuring 9.7 cm. There is no hydronephrosis. The bladder is incompletely distended but otherwise unremarkable. IMPRESSION: Unremarkable renal ultrasound. There is no hydronephrosis    Xr Knee Rt 3 V    Result Date: 8/21/2018  Right knee CLINICAL INDICATION: Right knee pain, no known injury FINDINGS: Three views of the right knee show tricompartmental joint space narrowing and marginal osteophytes in keeping with osteoarthritis. There is no fracture. There is no joint effusion. IMPRESSION: Tricompartmental OA. [employer]    Result Date: 8/21/2018  IMPRESSION: Unremarkable renal ultrasound. There is no hydronephrosis    Xr Knee Rt 3 V    Result Date: 8/21/2018  IMPRESSION: Tricompartmental OA. All Micro Results     Procedure Component Value Units Date/Time    CULTURE, URINE [[id number]]  (Abnormal) Collected:  08/21/18 1400    Order Status:  Completed Specimen:  Urine from Clean catch Updated:  08/22/18 0928     Special Requests: NO SPECIAL REQUESTS        Culture result:         >100,000 COLONIES/mL GRAM NEGATIVE RODS SUBCULTURE IN PROGRESS (A)          Current Medications Reviewed      Assessment/Plan        1. Acute kidney injury, most likely prerenal and possibly from UTI.  ultrasound of kidneys is normal.  ctn IV antibiotics for UTI, monitor urine output and creatinine. Continue IV fluids. Cr improving.     2.  Severe hyponatremia, hypokalemia, keep the patient on IV normal saline with potassium, Improving sodium and potassium levels. 3.  Possible UTI, started on IV Rocephin, follow-up urine cultures. 4.  Restrictive lung disease, chronic respiratory failure on home oxygen. 5.  Hypothyroidism, continue Synthroid, TSH normal.    6.  Chronic mild persistent asthma    7. History of PE on anticoagulation with Eliquis. May start Eliquis tonight if there is no evidence of bleeding and if okay with GI after EGD today. 8.  Chronic nausea and recurrent episodes of vomitings. History of gastric bypass surgery in the past.  GI consulted, EGD today. 9.  Bipolar disorder - continue Geodon,, monitor    10. Anemia of chronic disease    11. Morbid obesity    12. Fibromyalgia    13. Right leg and right knee pain.  x-ray of the right knee shows severe arthritis. No DVT on ultrasound venous Doppler of the right leg. The patient on IV fluids, normal saline with IV potassium, monitor urine output and electrolytes. IV protonix and carafate. GI is consulted for recommendations. Keep her on IV Rocephin, follow-up urine cultures. High-risk patient secondary to above-mentioned medical problems and comorbid conditions.        Cherri Alvarez MD  August 22, 2018

## 2018-08-22 NOTE — PROGRESS NOTES
OT NOTE:    Charts reviewed, orders appreciated. Pt off floor this AM in endo. Will re-attempt at later time/date as schedule permits.     Thanks,    Ethan Arroyo, OTR/L

## 2018-08-22 NOTE — INTERVAL H&P NOTE
H&P Update:  Cherelle Cabrera was seen and examined. History and physical has been reviewed. The patient has been examined.  There have been no significant clinical changes since the completion of the originally dated History and Physical.    Signed By: Deanna Putnam MD     August 22, 2018 10:59 AM

## 2018-08-22 NOTE — PROGRESS NOTES
TRANSFER - OUT REPORT:    Verbal report given to [address], RN on Gold Daniel  being transferred to [phone] for routine post - op       Report consisted of patients Situation, Background, Assessment and   Recommendations(SBAR). Information from the following report(s) SBAR was reviewed with the receiving nurse. Lines:   Peripheral IV 08/20/18 Right Wrist (Active)   Site Assessment Clean, dry, & intact 8/22/2018  7:29 AM   Phlebitis Assessment 0 8/22/2018  7:29 AM   Infiltration Assessment 0 8/22/2018  7:29 AM   Dressing Status Clean, dry, & intact 8/22/2018  7:29 AM   Dressing Type Transparent 8/22/2018  7:29 AM   Hub Color/Line Status Infusing 8/22/2018  7:29 AM   Alcohol Cap Used No 8/22/2018  3:49 AM        Opportunity for questions and clarification was provided.       Patient transported with:   O2 @ 2 liters

## 2018-08-22 NOTE — PROGRESS NOTES
TRANSFER - IN REPORT:    Verbal report received from Sandra Littlejohn RN on Soy Lott being received from [phone] for ordered procedure      Report consisted of patients Situation, Background, Assessment and Recommendations(SBAR). Information from the following report(s) Recent Results was reviewed with the receiving nurse. Opportunity for questions and clarification was provided.

## 2018-08-22 NOTE — PROGRESS NOTES
Spoke with GI NP and per Dr. Christopher Garcia MD request eliquis to be started 8/23/18. Will monitor.

## 2018-08-22 NOTE — ROUTINE PROCESS
Patient transported back to room via stretcher. Chart with patient and all belongings returned to patient.

## 2018-08-22 NOTE — PROGRESS NOTES
IV fluids were stopped because there is no suitable IV access. Multiple nurses tried to get an new IV access with unsuccessful attempts. Called the PICC team to come and evaluate patient. MD notified.

## 2018-08-22 NOTE — ANESTHESIA POSTPROCEDURE EVALUATION
Post-Anesthesia Evaluation and Assessment    Patient: Ernesto Haynes MRN: [id number]  SSN: [id number]    YOB: 1950  Age: 80 y.o. Sex: female       Cardiovascular Function/Vital Signs  Visit Vitals    /60    Pulse 96    Temp 37 °C (98.6 °F)    Resp 18    Ht 5' (1.524 m)    Wt 125.9 kg (277 lb 9.6 oz)    SpO2 94%    BMI 54.22 kg/m2       Patient is status post total IV anesthesia anesthesia for Procedure(s):  ESOPHAGOGASTRODUODENOSCOPY (EGD) BMI 52 ROOM 804   ESOPHAGOGASTRODUODENAL (EGD) BIOPSY. Nausea/Vomiting: None    Postoperative hydration reviewed and adequate. Pain:  Pain Scale 1: Numeric (0 - 10) (08/22/18 1225)  Pain Intensity 1: 0 (08/22/18 1200)   Managed    Neurological Status:   Neuro  Neurologic State: Alert (08/22/18 1000)  Orientation Level: Oriented X4 (08/22/18 1000)  Cognition: Appropriate for age attention/concentration; Follows commands (08/22/18 1000)   At baseline    Mental Status and Level of Consciousness: Arousable    Pulmonary Status:   O2 Device: Room air (08/22/18 1225)   Adequate oxygenation and airway patent    Complications related to anesthesia: None    Post-anesthesia assessment completed.  No concerns    Signed By: Chelle Felix MD     August 22, 2018

## 2018-08-22 NOTE — PROGRESS NOTES
Pt resting in bed. Pt alert oriented times 3 at this time. Pt complaints of back and right knee pain 7/10. Pt reports nausea is better at this time. Pt aware of NPO for procedure. IV to right hand flushed, working and IVF restarted. Pt on 2  L NC At this time. Pt encouraged to call for assistance if needed call light in reach, door open will monitor.

## 2018-08-22 NOTE — PROGRESS NOTES
TRANSFER - IN REPORT:    Verbal report received from [facility] on Eduardo Guerin  being received from GI lab for routine progression of care      Report consisted of patients Situation, Background, Assessment and   Recommendations(SBAR). Information from the following report(s) SBAR and Procedure Summary was reviewed with the receiving nurse. Opportunity for questions and clarification was provided. Assessment completed upon patients arrival to unit and care assumed.

## 2018-08-22 NOTE — PROGRESS NOTES
Problem: Mobility Impaired (Adult and Pediatric)  Goal: *Acute Goals and Plan of Care (Insert Text)  STG:  (1.)Ms. Hieu Oliver will move from supine to sit and sit to supine , scoot up and down and roll side to side with MODERATE ASSIST within 3 treatment day(s). (2.)Ms. Hieu Oliver will transfer from bed to chair and chair to bed with MAXIMAL ASSIST using the least restrictive device within 3 treatment day(s). (3.)Ms. Hieu Oliver will ambulate with MAXIMAL ASSIST for 3 feet with the least restrictive device within 3 treatment day(s). LTG:  (1.)Ms. Hieu Oliver will move from supine to sit and sit to supine , scoot up and down and roll side to side in bed with MODIFIED INDEPENDENCE within 7 treatment day(s). (2.)Ms. Hieu Oliver will transfer from bed to chair and chair to bed with MODIFIED INDEPENDENCE using the least restrictive device within 7 treatment day(s). (3.)Ms. Hieu Oliver will ambulate with MODIFIED INDEPENDENCE for 150+ feet with the least restrictive device within 7 treatment day(s). (4.)Ms. Hieu Oliver will ascend/descend 3 steps with one hand rail with STAND BY ASSIST within 7 treatment days.   ________________________________________________________________________________________________      PHYSICAL THERAPY: Initial Assessment, Treatment Day: Day of Assessment, AM 8/22/2018  INPATIENT: Hospital Day: 3  [place] / [place] / Product Type: Medicare /      NAME/AGE/GENDER: Esequiel Saucedo is a 80 y.o. female   PRIMARY DIAGNOSIS: Nausea and vomiting, intractability of vomiting not specified, unspecified vomiting type [R11.2] CAMILA (acute kidney injury) ([facility] [address] CAMILA (acute kidney injury) ([facility] [address]  Procedure(s) (LRB):  ESOPHAGOGASTRODUODENOSCOPY (EGD) BMI 52 ROOM 804  (N/A)  Day of Surgery  ICD-10: Treatment Diagnosis:    · Generalized Muscle Weakness (M62.81)  · Difficulty in walking, Not elsewhere classified (R26.2)  · Repeated Falls (R29.6)  · History of falling (Z91.81)   Precaution/Allergies:  Latex; Bactrim [sulfamethoxazole-trimethoprim]; Lamictal [lamotrigine]; Pcn [penicillins]; and Tapentadol      ASSESSMENT:     Ms. Kirstie Yung is a 80 y.o. Female with primary diagnosis listed above. Pt is supine in bed, A&O x 4, and agreeable to PT Evaluation. Pt states she lives with daughter in single story home with 3 steps to enter with a handrail, and a walk in shower with a seat. Pt states she ambulates household distances with cane, but has not been moving much lately due to R hip and knee pain, which is being caused by arthritis per MD. Pt reports 2 recent falls and is not sure what causes them (\"happens too fast\"). Pt is on 2-3L O2 NC at home at all times. Pt reports no pain currently while lying in bed. Pt attempted transfer from supine to sitting with MaxA and moderate verbal/visual/tactile cuing for sequencing and hand placement. Pt exclaimed that she was in too much pain and needed to lie back down right now. Pt returned to supine in bed with TotalA. Pt groans in pain with any movement of her R LE. B LE AROM and strength grossly decreased and nonfunctional. Transport arrived to take pt to procedure. Pt transferred to stretcher with sheet transfer. Pt left with transport. Ghislaine Mcallister will benefit from skilled PT (medically necessary) to address decreased strength, decreased balance, decreased functional tolerance, decreased cardiopulmonary endurance affecting participation in basic ADLs and functional tasks. Pt may benefit from skilled therapy upon discharge from hospital.    This section established at most recent assessment   PROBLEM LIST (Impairments causing functional limitations):  1. Decreased Strength  2. Decreased ADL/Functional Activities  3. Decreased Transfer Abilities  4. Decreased Ambulation Ability/Technique  5. Decreased Balance  6. Increased Pain  7. Decreased Activity Tolerance  8. Decreased Pacing Skills  9. Decreased Work Simplification/Energy Conservation Techniques  10.  Decreased [place] with Home Exercise Program   INTERVENTIONS PLANNED: (Benefits and precautions of physical therapy have been discussed with the patient.)  1. Balance Exercise  2. Bed Mobility  3. Family Education  4. Gait Training  5. Home Exercise Program (HEP)  6. Manual Therapy  7. Neuromuscular Re-education/Strengthening  8. Range of Motion (ROM)  9. Therapeutic Activites  10. Therapeutic Exercise/Strengthening  11. Transfer Training  12. Group Therapy     TREATMENT PLAN: Frequency/Duration: 3 times a week for duration of hospital stay  Rehabilitation Potential For Stated Goals: Good     RECOMMENDED REHABILITATION/EQUIPMENT: (at time of discharge pending progress): Due to the probability of continued deficits (see above) this patient will likely need continued skilled physical therapy after discharge. Equipment:    to be determined              HISTORY:   History of Present Injury/Illness (Reason for Referral):  HPI:  81 yo female with mult med probs including DVT on eleiquis, obesity, hx Gastric bypass w/ poss gastroparesis, chronic LE edema, morbid obesity with resterictive lung dz on O2 2-3 L at HS, debiltity using a cane, fibromyalgia, HTN, gastric ulcers, asthma.     Presents with sveral week hx weakness, decreased po's. Continues to take Lasix and Lisinopril. PCP last week reduced her BP meds per dtr. Mild nonprod cough. No new rx. No F/C. No CP or SOB. She has been vomiting freq for months and her GI doc thinks may be related to prior gastric bypass surgery per dtr.        On arrival to ER vitals stable, afebrile, 97% on 2L. Somewhat solement, flat affect. Answers Q's approp, no acute distress, nontoxic appearance. Labs:  Cr 2.56, and . WBC 14.2  Serum bicarb 39. Prior Cr this year was 0.86 on 7/1/2018. Past Medical History/Comorbidities:   Ms. Leidy Alonso  has a past medical history of Acute renal failure [facility]) (July, 2014); Arthritis; Asthma; Asthma with acute exacerbation (9/28/2017);  Bilateral pulmonary embolism [facility]) (September, 2012); Calculus of ureter (May, 2015); CAP (community acquired pneumonia) (October, 2012); Cellulitis of left lower extremity (11/1/2017); Chronic pain; Community acquired bacterial pneumonia (4/2/2018); CVA (cerebral infarction) (January, 2012); Gastric ulcer (July, 2014); GERD (gastroesophageal reflux disease); Gram-positive bacteremia 1/2 cx (12/26/2016); HCAP (healthcare-associated pneumonia) (January, 2013); HCAP (healthcare-associated pneumonia) (February, 2013); Hypertension; Left leg DVT ([address] (1991); Morbid obesity ([address] Psychiatric disorder; PUD (peptic ulcer disease) (???); Sepsis due to pneumonia ([address] (4/2/2018); Stroke [facility]); and Thyroid disease. She also has no past medical history of Aneurysm ([address] Arrhythmia; Autoimmune disease ([address] CAD (coronary artery disease); Cancer ([address] Chronic kidney disease; Chronic obstructive pulmonary disease ([address] Coagulation disorder ([address] COPD; Diabetes ([address] Endocarditis; Heart failure ([address] Liver disease; Nicotine vapor product user; Non-nicotine vapor product user; Rheumatic fever; Seizures ([address] or Unspecified sleep apnea. Ms. Marly Orozco  has a past surgical history that includes hx appendectomy; hx cholecystectomy; hx gastric bypass; hx tonsillectomy; hx other surgical; hx orthopaedic; hx hysterectomy; and hx endoscopy (July, 2014).   Social History/Living Environment:   Home Environment: Private residence  # Steps to Enter: 3  Rails to Enter: Yes  Hand Rails : Right  Wheelchair Ramp: No  One/Two Story Residence: One story  Living Alone: No  Support Systems: Child(jaswant)  Patient Expects to be Discharged to[de-identified] Private residence  Current DME Used/Available at Home: Joe Stephanie, straight, Walker, rolling, Shower chair  Tub or Shower Type: Shower  Prior Level of Function/Work/Activity:  Lives with daughter, ambulates household distances with cane, 2 recent falls, 2L supplemental O2   Number of Personal Factors/Comorbidities that affect the Plan of Care: 3+: HIGH COMPLEXITY   EXAMINATION:   Most Recent Physical Functioning:   Gross Assessment:  AROM: Grossly decreased, non-functional  Strength: Grossly decreased, non-functional  Coordination: Generally decreased, functional               Posture:     Balance:  Sitting: Impaired  Sitting - Static: Poor (constant support) Bed Mobility:  Rolling: Maximum assistance  Supine to Sit: Maximum assistance  Sit to Supine: Total assistance  Scooting: Maximum assistance  Wheelchair Mobility:     Transfers:     Gait:            Body Structures Involved:  1. Heart  2. Lungs  3. Bones  4. Joints  5. Muscles  6. Ligaments Body Functions Affected:  1. Sensory/Pain  2. Cardio  3. Respiratory  4. Neuromusculoskeletal  5. Movement Related Activities and Participation Affected:  1. Mobility  2. Self Care  3. Domestic Life  4. Interpersonal Interactions and Relationships  5. Community, Social and [place]   Number of elements that affect the Plan of Care: 4+: HIGH COMPLEXITY   CLINICAL PRESENTATION:   Presentation: Evolving clinical presentation with changing clinical characteristics: MODERATE COMPLEXITY   CLINICAL DECISION MAKIN [facility] Inpatient Short Form  How much difficulty does the patient currently have. .. Unable A Lot A Little None   1. Turning over in bed (including adjusting bedclothes, sheets and blankets)? [] 1   [x] 2   [] 3   [] 4   2. Sitting down on and standing up from a chair with arms ( e.g., wheelchair, bedside commode, etc.)   [x] 1   [] 2   [] 3   [] 4   3. Moving from lying on back to sitting on the side of the bed? [] 1   [x] 2   [] 3   [] 4   How much help from another person does the patient currently need. .. Total A Lot A Little None   4. Moving to and from a bed to a chair (including a wheelchair)? [x] 1   [] 2   [] 3   [] 4   5. Need to walk in hospital room? [x] 1   [] 2   [] 3   [] 4   6. Climbing 3-5 steps with a railing? [x] 1   [] 2   [] 3   [] 4   © 2007, Trustees of [address] [place] [address] [address] [id number], under license to [employer]. All rights reserved      Score:  Initial: 8 Most Recent: X (Date: -- )    Interpretation of Tool:  Represents activities that are increasingly more difficult (i.e. Bed mobility, Transfers, Gait). Score 24 23 22-20 19-15 14-10 9-7 6     Modifier CH CI CJ CK CL CM CN      ? Mobility - Walking and Moving Around:     - CURRENT STATUS: CM - 80%-99% impaired, limited or restricted    - GOAL STATUS: CL - 60%-79% impaired, limited or restricted    - D/C STATUS:  ---------------To be determined---------------  [place] / Product Type: Medicare /      Medical Necessity:     · Patient is expected to demonstrate progress in strength, range of motion, balance, coordination and functional technique to decrease assistance required with transfers and functional mobility. Reason for Services/Other Comments:  · Patient continues to require skilled intervention due to impaired activity tolerance and functional mobility.    Use of outcome tool(s) and clinical judgement create a POC that gives a: Questionable prediction of patient's progress: MODERATE COMPLEXITY            TREATMENT:   (In addition to Assessment/Re-Assessment sessions the following treatments were rendered)   Pre-treatment Symptoms/Complaints:  No complaints  Pain: Initial: 0/10 prior to movement   Post Session:  Pain Intensity 1: 10/10  Pain Location 1: Knee, Hip  Pain Orientation 1: Right  Pain Intervention(s) 1: Repositioned      Assessment/Reassessment only, no treatment provided today    Braces/Orthotics/Lines/Etc:   · IV  · O2 Device: Nasal cannula  Treatment/Session Assessment:    · Response to Treatment:  See above  · Interdisciplinary Collaboration:   o Physical Therapist  o Registered Nurse  o transport  · After treatment position/precautions:   o leaving with transport on stretcher   · Compliance with Program/Exercises: Will assess as treatment progresses. · Recommendations/Intent for next treatment session: \"Next visit will focus on reduction in assistance provided\".   Total Treatment Duration:  PT Patient Time In/Time Out  Time In: 0993  Time Out: [address] [place], [place]

## 2018-08-23 ENCOUNTER — APPOINTMENT (OUTPATIENT)
Dept: CT IMAGING | Age: 68
DRG: 682 | End: 2018-08-23
Attending: HOSPITALIST
Payer: MEDICARE

## 2018-08-23 LAB
AMMONIA PLAS-SCNC: 12 UMOL/L (ref 11–32)
ANION GAP SERPL CALC-SCNC: 8 MMOL/L (ref 7–16)
ATRIAL RATE: 83 BPM
ATRIAL RATE: 99 BPM
BASOPHILS # BLD: 0 K/UL (ref 0–0.2)
BASOPHILS NFR BLD: 0 % (ref 0–2)
BUN SERPL-MCNC: 8 MG/DL (ref 8–23)
CALCIUM SERPL-MCNC: 8.2 MG/DL (ref 8.3–10.4)
CALCULATED P AXIS, ECG09: 30 DEGREES
CALCULATED P AXIS, ECG09: 54 DEGREES
CALCULATED R AXIS, ECG10: -15 DEGREES
CALCULATED R AXIS, ECG10: -30 DEGREES
CALCULATED T AXIS, ECG11: 18 DEGREES
CALCULATED T AXIS, ECG11: 38 DEGREES
CHLORIDE SERPL-SCNC: 99 MMOL/L (ref 98–107)
CO2 SERPL-SCNC: 32 MMOL/L (ref 21–32)
CREAT SERPL-MCNC: 0.8 MG/DL (ref 0.6–1)
DIAGNOSIS, 93000: NORMAL
DIAGNOSIS, 93000: NORMAL
DIFFERENTIAL METHOD BLD: ABNORMAL
EOSINOPHIL # BLD: 0 K/UL (ref 0–0.8)
EOSINOPHIL NFR BLD: 0 % (ref 0.5–7.8)
ERYTHROCYTE [DISTWIDTH] IN BLOOD BY AUTOMATED COUNT: 18.9 %
GLUCOSE BLD STRIP.AUTO-MCNC: 125 MG/DL (ref 65–100)
GLUCOSE SERPL-MCNC: 102 MG/DL (ref 65–100)
HCT VFR BLD AUTO: 29.8 % (ref 35.8–46.3)
HGB BLD-MCNC: 9 G/DL (ref 11.7–15.4)
IMM GRANULOCYTES # BLD: 0 K/UL (ref 0–0.5)
IMM GRANULOCYTES NFR BLD AUTO: 0 % (ref 0–5)
LYMPHOCYTES # BLD: 2.5 K/UL (ref 0.5–4.6)
LYMPHOCYTES NFR BLD: 24 % (ref 13–44)
MAGNESIUM SERPL-MCNC: 2.2 MG/DL (ref 1.8–2.4)
MCH RBC QN AUTO: 24.9 PG (ref 26.1–32.9)
MCHC RBC AUTO-ENTMCNC: 30.2 G/DL (ref 31.4–35)
MCV RBC AUTO: 82.5 FL (ref 79.6–97.8)
MM INDURATION POC: 0 MM (ref 0–5)
MONOCYTES # BLD: 1.2 K/UL (ref 0.1–1.3)
MONOCYTES NFR BLD: 12 % (ref 4–12)
NEUTS SEG # BLD: 6.6 K/UL (ref 1.7–8.2)
NEUTS SEG NFR BLD: 64 % (ref 43–78)
NRBC # BLD: 0 K/UL (ref 0–0.2)
P-R INTERVAL, ECG05: 166 MS
P-R INTERVAL, ECG05: 204 MS
PHOSPHATE SERPL-MCNC: 2.1 MG/DL (ref 2.3–3.7)
PLATELET # BLD AUTO: 433 K/UL (ref 150–450)
PMV BLD AUTO: 9.2 FL (ref 9.4–12.3)
POTASSIUM SERPL-SCNC: 3.8 MMOL/L (ref 3.5–5.1)
PPD POC: NORMAL NEGATIVE
Q-T INTERVAL, ECG07: 332 MS
Q-T INTERVAL, ECG07: 432 MS
QRS DURATION, ECG06: 80 MS
QRS DURATION, ECG06: 90 MS
QTC CALCULATION (BEZET), ECG08: 426 MS
QTC CALCULATION (BEZET), ECG08: 507 MS
RBC # BLD AUTO: 3.61 M/UL (ref 4.05–5.2)
SODIUM SERPL-SCNC: 139 MMOL/L (ref 136–145)
VENTRICULAR RATE, ECG03: 83 BPM
VENTRICULAR RATE, ECG03: 99 BPM
WBC # BLD AUTO: 10.4 K/UL (ref 4.3–11.1)

## 2018-08-23 PROCEDURE — 94640 AIRWAY INHALATION TREATMENT: CPT

## 2018-08-23 PROCEDURE — 82140 ASSAY OF AMMONIA: CPT

## 2018-08-23 PROCEDURE — 76937 US GUIDE VASCULAR ACCESS: CPT

## 2018-08-23 PROCEDURE — 77010033678 HC OXYGEN DAILY

## 2018-08-23 PROCEDURE — 74011000250 HC RX REV CODE- 250: Performed by: INTERNAL MEDICINE

## 2018-08-23 PROCEDURE — 36415 COLL VENOUS BLD VENIPUNCTURE: CPT

## 2018-08-23 PROCEDURE — 83735 ASSAY OF MAGNESIUM: CPT

## 2018-08-23 PROCEDURE — 94760 N-INVAS EAR/PLS OXIMETRY 1: CPT

## 2018-08-23 PROCEDURE — 85025 COMPLETE CBC W/AUTO DIFF WBC: CPT

## 2018-08-23 PROCEDURE — 97166 OT EVAL MOD COMPLEX 45 MIN: CPT

## 2018-08-23 PROCEDURE — 82962 GLUCOSE BLOOD TEST: CPT

## 2018-08-23 PROCEDURE — 93005 ELECTROCARDIOGRAM TRACING: CPT | Performed by: HOSPITALIST

## 2018-08-23 PROCEDURE — 97530 THERAPEUTIC ACTIVITIES: CPT

## 2018-08-23 PROCEDURE — 84100 ASSAY OF PHOSPHORUS: CPT

## 2018-08-23 PROCEDURE — 74011250637 HC RX REV CODE- 250/637: Performed by: INTERNAL MEDICINE

## 2018-08-23 PROCEDURE — 65270000029 HC RM PRIVATE

## 2018-08-23 PROCEDURE — 70450 CT HEAD/BRAIN W/O DYE: CPT

## 2018-08-23 PROCEDURE — 80048 BASIC METABOLIC PNL TOTAL CA: CPT

## 2018-08-23 PROCEDURE — 74011250637 HC RX REV CODE- 250/637: Performed by: HOSPITALIST

## 2018-08-23 RX ORDER — POLYETHYLENE GLYCOL 3350 17 G/17G
17 POWDER, FOR SOLUTION ORAL 2 TIMES DAILY
Status: DISCONTINUED | OUTPATIENT
Start: 2018-08-23 | End: 2018-08-23

## 2018-08-23 RX ORDER — CEPHALEXIN 250 MG/1
500 CAPSULE ORAL EVERY 12 HOURS
Status: DISCONTINUED | OUTPATIENT
Start: 2018-08-23 | End: 2018-08-27 | Stop reason: HOSPADM

## 2018-08-23 RX ORDER — POLYETHYLENE GLYCOL 3350 17 G/17G
17 POWDER, FOR SOLUTION ORAL DAILY PRN
Status: DISCONTINUED | OUTPATIENT
Start: 2018-08-23 | End: 2018-08-25

## 2018-08-23 RX ORDER — POLYETHYLENE GLYCOL 3350 17 G/17G
17 POWDER, FOR SOLUTION ORAL
Status: DISCONTINUED | OUTPATIENT
Start: 2018-08-23 | End: 2018-08-27 | Stop reason: HOSPADM

## 2018-08-23 RX ORDER — DULOXETIN HYDROCHLORIDE 60 MG/1
60 CAPSULE, DELAYED RELEASE ORAL
Status: DISCONTINUED | OUTPATIENT
Start: 2018-08-23 | End: 2018-08-27 | Stop reason: HOSPADM

## 2018-08-23 RX ORDER — ALPRAZOLAM 0.5 MG/1
1 TABLET ORAL
Status: DISCONTINUED | OUTPATIENT
Start: 2018-08-23 | End: 2018-08-25

## 2018-08-23 RX ORDER — SODIUM,POTASSIUM PHOSPHATES 280-250MG
1 POWDER IN PACKET (EA) ORAL ONCE
Status: COMPLETED | OUTPATIENT
Start: 2018-08-23 | End: 2018-08-23

## 2018-08-23 RX ADMIN — ALBUTEROL SULFATE 2.5 MG: 2.5 SOLUTION RESPIRATORY (INHALATION) at 11:11

## 2018-08-23 RX ADMIN — Medication 10 ML: at 22:00

## 2018-08-23 RX ADMIN — OXYBUTYNIN CHLORIDE 10 MG: 10 TABLET, FILM COATED, EXTENDED RELEASE ORAL at 08:16

## 2018-08-23 RX ADMIN — ONDANSETRON 8 MG: 4 TABLET, ORALLY DISINTEGRATING ORAL at 05:42

## 2018-08-23 RX ADMIN — ALBUTEROL SULFATE 2.5 MG: 2.5 SOLUTION RESPIRATORY (INHALATION) at 07:18

## 2018-08-23 RX ADMIN — Medication 5 ML: at 05:40

## 2018-08-23 RX ADMIN — SUCRALFATE 1 G: 1 TABLET ORAL at 11:46

## 2018-08-23 RX ADMIN — Medication 400 MG: at 08:16

## 2018-08-23 RX ADMIN — NYSTATIN 500000 UNITS: 100000 SUSPENSION ORAL at 08:16

## 2018-08-23 RX ADMIN — CEPHALEXIN 500 MG: 250 CAPSULE ORAL at 16:20

## 2018-08-23 RX ADMIN — NYSTATIN 500000 UNITS: 100000 SUSPENSION ORAL at 22:00

## 2018-08-23 RX ADMIN — PREGABALIN 25 MG: 25 CAPSULE ORAL at 08:16

## 2018-08-23 RX ADMIN — ONDANSETRON 8 MG: 4 TABLET, ORALLY DISINTEGRATING ORAL at 16:20

## 2018-08-23 RX ADMIN — PANTOPRAZOLE SODIUM 40 MG: 40 TABLET, DELAYED RELEASE ORAL at 05:42

## 2018-08-23 RX ADMIN — MONTELUKAST SODIUM 10 MG: 10 TABLET, FILM COATED ORAL at 08:16

## 2018-08-23 RX ADMIN — POTASSIUM & SODIUM PHOSPHATES POWDER PACK 280-160-250 MG 1 PACKET: 280-160-250 PACK at 11:45

## 2018-08-23 RX ADMIN — ALPRAZOLAM 1 MG: 0.5 TABLET ORAL at 16:26

## 2018-08-23 RX ADMIN — PANTOPRAZOLE SODIUM 40 MG: 40 TABLET, DELAYED RELEASE ORAL at 16:20

## 2018-08-23 RX ADMIN — APIXABAN 5 MG: 5 TABLET, FILM COATED ORAL at 08:16

## 2018-08-23 RX ADMIN — SUCRALFATE 1 G: 1 TABLET ORAL at 08:16

## 2018-08-23 RX ADMIN — DULOXETINE 60 MG: 60 CAPSULE, DELAYED RELEASE ORAL at 08:16

## 2018-08-23 RX ADMIN — NYSTATIN: 100000 POWDER TOPICAL at 08:17

## 2018-08-23 RX ADMIN — ALBUTEROL SULFATE 2.5 MG: 2.5 SOLUTION RESPIRATORY (INHALATION) at 20:00

## 2018-08-23 NOTE — PROGRESS NOTES
Met with patient regarding discharge planning. Patient answers questions, but seems delayed compared to baseline. We discussed short-term rehab at discharge. Patient states she would like that, but is unable to voice where she would like to go appropriately. Case Management will attempt to discuss rehab options with family. Case Management will continue to follow. Care Management Interventions  PCP Verified by CM:  Yes  Transition of Care Consult (CM Consult): Discharge Planning  Discharge Durable Medical Equipment: No  Physical Therapy Consult: Yes  Occupational Therapy Consult: Yes  Speech Therapy Consult: No  Current Support Network: Own Home (Lives with her daughter.)  Confirm Follow Up Transport: Family  Plan discussed with Pt/Family/Caregiver: Yes  Freedom of Choice Offered: Yes  Discharge Location  Discharge Placement: Home with home health

## 2018-08-23 NOTE — PROGRESS NOTES
Patient is resting in bed quietly, on O2 @ 2L n/c, RR even and unlabored, IV fluids infusing, patient without c/o pain or discomfort, denies needs, call light within reach.

## 2018-08-23 NOTE — PROGRESS NOTES
Pt sitting up in bed. Pt alert but has trouble getting thoughts together. Pt moves all extremities on commands. Pt oriented to person at this time. Pt answer simple questions.  at this time. Previous nurse reports pt not sleeping last night. No acute distress noted at this time. /90  with O2 sat 95% on 3L NC At this time. Pt encouraged to call for assistance if needed call light in reach, door open will monitor closely.

## 2018-08-23 NOTE — PROGRESS NOTES
Patient was awake during night, did not sleep, on 2L n/c, however removed numerous times during night, patient IV fluids infusing, incontinent of bowel X2 during night, patient denies needs, call light within reach.

## 2018-08-23 NOTE — PROGRESS NOTES
Xanax 2-0.5 mg tabs po given for agitation. Pt daughter at bedside and reports pt takes 2 mg every 4-6 hours at home. Will monitor.

## 2018-08-23 NOTE — PROGRESS NOTES
Pt returns to room from CT. Pt combative and agitated during procedure. Pt now resting in bed with daughter at bedside. Dr. Rice Seat made aware of pt being combative and agitated. No new orders.

## 2018-08-23 NOTE — PROGRESS NOTES
Progress Note      Patient: Heather Mendez               Sex: female          MRN: [id number]           YOB: 1950      Age:  80 y.o. PCP:  Noreen Means DO  Treatment Team: Attending Provider: Eleanor Johnston MD; Utilization Review: Taiwo Heath, SLICK; Care Manager: Jessica Bang, RN; Consulting Provider: Will Hawley MD  Subjective:     Slightly confused this am, somewhat improved from last night (confusion started after the procedure yesterday)  No fevers or chills. Nausea improving. No fevers. No cp/sob. Objective:   Physical Exam:   Visit Vitals    /90    Pulse (!) 106    Temp 98.9 °F (37.2 °C)    Resp 20    Ht 5' (1.524 m)    Wt 127 kg (279 lb 14.4 oz)    SpO2 94%    BMI 54.66 kg/m2      Temp (24hrs), Av.6 °F (37 °C), Min:98.1 °F (36.7 °C), Max:98.9 °F (37.2 °C)    Oxygen Therapy  O2 Sat (%): 94 % (18)  Pulse via Oximetry: 111 beats per minute (18 1916)  O2 Device: Nasal cannula (18)  O2 Flow Rate (L/min): 2 l/min (18)  FIO2 (%): 28 % (18 0625)    Intake/Output Summary (Last 24 hours) at 18 1121  Last data filed at 18 0751   Gross per 24 hour   Intake             1980 ml   Output             2400 ml   Net             -420 ml      General: Conscious, no acute distress  Eyes:  KATHLEEN, No pallor/icterus    HENT:             Oral Mucosa is moist, No sinus tenderness  Neck:               Supple, No JVD  Lungs:  CTA Bilaterally, No significant wheeze/rhonchi  Heart:  S1 S2 regular  Abdomen: Soft, obese, normal bowel sounds, NTND, No guarding/rigidity/rebound tend. Extremities: mild pedal edema  Neurologic:  AAOX2, No acute FND, Motor:  LUE: 5/5, LLE: 5/5, RUE: 5/5, RLE: 5/5. ..minimal tremors. Skin:                No acute rashes  Musculoskeletal: No Acute findings. Rt knee is tender to touch, no swelling/redness/warmth. Psych:           Calm and appropriate.  Not oriented to time and MCI    LAB  Recent Results (from the past 24 hour(s))   CBC WITH AUTOMATED DIFF    Collection Time: 08/23/18  6:53 AM   Result Value Ref Range    WBC 10.4 4.3 - 11.1 K/uL    RBC 3.61 (L) 4.05 - 5.2 M/uL    HGB 9.0 (L) 11.7 - 15.4 g/dL    HCT 29.8 (L) 35.8 - 46.3 %    MCV 82.5 79.6 - 97.8 FL    MCH 24.9 (L) 26.1 - 32.9 PG    MCHC 30.2 (L) 31.4 - 35.0 g/dL    RDW 18.9 %    PLATELET 117 500 - 389 K/uL    MPV 9.2 (L) 9.4 - 12.3 FL    ABSOLUTE NRBC 0.00 0.0 - 0.2 K/uL    DF AUTOMATED      NEUTROPHILS 64 43 - 78 %    LYMPHOCYTES 24 13 - 44 %    MONOCYTES 12 4.0 - 12.0 %    EOSINOPHILS 0 (L) 0.5 - 7.8 %    BASOPHILS 0 0.0 - 2.0 %    IMMATURE GRANULOCYTES 0 0.0 - 5.0 %    ABS. NEUTROPHILS 6.6 1.7 - 8.2 K/UL    ABS. LYMPHOCYTES 2.5 0.5 - 4.6 K/UL    ABS. MONOCYTES 1.2 0.1 - 1.3 K/UL    ABS. EOSINOPHILS 0.0 0.0 - 0.8 K/UL    ABS. BASOPHILS 0.0 0.0 - 0.2 K/UL    ABS. IMM.  GRANS. 0.0 0.0 - 0.5 K/UL   METABOLIC PANEL, BASIC    Collection Time: 08/23/18  6:53 AM   Result Value Ref Range    Sodium 139 136 - 145 mmol/L    Potassium 3.8 3.5 - 5.1 mmol/L    Chloride 99 98 - 107 mmol/L    CO2 32 21 - 32 mmol/L    Anion gap 8 7 - 16 mmol/L    Glucose 102 (H) 65 - 100 mg/dL    BUN 8 8 - 23 MG/DL    Creatinine 0.80 0.6 - 1.0 MG/DL    GFR est AA >60 >60 ml/min/1.73m2    GFR est non-AA >60 >60 ml/min/1.73m2    Calcium 8.2 (L) 8.3 - 10.4 MG/DL   MAGNESIUM    Collection Time: 08/23/18  6:53 AM   Result Value Ref Range    Magnesium 2.2 1.8 - 2.4 mg/dL   PHOSPHORUS    Collection Time: 08/23/18  6:53 AM   Result Value Ref Range    Phosphorus 2.1 (L) 2.3 - 3.7 MG/DL   GLUCOSE, POC    Collection Time: 08/23/18  7:10 AM   Result Value Ref Range    Glucose (POC) 125 (H) 65 - 100 mg/dL   EKG, 12 LEAD, INITIAL    Collection Time: 08/23/18  8:40 AM   Result Value Ref Range    Ventricular Rate 99 BPM    Atrial Rate 99 BPM    P-R Interval 204 ms    QRS Duration 80 ms    Q-T Interval 332 ms    QTC Calculation (Bezet) 426 ms    Calculated P Axis 54 degrees    Calculated R Axis -15 degrees    Calculated T Axis 38 degrees    Diagnosis       Normal sinus rhythm with sinus arrhythmia  Normal ECG  When compared with ECG of 20-AUG-2018 18:57,  Borderline criteria for Anterior infarct are no longer Present  Borderline criteria for Anterolateral infarct are no longer Present  Criteria for Inferior infarct are no longer Present  QT has shortened     AMMONIA    Collection Time: 08/23/18  9:59 AM   Result Value Ref Range    Ammonia 12 11 - 32 UMOL/L       No results found. No results found. All Micro Results     Procedure Component Value Units Date/Time    CULTURE, URINE [[id number]]  (Abnormal) Collected:  08/21/18 1400    Order Status:  Completed Specimen:  Urine from Clean catch Updated:  08/23/18 0900     Special Requests: NO SPECIAL REQUESTS        Culture result:         >100,000 COLONIES/mL GRAM NEGATIVE RODS IDENTIFICATION AND SUSCEPTIBILITY TO FOLLOW (A)          Current Medications Reviewed      Assessment/Plan        1. Acute kidney injury, most likely prerenal and possibly from UTI.  ultrasound of kidneys is normal.  ctn IV antibiotics for UTI, monitor urine output and creatinine. Continue IV fluids. Cr improved. 2.  Severe hyponatremia, hypokalemia, resolved. Monitor off IVF. 3.  Acute UTI, started on IV Rocephin, follow-up urine cultures. 4.  Restrictive lung disease, chronic respiratory failure on home oxygen. 5.  Hypothyroidism, continue Synthroid, TSH normal.    6.  Chronic mild persistent asthma    7. History of PE on anticoagulation with Eliquis. Started Eliquis lastmnight    8. Chronic nausea and recurrent episodes of vomitings. History of gastric bypass surgery in the past.  S/p EGD. Recommended PPI+sucralfate. 9.  Bipolar disorder - continue Geodon,, monitor    10. Anemia of chronic disease    11. Morbid obesity    12. Fibromyalgia    13. Right leg and right knee pain.  x-ray of the right knee shows severe arthritis.   No DVT on ultrasound venous Doppler of the right leg. 14. Confusion, ?medication induced. Seems to be improving. If worsens may consider imaging. Non-focal exam.    15. Debility - PT/OT Eval.    Keep her on IV Rocephin, follow-up urine cultures. High-risk patient secondary to above-mentioned medical problems and comorbid conditions.      Cherri Alvarez MD  August 23, 2018

## 2018-08-23 NOTE — PROGRESS NOTES
Met with patient during IDT rounds. Patient did not respond to voice until attending MD got directly in front of her. Acute change in level of consciousness discussed by the team. Patient oriented to person only at this time. No new orders at this time. Care Management Interventions  PCP Verified by CM:  Yes  Transition of Care Consult (CM Consult): Discharge Planning  Discharge Durable Medical Equipment: No  Physical Therapy Consult: Yes  Occupational Therapy Consult: Yes  Speech Therapy Consult: No  Current Support Network: Own Home (Lives with her daughter.)  Confirm Follow Up Transport: Family  Plan discussed with Pt/Family/Caregiver: Yes  Freedom of Choice Offered: Yes  Discharge Location  Discharge Placement: Home with home health

## 2018-08-23 NOTE — PROGRESS NOTES
Pt resting in bed with eyes closed. No s/sx of distress noted at this time. Call light in reach, door open will monitor.

## 2018-08-23 NOTE — PROGRESS NOTES
Pt sitting up in chair. Pt remains confused answers some questions appropriately. Slow to answer questions. RA sat 93% at this time. Pt pulled IV to right hand out. Amador pad in place. Door open will monitor.

## 2018-08-23 NOTE — PROGRESS NOTES
Problem: Patient Education: Go to Patient Education Activity  Goal: Patient/Family Education  Nutrition   Received consult for Diet Education Prior gastric bypass, morbid obesity, chronic nausea, overeating suspected. 3-4 small meals/ day.  Very small residual stomach. (Dr. Twyla Wilkerson)    Assessment:  Diet order: Low Fat, Post Gastrectomy  Food/Nutrition and Pertinent History: Patient presents with no acute nutrition risk factors identified by malnutrition screening tool upon admission. The patient is noted to have a h/o HTN, Bipolar disorder, chronic narcotic abuse, asthma, restrictive lung disease and Gastric Bypass in 2008. She was been seen by outpatient RD's previously regarding compliance and post gastrectomy diet regimen. She was seen by Phillip Schmidt RD with [facility] in June of 2017. At this visit she reported sugar sweetened beverages and consumption of pop-tarts. Patient at this time is confused and inappropriate for education. RD left handouts at bedside for the patient to review once confusion resolves. The patient will likely remain non-compliant and eat how she wants to eat. Anthropometrics:Height: 5' (152.4 cm),  Weight: 127 kg (279 lb 14.4 oz), Weight Source: Bed, Body mass index is 54.66 kg/(m^2). BMI class of overweight for age >71. Macronutrient needs:  EER:  8131-9357 kcal /day (10-13 kcal/kg Actual BW)  EPR:  68-90 grams protein/day (1.5-2 grams/kg IBW) (obesity and post gastric bypass)                                                                           Nutrition Diagnosis: No ready for diet/lifestyle change related to by patient s/p gastric bypass diet recommendation as evidenced by weight gain after gastric bypass, consumption of regular sodas, sweet tea and poptarts. Intervention:   Meals and snacks: continue current diet order  Education: Provided patient with written instruction on post gastric bypass diet guidelines.  Handouts provided: Marco Farrell Post Gastric Bypass guidelines. (left at bedside as pt is confused)  Patient verbalizes poor understanding of diet. Expect poor compliance. Nutrition Discharge Plan: Discharge Nutrition Plan- Recommend patient continue nutrition behavior change therapy in outpatient setting. There are three methods available through [facility]. Physician Guided Weight Loss program- [phone] ([facility])   Individualized Outpatient Nutrition Counseling- (send referral to [facility] )   Bariatric Weight Loss [phone] or [email or web address]. [address], [place]   [phone]

## 2018-08-23 NOTE — PROGRESS NOTES
Problem: Self Care Deficits Care Plan (Adult)  Goal: *Acute Goals and Plan of Care (Insert Text)  1. Patient will complete lower body bathing and dressing with minimal assistance and adaptive equipment as needed. 2. Patient will complete toileting with minimal assistance. 3. Patient will tolerate 23 minutes of OT treatment with less than 4 rest breaks to increase activity tolerance for ADLs. 4. Patient will complete functional transfers with CGA x1 and adaptive equipment as needed. Timeframe: 7 visits     Comments:     OCCUPATIONAL THERAPY: Initial Assessment, Daily Note, Treatment Day: Day of Assessment and 1st and AM 8/23/2018  INPATIENT: Hospital Day: 4  [place] / [place] / Product Type: Medicare /      NAME/AGE/GENDER: Prisca Hua is a 80 y.o. female   PRIMARY DIAGNOSIS:  Nausea and vomiting, intractability of vomiting not specified, unspecified vomiting type [R11.2] CAMILA (acute kidney injury) ([facility] [address] CAMILA (acute kidney injury) ([facility] [address]  Procedure(s) (LRB):  ESOPHAGOGASTRODUODENOSCOPY (EGD) BMI 52 ROOM 804  (N/A)  ESOPHAGOGASTRODUODENAL (EGD) BIOPSY (N/A)  1 Day Post-Op  ICD-10: Treatment Diagnosis:    · Generalized Muscle Weakness (M62.81)  · Other lack of cordination (R27.8)   Precautions/Allergies:    fall risk Latex; Bactrim [sulfamethoxazole-trimethoprim]; Lamictal [lamotrigine]; Pcn [penicillins]; and Tapentadol      ASSESSMENT:     Ms. Drea Cesar presents to hospital for above. Pt found supine in bed upon arrival, AOx1, and agreeable to OT evaluation. Pt appears confused with AMS and flat affect noted. No family present thus PLOF is unknown. Today, she requires total A x2 for moving supine to sit. Once sitting at EOB, balance is intact. Pt is able to scoot forward at EOB with min A/HHA for boost. Pt tolerates EOB sitting for ~10 minutes with 0 LOB noted. She completes STS and functional transfer to chair with min Ax2/HHA. No AD used.  She requires verbal cueing for safety awareness and impulsivity, but is pleasant and cooperative. Pt left with possessions in reach and all needs met. RN notified. Ms. Pamela Arenas presents with functional limitations listed below and appears to be functioning below baseline. They will benefit from continued skilled OT services to maximize safety and independence with ADLs. Will follow during acute stay. This section established at most recent assessment   PROBLEM LIST (Impairments causing functional limitations):  1. Decreased Strength  2. Decreased ADL/Functional Activities  3. Decreased Transfer Abilities  4. Decreased Balance  5. Increased Pain  6. Decreased Activity Tolerance  7. Edema/Girth  8. Decreased Cognition   INTERVENTIONS PLANNED: (Benefits and precautions of occupational therapy have been discussed with the patient.)  1. Activities of daily living training  2. Adaptive equipment training  3. Balance training  4. Clothing management  5. Cognitive training  6. Donning&doffing training  7. Therapeutic activity  8. Therapeutic exercise     TREATMENT PLAN: Frequency/Duration: Follow patient 3x/week to address above goals. Rehabilitation Potential For Stated Goals: GOOD     RECOMMENDED REHABILITATION/EQUIPMENT: (at time of discharge pending progress): Due to the probability of continued deficits (see above) this patient will likely need continued skilled occupational therapy after discharge. Equipment:    None at this time              OCCUPATIONAL PROFILE AND HISTORY:   History of Present Injury/Illness (Reason for Referral):  See H&P  Past Medical History/Comorbidities:   Ms. Pamela Arenas  has a past medical history of Acute renal failure ([facility] [address] (July, 2014); Arthritis; Asthma; Asthma with acute exacerbation (9/28/2017); Bilateral pulmonary embolism [facility]) (September, 2012); Calculus of ureter (May, 2015); CAP (community acquired pneumonia) (October, 2012); Cellulitis of left lower extremity (11/1/2017);  Chronic pain; Community acquired bacterial pneumonia (4/2/2018); CVA (cerebral infarction) (January, 2012); Gastric ulcer (July, 2014); GERD (gastroesophageal reflux disease); Gram-positive bacteremia 1/2 cx (12/26/2016); HCAP (healthcare-associated pneumonia) (January, 2013); HCAP (healthcare-associated pneumonia) (February, 2013); Hypertension; Left leg DVT ([address] (1991); Morbid obesity ([address] Psychiatric disorder; PUD (peptic ulcer disease) (???); Sepsis due to pneumonia ([address] (4/2/2018); Stroke [facility]); and Thyroid disease. She also has no past medical history of Aneurysm ([address] Arrhythmia; Autoimmune disease ([address] CAD (coronary artery disease); Cancer ([address] Chronic kidney disease; Chronic obstructive pulmonary disease ([address] Coagulation disorder ([address] COPD; Diabetes ([address] Endocarditis; Heart failure ([address] Liver disease; Nicotine vapor product user; Non-nicotine vapor product user; Rheumatic fever; Seizures ([address] or Unspecified sleep apnea. Ms. Nabeel Yee  has a past surgical history that includes hx appendectomy; hx cholecystectomy; hx gastric bypass; hx tonsillectomy; hx other surgical; hx orthopaedic; hx hysterectomy; and hx endoscopy (July, 2014). Social History/Living Environment:   Home Environment: Private residence  # Steps to Enter: 3  Rails to Enter: Yes  Hand Rails : Right  Wheelchair Ramp: No  One/Two Story Residence: One story  Living Alone: No  Support Systems: Child(jaswant)  Patient Expects to be Discharged to[de-identified] Patient room  Current DME Used/Available at Home: Edmundo Boys, straight, Walker, rolling, Shower chair  Tub or Shower Type: Shower  Prior Level of Function/Work/Activity:  Unknown and no family present. Per charts, lives with daughter and uses cane for mobility. Personal Factors:          Sex:  female        Age:  80 y.o.         Overall Behavior:  Flat affect, AMS   Number of Personal Factors/Comorbidities that affect the Plan of Care: Expanded review of therapy/medical records (1-2):  MODERATE COMPLEXITY   ASSESSMENT OF OCCUPATIONAL PERFORMANCE[de-identified]   Activities of Daily Living:   Basic ADLs (From Assessment) Complex ADLs (From Assessment)   Feeding: Minimum assistance  Oral Facial Hygiene/Grooming: Moderate assistance  Bathing: Moderate assistance  Upper Body Dressing: Minimum assistance  Lower Body Dressing: Maximum assistance  Toileting: Moderate assistance     Grooming/Bathing/Dressing Activities of Daily Living     Cognitive Retraining  Safety/Judgement: Fall prevention;Decreased insight into deficits; Decreased awareness of need for safety;Decreased awareness of environment;Decreased awareness of need for assistance                       Bed/Mat Mobility  Supine to Sit: Total assistance;Assist x2  Sit to Stand: Minimum assistance;Assist x2  Bed to Chair: Minimum assistance;Assist x2  Scooting: Moderate assistance       Most Recent Physical Functioning:   Gross Assessment:  AROM: Generally decreased, functional  Strength: Generally decreased, functional  Coordination: Generally decreased, functional               Posture:     Balance:  Sitting: Intact  Standing: Impaired  Standing - Static: Fair  Standing - Dynamic : Fair Bed Mobility:  Supine to Sit: Total assistance;Assist x2  Scooting:  Moderate assistance  Wheelchair Mobility:     Transfers:  Sit to Stand: Minimum assistance;Assist x2  Stand to Sit: Minimum assistance;Assist x2  Bed to Chair: Minimum assistance;Assist x2            Patient Vitals for the past 6 hrs:   BP SpO2 O2 Flow Rate (L/min) Pulse   08/23/18 0712 158/90 95 % - (!) 106   08/23/18 0718 - 94 % 2 l/min -       Mental Status  Neurologic State: Alert  Orientation Level: Oriented to person, Disoriented to place, Disoriented to situation, Disoriented to time  Cognition: Decreased command following, Decreased attention/concentration  Perception: Appears intact  Perseveration: No perseveration noted  Safety/Judgement: Fall prevention, Decreased insight into deficits, Decreased awareness of need for safety, Decreased awareness of environment, Decreased awareness of need for assistance                          Physical Skills Involved:  1. Balance  2. Strength  3. Activity Tolerance Cognitive Skills Affected (resulting in the inability to perform in a timely and safe manner):  1. Executive Function  2. Immediate Memory  3. Short Term Recall  4. Sustained Attention  5. Divided Attention  6. Comprehension  7. Expression Psychosocial Skills Affected:  1. Habits/Routines  2. Environmental Adaptation  3. Social Interaction  4. Self-Awareness  5. Awareness of Others  6. Social Roles   Number of elements that affect the Plan of Care: 5+:  HIGH COMPLEXITY   CLINICAL DECISION MAKIN[address] [place] [address] AM-PAC 6 Clicks   Daily Activity Inpatient Short Form  How much help from another person does the patient currently need. .. Total A Lot A Little None   1. Putting on and taking off regular lower body clothing? [] 1   [x] 2   [] 3   [] 4   2. Bathing (including washing, rinsing, drying)? [] 1   [x] 2   [] 3   [] 4   3. Toileting, which includes using toilet, bedpan or urinal?   [] 1   [x] 2   [] 3   [] 4   4. Putting on and taking off regular upper body clothing? [] 1   [] 2   [x] 3   [] 4   5. Taking care of personal grooming such as brushing teeth? [] 1   [] 2   [x] 3   [] 4   6. Eating meals? [] 1   [] 2   [x] 3   [] 4   © , Trustees of [address] [place] [address], under license to [employer]. All rights reserved      Score:  Initial: 15 Most Recent: X (Date: -- )    Interpretation of Tool:  Represents activities that are increasingly more difficult (i.e. Bed mobility, Transfers, Gait). Score 24 23 22-20 19-15 14-10 9-7 6     Modifier CH CI CJ CK CL CM CN      ?  Self Care:     - CURRENT STATUS: CK - 40%-59% impaired, limited or restricted    - GOAL STATUS: CJ - 20%-39% impaired, limited or restricted    - D/C STATUS:  ---------------To be determined---------------  [place] / [place] / Product Type: Medicare /      Medical Necessity:     · Patient is expected to demonstrate progress in strength, balance and functional technique to increase independence with ADLs. Reason for Services/Other Comments:  · Patient continues to require skilled intervention due to medical complications and patient unable to attend/participate in therapy as expected. Use of outcome tool(s) and clinical judgement create a POC that gives a: MODERATE COMPLEXITY         TREATMENT:   (In addition to Assessment/Re-Assessment sessions the following treatments were rendered)     Pre-treatment Symptoms/Complaints:    Pain: Initial:   Pain Intensity 1: 0  Post Session:  same     Therapeutic Activity: (    10 minutes): Therapeutic activities including Bed transfers, Chair transfers and sitting balance/tolerance to improve mobility, strength and balance. Required minimal   to promote static and dynamic balance in standing. Braces/Orthotics/Lines/Etc:   · O2 Device: Nasal cannula  Treatment/Session Assessment:    · Response to Treatment:  Tolerated well. · Interdisciplinary Collaboration:   o Occupational Therapist  o Registered Nurse  o Rehabilitation Attendant  o Certified Nursing Assistant/Patient Care Technician  · After treatment position/precautions:   o Up in chair  o Bed alarm/tab alert on  o Bed/Chair-wheels locked  o Call light within reach  o RN notified   · Compliance with Program/Exercises: compliant all of the time. · Recommendations/Intent for next treatment session: \"Next visit will focus on advancements to more challenging activities and reduction in assistance provided\".   Total Treatment Duration:  OT Patient Time In/Time Out  Time In: 1018  Time Out: [address]

## 2018-08-23 NOTE — PROGRESS NOTES
Pt remains up in chair. Pt staring to right side and looking out the window. Pt remains confused but will focus on nurse when spoken to. Pt slow to answer questions and confused when answers. Dr. Barrientos Exon on floor and made aware of this. MD made aware of no IV access at this time. MD ok without IV access at this time. Will monitor.

## 2018-08-23 NOTE — PROGRESS NOTES
GI DAILY PROGRESS NOTE    Admit Date:  8/20/2018    Today's Date:  8/23/2018    CC:  N/V    Subjective:     Patient is confused this am.  Did not sleep well per nursing. 2 big BMs overnight per nursing.   No N/V this am but little appetite      Medications:   Current Facility-Administered Medications   Medication Dose Route Frequency    sodium chloride (NS) flush 5-10 mL  5-10 mL IntraVENous PRN    apixaban (ELIQUIS) tablet 5 mg  5 mg Oral Q12H    tuberculin injection 5 Units  5 Units IntraDERMal ONCE    0.9% sodium chloride with KCl 20 mEq/L infusion   IntraVENous CONTINUOUS    albuterol (PROVENTIL VENTOLIN) nebulizer solution 2.5 mg  2.5 mg Nebulization QID RT    ondansetron (ZOFRAN ODT) tablet 8 mg  8 mg Oral TIDAC    polyethylene glycol (MIRALAX) packet 17 g  17 g Oral DAILY    cefTRIAXone (ROCEPHIN) 2 g in 0.9% sodium chloride (MBP/ADV) 50 mL  2 g IntraVENous Q24H    sodium chloride (NS) flush 5-10 mL  5-10 mL IntraVENous Q8H    acetaminophen (TYLENOL) tablet 650 mg  650 mg Oral Q4H PRN    HYDROcodone-acetaminophen (NORCO) 5-325 mg per tablet 1 Tab  1 Tab Oral Q4H PRN    naloxone (NARCAN) injection 0.4 mg  0.4 mg IntraVENous PRN    diphenhydrAMINE (BENADRYL) capsule 25 mg  25 mg Oral Q4H PRN    ondansetron (ZOFRAN) injection 4 mg  4 mg IntraVENous Q4H PRN    senna-docusate (PERICOLACE) 8.6-50 mg per tablet 2 Tab  2 Tab Oral DAILY PRN    LORazepam (ATIVAN) tablet 0.5-1 mg  0.5-1 mg Oral Q4H PRN    zolpidem (AMBIEN) tablet 5 mg  5 mg Oral QHS PRN    DULoxetine (CYMBALTA) capsule 60 mg  60 mg Oral BID    meclizine (ANTIVERT) tablet 25 mg  25 mg Oral Q6H PRN    albuterol-ipratropium (DUO-NEB) 2.5 MG-0.5 MG/3 ML  3 mL Nebulization Q4H PRN    albuterol (PROVENTIL VENTOLIN) nebulizer solution 2.5 mg  2.5 mg Nebulization Q4H PRN    mirtazapine (REMERON) tablet 45 mg  45 mg Oral QHS    nystatin (MYCOSTATIN) 100,000 unit/gram powder   Topical BID    montelukast (SINGULAIR) tablet 10 mg  10 mg Oral DAILY    pregabalin (LYRICA) capsule 25 mg  25 mg Oral BID    sucralfate (CARAFATE) tablet 1 g  1 g Oral QID    ALPRAZolam (XANAX) tablet 2 mg  2 mg Oral TID PRN    magnesium oxide (MAG-OX) tablet 400 mg  400 mg Oral DAILY    oxybutynin chloride XL (DITROPAN XL) tablet 10 mg  10 mg Oral DAILY    HYDROcodone-acetaminophen (NORCO)  mg tablet 1 Tab  1 Tab Oral Q6H PRN    pantoprazole (PROTONIX) tablet 40 mg  40 mg Oral ACB&D    ziprasidone (GEODON) capsule 80 mg  80 mg Oral QHS    levothyroxine (SYNTHROID) tablet 112 mcg  112 mcg Oral QPM    nystatin (MYCOSTATIN) 100,000 unit/mL oral suspension 500,000 Units  500,000 Units Oral QID       Review of Systems:  ROS was obtained, with pertinent positives as listed above. No chest pain or SOB. Diet:  Low fat    Objective:   Vitals:  Visit Vitals    /90    Pulse (!) 106    Temp 98.9 °F (37.2 °C)    Resp 20    Ht 5' (1.524 m)    Wt 127 kg (279 lb 14.4 oz)    SpO2 94%    BMI 54.66 kg/m2     Intake/Output:     08/21 1901 - 08/23 0700  In: 8777 [P.O.:480; I.V.:1260]  Out: 3300 [Urine:3300]  Exam:  General appearance: alert, confused  Lungs: clear to auscultation bilaterally anteriorly  Heart: regular rate and rhythm  Abdomen: soft, Obese, non-tender.  Bowel sounds normal. No masses, no organomegaly  Extremities: extremities normal, atraumatic, no cyanosis or edema  Neuro:  alert and confused    Data Review (Labs):    Recent Labs      08/23/18   0653  08/22/18   0640  08/21/18   0625  08/20/18   1902   WBC  10.4  8.8  11.7*  14.2*   HGB  9.0*  8.7*  8.5*  10.2*   HCT  29.8*  27.9*  26.0*  31.0*   PLT  433  438  366  491*   MCV  82.5  79.0*  76.2*  76.2*   NA  139  134*  126*  118*   K  3.8  3.1*  2.7*  3.5   CL  99  89*  77*  67*   CO2  32  33*  37*  39*   BUN  8  20  38*  45*   CREA  0.80  1.08*  1.89*  2.56*   CA  8.2*  8.0*  8.0*  8.5   MG  2.2  2.4  2.2   --    GLU  102*  100  102*  105*   AP   --   125   --   141*   SGOT   --   26   --   64* ALT   --   21   --   28   TBILI   --   0.2   --   0.7   ALB   --   2.1*   --   2.4*   TP   --   5.3*   --   6.3   PTP   --   13.8   --    --    INR   --   1.1   --    --      EGD Dr Maggy Barry 8/23/18  Findings:   Esophagus: The proximal and mid esophagus appeared normal.  In the distal esophagus, the Z line was normal.  Stomach: small residual gastric pouch. The anastomosis was widely patent. 2 shallow linear ulcers at the anastomosis. No bleeding stigmata present. Biopsies were obtained from the antrum and body to rule out H. pylori infection as a cause for symptoms. Small bowel: One limb ends in a short blind loop. The other limb is deeply intubated and appears normal.    Recommendations: Follow up pathology results  Avoid NSAIDs  Continue Carafate  Nutrition consult- for dietary indiscretion, likely contributing to chronic nausea      Right leg venous ultrasound. 8/20/18  FINDINGS:  The right common femoral, superficial femoral, deep femoral,  popliteal, posterior tibialis, peroneal and greater saphenous veins are patent  and compressible with normal response to augmentation. IMPRESSION:  No evidence of right leg DVT.      Chest XR 8/20/18  No pneumothorax or pleural effusion. No focal lung consolidation. Heart is  normal in size. IMPRESSION:   1.  No evidence of acute cardiopulmonary abnormality.     Assessment:     Principal Problem:    CAMILA (acute kidney injury) ([facility] [address] (8/20/2018)    Active Problems:    Hypertension (9/16/2012)      GERD (gastroesophageal reflux disease) (9/16/2012)      Hypothyroidism (9/28/2017)      Asthma (9/28/2017)      Bipolar affective disorder ([address] (9/16/2012)      Hx of pulmonary embolus (9/28/2017)      Overview: Chronic Eliquis      Restrictive lung disease (5/21/2018)      Hyponatremia (8/20/2018)      Blase Press (8/20/2018)      80 y.o. female with PMH including but not limited to chronic narcotic use, bipolar disorder, CAMILA, PE (2012), Fibromyalgia, CVA, PUD, GERD, HTN, DVT on Eliquis, resterictive lung dz on O2 2-3 L at HS admitted with right leg jerking, weakness and was found to have K of 2.7.  US negative for Rt leg DVT. Has seen Dr Joseline Galan for chronic constipation and N/V. Hx of PUD with ulcer 2012 and 2014. Negative EGD 2/2016. Normal colo 2017. Labs 8/6/18 with TB 0.2,  (H), AST 19, ALT 12, lipase 27. Hgb has varied between 8.5 to 10.2. She denies BRRB or tarry stools. She denies NSAID or ETOH use. She continues on Eliquis. Neg Chest XR. EGD yesterday with small residual gastric pouch, the anastomosis was widely patent and 2 shallow linear ulcers at the anastomosis. Bx pending. AMS today and hospitalist made aware     Plan:     - Continue low fat diet  - Continue PPI and Carafate  - Bx pending will review when completed  - Miralax as needed for chronic constipation     Gasper Hernandez, FREDO  Patient is seen and examined in collaboration with Dr. Janae Grant. Assessment and plan as per Dr. Alex Bowling.            I have seen and examined this patient, and agree with above assessment and plan. Now pts mental status appears to be baseline  Awake, alert, answering questions appropriately  Reports improved abdom pain  Nutrition CS pending today for diet education post gastrectomy  Cont PPI and carafate for ulcer  Cont miralax PRN  WIll sign off, pls call if further issues arise. WIll arrange outpt fu in 2-4 wks.      Janae Grant MD

## 2018-08-23 NOTE — PROGRESS NOTES
Pt remains up in chair. Pt remains confused. Pt agitated at times. Daughter at bedside and reports Cece De Santiago has done this at home before\" will monitor.

## 2018-08-24 ENCOUNTER — APPOINTMENT (OUTPATIENT)
Dept: GENERAL RADIOLOGY | Age: 68
DRG: 682 | End: 2018-08-24
Attending: HOSPITALIST
Payer: MEDICARE

## 2018-08-24 LAB
ALBUMIN SERPL-MCNC: 2.3 G/DL (ref 3.2–4.6)
ALBUMIN/GLOB SERPL: 0.7 {RATIO} (ref 1.2–3.5)
ALP SERPL-CCNC: 121 U/L (ref 50–136)
ALT SERPL-CCNC: 28 U/L (ref 12–65)
ANION GAP SERPL CALC-SCNC: 7 MMOL/L (ref 7–16)
AST SERPL-CCNC: 26 U/L (ref 15–37)
BACTERIA SPEC CULT: ABNORMAL
BASOPHILS # BLD: 0.1 K/UL (ref 0–0.2)
BASOPHILS NFR BLD: 1 % (ref 0–2)
BILIRUB SERPL-MCNC: 0.2 MG/DL (ref 0.2–1.1)
BUN SERPL-MCNC: 3 MG/DL (ref 8–23)
CALCIUM SERPL-MCNC: 8.4 MG/DL (ref 8.3–10.4)
CHLORIDE SERPL-SCNC: 102 MMOL/L (ref 98–107)
CO2 SERPL-SCNC: 31 MMOL/L (ref 21–32)
CREAT SERPL-MCNC: 0.92 MG/DL (ref 0.6–1)
DIFFERENTIAL METHOD BLD: ABNORMAL
EOSINOPHIL # BLD: 0 K/UL (ref 0–0.8)
EOSINOPHIL NFR BLD: 0 % (ref 0.5–7.8)
ERYTHROCYTE [DISTWIDTH] IN BLOOD BY AUTOMATED COUNT: 19.2 %
GLOBULIN SER CALC-MCNC: 3.1 G/DL (ref 2.3–3.5)
GLUCOSE SERPL-MCNC: 132 MG/DL (ref 65–100)
HCT VFR BLD AUTO: 28.9 % (ref 35.8–46.3)
HGB BLD-MCNC: 8.8 G/DL (ref 11.7–15.4)
IMM GRANULOCYTES # BLD: 0.1 K/UL (ref 0–0.5)
IMM GRANULOCYTES NFR BLD AUTO: 1 % (ref 0–5)
INR PPP: 1.2
LACTATE SERPL-SCNC: 2.1 MMOL/L (ref 0.4–2)
LYMPHOCYTES # BLD: 2 K/UL (ref 0.5–4.6)
LYMPHOCYTES NFR BLD: 19 % (ref 13–44)
MAGNESIUM SERPL-MCNC: 2.2 MG/DL (ref 1.8–2.4)
MCH RBC QN AUTO: 24.9 PG (ref 26.1–32.9)
MCHC RBC AUTO-ENTMCNC: 30.4 G/DL (ref 31.4–35)
MCV RBC AUTO: 81.9 FL (ref 79.6–97.8)
MM INDURATION POC: NORMAL 0 MM (ref 0–5)
MONOCYTES # BLD: 0.9 K/UL (ref 0.1–1.3)
MONOCYTES NFR BLD: 8 % (ref 4–12)
NEUTS SEG # BLD: 7.8 K/UL (ref 1.7–8.2)
NEUTS SEG NFR BLD: 72 % (ref 43–78)
NRBC # BLD: 0 K/UL (ref 0–0.2)
PHOSPHATE SERPL-MCNC: 2 MG/DL (ref 2.3–3.7)
PLATELET # BLD AUTO: 408 K/UL (ref 150–450)
PMV BLD AUTO: 8.7 FL (ref 9.4–12.3)
POTASSIUM SERPL-SCNC: 4 MMOL/L (ref 3.5–5.1)
PPD POC: NORMAL NEGATIVE
PROT SERPL-MCNC: 5.4 G/DL (ref 6.3–8.2)
PROTHROMBIN TIME: 14.2 SEC (ref 11.5–14.5)
RBC # BLD AUTO: 3.53 M/UL (ref 4.05–5.2)
SERVICE CMNT-IMP: ABNORMAL
SODIUM SERPL-SCNC: 140 MMOL/L (ref 136–145)
WBC # BLD AUTO: 10.7 K/UL (ref 4.3–11.1)

## 2018-08-24 PROCEDURE — 36415 COLL VENOUS BLD VENIPUNCTURE: CPT

## 2018-08-24 PROCEDURE — 85610 PROTHROMBIN TIME: CPT

## 2018-08-24 PROCEDURE — 74011250637 HC RX REV CODE- 250/637: Performed by: INTERNAL MEDICINE

## 2018-08-24 PROCEDURE — 71045 X-RAY EXAM CHEST 1 VIEW: CPT

## 2018-08-24 PROCEDURE — 65270000029 HC RM PRIVATE

## 2018-08-24 PROCEDURE — 74011000250 HC RX REV CODE- 250: Performed by: INTERNAL MEDICINE

## 2018-08-24 PROCEDURE — 94640 AIRWAY INHALATION TREATMENT: CPT

## 2018-08-24 PROCEDURE — 84100 ASSAY OF PHOSPHORUS: CPT

## 2018-08-24 PROCEDURE — 83605 ASSAY OF LACTIC ACID: CPT

## 2018-08-24 PROCEDURE — 80053 COMPREHEN METABOLIC PANEL: CPT

## 2018-08-24 PROCEDURE — 74011250637 HC RX REV CODE- 250/637: Performed by: HOSPITALIST

## 2018-08-24 PROCEDURE — 85025 COMPLETE CBC W/AUTO DIFF WBC: CPT

## 2018-08-24 PROCEDURE — 94760 N-INVAS EAR/PLS OXIMETRY 1: CPT

## 2018-08-24 PROCEDURE — 83735 ASSAY OF MAGNESIUM: CPT

## 2018-08-24 RX ORDER — HALOPERIDOL 5 MG/ML
2 INJECTION INTRAMUSCULAR
Status: DISCONTINUED | OUTPATIENT
Start: 2018-08-24 | End: 2018-08-27 | Stop reason: HOSPADM

## 2018-08-24 RX ORDER — PROPRANOLOL HYDROCHLORIDE 10 MG/1
20 TABLET ORAL 3 TIMES DAILY
Status: DISCONTINUED | OUTPATIENT
Start: 2018-08-24 | End: 2018-08-27 | Stop reason: HOSPADM

## 2018-08-24 RX ADMIN — SUCRALFATE 1 G: 1 TABLET ORAL at 10:43

## 2018-08-24 RX ADMIN — PROPRANOLOL HYDROCHLORIDE 20 MG: 10 TABLET ORAL at 21:35

## 2018-08-24 RX ADMIN — PANTOPRAZOLE SODIUM 40 MG: 40 TABLET, DELAYED RELEASE ORAL at 16:25

## 2018-08-24 RX ADMIN — Medication 10 ML: at 21:01

## 2018-08-24 RX ADMIN — ZIPRASIDONE HYDROCHLORIDE 80 MG: 20 CAPSULE ORAL at 21:00

## 2018-08-24 RX ADMIN — Medication 10 ML: at 10:46

## 2018-08-24 RX ADMIN — NYSTATIN 500000 UNITS: 100000 SUSPENSION ORAL at 10:43

## 2018-08-24 RX ADMIN — MONTELUKAST SODIUM 10 MG: 10 TABLET, FILM COATED ORAL at 10:42

## 2018-08-24 RX ADMIN — OXYBUTYNIN CHLORIDE 10 MG: 10 TABLET, FILM COATED, EXTENDED RELEASE ORAL at 10:43

## 2018-08-24 RX ADMIN — HYDROCODONE BITARTRATE AND ACETAMINOPHEN 1 TABLET: 5; 325 TABLET ORAL at 16:25

## 2018-08-24 RX ADMIN — SUCRALFATE 1 G: 1 TABLET ORAL at 21:00

## 2018-08-24 RX ADMIN — HYDROCODONE BITARTRATE AND ACETAMINOPHEN 1 TABLET: 10; 325 TABLET ORAL at 21:00

## 2018-08-24 RX ADMIN — APIXABAN 5 MG: 5 TABLET, FILM COATED ORAL at 10:43

## 2018-08-24 RX ADMIN — LEVOTHYROXINE SODIUM 112 MCG: 112 TABLET ORAL at 16:26

## 2018-08-24 RX ADMIN — ONDANSETRON 8 MG: 4 TABLET, ORALLY DISINTEGRATING ORAL at 10:44

## 2018-08-24 RX ADMIN — DULOXETINE 60 MG: 60 CAPSULE, DELAYED RELEASE ORAL at 21:00

## 2018-08-24 RX ADMIN — APIXABAN 5 MG: 5 TABLET, FILM COATED ORAL at 21:00

## 2018-08-24 RX ADMIN — Medication 400 MG: at 10:43

## 2018-08-24 RX ADMIN — ONDANSETRON 8 MG: 4 TABLET, ORALLY DISINTEGRATING ORAL at 16:25

## 2018-08-24 RX ADMIN — MIRTAZAPINE 45 MG: 30 TABLET, FILM COATED ORAL at 21:00

## 2018-08-24 RX ADMIN — PROPRANOLOL HYDROCHLORIDE 20 MG: 10 TABLET ORAL at 16:25

## 2018-08-24 RX ADMIN — NYSTATIN 500000 UNITS: 100000 SUSPENSION ORAL at 21:00

## 2018-08-24 RX ADMIN — CEPHALEXIN 500 MG: 250 CAPSULE ORAL at 16:25

## 2018-08-24 RX ADMIN — ALBUTEROL SULFATE 2.5 MG: 2.5 SOLUTION RESPIRATORY (INHALATION) at 07:29

## 2018-08-24 RX ADMIN — ALBUTEROL SULFATE 2.5 MG: 2.5 SOLUTION RESPIRATORY (INHALATION) at 15:20

## 2018-08-24 RX ADMIN — NYSTATIN: 100000 POWDER TOPICAL at 16:26

## 2018-08-24 RX ADMIN — ALBUTEROL SULFATE 2.5 MG: 2.5 SOLUTION RESPIRATORY (INHALATION) at 11:18

## 2018-08-24 RX ADMIN — SUCRALFATE 1 G: 1 TABLET ORAL at 16:26

## 2018-08-24 RX ADMIN — PROPRANOLOL HYDROCHLORIDE 20 MG: 10 TABLET ORAL at 13:16

## 2018-08-24 RX ADMIN — Medication 10 ML: at 05:37

## 2018-08-24 NOTE — ANCILLARY DISCHARGE INSTRUCTIONS
OT NOTE:    Charts reviewed. Treated session aborted as pt was agitated and non-agreeable this AM, which is a change from yesterday's tx session. Will re-attempt at later time/date as schedule allows.     Thanks,    Ethan Arroyo, OTR/L

## 2018-08-24 NOTE — PROGRESS NOTES
Patient sitting up in bed this a.m. Awake, alert and calm, oriented to person place and time, disoriented to situation, however remains confused, refused to take medications, while holding medications in her hand she repeatedly states she has already taken them, despite much encouragement patient continues to refuse to take po meds that are due this a.m. Patient is telling nurse to \"back off\", \"dont do that\", \" I have already taken them\", patient encouraged po water intake, patient take multiple small sips of water, urine is darker ligia this a.m, patient does not appear to be in any pain, however is withdrawn with delayed response, patient slept most of the night, awake for short period this a.m, door open to monitor, call light within reach.

## 2018-08-24 NOTE — PROGRESS NOTES
Attempt to check patient brief this a.m. For saturation/BM, patient became combative, swatting @ nurse and assistant, patient continuously saying \"Quit\", attempt to redirect unsuccessful, patient remains confused, calm when being left alone, door open to monitor, call light within reach.

## 2018-08-24 NOTE — PROGRESS NOTES
AM assessment completed and documented, see docflow. Patient awake in bed. Patient answers to name with flat affect. Does not respond to assessment questions. NAD noted at present. Respirations even and non labored. Safety measures in place. Call light within reach. Will continue to monitor.

## 2018-08-24 NOTE — PROGRESS NOTES
Progress Note      Patient: Prisca Hua               Sex: female          MRN: [id number]           YOB: 1950      Age:  80 y.o. PCP:  Camilla Webster DO  Treatment Team: Attending Provider: Marquise Main MD; Utilization Review: Sobeida Kelly RN; Care Manager: Lita Cano, SLICK; Consulting Provider: Td Devine MD  Subjective:     More confused this morning. Awake and alert. Not answering any questions. No appetite and not eating much since yesterday. No fevers. Denies any abdominal pain or chest pain or shortness of breath. No burning with urination. No difficulty with speech or swallowing or hearing. Moving all her extremities. She pulled her IV out yesterday. Antibiotics were changed to Keflex. Objective:   Physical Exam:   Visit Vitals    /75 (BP 1 Location: Left arm, BP Patient Position: At rest)    Pulse 95    Temp 98 °F (36.7 °C)    Resp 20    Ht 5' (1.524 m)    Wt 126.9 kg (279 lb 12.8 oz)    SpO2 96%    BMI 54.64 kg/m2      Temp (24hrs), Av.7 °F (37.1 °C), Min:98 °F (36.7 °C), Max:99.6 °F (37.6 °C)    Oxygen Therapy  O2 Sat (%): 96 % (18)  Pulse via Oximetry: 102 beats per minute (18 07)  O2 Device: Room air (18)  O2 Flow Rate (L/min): 3 l/min (18 193)  FIO2 (%): 28 % (18 0625)    Intake/Output Summary (Last 24 hours) at 18 1041  Last data filed at 18 0814   Gross per 24 hour   Intake                0 ml   Output              800 ml   Net             -800 ml      General: Conscious, no acute distress  Eyes:  KATHLEEN, No pallor/icterus    HENT:             Oral Mucosa is dry, No sinus tenderness  Neck:               Supple, No JVD  Lungs:  CTA Bilaterally, No significant wheeze/rhonchi  Heart:  S1 S2 regular  Abdomen: Soft, obese, normal bowel sounds, NTND, No guarding/rigidity/rebound tend.   Extremities: mild pedal edema  Neurologic:  AAOX2, No acute FND, Motor:  LUE: 5/5, LLE: 5/5, RUE: 5/5, RLE: 5/5. ..minimal tremors. Skin:                No acute rashes  Musculoskeletal: No Acute findings. Rt knee is tender to touch, no swelling/redness/warmth. Psych:           Seems depressed. Not oriented to time. LAB  Recent Results (from the past 24 hour(s))   CBC WITH AUTOMATED DIFF    Collection Time: 08/23/18  6:53 AM   Result Value Ref Range    WBC 10.4 4.3 - 11.1 K/uL    RBC 3.61 (L) 4.05 - 5.2 M/uL    HGB 9.0 (L) 11.7 - 15.4 g/dL    HCT 29.8 (L) 35.8 - 46.3 %    MCV 82.5 79.6 - 97.8 FL    MCH 24.9 (L) 26.1 - 32.9 PG    MCHC 30.2 (L) 31.4 - 35.0 g/dL    RDW 18.9 %    PLATELET 928 830 - 395 K/uL    MPV 9.2 (L) 9.4 - 12.3 FL    ABSOLUTE NRBC 0.00 0.0 - 0.2 K/uL    DF AUTOMATED      NEUTROPHILS 64 43 - 78 %    LYMPHOCYTES 24 13 - 44 %    MONOCYTES 12 4.0 - 12.0 %    EOSINOPHILS 0 (L) 0.5 - 7.8 %    BASOPHILS 0 0.0 - 2.0 %    IMMATURE GRANULOCYTES 0 0.0 - 5.0 %    ABS. NEUTROPHILS 6.6 1.7 - 8.2 K/UL    ABS. LYMPHOCYTES 2.5 0.5 - 4.6 K/UL    ABS. MONOCYTES 1.2 0.1 - 1.3 K/UL    ABS. EOSINOPHILS 0.0 0.0 - 0.8 K/UL    ABS. BASOPHILS 0.0 0.0 - 0.2 K/UL    ABS. IMM.  GRANS. 0.0 0.0 - 0.5 K/UL   METABOLIC PANEL, BASIC    Collection Time: 08/23/18  6:53 AM   Result Value Ref Range    Sodium 139 136 - 145 mmol/L    Potassium 3.8 3.5 - 5.1 mmol/L    Chloride 99 98 - 107 mmol/L    CO2 32 21 - 32 mmol/L    Anion gap 8 7 - 16 mmol/L    Glucose 102 (H) 65 - 100 mg/dL    BUN 8 8 - 23 MG/DL    Creatinine 0.80 0.6 - 1.0 MG/DL    GFR est AA >60 >60 ml/min/1.73m2    GFR est non-AA >60 >60 ml/min/1.73m2    Calcium 8.2 (L) 8.3 - 10.4 MG/DL   MAGNESIUM    Collection Time: 08/23/18  6:53 AM   Result Value Ref Range    Magnesium 2.2 1.8 - 2.4 mg/dL   PHOSPHORUS    Collection Time: 08/23/18  6:53 AM   Result Value Ref Range    Phosphorus 2.1 (L) 2.3 - 3.7 MG/DL   GLUCOSE, POC    Collection Time: 08/23/18  7:10 AM   Result Value Ref Range    Glucose (POC) 125 (H) 65 - 100 mg/dL   EKG, 12 LEAD, INITIAL Collection Time: 08/23/18  8:40 AM   Result Value Ref Range    Ventricular Rate 99 BPM    Atrial Rate 99 BPM    P-R Interval 204 ms    QRS Duration 80 ms    Q-T Interval 332 ms    QTC Calculation (Bezet) 426 ms    Calculated P Axis 54 degrees    Calculated R Axis -15 degrees    Calculated T Axis 38 degrees    Diagnosis       Normal sinus rhythm with sinus arrhythmia  Normal ECG  When compared with ECG of 20-AUG-2018 18:57,  Borderline criteria for Anterior infarct are no longer Present  Borderline criteria for Anterolateral infarct are no longer Present  Criteria for Inferior infarct are no longer Present  QT has shortened     AMMONIA    Collection Time: 08/23/18  9:59 AM   Result Value Ref Range    Ammonia 12 11 - 32 UMOL/L       Ct Head Wo Cont    Result Date: 8/23/2018  CT scan of the brain 8/23/2018 Scanning was performed within 24 hours of arrival to the facility Comparison: April 10, 2017 Dose reduction techniques used: Automated exposure control, adjustment of the mAs and/or kVp according to patient size, standardized low-dose protocol, and/or iterative reconstruction technique. Indication: Nausea vomiting Findings: The brain parenchyma and ventricular structures are unremarkable. There is normal white-grey matter differentiation. There are no mass lesions, hemorrhage, or evidence of an acute stroke. The calvarium and the sinuses are unremarkable. Impression: Negative CT scan of the brain. Note: If a subtle CVA is suspected, MRI would be more definitive if clinically warranted. Ct Head Wo Cont    Result Date: 8/23/2018  Impression: Negative CT scan of the brain. Note: If a subtle CVA is suspected, MRI would be more definitive if clinically warranted.       All Micro Results     Procedure Component Value Units Date/Time    CULTURE, URINE [[id number]]  (Abnormal)  (Susceptibility) Collected:  08/21/18 1400    Order Status:  Completed Specimen:  Urine from Clean catch Updated:  08/24/18 0746     Special Requests: NO SPECIAL REQUESTS        Culture result:         >100,000 COLONIES/mL ESCHERICHIA COLI (A)          Current Medications Reviewed      Assessment/Plan        1. Acute kidney injury, most likely prerenal and possibly from UTI.  ultrasound of kidneys is normal. ctn antibiotics for UTI, monitor urine output and creatinine. 2.  Severe hyponatremia, hypokalemia, resolved. Monitor off IVF. 3.  Acute E.coli UTI, on keflex. 4.  Restrictive lung disease, chronic respiratory failure on home oxygen. 5.  Hypothyroidism, continue Synthroid, TSH normal.    6.  Chronic mild persistent asthma    7. History of PE on anticoagulation with Eliquis. Started Eliquis lastmnight    8. Chronic nausea and recurrent episodes of vomitings. History of gastric bypass surgery in the past.  S/p EGD. Recommended PPI+sucralfate. 9.  Bipolar disorder - continue Geodon,, monitor    10. Anemia of chronic disease    11. Morbid obesity    12. Fibromyalgia    13. Right leg and right knee pain.  x-ray of the right knee shows severe arthritis. No DVT on ultrasound venous Doppler of the right leg. 14. Confusion, ?medication induced. ?delirium, CT head shows no acute findings. Will recheck BMP to see if she has an electrolyte abnormalities. Patient seems depressed and part of this confusion could be secondary to her psychiatric conditions. Will get psychiatric evaluation. May consider MRI of the brain later but I do not think she would tolerate MRI at this time Considering her tremors, confusion/agitation . 15. Debility - PT/OT Eval.    High-risk patient secondary to above-mentioned medical problems and comorbid conditions. Rosaura Mccoy MD  August 24, 7793    Acute metabolic encephalopathy sec to CAMILA/Dehydration/UTI - being treated.

## 2018-08-24 NOTE — PROGRESS NOTES
LATE NOTE:  8/24  Patients spouse signed one copy of the Important Letter from Medicare which was placed in the patient's medical chart, and a copy of the Important Letter from Medicare was provided to the patients spouse. 8/27 - Patient signed one copy of the Important Letter from Medicare which was placed in the patient's medical chart, and a copy of the Important Letter from Medicare was provided to the patient. Care Managers were notified.

## 2018-08-24 NOTE — PROGRESS NOTES
Patient finally took morning medications. Patient's daughter requesting meds for tremors. Dr Lara Sanchez paged in regards.

## 2018-08-24 NOTE — PROGRESS NOTES
Physical Therapy Note:    Chart reviewed and PT treatment session attempted this AM. Patient refused therapy services, stating she was \"fine. \" Attempted to encourage patient and initiate treatment, however patient stated to not touch her. Patient presents with falt affect and is non-responsive to most questions. Will re-attempt treatment session at a later date/time as schedule and patient's avaliability allow.     Thank you,  MARIAN DowningT

## 2018-08-24 NOTE — PROGRESS NOTES
Problem: Falls - Risk of  Goal: *Absence of Falls  Document Shaheen Fall Risk and appropriate interventions in the flowsheet. Outcome: Progressing Towards Goal  Fall Risk Interventions:  Mobility Interventions: Bed/chair exit alarm, Communicate number of staff needed for ambulation/transfer, Patient to call before getting OOB    Mentation Interventions: Bed/chair exit alarm    Medication Interventions: Bed/chair exit alarm, Patient to call before getting OOB    Elimination Interventions: Call light in reach, Patient to call for help with toileting needs    History of Falls Interventions: Consult care management for discharge planning, Evaluate medications/consider consulting pharmacy        Problem: Pressure Injury - Risk of  Goal: *Prevention of pressure injury  Document Juwan Scale and appropriate interventions in the flowsheet.    Outcome: Progressing Towards Goal  Pressure Injury Interventions:  Sensory Interventions: Assess changes in LOC, Check visual cues for pain, Minimize linen layers, Maintain/enhance activity level    Moisture Interventions: Absorbent underpads, Check for incontinence Q2 hours and as needed, Maintain skin hydration (lotion/cream), Minimize layers    Activity Interventions: Pressure redistribution bed/mattress(bed type), PT/OT evaluation, Increase time out of bed    Mobility Interventions: HOB 30 degrees or less, Pressure redistribution bed/mattress (bed type), PT/OT evaluation    Nutrition Interventions: Document food/fluid/supplement intake    Friction and Shear Interventions: Foam dressings/transparent film/skin sealants, HOB 30 degrees or less, Lift sheet

## 2018-08-24 NOTE — PROGRESS NOTES
Problem: Interdisciplinary Rounds  Goal: Interdisciplinary Rounds  Interdisciplinary team rounds were held 8/24/2018 with the following team members:Care Management, Physical Therapy, Physician and Clinical Coordinator and the patient. Plan of care discussed. See clinical pathway and/or care plan for interventions and desired outcomes.

## 2018-08-24 NOTE — PROGRESS NOTES
Patient is resting quietly in bed, arousal to verbal stimuli, however drowsy, patient alert to person and what year it is, can not communicate location or why she is here, patient remains on RA, RR even and unlabored, does not appear to be in pain, drifts back to sleep @ this time, door open to monitor, call light within reach.

## 2018-08-24 NOTE — CDMP QUERY
Please clarify if this patient is being treated/managed for:    #1 of 2 Acute Metabolic Encephalopathy in elderly patient with UTI, CAMILA, Thrush, Hyponatremia who is now agitated, confused, refusing meds, refusing lab draws, pulling out ivs, pulling off oxygen treated with safety measures and xanax, ativan, geodon, lab monitoring, and head CT.      =>Other Explanation of clinical findings  =>Unable to Determine (no explanation of clinical findings)    The medical record reflects the following:    Risk Factors: 79 yo female with bipolar disorder, UTI, CAMILA, Thrush, Hyponatremia     Clinical Indicators: agitated, confused, refusing medications, refusing lab draws, pulling out ivs, pulling off oxygen, swatting at staff    Treatment: safety measures, lab monitoring, Head Ct, xanax, ativan, geodon. AND/ OR    #2 of 2 Benzodiazepine dependence with acute withdrawal in in elderly patient refusing her po medications who is now agitated, confused, refusing meds, refusing lab draws, pulling out ivs, pulling off oxygen treated with safety measures head CT, lab monitoring and continued staff attempts to give medications. =>Other Explanation of clinical findings  =>Unable to Determine (no explanation of clinical findings)    The medical record reflects the following:    Risk Factors: Nurse Aisha noted daughter states patient takes 2mg Xanax every 4-6 hours at home as well as her other psychiatric medications for bipolar disorder    Clinical Indicators:  agitated, confused, refusing medications, refusing lab draws, pulling out ivs, pulling off oxygen, swatting at staff      Treatment: safety measures, lab monitoring, Head Ct, xanax, ativan, geodon. Please clarify and document your clinical opinion in the progress notes and discharge summary including the definitive and/or presumptive diagnosis, (suspected or probable), related to the above clinical findings.  Please include clinical findings supporting your diagnosis.     Thanks,  Sorin Monzon RN, CDS  Compliant Documentation Management Program  [phone]

## 2018-08-24 NOTE — PROGRESS NOTES
Patient daughter called wanting update on mothers condition, daughter concerned with patient AMS, informed daughter that patient continues to sleep @ this time, patient wakes to verbal stimuli, drowsy and remains confused, alert to person and year when questioned, patient with delayed response and appears tired, daughter in agreement to hold night time medications if patient is drowsy, 2100 medications held r/t patient sleeping and drowsy, call light within reach, door open to monitor.

## 2018-08-25 LAB
ANION GAP SERPL CALC-SCNC: 8 MMOL/L (ref 7–16)
BASOPHILS # BLD: 0 K/UL (ref 0–0.2)
BASOPHILS NFR BLD: 0 % (ref 0–2)
BUN SERPL-MCNC: 3 MG/DL (ref 8–23)
CALCIUM SERPL-MCNC: 8.8 MG/DL (ref 8.3–10.4)
CHLORIDE SERPL-SCNC: 102 MMOL/L (ref 98–107)
CO2 SERPL-SCNC: 31 MMOL/L (ref 21–32)
CREAT SERPL-MCNC: 0.84 MG/DL (ref 0.6–1)
DIFFERENTIAL METHOD BLD: ABNORMAL
EOSINOPHIL # BLD: 0 K/UL (ref 0–0.8)
EOSINOPHIL NFR BLD: 0 % (ref 0.5–7.8)
ERYTHROCYTE [DISTWIDTH] IN BLOOD BY AUTOMATED COUNT: 19 %
FOLATE SERPL-MCNC: 66.8 NG/ML (ref 3.1–17.5)
GLUCOSE SERPL-MCNC: 114 MG/DL (ref 65–100)
HCT VFR BLD AUTO: 31.2 % (ref 35.8–46.3)
HGB BLD-MCNC: 9.3 G/DL (ref 11.7–15.4)
IMM GRANULOCYTES # BLD: 0 K/UL (ref 0–0.5)
IMM GRANULOCYTES NFR BLD AUTO: 0 % (ref 0–5)
LACTATE SERPL-SCNC: 1.1 MMOL/L (ref 0.4–2)
LYMPHOCYTES # BLD: 3.1 K/UL (ref 0.5–4.6)
LYMPHOCYTES NFR BLD: 28 % (ref 13–44)
MAGNESIUM SERPL-MCNC: 2.3 MG/DL (ref 1.8–2.4)
MCH RBC QN AUTO: 24.7 PG (ref 26.1–32.9)
MCHC RBC AUTO-ENTMCNC: 29.8 G/DL (ref 31.4–35)
MCV RBC AUTO: 83 FL (ref 79.6–97.8)
MM INDURATION POC: NORMAL 0 MM (ref 0–5)
MONOCYTES # BLD: 0.9 K/UL (ref 0.1–1.3)
MONOCYTES NFR BLD: 8 % (ref 4–12)
NEUTS SEG # BLD: 7 K/UL (ref 1.7–8.2)
NEUTS SEG NFR BLD: 63 % (ref 43–78)
NRBC # BLD: 0 K/UL (ref 0–0.2)
PHOSPHATE SERPL-MCNC: 2.4 MG/DL (ref 2.3–3.7)
PLATELET # BLD AUTO: 412 K/UL (ref 150–450)
PMV BLD AUTO: 9 FL (ref 9.4–12.3)
POTASSIUM SERPL-SCNC: 4.1 MMOL/L (ref 3.5–5.1)
PPD POC: NORMAL NEGATIVE
RBC # BLD AUTO: 3.76 M/UL (ref 4.05–5.2)
SODIUM SERPL-SCNC: 141 MMOL/L (ref 136–145)
VIT B12 SERPL-MCNC: >2000 PG/ML (ref 193–986)
WBC # BLD AUTO: 11.1 K/UL (ref 4.3–11.1)

## 2018-08-25 PROCEDURE — 83605 ASSAY OF LACTIC ACID: CPT

## 2018-08-25 PROCEDURE — 94640 AIRWAY INHALATION TREATMENT: CPT

## 2018-08-25 PROCEDURE — 74011250637 HC RX REV CODE- 250/637: Performed by: INTERNAL MEDICINE

## 2018-08-25 PROCEDURE — 82607 VITAMIN B-12: CPT

## 2018-08-25 PROCEDURE — 74011000250 HC RX REV CODE- 250: Performed by: INTERNAL MEDICINE

## 2018-08-25 PROCEDURE — 80048 BASIC METABOLIC PNL TOTAL CA: CPT

## 2018-08-25 PROCEDURE — 65270000029 HC RM PRIVATE

## 2018-08-25 PROCEDURE — 85025 COMPLETE CBC W/AUTO DIFF WBC: CPT

## 2018-08-25 PROCEDURE — 74011250636 HC RX REV CODE- 250/636: Performed by: INTERNAL MEDICINE

## 2018-08-25 PROCEDURE — 36415 COLL VENOUS BLD VENIPUNCTURE: CPT

## 2018-08-25 PROCEDURE — 74011250637 HC RX REV CODE- 250/637: Performed by: HOSPITALIST

## 2018-08-25 PROCEDURE — 82746 ASSAY OF FOLIC ACID SERUM: CPT

## 2018-08-25 PROCEDURE — 84100 ASSAY OF PHOSPHORUS: CPT

## 2018-08-25 PROCEDURE — 83735 ASSAY OF MAGNESIUM: CPT

## 2018-08-25 RX ORDER — ALBUTEROL SULFATE 0.83 MG/ML
2.5 SOLUTION RESPIRATORY (INHALATION)
Status: DISCONTINUED | OUTPATIENT
Start: 2018-08-25 | End: 2018-08-26

## 2018-08-25 RX ORDER — ALPRAZOLAM 0.5 MG/1
1 TABLET ORAL
Status: DISCONTINUED | OUTPATIENT
Start: 2018-08-25 | End: 2018-08-27 | Stop reason: HOSPADM

## 2018-08-25 RX ADMIN — ONDANSETRON 8 MG: 4 TABLET, ORALLY DISINTEGRATING ORAL at 16:01

## 2018-08-25 RX ADMIN — ONDANSETRON 8 MG: 4 TABLET, ORALLY DISINTEGRATING ORAL at 12:56

## 2018-08-25 RX ADMIN — LEVOTHYROXINE SODIUM 112 MCG: 112 TABLET ORAL at 16:01

## 2018-08-25 RX ADMIN — ZIPRASIDONE HYDROCHLORIDE 80 MG: 20 CAPSULE ORAL at 21:33

## 2018-08-25 RX ADMIN — ALPRAZOLAM 1 MG: 0.5 TABLET ORAL at 03:25

## 2018-08-25 RX ADMIN — APIXABAN 5 MG: 5 TABLET, FILM COATED ORAL at 21:33

## 2018-08-25 RX ADMIN — MONTELUKAST SODIUM 10 MG: 10 TABLET, FILM COATED ORAL at 09:32

## 2018-08-25 RX ADMIN — PROPRANOLOL HYDROCHLORIDE 20 MG: 10 TABLET ORAL at 09:35

## 2018-08-25 RX ADMIN — ZOLPIDEM TARTRATE 5 MG: 5 TABLET ORAL at 21:33

## 2018-08-25 RX ADMIN — PROPRANOLOL HYDROCHLORIDE 20 MG: 10 TABLET ORAL at 21:33

## 2018-08-25 RX ADMIN — PROPRANOLOL HYDROCHLORIDE 20 MG: 10 TABLET ORAL at 16:06

## 2018-08-25 RX ADMIN — ONDANSETRON 8 MG: 4 TABLET, ORALLY DISINTEGRATING ORAL at 06:01

## 2018-08-25 RX ADMIN — NYSTATIN 500000 UNITS: 100000 SUSPENSION ORAL at 09:31

## 2018-08-25 RX ADMIN — PANTOPRAZOLE SODIUM 40 MG: 40 TABLET, DELAYED RELEASE ORAL at 06:01

## 2018-08-25 RX ADMIN — Medication 10 ML: at 06:01

## 2018-08-25 RX ADMIN — DULOXETINE 60 MG: 60 CAPSULE, DELAYED RELEASE ORAL at 21:33

## 2018-08-25 RX ADMIN — Medication 10 ML: at 12:57

## 2018-08-25 RX ADMIN — ALBUTEROL SULFATE 2.5 MG: 2.5 SOLUTION RESPIRATORY (INHALATION) at 14:58

## 2018-08-25 RX ADMIN — OXYBUTYNIN CHLORIDE 10 MG: 10 TABLET, FILM COATED, EXTENDED RELEASE ORAL at 09:32

## 2018-08-25 RX ADMIN — CEPHALEXIN 500 MG: 250 CAPSULE ORAL at 16:02

## 2018-08-25 RX ADMIN — HYDROCODONE BITARTRATE AND ACETAMINOPHEN 1 TABLET: 10; 325 TABLET ORAL at 09:36

## 2018-08-25 RX ADMIN — SUCRALFATE 1 G: 1 TABLET ORAL at 21:33

## 2018-08-25 RX ADMIN — PANTOPRAZOLE SODIUM 40 MG: 40 TABLET, DELAYED RELEASE ORAL at 16:02

## 2018-08-25 RX ADMIN — NYSTATIN 500000 UNITS: 100000 SUSPENSION ORAL at 16:06

## 2018-08-25 RX ADMIN — HYDROCODONE BITARTRATE AND ACETAMINOPHEN 1 TABLET: 10; 325 TABLET ORAL at 03:15

## 2018-08-25 RX ADMIN — SUCRALFATE 1 G: 1 TABLET ORAL at 12:56

## 2018-08-25 RX ADMIN — ALBUTEROL SULFATE 2.5 MG: 2.5 SOLUTION RESPIRATORY (INHALATION) at 07:26

## 2018-08-25 RX ADMIN — SUCRALFATE 1 G: 1 TABLET ORAL at 09:31

## 2018-08-25 RX ADMIN — APIXABAN 5 MG: 5 TABLET, FILM COATED ORAL at 09:32

## 2018-08-25 RX ADMIN — Medication 400 MG: at 09:32

## 2018-08-25 RX ADMIN — ZOLPIDEM TARTRATE 5 MG: 5 TABLET ORAL at 00:06

## 2018-08-25 RX ADMIN — MIRTAZAPINE 45 MG: 30 TABLET, FILM COATED ORAL at 21:33

## 2018-08-25 RX ADMIN — ONDANSETRON HYDROCHLORIDE 4 MG: 2 INJECTION, SOLUTION INTRAMUSCULAR; INTRAVENOUS at 03:25

## 2018-08-25 RX ADMIN — SUCRALFATE 1 G: 1 TABLET ORAL at 16:02

## 2018-08-25 RX ADMIN — NYSTATIN: 100000 POWDER TOPICAL at 09:32

## 2018-08-25 RX ADMIN — CEPHALEXIN 500 MG: 250 CAPSULE ORAL at 03:15

## 2018-08-25 NOTE — PROGRESS NOTES
Received report from Violeta Anthony RN. Patient awake in bed. Respirations present and chest expansion symmetrical.  AxO x4. S1 and S2 noted. Radial and pedal pulses palpable bilaterally. Bowel sounds active. Patient last BM was today, 08/25/18. No signs of distress at this time. No complaints at this time. Bed low, locked, and side rails x3. Call light within reach. Will continue to monitor.

## 2018-08-25 NOTE — PROGRESS NOTES
Patient c/o not being able to sleep. Patient received Ambien 5 mg tab @ 0006. Will continue to monitor.

## 2018-08-25 NOTE — PROGRESS NOTES
Assumed care of patient. AM assessment completed and documented, see docflow. Patient awake in bed, resting quietly. Flat affect, delayed responses to questions with minimal answers. Patient alert to person and place and time at present. NAD noted at present. Respirations even and non labored. Safety measures in place. Call light within reach. Will continue to monitor.

## 2018-08-25 NOTE — PROGRESS NOTES
Patient c/o pain to right leg, anxiety, and being nauseous. Patient received Norco  mg tab @ 5013 for pain. Patient received Xanax 1 mg tab @ 3800 for anxiety. Patient received Zofran 4 mg IV @ 8130 for nausea. Will continue to monitor patient.

## 2018-08-25 NOTE — PROGRESS NOTES
Patient was awake throughout the night. Patient awake in bed at this time. Respirations present and chest expansion symmetrical.  No signs of distress at this time. BSR given to Shanna Padilla RN.

## 2018-08-25 NOTE — PROGRESS NOTES
Received report from Anmol Real RN. Patient awake in bed. Respirations present and chest expansion symmetrical.  AxO x4. S1 and S2 noted. Radial and pedal pulses palpable bilaterally. Bowel sounds active. Patient states last BM was today, 08/24/18. Patient's upper extremities are shaking. Will continue to assess. Patient seems agitated. Patient refused breathing treatment. Educated patient it is important to receive this for her health. Patient states she understands and still refuses. Bed low, locked, and side rails x2. Call light within reach. Will continue to monitor.

## 2018-08-25 NOTE — PROGRESS NOTES
Problem: Falls - Risk of  Goal: *Absence of Falls  Document Shaheen Fall Risk and appropriate interventions in the flowsheet. Outcome: Progressing Towards Goal  Fall Risk Interventions:  Mobility Interventions: Communicate number of staff needed for ambulation/transfer    Mentation Interventions: Door open when patient unattended, Bed/chair exit alarm, More frequent rounding, Reorient patient    Medication Interventions: Evaluate medications/consider consulting pharmacy, Bed/chair exit alarm    Elimination Interventions: Call light in reach, Patient to call for help with toileting needs    History of Falls Interventions: Consult care management for discharge planning, Evaluate medications/consider consulting pharmacy        Problem: Pressure Injury - Risk of  Goal: *Prevention of pressure injury  Document Juwan Scale and appropriate interventions in the flowsheet. Pressure Injury Interventions:  Sensory Interventions: Assess changes in LOC, Maintain/enhance activity level, Minimize linen layers, Turn and reposition approx.  every two hours (pillows and wedges if needed)    Moisture Interventions: Apply protective barrier, creams and emollients, Maintain skin hydration (lotion/cream), Minimize layers, Contain wound drainage    Activity Interventions: Pressure redistribution bed/mattress(bed type), PT/OT evaluation    Mobility Interventions: Chair cushion, HOB 30 degrees or less, Pressure redistribution bed/mattress (bed type), PT/OT evaluation    Nutrition Interventions: Document food/fluid/supplement intake    Friction and Shear Interventions: Foam dressings/transparent film/skin sealants, HOB 30 degrees or less, Lift sheet

## 2018-08-25 NOTE — PROGRESS NOTES
Progress Note    Patient: Italia Shin MRN: [id number]  SSN: [id number]    YOB: 1950  Age: 80 y.o. Sex: female      Admit Date: 8/20/2018    LOS: 5 days     Subjective:     Pt is more lucid but not her baseline per daughter Ryder Dimas who is in the room. She has no complaints today but is adamant about going home and does not want rehab. Ryder Dimas does not think she will be able to care for her until her strength and MS improve. Telepsych eval reviewed and pt deemed to not have decision making capacity. Objective:     Vitals:    08/24/18 2311 08/25/18 0246 08/25/18 0726 08/25/18 0749   BP: 147/83 146/82  144/79   Pulse: 74 79  98   Resp: 18 22 22   Temp: 98.3 °F (36.8 °C) 98.2 °F (36.8 °C)  97.8 °F (36.6 °C)   SpO2: 97% 96% 96% 97%   Weight:       Height:              Physical Exam:   General: elderly female lying in bed, AAO x 3 but slow to answer  HEENT: NCAT, EOMI, OOP pink/moist   CV: RRR, no m/g/r   Lungs - CTAB, no wheezes/crackles   Abd - soft, NT, ND   Ext - no edema   Neuro - CN II-XII intact, no focal deficits       Lab/Data Review:  Recent Results (from the past 24 hour(s))   CBC WITH AUTOMATED DIFF    Collection Time: 08/24/18  2:12 PM   Result Value Ref Range    WBC 10.7 4.3 - 11.1 K/uL    RBC 3.53 (L) 4.05 - 5.2 M/uL    HGB 8.8 (L) 11.7 - 15.4 g/dL    HCT 28.9 (L) 35.8 - 46.3 %    MCV 81.9 79.6 - 97.8 FL    MCH 24.9 (L) 26.1 - 32.9 PG    MCHC 30.4 (L) 31.4 - 35.0 g/dL    RDW 19.2 %    PLATELET 509 583 - 759 K/uL    MPV 8.7 (L) 9.4 - 12.3 FL    ABSOLUTE NRBC 0.00 0.0 - 0.2 K/uL    DF AUTOMATED      NEUTROPHILS 72 43 - 78 %    LYMPHOCYTES 19 13 - 44 %    MONOCYTES 8 4.0 - 12.0 %    EOSINOPHILS 0 (L) 0.5 - 7.8 %    BASOPHILS 1 0.0 - 2.0 %    IMMATURE GRANULOCYTES 1 0.0 - 5.0 %    ABS. NEUTROPHILS 7.8 1.7 - 8.2 K/UL    ABS. LYMPHOCYTES 2.0 0.5 - 4.6 K/UL    ABS. MONOCYTES 0.9 0.1 - 1.3 K/UL    ABS. EOSINOPHILS 0.0 0.0 - 0.8 K/UL    ABS. BASOPHILS 0.1 0.0 - 0.2 K/UL    ABS. IMM. GRANS. 0.1 0.0 - 0.5 K/UL   METABOLIC PANEL, COMPREHENSIVE    Collection Time: 08/24/18  2:12 PM   Result Value Ref Range    Sodium 140 136 - 145 mmol/L    Potassium 4.0 3.5 - 5.1 mmol/L    Chloride 102 98 - 107 mmol/L    CO2 31 21 - 32 mmol/L    Anion gap 7 7 - 16 mmol/L    Glucose 132 (H) 65 - 100 mg/dL    BUN 3 (L) 8 - 23 MG/DL    Creatinine 0.92 0.6 - 1.0 MG/DL    GFR est AA >60 >60 ml/min/1.73m2    GFR est non-AA >60 >60 ml/min/1.73m2    Calcium 8.4 8.3 - 10.4 MG/DL    Bilirubin, total 0.2 0.2 - 1.1 MG/DL    ALT (SGPT) 28 12 - 65 U/L    AST (SGOT) 26 15 - 37 U/L    Alk.  phosphatase 121 50 - 136 U/L    Protein, total 5.4 (L) 6.3 - 8.2 g/dL    Albumin 2.3 (L) 3.2 - 4.6 g/dL    Globulin 3.1 2.3 - 3.5 g/dL    A-G Ratio 0.7 (L) 1.2 - 3.5     MAGNESIUM    Collection Time: 08/24/18  2:12 PM   Result Value Ref Range    Magnesium 2.2 1.8 - 2.4 mg/dL   PHOSPHORUS    Collection Time: 08/24/18  2:12 PM   Result Value Ref Range    Phosphorus 2.0 (L) 2.3 - 3.7 MG/DL   PROTHROMBIN TIME + INR    Collection Time: 08/24/18  2:12 PM   Result Value Ref Range    Prothrombin time 14.2 11.5 - 14.5 sec    INR 1.2     LACTIC ACID    Collection Time: 08/24/18  2:12 PM   Result Value Ref Range    Lactic acid 2.1 (HH) 0.4 - 2.0 MMOL/L   PLEASE READ & DOCUMENT PPD TEST IN 48 HRS    Collection Time: 08/24/18  5:16 PM   Result Value Ref Range    PPD  Negative    mm Induration  0 mm   LACTIC ACID    Collection Time: 08/25/18  1:13 AM   Result Value Ref Range    Lactic acid 1.1 0.4 - 2.0 MMOL/L   CBC WITH AUTOMATED DIFF    Collection Time: 08/25/18  1:13 AM   Result Value Ref Range    WBC 11.1 4.3 - 11.1 K/uL    RBC 3.76 (L) 4.05 - 5.2 M/uL    HGB 9.3 (L) 11.7 - 15.4 g/dL    HCT 31.2 (L) 35.8 - 46.3 %    MCV 83.0 79.6 - 97.8 FL    MCH 24.7 (L) 26.1 - 32.9 PG    MCHC 29.8 (L) 31.4 - 35.0 g/dL    RDW 19.0 %    PLATELET 547 052 - 669 K/uL    MPV 9.0 (L) 9.4 - 12.3 FL    ABSOLUTE NRBC 0.00 0.0 - 0.2 K/uL    DF AUTOMATED      NEUTROPHILS 63 43 - 78 % LYMPHOCYTES 28 13 - 44 %    MONOCYTES 8 4.0 - 12.0 %    EOSINOPHILS 0 (L) 0.5 - 7.8 %    BASOPHILS 0 0.0 - 2.0 %    IMMATURE GRANULOCYTES 0 0.0 - 5.0 %    ABS. NEUTROPHILS 7.0 1.7 - 8.2 K/UL    ABS. LYMPHOCYTES 3.1 0.5 - 4.6 K/UL    ABS. MONOCYTES 0.9 0.1 - 1.3 K/UL    ABS. EOSINOPHILS 0.0 0.0 - 0.8 K/UL    ABS. BASOPHILS 0.0 0.0 - 0.2 K/UL    ABS. IMM. GRANS. 0.0 0.0 - 0.5 K/UL   METABOLIC PANEL, BASIC    Collection Time: 08/25/18  1:13 AM   Result Value Ref Range    Sodium 141 136 - 145 mmol/L    Potassium 4.1 3.5 - 5.1 mmol/L    Chloride 102 98 - 107 mmol/L    CO2 31 21 - 32 mmol/L    Anion gap 8 7 - 16 mmol/L    Glucose 114 (H) 65 - 100 mg/dL    BUN 3 (L) 8 - 23 MG/DL    Creatinine 0.84 0.6 - 1.0 MG/DL    GFR est AA >60 >60 ml/min/1.73m2    GFR est non-AA >60 >60 ml/min/1.73m2    Calcium 8.8 8.3 - 10.4 MG/DL   MAGNESIUM    Collection Time: 08/25/18  1:13 AM   Result Value Ref Range    Magnesium 2.3 1.8 - 2.4 mg/dL   PHOSPHORUS    Collection Time: 08/25/18  1:13 AM   Result Value Ref Range    Phosphorus 2.4 2.3 - 3.7 MG/DL       Assessment:   Ms. Esequiel Saucedo is a 80 y.o. female with PMH of gastric bypass with gastroparesis and chronic nausea and constipation, RLD and h/o DVT admitted for weakness and N/V with ARF. Plan:   Encephalopathy - Mental status improving but pt still not at baseline. Not decisional per psych. Psych recommended limiting BZDs but being watchful of withdrawals. CT head negative for acute ischemia on 8/23. N32 and folic acid pending. ARF - Cr normalized. Elevated from prerenal azotemia from vomiting and UTI.     UTI - E.coli growing in UCx. Day 5 of abx. Will continue with 2 more days of Keflex. N/V - Chronic. Evaluated by GI this admission with EGD that showed 2 shallow ulcers at her gastric bypass anastamosis. PPI and carafate continued. No nausea this morning. Hypothyroidism - Synthroid. H/o DVT - Continue Eliquis. FULL CODE  Dispo - Rehab per , next of kin.  Pt not decisional at this time. CM reconsulted. PPD already placed.     Signed By: Frank Diehl MD     August 25, 2018

## 2018-08-25 NOTE — PROGRESS NOTES
Patient was receptive to my visit  Encouraged her with presence and words of hope  She was thankful for the support  No family present    Nithin Smith, staff [address], [id number] [facility]  /   [email or web address]

## 2018-08-26 LAB
MAGNESIUM SERPL-MCNC: 2.2 MG/DL (ref 1.8–2.4)
PHOSPHATE SERPL-MCNC: 2.8 MG/DL (ref 2.3–3.7)

## 2018-08-26 PROCEDURE — 83735 ASSAY OF MAGNESIUM: CPT

## 2018-08-26 PROCEDURE — 84100 ASSAY OF PHOSPHORUS: CPT

## 2018-08-26 PROCEDURE — 74011250637 HC RX REV CODE- 250/637: Performed by: HOSPITALIST

## 2018-08-26 PROCEDURE — 74011250637 HC RX REV CODE- 250/637: Performed by: INTERNAL MEDICINE

## 2018-08-26 PROCEDURE — 65270000029 HC RM PRIVATE

## 2018-08-26 PROCEDURE — 97530 THERAPEUTIC ACTIVITIES: CPT

## 2018-08-26 PROCEDURE — 74011000250 HC RX REV CODE- 250: Performed by: INTERNAL MEDICINE

## 2018-08-26 PROCEDURE — 94640 AIRWAY INHALATION TREATMENT: CPT

## 2018-08-26 PROCEDURE — 36415 COLL VENOUS BLD VENIPUNCTURE: CPT

## 2018-08-26 RX ORDER — ALBUTEROL SULFATE 0.83 MG/ML
2.5 SOLUTION RESPIRATORY (INHALATION)
Status: DISCONTINUED | OUTPATIENT
Start: 2018-08-26 | End: 2018-08-27 | Stop reason: HOSPADM

## 2018-08-26 RX ADMIN — HYDROCODONE BITARTRATE AND ACETAMINOPHEN 1 TABLET: 10; 325 TABLET ORAL at 03:20

## 2018-08-26 RX ADMIN — SUCRALFATE 1 G: 1 TABLET ORAL at 13:31

## 2018-08-26 RX ADMIN — SUCRALFATE 1 G: 1 TABLET ORAL at 08:24

## 2018-08-26 RX ADMIN — SUCRALFATE 1 G: 1 TABLET ORAL at 21:29

## 2018-08-26 RX ADMIN — OXYBUTYNIN CHLORIDE 10 MG: 10 TABLET, FILM COATED, EXTENDED RELEASE ORAL at 08:24

## 2018-08-26 RX ADMIN — PROPRANOLOL HYDROCHLORIDE 20 MG: 10 TABLET ORAL at 21:29

## 2018-08-26 RX ADMIN — CEPHALEXIN 500 MG: 250 CAPSULE ORAL at 16:25

## 2018-08-26 RX ADMIN — MONTELUKAST SODIUM 10 MG: 10 TABLET, FILM COATED ORAL at 08:24

## 2018-08-26 RX ADMIN — PROPRANOLOL HYDROCHLORIDE 10 MG: 10 TABLET ORAL at 08:24

## 2018-08-26 RX ADMIN — HYDROCODONE BITARTRATE AND ACETAMINOPHEN 1 TABLET: 10; 325 TABLET ORAL at 09:19

## 2018-08-26 RX ADMIN — DULOXETINE 60 MG: 60 CAPSULE, DELAYED RELEASE ORAL at 21:29

## 2018-08-26 RX ADMIN — Medication 400 MG: at 08:24

## 2018-08-26 RX ADMIN — PROPRANOLOL HYDROCHLORIDE 20 MG: 10 TABLET ORAL at 16:25

## 2018-08-26 RX ADMIN — APIXABAN 5 MG: 5 TABLET, FILM COATED ORAL at 08:24

## 2018-08-26 RX ADMIN — PANTOPRAZOLE SODIUM 40 MG: 40 TABLET, DELAYED RELEASE ORAL at 05:59

## 2018-08-26 RX ADMIN — ALBUTEROL SULFATE 2.5 MG: 2.5 SOLUTION RESPIRATORY (INHALATION) at 07:19

## 2018-08-26 RX ADMIN — LEVOTHYROXINE SODIUM 112 MCG: 112 TABLET ORAL at 17:33

## 2018-08-26 RX ADMIN — ONDANSETRON 8 MG: 4 TABLET, ORALLY DISINTEGRATING ORAL at 05:59

## 2018-08-26 RX ADMIN — ZOLPIDEM TARTRATE 5 MG: 5 TABLET ORAL at 21:29

## 2018-08-26 RX ADMIN — PROPRANOLOL HYDROCHLORIDE 10 MG: 10 TABLET ORAL at 09:55

## 2018-08-26 RX ADMIN — HYDROCODONE BITARTRATE AND ACETAMINOPHEN 1 TABLET: 10; 325 TABLET ORAL at 16:25

## 2018-08-26 RX ADMIN — APIXABAN 5 MG: 5 TABLET, FILM COATED ORAL at 21:29

## 2018-08-26 RX ADMIN — CEPHALEXIN 500 MG: 250 CAPSULE ORAL at 03:20

## 2018-08-26 RX ADMIN — ONDANSETRON 8 MG: 4 TABLET, ORALLY DISINTEGRATING ORAL at 11:05

## 2018-08-26 RX ADMIN — MIRTAZAPINE 45 MG: 30 TABLET, FILM COATED ORAL at 21:29

## 2018-08-26 RX ADMIN — PANTOPRAZOLE SODIUM 40 MG: 40 TABLET, DELAYED RELEASE ORAL at 16:25

## 2018-08-26 RX ADMIN — ZIPRASIDONE HYDROCHLORIDE 80 MG: 20 CAPSULE ORAL at 21:29

## 2018-08-26 RX ADMIN — ONDANSETRON 8 MG: 4 TABLET, ORALLY DISINTEGRATING ORAL at 16:25

## 2018-08-26 RX ADMIN — ALBUTEROL SULFATE 2.5 MG: 2.5 SOLUTION RESPIRATORY (INHALATION) at 14:56

## 2018-08-26 RX ADMIN — SUCRALFATE 1 G: 1 TABLET ORAL at 17:33

## 2018-08-26 NOTE — PROGRESS NOTES
Patient requests medications to help her sleep tonight. Patient received Ambien 5 mg tab @ 2133. Will continue to monitor.

## 2018-08-26 NOTE — PROGRESS NOTES
Problem: Mobility Impaired (Adult and Pediatric)  Goal: *Acute Goals and Plan of Care (Insert Text)  STG:  (1.)Ms. Kirstie Yung will move from supine to sit and sit to supine , scoot up and down and roll side to side with MODERATE ASSIST within 3 treatment day(s). GOAL MET 8/26/2018    (2.)Ms. Kirstie Yung will transfer from bed to chair and chair to bed with MAXIMAL ASSIST using the least restrictive device within 3 treatment day(s). GOAL MET 8/26/2018  (3.)Ms. Kirstei Yung will ambulate with MAXIMAL ASSIST for 3 feet with the least restrictive device within 3 treatment day(s). GOAL MET 8/26/2018     LTG:  (1.)Ms. Kirstie Yung will move from supine to sit and sit to supine , scoot up and down and roll side to side in bed with MODIFIED INDEPENDENCE within 7 treatment day(s). (2.)Ms. Kirstie Yung will transfer from bed to chair and chair to bed with MODIFIED INDEPENDENCE using the least restrictive device within 7 treatment day(s). (3.)Ms. Kirstie Yung will ambulate with MODIFIED INDEPENDENCE for 150+ feet with the least restrictive device within 7 treatment day(s). (4.)Ms. Kirstie Yung will ascend/descend 3 steps with one hand rail with STAND BY ASSIST within 7 treatment days.   ________________________________________________________________________________________________      PHYSICAL THERAPY: Daily Note, Treatment Day: 1st, PM 8/26/2018  INPATIENT: Hospital Day: 7  [place] / [place] / Product Type: Medicare /      NAME/AGE/GENDER: Ghislaine Mcallister is a 80 y.o. female   PRIMARY DIAGNOSIS: Nausea and vomiting, intractability of vomiting not specified, unspecified vomiting type [R11.2] [facility] CAMILA (acute kidney injury) ([facility]  Procedure(s) (LRB):  ESOPHAGOGASTRODUODENOSCOPY (EGD) BMI 52 ROOM 804  (N/A)  ESOPHAGOGASTRODUODENAL (EGD) BIOPSY (N/A)  4 Days Post-Op  ICD-10: Treatment Diagnosis:    · Generalized Muscle Weakness (M62.81)  · Difficulty in walking, Not elsewhere classified (R26.2)  · Repeated Falls (R29.6)  · History of falling (Z91.81)   Precaution/Allergies:  Latex; Bactrim [sulfamethoxazole-trimethoprim]; Lamictal [lamotrigine]; Pcn [penicillins]; and Tapentadol      ASSESSMENT:     Ms. Stefanie Chen is supine on arrival, on room air, pure wick donned, spouse present. Pt more agreeable to therapy this date, relates back and R LE pain but improving. Pt still with flat affect at times, delayed response. Pt required less assist this date with mobility, MIN A for bed mobility, transfers. Pt with limited ambulation in room with RW for 20', CGA to MIN A required, slow gait. Pt with slight hip hike on R with increased trunk sway. Pt encouraged for OOB activity, encouraged to call for bathroom use instead of depending on pure wick. Pt expressed desire to return home instead of plans for rehab. Educated and encouraged in [facility] therapy in home. Pt has met all STGs set on initial evaluation, good progress made, continue with LTGs. Pt would benefit from further therapy at discharge if pt open to possible [facility] therapy. This section established at most recent assessment   PROBLEM LIST (Impairments causing functional limitations):  1. Decreased Strength  2. Decreased ADL/Functional Activities  3. Decreased Transfer Abilities  4. Decreased Ambulation Ability/Technique  5. Decreased Balance  6. Increased Pain  7. Decreased Activity Tolerance  8. Decreased Pacing Skills  9. Decreased Work Simplification/Energy Conservation Techniques  10. Decreased Cole with Home Exercise Program   INTERVENTIONS PLANNED: (Benefits and precautions of physical therapy have been discussed with the patient.)  1. Balance Exercise  2. Bed Mobility  3. Family Education  4. Gait Training  5. Home Exercise Program (HEP)  6. Manual Therapy  7. Neuromuscular Re-education/Strengthening  8. Range of Motion (ROM)  9. Therapeutic Activites  10. Therapeutic Exercise/Strengthening  11. Transfer Training  12.  Group Therapy     TREATMENT PLAN: Frequency/Duration: 3 times a week for duration of hospital stay  Rehabilitation Potential For Stated Goals: Good     RECOMMENDED REHABILITATION/EQUIPMENT: (at time of discharge pending progress): Due to the probability of continued deficits (see above) this patient will likely need continued skilled physical therapy after discharge. Equipment:    to be determined              HISTORY:   History of Present Injury/Illness (Reason for Referral):  HPI:  79 yo female with mult med probs including DVT on eleiquis, obesity, hx Gastric bypass w/ poss gastroparesis, chronic LE edema, morbid obesity with resterictive lung dz on O2 2-3 L at HS, debiltity using a cane, fibromyalgia, HTN, gastric ulcers, asthma.     Presents with sveral week hx weakness, decreased po's. Continues to take Lasix and Lisinopril. PCP last week reduced her BP meds per dtr. Mild nonprod cough. No new rx. No F/C. No CP or SOB. She has been vomiting freq for months and her GI doc thinks may be related to prior gastric bypass surgery per dtr.        On arrival to ER vitals stable, afebrile, 97% on 2L. Somewhat solement, flat affect. Answers Q's approp, no acute distress, nontoxic appearance. Labs:  Cr 2.56, and . WBC 14.2  Serum bicarb 39. Prior Cr this year was 0.86 on 7/1/2018. Past Medical History/Comorbidities:   Ms. Carol Rodriguez  has a past medical history of Acute renal failure [facility]) (July, 2014); Arthritis; Asthma; Asthma with acute exacerbation (9/28/2017); Bilateral pulmonary embolism [facility]) (September, 2012); Calculus of ureter (May, 2015); CAP (community acquired pneumonia) (October, 2012); Cellulitis of left lower extremity (11/1/2017); Chronic pain; Community acquired bacterial pneumonia (4/2/2018); CVA (cerebral infarction) (January, 2012); Gastric ulcer (July, 2014); GERD (gastroesophageal reflux disease);  Gram-positive bacteremia 1/2 cx (12/26/2016); HCAP (healthcare-associated pneumonia) (January, 2013); HCAP (healthcare-associated pneumonia) (February, 2013); Hypertension; Left leg DVT ([address] (1991); Morbid obesity ([address] Psychiatric disorder; PUD (peptic ulcer disease) (???); Sepsis due to pneumonia ([address] (4/2/2018); Stroke [facility]); and Thyroid disease. She also has no past medical history of Aneurysm ([facility] Arrhythmia; Autoimmune disease ([address] CAD (coronary artery disease); Cancer ([address] Chronic kidney disease; Chronic obstructive pulmonary disease ([address] Coagulation disorder ([facility] COPD; Diabetes ([facility] [facility] Endocarditis; Heart failure ([address] Liver disease; Nicotine vapor product user; Non-nicotine vapor product user; Rheumatic fever; Seizures ([facility] [facility] or Unspecified sleep apnea. Ms. Aime Kilgore  has a past surgical history that includes hx appendectomy; hx cholecystectomy; hx gastric bypass; hx tonsillectomy; hx other surgical; hx orthopaedic; hx hysterectomy; and hx endoscopy (July, 2014).   Social History/Living Environment:   Home Environment: Private residence  # Steps to Enter: 3  Rails to Enter: Yes  Hand Rails : Right  Wheelchair Ramp: No  One/Two Story Residence: One story  Living Alone: No  Support Systems: Child(jaswant)  Patient Expects to be Discharged to[de-identified] Patient room  Current DME Used/Available at Home: [place], straight, Walker, rolling, Shower chair  Tub or Shower Type: Shower  Prior Level of Function/Work/Activity:  Lives with daughter, ambulates household distances with cane, 2 recent falls, 2L supplemental O2   Number of Personal Factors/Comorbidities that affect the Plan of Care: 3+: HIGH COMPLEXITY   EXAMINATION:   Most Recent Physical Functioning:   Gross Assessment:                  Posture:  Posture (WDL): Exceptions to WDL  Posture Assessment: Rounded shoulders  Balance:  Sitting: Impaired  Sitting - Static: Good (unsupported)  Sitting - Dynamic: Fair (occasional)  Standing: Impaired  Standing - Static: Fair  Standing - Dynamic : Fair Bed Mobility:  Supine to Sit: Minimum assistance  Sit to Supine:  (left up in chair)  Scooting: Minimum assistance  Wheelchair Mobility:     Transfers:  Sit to Stand: Contact guard assistance;Minimum assistance  Stand to Sit: Contact guard assistance;Minimum assistance  Bed to Chair: Contact guard assistance;Minimum assistance  Gait:     Base of Support: Widened  Speed/Judith: Slow  Step Length: Left shortened;Right shortened  Swing Pattern: Left symmetrical;Right symmetrical  Gait Abnormalities: Antalgic;Decreased step clearance (R LE with slight hip hike)  Distance (ft): 20 Feet (ft) (around bed in room to chair)  Assistive Device: Walker, rolling  Ambulation - Level of Assistance: Contact guard assistance;Minimal assistance  Interventions: Safety awareness training;Verbal cues      Body Structures Involved:  1. Heart  2. Lungs  3. Bones  4. Joints  5. Muscles  6. Ligaments Body Functions Affected:  1. Sensory/Pain  2. Cardio  3. Respiratory  4. Neuromusculoskeletal  5. Movement Related Activities and Participation Affected:  1. Mobility  2. Self Care  3. Domestic Life  4. Interpersonal Interactions and Relationships  5. Community, Social and [place]   Number of elements that affect the Plan of Care: 4+: HIGH COMPLEXITY   CLINICAL PRESENTATION:   Presentation: Evolving clinical presentation with changing clinical characteristics: MODERATE COMPLEXITY   CLINICAL DECISION MAKIN [facility] Mobility Inpatient Short Form  How much difficulty does the patient currently have. .. Unable A Lot A Little None   1. Turning over in bed (including adjusting bedclothes, sheets and blankets)? [] 1   [x] 2   [] 3   [] 4   2. Sitting down on and standing up from a chair with arms ( e.g., wheelchair, bedside commode, etc.)   [x] 1   [] 2   [] 3   [] 4   3. Moving from lying on back to sitting on the side of the bed? [] 1   [x] 2   [] 3   [] 4   How much help from another person does the patient currently need. .. Total A Lot A Little None   4.   Moving to and from a bed to a chair (including a wheelchair)? [x] 1   [] 2   [] 3   [] 4   5. Need to walk in hospital room? [x] 1   [] 2   [] 3   [] 4   6. Climbing 3-5 steps with a railing? [x] 1   [] 2   [] 3   [] 4   © 2007, Trustees of [address] [place] [address] [address] [id number], under license to [employer]. All rights reserved      Score:  Initial: 8 Most Recent: X (Date: -- )    Interpretation of Tool:  Represents activities that are increasingly more difficult (i.e. Bed mobility, Transfers, Gait). Score 24 23 22-20 19-15 14-10 9-7 6     Modifier CH CI CJ CK CL CM CN      ? Mobility - Walking and Moving Around:     - CURRENT STATUS: CM - 80%-99% impaired, limited or restricted    - GOAL STATUS: CL - 60%-79% impaired, limited or restricted    - D/C STATUS:  ---------------To be determined---------------  [place] / [place] / Product Type: Medicare /      Medical Necessity:     · Patient is expected to demonstrate progress in strength, range of motion, balance, coordination and functional technique to decrease assistance required with transfers and functional mobility. Reason for Services/Other Comments:  · Patient continues to require skilled intervention due to impaired activity tolerance and functional mobility. Use of outcome tool(s) and clinical judgement create a POC that gives a: Questionable prediction of patient's progress: MODERATE COMPLEXITY            TREATMENT:   (In addition to Assessment/Re-Assessment sessions the following treatments were rendered)   Pre-treatment Symptoms/Complaints: \" I just want to go home now\"  Pain: Initial: 4/10 back and R LE in supine   Post Session:  Relates unchanged up in chair     Therapeutic Activity: (    23 min): Therapeutic activities including Bed transfers, Chair transfers, Ambulation on level ground and weight shifting, walker safety to improve mobility, strength, balance and coordination.   Required minimal Safety awareness training;Verbal cues to promote static and dynamic balance in standing and promote coordination of bilateral, lower extremity(s). Braces/Orthotics/Lines/Etc:   · O2 Device: Nasal cannula  Treatment/Session Assessment:    · Response to Treatment:  Improved mobility this date, STGs met  · Interdisciplinary Collaboration:   o Physical Therapist  o Registered Nurse  · After treatment position/precautions:   o Up in chair  o Bed alarm/tab alert on  o Bed/Chair-wheels locked  o Bed in low position  o Call light within reach  o RN notified  o Family at bedside   · Compliance with Program/Exercises: Will assess as treatment progresses. · Recommendations/Intent for next treatment session: \"Next visit will focus on reduction in assistance provided\".   Total Treatment Duration:  PT Patient Time In/Time Out  Time In: 1512  Time Out: [address], PT

## 2018-08-26 NOTE — PROGRESS NOTES
Patient slept periodically throughout the night. Patient awake in bed at this time. Respirations present and chest expansion symmetrical.  No signs of distress at this time. Bed low, locked, and side rails x3. Call light within reach. Will give report to oncoming RN.

## 2018-08-26 NOTE — PROGRESS NOTES
Received report from Elian, [facility]. Patient awake in bed. Respirations present and chest expansion symmetrical.  On room air. AxO x4. S1 and S2 noted. Bowel sounds active. Patient states last BM was yesterday, 08/25/18. No signs of distress at this time. No complaints at this time. Bed low and locked. Call light within reach. Will continue to monitor.

## 2018-08-26 NOTE — PROGRESS NOTES
Patient is calm   has followed patient since admission  Explored her feelings and struggles - anxiety, depression  She was thankful that  acknowledged her needs and wanted to address them as a part of her holistic care  Encouraged with scripture and time of prayer  Will continue to follow during her stay with us Maxime Fan, staff Ibeth phillips [address], [address]  /   [email or web address]

## 2018-08-26 NOTE — PROGRESS NOTES
Progress Note    Patient: Gregory Luther MRN: [id number]  SSN: [id number]    YOB: 1950  Age: 80 y.o. Sex: female      Admit Date: 8/20/2018    LOS: 6 days     Subjective:     She reports little appetite for lunch. No other complaints. Objective:     Vitals:    08/26/18 0255 08/26/18 0719 08/26/18 0725 08/26/18 1118   BP: 147/81  146/80 113/70   Pulse: 75  71 63   Resp: 20  20 20   Temp: 98.7 °F (37.1 °C)  98.7 °F (37.1 °C) 98.2 °F (36.8 °C)   SpO2: 94% 95% 95% 95%   Weight: 126.1 kg (278 lb 1.6 oz)      Height:              Physical Exam:   General: elderly female lying in bed, AAO x 3 but slow to answer  HEENT: NCAT, EOMI, OOP pink/moist   CV: RRR, no m/g/r   Lungs - CTAB, no wheezes/crackles   Abd - soft, NT, ND   Ext - no edema   Neuro - CN II-XII intact, no focal deficits       Lab/Data Review:  Recent Results (from the past 24 hour(s))   PLEASE READ & DOCUMENT PPD TEST IN 72 HRS    Collection Time: 08/25/18  5:30 PM   Result Value Ref Range    PPD  Negative    mm Induration  0 mm   MAGNESIUM    Collection Time: 08/26/18  6:06 AM   Result Value Ref Range    Magnesium 2.2 1.8 - 2.4 mg/dL   PHOSPHORUS    Collection Time: 08/26/18  6:06 AM   Result Value Ref Range    Phosphorus 2.8 2.3 - 3.7 MG/DL       Assessment:   Ms. Gregory Luther is a 80 y.o. female with PMH of gastric bypass with gastroparesis and chronic nausea and constipation, RLD and h/o DVT admitted for weakness and N/V with ARF. Plan:   Encephalopathy - Mental status improving but pt still not at baseline. Not decisional per psych. Psych recommended limiting BZDs but being watchful of withdrawals. CT head negative for acute ischemia on 8/23. X47 and folic acid pending. ARF - Cr normalized. Elevated from prerenal azotemia from vomiting and UTI.     UTI - E.coli growing in UCx. Day 6 of abx. Will continue with 2 more days of Keflex. N/V - Chronic.  Evaluated by GI this admission with EGD that showed 2 shallow ulcers at her gastric bypass anastamosis. PPI and carafate continued. No nausea this morning. Hypothyroidism - Synthroid. H/o DVT - Continue Eliquis. FULL CODE  Dispo - Rehab per , next of kin. Pt not decisional at this time. CM reconsulted. D/C tomorrow?     Signed By: John Williamson MD     August 26, 2018

## 2018-08-26 NOTE — PROGRESS NOTES
Patient c/o pain 8/10 in back and right leg. Patient received Norco 10/325 mg tab @ 0320. Will continue to monitor.

## 2018-08-27 ENCOUNTER — HOME HEALTH ADMISSION (OUTPATIENT)
Dept: HOME HEALTH SERVICES | Facility: HOME HEALTH | Age: 68
End: 2018-08-27

## 2018-08-27 VITALS
WEIGHT: 278.1 LBS | DIASTOLIC BLOOD PRESSURE: 90 MMHG | SYSTOLIC BLOOD PRESSURE: 166 MMHG | RESPIRATION RATE: 20 BRPM | TEMPERATURE: 98.2 F | OXYGEN SATURATION: 94 % | HEIGHT: 60 IN | HEART RATE: 106 BPM | BODY MASS INDEX: 54.6 KG/M2

## 2018-08-27 PROBLEM — R11.2 NAUSEA AND VOMITING: Status: ACTIVE | Noted: 2018-08-27

## 2018-08-27 PROBLEM — D72.829 LEUKOCYTOSIS: Status: RESOLVED | Noted: 2018-07-19 | Resolved: 2018-08-27

## 2018-08-27 LAB
ANION GAP SERPL CALC-SCNC: 10 MMOL/L (ref 7–16)
BUN SERPL-MCNC: 7 MG/DL (ref 8–23)
CALCIUM SERPL-MCNC: 8.3 MG/DL (ref 8.3–10.4)
CHLORIDE SERPL-SCNC: 106 MMOL/L (ref 98–107)
CO2 SERPL-SCNC: 26 MMOL/L (ref 21–32)
CREAT SERPL-MCNC: 0.86 MG/DL (ref 0.6–1)
GLUCOSE SERPL-MCNC: 87 MG/DL (ref 65–100)
POTASSIUM SERPL-SCNC: 4.1 MMOL/L (ref 3.5–5.1)
SODIUM SERPL-SCNC: 142 MMOL/L (ref 136–145)

## 2018-08-27 PROCEDURE — 74011250637 HC RX REV CODE- 250/637: Performed by: INTERNAL MEDICINE

## 2018-08-27 PROCEDURE — 36415 COLL VENOUS BLD VENIPUNCTURE: CPT

## 2018-08-27 PROCEDURE — 80048 BASIC METABOLIC PNL TOTAL CA: CPT

## 2018-08-27 PROCEDURE — 74011250637 HC RX REV CODE- 250/637: Performed by: HOSPITALIST

## 2018-08-27 PROCEDURE — 97530 THERAPEUTIC ACTIVITIES: CPT

## 2018-08-27 RX ORDER — DULOXETIN HYDROCHLORIDE 60 MG/1
60 CAPSULE, DELAYED RELEASE ORAL
Qty: 5 CAP | Refills: 0 | Status: SHIPPED
Start: 2018-08-27 | End: 2020-09-15

## 2018-08-27 RX ORDER — ALPRAZOLAM 1 MG/1
1 TABLET ORAL
Qty: 5 TAB | Refills: 0 | Status: SHIPPED
Start: 2018-08-27

## 2018-08-27 RX ADMIN — Medication 400 MG: at 08:17

## 2018-08-27 RX ADMIN — ONDANSETRON 8 MG: 4 TABLET, ORALLY DISINTEGRATING ORAL at 05:03

## 2018-08-27 RX ADMIN — CEPHALEXIN 500 MG: 250 CAPSULE ORAL at 05:06

## 2018-08-27 RX ADMIN — NYSTATIN: 100000 POWDER TOPICAL at 08:17

## 2018-08-27 RX ADMIN — MONTELUKAST SODIUM 10 MG: 10 TABLET, FILM COATED ORAL at 08:17

## 2018-08-27 RX ADMIN — OXYBUTYNIN CHLORIDE 10 MG: 10 TABLET, FILM COATED, EXTENDED RELEASE ORAL at 08:17

## 2018-08-27 RX ADMIN — SUCRALFATE 1 G: 1 TABLET ORAL at 08:17

## 2018-08-27 RX ADMIN — PROPRANOLOL HYDROCHLORIDE 20 MG: 10 TABLET ORAL at 08:17

## 2018-08-27 RX ADMIN — APIXABAN 5 MG: 5 TABLET, FILM COATED ORAL at 08:17

## 2018-08-27 RX ADMIN — PANTOPRAZOLE SODIUM 40 MG: 40 TABLET, DELAYED RELEASE ORAL at 05:03

## 2018-08-27 NOTE — PROGRESS NOTES
Pt taken to front of hospital via [facility] for [facility] home with daughter.

## 2018-08-27 NOTE — PROGRESS NOTES
Patient requests medication to help with sleep. Patient received Ambien 5 mg @ 2129. Will continue to monitor.

## 2018-08-27 NOTE — PROGRESS NOTES
Problem: Mobility Impaired (Adult and Pediatric)  Goal: *Acute Goals and Plan of Care (Insert Text)  STG:  (1.)Ms. Amy Morrow will move from supine to sit and sit to supine , scoot up and down and roll side to side with MODERATE ASSIST within 3 treatment day(s). GOAL MET 8/26/2018    (2.)Ms. Amy Morrow will transfer from bed to chair and chair to bed with MAXIMAL ASSIST using the least restrictive device within 3 treatment day(s). GOAL MET 8/26/2018  (3.)Ms. Amy Morrow will ambulate with MAXIMAL ASSIST for 3 feet with the least restrictive device within 3 treatment day(s). GOAL MET 8/26/2018     LTG:  (1.)Ms. Amy Morrow will move from supine to sit and sit to supine , scoot up and down and roll side to side in bed with MODIFIED INDEPENDENCE within 7 treatment day(s). (2.)Ms. Amy Morrow will transfer from bed to chair and chair to bed with MODIFIED INDEPENDENCE using the least restrictive device within 7 treatment day(s). (3.)Ms. Amy Morrow will ambulate with MODIFIED INDEPENDENCE for 150+ feet with the least restrictive device within 7 treatment day(s). (4.)Ms. Amy Morrow will ascend/descend 3 steps with one hand rail with STAND BY ASSIST within 7 treatment days.   ________________________________________________________________________________________________      PHYSICAL THERAPY: Daily Note, Treatment Day: 2nd, AM 8/27/2018  INPATIENT: Hospital Day: 8  [place] / [place] / Product Type: Medicare /      NAME/AGE/GENDER: Juan Woods is a 80 y.o. female   PRIMARY DIAGNOSIS: Nausea and vomiting, intractability of vomiting not specified, unspecified vomiting type [R11.2] CAMILA (acute kidney injury) ([facility] [address] CAMILA (acute kidney injury) ([facility] [address]  Procedure(s) (LRB):  ESOPHAGOGASTRODUODENOSCOPY (EGD) BMI 52 ROOM 804  (N/A)  ESOPHAGOGASTRODUODENAL (EGD) BIOPSY (N/A)  5 Days Post-Op  ICD-10: Treatment Diagnosis:    · Generalized Muscle Weakness (M62.81)  · Difficulty in walking, Not elsewhere classified (R26.2)  · Repeated Falls (R29.6)  · History of falling (Z91.81)   Precaution/Allergies:  Latex; Bactrim [sulfamethoxazole-trimethoprim]; Lamictal [lamotrigine]; Pcn [penicillins]; and Tapentadol      ASSESSMENT:     Ms. Pietro Centeno is supine on arrival, on room air with daughter present. Pt more agreeable to therapy this date. Pt's daughter stated she needed assistance to go to the bathroom. Pt required MOD A for bed mobility, transfers Pt ambulated 7 ft to the bathroom hand held assist. While in the bathroom worked on standing balance and endurance, assisted pt with hygiene. Pt ambulated in room with RW for 40', slow gait. Possibility of patient discharge today. This section established at most recent assessment   PROBLEM LIST (Impairments causing functional limitations):  1. Decreased Strength  2. Decreased ADL/Functional Activities  3. Decreased Transfer Abilities  4. Decreased Ambulation Ability/Technique  5. Decreased Balance  6. Increased Pain  7. Decreased Activity Tolerance  8. Decreased Pacing Skills  9. Decreased Work Simplification/Energy Conservation Techniques  10. Decreased [place] with Home Exercise Program   INTERVENTIONS PLANNED: (Benefits and precautions of physical therapy have been discussed with the patient.)  1. Balance Exercise  2. Bed Mobility  3. Family Education  4. Gait Training  5. Home Exercise Program (HEP)  6. Manual Therapy  7. Neuromuscular Re-education/Strengthening  8. Range of Motion (ROM)  9. Therapeutic Activites  10. Therapeutic Exercise/Strengthening  11. Transfer Training  12. Group Therapy     TREATMENT PLAN: Frequency/Duration: 3 times a week for duration of hospital stay  Rehabilitation Potential For Stated Goals: Good     RECOMMENDED REHABILITATION/EQUIPMENT: (at time of discharge pending progress): Due to the probability of continued deficits (see above) this patient will likely need continued skilled physical therapy after discharge.   Equipment:    to be determined              HISTORY: History of Present Injury/Illness (Reason for Referral):  HPI:  81 yo female with mult med probs including DVT on eleiquis, obesity, hx Gastric bypass w/ poss gastroparesis, chronic LE edema, morbid obesity with resterictive lung dz on O2 2-3 L at HS, debiltity using a cane, fibromyalgia, HTN, gastric ulcers, asthma.     Presents with sveral week hx weakness, decreased po's. Continues to take Lasix and Lisinopril. PCP last week reduced her BP meds per dtr. Mild nonprod cough. No new rx. No F/C. No CP or SOB. She has been vomiting freq for months and her GI doc thinks may be related to prior gastric bypass surgery per dtr.        On arrival to ER vitals stable, afebrile, 97% on 2L. Somewhat solement, flat affect. Answers Q's approp, no acute distress, nontoxic appearance. Labs:  Cr 2.56, and . WBC 14.2  Serum bicarb 39. Prior Cr this year was 0.86 on 7/1/2018. Past Medical History/Comorbidities:   Ms. Migue Phelan  has a past medical history of Acute renal failure [facility]) (July, 2014); Arthritis; Asthma; Asthma with acute exacerbation (9/28/2017); Bilateral pulmonary embolism [facility]) (September, 2012); Calculus of ureter (May, 2015); CAP (community acquired pneumonia) (October, 2012); Cellulitis of left lower extremity (11/1/2017); Chronic pain; Community acquired bacterial pneumonia (4/2/2018); CVA (cerebral infarction) (January, 2012); Gastric ulcer (July, 2014); GERD (gastroesophageal reflux disease); Gram-positive bacteremia 1/2 cx (12/26/2016); HCAP (healthcare-associated pneumonia) (January, 2013); HCAP (healthcare-associated pneumonia) (February, 2013); Hypertension; Left leg DVT ([address] (1991); Morbid obesity ([address] Psychiatric disorder; PUD (peptic ulcer disease) (???); Sepsis due to pneumonia ([facility] [address] (4/2/2018); Stroke [facility]); and Thyroid disease. She also has no past medical history of Aneurysm ([facility] [address] Arrhythmia; Autoimmune disease ([facility] [address] CAD (coronary artery disease); Cancer ([facility] [address]  Chronic kidney disease; Chronic obstructive pulmonary disease ([facility] [address] Coagulation disorder ([facility] [address] COPD; Diabetes ([facility] [address] Endocarditis; Heart failure ([facility] [address] Liver disease; Nicotine vapor product user; Non-nicotine vapor product user; Rheumatic fever; Seizures ([facility] [address] or Unspecified sleep apnea. Ms. Manuela Grijalva  has a past surgical history that includes hx appendectomy; hx cholecystectomy; hx gastric bypass; hx tonsillectomy; hx other surgical; hx orthopaedic; hx hysterectomy; and hx endoscopy (July, 2014). Social History/Living Environment:   Home Environment: Private residence  # Steps to Enter: 3  Rails to Enter: Yes  Hand Rails : Right  Wheelchair Ramp: No  One/Two Story Residence: One story  Living Alone: No  Support Systems: Child(jaswant)  Patient Expects to be Discharged to[de-identified] Patient room  Current DME Used/Available at Home: [address] [facility] [place], straight, Walker, rolling, Shower chair  Tub or Shower Type: Shower  Prior Level of Function/Work/Activity:  Lives with daughter, ambulates household distances with cane, 2 recent falls, 2L supplemental O2   Number of Personal Factors/Comorbidities that affect the Plan of Care: 3+: HIGH COMPLEXITY   EXAMINATION:   Most Recent Physical Functioning:   Gross Assessment:                  Posture:     Balance:  Sitting: Intact  Sitting - Static: Good (unsupported)  Standing: Impaired  Standing - Static: Fair  Standing - Dynamic : Fair Bed Mobility:  Rolling:  Moderate assistance;Assist x2  Supine to Sit: Moderate assistance;Assist x2  Scooting: Minimum assistance  Wheelchair Mobility:     Transfers:  Sit to Stand: Minimum assistance (due to low bathroom seat)  Stand to Sit: Contact guard assistance  Gait:     Base of Support: Widened  Speed/Judith: Slow  Step Length: Left shortened;Right shortened  Distance (ft): 40 Feet (ft)  Assistive Device: Walker, rolling  Ambulation - Level of Assistance: Contact guard assistance  Interventions: Safety awareness training      Body Structures Involved:  1. Heart  2. Lungs  3. Bones  4. Joints  5. Muscles  6. Ligaments Body Functions Affected:  1. Sensory/Pain  2. Cardio  3. Respiratory  4. Neuromusculoskeletal  5. Movement Related Activities and Participation Affected:  1. Mobility  2. Self Care  3. Domestic Life  4. Interpersonal Interactions and Relationships  5. Community, Social and [place]   Number of elements that affect the Plan of Care: 4+: HIGH COMPLEXITY   CLINICAL PRESENTATION:   Presentation: Evolving clinical presentation with changing clinical characteristics: MODERATE COMPLEXITY   CLINICAL DECISION MAKIN [facility] Mobility Inpatient Short Form  How much difficulty does the patient currently have. .. Unable A Lot A Little None   1. Turning over in bed (including adjusting bedclothes, sheets and blankets)? [] 1   [x] 2   [] 3   [] 4   2. Sitting down on and standing up from a chair with arms ( e.g., wheelchair, bedside commode, etc.)   [x] 1   [] 2   [] 3   [] 4   3. Moving from lying on back to sitting on the side of the bed? [] 1   [x] 2   [] 3   [] 4   How much help from another person does the patient currently need. .. Total A Lot A Little None   4. Moving to and from a bed to a chair (including a wheelchair)? [x] 1   [] 2   [] 3   [] 4   5. Need to walk in hospital room? [x] 1   [] 2   [] 3   [] 4   6. Climbing 3-5 steps with a railing? [x] 1   [] 2   [] 3   [] 4   © , Trustees of [address] [place] [address] [address] [address], under license to [employer]. All rights reserved      Score:  Initial: 8 Most Recent: X (Date: -- )    Interpretation of Tool:  Represents activities that are increasingly more difficult (i.e. Bed mobility, Transfers, Gait). Score 24 23 22-20 19-15 14-10 9-7 6     Modifier CH CI CJ CK CL CM CN      ?  Mobility - Walking and Moving Around:     - CURRENT STATUS: CM - 80%-99% impaired, limited or restricted    - GOAL STATUS: CL - 60%-79% impaired, limited or restricted    - D/C STATUS:  ---------------To be determined---------------  [place] / [place] / Product Type: Medicare /      Medical Necessity:     · Patient is expected to demonstrate progress in strength, range of motion, balance, coordination and functional technique to decrease assistance required with transfers and functional mobility. Reason for Services/Other Comments:  · Patient continues to require skilled intervention due to impaired activity tolerance and functional mobility. Use of outcome tool(s) and clinical judgement create a POC that gives a: Questionable prediction of patient's progress: MODERATE COMPLEXITY            TREATMENT:   (In addition to Assessment/Re-Assessment sessions the following treatments were rendered)   Pre-treatment Symptoms/Complaints: Patient was agreeable to therapy  Pain: Initial: none verbalized   Post Session:  None verbalized     Therapeutic Activity: (    25 min): Therapeutic activities including Bed transfers, Chair transfers, Ambulation on level ground and weight shifting, walker safety to improve mobility, strength, balance and coordination. Required minimal Safety awareness training to promote static and dynamic balance in standing and promote coordination of bilateral, lower extremity(s). Braces/Orthotics/Lines/Etc:   · room air  Treatment/Session Assessment:    · Response to Treatment: No difficulties with transfers. · Interdisciplinary Collaboration:   o Physical Therapy Assistant  o Registered Nurse  o SPTA  · After treatment position/precautions:   o Up in chair  o Bed alarm/tab alert on  o Bed/Chair-wheels locked  o Bed in low position  o Call light within reach  o RN notified  o Family at bedside   · Compliance with Program/Exercises: Will assess as treatment progresses. · Recommendations/Intent for next treatment session: \"Next visit will focus on reduction in assistance provided\".   Total Treatment Duration:25 min  PT Patient Time In/Time Out  Time In: 7826  Time Out: [address], [facility]

## 2018-08-27 NOTE — DISCHARGE SUMMARY
Discharge Summary     Patient: Gregory Luther MRN: [id number]  SSN: [id number]    YOB: 1950  Age: 80 y.o. Sex: female       Patient: Gregory Luther  : 1950 MRN: [id number]      PCP: Carina Barrera DO   Age: 80 y.o. Code Status: Full Code  Sex: female        Discharge Diagnoses   Patient Active Problem List   Diagnosis Code    Fibromyalgia M79.7    Hypertension I10    GERD (gastroesophageal reflux disease) K21.9    Morbid obesity - sedentary lifestyle E66.01    Hypothyroidism E03.9    Asthma J45.909    Arthritis M19.90    Bipolar affective disorder ([facility] [address] F31.9    Debility R53.81    History of peptic ulcer disease Z87.11    Chronic narcotic use     Anxiety state F41.1    Hx of pulmonary embolus Z86.711    Restrictive lung disease J98.4    Nocturnal hypoxemia G47.34    UTI (urinary tract infection) N39.0    Normocytic anemia Y81.9    Metabolic encephalopathy T84.21    CAMILA (acute kidney injury) ([facility] [address] N17.9    Hyponatremia E87.1    Thrush B37.0    Nausea and vomiting R11.2          Hospital Course   Presenting Problem:   Chief Complaint   Patient presents with    Leg Pain        HPI: Gregory Luther is a 80 y.o. female with PMH of DVT on Eliquis, gastric bypass with gastroparesis, RLD and debility who presented to the ED with several week history of weakness and decreased PO intake. Her PCP adjusted the patient's meds last week per daughter. Nonproductive cough, no F/C, CP or SOB. She has frequent vomiting which has been ongoing for months. She has been evaluated by GI who thinks it is related to her previous gastric bypass surgery. In the ED, Cr was elevated to 2.5, Na 118. Hospital Course:   1. ARF - Pt was hydrated and Cr normalized. Rise was felt to be from dehydration for vomiting. 2. UTI - Pt was found to have E.coli UTI and received 7 days of Keflex.   3. Chronic nausea and vomiting - Pt was again evaluated by GI and underwent EGD that showed 2 shallow ulcers at her gastric bypass. PPI and carafate were recommended along with discontinuation of Reglan with concern for tardive dyskinesia. Pt has continue to have intermittent nausea and decreased appetite but no further vomiting. F/U with GI OP. 4. Hyponatremia - Sodium improved with IVF and was WNL at the time of discharge. 5. Encephalopathy - Pt had acute change in mental status that has slowly improved. She is close to her baseline at discharge per daughter. Psych evaluated pt and made some recommendations. Prior to my taking over care of the patient, her Cymbalta dose was decreased and Lyrica dicontinued. Xanax taper. CT was checked during her period of delirium and showed no acute ischemia or other concerning changes. TSH WNL and folic acid and Q16 high, so home folic acid discontinued. 6. Bipolar disorder - Medications adjusted with decreased dose of Cymbalta and Xanax taper. She will continue with mirtazapine and geodon but needs f/u with her OP psychiatrist.  7. Hypothyroidism - Continue Synthroid at previous dose.     Consults: GI    Procedures: none     Discharge Exam & Pertinent Results   VITALS:  Visit Vitals    /90 (BP Patient Position: At rest)    Pulse (!) 106    Temp 98.2 °F (36.8 °C)    Resp 20    Ht 5' (1.524 m)    Wt 126.1 kg (278 lb 1.6 oz)    SpO2 94%    BMI 54.31 kg/m2      Physical Exam:  General: elderly female lying in bed, AAO x 3 but slow to answer  HEENT: NCAT, EOMI, OOP pink/moist   CV: RRR, no m/g/r   Lungs - CTAB, no wheezes/crackles   Abd - soft, NT, ND   Ext - no edema   Neuro - CN II-XII intact, no focal deficits     Recent Pertinent Labs:  Lab Results   Component Value Date/Time    WBC 11.1 08/25/2018 01:13 AM    HGB 9.3 (L) 08/25/2018 01:13 AM    HCT 31.2 (L) 08/25/2018 01:13 AM     08/25/2018 01:13 AM     08/27/2018 06:48 AM    K 4.1 08/27/2018 06:48 AM     08/27/2018 06:48 AM    CO2 26 08/27/2018 06:48 AM    BUN 7 (L) 08/27/2018 06:48 AM GLU 87 08/27/2018 06:48 AM       Additional Test Results:   Micro: All Micro Results     Procedure Component Value Units Date/Time    CULTURE, URINE [[id number]]  (Abnormal)  (Susceptibility) Collected:  08/21/18 1400    Order Status:  Completed Specimen:  Urine from Clean catch Updated:  08/24/18 0746     Special Requests: NO SPECIAL REQUESTS        Culture result:         >100,000 COLONIES/mL ESCHERICHIA COLI (A)          Imaging:     XR CHEST SNGL V   Final Result   IMPRESSION:  NO SIGNIFICANT INTERVAL CHANGE. CT HEAD WO CONT   Final Result   Impression: Negative CT scan of the brain. Note: If a subtle CVA is suspected, MRI would be more definitive if clinically   warranted. US RETROPERITONEUM LTD   Final Result   IMPRESSION: Unremarkable renal ultrasound. There is no hydronephrosis      XR KNEE RT 3 V   Final Result   IMPRESSION: Tricompartmental OA. DUPLEX LOWER EXT VENOUS RIGHT   Final Result   IMPRESSION:  No evidence of right leg DVT. XR CHEST PORT   Final Result   IMPRESSION:    1. No evidence of acute cardiopulmonary abnormality. Discharge Medications   Current Discharge Medication List      CONTINUE these medications which have CHANGED    Details   DULoxetine (CYMBALTA) 60 mg capsule Take 1 Cap by mouth nightly. Dose changed by inpatient psych  Qty: 5 Cap, Refills: 0      ALPRAZolam (XANAX) 1 mg tablet Take 1 Tab by mouth daily as needed for Anxiety. Use for 5 days as needed, then discontinue to get off benzodiazapenes completely  Indications: anxiety  Qty: 5 Tab, Refills: 0    Associated Diagnoses: Anxiety state         CONTINUE these medications which have NOT CHANGED    Details   apixaban (ELIQUIS) 5 mg tablet Take 5 mg by mouth two (2) times a day. albuterol-ipratropium (DUO-NEB) 2.5 mg-0.5 mg/3 ml nebu 3 mL by Nebulization route every four (4) hours as needed. mirtazapine (REMERON) 45 mg tablet Take 45 mg by mouth nightly.       albuterol (PROAIR HFA) 90 mcg/actuation inhaler Take 2 Puffs by inhalation every four (4) hours as needed for Wheezing. Qty: 1 Inhaler, Refills: 11      albuterol (PROVENTIL VENTOLIN) 2.5 mg /3 mL (0.083 %) nebulizer solution 3 mL by Nebulization route four (4) times daily. And every 4 hours as needed cough, wheezing, shortness of breath, J45.909, Medicare part B  Qty: 120 Each, Refills: 0      OXYGEN-AIR DELIVERY SYSTEMS 2 lpm cont. , 4 lpm at hs      fluticasone-vilanterol (BREO ELLIPTA) 200-25 mcg/dose inhaler Take 1 Puff by inhalation daily. RINSE MOUTH WELL AFTER USE  Qty: 3 Inhaler, Refills: 3      nystatin (MYCOSTATIN) powder Apply  to affected area two (2) times a day. Qty: 1 Bottle, Refills: 0      sucralfate (CARAFATE) 1 gram tablet Take 1 g by mouth four (4) times daily. montelukast (SINGULAIR) 10 mg tablet Take 10 mg by mouth daily. ondansetron (ZOFRAN ODT) 8 mg disintegrating tablet Take 8 mg by mouth every eight (8) hours as needed for Nausea. oxybutynin chloride XL (DITROPAN XL) 10 mg CR tablet Take 10 mg by mouth daily. Magnesium Oxide 500 mg cap Take 500 mg by mouth. HYDROcodone-acetaminophen (NORCO)  mg tablet Take 1 Tab by mouth every six (6) hours as needed for Pain. Cholecalciferol, Vitamin D3, (VITAMIN D3) 1,000 unit cap Take  by mouth.      pantoprazole (PROTONIX) 40 mg tablet Take 1 Tab by mouth Before breakfast and dinner. Qty: 60 Tab, Refills: 2      FERROUS FUMARATE/VIT BCOMP&C (SUPER B COMPLEX PO) Take 1 Tab by mouth daily. ziprasidone (GEODON) 80 mg capsule Take 80 mg by mouth nightly. SUMATRIPTAN SUCCINATE (IMITREX PO) take 100 mg by mouth as needed. levothyroxine (SYNTHROID) 125 mcg tablet Take 112 mcg by mouth every evening. meclizine (ANTIVERT) 25 mg tablet Take 25 mg by mouth three (3) times daily as needed. naloxegol (MOVANTIK) 25 mg tab tablet Take  by mouth Daily (before breakfast).       carvedilol (COREG) 12.5 mg tablet Take 12.5 mg by mouth two (2) times a day. atorvastatin (LIPITOR) 10 mg tablet Take 2 Tabs by mouth nightly. Qty: 30 Tab, Refills: 11      NEBULIZER by Does Not Apply route. STOP taking these medications       pregabalin (LYRICA) 150 mg capsule Comments:   Reason for Stopping:         POTASSIUM CHLORIDE SR 10 MEQ TAB Comments:   Reason for Stopping:         folic acid 855 mcg tablet Comments:   Reason for Stopping:         metoclopramide HCl (REGLAN) 5 mg tablet Comments:   Reason for Stopping:         lisinopril (PRINIVIL, ZESTRIL) 10 mg tablet Comments:   Reason for Stopping:         furosemide (LASIX) 40 mg tablet Comments:   Reason for Stopping:                Discharge Follow up and Special Instructions   Disposition: home with HH. Rehab recommended but pt refused and also refused to work with PT while here. Follow up with: Future Appointments  Date Time Provider Deyanira Parikh   9/20/2018 12:30 PM [facility] PHONE APPOINTMENT TIM [facility] OR PRE A     Condition: fair  Diet: DIET LOW FAT None, Post Gastrectomy  DIET NUTRITIONAL SUPPLEMENTS All Meals; Ensure Enlive  Activity: As tolerated  Restrictions: none    Time spent on care of coordination of discharge was 35 minutes for chart review, medication reconciliation, patient education, and coordination of services with case management and nursing.      Giuliana Quiroga MD  [facility] Hospitalist  08/27/18, 9:39 AM

## 2018-08-27 NOTE — DISCHARGE INSTRUCTIONS
Hyponatremia: Care Instructions  Your Care Instructions    Hyponatremia (say \"lx-et-xfg-TREE-geraldine-uh\") means that you don't have enough sodium in your blood. It can cause nausea, vomiting, and headaches. Or you may not feel hungry. In serious cases, it can cause seizures, a coma, or even death. Hyponatremia is not a disease. It is a problem caused by something else, such as medicines or exercising for a long time in hot weather. You can get hyponatremia if you lose a lot of fluids and then you drink a lot of water or other liquids that don't have much sodium. You can also get it if you have kidney, liver, heart, or other health problems. Treatment is focused on getting your sodium levels back to normal.  Follow-up care is a key part of your treatment and safety. Be sure to make and go to all appointments, and call your doctor if you are having problems. It's also a good idea to know your test results and keep a list of the medicines you take. How can you care for yourself at home? · If your doctor recommends it, drink fluids that have sodium. Sports drinks are a good choice. Or you can eat salty foods. · If your doctor recommends it, limit the amount of water you drink. And limit fluids that are mostly water. These include tea, coffee, and juice. · Take your medicines exactly as prescribed. Call your doctor if you have any problems with your medicine. · Get your sodium levels tested when your doctor tells you to. When should you call for help? Call 911 anytime you think you may need emergency care.  For example, call if:    · You have a seizure.     · You passed out (lost consciousness).    Call your doctor now or seek immediate medical care if:    · You are confused or it is hard to focus.     · You have little or no appetite.     · You feel sick to your stomach or you vomit.     · You have a headache.     · You have mood changes.     · You feel more tired than usual.    Watch closely for changes in your health, and be sure to contact your doctor if:    · You do not get better as expected. Where can you learn more? Go to http://era-fransisco.info/. Enter U278 in the search box to learn more about \"Hyponatremia: Care Instructions. \"  Current as of: October 9, 2017  Content Version: 11.7  © 8454-8344 [employer]. Care instructions adapted under license by [employer] (which disclaims liability or warranty for this information). If you have questions about a medical condition or this instruction, always ask your healthcare professional. [place] [address] any warranty or liability for your use of this information. Acute Kidney Injury: Care Instructions  Your Care Instructions    Acute kidney injury (CAMILA) is a sudden decrease in kidney function. This can happen over a period of hours, days or, in some cases, weeks. CAMILA used to be called acute renal failure, but kidney failure doesn't always happen with CAMILA. Common causes of CAMILA are dehydration, blood loss, and medicines. When CAMILA happens, the kidneys have trouble removing waste and excess fluids from the body. The waste and fluids build up and become harmful. Kidney function may return to normal if the cause of CAMILA is treated quickly. Your chance of a full recovery depends on what caused the problem, how quickly the cause was treated, and what other medical problems you have. A machine may be used to help your kidneys remove waste and fluids for a short period of time. This is called dialysis. Follow-up care is a key part of your treatment and safety. Be sure to make and go to all appointments, and call your doctor if you are having problems. It's also a good idea to know your test results and keep a list of the medicines you take. How can you care for yourself at home? · Talk to your doctor about how much fluid you should drink. · Eat a balanced diet.  Talk to your doctor or a dietitian about what type of diet may be best for you. You may need to limit sodium, potassium, and phosphorus. · If you need dialysis, follow the instructions and schedule for dialysis that your doctor gives you. · Do not smoke. Smoking can make your condition worse. If you need help quitting, talk to your doctor about stop-smoking programs and medicines. These can increase your chances of quitting for good. · Do not drink alcohol. · Review all of your medicines with your doctor. Do not take any medicines, including nonsteroidal anti-inflammatory drugs (NSAIDs) such as ibuprofen (Advil, Motrin) or naproxen (Aleve), unless your doctor says it is safe for you to do so. · Make sure that anyone treating you for any health problem knows that you have had CAMILA. When should you call for help? Call 911 anytime you think you may need emergency care. For example, call if:    · You passed out (lost consciousness).    Call your doctor now or seek immediate medical care if:    · You have new or worse nausea and vomiting.     · You have much less urine than normal, or you have no urine.     · You are feeling confused or cannot think clearly.     · You have new or more blood in your urine.     · You have new swelling.     · You are dizzy or lightheaded, or feel like you may faint.    Watch closely for changes in your health, and be sure to contact your doctor if:    · You do not get better as expected. Where can you learn more? Go to http://era-fransisco.info/. Enter V986 in the search box to learn more about \"Acute Kidney Injury: Care Instructions. \"  Current as of: May 12, 2017  Content Version: 11.7  © 9780-3015 [employer]. Care instructions adapted under license by [employer] (which disclaims liability or warranty for this information).  If you have questions about a medical condition or this instruction, always ask your healthcare professional. Melany King any warranty or liability for your use of this information. Urinary Tract Infection in Women: Care Instructions  Your Care Instructions    A urinary tract infection, or UTI, is a general term for an infection anywhere between the kidneys and the urethra (where urine comes out). Most UTIs are bladder infections. They often cause pain or burning when you urinate. UTIs are caused by bacteria and can be cured with antibiotics. Be sure to complete your treatment so that the infection goes away. Follow-up care is a key part of your treatment and safety. Be sure to make and go to all appointments, and call your doctor if you are having problems. It's also a good idea to know your test results and keep a list of the medicines you take. How can you care for yourself at home? · Take your antibiotics as directed. Do not stop taking them just because you feel better. You need to take the full course of antibiotics. · Drink extra water and other fluids for the next day or two. This may help wash out the bacteria that are causing the infection. (If you have kidney, heart, or liver disease and have to limit fluids, talk with your doctor before you increase your fluid intake.)  · Avoid drinks that are carbonated or have caffeine. They can irritate the bladder. · Urinate often. Try to empty your bladder each time. · To relieve pain, take a hot bath or lay a heating pad set on low over your lower belly or genital area. Never go to sleep with a heating pad in place. To prevent UTIs  · Drink plenty of water each day. This helps you urinate often, which clears bacteria from your system. (If you have kidney, heart, or liver disease and have to limit fluids, talk with your doctor before you increase your fluid intake.)  · Urinate when you need to. · Urinate right after you have sex. · Change sanitary pads often.   · Avoid douches, bubble baths, feminine hygiene sprays, and other feminine hygiene products that have deodorants. · After going to the bathroom, wipe from front to back. When should you call for help? Call your doctor now or seek immediate medical care if:    · Symptoms such as fever, chills, nausea, or vomiting get worse or appear for the first time.     · You have new pain in your back just below your rib cage. This is called flank pain.     · There is new blood or pus in your urine.     · You have any problems with your antibiotic medicine.    Watch closely for changes in your health, and be sure to contact your doctor if:    · You are not getting better after taking an antibiotic for 2 days.     · Your symptoms go away but then come back. Where can you learn more? Go to http://era-fransisco.info/. Enter B014 in the search box to learn more about \"Urinary Tract Infection in Women: Care Instructions. \"  Current as of: May 12, 2017  Content Version: 11.7  © 9518-2835 [employer]. Care instructions adapted under license by [employer] (which disclaims liability or warranty for this information). If you have questions about a medical condition or this instruction, always ask your healthcare professional. [place] [address] any warranty or liability for your use of this information. DISCHARGE SUMMARY from Nurse    PATIENT INSTRUCTIONS:    After general anesthesia or intravenous sedation, for 24 hours or while taking prescription Narcotics:  · Limit your activities  · Do not drive and operate hazardous machinery  · Do not make important personal or business decisions  · Do  not drink alcoholic beverages  · If you have not urinated within 8 hours after discharge, please contact your surgeon on call.     Report the following to your surgeon:  · Excessive pain, swelling, redness or odor of or around the surgical area  · Temperature over 100.5  · Nausea and vomiting lasting longer than 4 hours or if unable to take medications  · Any signs of decreased circulation or nerve impairment to extremity: change in color, persistent  numbness, tingling, coldness or increase pain  · Any questions    What to do at Home:    *  Please give a list of your current medications to your Primary Care Provider. *  Please update this list whenever your medications are discontinued, doses are      changed, or new medications (including over-the-counter products) are added. *  Please carry medication information at all times in case of emergency situations. These are general instructions for a healthy lifestyle:    No smoking/ No tobacco products/ Avoid exposure to second hand smoke  Surgeon General's Warning:  Quitting smoking now greatly reduces serious risk to your health. Obesity, smoking, and sedentary lifestyle greatly increases your risk for illness    A healthy diet, regular physical exercise & weight monitoring are important for maintaining a healthy lifestyle    You may be retaining fluid if you have a history of heart failure or if you experience any of the following symptoms:  Weight gain of 3 pounds or more overnight or 5 pounds in a week, increased swelling in our hands or feet or shortness of breath while lying flat in bed. Please call your doctor as soon as you notice any of these symptoms; do not wait until your next office visit. Recognize signs and symptoms of STROKE:    F-face looks uneven    A-arms unable to move or move unevenly    S-speech slurred or non-existent    T-time-call 911 as soon as signs and symptoms begin-DO NOT go       Back to bed or wait to see if you get better-TIME IS BRAIN. Warning Signs of HEART ATTACK     Call 911 if you have these symptoms:   Chest discomfort. Most heart attacks involve discomfort in the center of the chest that lasts more than a few minutes, or that goes away and comes back. It can feel like uncomfortable pressure, squeezing, fullness, or pain.  Discomfort in other areas of the upper body.  Symptoms can include pain or discomfort in one or both arms, the back, neck, jaw, or stomach.  Shortness of breath with or without chest discomfort.  Other signs may include breaking out in a cold sweat, nausea, or lightheadedness. Don't wait more than five minutes to call 911 - MINUTES MATTER! Fast action can save your life. Calling 911 is almost always the fastest way to get lifesaving treatment. Emergency Medical Services staff can begin treatment when they arrive -- up to an hour sooner than if someone gets to the hospital by car. The discharge information has been reviewed with the patient. The patient verbalized understanding. Discharge medications reviewed with the patient and appropriate educational materials and side effects teaching were provided.   ___________________________________________________________________________________________________________________________________

## 2018-08-27 NOTE — PROGRESS NOTES
Patient is discharging home today. She was current with [facility] prior to admission and has refused to work with PT/OT while here. Patient's daughter had requested short-term rehab placement, but because patient will not work with PT/OT, she does not qualify for STR under Medicare guidelines. Patient will discharge home today with [facility] [address] for RN, PT, and OT. No other discharge planning needs identified at this time. Case Management will remain available to assist as needed. Care Management Interventions  PCP Verified by CM:  Yes  Transition of Care Consult (CM Consult): Discharge Planning  Discharge Durable Medical Equipment: No  Physical Therapy Consult: Yes  Occupational Therapy Consult: Yes  Speech Therapy Consult: No  Current Support Network: Own Home (Lives with her daughter.)  Confirm Follow Up Transport: Family  Plan discussed with Pt/Family/Caregiver: Yes  Freedom of Choice Offered: Yes  Discharge Location  Discharge Placement: Home with home health

## 2018-08-27 NOTE — PROGRESS NOTES
Pt resting in bed with eyes closed. Pt awakens when spoken to. Pt oriented times 3 at this time. Pt on RA at this time. Pt complaints of right leg and lower back pain 8/10 at this time. Pt has no acute distress noted at this time. Pt without IV access and MD aware. Pt encouraged to call for assistance if needed call light in reach, door open will monitor.

## 2018-08-27 NOTE — PROGRESS NOTES
[address]  Face to Face Encounter    Patients Name: Joao Mireles    YOB: 1950    Ordering Physician: Rosalind Agarwal MD    Primary Diagnosis: Nausea and vomiting, intractability of vomiting not specified, unspecified vomiting type [R11.2]    Date of Face to Face:   8/27/2018                                  Face to Face Encounter findings are related to primary reason for home care:   yes. 1. I certify that the patient needs intermittent care as follows: skilled nursing care:  skilled observation/assessment, patient education, complex care plan management, psychiatric evaluation and therapy and rehabilitative nursing  physical therapy: strengthening, gait/stair training, balance training and pt/caregiver education  occupational therapy:  ADL safety (ie. cooking, bathing, dressing), ROM and pt/caregiver education    2. I certify that this patient is homebound, that is: 1) patient requires the use of a walker device, special transportation, or assistance of another to leave the home; or 2) patient's condition makes leaving the home medically contraindicated; and 3) patient has a normal inability to leave the home and leaving the home requires considerable and taxing effort. Patient may leave the home for infrequent and short duration for medical reasons, and occasional absences for non-medical reasons. Homebound status is due to the following functional limitations: Patient with strength deficits limiting the performance of all ADL's without caregiver assistance or the use of an assistive device. Patient with poor safety awareness and is at risk for falls without assistance of another person and the use of an assistive device. Patient with poor ambulation endurance limiting their safe ability to ascend/descend the required number of steps to leave the home.     3. I certify that this patient is under my care and that I, or a nurse practitioner or [phone], or clinical nurse specialist, or certified nurse midwife, working with me, had a Face-to-Face Encounter that meets the physician Face-to-Face Encounter requirements. The following are the clinical findings from the [address] [place] [address] encounter that support the need for skilled services and is a summary of the encounter: Medical Record. See discharge summary and summary of the patient's illness      De Live RN  8/27/2018      THE FOLLOWING TO BE COMPLETED BY THE COMMUNITY PHYSICIAN:    I concur with the findings described above from the F2F encounter that this patient is homebound and in need of a skilled service.     Certifying Physician: _____________________________________      Printed Certifying Physician Name: _____________________________________    Date: _________________

## 2018-08-28 ENCOUNTER — PATIENT OUTREACH (OUTPATIENT)
Dept: CASE MANAGEMENT | Age: 68
End: 2018-08-28

## 2018-08-28 ENCOUNTER — HOME CARE VISIT (OUTPATIENT)
Dept: HOME HEALTH SERVICES | Facility: HOME HEALTH | Age: 68
End: 2018-08-28

## 2018-08-28 NOTE — PROGRESS NOTES
Transition of Care Discharge Follow-up Questionnaire Date/Time of TAWNY Outreach: 8/[id number] 3:00 PM  
What was the patient hospitalized for? Hospitalized at [facility] from  8/20/18 - 8/27/18  For CAMILA (acute kidney injury) ([facility] [address]  
 
7/18/18 - 7/21/18 for acute metabolic encephalopathy Hx of hospitalized for CAP Does the patient understand his/her diagnosis and/or treatment and what happened during the hospitalization? 
   
CM Assessed Risk for Readmission: 
   
 
 
 
 
   
   
Patient stated Risk for Readmission: 
   Spoke to Patient who consented to [facility] [place] outreach, and verbalized understanding of treatment and diagnosis.    
RRAT score 22,HRRP and patient has had 8 hospitalizations over the past year including CAP, asthma, restrictive lung disease, acute metabolic encephalopathy, UTI chronic health management and diagnosis 
 
 
  
Patient states readmission would be due to Pneumonia or [facility] or medical problem 
    
Did the patient receive discharge instructions? Yes Review any discharge instructions (see notes in [facility]).   Ask patient if they understand these.  Do they have any questions? 
   Patient reviewed and discussed discharge plan and instruction per connect care Were home services ordered (nursing, PT, OT, ST, etc.)? Yes If so, has the first visit occurred? If not, why? (Assist with coordination of services if necessary.) Yes Was any DME ordered? No DME ordered at d/c.  Patient states she uses home O2. If so, has it been received?  If not, why?  (Assist with coordination of arranging DME orders if necessary.) N/A Complete a review of all medications (new, continued and discontinued meds per the D/C instructions and medication tab in [facility]). Review of all meds - verbalizes understanding of meds Were all new prescriptions filled?  If not, why?  (Assist with obtainment of medications if necessary.) Yes.   
Does the patient understand the purpose and dosing instructions for all medications?  (If patient has questions, provide explanation and education.) Verbalized understanding and dosing instructions for all medications Does the patient have any problems in performing ADLs? (If patient is unable to perform ADLs  what is the limiting factor(s)?  Do they have a support system that can assist? If no support system is present, discuss possible assistance that they may be able to obtain.) Patient states she is mostly independent with some ADLs and that with others daughter assists with.    
Does the patient have all follow-up appointments scheduled?   
   
7 day f/up with PCP? 
   
7-14 day f/up with specialist? 
   
If f/up has not been made  what actions has the care coordinator made to accomplish this? 
   
Has transportation been arranged? 
   
[facility]monary follow-up should be within 7 days of discharge; all others should have PCP follow-up within 7 days of discharge; follow-ups with other specialists as appropriate or ordered.) Yes on 9/4/18 
   
   
  
   
 Patient denied the need for transportation. 
    
Any other questions or concerns expressed by the patient? 
   Gratitude stated and no further questions asked or needs identified.    
TAWNY Call Completed By:  
Elva KIRAN, RN, CCM /  
c: [phone] / [email or web address] This note will not be viewable in [address]

## 2018-09-04 ENCOUNTER — PATIENT OUTREACH (OUTPATIENT)
Dept: CASE MANAGEMENT | Age: 68
End: 2018-09-04

## 2018-09-05 ENCOUNTER — PATIENT OUTREACH (OUTPATIENT)
Dept: CASE MANAGEMENT | Age: 68
End: 2018-09-05

## 2018-09-05 NOTE — PROGRESS NOTES
Community Care Management  Follow up Outreach Note Outreach type: Phone call:  9/5/18   
   
Home visit:  
   
Reason for follow-up: Hospitalized at [facility] from  8/20/18 - 8/27/18  For CAMILA (acute kidney injury) (HCC)7/18/18 - 7/21/18 for acute metabolic encephalopathy Hx of hospitalized for CAP and UTI Patient was hospitalized at [facility] from 5/21/18 - 5/24/18 for Acute on Chronic Respiratory Failure with Hypercapnia Patient has had 7 hospitalizations over the past year   
Disease specific complaints/issues: Hospitalized at [facility] from  8/20/18 - 8/27/18  For CAMILA (acute kidney injury) ([address] 
7/18/18 - 7/21/18 for acute metabolic encephalopathy Hx of hospitalized for CAP and UTI Patient was hospitalized at [facility] from 5/21/18 - 5/24/18 for Acute on Chronic Respiratory Failure with Hypercapnia Patient has had 7 hospitalizations over the past year  
   
Patient progress towards goals set from last contact: States that she will report any s/sx of UTI to PCP immediately Instruct Patient/Caregiver in strategies to prevent infection: frequent/proper hand-washing techniques, Universal precautions, Standard precautions, avoid crowds and persons with known infections, staying current with immunizations, s/s of infection, use of incentive spirometer, use of antibiotics and safe use and cleaning of equipment.   
CM Assessed Risk for Readmission:  
   
 
 
 
 
 
Patient stated Risk for Readmission: RRAT score 22,HRRP and patient has had 8 hospitalizations over the past year including CAP, asthma, restrictive lung disease, acute metabolic encephalopathy, UTI chronic health management and diagnosis    
   
SHOB, respiratory lung problems. Patient states readmission would be due to Pneumonia or [facility] or medical problem Has patient attended any PCP or specialist follow-up appointments since last contact? 
   
What was outcome of appointment? 
   
When is next follow-up scheduled?    
   
 Patient has transportation to MD appointments Review medications. 
   
Any medication changes since last outreach? 
   
Does patient have any questions or issues related to their medications? Patient and daughter verbalize understanding of medications dosage and administration Denies any issues with medications at this time  
   
Meds reviewed and verbalizes understanding    
Home health active? If yes Aura Silva issue? Progress? Declined home health after orders were written and home health staff have been UTR patient    
Referrals needed? 
(SW, Diabetes education, HH, etc. ) Not at this time Other issues/Miscellaneous? (Transportation, access to meals, ability to perform ADLs, adequate caregiver support, etc.) Patient states she is independent with some ADLs and that with others daughter assists as needed A&O 
   
   
    
Next Outreach Scheduled: In 2 weeks due to out of office next week  
    
   
Next Steps/Goals:    
Report s/sx of UTI to PCP immediately   
Instruct Patient/Caregiver in strategies to prevent infection: frequent/proper hand-washing techniques, Universal precautions, Standard precautions, avoid crowds and persons with known infections, staying current with immunizations, s/s of infection, use of incentive spirometer, use of antibiotics and safe use and cleaning of equipment. Community Care Manager:  
Francios Aj RN, BSN, CCM /  
c: [phone] / [email or web address] This note will not be viewable in [address]

## 2018-09-19 ENCOUNTER — PATIENT OUTREACH (OUTPATIENT)
Dept: CASE MANAGEMENT | Age: 68
End: 2018-09-19

## 2018-09-19 NOTE — PROGRESS NOTES
Community Care Management  Follow up Outreach Note Outreach type: Phone call:  9/19/18   
   
Home visit:  
   
Reason for follow-up: Hospitalized at [facility] from  8/20/18 - 8/27/18  For CAMILA (acute kidney injury) (HCC)7/18/18 - 7/21/18 for acute metabolic encephalopathy Hx of hospitalized for CAP and UTI Patient was hospitalized at [facility] from 5/21/18 - 5/24/18 for Acute on Chronic Respiratory Failure with Hypercapnia Patient has had 7 hospitalizations over the past year   
Disease specific complaints/issues: Hospitalized at [facility] from  8/20/18 - 8/27/18  For CAMILA (acute kidney injury) ([address] 
7/18/18 - 7/21/18 for acute metabolic encephalopathy Hx of hospitalized for CAP and UTI Patient was hospitalized at [facility] from 5/21/18 - 5/24/18 for Acute on Chronic Respiratory Failure with Hypercapnia Patient has had 7 hospitalizations over the past year  
   
Patient progress towards goals set from last contact: States that she will report any s/sx of UTI to PCP immediately Instruct Patient/Caregiver in strategies to prevent infection: frequent/proper hand-washing techniques, Universal precautions, Standard precautions, avoid crowds and persons with known infections, staying current with immunizations, s/s of infection, use of incentive spirometer, use of antibiotics and safe use and cleaning of equipment.   
CM Assessed Risk for Readmission:  
   
  
  
  
  
  
Patient stated Risk for Readmission: RRAT score 22,HRRP and patient has had 8 hospitalizations over the past year including CAP, asthma, restrictive lung disease, acute metabolic encephalopathy, UTI chronic health management and diagnosis    
   
SHOB, respiratory lung problems. Patient states readmission would be due to Pneumonia or [facility] or medical problem Has patient attended any PCP or specialist follow-up appointments since last contact? 
   
What was outcome of appointment? 
   
When is next follow-up scheduled?    
   
 Patient has transportation to MD appointments Review medications. 
   
Any medication changes since last outreach? 
   
Does patient have any questions or issues related to their medications? Patient and daughter verbalize understanding of medications dosage and administration Denies any issues with medications at this time  
   
Meds reviewed and verbalizes understanding    
Home health active? If yes Chel Fickle issue? Progress? Declined home health orders   
Referrals needed? 
(SW, Diabetes education, HH, etc. ) Not at this time Other issues/Miscellaneous? (Transportation, access to meals, ability to perform ADLs, adequate caregiver support, etc.) Patient states she is independent with some ADLs and that with others daughter assists as needed A&O 
   
   
    
Next Outreach Scheduled: In 1 week - will consider graduating at this time if patient remains stable over this week 
    
   
Next Steps/Goals:    
Report s/sx of UTI to PCP immediately   
Instruct Patient/Caregiver in strategies to prevent infection: frequent/proper hand-washing techniques, Universal precautions, Standard precautions, avoid crowds and persons with known infections, staying current with immunizations, s/s of infection, use of incentive spirometer, use of antibiotics and safe use and cleaning of equipment. Community Care Manager:  
Melissa Berman RN, BSN, CCM /  
c: [phone] / [email or web address]   
 This note will not be viewable in [employer].

## 2018-09-26 ENCOUNTER — PATIENT OUTREACH (OUTPATIENT)
Dept: CASE MANAGEMENT | Age: 68
End: 2018-09-26

## 2018-09-26 NOTE — PROGRESS NOTES
Community Care Management  Follow up Outreach Note Outreach type: Phone call:  9/26/18   
   
Home visit:  
   
Reason for follow-up: Hospitalized at [facility] from  8/20/18 - 8/27/18  For CAMILA (acute kidney injury) (HCC)7/18/18 - 7/21/18 for acute metabolic encephalopathy Hx of hospitalized for CAP and UTI Patient was hospitalized at [facility] from 5/21/18 - 5/24/18 for Acute on Chronic Respiratory Failure with Hypercapnia Patient has had 7 hospitalizations over the past year   
Disease specific complaints/issues: Hospitalized at [facility] from  8/20/18 - 8/27/18  For CAMILA (acute kidney injury) ([address] 
7/18/18 - 7/21/18 for acute metabolic encephalopathy Hx of hospitalized for CAP and UTI Patient was hospitalized at [facility] from 5/21/18 - 5/24/18 for Acute on Chronic Respiratory Failure with Hypercapnia Patient has had 7 hospitalizations over the past year  
   
Patient progress towards goals set from last contact: Patient at [facility] outpatient infusion center today receiving infusion for IGg deficiency States that she will report any s/sx of UTI to PCP immediately Instruct Patient/Caregiver in strategies to prevent infection: frequent/proper hand-washing techniques, Universal precautions, Standard precautions, avoid crowds and persons with known infections, staying current with immunizations, s/s of infection, use of incentive spirometer, use of antibiotics and safe use and cleaning of equipment.   
CM Assessed Risk for Readmission:  
   
   
   
   
   
   
Patient stated Risk for Readmission: RRAT score 22,HRRP and patient has had 8 hospitalizations over the past year including CAP, asthma, restrictive lung disease, acute metabolic encephalopathy, UTI chronic health management and diagnosis    
   
SHOB, respiratory lung problems. Patient states readmission would be due to Pneumonia or [facility] or medical problem Has patient attended any PCP or specialist follow-up appointments since last contact? 
   
 What was outcome of appointment? 
   
When is next follow-up scheduled?    
   
Patient has transportation to MD appointments Review medications. 
   
Any medication changes since last outreach? 
   
Does patient have any questions or issues related to their medications? Patient and daughter verbalize understanding of medications dosage and administration Denies any issues with medications at this time  
   
Meds reviewed and verbalizes understanding    
Home health active? If yes Rigoberto Diallo issue? Progress? Declined home health orders   
Referrals needed? 
(SW, Diabetes education, HH, etc. ) Not at this time Other issues/Miscellaneous? (Transportation, access to meals, ability to perform ADLs, adequate caregiver support, etc.) Patient states she is independent with some ADLs and that with others daughter assists as needed A&O 
   
   
    
Next Outreach Scheduled: Patient stable at this time Continuing OP infusion at [facility] for IGg deficiency Case closed and patient graduated at this time Patient and daughter have my contact information     
   
Next Steps/Goals:    
Report s/sx of UTI to PCP immediately   
Instruct Patient/Caregiver in strategies to prevent infection: frequent/proper hand-washing techniques, Universal precautions, Standard precautions, avoid crowds and persons with known infections, staying current with immunizations, s/s of infection, use of incentive spirometer, use of antibiotics and safe use and cleaning of equipment. Community Care Manager:  
Doretha Walton RN, BSN, CCM /  
c: [phone] / [email or web address] This note will not be viewable in [address]

## 2018-11-08 PROBLEM — J30.0 VASOMOTOR RHINITIS: Status: ACTIVE | Noted: 2018-11-08

## 2018-11-08 PROBLEM — J30.0 VASOMOTOR RHINITIS: Status: RESOLVED | Noted: 2018-11-08 | Resolved: 2018-11-08

## 2018-12-24 ENCOUNTER — HOSPITAL ENCOUNTER (OUTPATIENT)
Age: 68
Setting detail: OUTPATIENT SURGERY
Discharge: HOME | End: 2018-12-24
Attending: INTERNAL MEDICINE | Admitting: INTERNAL MEDICINE
Payer: MEDICARE

## 2018-12-24 ENCOUNTER — ANESTHESIA EVENT (OUTPATIENT)
Dept: ENDOSCOPY | Age: 68
End: 2018-12-24
Payer: MEDICARE

## 2018-12-24 ENCOUNTER — ANESTHESIA (OUTPATIENT)
Dept: ENDOSCOPY | Age: 68
End: 2018-12-24
Payer: MEDICARE

## 2018-12-24 VITALS
BODY MASS INDEX: 48.69 KG/M2 | SYSTOLIC BLOOD PRESSURE: 145 MMHG | HEART RATE: 80 BPM | RESPIRATION RATE: 18 BRPM | TEMPERATURE: 96.8 F | OXYGEN SATURATION: 95 % | HEIGHT: 60 IN | WEIGHT: 248 LBS | DIASTOLIC BLOOD PRESSURE: 65 MMHG

## 2018-12-24 PROCEDURE — 74011250636 HC RX REV CODE- 250/636: Performed by: ANESTHESIOLOGY

## 2018-12-24 PROCEDURE — 76040000026: Performed by: INTERNAL MEDICINE

## 2018-12-24 PROCEDURE — 74011250636 HC RX REV CODE- 250/636

## 2018-12-24 PROCEDURE — 76060000032 HC ANESTHESIA 0.5 TO 1 HR: Performed by: INTERNAL MEDICINE

## 2018-12-24 RX ORDER — PROPOFOL 10 MG/ML
INJECTION, EMULSION INTRAVENOUS
Status: DISCONTINUED | OUTPATIENT
Start: 2018-12-24 | End: 2018-12-24 | Stop reason: HOSPADM

## 2018-12-24 RX ORDER — SODIUM CHLORIDE, SODIUM LACTATE, POTASSIUM CHLORIDE, CALCIUM CHLORIDE 600; 310; 30; 20 MG/100ML; MG/100ML; MG/100ML; MG/100ML
1000 INJECTION, SOLUTION INTRAVENOUS CONTINUOUS
Status: DISCONTINUED | OUTPATIENT
Start: 2018-12-24 | End: 2018-12-24 | Stop reason: HOSPADM

## 2018-12-24 RX ORDER — PROPOFOL 10 MG/ML
INJECTION, EMULSION INTRAVENOUS AS NEEDED
Status: DISCONTINUED | OUTPATIENT
Start: 2018-12-24 | End: 2018-12-24 | Stop reason: HOSPADM

## 2018-12-24 RX ADMIN — PROPOFOL 20 MG: 10 INJECTION, EMULSION INTRAVENOUS at 10:22

## 2018-12-24 RX ADMIN — PROPOFOL 50 MG: 10 INJECTION, EMULSION INTRAVENOUS at 10:18

## 2018-12-24 RX ADMIN — PROPOFOL 160 MCG/KG/MIN: 10 INJECTION, EMULSION INTRAVENOUS at 10:22

## 2018-12-24 RX ADMIN — SODIUM CHLORIDE, SODIUM LACTATE, POTASSIUM CHLORIDE, AND CALCIUM CHLORIDE 1000 ML: 600; 310; 30; 20 INJECTION, SOLUTION INTRAVENOUS at 09:48

## 2018-12-24 NOTE — ANESTHESIA POSTPROCEDURE EVALUATION
Procedure(s):  COLONOSCOPY / BMI 52  ESOPHAGOGASTRODUODENOSCOPY (EGD).     Anesthesia Post Evaluation        Patient location during evaluation: PACU  Patient participation: complete - patient participated  Level of consciousness: awake and alert  Pain management: adequate  Airway patency: patent  Anesthetic complications: no  Cardiovascular status: acceptable  Respiratory status: acceptable  Hydration status: acceptable  Post anesthesia nausea and vomiting:  none      Visit Vitals  /65   Pulse 80   Temp 36 °C (96.8 °F)   Resp 18   Ht 5' (1.524 m)   Wt 112.5 kg (248 lb)   SpO2 95%   BMI 48.43 kg/m²

## 2018-12-24 NOTE — DISCHARGE INSTRUCTIONS
Gastrointestinal Colonoscopy/Flexible Sigmoidoscopy - Lower Exam Discharge Instructions  1. Call Dr. Jaspal Mcdermott for any problems or questions. 2. Contact the doctors office for follow up appointment as directed  3. Medication may cause drowsiness for several hours, therefore, do not drive or operate machinery for remainder of the day. 4. No alcohol today. 5. Ordinarily, you may resume regular diet and activity after exam unless otherwise specified by your physician. 6. Because of air put into your colon during exam, you may experience some abdominal distension, relieved by the passage of gas, for several hours. 7. Contact your physician if you have any of the following:  a. Excessive amount of bleeding - large amount when having a bowel movement. Occasional specks of blood with bowel movement would not be unusual.  b. Severe abdominal pain  c. Fever or Chills  Any additional instructions:  Repeat colonoscopy in 5 years, May restart Eliquis today      Instructions given to Mery Johnson and other family members. Gastrointestinal Esophagogastroduodenoscopy (EGD) - Upper Exam Discharge Instructions    1. Call Dr. Jaspal Mcdermott for any problems or questions. 2. Contact the doctor's office for follow up appointment as directed. 3. Medication may cause drowsiness for several hours, therefore, do not drive or operate machinery for remainder of the day. 4. No alcohol today. 5. Ordinarily, you may resume regular diet and activity after exam unless otherwise specified by your physician. 6. For mild soreness in your throat you may use Cepacol throat lozenges or warm salt-water gargles as needed. Any additional instructions: Instructions given to Mery Johnson and other family members.

## 2018-12-24 NOTE — PROCEDURES
Colonoscopy Procedure Note    Indications: Anemia, chronic blood loss    Anesthesia/Sedation: MAC IV     Pre-Procedure Physical:  Current Facility-Administered Medications   Medication Dose Route Frequency    lactated Ringers infusion 1,000 mL  1,000 mL IntraVENous CONTINUOUS      Latex; Bactrim [sulfamethoxazole-trimethoprim]; Lamictal [lamotrigine]; Pcn [penicillins]; and Tapentadol    Patient Vitals for the past 8 hrs:   BP Temp Pulse Resp SpO2 Height Weight   12/24/18 1016 139/82  95 16 100 %     12/24/18 0938 166/79  (!) 101 20 97 %     12/24/18 0923  98.2 °F (36.8 °C)    5' (1.524 m) 112.5 kg (248 lb)       Exam:    Airway: clear   Heart: normal S1and S2    Lungs: clear bilateral  Abdomen: soft, nontender, bowel sounds present and normal in all quads   Mental Status: awake, alert and oriented to person, place and time        Procedure Details      Informed consent was obtained for the procedure, including sedation. Risks of perforation, hemorrhage, adverse drug reaction and aspiration were discussed. The patient was placed in the left lateral decubitus position. Based on the pre-procedure assessment, including review of the patient's medical history, medications, allergies, and review of systems, she had been deemed to be an appropriate candidate for conscious sedation; she was therefore sedated with the medications listed below. The patient was monitored continuously with ECG tracing, pulse oximetry, blood pressure monitoring, and direct observations. A rectal examination was performed. The colonoscope was inserted into the rectum and advanced under direct vision to the cecum. The quality of the colonic preparation was fair. A careful inspection was made as the colonoscope was withdrawn, including a retroflexed view of the rectum; findings and interventions are described below. Appropriate photodocumentation was obtained.     Findings:   Mild sigmoid diverticulosis    Specimens: None    Estimated Blood Loss: 0 cc           Complications: None; patient tolerated the procedure well. Attending Attestation: I performed the procedure.     Impression:    Mild sigmoid diverticulosis    Recommendations:   Repeat colonoscopy in 5 years

## 2018-12-24 NOTE — H&P
[facility] Consult Note           Referring Physician:     Consult Date: 12/24/2018    Reason for Consult: Anemia    History of Present Illness:  Patient is a 80 y.o. female who is seen in consultation for anemia. Past Medical History:   Diagnosis Date    Acute renal failure ([address] July, 2014    The patient was admitted with acute renal failure secondary to volume depletion. She was found to have a gastric ulcer on admission.  Arthritis     Asthma     Asthma with acute exacerbation 9/28/2017    Bilateral pulmonary embolism [facility]) September, 2012    Restarted on anticoagulation for lifetime    Calculus of ureter May, 2015    CAP (community acquired pneumonia) October, 2012    RML pneumonia    Cellulitis of left lower extremity 11/1/2017    Chronic pain     fibromyalgia. back pain    Community acquired bacterial pneumonia 4/2/2018    CVA (cerebral infarction) January, 2012    Reportedly has mild left arm residual weakness    Gastric ulcer July, 2014          GERD (gastroesophageal reflux disease)     Gram-positive bacteremia 1/2 cx 12/26/2016    HCAP (healthcare-associated pneumonia) January, 2013    RLL pneumonia    HCAP (healthcare-associated pneumonia) February, 2013    RLL pneumonia    Hypertension     Left leg DVT ([address] 1991    On coumadin until 2008    Morbid obesity ([address]     Psychiatric disorder     PUD (peptic ulcer disease) ? ??    Sepsis due to pneumonia ([address] 4/2/2018    Stroke [facility])     2009 mini stroke    Thyroid disease       Past Surgical History:   Procedure Laterality Date    HX APPENDECTOMY      HX CHOLECYSTECTOMY      HX ENDOSCOPY  July, 2014    Gastric ulcers x 2    HX GASTRIC BYPASS      HX HYSTERECTOMY      HX ORTHOPAEDIC      rt knee growth, right shoulder, left thumb    HX OTHER SURGICAL      vagus nerve stimulator    HX TONSILLECTOMY        Family History   Problem Relation Age of Onset    Hypertension Mother     Cancer Father prostate    Heart Attack Father     Heart Disease Father         CAD    Hypertension Father     Hypertension Sister     Stroke Sister     Heart Disease Brother         CAD with CABG    Heart Attack Brother     Hypertension Brother      Social History     Occupational History    Occupation:      Comment: Disabled   Tobacco Use    Smoking status: Never Smoker    Smokeless tobacco: Never Used   Substance and Sexual Activity    Alcohol use: No     Alcohol/week: 0.0 oz    Drug use: No    Sexual activity: Not on file       Hospital Medications:  Current Facility-Administered Medications   Medication Dose Route Frequency    lactated Ringers infusion 1,000 mL  1,000 mL IntraVENous CONTINUOUS       Allergies: Allergies   Allergen Reactions    Latex Contact Dermatitis    Bactrim [Sulfamethoxazole-Trimethoprim] Nausea Only    Lamictal [Lamotrigine] Atopic Dermatitis    Pcn [Penicillins] Rash    Tapentadol Rash       Review of Systems:  A comprehensive review of systems was negative except for that written in the History of Present Illness. Objective:     Physical Exam:  Vitals:  Visit Vitals  /79   Pulse (!) 101   Temp 98.2 °F (36.8 °C)   Resp 20   Ht 5' (1.524 m)   Wt 112.5 kg (248 lb)   SpO2 97%   BMI 48.43 kg/m²       General: No acute distress. Skin:  Extremities and face reveal no rashes. No thorne erythema. No telangiectasias on the chest wall. HEENT: Sclerae anicteric. No oral ulcers. No abnormal pigmentation of the lips. The neck is supple. Cardiovascular: Regular rate and rhythm. No murmurs, gallops, or rubs. Respiratory:  Comfortable breathing  With no accessory muscle use. Clear breath sounds with no wheezes, rales, or rhonchi. GI:  Abdomen nondistended, soft, and nontender. Normal active bowel sounds. No enlargement of the liver or spleen. No masses palpable. Musculoskeletal:  No pitting edema of the lower legs. Extremities have good range of motion. Neurological:  Gross memory appears intact. Patient is alert and oriented. Psychiatric:  Mood appears appropriate with judgement intact. Lymphatic:  No cervical or supraclavicular adenopathy. Laboratory:    No results for input(s): WBC, RBC, HGB, HCT, PLT, HGBEXT, HCTEXT, PLTEXT in the last 72 hours. No results for input(s): GLU, NA, K, CL, CO2, BUN, CREA, CA in the last 72 hours. No results for input(s): PTP, INR, APTT in the last 72 hours. No lab exists for component: INREXT  No results for input(s): TBIL, CBIL, SGOT, GPT, AP, ALB, TP, AML, LPSE in the last 72 hours.     Assessment:       A 80 y.o. female with anemia      Plan:       EGD and colonoscopy      Signed By: Kaz Becerril MD     December 24, 2018

## 2018-12-24 NOTE — PROCEDURES
Endoscopic Gastroduodenoscopy Procedure Note    Indications: Anemia, chronic blood loss    Anesthesia/Sedation: MAC IV     Pre-Procedure Physical:    Current Facility-Administered Medications   Medication Dose Route Frequency    lactated Ringers infusion 1,000 mL  1,000 mL IntraVENous CONTINUOUS      Latex; Bactrim [sulfamethoxazole-trimethoprim]; Lamictal [lamotrigine]; Pcn [penicillins]; and Tapentadol    Patient Vitals for the past 8 hrs:   BP Temp Pulse Resp SpO2 Height Weight   12/24/18 0938 166/79  (!) 101 20 97 %     12/24/18 0923  98.2 °F (36.8 °C)    5' (1.524 m) 112.5 kg (248 lb)       Exam      Airway: clear   Heart: normal S1and S2    Lungs: clear bilateral  Abdomen: soft, nontender, bowel sounds present and normal in all quads   Mental Status: awake, alert and oriented to person, place and time          Procedure Details     Informed consent was obtained for the procedure, including conscious sedation. Risks of pancreatitis, infection, perforation, hemorrhage, adverse drug reaction and aspiration were discussed. The patient was placed in the left lateral decubitus position. Based on the pre-procedure assessment, including review of the patient's medical history, medications, allergies, and review of systems, she had been deemed to be an appropriate candidate for conscious sedation; she was therefore sedated with the medications listed below. She was monitored continuously with ECG tracing, pulse oximetry, blood pressure monitoring, and direct observation. The EGD gastroscope was inserted into the mouth and advanced under direct vision to the second portion of the duodenum. A careful inspection was made as the gastroscope was withdrawn, including a retroflexed view of the proximal stomach; findings and interventions are described below. Appropriate photodocumentation was obtained. Findings:   Esophagus- Normal.  Stomach- Prior gastric surgery.   Duodenum- Normal.    Therapies: None    Specimens: None    Estimated Blood Loss: 0 cc           Complications:   None; patient tolerated the procedure well. Attending Attestation:  I performed the procedure. Impression:    Prior gastric surgery. Previous gastric ulcer has resolved. Recommendations:  Colonoscopy to follow.

## 2018-12-24 NOTE — ANESTHESIA PREPROCEDURE EVALUATION
Anesthetic History               Review of Systems / Medical History  Patient summary reviewed    Pulmonary            Asthma        Neuro/Psych       CVA (2012, mild L arm weakness)  Psychiatric history (Bipolar affective disorder )     Cardiovascular    Hypertension                   GI/Hepatic/Renal     GERD    Renal disease: stones       Endo/Other      Hypothyroidism  Morbid obesity     Other Findings            Physical Exam    Airway  Mallampati: II    Neck ROM: normal range of motion, short neck   Mouth opening: Normal     Cardiovascular  Regular rate and rhythm,  S1 and S2 normal,  no murmur, click, rub, or gallop             Dental         Pulmonary  Breath sounds clear to auscultation               Abdominal         Other Findings            Anesthetic Plan    ASA: 3  Anesthesia type: total IV anesthesia            Anesthetic plan and risks discussed with: Patient

## 2019-08-20 NOTE — DISCHARGE SUMMARY
[facility]: Discharge Summary    Jae Mario    Admission date:9/28/2017    Discharge date: 10/2/2017    Admitting [address] [place] exacerbation    Discharge Diagnoses:    Hospital Problems  Date Reviewed: 9/28/2017          Codes Class Noted POA    Atelectasis ICD-10-CM: J98.11  ICD-9-CM: 518.0  10/2/2017 Unknown        Restrictive lung disease (Chronic) ICD-10-CM: J98.4  ICD-9-CM: 518.89  9/29/2017 Unknown        Morbid obesity - sedentary lifestyle (Chronic) ICD-10-CM: E66.01  ICD-9-CM: 278.01  9/28/2017 Yes        Hypothyroidism (Chronic) ICD-10-CM: E03.9  ICD-9-CM: 244.9  9/28/2017 Yes        Asthma (Chronic) ICD-10-CM: J45.909  ICD-9-CM: 493.90  9/28/2017 Yes        CAP (community acquired pneumonia) ICD-10-CM: J18.9  ICD-9-CM: 119  9/28/2017 Yes        * (Principal)Asthma with acute exacerbation ICD-10-CM: J45.901  ICD-9-CM: 493.92  9/28/2017 Yes        Hx of pulmonary embolus (Chronic) ICD-10-CM: Z86.711  ICD-9-CM: V12.55  9/28/2017 Yes    Overview Signed 9/28/2017  3:18 PM by FREDO Flynn                   Consultants:  none    Studies/Procedures:  * No surgery found *  CXR  IMPRESSION: Negative for acute abnormality.       Recent Results (from the past 24 hour(s))   GLUCOSE, POC    Collection Time: 10/01/17  4:13 PM   Result Value Ref Range    Glucose (POC) 120 (H) 65 - 100 mg/dL   GLUCOSE, POC    Collection Time: 10/02/17  5:17 AM   Result Value Ref Range    Glucose (POC) 106 (H) 65 - 100 mg/dL   GLUCOSE, POC    Collection Time: 10/02/17 12:02 PM   Result Value Ref Range    Glucose (POC) 136 (H) 65 - 100 mg/dL         Hospital course:  Patient is a 78 y.o.  female presented with shortness of breath and wheezing.  She went to PCP and was prescribed antibiotics and steroids but felt worse and called EMS. Ozzy Rubio has been at her usual states of health and has been able to ambulate in the home. Ozzy Rubio is not on home O2 and states she has been compliant with chronic home meds.    CXR without infiltrate.  She was admitted for asthma exacerbation with possible CAP. She was given IV steroids, BD and antibiotics. She has restrictive lung disease with morbid obesity and chronic pain. We limited oversedation. Weight loss would be ideal.    Of note, she was also admitted 7/25-7/27/17 for asthma exacerbation, treated with steroids and discharged on Doxycycline.  She is morbidly obese, has chronic left vocal cord paralysis. GERD, hx of PE and on lifelong anticoagulation, previous gastric bypass 13 years ago, Fe def anemia and has been on iron infusions as well pneumonia in 2012, 2013 and 2017. She is on NC, afebrile and will plan to de-escalate to doxycycline po alone to complete a total of 8 days. She is on chronic norco and xanax. She is stable for discharge. She has been converted to oral medications and will plan to follow up in our office and will need PSG after discharge. SHe will go home on oxygen after discharge         Room air: SpO2 with O2 and liter flow   Resting SpO2  88%  93% on 2L   Ambulating SpO2  84%  91% on 3L         Completed by:   Madelin Savage              Discharge Exam:  Visit Vitals    /81 (BP 1 Location: Left arm, BP Patient Position: At rest)    Pulse 78    Temp 98.5 °F (36.9 °C)    Resp 18    Wt 254 lb 8 oz (115.4 kg)    SpO2 92%    BMI 48.15 kg/m2       General appearance: alert, cooperative, morbidly obese  Respiratory: clear to auscultation bilaterally on 1 lpm NC  Cardiovascular: S1, S2, no murmur, click, rub or gallop   GI: soft, non-tender. + Bowel sounds X 4 Q.  rounded  Musculoskeletal: no cyanosis or edema   Skin: Skin color, texture, turgor appropriate. No lesions noted. Neurologic: Alert and oriented X 3- appropriate.     Discharge Medications:   Current Discharge Medication List      START taking these medications    Details   doxycycline (VIBRAMYCIN) 100 mg capsule Take 1 Cap by mouth every twelve (12) hours for 3 days.  Qty: 6 Cap, Refills: 0      nystatin (MYCOSTATIN) powder Apply  to affected area two (2) times a day. Qty: 1 Bottle, Refills: 0      predniSONE (DELTASONE) 20 mg tablet 40mg per day for 3 days, then 20mg per day for 3 days then 10mg per day for 3 days, then stop. Qty: 11 Tab, Refills: 0         CONTINUE these medications which have NOT CHANGED    Details   sucralfate (CARAFATE) 1 gram tablet Take 1 g by mouth four (4) times daily. Biotin 2,500 mcg cap Take  by mouth. UBIDECARENONE/VITAMIN E MIXED (COQ10  PO) Take  by mouth.      gabapentin (NEURONTIN) 300 mg capsule Take 300 mg by mouth three (3) times daily. ipratropium (ATROVENT) 0.03 % nasal spray 2 Sprays every twelve (12) hours. pregabalin (LYRICA) 150 mg capsule Take  by mouth.      montelukast (SINGULAIR) 10 mg tablet Take 10 mg by mouth daily. naloxegol (MOVANTIK) 25 mg tab tablet Take  by mouth Daily (before breakfast). albuterol sulfate (PROVENTIL;VENTOLIN) 2.5 mg/0.5 mL nebu nebulizer solution 0.5 mL by Nebulization route four (4) times daily. Qty: 120 mL, Refills: 11    Comments: DX: 799.02 hypoxemia, 518.83 chronic respitory failure      mometasone-formoterol (DULERA) 200-5 mcg/actuation HFA inhaler Take 2 Puffs by inhalation two (2) times a day. Qty: 3 Inhaler, Refills: 4      albuterol (PROVENTIL HFA, VENTOLIN HFA, PROAIR HFA) 90 mcg/actuation inhaler Take 1 Puff by inhalation every four (4) hours as needed for Wheezing. Qty: 1 Inhaler, Refills: 5      carvedilol (COREG) 12.5 mg tablet Take 12.5 mg by mouth two (2) times daily (with meals). apixaban (ELIQUIS) 2.5 mg tablet Take 2.5 mg by mouth two (2) times a day. Indications: PULMONARY THROMBOEMBOLISM PREVENTION      atorvastatin (LIPITOR) 10 mg tablet Take 2 Tabs by mouth nightly. Qty: 30 Tab, Refills: 11      ALPRAZolam (XANAX) 2 mg tablet Take 1 Tab by mouth three (3) times daily as needed for Anxiety. Max Daily Amount: 6 mg.   Qty: 30 Tab, Refills: 0      mirtazapine (REMERON) 30 mg tablet Take 45 mg by mouth nightly. ondansetron (ZOFRAN ODT) 8 mg disintegrating tablet Take 8 mg by mouth every eight (8) hours as needed for Nausea. oxybutynin chloride XL (DITROPAN XL) 10 mg CR tablet Take 10 mg by mouth daily. Magnesium Oxide 500 mg cap Take 500 mg by mouth.      lisinopril (PRINIVIL, ZESTRIL) 40 mg tablet Take 1 Tab by mouth daily. Qty: 30 Tab, Refills: 5      POTASSIUM CHLORIDE SR 10 MEQ TAB Take 1 Tab by mouth daily. Cholecalciferol, Vitamin D3, (VITAMIN D3) 1,000 unit cap Take  by mouth. folic acid 175 mcg tablet Take 800 mcg by mouth daily. nicotinic acid (NIACIN) 500 mg tablet Take 500 mg by mouth Daily (before breakfast). pantoprazole (PROTONIX) 40 mg tablet Take 1 Tab by mouth Before breakfast and dinner. Qty: 60 Tab, Refills: 2      FERROUS FUMARATE/VIT BCOMP&C (SUPER B COMPLEX PO) Take 1 Tab by mouth daily. NEBULIZER by Does Not Apply route. ziprasidone (GEODON) 80 mg capsule Take 160 mg by mouth nightly. SUMATRIPTAN SUCCINATE (IMITREX PO) take 100 mg by mouth as needed. levothyroxine (SYNTHROID) 125 mcg tablet Take 112 mcg by mouth every evening. HYDROcodone-acetaminophen (NORCO)  mg tablet Take 1 Tab by mouth every six (6) hours as needed for Pain. STOP taking these medications       verapamil ER (VERELAN) 120 mg ER capsule Comments:   Reason for Stopping:                 Activity: as tolerated, limited with fibromyalgia    Diet Restrictions: cardiac    Disposition:  stable    Followup/Outpt Studies        Home with office follow up with [facility] in 2-3 weeks  PSG after discharge- ordered outpatient  Weight loss     Nick Blackwell NP      Lungs:  Fairly clear  Heart:  RRR with no Murmur/Rubs/Gallops    Additional Comments:  Doing better. Agree with need for PSG as OP. I have spoken with and examined the patient.  I agree with the above assessment and plan as documented.     Lourdes Chavez MD fall down stairs

## 2019-12-12 ENCOUNTER — APPOINTMENT (OUTPATIENT)
Dept: GENERAL RADIOLOGY | Age: 69
End: 2019-12-12
Attending: EMERGENCY MEDICINE
Payer: MEDICARE

## 2019-12-12 ENCOUNTER — HOSPITAL ENCOUNTER (EMERGENCY)
Age: 69
Discharge: HOME | End: 2019-12-12
Attending: EMERGENCY MEDICINE
Payer: MEDICARE

## 2019-12-12 VITALS
RESPIRATION RATE: 18 BRPM | DIASTOLIC BLOOD PRESSURE: 80 MMHG | TEMPERATURE: 98.4 F | WEIGHT: 220 LBS | HEIGHT: 63 IN | HEART RATE: 80 BPM | BODY MASS INDEX: 38.98 KG/M2 | SYSTOLIC BLOOD PRESSURE: 170 MMHG | OXYGEN SATURATION: 95 %

## 2019-12-12 DIAGNOSIS — R05.9 COUGH: Primary | ICD-10-CM

## 2019-12-12 LAB
ALBUMIN SERPL-MCNC: 2.9 G/DL (ref 3.2–4.6)
ALBUMIN/GLOB SERPL: 0.7 {RATIO} (ref 1.2–3.5)
ALP SERPL-CCNC: 94 U/L (ref 50–136)
ALT SERPL-CCNC: 19 U/L (ref 12–65)
ANION GAP SERPL CALC-SCNC: 8 MMOL/L (ref 7–16)
AST SERPL-CCNC: 32 U/L (ref 15–37)
BASOPHILS # BLD: 0 K/UL (ref 0–0.2)
BASOPHILS NFR BLD: 1 % (ref 0–2)
BILIRUB SERPL-MCNC: 0.3 MG/DL (ref 0.2–1.1)
BUN SERPL-MCNC: 13 MG/DL (ref 8–23)
CALCIUM SERPL-MCNC: 8.9 MG/DL (ref 8.3–10.4)
CHLORIDE SERPL-SCNC: 109 MMOL/L (ref 98–107)
CO2 SERPL-SCNC: 24 MMOL/L (ref 21–32)
CREAT SERPL-MCNC: 1.14 MG/DL (ref 0.6–1)
D DIMER PPP FEU-MCNC: 0.4 UG/ML(FEU)
DIFFERENTIAL METHOD BLD: ABNORMAL
EOSINOPHIL # BLD: 0.1 K/UL (ref 0–0.8)
EOSINOPHIL NFR BLD: 2 % (ref 0.5–7.8)
ERYTHROCYTE [DISTWIDTH] IN BLOOD BY AUTOMATED COUNT: 14.7 % (ref 11.9–14.6)
FLUAV AG NPH QL IA: NEGATIVE
FLUBV AG NPH QL IA: NEGATIVE
GLOBULIN SER CALC-MCNC: 4.1 G/DL (ref 2.3–3.5)
GLUCOSE SERPL-MCNC: 99 MG/DL (ref 65–100)
HCT VFR BLD AUTO: 38.8 % (ref 35.8–46.3)
HGB BLD-MCNC: 12.9 G/DL (ref 11.7–15.4)
IMM GRANULOCYTES # BLD AUTO: 0 K/UL (ref 0–0.5)
IMM GRANULOCYTES NFR BLD AUTO: 0 % (ref 0–5)
LACTATE BLD-SCNC: 0.97 MMOL/L (ref 0.5–1.9)
LYMPHOCYTES # BLD: 1.3 K/UL (ref 0.5–4.6)
LYMPHOCYTES NFR BLD: 28 % (ref 13–44)
MCH RBC QN AUTO: 30.1 PG (ref 26.1–32.9)
MCHC RBC AUTO-ENTMCNC: 33.2 G/DL (ref 31.4–35)
MCV RBC AUTO: 90.7 FL (ref 79.6–97.8)
MONOCYTES # BLD: 1 K/UL (ref 0.1–1.3)
MONOCYTES NFR BLD: 20 % (ref 4–12)
NEUTS SEG # BLD: 2.4 K/UL (ref 1.7–8.2)
NEUTS SEG NFR BLD: 50 % (ref 43–78)
NRBC # BLD: 0 K/UL (ref 0–0.2)
PLATELET # BLD AUTO: 261 K/UL (ref 150–450)
PMV BLD AUTO: 9.5 FL (ref 9.4–12.3)
POTASSIUM SERPL-SCNC: 4.1 MMOL/L (ref 3.5–5.1)
PROCALCITONIN SERPL-MCNC: <0.05 NG/ML
PROT SERPL-MCNC: 7 G/DL (ref 6.3–8.2)
RBC # BLD AUTO: 4.28 M/UL (ref 4.05–5.2)
SODIUM SERPL-SCNC: 141 MMOL/L (ref 136–145)
SPECIMEN SOURCE: NORMAL
TROPONIN I SERPL-MCNC: <0.02 NG/ML (ref 0.02–0.05)
WBC # BLD AUTO: 4.9 K/UL (ref 4.3–11.1)

## 2019-12-12 PROCEDURE — 84145 PROCALCITONIN (PCT): CPT

## 2019-12-12 PROCEDURE — 99285 EMERGENCY DEPT VISIT HI MDM: CPT | Performed by: EMERGENCY MEDICINE

## 2019-12-12 PROCEDURE — 93005 ELECTROCARDIOGRAM TRACING: CPT | Performed by: EMERGENCY MEDICINE

## 2019-12-12 PROCEDURE — 84484 ASSAY OF TROPONIN QUANT: CPT

## 2019-12-12 PROCEDURE — 85379 FIBRIN DEGRADATION QUANT: CPT

## 2019-12-12 PROCEDURE — 71046 X-RAY EXAM CHEST 2 VIEWS: CPT

## 2019-12-12 PROCEDURE — 87804 INFLUENZA ASSAY W/OPTIC: CPT

## 2019-12-12 PROCEDURE — 83605 ASSAY OF LACTIC ACID: CPT

## 2019-12-12 PROCEDURE — 96360 HYDRATION IV INFUSION INIT: CPT | Performed by: EMERGENCY MEDICINE

## 2019-12-12 PROCEDURE — 85025 COMPLETE CBC W/AUTO DIFF WBC: CPT

## 2019-12-12 PROCEDURE — 74011250636 HC RX REV CODE- 250/636: Performed by: EMERGENCY MEDICINE

## 2019-12-12 PROCEDURE — 80053 COMPREHEN METABOLIC PANEL: CPT

## 2019-12-12 RX ORDER — QUETIAPINE FUMARATE 400 MG/1
200 TABLET, FILM COATED ORAL
COMMUNITY

## 2019-12-12 RX ORDER — PREDNISONE 20 MG/1
60 TABLET ORAL DAILY
Qty: 12 TAB | Refills: 0 | Status: SHIPPED | OUTPATIENT
Start: 2019-12-12 | End: 2019-12-16

## 2019-12-12 RX ADMIN — SODIUM CHLORIDE 1000 ML: 900 INJECTION, SOLUTION INTRAVENOUS at 19:11

## 2019-12-12 NOTE — ED TRIAGE NOTES
Pt reports cough and shob with chest pain that started last night. Pt has hx of asthma. Pt states she has tried her inhalers for no relief.

## 2019-12-13 LAB
ATRIAL RATE: 107 BPM
ATRIAL RATE: 94 BPM
CALCULATED P AXIS, ECG09: 31 DEGREES
CALCULATED P AXIS, ECG09: 45 DEGREES
CALCULATED R AXIS, ECG10: -48 DEGREES
CALCULATED R AXIS, ECG10: -84 DEGREES
CALCULATED T AXIS, ECG11: 14 DEGREES
CALCULATED T AXIS, ECG11: 4 DEGREES
DIAGNOSIS, 93000: NORMAL
DIAGNOSIS, 93000: NORMAL
P-R INTERVAL, ECG05: 164 MS
P-R INTERVAL, ECG05: 188 MS
Q-T INTERVAL, ECG07: 350 MS
Q-T INTERVAL, ECG07: 352 MS
QRS DURATION, ECG06: 78 MS
QRS DURATION, ECG06: 80 MS
QTC CALCULATION (BEZET), ECG08: 440 MS
QTC CALCULATION (BEZET), ECG08: 467 MS
VENTRICULAR RATE, ECG03: 107 BPM
VENTRICULAR RATE, ECG03: 94 BPM

## 2019-12-13 NOTE — ED NOTES
I have reviewed discharge instructions with the patient and caregiver. The patient and caregiver verbalized understanding. Patient left ED via Discharge Method: ambulatory to Home with (insert name of family/friend, self, transport family). Opportunity for questions and clarification provided. Patient given 1 scripts. To continue your aftercare when you leave the hospital, you may receive an automated call from our care team to check in on how you are doing. This is a free service and part of our promise to provide the best care and service to meet your aftercare needs.  If you have questions, or wish to unsubscribe from this service please call [phone]. Thank you for Choosing our [facility].

## 2019-12-13 NOTE — ED PROVIDER NOTES
Patient is on Eliquis for PEs. Gets recurrent pneumonias but none recently. For the past couple days has been feeling bad with chest pain cough and shortness of breath starting last night. Symptoms worse today so came in. No sputum production. Mild nausea but no vomiting. No abdominal pain. The history is provided by the patient. No  was used. Shortness of Breath   This is a new problem. The problem occurs continuously. The current episode started more than 2 days ago. The problem has been gradually worsening. Associated symptoms include cough and chest pain (bilateral for a day or so. ). Pertinent negatives include no fever, no headaches, no rhinorrhea, no sore throat, no neck pain, no sputum production, no wheezing, no vomiting, no abdominal pain, no rash, no leg pain and no leg swelling. She has tried nothing for the symptoms. Associated medical issues include asthma and pneumonia. Past Medical History:   Diagnosis Date    Acute renal failure ([facility] [address] July, 2014    The patient was admitted with acute renal failure secondary to volume depletion. She was found to have a gastric ulcer on admission.  Arthritis     Asthma     Asthma with acute exacerbation 9/28/2017    Bilateral pulmonary embolism [facility]) September, 2012    Restarted on anticoagulation for lifetime    Calculus of ureter May, 2015    CAP (community acquired pneumonia) October, 2012    RML pneumonia    Cellulitis of left lower extremity 11/1/2017    Chronic pain     fibromyalgia.  back pain    Community acquired bacterial pneumonia 4/2/2018    CVA (cerebral infarction) January, 2012    Reportedly has mild left arm residual weakness    Gastric ulcer July, 2014          GERD (gastroesophageal reflux disease)     Gram-positive bacteremia 1/2 cx 12/26/2016    HCAP (healthcare-associated pneumonia) January, 2013    RLL pneumonia    HCAP (healthcare-associated pneumonia) February, 2013    RLL pneumonia    Hypertension     Left leg DVT ([facility] [address] 1991    On coumadin until 2008    Morbid obesity ([facility] [address]     Psychiatric disorder     PUD (peptic ulcer disease) ? ??    Sepsis due to pneumonia ([facility] [address] 4/2/2018    Stroke ([facility] [address]     2009 mini stroke    Thyroid disease        Past Surgical History:   Procedure Laterality Date    COLONOSCOPY N/A 12/24/2018    COLONOSCOPY / BMI 52 performed by Ivon Chirinos MD at [facility] ENDOSCOPY    HX APPENDECTOMY      HX CHOLECYSTECTOMY      HX ENDOSCOPY  July, 2014    Gastric ulcers x 2    HX GASTRIC BYPASS      HX HYSTERECTOMY      HX ORTHOPAEDIC      rt knee growth, right shoulder, left thumb    HX OTHER SURGICAL      vagus nerve stimulator    HX TONSILLECTOMY           Family History:   Problem Relation Age of Onset    Hypertension Mother     Cancer Father         prostate    Heart Attack Father     Heart Disease Father         CAD    Hypertension Father     Hypertension Sister     Stroke Sister     Heart Disease Brother         CAD with CABG    Heart Attack Brother     Hypertension Brother        Social History     Socioeconomic History    Marital status:      Spouse name: Not on file    Number of children: Not on file    Years of education: Not on file    Highest education level: Not on file   Occupational History    Occupation:      Comment: Disabled   Social Needs    Financial resource strain: Not on file    Food insecurity:     Worry: Not on file     Inability: Not on file    Transportation needs:     Medical: Not on file     Non-medical: Not on file   Tobacco Use    Smoking status: Never Smoker    Smokeless tobacco: Never Used   Substance and Sexual Activity    Alcohol use: No     Alcohol/week: 0.0 standard drinks    Drug use: No    Sexual activity: Not on file   Lifestyle    Physical activity:     Days per week: Not on file     Minutes per session: Not on file    Stress: Not on file   Relationships    Social connections:     Talks on phone: Not on file     Gets together: Not on file     Attends Latter-day service: Not on file     Active member of club or organization: Not on file     Attends meetings of clubs or organizations: Not on file     Relationship status: Not on file    Intimate partner violence:     Fear of current or ex partner: Not on file     Emotionally abused: Not on file     Physically abused: Not on file     Forced sexual activity: Not on file   Other Topics Concern    Not on file   Social History Narrative    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 , one child who is healthy. Has always lived in [address]. Dog as a pet. No history of pet bird. There is no significant environmental or industrial exposure. There is no known exposure to TB. ALLERGIES: Latex; Bactrim [sulfamethoxazole-trimethoprim]; Lamictal [lamotrigine]; Pcn [penicillins]; and Tapentadol    Review of Systems   Constitutional: Negative for chills and fever. HENT: Negative for rhinorrhea and sore throat. Eyes: Negative for pain and redness. Respiratory: Positive for cough and shortness of breath. Negative for sputum production, chest tightness and wheezing. Cardiovascular: Positive for chest pain (bilateral for a day or so. ). Negative for leg swelling. Gastrointestinal: Positive for nausea. Negative for abdominal pain, diarrhea and vomiting. Genitourinary: Negative for dysuria and hematuria. Musculoskeletal: Negative for back pain, gait problem, neck pain and neck stiffness. Skin: Negative for color change and rash. Neurological: Negative for weakness, numbness and headaches. Vitals:    12/12/19 1847   BP: (!) 151/94   Pulse: (!) 114   Resp: 16   Temp: 98.4 °F (36.9 °C)   SpO2: 97%   Weight: 99.8 kg (220 lb)   Height: 5' 3\" (1.6 m)            Physical Exam  Constitutional:       Appearance: Normal appearance. She is well-developed. HENT:      Head: Normocephalic and atraumatic. Neck:      Musculoskeletal: Normal range of motion and neck supple. Cardiovascular:      Rate and Rhythm: Regular rhythm. Tachycardia present. Pulmonary:      Effort: Pulmonary effort is normal. No respiratory distress. Breath sounds: Normal breath sounds. No wheezing. Abdominal:      General: Bowel sounds are normal.      Palpations: Abdomen is soft. Tenderness: There is no tenderness. Musculoskeletal: Normal range of motion. General: No swelling. Skin:     General: Skin is warm and dry. Neurological:      General: No focal deficit present. Mental Status: She is alert and oriented to person, place, and time. MDM  Number of Diagnoses or Management Options  Cough:   Diagnosis management comments: Patient with history of pneumonia and PEs in the past.  On Eliquis. Having cough congestion and shortness of breath. Chest x-ray pending. Will have oncoming doc follow-up on results.        Amount and/or Complexity of Data Reviewed  Clinical lab tests: ordered and reviewed (Results for orders placed or performed during the hospital encounter of 12/12/19  -INFLUENZA A & B AG (RAPID TEST)       Result                      Value             Ref Range           Influenza A Ag              NEGATIVE          NEG                 Influenza B Ag              NEGATIVE          NEG                 Source                                                        NASOPHARYNGEAL  -CBC WITH AUTOMATED DIFF       Result                      Value             Ref Range           WBC                         4.9               4.3 - 11.1 K*       RBC                         4.28              4.05 - 5.2 M*       HGB                         12.9              11.7 - 15.4 *       HCT                         38.8              35.8 - 46.3 %       MCV                         90.7              79.6 - 97.8 *       MCH                         30.1 26.1 - 32.9 *       MCHC                        33.2              31.4 - 35.0 *       RDW                         14.7 (H)          11.9 - 14.6 %       PLATELET                    261               150 - 450 K/*       MPV                         9.5               9.4 - 12.3 FL       ABSOLUTE NRBC               0.00              0.0 - 0.2 K/*       DF                          AUTOMATED                             NEUTROPHILS                 50                43 - 78 %           LYMPHOCYTES                 28                13 - 44 %           MONOCYTES                   20 (H)            4.0 - 12.0 %        EOSINOPHILS                 2                 0.5 - 7.8 %         BASOPHILS                   1                 0.0 - 2.0 %         IMMATURE GRANULOCYTES       0                 0.0 - 5.0 %         ABS. NEUTROPHILS            2.4               1.7 - 8.2 K/*       ABS. LYMPHOCYTES            1.3               0.5 - 4.6 K/*       ABS. MONOCYTES              1.0               0.1 - 1.3 K/*       ABS. EOSINOPHILS            0.1               0.0 - 0.8 K/*       ABS. BASOPHILS              0.0               0.0 - 0.2 K/*       ABS. IMM.  GRANS.            0.0               0.0 - 0.5 K/*  -METABOLIC PANEL, COMPREHENSIVE       Result                      Value             Ref Range           Sodium                      141               136 - 145 mm*       Potassium                   4.1               3.5 - 5.1 mm*       Chloride                    109 (H)           98 - 107 mmo*       CO2                         24                21 - 32 mmol*       Anion gap                   8                 7 - 16 mmol/L       Glucose                     99                65 - 100 mg/*       BUN                         13                8 - 23 MG/DL        Creatinine                  1.14 (H)          0.6 - 1.0 MG*       GFR est AA                  >60               >60 ml/min/1*       GFR est non-AA              50 (L) >60 ml/min/1*       Calcium                     8.9               8.3 - 10.4 M*       Bilirubin, total            0.3               0.2 - 1.1 MG*       ALT (SGPT)                  19                12 - 65 U/L         AST (SGOT)                  32                15 - 37 U/L         Alk.  phosphatase            94                50 - 136 U/L        Protein, total              7.0               6.3 - 8.2 g/*       Albumin                     2.9 (L)           3.2 - 4.6 g/*       Globulin                    4.1 (H)           2.3 - 3.5 g/*       A-G Ratio                   0.7 (L)           1.2 - 3.5      -TROPONIN I       Result                      Value             Ref Range           Troponin-I, Qt.             <0.02 (L)         0.02 - 0.05 *  -D DIMER       Result                      Value             Ref Range           D DIMER                     0.40              <0.56 ug/ml(*  -PROCALCITONIN       Result                      Value             Ref Range           Procalcitonin               <0.05             ng/mL          -POC LACTIC ACID       Result                      Value             Ref Range           Lactic Acid (POC)           0.97              0.5 - 1.9 mm*  -EKG, 12 LEAD, INITIAL       Result                      Value             Ref Range           Ventricular Rate            107               BPM                 Atrial Rate                 107               BPM                 P-R Interval                164               ms                  QRS Duration                78                ms                  Q-T Interval                350               ms                  QTC Calculation (Bezet)     467               ms                  Calculated P Axis           45                degrees             Calculated R Axis           -84               degrees             Calculated T Axis           4                 degrees             Diagnosis Sinus tachycardia   Left anterior fascicular block   Inferior infarct , age undetermined   Anterior infarct , age undetermined   Abnormal ECG   When compared with ECG of 23-AUG-2018 08:40,   Significant changes have occurred     )  Tests in the radiology section of CPT®: ordered and reviewed (Xr Chest Pa Lat    Result Date: 12/12/2019  PA and lateral chest radiographs History: luz maria, 69 years Female Comparison: Chest radiograph August 24, 2018 Findings: A stimulator device obscures part of the left hemithorax, unchanged. Normal cardiomediastinal silhouette. Persistent low lung volumes. Minimal dependent subsegmental atelectasis bilateral lung bases. No evidence of pneumothorax, pleural effusion, or air space opacity. Visualized soft tissue and osseous structures otherwise unremarkable. Impression:   No significant interval change.    )  Tests in the medicine section of CPT®: ordered and reviewed  Independent visualization of images, tracings, or specimens: yes    Risk of Complications, Morbidity, and/or Mortality  Presenting problems: high  Diagnostic procedures: high  Management options: moderate  General comments: I personally reviewed the patient's vital signs, laboratory tests, and/or radiological findings. I discussed these findings with the patient and their significance. I answered all questions and gave the patient clear return precautions. The patient was discharged from the emergency department in stable condition        Patient Progress  Patient progress: stable    ED Course as of Dec 12 2051   Thu Dec 12, 2019   2023 I have assumed care of this patient from Dr. Maryruth Collet awaiting completion of her blood tests and possible CT of her chest if significantly elevated d-dimer    [JS]   2046 Reviewing the patient's blood work and she has a normal CBC and CMP. D-dimer is negative. Troponin is negative. Negative influenza. Vital signs have normalized.     [JS]      ED Course User Index  [JS] Elva Berg Luis F Stoll MD       Procedures      EKG: nonspecific ST and T waves changes, sinus tachycardia. Rate 107. Results Include:    Recent Results (from the past 24 hour(s))   EKG, 12 LEAD, INITIAL    Collection Time: 12/12/19  6:51 PM   Result Value Ref Range    Ventricular Rate 107 BPM    Atrial Rate 107 BPM    P-R Interval 164 ms    QRS Duration 78 ms    Q-T Interval 350 ms    QTC Calculation (Bezet) 467 ms    Calculated P Axis 45 degrees    Calculated R Axis -84 degrees    Calculated T Axis 4 degrees    Diagnosis       Sinus tachycardia  Left anterior fascicular block  Inferior infarct , age undetermined  Anterior infarct , age undetermined  Abnormal ECG  When compared with ECG of 23-AUG-2018 08:40,  Significant changes have occurred     CBC WITH AUTOMATED DIFF    Collection Time: 12/12/19  6:58 PM   Result Value Ref Range    WBC 4.9 4.3 - 11.1 K/uL    RBC 4.28 4.05 - 5.2 M/uL    HGB 12.9 11.7 - 15.4 g/dL    HCT 38.8 35.8 - 46.3 %    MCV 90.7 79.6 - 97.8 FL    MCH 30.1 26.1 - 32.9 PG    MCHC 33.2 31.4 - 35.0 g/dL    RDW 14.7 (H) 11.9 - 14.6 %    PLATELET 213 321 - 030 K/uL    MPV 9.5 9.4 - 12.3 FL    ABSOLUTE NRBC 0.00 0.0 - 0.2 K/uL    DF AUTOMATED      NEUTROPHILS 50 43 - 78 %    LYMPHOCYTES 28 13 - 44 %    MONOCYTES 20 (H) 4.0 - 12.0 %    EOSINOPHILS 2 0.5 - 7.8 %    BASOPHILS 1 0.0 - 2.0 %    IMMATURE GRANULOCYTES 0 0.0 - 5.0 %    ABS. NEUTROPHILS 2.4 1.7 - 8.2 K/UL    ABS. LYMPHOCYTES 1.3 0.5 - 4.6 K/UL    ABS. MONOCYTES 1.0 0.1 - 1.3 K/UL    ABS. EOSINOPHILS 0.1 0.0 - 0.8 K/UL    ABS. BASOPHILS 0.0 0.0 - 0.2 K/UL    ABS. IMM.  GRANS. 0.0 0.0 - 0.5 K/UL   POC LACTIC ACID    Collection Time: 12/12/19  7:31 PM   Result Value Ref Range    Lactic Acid (POC) 0.97 0.5 - 1.9 mmol/L

## 2019-12-13 NOTE — DISCHARGE INSTRUCTIONS
There is no significant abnormality on your tests today. Your vital signs are normal.  Chest x-ray and labs are all normal.  It is likely that this is a viral process. Use the prednisone for symptom control but follow-up with your primary care doctor in the next week for reevaluation.

## 2020-03-19 PROBLEM — D83.9 CVID (COMMON VARIABLE IMMUNODEFICIENCY) (HCC): Status: ACTIVE | Noted: 2020-03-19

## 2020-03-19 PROBLEM — Z01.811 PREOP PULMONARY/RESPIRATORY EXAM: Status: ACTIVE | Noted: 2020-03-19

## 2020-03-19 PROBLEM — J45.30 MILD PERSISTENT ASTHMA WITHOUT COMPLICATION: Status: ACTIVE | Noted: 2017-09-28

## 2020-07-04 NOTE — PROGRESS NOTES
Hourly rounds performed; all pt needs met. Pt without complaints. Bed L/L, SR up x2; call light/ personal items within reach. Bedside shift report to Aleah Wen RN. Patient Education     Urinary Tract Infections in Women    Urinary tract infections (UTIs) are most often caused by bacteria. These bacteria enter the urinary tract. The bacteria may come from outside the body. Or they may travel from the skin outside the rectum or vagina into the urethra. Female anatomy makes it easy for bacteria from the bowel to enter a woman’s urinary tract, which is the most common source of UTI. This means women develop UTIs more often than men. Pain in or around the urinary tract is a common UTI symptom. But the only way to know for sure if you have a UTI for the healthcare provider to test your urine. The two tests that may be done are the urinalysis and urine culture.  Types of UTIs  · Cystitis. A bladder infection (cystitis) is the most common UTI in women. You may have urgent or frequent urination. You may also have pain, burning when you urinate, and bloody urine.  · Urethritis. This is an inflamed urethra, which is the tube that carries urine from the bladder to outside the body. You may have lower stomach or back pain. You may also have urgent or frequent urination.  · Pyelonephritis. This is a kidney infection. If not treated, it can be serious and damage your kidneys. In severe cases, you may need to stay in the hospital. You may have a fever and lower back pain.  Medicines to treat a UTI  Most UTIs are treated with antibiotics. These kill the bacteria. The length of time you need to take them depends on the type of infection. It may be as short as 3 days. If you have repeated UTIs, you may need a low-dose antibiotic for several months. Take antibiotics exactly as directed. Don’t stop taking them until all of the medicine is gone. If you stop taking the antibiotic too soon, the infection may not go away. You may also develop a resistance to the antibiotic. This can make it much harder to treat.  Lifestyle changes to treat and prevent UTIs  The lifestyle changes below will help get  rid of your UTI. They may also help prevent future UTIs.  · Drink plenty of fluids. This includes water, juice, or other caffeine-free drinks. Fluids help flush bacteria out of your body.  · Empty your bladder. Always empty your bladder when you feel the urge to urinate. And always urinate before going to sleep. Urine that stays in your bladder can lead to infection. Try to urinate before and after sex as well.  · Practice good personal hygiene. Wipe yourself from front to back after using the toilet. This helps keep bacteria from getting into the urethra.  · Use condoms during sex. These help prevent UTIs caused by sexually transmitted bacteria. Also don't use spermicides during sex. These can increase the risk for UTIs. Choose other forms of birth control instead. For women who tend to get UTIs after sex, a low-dose of a preventive antibiotic may be used. Be sure to discuss this option with your healthcare provider.  · Follow up with your healthcare provider as directed. He or she may test to make sure the infection has cleared. If needed, more treatment may be started.  Date Last Reviewed: 1/1/2017  © 7166-8926 [employer] [address] [place] [address], [place] [address]. All rights reserved. This information is not intended as a substitute for professional medical care. Always follow your healthcare professional's instructions.

## 2020-08-03 ENCOUNTER — HOSPITAL ENCOUNTER (OUTPATIENT)
Age: 70
Setting detail: OBSERVATION
Discharge: HOME | End: 2020-08-05
Attending: EMERGENCY MEDICINE | Admitting: FAMILY MEDICINE
Payer: MEDICARE

## 2020-08-03 ENCOUNTER — APPOINTMENT (OUTPATIENT)
Dept: GENERAL RADIOLOGY | Age: 70
End: 2020-08-03
Attending: EMERGENCY MEDICINE
Payer: MEDICARE

## 2020-08-03 DIAGNOSIS — N30.00 ACUTE CYSTITIS WITHOUT HEMATURIA: ICD-10-CM

## 2020-08-03 DIAGNOSIS — R77.8 ELEVATED TROPONIN: ICD-10-CM

## 2020-08-03 DIAGNOSIS — F41.1 ANXIETY STATE: Chronic | ICD-10-CM

## 2020-08-03 DIAGNOSIS — R07.89 ATYPICAL CHEST PAIN: Primary | ICD-10-CM

## 2020-08-03 DIAGNOSIS — R07.2 PRECORDIAL PAIN: ICD-10-CM

## 2020-08-03 LAB
BASOPHILS # BLD: 0.1 K/UL (ref 0–0.2)
BASOPHILS NFR BLD: 1 % (ref 0–2)
DIFFERENTIAL METHOD BLD: ABNORMAL
EOSINOPHIL # BLD: 0 K/UL (ref 0–0.8)
EOSINOPHIL NFR BLD: 0 % (ref 0.5–7.8)
ERYTHROCYTE [DISTWIDTH] IN BLOOD BY AUTOMATED COUNT: 14 % (ref 11.9–14.6)
HCT VFR BLD AUTO: 44.5 % (ref 35.8–46.3)
HGB BLD-MCNC: 14.6 G/DL (ref 11.7–15.4)
IMM GRANULOCYTES # BLD AUTO: 0 K/UL (ref 0–0.5)
IMM GRANULOCYTES NFR BLD AUTO: 0 % (ref 0–5)
LACTATE SERPL-SCNC: 4.4 MMOL/L (ref 0.4–2)
LYMPHOCYTES # BLD: 3.8 K/UL (ref 0.5–4.6)
LYMPHOCYTES NFR BLD: 38 % (ref 13–44)
MCH RBC QN AUTO: 30.5 PG (ref 26.1–32.9)
MCHC RBC AUTO-ENTMCNC: 32.8 G/DL (ref 31.4–35)
MCV RBC AUTO: 92.9 FL (ref 79.6–97.8)
MONOCYTES # BLD: 0.8 K/UL (ref 0.1–1.3)
MONOCYTES NFR BLD: 8 % (ref 4–12)
NEUTS SEG # BLD: 5.3 K/UL (ref 1.7–8.2)
NEUTS SEG NFR BLD: 53 % (ref 43–78)
NRBC # BLD: 0 K/UL (ref 0–0.2)
PLATELET # BLD AUTO: 333 K/UL (ref 150–450)
PMV BLD AUTO: 11.6 FL (ref 9.4–12.3)
RBC # BLD AUTO: 4.79 M/UL (ref 4.05–5.2)
WBC # BLD AUTO: 10 K/UL (ref 4.3–11.1)

## 2020-08-03 PROCEDURE — 83605 ASSAY OF LACTIC ACID: CPT

## 2020-08-03 PROCEDURE — 96375 TX/PRO/DX INJ NEW DRUG ADDON: CPT

## 2020-08-03 PROCEDURE — 85025 COMPLETE CBC W/AUTO DIFF WBC: CPT

## 2020-08-03 PROCEDURE — 99285 EMERGENCY DEPT VISIT HI MDM: CPT

## 2020-08-03 PROCEDURE — 80053 COMPREHEN METABOLIC PANEL: CPT

## 2020-08-03 PROCEDURE — 51701 INSERT BLADDER CATHETER: CPT

## 2020-08-03 PROCEDURE — 84484 ASSAY OF TROPONIN QUANT: CPT

## 2020-08-03 PROCEDURE — 93005 ELECTROCARDIOGRAM TRACING: CPT | Performed by: EMERGENCY MEDICINE

## 2020-08-03 PROCEDURE — 74011250636 HC RX REV CODE- 250/636: Performed by: EMERGENCY MEDICINE

## 2020-08-03 PROCEDURE — 71045 X-RAY EXAM CHEST 1 VIEW: CPT

## 2020-08-03 PROCEDURE — 83735 ASSAY OF MAGNESIUM: CPT

## 2020-08-03 PROCEDURE — 84145 PROCALCITONIN (PCT): CPT

## 2020-08-03 RX ORDER — SODIUM CHLORIDE 0.9 % (FLUSH) 0.9 %
5-40 SYRINGE (ML) INJECTION EVERY 8 HOURS
Status: DISCONTINUED | OUTPATIENT
Start: 2020-08-03 | End: 2020-08-05 | Stop reason: HOSPADM

## 2020-08-03 RX ORDER — LORAZEPAM 2 MG/ML
1 INJECTION INTRAMUSCULAR
Status: COMPLETED | OUTPATIENT
Start: 2020-08-03 | End: 2020-08-03

## 2020-08-03 RX ORDER — MORPHINE SULFATE 4 MG/ML
4 INJECTION INTRAVENOUS
Status: COMPLETED | OUTPATIENT
Start: 2020-08-03 | End: 2020-08-03

## 2020-08-03 RX ORDER — SODIUM CHLORIDE 9 MG/ML
125 INJECTION, SOLUTION INTRAVENOUS CONTINUOUS
Status: DISCONTINUED | OUTPATIENT
Start: 2020-08-03 | End: 2020-08-04

## 2020-08-03 RX ORDER — MORPHINE SULFATE 4 MG/ML
4 INJECTION INTRAVENOUS
Status: COMPLETED | OUTPATIENT
Start: 2020-08-03 | End: 2020-08-04

## 2020-08-03 RX ORDER — SODIUM CHLORIDE 0.9 % (FLUSH) 0.9 %
5-40 SYRINGE (ML) INJECTION AS NEEDED
Status: DISCONTINUED | OUTPATIENT
Start: 2020-08-03 | End: 2020-08-05 | Stop reason: HOSPADM

## 2020-08-03 RX ADMIN — SODIUM CHLORIDE 500 ML: 900 INJECTION, SOLUTION INTRAVENOUS at 22:28

## 2020-08-03 RX ADMIN — LORAZEPAM 1 MG: 2 INJECTION INTRAMUSCULAR; INTRAVENOUS at 22:29

## 2020-08-03 RX ADMIN — SODIUM CHLORIDE 125 ML/HR: 9 INJECTION, SOLUTION INTRAVENOUS at 23:22

## 2020-08-03 RX ADMIN — MORPHINE SULFATE 4 MG: 4 INJECTION INTRAVENOUS at 22:28

## 2020-08-04 ENCOUNTER — APPOINTMENT (OUTPATIENT)
Dept: CT IMAGING | Age: 70
End: 2020-08-04
Attending: EMERGENCY MEDICINE
Payer: MEDICARE

## 2020-08-04 PROBLEM — G93.41 METABOLIC ENCEPHALOPATHY: Status: RESOLVED | Noted: 2018-07-19 | Resolved: 2020-08-04

## 2020-08-04 PROBLEM — N17.9 AKI (ACUTE KIDNEY INJURY) (HCC): Status: RESOLVED | Noted: 2018-08-20 | Resolved: 2020-08-04

## 2020-08-04 PROBLEM — G93.41 ACUTE METABOLIC ENCEPHALOPATHY: Status: RESOLVED | Noted: 2018-07-19 | Resolved: 2020-08-04

## 2020-08-04 PROBLEM — R07.9 CHEST PAIN: Status: ACTIVE | Noted: 2020-08-04

## 2020-08-04 PROBLEM — R77.8 ELEVATED TROPONIN: Status: ACTIVE | Noted: 2020-08-04

## 2020-08-04 PROBLEM — R11.2 NAUSEA AND VOMITING: Status: RESOLVED | Noted: 2018-08-27 | Resolved: 2020-08-04

## 2020-08-04 PROBLEM — E87.1 HYPONATREMIA: Status: RESOLVED | Noted: 2018-08-20 | Resolved: 2020-08-04

## 2020-08-04 LAB
ALBUMIN SERPL-MCNC: 3.4 G/DL (ref 3.2–4.6)
ALBUMIN/GLOB SERPL: 1 {RATIO} (ref 1.2–3.5)
ALP SERPL-CCNC: 114 U/L (ref 50–136)
ALT SERPL-CCNC: 18 U/L (ref 12–65)
ANION GAP SERPL CALC-SCNC: 12 MMOL/L (ref 7–16)
APPEARANCE UR: ABNORMAL
AST SERPL-CCNC: 18 U/L (ref 15–37)
ATRIAL RATE: 123 BPM
BACTERIA URNS QL MICRO: ABNORMAL /HPF
BILIRUB SERPL-MCNC: 0.6 MG/DL (ref 0.2–1.1)
BILIRUB UR QL: ABNORMAL
BUN SERPL-MCNC: 13 MG/DL (ref 8–23)
CALCIUM SERPL-MCNC: 8.7 MG/DL (ref 8.3–10.4)
CALCULATED P AXIS, ECG09: 48 DEGREES
CALCULATED R AXIS, ECG10: -55 DEGREES
CALCULATED T AXIS, ECG11: 31 DEGREES
CASTS URNS QL MICRO: ABNORMAL /LPF
CHLORIDE SERPL-SCNC: 107 MMOL/L (ref 98–107)
CO2 SERPL-SCNC: 23 MMOL/L (ref 21–32)
COLOR UR: YELLOW
CREAT SERPL-MCNC: 1.26 MG/DL (ref 0.6–1)
DIAGNOSIS, 93000: NORMAL
EPI CELLS #/AREA URNS HPF: 0 /HPF
GLOBULIN SER CALC-MCNC: 3.3 G/DL (ref 2.3–3.5)
GLUCOSE SERPL-MCNC: 85 MG/DL (ref 65–100)
GLUCOSE UR STRIP.AUTO-MCNC: NEGATIVE MG/DL
HGB UR QL STRIP: NEGATIVE
KETONES UR QL STRIP.AUTO: 15 MG/DL
LACTATE SERPL-SCNC: 1.3 MMOL/L (ref 0.4–2)
LEUKOCYTE ESTERASE UR QL STRIP.AUTO: ABNORMAL
LIPASE SERPL-CCNC: 92 U/L (ref 73–393)
MAGNESIUM SERPL-MCNC: 2.2 MG/DL (ref 1.8–2.4)
NITRITE UR QL STRIP.AUTO: NEGATIVE
P-R INTERVAL, ECG05: 150 MS
PH UR STRIP: 7 [PH] (ref 5–9)
POTASSIUM SERPL-SCNC: 3.4 MMOL/L (ref 3.5–5.1)
PROCALCITONIN SERPL-MCNC: 0.06 NG/ML
PROT SERPL-MCNC: 6.7 G/DL (ref 6.3–8.2)
PROT UR STRIP-MCNC: NEGATIVE MG/DL
Q-T INTERVAL, ECG07: 316 MS
QRS DURATION, ECG06: 76 MS
QTC CALCULATION (BEZET), ECG08: 452 MS
RBC #/AREA URNS HPF: ABNORMAL /HPF
SODIUM SERPL-SCNC: 142 MMOL/L (ref 136–145)
SP GR UR REFRACTOMETRY: 1.02 (ref 1–1.02)
TROPONIN-HIGH SENSITIVITY: 117.5 PG/ML (ref 0–14)
TROPONIN-HIGH SENSITIVITY: 147.6 PG/ML (ref 0–14)
TROPONIN-HIGH SENSITIVITY: 43.2 PG/ML (ref 0–14)
UROBILINOGEN UR QL STRIP.AUTO: 1 EU/DL (ref 0.2–1)
VENTRICULAR RATE, ECG03: 123 BPM
WBC URNS QL MICRO: ABNORMAL /HPF

## 2020-08-04 PROCEDURE — 84484 ASSAY OF TROPONIN QUANT: CPT

## 2020-08-04 PROCEDURE — 81001 URINALYSIS AUTO W/SCOPE: CPT

## 2020-08-04 PROCEDURE — C1894 INTRO/SHEATH, NON-LASER: HCPCS

## 2020-08-04 PROCEDURE — 36415 COLL VENOUS BLD VENIPUNCTURE: CPT

## 2020-08-04 PROCEDURE — 74011250636 HC RX REV CODE- 250/636: Performed by: FAMILY MEDICINE

## 2020-08-04 PROCEDURE — 74011250636 HC RX REV CODE- 250/636: Performed by: EMERGENCY MEDICINE

## 2020-08-04 PROCEDURE — 99153 MOD SED SAME PHYS/QHP EA: CPT

## 2020-08-04 PROCEDURE — 74011250636 HC RX REV CODE- 250/636: Performed by: INTERNAL MEDICINE

## 2020-08-04 PROCEDURE — 83605 ASSAY OF LACTIC ACID: CPT

## 2020-08-04 PROCEDURE — 74011000250 HC RX REV CODE- 250: Performed by: FAMILY MEDICINE

## 2020-08-04 PROCEDURE — 96375 TX/PRO/DX INJ NEW DRUG ADDON: CPT

## 2020-08-04 PROCEDURE — 99218 HC RM OBSERVATION: CPT

## 2020-08-04 PROCEDURE — 77030016699 HC CATH ANGI DX INFN1 CARD -A

## 2020-08-04 PROCEDURE — 74011250637 HC RX REV CODE- 250/637: Performed by: FAMILY MEDICINE

## 2020-08-04 PROCEDURE — 77030029997 HC DEV COM RDL R BND TELE -B

## 2020-08-04 PROCEDURE — 94760 N-INVAS EAR/PLS OXIMETRY 1: CPT

## 2020-08-04 PROCEDURE — 83690 ASSAY OF LIPASE: CPT

## 2020-08-04 PROCEDURE — C8929 TTE W OR WO FOL WCON,DOPPLER: HCPCS

## 2020-08-04 PROCEDURE — 74011000250 HC RX REV CODE- 250: Performed by: INTERNAL MEDICINE

## 2020-08-04 PROCEDURE — 96376 TX/PRO/DX INJ SAME DRUG ADON: CPT

## 2020-08-04 PROCEDURE — 99215 OFFICE O/P EST HI 40 MIN: CPT | Performed by: INTERNAL MEDICINE

## 2020-08-04 PROCEDURE — 74011250637 HC RX REV CODE- 250/637: Performed by: EMERGENCY MEDICINE

## 2020-08-04 PROCEDURE — 74011250637 HC RX REV CODE- 250/637: Performed by: INTERNAL MEDICINE

## 2020-08-04 PROCEDURE — 96365 THER/PROPH/DIAG IV INF INIT: CPT

## 2020-08-04 PROCEDURE — 87088 URINE BACTERIA CULTURE: CPT

## 2020-08-04 PROCEDURE — 74011250637 HC RX REV CODE- 250/637: Performed by: NURSE PRACTITIONER

## 2020-08-04 PROCEDURE — 74011636320 HC RX REV CODE- 636/320: Performed by: EMERGENCY MEDICINE

## 2020-08-04 PROCEDURE — 93458 L HRT ARTERY/VENTRICLE ANGIO: CPT

## 2020-08-04 PROCEDURE — 99152 MOD SED SAME PHYS/QHP 5/>YRS: CPT

## 2020-08-04 PROCEDURE — 94640 AIRWAY INHALATION TREATMENT: CPT

## 2020-08-04 PROCEDURE — 74011000258 HC RX REV CODE- 258: Performed by: EMERGENCY MEDICINE

## 2020-08-04 PROCEDURE — 87040 BLOOD CULTURE FOR BACTERIA: CPT

## 2020-08-04 PROCEDURE — 87086 URINE CULTURE/COLONY COUNT: CPT

## 2020-08-04 PROCEDURE — 71260 CT THORAX DX C+: CPT

## 2020-08-04 PROCEDURE — 87186 SC STD MICRODIL/AGAR DIL: CPT

## 2020-08-04 PROCEDURE — 74011636320 HC RX REV CODE- 636/320: Performed by: INTERNAL MEDICINE

## 2020-08-04 PROCEDURE — C1769 GUIDE WIRE: HCPCS

## 2020-08-04 RX ORDER — HYDROCODONE BITARTRATE AND ACETAMINOPHEN 5; 325 MG/1; MG/1
1 TABLET ORAL
Status: DISCONTINUED | OUTPATIENT
Start: 2020-08-04 | End: 2020-08-04

## 2020-08-04 RX ORDER — BUDESONIDE AND FORMOTEROL FUMARATE DIHYDRATE 160; 4.5 UG/1; UG/1
2 AEROSOL RESPIRATORY (INHALATION) 2 TIMES DAILY
Status: DISCONTINUED | OUTPATIENT
Start: 2020-08-04 | End: 2020-08-05 | Stop reason: HOSPADM

## 2020-08-04 RX ORDER — LISINOPRIL 20 MG/1
20 TABLET ORAL 2 TIMES DAILY
Status: DISCONTINUED | OUTPATIENT
Start: 2020-08-04 | End: 2020-08-05 | Stop reason: HOSPADM

## 2020-08-04 RX ORDER — FENTANYL CITRATE 50 UG/ML
25-100 INJECTION, SOLUTION INTRAMUSCULAR; INTRAVENOUS
Status: DISCONTINUED | OUTPATIENT
Start: 2020-08-04 | End: 2020-08-04

## 2020-08-04 RX ORDER — PHENAZOPYRIDINE HYDROCHLORIDE 95 MG/1
200 TABLET ORAL EVERY 8 HOURS
Status: DISCONTINUED | OUTPATIENT
Start: 2020-08-04 | End: 2020-08-05 | Stop reason: HOSPADM

## 2020-08-04 RX ORDER — POTASSIUM CHLORIDE 20 MEQ/1
20 TABLET, EXTENDED RELEASE ORAL DAILY
COMMUNITY

## 2020-08-04 RX ORDER — SODIUM CHLORIDE 9 MG/ML
75 INJECTION, SOLUTION INTRAVENOUS CONTINUOUS
Status: DISCONTINUED | OUTPATIENT
Start: 2020-08-04 | End: 2020-08-04

## 2020-08-04 RX ORDER — MONTELUKAST SODIUM 10 MG/1
10 TABLET ORAL DAILY
Status: DISCONTINUED | OUTPATIENT
Start: 2020-08-04 | End: 2020-08-05 | Stop reason: HOSPADM

## 2020-08-04 RX ORDER — HYDROMORPHONE HYDROCHLORIDE 1 MG/ML
1 INJECTION, SOLUTION INTRAMUSCULAR; INTRAVENOUS; SUBCUTANEOUS
Status: COMPLETED | OUTPATIENT
Start: 2020-08-04 | End: 2020-08-04

## 2020-08-04 RX ORDER — ONDANSETRON 2 MG/ML
4 INJECTION INTRAMUSCULAR; INTRAVENOUS
Status: DISCONTINUED | OUTPATIENT
Start: 2020-08-04 | End: 2020-08-05 | Stop reason: HOSPADM

## 2020-08-04 RX ORDER — IPRATROPIUM BROMIDE 42 UG/1
2 SPRAY, METERED NASAL
Status: DISCONTINUED | OUTPATIENT
Start: 2020-08-04 | End: 2020-08-05 | Stop reason: HOSPADM

## 2020-08-04 RX ORDER — PANTOPRAZOLE SODIUM 40 MG/1
40 TABLET, DELAYED RELEASE ORAL
Status: DISCONTINUED | OUTPATIENT
Start: 2020-08-04 | End: 2020-08-05 | Stop reason: HOSPADM

## 2020-08-04 RX ORDER — SODIUM CHLORIDE 0.9 % (FLUSH) 0.9 %
5-40 SYRINGE (ML) INJECTION EVERY 8 HOURS
Status: DISCONTINUED | OUTPATIENT
Start: 2020-08-04 | End: 2020-08-04

## 2020-08-04 RX ORDER — BUPROPION HYDROCHLORIDE 150 MG/1
150 TABLET, EXTENDED RELEASE ORAL DAILY
Status: DISCONTINUED | OUTPATIENT
Start: 2020-08-04 | End: 2020-08-05 | Stop reason: HOSPADM

## 2020-08-04 RX ORDER — VERAPAMIL HYDROCHLORIDE 120 MG/1
120 TABLET, FILM COATED ORAL 2 TIMES DAILY
Status: DISCONTINUED | OUTPATIENT
Start: 2020-08-04 | End: 2020-08-05 | Stop reason: HOSPADM

## 2020-08-04 RX ORDER — CARVEDILOL 25 MG/1
25 TABLET ORAL 2 TIMES DAILY
Status: DISCONTINUED | OUTPATIENT
Start: 2020-08-04 | End: 2020-08-05 | Stop reason: HOSPADM

## 2020-08-04 RX ORDER — TORSEMIDE 20 MG/1
20 TABLET ORAL DAILY PRN
Status: DISCONTINUED | OUTPATIENT
Start: 2020-08-04 | End: 2020-08-05 | Stop reason: HOSPADM

## 2020-08-04 RX ORDER — NALOXONE HYDROCHLORIDE 0.4 MG/ML
0.4 INJECTION, SOLUTION INTRAMUSCULAR; INTRAVENOUS; SUBCUTANEOUS AS NEEDED
Status: DISCONTINUED | OUTPATIENT
Start: 2020-08-04 | End: 2020-08-05 | Stop reason: HOSPADM

## 2020-08-04 RX ORDER — MORPHINE SULFATE 2 MG/ML
2 INJECTION, SOLUTION INTRAMUSCULAR; INTRAVENOUS
Status: DISCONTINUED | OUTPATIENT
Start: 2020-08-04 | End: 2020-08-05 | Stop reason: HOSPADM

## 2020-08-04 RX ORDER — ACETAMINOPHEN 325 MG/1
650 TABLET ORAL
Status: DISCONTINUED | OUTPATIENT
Start: 2020-08-04 | End: 2020-08-05 | Stop reason: HOSPADM

## 2020-08-04 RX ORDER — LEVOTHYROXINE SODIUM 112 UG/1
112 TABLET ORAL EVERY EVENING
Status: DISCONTINUED | OUTPATIENT
Start: 2020-08-04 | End: 2020-08-04

## 2020-08-04 RX ORDER — FAMOTIDINE 20 MG/1
20 TABLET, FILM COATED ORAL
COMMUNITY

## 2020-08-04 RX ORDER — HEPARIN SODIUM 200 [USP'U]/100ML
2 INJECTION, SOLUTION INTRAVENOUS CONTINUOUS
Status: DISCONTINUED | OUTPATIENT
Start: 2020-08-04 | End: 2020-08-04

## 2020-08-04 RX ORDER — SODIUM CHLORIDE 0.9 % (FLUSH) 0.9 %
10 SYRINGE (ML) INJECTION
Status: COMPLETED | OUTPATIENT
Start: 2020-08-04 | End: 2020-08-04

## 2020-08-04 RX ORDER — MORPHINE SULFATE 2 MG/ML
1 INJECTION, SOLUTION INTRAMUSCULAR; INTRAVENOUS
Status: DISCONTINUED | OUTPATIENT
Start: 2020-08-04 | End: 2020-08-04

## 2020-08-04 RX ORDER — CARVEDILOL 12.5 MG/1
12.5 TABLET ORAL 2 TIMES DAILY
Status: DISCONTINUED | OUTPATIENT
Start: 2020-08-04 | End: 2020-08-04

## 2020-08-04 RX ORDER — QUETIAPINE FUMARATE 100 MG/1
200 TABLET, FILM COATED ORAL
Status: DISCONTINUED | OUTPATIENT
Start: 2020-08-04 | End: 2020-08-05 | Stop reason: HOSPADM

## 2020-08-04 RX ORDER — LEVOTHYROXINE SODIUM 112 UG/1
112 TABLET ORAL
Status: DISCONTINUED | OUTPATIENT
Start: 2020-08-05 | End: 2020-08-05 | Stop reason: HOSPADM

## 2020-08-04 RX ORDER — MIDAZOLAM HYDROCHLORIDE 1 MG/ML
.5-2 INJECTION, SOLUTION INTRAMUSCULAR; INTRAVENOUS
Status: DISCONTINUED | OUTPATIENT
Start: 2020-08-04 | End: 2020-08-04

## 2020-08-04 RX ORDER — LIDOCAINE HYDROCHLORIDE 10 MG/ML
2-20 INJECTION, SOLUTION EPIDURAL; INFILTRATION; INTRACAUDAL; PERINEURAL
Status: DISCONTINUED | OUTPATIENT
Start: 2020-08-04 | End: 2020-08-04

## 2020-08-04 RX ORDER — DULOXETIN HYDROCHLORIDE 60 MG/1
60 CAPSULE, DELAYED RELEASE ORAL
Status: DISCONTINUED | OUTPATIENT
Start: 2020-08-04 | End: 2020-08-05 | Stop reason: HOSPADM

## 2020-08-04 RX ORDER — ZIPRASIDONE HYDROCHLORIDE 20 MG/1
80 CAPSULE ORAL
Status: DISCONTINUED | OUTPATIENT
Start: 2020-08-04 | End: 2020-08-04

## 2020-08-04 RX ORDER — GUAIFENESIN 600 MG/1
600 TABLET, EXTENDED RELEASE ORAL 2 TIMES DAILY
Status: DISCONTINUED | OUTPATIENT
Start: 2020-08-04 | End: 2020-08-05 | Stop reason: HOSPADM

## 2020-08-04 RX ORDER — GUAIFENESIN 100 MG/5ML
324 LIQUID (ML) ORAL ONCE
Status: COMPLETED | OUTPATIENT
Start: 2020-08-04 | End: 2020-08-04

## 2020-08-04 RX ORDER — SUCRALFATE 1 G/1
1 TABLET ORAL 4 TIMES DAILY
Status: DISCONTINUED | OUTPATIENT
Start: 2020-08-04 | End: 2020-08-05 | Stop reason: HOSPADM

## 2020-08-04 RX ORDER — GUAIFENESIN 100 MG/5ML
81 LIQUID (ML) ORAL DAILY
Status: DISCONTINUED | OUTPATIENT
Start: 2020-08-04 | End: 2020-08-05 | Stop reason: HOSPADM

## 2020-08-04 RX ORDER — LORATADINE 10 MG/1
10 TABLET ORAL DAILY
Status: DISCONTINUED | OUTPATIENT
Start: 2020-08-04 | End: 2020-08-05 | Stop reason: HOSPADM

## 2020-08-04 RX ORDER — ALPRAZOLAM 0.5 MG/1
1 TABLET ORAL
Status: DISCONTINUED | OUTPATIENT
Start: 2020-08-04 | End: 2020-08-05 | Stop reason: HOSPADM

## 2020-08-04 RX ORDER — SODIUM CHLORIDE 0.9 % (FLUSH) 0.9 %
5-40 SYRINGE (ML) INJECTION AS NEEDED
Status: DISCONTINUED | OUTPATIENT
Start: 2020-08-04 | End: 2020-08-05 | Stop reason: HOSPADM

## 2020-08-04 RX ORDER — SODIUM CHLORIDE 9 MG/ML
75 INJECTION, SOLUTION INTRAVENOUS CONTINUOUS
Status: DISCONTINUED | OUTPATIENT
Start: 2020-08-04 | End: 2020-08-05 | Stop reason: HOSPADM

## 2020-08-04 RX ORDER — TRAZODONE HYDROCHLORIDE 50 MG/1
100 TABLET ORAL
Status: DISCONTINUED | OUTPATIENT
Start: 2020-08-04 | End: 2020-08-05 | Stop reason: HOSPADM

## 2020-08-04 RX ORDER — HYDROCODONE BITARTRATE AND ACETAMINOPHEN 10; 325 MG/1; MG/1
1 TABLET ORAL
Status: DISCONTINUED | OUTPATIENT
Start: 2020-08-04 | End: 2020-08-05 | Stop reason: HOSPADM

## 2020-08-04 RX ORDER — ONDANSETRON 2 MG/ML
4 INJECTION INTRAMUSCULAR; INTRAVENOUS
Status: ACTIVE | OUTPATIENT
Start: 2020-08-04 | End: 2020-08-05

## 2020-08-04 RX ORDER — PROPRANOLOL HYDROCHLORIDE 10 MG/1
10 TABLET ORAL 3 TIMES DAILY
Status: DISCONTINUED | OUTPATIENT
Start: 2020-08-04 | End: 2020-08-04

## 2020-08-04 RX ORDER — ATORVASTATIN CALCIUM 20 MG/1
20 TABLET, FILM COATED ORAL
Status: DISCONTINUED | OUTPATIENT
Start: 2020-08-04 | End: 2020-08-05 | Stop reason: HOSPADM

## 2020-08-04 RX ORDER — HEPARIN SODIUM 10000 [USP'U]/ML
3000 INJECTION, SOLUTION INTRAVENOUS; SUBCUTANEOUS
Status: DISCONTINUED | OUTPATIENT
Start: 2020-08-04 | End: 2020-08-04

## 2020-08-04 RX ORDER — MIRTAZAPINE 15 MG/1
45 TABLET, FILM COATED ORAL
Status: DISCONTINUED | OUTPATIENT
Start: 2020-08-04 | End: 2020-08-04

## 2020-08-04 RX ORDER — TAMSULOSIN HYDROCHLORIDE 0.4 MG/1
0.4 CAPSULE ORAL
COMMUNITY

## 2020-08-04 RX ORDER — OXYBUTYNIN CHLORIDE 10 MG/1
10 TABLET, EXTENDED RELEASE ORAL DAILY
Status: DISCONTINUED | OUTPATIENT
Start: 2020-08-04 | End: 2020-08-05 | Stop reason: HOSPADM

## 2020-08-04 RX ADMIN — LISINOPRIL 20 MG: 20 TABLET ORAL at 21:35

## 2020-08-04 RX ADMIN — VERAPAMIL HYDROCHLORIDE 120 MG: 120 TABLET, FILM COATED ORAL at 21:35

## 2020-08-04 RX ADMIN — ASPIRIN 81 MG: 81 TABLET, CHEWABLE ORAL at 11:53

## 2020-08-04 RX ADMIN — ONDANSETRON 4 MG: 2 INJECTION INTRAMUSCULAR; INTRAVENOUS at 17:03

## 2020-08-04 RX ADMIN — HEPARIN SODIUM 2 ML/HR: 5000 INJECTION, SOLUTION INTRAVENOUS; SUBCUTANEOUS at 09:36

## 2020-08-04 RX ADMIN — SODIUM CHLORIDE 1000 ML: 9 INJECTION, SOLUTION INTRAVENOUS at 00:44

## 2020-08-04 RX ADMIN — CEFTRIAXONE SODIUM 2 G: 2 INJECTION, POWDER, FOR SOLUTION INTRAMUSCULAR; INTRAVENOUS at 00:41

## 2020-08-04 RX ADMIN — TRAZODONE HYDROCHLORIDE 100 MG: 50 TABLET ORAL at 21:35

## 2020-08-04 RX ADMIN — NITROGLYCERIN 1 INCH: 20 OINTMENT TOPICAL at 02:54

## 2020-08-04 RX ADMIN — ASPIRIN 324 MG: 81 TABLET, CHEWABLE ORAL at 02:54

## 2020-08-04 RX ADMIN — IOPAMIDOL 80 ML: 755 INJECTION, SOLUTION INTRAVENOUS at 02:33

## 2020-08-04 RX ADMIN — HYDROMORPHONE HYDROCHLORIDE 1 MG: 1 INJECTION, SOLUTION INTRAMUSCULAR; INTRAVENOUS; SUBCUTANEOUS at 02:16

## 2020-08-04 RX ADMIN — MORPHINE SULFATE 4 MG: 4 INJECTION INTRAVENOUS at 00:18

## 2020-08-04 RX ADMIN — CARVEDILOL 25 MG: 25 TABLET, FILM COATED ORAL at 21:35

## 2020-08-04 RX ADMIN — ONDANSETRON 4 MG: 2 INJECTION INTRAMUSCULAR; INTRAVENOUS at 13:12

## 2020-08-04 RX ADMIN — QUETIAPINE FUMARATE 200 MG: 100 TABLET ORAL at 21:34

## 2020-08-04 RX ADMIN — SUCRALFATE 1 G: 1 TABLET ORAL at 21:34

## 2020-08-04 RX ADMIN — BUDESONIDE AND FORMOTEROL FUMARATE DIHYDRATE 2 PUFF: 160; 4.5 AEROSOL RESPIRATORY (INHALATION) at 08:08

## 2020-08-04 RX ADMIN — DULOXETINE HYDROCHLORIDE 60 MG: 60 CAPSULE, DELAYED RELEASE ORAL at 21:35

## 2020-08-04 RX ADMIN — Medication 10 ML: at 06:23

## 2020-08-04 RX ADMIN — SUCRALFATE 1 G: 1 TABLET ORAL at 11:53

## 2020-08-04 RX ADMIN — GUAIFENESIN 600 MG: 600 TABLET ORAL at 21:34

## 2020-08-04 RX ADMIN — MIDAZOLAM 2 MG: 1 INJECTION INTRAMUSCULAR; INTRAVENOUS at 09:34

## 2020-08-04 RX ADMIN — GUAIFENESIN 600 MG: 600 TABLET ORAL at 11:53

## 2020-08-04 RX ADMIN — LISINOPRIL 20 MG: 20 TABLET ORAL at 11:53

## 2020-08-04 RX ADMIN — PERFLUTREN 1 ML: 6.52 INJECTION, SUSPENSION INTRAVENOUS at 11:45

## 2020-08-04 RX ADMIN — SODIUM CHLORIDE 100 ML: 900 INJECTION, SOLUTION INTRAVENOUS at 02:33

## 2020-08-04 RX ADMIN — VERAPAMIL HYDROCHLORIDE 2 ML: 2.5 INJECTION, SOLUTION INTRAVENOUS at 09:45

## 2020-08-04 RX ADMIN — LIDOCAINE HYDROCHLORIDE 4 ML: 10 INJECTION, SOLUTION EPIDURAL; INFILTRATION; INTRACAUDAL; PERINEURAL at 09:43

## 2020-08-04 RX ADMIN — IOPAMIDOL 100 ML: 755 INJECTION, SOLUTION INTRAVENOUS at 10:05

## 2020-08-04 RX ADMIN — ALPRAZOLAM 1 MG: 0.5 TABLET ORAL at 17:18

## 2020-08-04 RX ADMIN — ATORVASTATIN CALCIUM 20 MG: 20 TABLET, FILM COATED ORAL at 21:35

## 2020-08-04 RX ADMIN — HYDROCODONE BITARTRATE AND ACETAMINOPHEN 1 TABLET: 5; 325 TABLET ORAL at 11:53

## 2020-08-04 RX ADMIN — Medication 10 ML: at 21:36

## 2020-08-04 RX ADMIN — MONTELUKAST 10 MG: 10 TABLET, FILM COATED ORAL at 11:53

## 2020-08-04 RX ADMIN — FENTANYL CITRATE 50 MCG: 50 INJECTION INTRAMUSCULAR; INTRAVENOUS at 09:34

## 2020-08-04 RX ADMIN — LORATADINE 10 MG: 10 TABLET ORAL at 11:53

## 2020-08-04 RX ADMIN — HYDROCODONE BITARTRATE AND ACETAMINOPHEN 1 TABLET: 10; 325 TABLET ORAL at 21:35

## 2020-08-04 RX ADMIN — BUDESONIDE AND FORMOTEROL FUMARATE DIHYDRATE 2 PUFF: 160; 4.5 AEROSOL RESPIRATORY (INHALATION) at 19:48

## 2020-08-04 RX ADMIN — FENTANYL CITRATE 50 MCG: 50 INJECTION INTRAMUSCULAR; INTRAVENOUS at 09:46

## 2020-08-04 RX ADMIN — Medication 5 ML: at 17:09

## 2020-08-04 RX ADMIN — Medication 10 ML: at 02:33

## 2020-08-04 RX ADMIN — URINARY PAIN RELIEF 190 MG: 95 TABLET ORAL at 21:35

## 2020-08-04 RX ADMIN — MORPHINE SULFATE 1 MG: 2 INJECTION, SOLUTION INTRAMUSCULAR; INTRAVENOUS at 13:03

## 2020-08-04 RX ADMIN — HEPARIN SODIUM 3000 UNITS: 10000 INJECTION INTRAVENOUS; SUBCUTANEOUS at 09:45

## 2020-08-04 RX ADMIN — SODIUM CHLORIDE 75 ML/HR: 9 INJECTION, SOLUTION INTRAVENOUS at 06:22

## 2020-08-04 RX ADMIN — MORPHINE SULFATE 2 MG: 2 INJECTION, SOLUTION INTRAMUSCULAR; INTRAVENOUS at 17:27

## 2020-08-04 RX ADMIN — BUPROPION HYDROCHLORIDE 150 MG: 150 TABLET, EXTENDED RELEASE ORAL at 11:53

## 2020-08-04 NOTE — PROCEDURES
Brief Cardiac Procedure Note    Patient: Jason Ureña MRN: [id number]  SSN: [id number]    YOB: 1950  Age: 72 y.o. Sex: female      Date of Procedure: 8/4/2020     Pre-procedure Diagnosis: Unstable Angina    Post-procedure Diagnosis: Non-cardiac Chest Pain    Reason for Procedure: ACS < or = 24 Hours    Procedure: Left Heart Catheterization    Brief Description of Procedure:  LHC via R radial artery, selective coronary angiography. Performed By: Myles Marmolejo MD     Assistants: None    Anesthesia: Moderate Sedation    Estimated Blood Loss: Less than 10 mL      Specimens: None    Implants: None    Findings: LM normal, LAD luminal irregs, Circ luminal irregs, RCA luminal irregs, LVEF 65-70% LVEDP 13 mmHg    Complications: None    Recommendations: Continue medical therapy.     Signed By: Myles Marmolejo MD     August 4, 2020

## 2020-08-04 NOTE — PROGRESS NOTES
ALECIASW met with pt. She is known to CM from previous care. She lives with her daughter and has home O2 in place. SHe has had [facility] but denies any concerns for supportive care at present. Encouraged pt to ask for CM if she has any needs. Will follow pt and assist if any needs arise. Care Management Interventions  Mode of Transport at Discharge: (family)  Transition of Care Consult (CM Consult): Discharge Planning(Pt is insured with pharmacy benefits.)  Discharge Durable Medical Equipment: No(she has home O2 with [employer])  Physical Therapy Consult: No  Occupational Therapy Consult: No  Speech Therapy Consult: No  Current Support Network: Relative's Home(Pt lives with her daughter.   Normally manages ADL's with family support as needed.  )  Confirm Follow Up Transport: Family  Name of the Patient Representative Who was Provided with a Choice of Provider and Agrees with the Discharge Plan: pt  [employer] Information Provided?: No  Discharge Location  Discharge Placement: Home

## 2020-08-04 NOTE — ROUTINE PROCESS
TRANSFER - IN REPORT: 
 
Verbal [facility] from [facility] - op Report consisted of patients Situation, Background, Assessment and Recommendations(SBAR). Information from the following report(s) SBAR, Kardex and Procedure Summary was reviewed with the receiving nurse. Opportunity for questions and clarification was provided. Assessment completed upon patients arrival to unit and care assumed. R radial site visualized. C/D/I with TR Band in place.

## 2020-08-04 NOTE — ROUTINE PROCESS
Verbal bedside report received from Ana Hinojosa, [facility]. Assumed care of patient.

## 2020-08-04 NOTE — PROGRESS NOTES
TRANSFER - OUT REPORT:    Verbal report given to [facility] RN(name) on Ang Valentino  being transferred to [facility](unit) for routine progression of care       Report consisted of patients Situation, Background, Assessment and   Recommendations(SBAR). Information from the following report(s) Procedure Summary was reviewed with the receiving nurse. Lines:   Peripheral IV 08/03/20 Left Wrist (Active)   Site Assessment Clean, dry, & intact 08/03/20 2222   Phlebitis Assessment 0 08/03/20 2222   Infiltration Assessment 0 08/03/20 2222   Dressing Status Clean, dry, & intact 08/03/20 2222        Opportunity for questions and clarification was provided.       Patient transported with:   "[employer]

## 2020-08-04 NOTE — PROGRESS NOTES
TRANSFER - IN REPORT:    Verbal report received from Manus Dubin RN(name) on Valentina Barefoot  being received from cath lab(unit) for routine progression of care      Report consisted of patients Situation, Background, Assessment and   Recommendations(SBAR). Information from the following report(s) Procedure Summary was reviewed with the receiving nurse. Opportunity for questions and clarification was provided. Assessment completed upon patients arrival to unit and care assumed.

## 2020-08-04 NOTE — PROGRESS NOTES
TRANSFER - OUT REPORT:    Verbal report given to Santos Reynoso RN(name) on Davis Wynn  being transferred to CPRU(unit) for routine progression of care       Report consisted of patients Situation, Background, Assessment and   Recommendations(SBAR). Information from the following report(s) Procedure Summary, MAR and Cardiac Rhythm SR was reviewed with the receiving nurse. Lines:   Peripheral IV 08/03/20 Left Wrist (Active)   Site Assessment Clean, dry, & intact 08/03/20 2222   Phlebitis Assessment 0 08/03/20 2222   Infiltration Assessment 0 08/03/20 2222   Dressing Status Clean, dry, & intact 08/03/20 2222        Opportunity for questions and clarification was provided.       Patient transported with:   O2 @ 4 liters  Registered Nurse     [facility]  Diagnostic  RRA - R band @ 1000 w/ 12 ml air in band - site C/D I  Versed 2 mg IV  Fent 100 mcg IV

## 2020-08-04 NOTE — ED TRIAGE NOTES
Pt BIB EMS, called out for mid sternal chest pressure that radiates to her neck, bilateral shoulders and back. Also reports she feels pressure in bilateral ears. States the onset was 3 weeks ago, worsening over the last 2-3 days. On EMS arrival patient was very anxious with a RR of 60. Pt on home O2 at 6L NC sat, 100%. States that she is normally on 3L NC but adjusts for SOB. Denies fever or cough. .

## 2020-08-04 NOTE — PROGRESS NOTES
Radial compression band removed at 1356 after slowly reducing air from 12 cc to zero as per hospital protocol. No bleeding or hematoma noted. 2 x 2 gauze with tegaderm placed over puncture site. The affected extremity is warm and dry to the touch. Frequent vital signs printed and placed on bedside chart. Patient instructed to call if any bleeding noted on gauze. Patient verbalized understanding the nursing instructions.

## 2020-08-04 NOTE — ROUTINE PROCESS
Verbal bedside report given to Tereza Lowery oncoming RN. Patient's situation, background, assessment and recommendations provided. Opportunity for questions provided. Oncoming RN assumed care of patient. R radial site visualized.

## 2020-08-04 NOTE — ROUTINE PROCESS
Skin assessment completed. Sacrum intact. Skin flaky and dry. Redness present to bilateral groin sites. Toe nails brittle and split. No other abnormalities seen.

## 2020-08-04 NOTE — ED NOTES
TRANSFER - OUT REPORT:    Verbal report given to Elicia 27 on Zane Lose  being transferred to [facility] for routine progression of care       Report consisted of patients Situation, Background, Assessment and   Recommendations(SBAR). Information from the following report(s) SBAR, ED Summary, [facility] and Cardiac Rhythm nsr was reviewed with the receiving nurse. Lines:   Peripheral IV 08/03/20 Left Wrist (Active)   Site Assessment Clean, dry, & intact 08/03/20 2222   Phlebitis Assessment 0 08/03/20 2222   Infiltration Assessment 0 08/03/20 2222   Dressing Status Clean, dry, & intact 08/03/20 2222        Opportunity for questions and clarification was provided.       Patient transported with:  Oxygen of 6 L  Transport

## 2020-08-04 NOTE — ED PROVIDER NOTES
80-year-old lady presents with concerns about chest pain and shortness of breath that has been present for a few weeks. She says that she just been letting it go because she was worried about getting exposed to COVID-19. She says that the pain in her chest as a pressure that radiates into her back and goes up the back of her neck into her ears. She says it is been that same pressure for the last 3 weeks but has intensified within the past few days. She also says that she has had some progressive shortness of breath associated with this. She has had no fevers or chills and no cough. She has a complicated medical history including history of pulmonary embolisms and vocal cord problems. EMS reported that on their arrival she seemed very anxious. On arrival to the emergency department her heart rate was in the 130s and she says she was still having the pain and shortness of breath. No other associated symptoms. Elements of this note were created using speech recognition software. As such, errors of speech recognition may be present. Past Medical History:   Diagnosis Date    Acute renal failure ([address] July, 2014    The patient was admitted with acute renal failure secondary to volume depletion. She was found to have a gastric ulcer on admission.  Arthritis     Asthma     Asthma with acute exacerbation 9/28/2017    Bilateral pulmonary embolism [facility]) September, 2012    Restarted on anticoagulation for lifetime    Calculus of ureter May, 2015    CAP (community acquired pneumonia) October, 2012    RML pneumonia    Cellulitis of left lower extremity 11/1/2017    Chronic pain     fibromyalgia.  back pain    Community acquired bacterial pneumonia 4/2/2018    CVA (cerebral infarction) January, 2012    Reportedly has mild left arm residual weakness    Gastric ulcer July, 2014          GERD (gastroesophageal reflux disease)     Gram-positive bacteremia 1/2 cx 12/26/2016    HCAP (healthcare-associated pneumonia) January, 2013    RLL pneumonia    HCAP (healthcare-associated pneumonia) February, 2013    RLL pneumonia    Hypertension     Left leg DVT ([facility] [address] 1991    On coumadin until 2008    Morbid obesity ([address]     Psychiatric disorder     PUD (peptic ulcer disease) ? ??    Sepsis due to pneumonia ([address] 4/2/2018    Stroke ([facility] [address]     2009 mini stroke    Thyroid disease        Past Surgical History:   Procedure Laterality Date    COLONOSCOPY N/A 12/24/2018    COLONOSCOPY / BMI 52 performed by Mariana Dye MD at [facility] ENDOSCOPY    HX APPENDECTOMY      HX CHOLECYSTECTOMY      HX ENDOSCOPY  July, 2014    Gastric ulcers x 2    HX GASTRIC BYPASS      HX HYSTERECTOMY      HX ORTHOPAEDIC      rt knee growth, right shoulder, left thumb    HX OTHER SURGICAL      vagus nerve stimulator    HX TONSILLECTOMY           Family History:   Problem Relation Age of Onset    Hypertension Mother     Cancer Father         prostate    Heart Attack Father     Heart Disease Father         CAD    Hypertension Father     Hypertension Sister     Stroke Sister     Heart Disease Brother         CAD with CABG    Heart Attack Brother     Hypertension Brother        Social History     Socioeconomic History    Marital status:      Spouse name: Not on file    Number of children: Not on file    Years of education: Not on file    Highest education level: Not on file   Occupational History    Occupation:      Comment: Disabled   Social Needs    Financial resource strain: Not on file    Food insecurity     Worry: Not on file     Inability: Not on file   [employer] needs     Medical: Not on file     Non-medical: Not on file   Tobacco Use    Smoking status: Never Smoker    Smokeless tobacco: Never Used   Substance and Sexual Activity    Alcohol use: No     Alcohol/week: 0.0 standard drinks    Drug use: No    Sexual activity: Not on file   Lifestyle    Physical activity     Days per week: Not on file     Minutes per session: Not on file    Stress: Not on file   Relationships    Social connections     Talks on phone: Not on file     Gets together: Not on file     Attends Hinduism service: Not on file     Active member of club or organization: Not on file     Attends meetings of clubs or organizations: Not on file     Relationship status: Not on file    Intimate partner violence     Fear of current or ex partner: Not on file     Emotionally abused: Not on file     Physically abused: Not on file     Forced sexual activity: Not on file   Other Topics Concern    Not on file   Social History Narrative    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 , one child who is healthy. Has always lived in [address]. Dog as a pet. No history of pet bird. There is no significant environmental or industrial exposure. There is no known exposure to TB. ALLERGIES: Latex; Bactrim [sulfamethoxazole-trimethoprim]; Lamictal [lamotrigine]; Pcn [penicillins]; and Tapentadol    Review of Systems   Constitutional: Negative for chills, diaphoresis and fever. HENT: Negative for congestion, rhinorrhea and sore throat. Eyes: Negative for redness and visual disturbance. Respiratory: Positive for shortness of breath. Negative for cough, chest tightness and wheezing. Cardiovascular: Positive for chest pain. Negative for palpitations. Gastrointestinal: Negative for abdominal pain, blood in stool, diarrhea, nausea and vomiting. Endocrine: Negative for polydipsia and polyuria. Genitourinary: Negative for dysuria and hematuria. Musculoskeletal: Negative for arthralgias, myalgias and neck stiffness. Skin: Negative for rash. Allergic/Immunologic: Negative for environmental allergies and food allergies. Neurological: Negative for dizziness, weakness and headaches. Hematological: Negative for adenopathy. Does not bruise/bleed easily. Psychiatric/Behavioral: Negative for confusion and sleep disturbance. The patient is not nervous/anxious. Vitals:    08/03/20 2239 08/03/20 2244 08/03/20 2246 08/03/20 2255   BP:   141/81    Pulse: (!) 115 (!) 112 (!) 112 (!) 107   Resp: 19 16 14 13   Temp:       SpO2: 98% 99% 99% 99%   Weight:       Height:                Physical Exam  Vitals signs and nursing note reviewed. Constitutional:       Appearance: Normal appearance. HENT:      Mouth/Throat:      Mouth: Mucous membranes are moist.   Eyes:      General:         Right eye: No discharge. Left eye: No discharge. Cardiovascular:      Heart sounds: Normal heart sounds. Comments: Tachycardic with regular rhythm  Pulmonary:      Comments: Increased work of breathing with no wheezing or rhonchi  Abdominal:      General: Bowel sounds are normal.      Palpations: Abdomen is soft. Skin:     General: Skin is warm and dry. Neurological:      General: No focal deficit present. Mental Status: She is alert and oriented to person, place, and time. MDM  Number of Diagnoses or Management Options  Diagnosis management comments: Please see the medical decision making as documented under the ED course notes. ED Course as of Aug 04 0007   Mon Aug 03, 2020   2353 Patient's lactic acid is elevated to 4.4. I do not have an obvious source for any signs of infection or sepsis. Feeling better after the Ativan and morphine but is still having some pain. I will give her another dose of morphine.   Her metabolic panel and troponin are still pending.    [AC]      ED Course User Index  [AC] Peggy Hoang MD       Procedures

## 2020-08-04 NOTE — H&P
HOSPITALIST INITIAL HISTORY AND PHYSICAL    NAME:  Jason Ureña   Age:  72 y.o.  :   1950   MRN:   [id number]  PCP: Tej Johns  Consulting MD:  Treatment Team: Attending Provider: Ava Carrasco MD; Primary Nurse: Jose M Kearns RN; Consulting Provider: Ashish Sherwood MD    CHIEF COMPLAINT: chest pain    HISTORY OF PRESENT ILLNESS:   Jason Ureña is a 72 y.o. female with a past medical history of asthma, GERD, previous CVA who presents to the ER with report of chest pain that has been going on for the past 3 weeks or so, but has gotten progressively worse. The pain is substernal, accompanied with significant SOB. Worse with ambulation. Slightly better with nitro paste. Pt reports that pain and SOB are still present. REVIEW OF SYSTEMS: Comprehensive ROS performed. Denies fevers, chills, nausea, vomiting, otherwise negative. Past Medical History:   Diagnosis Date    Acute renal failure ([facility] [address]     The patient was admitted with acute renal failure secondary to volume depletion. She was found to have a gastric ulcer on admission.  Arthritis     Asthma     Asthma with acute exacerbation 2017    Bilateral pulmonary embolism [facility])     Restarted on anticoagulation for lifetime    Calculus of ureter May, 2015    CAP (community acquired pneumonia)     RML pneumonia    Cellulitis of left lower extremity 2017    Chronic pain     fibromyalgia.  back pain    Community acquired bacterial pneumonia 2018    CVA (cerebral infarction)     Reportedly has mild left arm residual weakness    Gastric ulcer           GERD (gastroesophageal reflux disease)     Gram-positive bacteremia / cx 2016    HCAP (healthcare-associated pneumonia)     RLL pneumonia    HCAP (healthcare-associated pneumonia)     RLL pneumonia    Hypertension     Left leg DVT ([facility] [address]     On coumadin until     Morbid obesity ([facility] [address]     Psychiatric disorder     PUD (peptic ulcer disease) ? ??    Sepsis due to pneumonia ([facility] [address] 2018    Stroke [facility])     2009 mini stroke    Thyroid disease         Past Surgical History:   Procedure Laterality Date    COLONOSCOPY N/A 2018    COLONOSCOPY / BMI 52 performed by Pat Eller MD at [facility] ENDOSCOPY    HX APPENDECTOMY      HX CHOLECYSTECTOMY      HX ENDOSCOPY      Gastric ulcers x 2    HX GASTRIC BYPASS      HX HYSTERECTOMY      HX ORTHOPAEDIC      rt knee growth, right shoulder, left thumb    HX OTHER SURGICAL      vagus nerve stimulator    HX TONSILLECTOMY         Prior to Admission Medications   Prescriptions Last Dose Informant Patient Reported? Taking? ALPRAZolam (XANAX) 1 mg tablet   No No   Sig: Take 1 Tab by mouth daily as needed for Anxiety. Use for 5 days as needed, then discontinue to get off benzodiazapenes completely  Indications: anxiety   Cetirizine (ZYRTEC) 10 mg cap   Yes No   Sig: Take  by mouth. Cholecalciferol, Vitamin D3, (VITAMIN D3) 1,000 unit cap   Yes No   Sig: Take  by mouth. DULoxetine (CYMBALTA) 60 mg capsule   No No   Sig: Take 1 Cap by mouth nightly. Dose changed by inpatient psych   FERROUS FUMARATE/VIT BCOMP&C (SUPER B COMPLEX PO)   Yes No   Sig: Take 1 Tab by mouth daily. HYDROcodone-acetaminophen (NORCO)  mg tablet   Yes No   Sig: Take 1 Tab by mouth every six (6) hours as needed for Pain. Magnesium Oxide 500 mg cap   Yes No   Sig: Take 500 mg by mouth. NEBULIZER   Yes No   Sig: by Does Not Apply route. OXYGEN-AIR DELIVERY SYSTEMS   Yes No   Si lpm cont. , 4 lpm at hs   QUEtiapine (SEROQUEL) 400 mg tablet   Yes No   Sig: Take 450 mg by mouth two (2) times a day. SUMATRIPTAN SUCCINATE (IMITREX PO)   Yes No   Sig: take 100 mg by mouth as needed. acyclovir (ZOVIRAX) 200 mg capsule   Yes No   Sig: Take  by mouth every four (4) hours (while awake).    albuterol (PROAIR HFA) 90 mcg/actuation inhaler   No No   Sig: Take 2 Puffs by inhalation every four (4) hours as needed for Wheezing. albuterol (PROVENTIL VENTOLIN) 2.5 mg /3 mL (0.083 %) nebulizer solution   No No   Sig: 3 mL by Nebulization route four (4) times daily. And every 4 hours as needed cough, wheezing, shortness of breath, J45.909, Medicare part B   albuterol-ipratropium (DUO-NEB) 2.5 mg-0.5 mg/3 ml nebu   Yes No   Sig: 3 mL by Nebulization route every four (4) hours as needed. ammonium lactate (LAC-HYDRIN) 12 % lotion   Yes No   Sig: Apply  to affected area as needed. rub in to affected area well   apixaban (ELIQUIS) 5 mg tablet   Yes No   Sig: Take 5 mg by mouth two (2) times a day. atorvastatin (LIPITOR) 10 mg tablet   No No   Sig: Take 2 Tabs by mouth nightly. biotin (VITAMIN B7) 5 mg capsule   Yes No   Sig: Take 5 mg by mouth daily. buPROPion SR (WELLBUTRIN, ZYBAN) 200 mg SR tablet   Yes No   Sig: Take 200 mg by mouth two (2) times a day. carvedilol (COREG) 12.5 mg tablet   Yes No   Sig: Take 12.5 mg by mouth two (2) times a day. fluticasone furoate-vilanterol (BREO ELLIPTA) 200-25 mcg/dose inhaler   No No   Sig: Take 1 Puff by inhalation daily. RINSE MOUTH WELL AFTER USE   guaiFENesin ER (MUCINEX) 600 mg ER tablet   Yes No   Sig: Take 600 mg by mouth two (2) times a day. icosapent ethyl (VASCEPA) 1 gram capsule   Yes No   Sig: Take  by mouth two (2) times daily (with meals). ipratropium (ATROVENT) 42 mcg (0.06 %) nasal spray   No No   Si Sprays by Both Nostrils route three (3) times daily. levothyroxine (SYNTHROID) 125 mcg tablet   Yes No   Sig: Take 112 mcg by mouth every evening. lisinopril (PRINIVIL, ZESTRIL) 10 mg tablet   Yes No   Sig: Take  by mouth daily. meclizine (ANTIVERT) 25 mg tablet   Yes No   Sig: Take 25 mg by mouth three (3) times daily as needed. mirtazapine (REMERON) 45 mg tablet   Yes No   Sig: Take 45 mg by mouth nightly.    montelukast (SINGULAIR) 10 mg tablet   Yes No Sig: Take 10 mg by mouth daily. mv, min #36-iron,carbonyl-FA (GERITOL COMPLETE) 16 mg iron- 0.38 mg tab   Yes No   Sig: Take  by mouth.   naloxegol (MOVANTIK) 25 mg tab tablet   Yes No   Sig: Take  by mouth Daily (before breakfast). nystatin (MYCOSTATIN) powder   No No   Sig: Apply  to affected area two (2) times a day. ondansetron (ZOFRAN ODT) 8 mg disintegrating tablet   Yes No   Sig: Take 8 mg by mouth every eight (8) hours as needed for Nausea. oxybutynin chloride XL (DITROPAN XL) 10 mg CR tablet   Yes No   Sig: Take 10 mg by mouth daily. pantoprazole (PROTONIX) 40 mg tablet   No No   Sig: Take 1 Tab by mouth Before breakfast and dinner. potassium chloride SR (K-TAB) 20 mEq tablet   Yes No   Sig: Take  by mouth.   prasterone, dhea, (DHEA) 25 mg cap   Yes No   Sig: Take  by mouth.   propranolol (INDERAL) 10 mg tablet   Yes No   Sig: Take  by mouth three (3) times daily. sucralfate (CARAFATE) 1 gram tablet   Yes No   Sig: Take 1 g by mouth four (4) times daily. torsemide (DEMADEX) 20 mg tablet   Yes No   Sig: Take  by mouth daily. traZODone (DESYREL) 50 mg tablet   Yes No   Sig: Take  by mouth nightly. verapamil (CALAN) 120 mg tablet   Yes No   Sig: Take 120 mg by mouth three (3) times daily. vortioxetine (TRINTELLIX) 10 mg tablet   Yes No   Sig: Take  by mouth daily. ziprasidone (GEODON) 80 mg capsule   Yes No   Sig: Take 80 mg by mouth nightly. Facility-Administered Medications: None       Allergies   Allergen Reactions    Latex Contact Dermatitis    Bactrim [Sulfamethoxazole-Trimethoprim] Nausea Only    Lamictal [Lamotrigine] Atopic Dermatitis    Pcn [Penicillins] Rash    Tapentadol Rash       FAMILY HISTORY: Reviewed.  Negative except   Family History   Problem Relation Age of Onset    Hypertension Mother     Cancer Father         prostate    Heart Attack Father     Heart Disease Father         CAD    Hypertension Father     Hypertension Sister     Stroke Sister     Heart Disease Brother         CAD with CABG    Heart Attack Brother     Hypertension Brother        Social History     Tobacco Use    Smoking status: Never Smoker    Smokeless tobacco: Never Used   Substance Use Topics    Alcohol use: No     Alcohol/week: 0.0 standard drinks         Objective:     Visit Vitals  BP (!) 150/94   Pulse 98   Temp 98.3 °F (36.8 °C)   Resp 24   Ht 5' 3\" (1.6 m)   Wt 90.7 kg (200 lb)   SpO2 98%   BMI 35.43 kg/m²      Temp (24hrs), Av.3 °F (36.8 °C), Min:98.3 °F (36.8 °C), Max:98.3 °F (36.8 °C)    Oxygen Therapy  O2 Sat (%): 98 % (20)  Pulse via Oximetry: 99 beats per minute (20)  O2 Device: Nasal cannula (20)  O2 Flow Rate (L/min): 6 l/min (20)  Physical Exam:  General:    The patient is a pleasant middle aged female, obviously uncomfortable  Head:   Normocephalic/atraumatic. Eyes:  No palpebral pallor or scleral icterus. ENT:  External auricular and nasal exam within normal limits. Mucous membranes are moist.  Neck:  Supple, non-tender, no JVD. Lungs:   Clear to auscultation bilaterally without wheezes or crackles. No respiratory distress or accessory muscle use. Heart:   Regular rate and rhythm, without murmurs, rubs, or gallops. Abdomen:   Soft, non-tender, non-distended with normoactive bowel sounds. Genitourinary: No tenderness over the bladder or bilateral CVAs. Extremities: Without clubbing, cyanosis, or edema. Skin:     Pale, diaphoretic. No rashes, lesions, or jaundice. Pulses: Radial and dorsalis pedis pulses present 2+ bilaterally. Capillary refill <2s. Neurologic: CN II-XII grossly intact and symmetrical.     Moving all four extremities well with no focal deficits. Psychiatric: Pleasant demeanor, anxious affect.  Alert and oriented x 3    Data Review:   Recent Results (from the past 24 hour(s))   CBC WITH AUTOMATED DIFF    Collection Time: 20 10:19 PM   Result Value Ref Range    WBC 10.0 4.3 - 11.1 K/uL    RBC 4.79 4.05 - 5.2 M/uL    HGB 14.6 11.7 - 15.4 g/dL    HCT 44.5 35.8 - 46.3 %    MCV 92.9 79.6 - 97.8 FL    MCH 30.5 26.1 - 32.9 PG    MCHC 32.8 31.4 - 35.0 g/dL    RDW 14.0 11.9 - 14.6 %    PLATELET 055 443 - 219 K/uL    MPV 11.6 9.4 - 12.3 FL    ABSOLUTE NRBC 0.00 0.0 - 0.2 K/uL    DF AUTOMATED      NEUTROPHILS 53 43 - 78 %    LYMPHOCYTES 38 13 - 44 %    MONOCYTES 8 4.0 - 12.0 %    EOSINOPHILS 0 (L) 0.5 - 7.8 %    BASOPHILS 1 0.0 - 2.0 %    IMMATURE GRANULOCYTES 0 0.0 - 5.0 %    ABS. NEUTROPHILS 5.3 1.7 - 8.2 K/UL    ABS. LYMPHOCYTES 3.8 0.5 - 4.6 K/UL    ABS. MONOCYTES 0.8 0.1 - 1.3 K/UL    ABS. EOSINOPHILS 0.0 0.0 - 0.8 K/UL    ABS. BASOPHILS 0.1 0.0 - 0.2 K/UL    ABS. IMM. GRANS. 0.0 0.0 - 0.5 K/UL   LACTIC ACID    Collection Time: 08/03/20 10:19 PM   Result Value Ref Range    Lactic acid 4.4 (HH) 0.4 - 2.0 MMOL/L   EKG, 12 LEAD, INITIAL    Collection Time: 08/03/20 10:23 PM   Result Value Ref Range    Ventricular Rate 123 BPM    Atrial Rate 123 BPM    P-R Interval 150 ms    QRS Duration 76 ms    Q-T Interval 316 ms    QTC Calculation (Bezet) 452 ms    Calculated P Axis 48 degrees    Calculated R Axis -55 degrees    Calculated T Axis 31 degrees    Diagnosis       !! AGE AND GENDER SPECIFIC ECG ANALYSIS !!   Sinus tachycardia  Left axis deviation  Abnormal ECG  When compared with ECG of 12-DEC-2019 19:07,  Criteria for Anterior infarct are no longer Present  Criteria for Anterolateral infarct are no longer Present  Criteria for Inferior infarct are no longer Present  Nonspecific T wave abnormality no longer evident in Anterior leads     MAGNESIUM    Collection Time: 08/03/20 11:36 PM   Result Value Ref Range    Magnesium 2.2 1.8 - 2.4 mg/dL   METABOLIC PANEL, COMPREHENSIVE    Collection Time: 08/03/20 11:36 PM   Result Value Ref Range    Sodium 142 136 - 145 mmol/L    Potassium 3.4 (L) 3.5 - 5.1 mmol/L    Chloride 107 98 - 107 mmol/L    CO2 23 21 - 32 mmol/L    Anion gap 12 7 - 16 mmol/L    Glucose 85 65 - 100 mg/dL    BUN 13 8 - 23 MG/DL    Creatinine 1.26 (H) 0.6 - 1.0 MG/DL    GFR est AA 54 (L) >60 ml/min/1.73m2    GFR est non-AA 45 (L) >60 ml/min/1.73m2    Calcium 8.7 8.3 - 10.4 MG/DL    Bilirubin, total 0.6 0.2 - 1.1 MG/DL    ALT (SGPT) 18 12 - 65 U/L    AST (SGOT) 18 15 - 37 U/L    Alk. phosphatase 114 50 - 136 U/L    Protein, total 6.7 6.3 - 8.2 g/dL    Albumin 3.4 3.2 - 4.6 g/dL    Globulin 3.3 2.3 - 3.5 g/dL    A-G Ratio 1.0 (L) 1.2 - 3.5     TROPONIN-HIGH SENSITIVITY    Collection Time: 08/03/20 11:36 PM   Result Value Ref Range    Troponin-High Sensitivity 43.2 (H) 0 - 14 pg/mL   PROCALCITONIN    Collection Time: 08/03/20 11:36 PM   Result Value Ref Range    Procalcitonin 0.06 ng/mL   URINALYSIS W/ RFLX MICROSCOPIC    Collection Time: 08/04/20 12:01 AM   Result Value Ref Range    Color YELLOW      Appearance CLOUDY      Specific gravity 1.017 1.001 - 1.023      pH (UA) 7.0 5.0 - 9.0      Protein Negative NEG mg/dL    Glucose Negative mg/dL    Ketone 15 (A) NEG mg/dL    Bilirubin SMALL (A) NEG      Blood Negative NEG      Urobilinogen 1.0 0.2 - 1.0 EU/dL    Nitrites Negative NEG      Leukocyte Esterase SMALL (A) NEG      WBC 20-50 0 /hpf    RBC 0-3 0 /hpf    Epithelial cells 0 0 /hpf    Bacteria 4+ (H) 0 /hpf    Casts 3-5 0 /lpf   TROPONIN-HIGH SENSITIVITY    Collection Time: 08/04/20  1:49 AM   Result Value Ref Range    Troponin-High Sensitivity 117.5 (HH) 0 - 14 pg/mL   LACTIC ACID    Collection Time: 08/04/20  1:49 AM   Result Value Ref Range    Lactic acid 1.3 0.4 - 2.0 MMOL/L   LIPASE    Collection Time: 08/04/20  1:49 AM   Result Value Ref Range    Lipase 92 73 - 393 U/L       Imaging /Procedures /Studies:  Ct Chest W Cont    Result Date: 8/4/2020  IMPRESSION: No acute abnormality. Xr Chest Port    Result Date: 8/3/2020  IMPRESSION: No acute process.          Assessment and Plan:     Principal Problem:    NSTEMI (8/4/2020)    Elevated troponin in context of persistent pain with SOB, worse with exertion and better with nitro paste. Inverted T waves on EKG. Per ER physician Dr. Gisela Denney, cardiology requested we admit given patient's mild UTI and stable CVID. Will defer to cardiology to management of NSTEMI. Active Problems:    UTI (urinary tract infection) (7/19/2018)    IV Rocephin. Urine culture pending. Elevated troponin (8/4/2020)    Follow troponin. Normocytic anemia (7/19/2018)    Stable, follow CBC      Mild persistent asthma without complication (8/88/4937)    Stable. Continue home meds. Bipolar affective disorder ([facility] [address] (9/16/2012)    Stable. Continue home meds. History of peptic ulcer disease (1/13/2013)    Stable. Continue home meds. Restrictive lung disease (5/21/2018)    Stable. Continue home meds. Hypertension (9/16/2012)    Stable. Continue home meds. GERD (gastroesophageal reflux disease) (9/16/2012)    Stable. Continue home meds. Morbid obesity - sedentary lifestyle (5/21/2018)    Stable. Hypothyroidism (9/28/2017)    Stable. Continue home meds. CVID (common variable immunodeficiency) ([facility] [address] (3/19/2020)    Stable. DVT Prophylaxis: Heparin gtt      Code Status: FULL CODE      Disposition: Admit to cardiology unit for evaluation and treatment as per above.       Anticipated discharge: 2-3 days     Signed By: Zeb Blake MD     August 4, 2020

## 2020-08-04 NOTE — ROUTINE PROCESS
TRANSFER - IN REPORT: 
 
Verbal report received from Turner on Lexi Hawkins being received from ED  for routine progression of care. Report consisted of patients Situation, Background, Assessment and Recommendations(SBAR). Information from the following report(s) SBAR, Intake/Output, MAR and Cardiac Rhythm NSR was reviewed. Opportunity for questions and clarification was provided. Assessment completed upon patients arrival to unit and care assumed. Patient received to room 330. Patient connected to monitor and assessment completed. Plan of care reviewed. Patient oriented to room and call light. Patient aware to use call light to communicate any chest pain or needs.

## 2020-08-04 NOTE — CONSULTS
[facility] Cardiology Consult                Date of  Admission: 8/3/2020 10:04 PM     Primary Care Physician: Dr. Ruben Combs  Primary Cardiologist: none  Referring Physician: Dr. Tasha Ortega Physician: Dr. Praveena Canchola    CC/Reason for consult: chest pain, elevated troponin, tachycardia      Alem Edmonds is a 72 y.o. female with a past medical history of pulmonary embolism, DVTs, chronic pain, CVA, GERD, asthma, HTN, obesity, admitted through the hospitalist service due to multiple co-morbidities, possible UTI, elevated lactic acid. We have been consulted for chest pain that she describes as pressure through the center of her chest, into her upper back, the back of her neck and into her ears. The pain has lasted 3 weeks, worsening over the past few days with activity and improves with rest. She states she takes her prescribed pain meds for the pain but gets better as she lays down and rests. Associated symptoms include nausea and shortness of breath. She states she has home O2 as needed and has just recently started wearing it when the symptoms occurred. Patient denies dizziness, palpitations, or syncope. Patient also denies fevers, chills, or cough. Patient reports urinary frequency that she thinks has gotten better. On exam patient denies any current pain. She received 324 mg aspirin, 1\" nitrobid, and fluid boluses in the ER. Past Medical History:   Diagnosis Date    Acute renal failure ([address] July, 2014    The patient was admitted with acute renal failure secondary to volume depletion. She was found to have a gastric ulcer on admission.  Arthritis     Asthma     Asthma with acute exacerbation 9/28/2017    Bilateral pulmonary embolism [facility]) September, 2012    Restarted on anticoagulation for lifetime    Calculus of ureter May, 2015    CAP (community acquired pneumonia) October, 2012    RML pneumonia    Cellulitis of left lower extremity 11/1/2017    Chronic pain     fibromyalgia.  back pain    Community acquired bacterial pneumonia 4/2/2018    CVA (cerebral infarction) January, 2012    Reportedly has mild left arm residual weakness    Gastric ulcer July, 2014          GERD (gastroesophageal reflux disease)     Gram-positive bacteremia 1/2 cx 12/26/2016    HCAP (healthcare-associated pneumonia) January, 2013    RLL pneumonia    HCAP (healthcare-associated pneumonia) February, 2013    RLL pneumonia    Hypertension     Left leg DVT ([address] 1991    On coumadin until 2008    Morbid obesity ([address]     Psychiatric disorder     PUD (peptic ulcer disease) ? ??    Sepsis due to pneumonia ([address] 4/2/2018    Stroke ([address]     2009 mini stroke    Thyroid disease       Past Surgical History:   Procedure Laterality Date    COLONOSCOPY N/A 12/24/2018    COLONOSCOPY / BMI 52 performed by Jose Navas MD at [facility] ENDOSCOPY    HX APPENDECTOMY      HX CHOLECYSTECTOMY      HX ENDOSCOPY  July, 2014    Gastric ulcers x 2    HX GASTRIC BYPASS      HX HYSTERECTOMY      HX ORTHOPAEDIC      rt knee growth, right shoulder, left thumb    HX OTHER SURGICAL      vagus nerve stimulator    HX TONSILLECTOMY       Allergies   Allergen Reactions    Latex Contact Dermatitis    Bactrim [Sulfamethoxazole-Trimethoprim] Nausea Only    Lamictal [Lamotrigine] Atopic Dermatitis    Pcn [Penicillins] Rash    Tapentadol Rash      Family History   Problem Relation Age of Onset    Hypertension Mother     Cancer Father         prostate    Heart Attack Father     Heart Disease Father         CAD    Hypertension Father     Hypertension Sister     Stroke Sister     Heart Disease Brother         CAD with CABG    Heart Attack Brother     Hypertension Brother         Current Facility-Administered Medications   Medication Dose Route Frequency    sodium chloride (NS) flush 5-40 mL  5-40 mL IntraVENous Q8H    sodium chloride (NS) flush 5-40 mL  5-40 mL IntraVENous PRN    0.9% sodium chloride infusion  125 mL/hr IntraVENous CONTINUOUS     Current Outpatient Medications   Medication Sig    QUEtiapine (SEROQUEL) 400 mg tablet Take 450 mg by mouth two (2) times a day.  acyclovir (ZOVIRAX) 200 mg capsule Take  by mouth every four (4) hours (while awake).  ammonium lactate (LAC-HYDRIN) 12 % lotion Apply  to affected area as needed. rub in to affected area well    biotin (VITAMIN B7) 5 mg capsule Take 5 mg by mouth daily.  prasterone, dhea, (DHEA) 25 mg cap Take  by mouth.  mv, min #36-iron,carbonyl-FA (GERITOL COMPLETE) 16 mg iron- 0.38 mg tab Take  by mouth.  guaiFENesin ER (MUCINEX) 600 mg ER tablet Take 600 mg by mouth two (2) times a day.  potassium chloride SR (K-TAB) 20 mEq tablet Take  by mouth.  lisinopril (PRINIVIL, ZESTRIL) 10 mg tablet Take  by mouth daily.  propranolol (INDERAL) 10 mg tablet Take  by mouth three (3) times daily.  torsemide (DEMADEX) 20 mg tablet Take  by mouth daily.  traZODone (DESYREL) 50 mg tablet Take  by mouth nightly.  vortioxetine (TRINTELLIX) 10 mg tablet Take  by mouth daily.  icosapent ethyl (VASCEPA) 1 gram capsule Take  by mouth two (2) times daily (with meals).  verapamil (CALAN) 120 mg tablet Take 120 mg by mouth three (3) times daily.  buPROPion SR (WELLBUTRIN, ZYBAN) 200 mg SR tablet Take 200 mg by mouth two (2) times a day.  Cetirizine (ZYRTEC) 10 mg cap Take  by mouth.  albuterol (PROVENTIL VENTOLIN) 2.5 mg /3 mL (0.083 %) nebulizer solution 3 mL by Nebulization route four (4) times daily. And every 4 hours as needed cough, wheezing, shortness of breath, J45.909, Medicare part B    fluticasone furoate-vilanterol (BREO ELLIPTA) 200-25 mcg/dose inhaler Take 1 Puff by inhalation daily. RINSE MOUTH WELL AFTER USE    albuterol (PROAIR HFA) 90 mcg/actuation inhaler Take 2 Puffs by inhalation every four (4) hours as needed for Wheezing.     ipratropium (ATROVENT) 42 mcg (0.06 %) nasal spray 2 Sprays by Both Nostrils route three (3) times daily.    ALPRAZolam (XANAX) 1 mg tablet Take 1 Tab by mouth daily as needed for Anxiety. Use for 5 days as needed, then discontinue to get off benzodiazapenes completely  Indications: anxiety    DULoxetine (CYMBALTA) 60 mg capsule Take 1 Cap by mouth nightly. Dose changed by inpatient psych    apixaban (ELIQUIS) 5 mg tablet Take 5 mg by mouth two (2) times a day.  meclizine (ANTIVERT) 25 mg tablet Take 25 mg by mouth three (3) times daily as needed.  albuterol-ipratropium (DUO-NEB) 2.5 mg-0.5 mg/3 ml nebu 3 mL by Nebulization route every four (4) hours as needed.  mirtazapine (REMERON) 45 mg tablet Take 45 mg by mouth nightly.  OXYGEN-AIR DELIVERY SYSTEMS 2 lpm cont. , 4 lpm at hs    nystatin (MYCOSTATIN) powder Apply  to affected area two (2) times a day.  sucralfate (CARAFATE) 1 gram tablet Take 1 g by mouth four (4) times daily.  montelukast (SINGULAIR) 10 mg tablet Take 10 mg by mouth daily.  naloxegol (MOVANTIK) 25 mg tab tablet Take  by mouth Daily (before breakfast).  carvedilol (COREG) 12.5 mg tablet Take 12.5 mg by mouth two (2) times a day.  atorvastatin (LIPITOR) 10 mg tablet Take 2 Tabs by mouth nightly.  ondansetron (ZOFRAN ODT) 8 mg disintegrating tablet Take 8 mg by mouth every eight (8) hours as needed for Nausea.  oxybutynin chloride XL (DITROPAN XL) 10 mg CR tablet Take 10 mg by mouth daily.  Magnesium Oxide 500 mg cap Take 500 mg by mouth.  HYDROcodone-acetaminophen (NORCO)  mg tablet Take 1 Tab by mouth every six (6) hours as needed for Pain.  Cholecalciferol, Vitamin D3, (VITAMIN D3) 1,000 unit cap Take  by mouth.  pantoprazole (PROTONIX) 40 mg tablet Take 1 Tab by mouth Before breakfast and dinner.  FERROUS FUMARATE/VIT BCOMP&C (SUPER B COMPLEX PO) Take 1 Tab by mouth daily.  NEBULIZER by Does Not Apply route.  ziprasidone (GEODON) 80 mg capsule Take 80 mg by mouth nightly.     SUMATRIPTAN SUCCINATE (IMITREX PO) take 100 mg by mouth as needed.  levothyroxine (SYNTHROID) 125 mcg tablet Take 112 mcg by mouth every evening. Review of Systems   Constitutional: Negative. HENT: Positive for ear pain. Eyes: Negative. Respiratory: Positive for shortness of breath. Negative for cough. Cardiovascular: Positive for chest pain. Gastrointestinal: Positive for nausea. Genitourinary: Positive for frequency. Musculoskeletal: Positive for back pain and neck pain. Skin: Negative. Neurological: Negative. Endo/Heme/Allergies: Negative. Psychiatric/Behavioral: Negative. Physical Exam  Vitals:    08/04/20 0200 08/04/20 0220 08/04/20 0254 08/04/20 0301   BP:  (!) 174/99 (!) 174/99 (!) 174/102   Pulse: 84 81 83 82   Resp: 30 27  26   Temp:       SpO2: 100% 98%  100%   Weight:       Height:           Physical Exam:  Physical Exam  Constitutional:       Appearance: She is obese. Neck:      Musculoskeletal: Normal range of motion. Cardiovascular:      Rate and Rhythm: Normal rate and regular rhythm. Pulses: Normal pulses. Heart sounds: Normal heart sounds. Pulmonary:      Effort: Tachypnea present. Breath sounds: Normal breath sounds. Abdominal:      General: Bowel sounds are normal.   Musculoskeletal:      Right lower leg: No edema. Left lower leg: No edema. Skin:     General: Skin is warm and dry. Neurological:      Mental Status: She is alert and oriented to person, place, and time. Cardiographics    Telemetry: normal sinus rhythm  ECG: sinus tachycardia  Echocardiogram: ordered/pending    Labs:   Recent Labs     08/03/20  2336 08/03/20  2219     --    K 3.4*  --    MG 2.2  --    BUN 13  --    CREA 1.26*  --    GLU 85  --    WBC  --  10.0   HGB  --  14.6   HCT  --  44.5   PLT  --  333        Assessment/Plan:     Assessment:     Chest pain - check ECHO, check serial troponins, start 81 mg asa  Possibly demand ischemia. Rule out wall motion abnormality with ECHO. Tachycardia - ST on EKG, resolved with IV ativan and morphine    Elevated troponin - see above; continue to follow    Thank you very much for this referral. We appreciate the opportunity to participate in this patient's care. We will follow along with above stated plan. Dre Carlton, FREDO  Consulting MD: Dr. Urszula Giraldo:    Patient seen and examined by me. Agree with above note by physician extender. Key findings are:  No CP or MARLOW at present, but 3 weeks recurrent exertional SSCP radiating into throat with worsening MARLOW. Also with chronic pain in back/shoulders, s/p spinal stimulator and on chronic norco at home but no recent change in meds. Exam benign at present, but presented with pain, anxiety, sinus tachy to 140bpm and HTN. HS trop elevated but ECG normal during tachycardia and normal at sinus in 80's during my exam. CT chest negative. Currently asymptomatic. CV- RRR without murmur  Lungs- Clear bilaterally  Abd- soft, nontender, nondistended  Ext- no edema    Plan: As above. Echo in AM, continue meds, ASA, NTG paste, add heparin gtt if HS trop continues to rise or new recurrent CP in hospital. Consider C once infection issues evaluated, ruled out/treated. We will follow with you.      Juhi Fuller MD  [address] Cardiology  Pager [phone]

## 2020-08-05 VITALS
DIASTOLIC BLOOD PRESSURE: 83 MMHG | SYSTOLIC BLOOD PRESSURE: 131 MMHG | HEIGHT: 63 IN | RESPIRATION RATE: 18 BRPM | HEART RATE: 70 BPM | TEMPERATURE: 97.8 F | WEIGHT: 198.6 LBS | OXYGEN SATURATION: 96 % | BODY MASS INDEX: 35.19 KG/M2

## 2020-08-05 LAB
ANION GAP SERPL CALC-SCNC: 7 MMOL/L (ref 7–16)
BASOPHILS # BLD: 0 K/UL (ref 0–0.2)
BASOPHILS NFR BLD: 1 % (ref 0–2)
BUN SERPL-MCNC: 11 MG/DL (ref 8–23)
CALCIUM SERPL-MCNC: 7.8 MG/DL (ref 8.3–10.4)
CHLORIDE SERPL-SCNC: 114 MMOL/L (ref 98–107)
CO2 SERPL-SCNC: 23 MMOL/L (ref 21–32)
CREAT SERPL-MCNC: 0.91 MG/DL (ref 0.6–1)
DIFFERENTIAL METHOD BLD: ABNORMAL
EOSINOPHIL # BLD: 0.1 K/UL (ref 0–0.8)
EOSINOPHIL NFR BLD: 1 % (ref 0.5–7.8)
ERYTHROCYTE [DISTWIDTH] IN BLOOD BY AUTOMATED COUNT: 13.7 % (ref 11.9–14.6)
GLUCOSE SERPL-MCNC: 125 MG/DL (ref 65–100)
HCT VFR BLD AUTO: 33.6 % (ref 35.8–46.3)
HGB BLD-MCNC: 11.1 G/DL (ref 11.7–15.4)
IMM GRANULOCYTES # BLD AUTO: 0 K/UL (ref 0–0.5)
IMM GRANULOCYTES NFR BLD AUTO: 0 % (ref 0–5)
LYMPHOCYTES # BLD: 2.7 K/UL (ref 0.5–4.6)
LYMPHOCYTES NFR BLD: 44 % (ref 13–44)
MCH RBC QN AUTO: 31 PG (ref 26.1–32.9)
MCHC RBC AUTO-ENTMCNC: 33 G/DL (ref 31.4–35)
MCV RBC AUTO: 93.9 FL (ref 79.6–97.8)
MONOCYTES # BLD: 0.5 K/UL (ref 0.1–1.3)
MONOCYTES NFR BLD: 9 % (ref 4–12)
NEUTS SEG # BLD: 2.8 K/UL (ref 1.7–8.2)
NEUTS SEG NFR BLD: 46 % (ref 43–78)
NRBC # BLD: 0 K/UL (ref 0–0.2)
PLATELET # BLD AUTO: 206 K/UL (ref 150–450)
PMV BLD AUTO: 9.8 FL (ref 9.4–12.3)
POTASSIUM SERPL-SCNC: 3.3 MMOL/L (ref 3.5–5.1)
RBC # BLD AUTO: 3.58 M/UL (ref 4.05–5.2)
SODIUM SERPL-SCNC: 144 MMOL/L (ref 136–145)
WBC # BLD AUTO: 6.1 K/UL (ref 4.3–11.1)

## 2020-08-05 PROCEDURE — 85025 COMPLETE CBC W/AUTO DIFF WBC: CPT

## 2020-08-05 PROCEDURE — 77010033678 HC OXYGEN DAILY

## 2020-08-05 PROCEDURE — 80048 BASIC METABOLIC PNL TOTAL CA: CPT

## 2020-08-05 PROCEDURE — 74011250637 HC RX REV CODE- 250/637: Performed by: FAMILY MEDICINE

## 2020-08-05 PROCEDURE — 74011250637 HC RX REV CODE- 250/637: Performed by: INTERNAL MEDICINE

## 2020-08-05 PROCEDURE — 74011250636 HC RX REV CODE- 250/636: Performed by: FAMILY MEDICINE

## 2020-08-05 PROCEDURE — 99231 SBSQ HOSP IP/OBS SF/LOW 25: CPT | Performed by: INTERNAL MEDICINE

## 2020-08-05 PROCEDURE — 94640 AIRWAY INHALATION TREATMENT: CPT

## 2020-08-05 PROCEDURE — 99218 HC RM OBSERVATION: CPT

## 2020-08-05 PROCEDURE — 36415 COLL VENOUS BLD VENIPUNCTURE: CPT

## 2020-08-05 PROCEDURE — 74011250637 HC RX REV CODE- 250/637: Performed by: NURSE PRACTITIONER

## 2020-08-05 PROCEDURE — 74011000258 HC RX REV CODE- 258: Performed by: FAMILY MEDICINE

## 2020-08-05 PROCEDURE — 96376 TX/PRO/DX INJ SAME DRUG ADON: CPT

## 2020-08-05 PROCEDURE — 94760 N-INVAS EAR/PLS OXIMETRY 1: CPT

## 2020-08-05 RX ORDER — CARVEDILOL 12.5 MG/1
12.5 TABLET ORAL 2 TIMES DAILY
Qty: 60 TAB | Refills: 0 | Status: SHIPPED
Start: 2020-08-05

## 2020-08-05 RX ORDER — ONDANSETRON 4 MG/1
4 TABLET, ORALLY DISINTEGRATING ORAL
Status: DISCONTINUED | OUTPATIENT
Start: 2020-08-05 | End: 2020-08-05 | Stop reason: HOSPADM

## 2020-08-05 RX ORDER — CEPHALEXIN 500 MG/1
500 CAPSULE ORAL 3 TIMES DAILY
Qty: 6 CAP | Refills: 0 | Status: SHIPPED | OUTPATIENT
Start: 2020-08-05 | End: 2020-09-15

## 2020-08-05 RX ORDER — GUAIFENESIN 100 MG/5ML
81 LIQUID (ML) ORAL DAILY
Qty: 30 TAB | Refills: 0 | Status: SHIPPED | OUTPATIENT
Start: 2020-08-06

## 2020-08-05 RX ADMIN — HYDROCODONE BITARTRATE AND ACETAMINOPHEN 1 TABLET: 10; 325 TABLET ORAL at 10:35

## 2020-08-05 RX ADMIN — URINARY PAIN RELIEF 190 MG: 95 TABLET ORAL at 13:15

## 2020-08-05 RX ADMIN — ONDANSETRON 4 MG: 4 TABLET, ORALLY DISINTEGRATING ORAL at 12:07

## 2020-08-05 RX ADMIN — PANTOPRAZOLE SODIUM 40 MG: 40 TABLET, DELAYED RELEASE ORAL at 09:34

## 2020-08-05 RX ADMIN — LEVOTHYROXINE SODIUM 112 MCG: 0.11 TABLET ORAL at 09:34

## 2020-08-05 RX ADMIN — ASPIRIN 81 MG: 81 TABLET, CHEWABLE ORAL at 09:34

## 2020-08-05 RX ADMIN — GUAIFENESIN 600 MG: 600 TABLET ORAL at 09:34

## 2020-08-05 RX ADMIN — SODIUM CHLORIDE 75 ML/HR: 9 INJECTION, SOLUTION INTRAVENOUS at 07:25

## 2020-08-05 RX ADMIN — OXYBUTYNIN CHLORIDE 10 MG: 10 TABLET, EXTENDED RELEASE ORAL at 09:34

## 2020-08-05 RX ADMIN — SUCRALFATE 1 G: 1 TABLET ORAL at 09:34

## 2020-08-05 RX ADMIN — LISINOPRIL 20 MG: 20 TABLET ORAL at 09:34

## 2020-08-05 RX ADMIN — CEFTRIAXONE SODIUM 2 G: 2 INJECTION, POWDER, FOR SOLUTION INTRAMUSCULAR; INTRAVENOUS at 00:57

## 2020-08-05 RX ADMIN — VERAPAMIL HYDROCHLORIDE 120 MG: 120 TABLET, FILM COATED ORAL at 09:34

## 2020-08-05 RX ADMIN — MONTELUKAST 10 MG: 10 TABLET, FILM COATED ORAL at 09:34

## 2020-08-05 RX ADMIN — URINARY PAIN RELIEF 190 MG: 95 TABLET ORAL at 05:23

## 2020-08-05 RX ADMIN — Medication 10 ML: at 13:16

## 2020-08-05 RX ADMIN — CARVEDILOL 25 MG: 25 TABLET, FILM COATED ORAL at 09:34

## 2020-08-05 RX ADMIN — BUDESONIDE AND FORMOTEROL FUMARATE DIHYDRATE 2 PUFF: 160; 4.5 AEROSOL RESPIRATORY (INHALATION) at 09:45

## 2020-08-05 RX ADMIN — ALPRAZOLAM 1 MG: 0.5 TABLET ORAL at 10:35

## 2020-08-05 RX ADMIN — BUPROPION HYDROCHLORIDE 150 MG: 150 TABLET, EXTENDED RELEASE ORAL at 09:34

## 2020-08-05 RX ADMIN — SUCRALFATE 1 G: 1 TABLET ORAL at 13:16

## 2020-08-05 RX ADMIN — LORATADINE 10 MG: 10 TABLET ORAL at 09:34

## 2020-08-05 RX ADMIN — Medication 10 ML: at 05:23

## 2020-08-05 NOTE — PROGRESS NOTES
Problem: Falls - Risk of  Goal: *Absence of Falls  Description: Document Lela Chung Fall Risk and appropriate interventions in the flowsheet.   Outcome: Resolved/Met     Problem: Patient Education: Go to Patient Education Activity  Goal: Patient/Family Education  Outcome: Resolved/Met

## 2020-08-05 NOTE — ROUTINE PROCESS
Bedside shift change report given to Pam Mora (oncoming nurse) by Self (offgoing nurse).  Report included the following information SBAR, Kardex, Intake/Output, MAR and Cardiac Rhythm SR.

## 2020-08-05 NOTE — PROGRESS NOTES
Discharge instructions given to patient and follow up appointments given and new prescriptions given. Time for questions allowed. Patient verbalized understanding. Patient taken out for discharge at designated time.

## 2020-08-05 NOTE — DISCHARGE SUMMARY
Hospitalist Discharge Summary     Patient ID:  Esha Bhatia  [id number]  33 y.o.  1950  Admit date: 8/3/2020 10:04 PM  Discharge date and time: 8/5/2020  Attending: Debra Hernandez MD  PCP:  Rosa Monique DO  Treatment Team: Attending Provider: Debra Hernandez MD; Consulting Provider: Lory Dang MD; Consulting Provider: Rochelle Urban MD; Utilization Review: Alex Salas RN; Care Manager: Dottie Randhawa LM    Principal Diagnosis Chest pain   Principal Problem:    Chest pain (8/4/2020)    Active Problems:    Hypertension (9/16/2012)      GERD (gastroesophageal reflux disease) (9/16/2012)      Morbid obesity - sedentary lifestyle (5/21/2018)      Hypothyroidism (9/28/2017)      Mild persistent asthma without complication (8/47/3325)      Bipolar affective disorder ([facility] (9/16/2012)      History of peptic ulcer disease (1/13/2013)      Restrictive lung disease (5/21/2018)      UTI (urinary tract infection) (7/19/2018)      Normocytic anemia (7/19/2018)      CVID (common variable immunodeficiency) ([facility] (3/19/2020)      Elevated troponin (8/4/2020)             Hospital Course:  Please refer to the admission H&P for details of presentation. In summary, the patient is a 72yo F with hx asthma, anxiety, GERD, CVA who presented with three weeks of chest pain. High sensitivity troponin was elevated. Cardiology consulted with initial concern of NSTEMI, but LHC with no occlusive disease and only mild CAD present. Noncardiac chest pain. Chest pain resolved. Etiology possibly GI or psych. Continue home ativan, statin, asa, BB. Found also to have UTI, receiving rocephin. GNR on culture, identification pending at time of discharge. Continue keflex at discharge. Significant Diagnostic Studies:   CT CHEST W CONT   Final Result   IMPRESSION: No acute abnormality. XR CHEST PORT   Final Result   IMPRESSION: No acute process.             Labs: Results:       Chemistry Recent Labs     08/05/20  0320 08/03/20  2336   * 85    142   K 3.3* 3.4*   * 107   CO2 23 23   BUN 11 13   CREA 0.91 1.26*   CA 7.8* 8.7   AGAP 7 12   AP  --  114   TP  --  6.7   ALB  --  3.4   GLOB  --  3.3   AGRAT  --  1.0*      CBC w/Diff Recent Labs     08/05/20 0320 08/03/20  2219   WBC 6.1 10.0   RBC 3.58* 4.79   HGB 11.1* 14.6   HCT 33.6* 44.5    333   GRANS 46 53   LYMPH 44 38   EOS 1 0*      Cardiac Enzymes No results for input(s): CPK, CKND1, BRAN in the last 72 hours. No lab exists for component: CKRMB, TROIP   Coagulation No results for input(s): PTP, INR, APTT, INREXT in the last 72 hours. Lipid Panel Lab Results   Component Value Date/Time    Cholesterol, total 159 02/09/2017 05:35 AM    HDL Cholesterol 55 02/09/2017 05:35 AM    LDL, calculated 89.6 02/09/2017 05:35 AM    VLDL, calculated 14.4 02/09/2017 05:35 AM    Triglyceride 72 02/09/2017 05:35 AM    CHOL/HDL Ratio 2.9 02/09/2017 05:35 AM      BNP No results for input(s): BNPP in the last 72 hours. Liver Enzymes Recent Labs     08/03/20  2336   TP 6.7   ALB 3.4         Thyroid Studies Lab Results   Component Value Date/Time    T4, Total 4.9 09/16/2012 04:20 AM    T3 Uptake 41 (H) 09/16/2012 04:20 AM    TSH 0.831 08/20/2018 11:51 PM            Discharge Exam:  Visit Vitals  /65   Pulse 67   Temp 97.6 °F (36.4 °C)   Resp 18   Ht 5' 3\" (1.6 m)   Wt 90.1 kg (198 lb 9.6 oz)   SpO2 97%   BMI 35.18 kg/m²     General appearance: obese, NAD  Lungs: clear to auscultation bilaterally  Heart: regular rate and rhythm, S1, S2 normal, no murmur, click, rub or gallop  Abdomen: soft, non-tender. Bowel sounds normal. No masses,  no organomegaly  Extremities: no cyanosis or edema  Neurologic: Grossly normal    Disposition: home  Discharge Condition: stable  Patient Instructions:   Current Discharge Medication List      START taking these medications    Details   aspirin 81 mg chewable tablet Take 1 Tab by mouth daily.   Qty: 30 Tab, Refills: 0      cephALEXin (KEFLEX) 500 mg capsule Take 1 Cap by mouth three (3) times daily. Qty: 6 Cap, Refills: 0         CONTINUE these medications which have CHANGED    Details   carvediloL (Coreg) 12.5 mg tablet Take 1 Tab by mouth two (2) times a day. Qty: 60 Tab, Refills: 0         CONTINUE these medications which have NOT CHANGED    Details   tamsulosin (FLOMAX) 0.4 mg capsule Take 0.4 mg by mouth nightly. famotidine (Pepcid) 20 mg tablet Take 20 mg by mouth nightly. potassium chloride (K-DUR, KLOR-CON) 20 mEq tablet Take 20 mEq by mouth daily. QUEtiapine (SEROQUEL) 400 mg tablet Take 200 mg by mouth nightly. acyclovir (ZOVIRAX) 200 mg capsule Take 200 mg by mouth every twelve (12) hours. guaiFENesin ER (MUCINEX) 600 mg ER tablet Take 600 mg by mouth two (2) times a day. lisinopril (PRINIVIL, ZESTRIL) 10 mg tablet Take 20 mg by mouth two (2) times a day. traZODone (DESYREL) 50 mg tablet Take 100 mg by mouth nightly. vortioxetine (TRINTELLIX) 10 mg tablet Take 10 mg by mouth daily. icosapent ethyl (VASCEPA) 1 gram capsule Take 2 Caps by mouth two (2) times daily (with meals). verapamil (CALAN) 120 mg tablet Take 120 mg by mouth two (2) times a day. buPROPion SR (WELLBUTRIN, ZYBAN) 200 mg SR tablet Take 150 mg by mouth daily. Cetirizine (ZYRTEC) 10 mg cap Take  by mouth nightly. albuterol (PROVENTIL VENTOLIN) 2.5 mg /3 mL (0.083 %) nebulizer solution 3 mL by Nebulization route four (4) times daily. And every 4 hours as needed cough, wheezing, shortness of breath, J45.909, Medicare part B  Qty: 120 Each, Refills: 0      fluticasone furoate-vilanterol (BREO ELLIPTA) 200-25 mcg/dose inhaler Take 1 Puff by inhalation daily. RINSE MOUTH WELL AFTER USE  Qty: 3 Inhaler, Refills: 3      DULoxetine (CYMBALTA) 60 mg capsule Take 1 Cap by mouth nightly.  Dose changed by inpatient psych  Qty: 5 Cap, Refills: 0      apixaban (ELIQUIS) 5 mg tablet Take 5 mg by mouth two (2) times a day. OXYGEN-AIR DELIVERY SYSTEMS 2 lpm cont. , 4 lpm at hs      sucralfate (CARAFATE) 1 gram tablet Take 1 g by mouth four (4) times daily. montelukast (SINGULAIR) 10 mg tablet Take 10 mg by mouth daily. naloxegol (MOVANTIK) 25 mg tab tablet Take  by mouth Daily (before breakfast). atorvastatin (LIPITOR) 10 mg tablet Take 2 Tabs by mouth nightly. Qty: 30 Tab, Refills: 11      oxybutynin chloride XL (DITROPAN XL) 10 mg CR tablet Take 10 mg by mouth daily. Magnesium Oxide 500 mg cap Take 500 mg by mouth. HYDROcodone-acetaminophen (NORCO)  mg tablet Take 1 Tab by mouth every six (6) hours as needed for Pain.      pantoprazole (PROTONIX) 40 mg tablet Take 1 Tab by mouth Before breakfast and dinner. Qty: 60 Tab, Refills: 2      levothyroxine (SYNTHROID) 125 mcg tablet Take 112 mcg by mouth every evening. torsemide (DEMADEX) 20 mg tablet Take  by mouth daily as needed. albuterol (PROAIR HFA) 90 mcg/actuation inhaler Take 2 Puffs by inhalation every four (4) hours as needed for Wheezing. Qty: 3 Inhaler, Refills: 3      ipratropium (ATROVENT) 42 mcg (0.06 %) nasal spray 2 Sprays by Both Nostrils route three (3) times daily. Qty: 15 mL, Refills: 11    Associated Diagnoses: Chronic rhinitis      ALPRAZolam (XANAX) 1 mg tablet Take 1 Tab by mouth daily as needed for Anxiety. Use for 5 days as needed, then discontinue to get off benzodiazapenes completely  Indications: anxiety  Qty: 5 Tab, Refills: 0    Associated Diagnoses: Anxiety state      meclizine (ANTIVERT) 25 mg tablet Take 25 mg by mouth three (3) times daily as needed. albuterol-ipratropium (DUO-NEB) 2.5 mg-0.5 mg/3 ml nebu 3 mL by Nebulization route every four (4) hours as needed. nystatin (MYCOSTATIN) powder Apply  to affected area two (2) times a day.   Qty: 1 Bottle, Refills: 0      ondansetron (ZOFRAN ODT) 8 mg disintegrating tablet Take 8 mg by mouth every eight (8) hours as needed for Nausea. SUMATRIPTAN SUCCINATE (IMITREX PO) take 100 mg by mouth as needed. Activity: Activity as tolerated  Diet: Resume previous diet    Follow-up:     Follow-up Appointments   Procedures    FOLLOW UP VISIT Appointment in: One Week PCP     PCP     Standing Status:   Standing     Number of Occurrences:   1     Order Specific Question:   Appointment in     Answer:    One Week           Time spent to discharge patient 35 minutes  Signed:  Christie Montoya MD  8/5/2020  11:02 AM

## 2020-08-05 NOTE — PROGRESS NOTES
[facility] CARDIOLOGY PROGRESS NOTE           8/5/2020 9:02 AM    Admit Date: 8/3/2020         Subjective: CP resolved, cath did not show obstructive disease, normal LVEF    ROS:  Cardiovascular:  As noted above    Objective:      Vitals:    08/05/20 0017 08/05/20 0424 08/05/20 0638 08/05/20 0756   BP: 150/75 118/70  120/65   Pulse: 78 70  67   Resp: 18 18  18   Temp: 98 °F (36.7 °C) 97.9 °F (36.6 °C)  97.6 °F (36.4 °C)   SpO2: 99% 95%  97%   Weight:   198 lb 9.6 oz (90.1 kg)    Height:             Physical Exam:  General: Well Developed, Well Nourished, No Acute Distress, Alert & Oriented x 3, Appropriate mood  Neck: supple, no JVD  Heart: S1S2 with RRR without murmurs or gallops  Lungs: Clear throughout auscultation bilaterally without adventitious sounds  Abd: soft, nontender, nondistended, with good bowel sounds  Ext: no edema bilaterally  Skin: warm and dry      Data Review:   Recent Labs     08/05/20  0320 08/03/20  2336 08/03/20  2219    142  --    K 3.3* 3.4*  --    MG  --  2.2  --    BUN 11 13  --    CREA 0.91 1.26*  --    * 85  --    WBC 6.1  --  10.0   HGB 11.1*  --  14.6   HCT 33.6*  --  44.5     --  333       No results for input(s): TNIPOC, TROIQ in the last 72 hours.       Assessment/Plan:     Principal Problem:    Chest pain (8/4/2020)    Active Problems:    Hypertension (9/16/2012)      GERD (gastroesophageal reflux disease) (9/16/2012)      Morbid obesity - sedentary lifestyle (5/21/2018)      Hypothyroidism (9/28/2017)      Mild persistent asthma without complication (6/08/4822)      Bipolar affective disorder ([facility] (9/16/2012)      History of peptic ulcer disease (1/13/2013)      Restrictive lung disease (5/21/2018)      UTI (urinary tract infection) (7/19/2018)      Normocytic anemia (7/19/2018)      CVID (common variable immunodeficiency) ([facility] [address] (3/19/2020)      Elevated troponin (8/4/2020)    A/P  1) CP - non-cardiac  2) CAD - very mild by cath, recommend daily asa, lipid control  3) BP - controlled    Cardiology will sign off      Mary Delcid MD  8/5/2020 9:02 AM

## 2020-08-05 NOTE — PROCEDURES
[facility]  CARDIAC CATH    Name:  Kady Fisher  MR#:  [id number]  :  1950  ACCOUNT #:  [de-identified]  DATE OF SERVICE:  2020    PROCEDURES PERFORMED:  Left heart catheterization with selective coronary angiography via the right radial artery. PREOPERATIVE DIAGNOSIS:  Unstable angina. POSTOPERATIVE DIAGNOSIS:  Noncardiac chest pain. SURGEON:  Mahesh Sweet MD    ASSISTANT:  None. ESTIMATED BLOOD LOSS:  Less than 5 mL. SPECIMENS REMOVED:  None. COMPLICATIONS:  None. IMPLANTS:  None. ANESTHESIA:  The patient received 2 mg of Versed and 100 mcg of fentanyl, was monitored for conscious sedation beginning at 09:32 and ending at 10:00 by nurse Karlie Lubin. PROCEDURE:  After informed consent, the patient was prepped and draped in the usual sterile fashion. The right wrist was infiltrated with lidocaine. The right radial artery was accessed by the modified Seldinger technique with a 6-Citizen of [place] sheath. A total of 100 mL of Isovue contrast were used for the entire procedure. A Terumo band was used for hemostasis. CATHETERS USED:  Include a 5-Citizen of [place] JL-3.5, 5-[place]izen [place] JR-5, 5-Citizen [place] angled pigtail catheter. FINDINGS:  Left ventricle:  Left ventricular ejection fraction is estimated at 65-70% with normal wall motion. LVEDP 13 mmHg. Left main normal.    Left anterior descending artery had mild luminal irregularities throughout. It is very tortuous, consistent with longstanding hypertension. Circumflex artery:  Mild luminal irregularities and again, very tortuous. The right coronary artery was dominant, had a proximal stenosis of approximately 20%. Otherwise, mild luminal irregularities throughout it. It too was a very tortuous vessel. CONCLUSION:  This is a 52-year-old female who presented with signs and symptoms concerning for unstable angina and was found to have mild nonobstructive coronary artery disease by heart cath.       Maria Dolores Durand Saralee Gaucher, MD [id number]  D:  08/04/2020 10:12  T:  08/04/2020 22:44  JOB #:  [id number]

## 2020-08-05 NOTE — PROGRESS NOTES
Pt is for discharge home today with no needs/supportive care orders recieved for CM at this time. Care Management Interventions  Mode of Transport at Discharge: (family)  Transition of Care Consult (CM Consult): Discharge Planning(Pt is insured with pharmacy benefits.)  Discharge Durable Medical Equipment: No(she has home O2 with [employer])  Physical Therapy Consult: No  Occupational Therapy Consult: No  Speech Therapy Consult: No  Current Support Network: Relative's Home(Pt lives with her daughter.   Normally manages ADL's with family support as needed.  )  Confirm Follow Up Transport: Family  Name of the Patient Representative Who was Provided with a Choice of Provider and Agrees with the Discharge Plan: pt  [employer] Information Provided?: No  Discharge Location  Discharge Placement: Home

## 2020-08-05 NOTE — PROGRESS NOTES
Bedside report received from [facility], [address] [place] [address]. Care assumed.

## 2020-08-05 NOTE — DISCHARGE INSTRUCTIONS
Cardiac Catheterization/Angiography Discharge Instructions    *Check the puncture site frequently for swelling or bleeding. If you see any bleeding, lie down and apply pressure over the area with a clean town or washcloth. Notify your doctor for any redness, swelling, drainage or oozing from the puncture site. Notify your doctor for any fever or chills. *If the  arm with the puncture becomes cold, numb or painful, call Dr Toño Santiago at  [phone]    *Activity should be limited for the next 3 days with right arm. avoid any  strenuous activity for 48 hours. No heavy lifting (anything over 10 pounds) for three days. *Do not drive for 48 hours. *You may resume your usual diet. Drink more fluids than usual.    *Have a responsible person drive you home and stay with you for at least 24 hours after your heart catheterization/angiography. *You may remove the bandage from your Right  Arm in 24 hours. You may shower in 24 hours. No tub baths, hot tubs or swimming for one week. Do not place any lotions, creams, powders, ointments over the puncture site for one week. You may place a clean band-aid over the puncture site each day for 5 days. Change this daily.         URINARY TRACT INFECTION DISCHARGE INSTRUCTIONS    Name: Andrew Valencia  YOB: 1950  Primary Diagnosis: Principal Problem:    Chest pain (8/4/2020)    Active Problems:    Hypertension (9/16/2012)      GERD (gastroesophageal reflux disease) (9/16/2012)      Morbid obesity - sedentary lifestyle (5/21/2018)      Hypothyroidism (9/28/2017)      Mild persistent asthma without complication (9/54/6670)      Bipolar affective disorder ([facility] (9/16/2012)      History of peptic ulcer disease (1/13/2013)      Restrictive lung disease (5/21/2018)      UTI (urinary tract infection) (7/19/2018)      Normocytic anemia (7/19/2018)      CVID (common variable immunodeficiency) ([facility] (3/19/2020)      Elevated troponin (8/4/2020)        Introduction: You came to the hospital because you had one or some of the following symptoms: pain, tenderness in the bladder area, fever, frequent or painful urination, burning on urination or uterine irritability. Based on your evaluation, you have been diagnosed with a bladder infection. Prescribed medication and instructions follow to treat the infection. You are now ready to be sent home. In general, you should remember:     Empty your bladder at least every 2-3 hours. An over distended bladder can lead to infection.  Wipe front to back after urination, avoid wiping back to front as you may transfer bacteria from the rectum to the urethra (opening to the bladder).  Drink 8-10 glasses of water daily.  Take medication as prescribed. Take all of the medicine even though you start to feel better.  Know that bladder infections are the most common cause of pre-term labor. General:         Diet/Diet Restrictions:      Physical Activity / Restrictions / Safety:          Discharge Instructions/ Special Treatment/ Home Care Needs:     Call your provider if:   Your symptoms dont improve 24-36 hours after starting medication.  Pelvic pressure continues after 24-36 hours of treatment with medication.  You develop low backache along with pelvic pressure.  You develop cramps or contractions-more frequent than 8 in one hour or four in twenty minutes.  Your baby is not moving as much as usual-at least 4 fetal movements in 1 hour after drinking and resting on your side for one hour.    Other:

## 2020-08-06 ENCOUNTER — PATIENT OUTREACH (OUTPATIENT)
Dept: CASE MANAGEMENT | Age: 70
End: 2020-08-06

## 2020-08-06 NOTE — PROGRESS NOTES
1st Attempt to contact patient for [facility] Covid 19 Risk education  call, no answer on mobile number .  Will attempt to contact patient again within 24 hours

## 2020-08-07 ENCOUNTER — PATIENT OUTREACH (OUTPATIENT)
Dept: CASE MANAGEMENT | Age: 70
End: 2020-08-07

## 2020-08-07 LAB
BACTERIA SPEC CULT: ABNORMAL
BACTERIA SPEC CULT: ABNORMAL
SERVICE CMNT-IMP: ABNORMAL

## 2020-09-11 ENCOUNTER — ANESTHESIA EVENT (OUTPATIENT)
Dept: ENDOSCOPY | Age: 70
End: 2020-09-11
Payer: MEDICARE

## 2020-09-14 ENCOUNTER — HOSPITAL ENCOUNTER (OUTPATIENT)
Dept: SURGERY | Age: 70
Discharge: HOME | End: 2020-09-14

## 2020-09-15 VITALS — WEIGHT: 200 LBS | BODY MASS INDEX: 34.15 KG/M2 | HEIGHT: 64 IN

## 2020-09-15 NOTE — PERIOP NOTES
Patient verified name and . Order for consent not found in EHR and unable to match consent with case posting; patient verifies procedure. Type 1b surgery, phone assessment complete. Orders not received. Labs per surgeon: unknown, no orders  Labs per anesthesia protocol: none      Patient answered medical/surgical history questions at their best of ability. All prior to admission medications documented in [facility]. Patient instructed to take the following medications the day of surgery according to anesthesia guidelines with a small sip of water: Acyclovir, Xanax, Carvedilol, Mucinex,  Zofran, Ditropan, Carafate, Verapamil, Trintellix use/bring all inhalers; use nebulizer Hold all vitamins 7 days prior to surgery and NSAIDS 5 days prior to surgery. Prescription meds to hold: pt instructed to hold Eliquis for 2 days prior to surgery per MD    Patient instructed on the following:    > a negative Covid swab result is required to proceed with surgery;  appointments are made by the surgeon office and test should be collected 7 days prior to surgery. The testing center is located at the [address] [place] [address].   > 1 visitor allowed at this time. >Arrive at [facility], time of arrival to be called the day before by 1700  >NPO after midnight including gum, mints, and ice chips  >Responsible adult must drive patient to the hospital, stay during surgery, and patient will need supervision 24 hours after anesthesia  >Use antibacterial soap in shower the night before surgery and on the morning of surgery  >All piercings must be removed prior to arrival.    >Leave all valuables (money and jewelry) at home but bring insurance card and ID on       DOS. >Do not wear make-up, nail polish, lotions, cologne, perfumes, powders, or oil on skin.

## 2020-09-15 NOTE — PERIOP NOTES
Pt called this AM and left 2 messages stating she needed to complete phone assessment. Called pt back #[phone] but no answer. Pt completed phone assessment yesterday 9/14/2020 with Jose Martin Shelton RN. Left message on pt's phone giving PAT #[phone] to call back with any specific questions but informed on message that phone assessment was already completed.

## 2020-09-18 ENCOUNTER — HOSPITAL ENCOUNTER (OUTPATIENT)
Age: 70
Setting detail: OUTPATIENT SURGERY
Discharge: HOME | End: 2020-09-18
Attending: INTERNAL MEDICINE | Admitting: INTERNAL MEDICINE
Payer: MEDICARE

## 2020-09-18 ENCOUNTER — ANESTHESIA (OUTPATIENT)
Dept: ENDOSCOPY | Age: 70
End: 2020-09-18
Payer: MEDICARE

## 2020-09-18 VITALS
SYSTOLIC BLOOD PRESSURE: 157 MMHG | HEART RATE: 85 BPM | OXYGEN SATURATION: 97 % | TEMPERATURE: 98.6 F | BODY MASS INDEX: 34.87 KG/M2 | DIASTOLIC BLOOD PRESSURE: 87 MMHG | RESPIRATION RATE: 16 BRPM | WEIGHT: 200 LBS

## 2020-09-18 PROCEDURE — 74011000250 HC RX REV CODE- 250: Performed by: NURSE ANESTHETIST, CERTIFIED REGISTERED

## 2020-09-18 PROCEDURE — 74011250636 HC RX REV CODE- 250/636: Performed by: ANESTHESIOLOGY

## 2020-09-18 PROCEDURE — 74011250636 HC RX REV CODE- 250/636: Performed by: NURSE ANESTHETIST, CERTIFIED REGISTERED

## 2020-09-18 PROCEDURE — 76060000031 HC ANESTHESIA FIRST 0.5 HR: Performed by: INTERNAL MEDICINE

## 2020-09-18 PROCEDURE — 2709999900 HC NON-CHARGEABLE SUPPLY: Performed by: INTERNAL MEDICINE

## 2020-09-18 PROCEDURE — 76040000025: Performed by: INTERNAL MEDICINE

## 2020-09-18 PROCEDURE — 74011000250 HC RX REV CODE- 250: Performed by: ANESTHESIOLOGY

## 2020-09-18 RX ORDER — PROPOFOL 10 MG/ML
INJECTION, EMULSION INTRAVENOUS
Status: DISCONTINUED | OUTPATIENT
Start: 2020-09-18 | End: 2020-09-18 | Stop reason: HOSPADM

## 2020-09-18 RX ORDER — FAMOTIDINE 20 MG/1
20 TABLET, FILM COATED ORAL AS NEEDED
Status: DISCONTINUED | OUTPATIENT
Start: 2020-09-18 | End: 2020-09-18 | Stop reason: HOSPADM

## 2020-09-18 RX ORDER — PROPOFOL 10 MG/ML
INJECTION, EMULSION INTRAVENOUS AS NEEDED
Status: DISCONTINUED | OUTPATIENT
Start: 2020-09-18 | End: 2020-09-18 | Stop reason: HOSPADM

## 2020-09-18 RX ORDER — SODIUM CHLORIDE, SODIUM LACTATE, POTASSIUM CHLORIDE, CALCIUM CHLORIDE 600; 310; 30; 20 MG/100ML; MG/100ML; MG/100ML; MG/100ML
100 INJECTION, SOLUTION INTRAVENOUS CONTINUOUS
Status: DISCONTINUED | OUTPATIENT
Start: 2020-09-18 | End: 2020-09-18 | Stop reason: HOSPADM

## 2020-09-18 RX ORDER — LIDOCAINE HYDROCHLORIDE 20 MG/ML
INJECTION, SOLUTION EPIDURAL; INFILTRATION; INTRACAUDAL; PERINEURAL AS NEEDED
Status: DISCONTINUED | OUTPATIENT
Start: 2020-09-18 | End: 2020-09-18 | Stop reason: HOSPADM

## 2020-09-18 RX ADMIN — LIDOCAINE HYDROCHLORIDE 50 MG: 20 INJECTION, SOLUTION EPIDURAL; INFILTRATION; INTRACAUDAL; PERINEURAL at 13:26

## 2020-09-18 RX ADMIN — SODIUM CHLORIDE, SODIUM LACTATE, POTASSIUM CHLORIDE, AND CALCIUM CHLORIDE 100 ML/HR: 600; 310; 30; 20 INJECTION, SOLUTION INTRAVENOUS at 12:37

## 2020-09-18 RX ADMIN — PROPOFOL 50 MG: 10 INJECTION, EMULSION INTRAVENOUS at 13:29

## 2020-09-18 RX ADMIN — PROPOFOL 200 MCG/KG/MIN: 10 INJECTION, EMULSION INTRAVENOUS at 13:29

## 2020-09-18 RX ADMIN — FAMOTIDINE 20 MG: 10 INJECTION INTRAVENOUS at 12:37

## 2020-09-18 NOTE — H&P
[facility] Consult Note           Referring Physician:     Consult Date: 9/18/2020    Reason for Consult: CP    History of Present Illness:  Patient is a 72 y.o. female who is seen in consultation for CP. Past Medical History:   Diagnosis Date    Acute renal failure ([address] July, 2014    The patient was admitted with acute renal failure secondary to volume depletion. She was found to have a gastric ulcer on admission.  Arthritis     Asthma     Asthma with acute exacerbation 9/28/2017    Bilateral pulmonary embolism [facility]) September, 2012    Restarted on anticoagulation for lifetime    Calculus of ureter May, 2015    CAP (community acquired pneumonia) October, 2012    RML pneumonia    Cellulitis of left lower extremity 11/1/2017    Chronic pain     fibromyalgia. back pain    Community acquired bacterial pneumonia 4/2/2018    CVA (cerebral infarction) January, 2012    Reportedly has mild left arm residual weakness    Gastric ulcer July, 2014          GERD (gastroesophageal reflux disease)     Gram-positive bacteremia 1/2 cx 12/26/2016    HCAP (healthcare-associated pneumonia) January, 2013    RLL pneumonia    HCAP (healthcare-associated pneumonia) February, 2013    RLL pneumonia    Hypertension     Left leg DVT ([address] 1991    On coumadin until 2008    Morbid obesity ([address]     Nausea & vomiting     post op n/v \"years ago\"    Psychiatric disorder     depression, anxiety, panic attacks    PUD (peptic ulcer disease) ? ??    Sepsis due to pneumonia ([address] 4/2/2018    Stroke ([address]     2009 mini stroke; no residual effects    Thyroid disease       Past Surgical History:   Procedure Laterality Date    COLONOSCOPY N/A 12/24/2018    COLONOSCOPY / BMI 52 performed [facility]anna [facility] ENDOSCOPY    HX APPENDECTOMY      HX CHOLECYSTECTOMY      HX ENDOSCOPY  July, 2014    Gastric ulcers x 2    HX GASTRIC BYPASS      HX HYSTERECTOMY      HX ORTHOPAEDIC      rt knee growth, right shoulder, left thumb    HX OTHER SURGICAL      vagus nerve stimulator ~on left side of chest at neck    HX TONSILLECTOMY        Family History   Problem Relation Age of Onset    Hypertension Mother     Cancer Father         prostate    Heart Attack Father     Heart Disease Father         CAD    Hypertension Father     Hypertension Sister     Stroke Sister     Heart Disease Brother         CAD with CABG    Heart Attack Brother     Hypertension Brother      Social History     Occupational History    Occupation:      Comment: Disabled   Tobacco Use    Smoking status: Never Smoker    Smokeless tobacco: Never Used   Substance and Sexual Activity    Alcohol use: No     Alcohol/week: 0.0 standard drinks    Drug use: No    Sexual activity: Not on file       Hospital Medications:  Current Facility-Administered Medications   Medication Dose Route Frequency    lactated Ringers infusion  100 mL/hr IntraVENous CONTINUOUS    famotidine (PEPCID) tablet 20 mg  20 mg Oral PRN       Allergies: Allergies   Allergen Reactions    Latex Contact Dermatitis    Bactrim [Sulfamethoxazole-Trimethoprim] Nausea Only    Lamictal [Lamotrigine] Atopic Dermatitis    Pcn [Penicillins] Rash    Tapentadol Rash       Review of Systems:  A comprehensive review of systems was negative except for that written in the History of Present Illness. Objective:     Physical Exam:  Vitals:  Visit Vitals  BP (!) 148/83   Pulse (!) 131   Temp 99 °F (37.2 °C)   Resp 20   Wt 90.7 kg (200 lb)   SpO2 97%   BMI 34.87 kg/m²       General: No acute distress. Skin:  Extremities and face reveal no rashes. No thorne erythema. No telangiectasias on the chest wall. HEENT: Sclerae anicteric. No oral ulcers. No abnormal pigmentation of the lips. The neck is supple. Cardiovascular: Regular rate and rhythm. No murmurs, gallops, or rubs. Respiratory:  Comfortable breathing  With no accessory muscle use.  Clear breath sounds with no wheezes, rales, or rhonchi. GI:  Abdomen nondistended, soft, and nontender. Normal active bowel sounds. No enlargement of the liver or spleen. No masses palpable. Musculoskeletal:  No pitting edema of the lower legs. Extremities have good range of motion. Neurological:  Gross memory appears intact. Patient is alert and oriented. Psychiatric:  Mood appears appropriate with judgement intact. Lymphatic:  No cervical or supraclavicular adenopathy. Laboratory:    No results for input(s): WBC, RBC, HGB, HCT, PLT, HGBEXT, HCTEXT, PLTEXT in the last 72 hours. No results for input(s): GLU, NA, K, CL, CO2, BUN, CREA, CA in the last 72 hours. No results for input(s): PTP, INR, APTT, INREXT in the last 72 hours. No results for input(s): TBIL, CBIL, AP, ALB, TP, AML, LPSE in the last 72 hours.     No lab exists for component: SGOT, GPT    Assessment:       A 72 y.o. female with atypical chest pain      Plan:       EGD    Signed By: Tari Mota MD     September 18, 2020

## 2020-09-18 NOTE — PROCEDURES
Endoscopic Gastroduodenoscopy Procedure Note    Indications: Non-cardiac chest pain, n/v    Anesthesia/Sedation: MAC IV     Pre-Procedure Physical:    Current Facility-Administered Medications   Medication Dose Route Frequency    lactated Ringers infusion  100 mL/hr IntraVENous CONTINUOUS    famotidine (PEPCID) tablet 20 mg  20 mg Oral PRN      Latex; Bactrim [sulfamethoxazole-trimethoprim]; Lamictal [lamotrigine]; Pcn [penicillins]; and Tapentadol    Patient Vitals for the past 8 hrs:   BP Temp Pulse Resp SpO2 Weight   09/18/20 1146 (!) 148/83 99 °F (37.2 °C) (!) 131 20 97 % 90.7 kg (200 lb)       Exam      Airway: clear   Heart: normal S1and S2    Lungs: clear bilateral  Abdomen: soft, nontender, bowel sounds present and normal in all quads   Mental Status: awake, alert and oriented to person, place and time          Procedure Details     Informed consent was obtained for the procedure, including conscious sedation. Risks of pancreatitis, infection, perforation, hemorrhage, adverse drug reaction and aspiration were discussed. The patient was placed in the left lateral decubitus position. Based on the pre-procedure assessment, including review of the patient's medical history, medications, allergies, and review of systems, she had been deemed to be an appropriate candidate for conscious sedation; she was therefore sedated with the medications listed below. She was monitored continuously with ECG tracing, pulse oximetry, blood pressure monitoring, and direct observation. The EGD gastroscope was inserted into the mouth and advanced under direct vision to the second portion of the duodenum. A careful inspection was made as the gastroscope was withdrawn, including a retroflexed view of the proximal stomach; findings and interventions are described below. Appropriate photodocumentation was obtained. Findings:   Esophagus- Normal.  Stomach- Gastric bypass intact.   Duodenum- Normal.    Therapies: None    Specimens: None    Estimated Blood Loss: 0 cc           Complications:   None; patient tolerated the procedure well. Attending Attestation:  I performed the procedure. Impression:    Gastric bypass. Recommendations:  PPI, carafate, reglan have not helped. Consider musculoskeletal causes of CP as she relates it to bilateral shoulder and arm pain and reports tenderness to palpation. I suggested 2 week trial of NSAIDS.

## 2020-09-18 NOTE — ANESTHESIA POSTPROCEDURE EVALUATION
Procedure(s):  ESOPHAGOGASTRODUODENOSCOPY (EGD)/ 36.    total IV anesthesia    Anesthesia Post Evaluation      Multimodal analgesia: multimodal analgesia not used between 6 hours prior to anesthesia start to PACU discharge  Patient location during evaluation: bedside  Patient participation: complete - patient participated  Level of consciousness: awake and alert  Pain management: adequate  Airway patency: patent  Anesthetic complications: no  Cardiovascular status: hemodynamically stable  Respiratory status: spontaneous ventilation  Hydration status: euvolemic  Comments: Patient stable and may discharge at this time.   Post anesthesia nausea and vomiting:  none  Final Post Anesthesia Temperature Assessment:  Normothermia (36.0-37.5 degrees C)      INITIAL Post-op Vital signs:   Vitals Value Taken Time   /96 9/18/2020  1:44 PM   Temp 37 °C (98.6 °F) 9/18/2020  1:39 PM   Pulse 89 9/18/2020  1:44 PM   Resp 16 9/18/2020  1:44 PM   SpO2 96 % 9/18/2020  1:44 PM

## 2020-09-18 NOTE — ANESTHESIA PREPROCEDURE EVALUATION
Anesthetic History               Review of Systems / Medical History  Patient summary reviewed and pertinent labs reviewed    Pulmonary          Shortness of breath  Asthma     Comments: Restrictive lung dis. Neuro/Psych         TIA and psychiatric history (Bipolar affective disorder )  Pertinent negatives: CVA: 2012, mild L arm weakness. Cardiovascular    Hypertension: well controlled          Hyperlipidemia    Exercise tolerance: <4 METS     GI/Hepatic/Renal     GERD: well controlled    Renal disease: stones  PUD     Endo/Other      Hypothyroidism  Morbid obesity     Other Findings   Comments:  On acyclovir  Fibromyalgia  Chronic pain           Physical Exam    Airway  Mallampati: II    Neck ROM: normal range of motion, short neck   Mouth opening: Normal     Cardiovascular  Regular rate and rhythm,  S1 and S2 normal,  no murmur, click, rub, or gallop  Rhythm: regular  Rate: normal         Dental  No notable dental hx       Pulmonary  Breath sounds clear to auscultation               Abdominal         Other Findings            Anesthetic Plan    ASA: 3  Anesthesia type: total IV anesthesia          Induction: Intravenous  Anesthetic plan and risks discussed with: Patient and Spouse

## 2020-09-18 NOTE — DISCHARGE INSTRUCTIONS
Gastrointestinal Esophagogastroduodenoscopy (EGD)  Upper Exam Discharge Instructions      1. Call your doctor for any problems or questions. 2. Contact the doctor's office for follow up appointment as directed. 3. Medication may cause drowsiness for several hours, therefore, do not drive or                        operate machinery for remainder of the day. 4. No alcohol today. 5. Ordinarily, you may resume regular diet and activity after exam unless otherwise                    specified by your physician. 6. For mild soreness in your throat you may use Cepacol throat lozenges or warm                     salt-water gargles as needed. After general anesthesia or intravenous sedation, for 24 hours or while taking prescription Narcotics:  · Limit your activities  · A responsible adult needs to be with you for the next 24 hours  · Do not drive and operate hazardous machinery  · Do not make important personal or business decisions  · Do not drink alcoholic beverages  · If you have not urinated within 8 hours after discharge, please contact your surgeon on call. · If you have sleep apnea and have a CPAP machine, please use it for all naps and sleeping. · Please use caution when taking narcotics and any of your home medications that may cause drowsiness. *  Please give a list of your current medications to your Primary Care Provider. *  Please update this list whenever your medications are discontinued, doses are      changed, or new medications (including over-the-counter products) are added. *  Please carry medication information at all times in case of emergency situations. These are general instructions for a healthy lifestyle:  No smoking/ No tobacco products/ Avoid exposure to second hand smoke  Surgeon General's Warning:  Quitting smoking now greatly reduces serious risk to your health.   Obesity, smoking, and sedentary lifestyle greatly increases your risk for illness  A healthy diet, regular physical exercise & weight monitoring are important for maintaining a healthy lifestyle    You may be retaining fluid if you have a history of heart failure or if you experience any of the following symptoms:  Weight gain of 3 pounds or more overnight or 5 pounds in a week, increased swelling in our hands or feet or shortness of breath while lying flat in bed. Please call your doctor as soon as you notice any of these symptoms; do not wait until your next office visit.

## 2020-09-18 NOTE — PROGRESS NOTES
PACU has been called and ready to accept patient. Patient has been assessed and ready to be transported. 1349: Report given to Eduardo Shah RN in PACU. RN states no further needs at this time.

## 2021-08-30 ENCOUNTER — APPOINTMENT (OUTPATIENT)
Dept: GENERAL RADIOLOGY | Age: 71
End: 2021-08-30
Attending: EMERGENCY MEDICINE
Payer: MEDICARE

## 2021-08-30 ENCOUNTER — HOSPITAL ENCOUNTER (EMERGENCY)
Age: 71
Discharge: HOME | End: 2021-08-30
Attending: EMERGENCY MEDICINE
Payer: MEDICARE

## 2021-08-30 VITALS
HEIGHT: 64 IN | RESPIRATION RATE: 73 BRPM | TEMPERATURE: 98 F | BODY MASS INDEX: 39.27 KG/M2 | WEIGHT: 230 LBS | DIASTOLIC BLOOD PRESSURE: 71 MMHG | SYSTOLIC BLOOD PRESSURE: 157 MMHG | OXYGEN SATURATION: 98 % | HEART RATE: 79 BPM

## 2021-08-30 DIAGNOSIS — M79.7 FIBROMYALGIA MUSCLE PAIN: Primary | ICD-10-CM

## 2021-08-30 LAB
ALBUMIN SERPL-MCNC: 3.3 G/DL (ref 3.2–4.6)
ALBUMIN/GLOB SERPL: 1 {RATIO} (ref 1.2–3.5)
ALP SERPL-CCNC: 104 U/L (ref 50–136)
ALT SERPL-CCNC: 18 U/L (ref 12–65)
ANION GAP SERPL CALC-SCNC: 8 MMOL/L (ref 7–16)
AST SERPL-CCNC: 21 U/L (ref 15–37)
ATRIAL RATE: 114 BPM
BASOPHILS # BLD: 0 K/UL (ref 0–0.2)
BASOPHILS NFR BLD: 0 % (ref 0–2)
BILIRUB SERPL-MCNC: 0.3 MG/DL (ref 0.2–1.1)
BUN SERPL-MCNC: 13 MG/DL (ref 8–23)
CALCIUM SERPL-MCNC: 9.2 MG/DL (ref 8.3–10.4)
CALCULATED P AXIS, ECG09: 64 DEGREES
CALCULATED R AXIS, ECG10: 27 DEGREES
CALCULATED T AXIS, ECG11: 14 DEGREES
CHLORIDE SERPL-SCNC: 113 MMOL/L (ref 98–107)
CO2 SERPL-SCNC: 21 MMOL/L (ref 21–32)
COVID-19 RAPID TEST, COVR: NOT DETECTED
CREAT SERPL-MCNC: 0.86 MG/DL (ref 0.6–1)
DIAGNOSIS, 93000: NORMAL
DIFFERENTIAL METHOD BLD: ABNORMAL
EOSINOPHIL # BLD: 0 K/UL (ref 0–0.8)
EOSINOPHIL NFR BLD: 0 % (ref 0.5–7.8)
ERYTHROCYTE [DISTWIDTH] IN BLOOD BY AUTOMATED COUNT: 16.7 % (ref 11.9–14.6)
GLOBULIN SER CALC-MCNC: 3.4 G/DL (ref 2.3–3.5)
GLUCOSE SERPL-MCNC: 123 MG/DL (ref 65–100)
HCT VFR BLD AUTO: 38.1 % (ref 35.8–46.3)
HGB BLD-MCNC: 12 G/DL (ref 11.7–15.4)
IMM GRANULOCYTES # BLD AUTO: 0 K/UL (ref 0–0.5)
IMM GRANULOCYTES NFR BLD AUTO: 0 % (ref 0–5)
LIPASE SERPL-CCNC: 66 U/L (ref 73–393)
LYMPHOCYTES # BLD: 2.3 K/UL (ref 0.5–4.6)
LYMPHOCYTES NFR BLD: 25 % (ref 13–44)
MAGNESIUM SERPL-MCNC: 2 MG/DL (ref 1.8–2.4)
MCH RBC QN AUTO: 27.9 PG (ref 26.1–32.9)
MCHC RBC AUTO-ENTMCNC: 31.5 G/DL (ref 31.4–35)
MCV RBC AUTO: 88.6 FL (ref 79.6–97.8)
MONOCYTES # BLD: 0.6 K/UL (ref 0.1–1.3)
MONOCYTES NFR BLD: 6 % (ref 4–12)
NEUTS SEG # BLD: 6.2 K/UL (ref 1.7–8.2)
NEUTS SEG NFR BLD: 68 % (ref 43–78)
NRBC # BLD: 0 K/UL (ref 0–0.2)
P-R INTERVAL, ECG05: 190 MS
PLATELET # BLD AUTO: 323 K/UL (ref 150–450)
PMV BLD AUTO: 10.5 FL (ref 9.4–12.3)
POTASSIUM SERPL-SCNC: 4.5 MMOL/L (ref 3.5–5.1)
PROT SERPL-MCNC: 6.7 G/DL (ref 6.3–8.2)
Q-T INTERVAL, ECG07: 324 MS
QRS DURATION, ECG06: 80 MS
QTC CALCULATION (BEZET), ECG08: 446 MS
RBC # BLD AUTO: 4.3 M/UL (ref 4.05–5.2)
SODIUM SERPL-SCNC: 142 MMOL/L (ref 136–145)
SOURCE, COVRS: NORMAL
TROPONIN-HIGH SENSITIVITY: 6.6 PG/ML (ref 0–14)
TROPONIN-HIGH SENSITIVITY: 8.1 PG/ML (ref 0–14)
VENTRICULAR RATE, ECG03: 114 BPM
WBC # BLD AUTO: 9.2 K/UL (ref 4.3–11.1)

## 2021-08-30 PROCEDURE — 96374 THER/PROPH/DIAG INJ IV PUSH: CPT

## 2021-08-30 PROCEDURE — 83735 ASSAY OF MAGNESIUM: CPT

## 2021-08-30 PROCEDURE — 99284 EMERGENCY DEPT VISIT MOD MDM: CPT

## 2021-08-30 PROCEDURE — 93005 ELECTROCARDIOGRAM TRACING: CPT | Performed by: EMERGENCY MEDICINE

## 2021-08-30 PROCEDURE — 83690 ASSAY OF LIPASE: CPT

## 2021-08-30 PROCEDURE — 84484 ASSAY OF TROPONIN QUANT: CPT

## 2021-08-30 PROCEDURE — 85025 COMPLETE CBC W/AUTO DIFF WBC: CPT

## 2021-08-30 PROCEDURE — 74011250636 HC RX REV CODE- 250/636: Performed by: EMERGENCY MEDICINE

## 2021-08-30 PROCEDURE — 87635 SARS-COV-2 COVID-19 AMP PRB: CPT

## 2021-08-30 PROCEDURE — 80053 COMPREHEN METABOLIC PANEL: CPT

## 2021-08-30 PROCEDURE — 71046 X-RAY EXAM CHEST 2 VIEWS: CPT

## 2021-08-30 RX ORDER — SODIUM CHLORIDE 0.9 % (FLUSH) 0.9 %
5-10 SYRINGE (ML) INJECTION AS NEEDED
Status: DISCONTINUED | OUTPATIENT
Start: 2021-08-30 | End: 2021-08-31 | Stop reason: HOSPADM

## 2021-08-30 RX ORDER — SODIUM CHLORIDE 0.9 % (FLUSH) 0.9 %
5-10 SYRINGE (ML) INJECTION EVERY 8 HOURS
Status: DISCONTINUED | OUTPATIENT
Start: 2021-08-30 | End: 2021-08-31 | Stop reason: HOSPADM

## 2021-08-30 RX ORDER — DEXAMETHASONE SODIUM PHOSPHATE 100 MG/10ML
6 INJECTION INTRAMUSCULAR; INTRAVENOUS
Status: COMPLETED | OUTPATIENT
Start: 2021-08-30 | End: 2021-08-30

## 2021-08-30 RX ADMIN — DEXAMETHASONE SODIUM PHOSPHATE 6 MG: 10 INJECTION, SOLUTION INTRAMUSCULAR; INTRAVENOUS at 22:09

## 2021-08-30 NOTE — ED TRIAGE NOTES
Patient presents via [facility]. Patient told EMS that she was went to primary care  And was instructed to come the ED for a covid test. Patient  with complaints of 3 weeks generalized bodyaches. Patient states that pain is all over and hurts to breathe. O2 sat 98 RA VSS enroute.   154/80 BP upon arrival to the ED patient /64 with EMS

## 2021-08-31 NOTE — ED PROVIDER NOTES
Patient is a 73-year-old female presenting to emerge department a complaining of body aches for the last 2 to 3 weeks. She went and saw doctor and he recommended she come to the emergency department for Covid testing. Patient does have a documented history of chronic pain with fibromyalgia. She says this does feel similar to that but much worse. Past Medical History:   Diagnosis Date    Acute renal failure ([address] July, 2014    The patient was admitted with acute renal failure secondary to volume depletion. She was found to have a gastric ulcer on admission.  Arthritis     Asthma     Asthma with acute exacerbation 9/28/2017    Bilateral pulmonary embolism [facility]) September, 2012    Restarted on anticoagulation for lifetime    Calculus of ureter May, 2015    CAP (community acquired pneumonia) October, 2012    RML pneumonia    Cellulitis of left lower extremity 11/1/2017    Chronic pain     fibromyalgia. back pain    Community acquired bacterial pneumonia 4/2/2018    CVA (cerebral infarction) January, 2012    Reportedly has mild left arm residual weakness    Gastric ulcer July, 2014          GERD (gastroesophageal reflux disease)     Gram-positive bacteremia 1/2 cx 12/26/2016    HCAP (healthcare-associated pneumonia) January, 2013    RLL pneumonia    HCAP (healthcare-associated pneumonia) February, 2013    RLL pneumonia    Hypertension     Left leg DVT ([address] 1991    On coumadin until 2008    Morbid obesity ([address]     Nausea & vomiting     post op n/v \"years ago\"    Psychiatric disorder     depression, anxiety, panic attacks    PUD (peptic ulcer disease) ? ??    Sepsis due to pneumonia ([address] 4/2/2018    Stroke ([address]     2009 mini stroke; no residual effects    Thyroid disease        Past Surgical History:   Procedure Laterality Date    COLONOSCOPY N/A 12/24/2018    COLONOSCOPY / BMI 52 [facility] ENDOSCOPY    HX APPENDECTOMY      HX CHOLECYSTECTOMY  HX ENDOSCOPY  July, 2014    Gastric ulcers x 2    HX GASTRIC BYPASS      HX HYSTERECTOMY      HX ORTHOPAEDIC      rt knee growth, right shoulder, left thumb    HX OTHER SURGICAL      vagus nerve stimulator ~on left side of chest at neck    HX TONSILLECTOMY           Family History:   Problem Relation Age of Onset    Hypertension Mother     Cancer Father         prostate    Heart Attack Father     Heart Disease Father         CAD    Hypertension Father     Hypertension Sister     Stroke Sister     Heart Disease Brother         CAD with CABG    Heart Attack Brother     Hypertension Brother        Social History     Socioeconomic History    Marital status:      Spouse name: Not on file    Number of children: Not on file    Years of education: Not on file    Highest education level: Not on file   Occupational History    Occupation:      Comment: Disabled   Tobacco Use    Smoking status: Never Smoker    Smokeless tobacco: Never Used   Substance and Sexual Activity    Alcohol use: No     Alcohol/week: 0.0 standard drinks    Drug use: No    Sexual activity: Not on file   Other Topics Concern    Not on file   Social History Narrative    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 , one child who is healthy. Has always lived in [address] Dog as a pet. No history of pet bird. There is no significant environmental or industrial exposure. There is no known exposure to TB. Social Determinants of Health     Financial Resource Strain:     Difficulty of Paying Living Expenses:    Food Insecurity:     Worried About Running Out of Food in the Last Year:     [address] in the Last Year:    Transportation Needs:     Lack of Transportation (Medical):      Lack of Transportation (Non-Medical):    Physical Activity:     Days of Exercise per Week:     Minutes of Exercise per Session:    Stress:     Feeling of Stress :    Social Connections:     Frequency of Communication with Friends and Family:     Frequency of Social Gatherings with Friends and Family:     Attends Yazdanism Services:     Active Member of Clubs or Organizations:     Attends Club or Organization Meetings:     Marital Status:    Intimate Partner Violence:     Fear of Current or Ex-Partner:     Emotionally Abused:     Physically Abused:     Sexually Abused: ALLERGIES: Latex, Bactrim [sulfamethoxazole-trimethoprim], Lamictal [lamotrigine], Pcn [penicillins], and Tapentadol    Review of Systems   Constitutional: Positive for fatigue. Musculoskeletal: Positive for myalgias. All other systems reviewed and are negative. Vitals:    08/30/21 1844   BP: (!) 138/94   Pulse: 73   Resp: 16   Temp: 97.9 °F (36.6 °C)   SpO2: 99%   Weight: 104.3 kg (230 lb)   Height: 5' 3.5\" (1.613 m)            Physical Exam     GENERAL:The patient has Body mass index is 40.1 kg/m². Well-hydrated. VITAL SIGNS: Heart rate, blood pressure, respiratory rate reviewed as recorded in  nurse's notes  EYES: Pupils reactive. Extraocular motion intact. No conjunctival redness or drainage. MOUTH/THROAT: Pharynx clear; airway patent. NECK: Supple, no meningeal signs. Trachea midline. No masses or thyromegaly. LUNGS: Breath sounds clear and equal bilaterally no accessory muscle use  CHEST: No deformity or crepitus appreciated. The patient has reproducible tenderness over the anterior chest wall. CARDIOVASCULAR: Regular rate and rhythm  ABDOMEN: Soft without tenderness. No palpable masses or organomegaly. No  peritoneal signs. No rigidity. BACK: Reproducible tenderness palpation along the upper trapezius and the paraspinal muscles of the cervical thoracic and lumbar spine. EXTREMITIES: No clubbing or cyanosis. No joint swelling. Normal muscle tone. No  restricted range of motion appreciated. NEUROLOGIC: Sensation is grossly intact.  Cranial nerve exam reveals face is  symmetrical, tongue is midline speech is clear. SKIN: No rash or petechiae. Good skin turgor palpated. PSYCHIATRIC: Alert and oriented. Appropriate behavior and judgment. MDM  Number of Diagnoses or Management Options  Diagnosis management comments: Viral infection, croup (bronchiolitis), mononucleosis, COVID-19    Acute sinusitis, chronic sinusitis,    OM, serous OM, otitis externa,    Pharyngitis, Strep Throat, adenitis, uvulitis, rhinitis, postnasal drainage, nasal congestion    Bronchitis, pneumonia         Amount and/or Complexity of Data Reviewed  Clinical lab tests: reviewed and ordered  Tests in the radiology section of CPT®: reviewed and ordered  Tests in the medicine section of CPT®: reviewed and ordered  Review and summarize past medical records: yes  Independent visualization of images, tracings, or specimens: yes      ED Course as of Aug 30 2218   Mon Aug 30, 2021   2141 IMPRESSION  No consolidation.    XR Chest PA LAT (check if patient is in hallway or waiting room) [KH]      ED Course User Index  [KH] Brittney Toussaint, DO       Procedures

## 2021-08-31 NOTE — ED NOTES
I have reviewed discharge instructions with the patient. The patient verbalized understanding. Patient left ED via Discharge Method: wheelchair to Home. Opportunity for questions and clarification provided. Patient given 0 scripts. To continue your aftercare when you leave the hospital, you may receive an automated call from our care team to check in on how you are doing. This is a free service and part of our promise to provide the best care and service to meet your aftercare needs.  If you have questions, or wish to unsubscribe from this service please call [phone]. Thank you for Choosing our [facility] Emergency Department.

## 2021-08-31 NOTE — DISCHARGE INSTRUCTIONS
Continue taking normal medications and follow-up with your family doctor if your symptoms do not start to improve to talk about alternative management of your pain.

## 2021-12-14 ENCOUNTER — HOSPITAL ENCOUNTER (EMERGENCY)
Age: 71
Discharge: HOME | End: 2021-12-15
Attending: EMERGENCY MEDICINE
Payer: MEDICARE

## 2021-12-14 ENCOUNTER — APPOINTMENT (OUTPATIENT)
Dept: CT IMAGING | Age: 71
End: 2021-12-14
Attending: NURSE PRACTITIONER
Payer: MEDICARE

## 2021-12-14 DIAGNOSIS — R59.1 LYMPHADENOPATHY: ICD-10-CM

## 2021-12-14 DIAGNOSIS — R51.9 RIGHT SIDED FACIAL PAIN: Primary | ICD-10-CM

## 2021-12-14 LAB
ALBUMIN SERPL-MCNC: 3.2 G/DL (ref 3.2–4.6)
ALBUMIN/GLOB SERPL: 0.9 {RATIO} (ref 1.2–3.5)
ALP SERPL-CCNC: 96 U/L (ref 50–136)
ALT SERPL-CCNC: 18 U/L (ref 12–65)
ANION GAP SERPL CALC-SCNC: 2 MMOL/L (ref 7–16)
AST SERPL-CCNC: 39 U/L (ref 15–37)
BASOPHILS # BLD: 0 K/UL (ref 0–0.2)
BASOPHILS NFR BLD: 0 % (ref 0–2)
BILIRUB SERPL-MCNC: 0.2 MG/DL (ref 0.2–1.1)
BUN SERPL-MCNC: 21 MG/DL (ref 8–23)
CALCIUM SERPL-MCNC: 8.9 MG/DL (ref 8.3–10.4)
CHLORIDE SERPL-SCNC: 114 MMOL/L (ref 98–107)
CO2 SERPL-SCNC: 25 MMOL/L (ref 21–32)
CREAT SERPL-MCNC: 0.92 MG/DL (ref 0.6–1)
CRP SERPL-MCNC: <0.3 MG/DL (ref 0–0.9)
DIFFERENTIAL METHOD BLD: ABNORMAL
EOSINOPHIL # BLD: 0.1 K/UL (ref 0–0.8)
EOSINOPHIL NFR BLD: 1 % (ref 0.5–7.8)
ERYTHROCYTE [DISTWIDTH] IN BLOOD BY AUTOMATED COUNT: 15 % (ref 11.9–14.6)
ERYTHROCYTE [SEDIMENTATION RATE] IN BLOOD: 10 MM/HR (ref 0–30)
GLOBULIN SER CALC-MCNC: 3.4 G/DL (ref 2.3–3.5)
GLUCOSE SERPL-MCNC: 91 MG/DL (ref 65–100)
HCT VFR BLD AUTO: 35 % (ref 35.8–46.3)
HGB BLD-MCNC: 10.5 G/DL (ref 11.7–15.4)
IMM GRANULOCYTES # BLD AUTO: 0 K/UL (ref 0–0.5)
IMM GRANULOCYTES NFR BLD AUTO: 0 % (ref 0–5)
LYMPHOCYTES # BLD: 3.2 K/UL (ref 0.5–4.6)
LYMPHOCYTES NFR BLD: 41 % (ref 13–44)
MCH RBC QN AUTO: 26.8 PG (ref 26.1–32.9)
MCHC RBC AUTO-ENTMCNC: 30 G/DL (ref 31.4–35)
MCV RBC AUTO: 89.3 FL (ref 79.6–97.8)
MONOCYTES # BLD: 0.7 K/UL (ref 0.1–1.3)
MONOCYTES NFR BLD: 9 % (ref 4–12)
NEUTS SEG # BLD: 3.7 K/UL (ref 1.7–8.2)
NEUTS SEG NFR BLD: 48 % (ref 43–78)
NRBC # BLD: 0 K/UL (ref 0–0.2)
PLATELET # BLD AUTO: 267 K/UL (ref 150–450)
PMV BLD AUTO: 10.3 FL (ref 9.4–12.3)
POTASSIUM SERPL-SCNC: 5.4 MMOL/L (ref 3.5–5.1)
PROT SERPL-MCNC: 6.6 G/DL (ref 6.3–8.2)
RBC # BLD AUTO: 3.92 M/UL (ref 4.05–5.2)
SODIUM SERPL-SCNC: 141 MMOL/L (ref 136–145)
WBC # BLD AUTO: 7.7 K/UL (ref 4.3–11.1)

## 2021-12-14 PROCEDURE — 96374 THER/PROPH/DIAG INJ IV PUSH: CPT

## 2021-12-14 PROCEDURE — 74011250636 HC RX REV CODE- 250/636: Performed by: NURSE PRACTITIONER

## 2021-12-14 PROCEDURE — 80053 COMPREHEN METABOLIC PANEL: CPT

## 2021-12-14 PROCEDURE — 74011250637 HC RX REV CODE- 250/637: Performed by: NURSE PRACTITIONER

## 2021-12-14 PROCEDURE — 85025 COMPLETE CBC W/AUTO DIFF WBC: CPT

## 2021-12-14 PROCEDURE — 96361 HYDRATE IV INFUSION ADD-ON: CPT

## 2021-12-14 PROCEDURE — 70491 CT SOFT TISSUE NECK W/DYE: CPT

## 2021-12-14 PROCEDURE — 86140 C-REACTIVE PROTEIN: CPT

## 2021-12-14 PROCEDURE — 74011000636 HC RX REV CODE- 636: Performed by: EMERGENCY MEDICINE

## 2021-12-14 PROCEDURE — 99284 EMERGENCY DEPT VISIT MOD MDM: CPT

## 2021-12-14 PROCEDURE — 85652 RBC SED RATE AUTOMATED: CPT

## 2021-12-14 PROCEDURE — 74011000258 HC RX REV CODE- 258: Performed by: EMERGENCY MEDICINE

## 2021-12-14 RX ORDER — SODIUM CHLORIDE 0.9 % (FLUSH) 0.9 %
10 SYRINGE (ML) INJECTION
Status: COMPLETED | OUTPATIENT
Start: 2021-12-14 | End: 2021-12-14

## 2021-12-14 RX ORDER — MORPHINE SULFATE 4 MG/ML
4 INJECTION INTRAVENOUS
Status: COMPLETED | OUTPATIENT
Start: 2021-12-14 | End: 2021-12-14

## 2021-12-14 RX ORDER — ACETAMINOPHEN 500 MG
1000 TABLET ORAL
Status: COMPLETED | OUTPATIENT
Start: 2021-12-14 | End: 2021-12-14

## 2021-12-14 RX ADMIN — SODIUM CHLORIDE 100 ML: 9 INJECTION, SOLUTION INTRAVENOUS at 21:53

## 2021-12-14 RX ADMIN — IOPAMIDOL 80 ML: 755 INJECTION, SOLUTION INTRAVENOUS at 21:53

## 2021-12-14 RX ADMIN — ACETAMINOPHEN 1000 MG: 500 TABLET, FILM COATED ORAL at 21:42

## 2021-12-14 RX ADMIN — MORPHINE SULFATE 4 MG: 4 INJECTION INTRAVENOUS at 23:51

## 2021-12-14 RX ADMIN — Medication 10 ML: at 21:53

## 2021-12-14 RX ADMIN — SODIUM CHLORIDE 1000 ML: 900 INJECTION, SOLUTION INTRAVENOUS at 21:42

## 2021-12-14 NOTE — ED TRIAGE NOTES
Pt states she ha been on an antibiotic for knot on right lower jaw area for about three days but stats the pain is getting worse and into neck and jaw now. States some weakness with nausea and diarrhea also. Masked for triage.

## 2021-12-14 NOTE — ED NOTES
Hanley Essex is a 68yo female complaining of right sided facial pain. Has been going on and off for 6 months. Complaining of knot in the right lower jaw, right neck lymphadenopathy, right ear pain, decreased appetite, sore throat. Started on Clinda 300 TID on Saturday. Getting smaller. Still feeling sickly. Afebrile  Its taking tylenol for pain. This served as a medical screen. Will continue to be followed by provider in ED.      Lizzie Calvillo NP  6:35 PM

## 2021-12-15 VITALS
HEIGHT: 64 IN | WEIGHT: 230 LBS | BODY MASS INDEX: 39.27 KG/M2 | TEMPERATURE: 99.2 F | DIASTOLIC BLOOD PRESSURE: 73 MMHG | SYSTOLIC BLOOD PRESSURE: 139 MMHG | RESPIRATION RATE: 24 BRPM | HEART RATE: 63 BPM | OXYGEN SATURATION: 92 %

## 2021-12-15 NOTE — ED PROVIDER NOTES
JAKE Pastrana is a 58-year-old female with history of CVA, asthma, GERD, HTN, presenting to the ED for evaluation of right facial pain. Symptoms have been intermittent over 6 months. Patient describes a soft tissue mass the size of a pecan or walnut in her right lower jaw with associated lymphadenopathy. She communicated with her PCP who started patient on clindamycin 300 mg 3 times a day, first dosing on Saturday. She does report minor improvement on antibiotics. Her pain radiates to her right ear and down her right neck and shoulder. She denies fever, nausea, vomiting, diarrhea, abdominal pain. Past Medical History:   Diagnosis Date    Acute renal failure ([address] July, 2014    The patient was admitted with acute renal failure secondary to volume depletion. She was found to have a gastric ulcer on admission.  Arthritis     Asthma     Asthma with acute exacerbation 9/28/2017    Bilateral pulmonary embolism [facility]) September, 2012    Restarted on anticoagulation for lifetime    Calculus of ureter May, 2015    CAP (community acquired pneumonia) October, 2012    RML pneumonia    Cellulitis of left lower extremity 11/1/2017    Chronic pain     fibromyalgia. back pain    Community acquired bacterial pneumonia 4/2/2018    CVA (cerebral infarction) January, 2012    Reportedly has mild left arm residual weakness    Gastric ulcer July, 2014          GERD (gastroesophageal reflux disease)     Gram-positive bacteremia 1/2 cx 12/26/2016    HCAP (healthcare-associated pneumonia) January, 2013    RLL pneumonia    HCAP (healthcare-associated pneumonia) February, 2013    RLL pneumonia    Hypertension     Left leg DVT ([address] 1991    On coumadin until 2008    Morbid obesity ([address]     Nausea & vomiting     post op n/v \"years ago\"    Psychiatric disorder     depression, anxiety, panic attacks    PUD (peptic ulcer disease) ? ??    Sepsis due to pneumonia ([address] 4/2/2018    Stroke ([address]     2009 mini stroke; no residual effects    Thyroid disease        Past Surgical History:   Procedure Laterality Date    COLONOSCOPY N/A 12/24/2018    COLONOSCOPY / BMI 52 performed by [facility]on [facility] at [facility] ENDOSCOPY    HX APPENDECTOMY      HX CHOLECYSTECTOMY      HX ENDOSCOPY  July, 2014    Gastric ulcers x 2    HX GASTRIC BYPASS      HX HYSTERECTOMY      HX ORTHOPAEDIC      rt knee growth, right shoulder, left thumb    HX OTHER SURGICAL      vagus nerve stimulator ~on left side of chest at neck    HX TONSILLECTOMY           Family History:   Problem Relation Age of Onset    Hypertension Mother     Cancer Father         prostate    Heart Attack Father     Heart Disease Father         CAD    Hypertension Father     Hypertension Sister     Stroke Sister     Heart Disease Brother         CAD with CABG    Heart Attack Brother     Hypertension Brother        Social History     Socioeconomic History    Marital status:      Spouse name: Not on file    Number of children: Not on file    Years of education: Not on file    Highest education level: Not on file   Occupational History    Occupation:      Comment: Disabled   Tobacco Use    Smoking status: Never Smoker    Smokeless tobacco: Never Used   Substance and Sexual Activity    Alcohol use: No     Alcohol/week: 0.0 standard drinks    Drug use: No    Sexual activity: Not on file   Other Topics Concern    Not on file   Social History Narrative    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 , one child who is healthy. Has always lived in [facility] [address] [facility]. Dog as a pet. No history of pet bird. There is no significant environmental or industrial exposure. There is no known exposure to TB.      Social Determinants of Health     Financial Resource Strain:     Difficulty of Paying Living Expenses: Not on file   Food Insecurity:     Worried About Running Out of Food in the Last Year: Not on file    Ran Out of Food in the Last Year: Not on file   Transportation Needs:     Lack of Transportation (Medical): Not on file    Lack of Transportation (Non-Medical): Not on file   Physical Activity:     Days of Exercise per Week: Not on file    Minutes of Exercise per Session: Not on file   Stress:     Feeling of Stress : Not on file   Social Connections:     Frequency of Communication with Friends and Family: Not on file    Frequency of Social Gatherings with Friends and Family: Not on file    Attends Amish Services: Not on file    Active Member of [address] [place] [address] [email or web address] or Organizations: Not on file    Attends Club or Organization Meetings: Not on file    Marital Status: Not on file   Intimate Partner Violence:     Fear of Current or Ex-Partner: Not on file    Emotionally Abused: Not on file    Physically Abused: Not on file    Sexually Abused: Not on file   Housing Stability:     Unable to Pay for Housing in the Last Year: Not on file    Number of [place] in the Last Year: Not on file    Unstable Housing in the Last Year: Not on file         ALLERGIES: Latex, Bactrim [sulfamethoxazole-trimethoprim], Lamictal [lamotrigine], Pcn [penicillins], and Tapentadol    Review of Systems   Constitutional: Positive for appetite change (decreased). Negative for activity change and fever. HENT: Positive for facial swelling (right lower jaw with pain) and sore throat. Negative for congestion, trouble swallowing and voice change. Respiratory: Negative for cough and shortness of breath. Gastrointestinal: Negative for abdominal pain, diarrhea, nausea and vomiting. Genitourinary: Negative for difficulty urinating. Musculoskeletal: Negative for arthralgias. Skin: Negative for wound. Neurological: Negative for headaches. Hematological: Negative.         Vitals:    12/14/21 1833   BP: 114/68   Pulse: 75   Resp: 16   Temp: 99.2 °F (37.3 °C)   SpO2: 94%   Weight: 104.3 kg (230 lb)   Height: 5' 4\" (1.626 m)            Physical Exam  Constitutional:       Appearance: Normal appearance. She is not ill-appearing. HENT:      Head:      Jaw: Swelling present. Comments: Patient has soft tissue pain and swelling to the right lower face/jaw. No palpable mass appreciated. No redness or warmth concerning for soft tissue cellulitis. Area of bleeding and open wound around 1 inch in length. No evidence of right AOM. Right Ear: Tympanic membrane and ear canal normal.      Mouth/Throat:      Mouth: Mucous membranes are moist.      Pharynx: Oropharynx is clear. Comments: Patient has upper and lower palates. On the right lower gumline, patient has an open wound with active bleeding. Tender to touch. No obvious abscess or area of fluctuance appreciated. Eyes:      Pupils: Pupils are equal, round, and reactive to light. Cardiovascular:      Rate and Rhythm: Normal rate and regular rhythm. Pulmonary:      Effort: Pulmonary effort is normal. No respiratory distress. Breath sounds: Normal breath sounds. Abdominal:      General: Bowel sounds are normal.      Tenderness: There is no abdominal tenderness. There is no guarding. Musculoskeletal:      Cervical back: Normal range of motion. Skin:     General: Skin is warm. Neurological:      General: No focal deficit present. Mental Status: She is alert and oriented to person, place, and time. Mental status is at baseline. Psychiatric:         Mood and Affect: Mood normal.         Thought Content:  Thought content normal.        Labs Reviewed   CBC WITH AUTOMATED DIFF - Abnormal; Notable for the following components:       Result Value    RBC 3.92 (*)     HGB 10.5 (*)     HCT 35.0 (*)     MCHC 30.0 (*)     RDW 15.0 (*)     All other components within normal limits   METABOLIC PANEL, COMPREHENSIVE - Abnormal; Notable for the following components:    Potassium 5.4 (*)     Chloride 114 (*)     Anion gap 2 (*)     AST (SGOT) 39 (*)     A-G Ratio 0.9 (*)     All other components within normal limits   SED RATE, AUTOMATED   C REACTIVE PROTEIN, QT     CT NECK SOFT TISSUE W CONT   Final Result   There are 2 prominent submandibular lymph nodes present. The largest of these   measures 1.5 x 1.4 cm. There is no obvious fluid collection or inflammatory   change. I do not appreciate an obviously enhancing mass in the neck. If there is concern   for neoplasm further workup with direct visualization may be of benefit. There are a few prominent right cervical chain lymph nodes seen. There is a nodule in the right lobe of the thyroid gland. The left thyroid lobe   is enlarged. JOHNNY Byers is a 80-year-old female with history of CVA, asthma, GERD, HTN, presenting to the ED for evaluation of right facial pain. Patient was started on antibiotics by her PCP. Will get basic blood work, inflammatory markers and plan for a CT soft tissue of the face and neck to rule out abscess. Differential includes not limited to dental abscess, mastoiditis, Kumar's angina,  Parotitis. 9:37 PM  Potassium elevated at 5.4. Liter of fluid started. Inflammatory markers negative. White blood cell count 7.7. CT scan pending. Will give a dose of Tylenol for pain. 11:36 PM  Pain not significantly improved. Will give a dose of morphine now. CT scan shows no specific abscess identified. There is some small inflammatory changes to the cervical lymph nodes. We will plan for use of Cindy's Magic mouthwash to help with pain and healing. We will have her see her dentist later this week for quick evaluation. ENT referral.  Maxillofacial surgery referral.  Continue clindamycin as prescribed. Ibuprofen for pain. Patient is well-appearing and has had stable vitals during her time here in the ED. Strict return precaution given. Safe for discharge at this time. Dispo: Stable, Discharge to home    Diagnosis:   1. Lower jaw pain  2. Karie Carvajal NP  8:56 PM      Angel Shay NP; 12/14/2021 @8:56 PM Voice dictation software was used during the making of this note. This software is not perfect and grammatical and other typographical errors may be present.   This note has not been proofread for errors.  ===================================================================  '

## 2021-12-15 NOTE — ED NOTES
I have reviewed discharge instructions with the patient. The patient verbalized understanding. Patient left ED via Discharge Method: wheelchair to Home with family. Opportunity for questions and clarification provided. Patient given 0 scripts. To continue your aftercare when you leave the hospital, you may receive an automated call from our care team to check in on how you are doing. This is a free service and part of our promise to provide the best care and service to meet your aftercare needs.  If you have questions, or wish to unsubscribe from this service please call [phone]. Thank you for Choosing our [facility] Emergency Department.

## 2021-12-15 NOTE — DISCHARGE INSTRUCTIONS
Continue taking antibiotics as prescribed. Start taking Cindy's Magic mouthwash 4 times a day, swish and spit. Call and schedule appointment to be seen by her dentist earlier this week. Call and schedule an appointment to be seen by ENT.

## 2022-03-18 PROBLEM — J38.00 VOCAL CORD PARALYSIS: Status: ACTIVE | Noted: 2018-05-21

## 2022-03-18 PROBLEM — G47.10 HYPERSOMNOLENCE: Status: ACTIVE | Noted: 2018-05-21

## 2022-03-18 PROBLEM — Z79.899 POLYPHARMACY: Status: ACTIVE | Noted: 2018-05-21

## 2022-03-18 PROBLEM — J45.30 MILD PERSISTENT ASTHMA WITHOUT COMPLICATION: Status: ACTIVE | Noted: 2017-09-28

## 2022-03-18 PROBLEM — E66.01 MORBID OBESITY (HCC): Status: ACTIVE | Noted: 2018-05-21

## 2022-03-18 PROBLEM — R47.9 SPEECH ABNORMALITY: Status: ACTIVE | Noted: 2017-11-01

## 2022-03-18 PROBLEM — Z86.711 HX OF PULMONARY EMBOLUS: Status: ACTIVE | Noted: 2017-09-28

## 2022-03-18 PROBLEM — M79.7 FIBROMYALGIA: Status: ACTIVE | Noted: 2018-05-21

## 2022-03-18 PROBLEM — R53.81 DEBILITY: Status: ACTIVE | Noted: 2018-05-21

## 2022-03-18 PROBLEM — R79.89 ELEVATED TROPONIN: Status: ACTIVE | Noted: 2020-08-04

## 2022-03-18 PROBLEM — N39.0 UTI (URINARY TRACT INFECTION): Status: ACTIVE | Noted: 2018-07-19

## 2022-03-19 PROBLEM — R07.9 CHEST PAIN: Status: ACTIVE | Noted: 2020-08-04

## 2022-03-19 PROBLEM — Z79.01 CHRONIC ANTICOAGULATION: Status: ACTIVE | Noted: 2018-05-21

## 2022-03-19 PROBLEM — Z01.811 PREOP PULMONARY/RESPIRATORY EXAM: Status: ACTIVE | Noted: 2020-03-19

## 2022-03-19 PROBLEM — J98.4 RESTRICTIVE LUNG DISEASE: Status: ACTIVE | Noted: 2018-05-21

## 2022-03-19 PROBLEM — F19.10 DRUG ABUSE (HCC): Status: ACTIVE | Noted: 2018-05-21

## 2022-03-19 PROBLEM — E03.9 HYPOTHYROIDISM: Status: ACTIVE | Noted: 2017-09-28

## 2022-03-19 PROBLEM — D64.9 NORMOCYTIC ANEMIA: Status: ACTIVE | Noted: 2018-07-19

## 2022-03-19 PROBLEM — D83.9 CVID (COMMON VARIABLE IMMUNODEFICIENCY) (HCC): Status: ACTIVE | Noted: 2020-03-19

## 2022-03-19 PROBLEM — B37.0 THRUSH: Status: ACTIVE | Noted: 2018-08-20

## 2022-03-20 PROBLEM — G47.34 NOCTURNAL HYPOXEMIA: Status: ACTIVE | Noted: 2018-05-21

## 2022-04-12 NOTE — DISCHARGE INSTRUCTIONS
Pneumonia: Care Instructions  Your Care Instructions    Pneumonia is an infection of the lungs. Most cases are caused by infections from bacteria or viruses. Pneumonia may be mild or very severe. If it is caused by bacteria, you will be treated with antibiotics. It may take a few weeks to a few months to recover fully from pneumonia, depending on how sick you were and whether your overall health is good. Follow-up care is a key part of your treatment and safety. Be sure to make and go to all appointments, and call your doctor if you are having problems. It's also a good idea to know your test results and keep a list of the medicines you take. How can you care for yourself at home? · Take your antibiotics exactly as directed. Do not stop taking the medicine just because you are feeling better. You need to take the full course of antibiotics. · Take your medicines exactly as prescribed. Call your doctor if you think you are having a problem with your medicine. · Get plenty of rest and sleep. You may feel weak and tired for a while, but your energy level will improve with time. · To prevent dehydration, drink plenty of fluids, enough so that your urine is light yellow or clear like water. Choose water and other caffeine-free clear liquids until you feel better. If you have kidney, heart, or liver disease and have to limit fluids, talk with your doctor before you increase the amount of fluids you drink. · Take care of your cough so you can rest. A cough that brings up mucus from your lungs is common with pneumonia. It is one way your body gets rid of the infection. But if coughing keeps you from resting or causes severe fatigue and chest-wall pain, talk to your doctor. He or she may suggest that you take a medicine to reduce the cough. · Use a vaporizer or humidifier to add moisture to your bedroom. Follow the directions for cleaning the machine. · Do not smoke or allow others to smoke around you.  Smoke will make your cough last longer. If you need help quitting, talk to your doctor about stop-smoking programs and medicines. These can increase your chances of quitting for good. · Take an over-the-counter pain medicine, such as acetaminophen (Tylenol), ibuprofen (Advil, Motrin), or naproxen (Aleve). Read and follow all instructions on the label. · Do not take two or more pain medicines at the same time unless the doctor told you to. Many pain medicines have acetaminophen, which is Tylenol. Too much acetaminophen (Tylenol) can be harmful. · If you were given a spirometer to measure how well your lungs are working, use it as instructed. This can help your doctor tell how your recovery is going. · To prevent pneumonia in the future, talk to your doctor about getting a flu vaccine (once a year) and a pneumococcal vaccine (one time only for most people). When should you call for help? Call 911 anytime you think you may need emergency care. For example, call if:  ? · You have severe trouble breathing. ?Call your doctor now or seek immediate medical care if:  ? · You cough up dark brown or bloody mucus (sputum). ? · You have new or worse trouble breathing. ? · You are dizzy or lightheaded, or you feel like you may faint. ? Watch closely for changes in your health, and be sure to contact your doctor if:  ? · You have a new or higher fever. ? · You are coughing more deeply or more often. ? · You are not getting better after 2 days (48 hours). ? · You do not get better as expected. Where can you learn more? Go to http://era-fransisco.info/. Enter [id number] in the search box to learn more about \"Pneumonia: Care Instructions. \"  Current as of: May 12, 2017  Content Version: 11.4  © 1737-4223 [employer]. Care instructions adapted under license by [employer] (which disclaims liability or warranty for this information).  If you have questions about a medical condition or this instruction, always ask your healthcare professional. [address] any warranty or liability for your use of this information. Sepsis: Care Instructions  Your Care Instructions    Sepsis is an infection that has spread throughout your body. It is a life-threatening condition and often causes extremely low blood pressure. This can lead to problems with many different organs. The cause of sepsis is not always clear, but it can happen as part of a long-term or sudden illness. Sometimes even a mild illness can lead to sepsis. Follow-up care is a key part of your treatment and safety. Be sure to make and go to all appointments, and call your doctor if you are having problems. It's also a good idea to know your test results and keep a list of the medicines you take. How can you care for yourself at home? · If your doctor prescribed antibiotics, take them as directed. Do not stop taking them just because you feel better. You need to take the full course of antibiotics. · Drink plenty of fluids, enough so that your urine is light yellow or clear like water. Choose water or caffeine-free clear liquids until you feel better. If you have kidney, heart, or liver disease and have to limit fluids, talk with your doctor before you increase your fluid intake. You can try rehydration drinks, such as Gatorade or Powerade. · Do not drink alcohol. · Eat a healthy diet. Include fruits, vegetables, and whole grains in your diet every day. · Walking is an easy way to get exercise. Gradually increase the amount you walk every day. Make sure your doctor knows that you are starting an exercise program.  · Do not smoke or use other tobacco products. If you need help quitting, talk to your doctor about stop-smoking programs and medicines. These can increase your chances of quitting for good. When should you call for help? Call 911 anytime you think you may need emergency care.  For example, call if:  ? · You passed out (lost consciousness). ?Call your doctor now or seek immediate medical care if:  ? · You have a fever or chills. ? · You have cool, pale, or clammy skin. ? · You are dizzy or lightheaded, or you feel like you may faint. ? · You have any new symptoms, such as a cough, pain in one part of your body, or urinary problems. ? Watch closely for changes in your health, and be sure to contact your doctor if:  ? · You do not get better as expected. Where can you learn more? Go to http://era-fransisco.info/. Enter X567 in the search box to learn more about \"Sepsis: Care Instructions. \"  Current as of: March 20, 2017  Content Version: 11.4  © 8702-5749 [employer]. Care instructions adapted under license by [employer] (which disclaims liability or warranty for this information). If you have questions about a medical condition or this instruction, always ask your healthcare professional. [place] [address] any warranty or liability for your use of this information. DISCHARGE SUMMARY from Nurse    PATIENT INSTRUCTIONS:    After general anesthesia or intravenous sedation, for 24 hours or while taking prescription Narcotics:  · Limit your activities  · Do not drive and operate hazardous machinery  · Do not make important personal or business decisions  · Do  not drink alcoholic beverages  · If you have not urinated within 8 hours after discharge, please contact your surgeon on call.     Report the following to your surgeon:  · Excessive pain, swelling, redness or odor of or around the surgical area  · Temperature over 100.5  · Nausea and vomiting lasting longer than 4 hours or if unable to take medications  · Any signs of decreased circulation or nerve impairment to extremity: change in color, persistent  numbness, tingling, coldness or increase pain  · Any questions    What to do at Home:  Recommended activity: Activity as tolerated, *  Please give a list of your current medications to your Primary Care Provider. *  Please update this list whenever your medications are discontinued, doses are      changed, or new medications (including over-the-counter products) are added. *  Please carry medication information at all times in case of emergency situations. These are general instructions for a healthy lifestyle:    No smoking/ No tobacco products/ Avoid exposure to second hand smoke  Surgeon General's Warning:  Quitting smoking now greatly reduces serious risk to your health. Obesity, smoking, and sedentary lifestyle greatly increases your risk for illness    A healthy diet, regular physical exercise & weight monitoring are important for maintaining a healthy lifestyle    You may be retaining fluid if you have a history of heart failure or if you experience any of the following symptoms:  Weight gain of 3 pounds or more overnight or 5 pounds in a week, increased swelling in our hands or feet or shortness of breath while lying flat in bed. Please call your doctor as soon as you notice any of these symptoms; do not wait until your next office visit. Recognize signs and symptoms of STROKE:    F-face looks uneven    A-arms unable to move or move unevenly    S-speech slurred or non-existent    T-time-call 911 as soon as signs and symptoms begin-DO NOT go       Back to bed or wait to see if you get better-TIME IS BRAIN. Warning Signs of HEART ATTACK     Call 911 if you have these symptoms:   Chest discomfort. Most heart attacks involve discomfort in the center of the chest that lasts more than a few minutes, or that goes away and comes back. It can feel like uncomfortable pressure, squeezing, fullness, or pain.  Discomfort in other areas of the upper body. Symptoms can include pain or discomfort in one or both arms, the back, neck, jaw, or stomach.  Shortness of breath with or without chest discomfort.    Other signs may - Patient able to tolerate BiPAP  - it was noted that patient became more agitated and started pulling off his mask in which he was given 0.5mg of haloperidol  family hx significant for alcohol abuse  patient was placed on CIWA protocol with precedex phenobarb in ICU  now calm and CIWA score 0  c/w to monitor  mild agitation, on 1:1  likely a baseline, dementia  will dc and monitor include breaking out in a cold sweat, nausea, or lightheadedness. Don't wait more than five minutes to call 911 - MINUTES MATTER! Fast action can save your life. Calling 911 is almost always the fastest way to get lifesaving treatment. Emergency Medical Services staff can begin treatment when they arrive -- up to an hour sooner than if someone gets to the hospital by car. The discharge information has been reviewed with the patient. The patient verbalized understanding. Discharge medications reviewed with the patient and appropriate educational materials and side effects teaching were provided.   ___________________________________________________________________________________________________________________________________

## 2023-02-16 NOTE — PROGRESS NOTES
02/16/23 2:44 PM     VB [employer] SmartForm used to document caregap status      Gracie Hussein Initial visit made to patient and a prayer was provided. Patient described her frequency of being in the hospital with recurring pneumonia. She shared that her daughter who has 6 children has been caring for her at her home. She has only 1 daughter. The patient's  who is active and has good health checks on her every day. Patient says she prays regularly asking for God's help. Patient has an older brother and sister still living.         [employer]

## 2023-05-11 NOTE — PROGRESS NOTES
Attempted to outreach to patient to f/u - UTR no answer at this time Patient was a non-admit to home health due to UTR 
will attempt outreach again tomorrow This note will not be viewable in [address] Female

## 2023-06-12 NOTE — ED NOTES
Report to Ronit Stearns RN. Quality 226: Preventive Care And Screening: Tobacco Use: Screening And Cessation Intervention: Patient screened for tobacco use and is an ex/non-smoker

## 2023-10-03 NOTE — PROGRESS NOTES
[facility] CRITICAL CARE OUTREACH NURSE PROGRESS REPORT      SUBJECTIVE: Called to assess patient secondary to outreach protocol. MEWS Score: 1 (05/23/18 0300)  Vitals:    05/23/18 7467 05/23/18 0719 05/23/18 0734 05/23/18 1128   BP:  143/69  145/82   Pulse:  (!) 120  71   Resp:  19  18   Temp:  98.5 °F (36.9 °C)  98.6 °F (37 °C)   SpO2:  96% 96% 94%   Weight: 122.5 kg (270 lb 1.6 oz)             LAB DATA:    Recent Labs      05/23/18   0628 05/22/18   0340 05/21/18   1309   NA  146*  146*  147*   K  3.7  3.9  4.2   CL  106  110*  112*   CO2  32  29  27   AGAP  8  7  8   GLU  124*  214*  76   BUN  10  12  13   CREA  0.84  1.06*  1.21*   GFRAA  >60  >60  57*   GFRNA  >60  55*  47*   CA  8.6  8.4  8.2*   ALB   --    --   2.3*   TP   --    --   5.6*   GLOB   --    --   3.3   AGRAT   --    --   0.7*   ALT   --    --   16        Recent Labs      05/23/18 0628 05/22/18   0340 05/21/18   1309   WBC  10.0  7.5  7.1   HGB  8.7*  8.8*  8.6*   HCT  29.2*  29.6*  29.4*   PLT  331  305  168          OBJECTIVE: On arrival to room, I found patient to be resting in bed. Pain Assessment  Pain Intensity 1: 7 (05/23/18 0756)  Pain Location 1: Leg, Back (LEFT)  Pain Intervention(s) 1: Medication (see MAR)  Patient Stated Pain Goal: 0                                 ASSESSMENT:  Pt resting in bed, AXO, on 3L NC, sat 97%, Denies pain, SOB. No family or visitors at bedside, pt in NAD. PLAN:        Will continue to follow up per outreach protocol.     Signed By:   Sarah Garibay RN    May 23, 2018 12:12 PM Hide Additional Notes?: No Detail Level: Zone Additional Note: If lesions become symptomatic treatment options of liquid nitrogen vs shave removal were discussed. Benign nature reviewed. Risks & benefits of cryosurgery discussed, including incomplete treatment, scar, blistering, and dyspigmentation. Risks vs Benefits and cautions of removal discussed with patient. These are including but not limited to : Scar, infection, poor cosmetic outcome and recurrence. Additional Note: Patient to observe site for changes. Discussed the importance of monthly self skin checks and routine skin exams. Discussed the importance of daily sun protection- SPF 35 or higher.

## 2023-10-09 ENCOUNTER — OFFICE VISIT (OUTPATIENT)
Dept: ORTHOPEDIC SURGERY | Age: 73
End: 2023-10-09

## 2023-10-09 DIAGNOSIS — M17.11 ARTHRITIS OF RIGHT KNEE: ICD-10-CM

## 2023-10-09 DIAGNOSIS — M17.0 BILATERAL PRIMARY OSTEOARTHRITIS OF KNEE: Primary | ICD-10-CM

## 2023-10-09 DIAGNOSIS — M25.561 RIGHT KNEE PAIN, UNSPECIFIED CHRONICITY: ICD-10-CM

## 2023-10-09 RX ORDER — TRIAMCINOLONE ACETONIDE 40 MG/ML
40 INJECTION, SUSPENSION INTRA-ARTICULAR; INTRAMUSCULAR ONCE
Status: COMPLETED | OUTPATIENT
Start: 2023-10-09 | End: 2023-10-09

## 2023-10-09 RX ADMIN — TRIAMCINOLONE ACETONIDE 40 MG: 40 INJECTION, SUSPENSION INTRA-ARTICULAR; INTRAMUSCULAR at 14:56

## 2023-10-09 NOTE — PROGRESS NOTES
Name: Jose M Humphries  YOB: 1950  Gender: female  MRN: [id number]    CC:   Chief Complaint   Patient presents with    Joint Pain     Right knee pain will xray today          DIAGNOSIS:   Encounter Diagnoses   Name Primary? Right knee pain, unspecified chronicity Yes    Arthritis of right knee         HPI:   The pain has been present for months and is becoming worse. She fell a few weeks apart injuring the left knee and then 2 weeks later the right knee  It hurts at night when sleeping. The pain is located over the knee. It does hurt to walk and gets worse with increased distances today she is fairly wheelchair dependent. The pain does not radiate down the leg. Numbness and tingling are not noted. Treatment so far has been Tylenol and occasional rub therapies. Current Outpatient Medications:     acyclovir (ZOVIRAX) 200 MG capsule, Take 200 mg by mouth every 12 hours, Disp: , Rfl:     albuterol sulfate  (90 Base) MCG/ACT inhaler, Inhale 2 puffs into the lungs every 4 hours as needed, Disp: , Rfl:     albuterol (PROVENTIL) (2.5 MG/3ML) 0.083% nebulizer solution, Inhale 2.5 mg into the lungs 4 times daily, Disp: , Rfl:     ALPRAZolam (XANAX) 1 MG tablet, Take 1 mg by mouth daily as needed. , Disp: , Rfl:     apixaban (ELIQUIS) 5 MG TABS tablet, Take 5 mg by mouth 2 times daily, Disp: , Rfl:     aspirin 81 MG chewable tablet, Take 81 mg by mouth daily, Disp: , Rfl:     atorvastatin (LIPITOR) 10 MG tablet, Take 20 mg by mouth, Disp: , Rfl:     carvedilol (COREG) 12.5 MG tablet, Take 12.5 mg by mouth 2 times daily, Disp: , Rfl:     Cetirizine HCl 10 MG CAPS, Take by mouth, Disp: , Rfl:     famotidine (PEPCID) 20 MG tablet, Take 20 mg by mouth, Disp: , Rfl:     Fluticasone furoate-vilanterol (BREO ELLIPTA) 200-25 MCG/INH AEPB inhaler, Inhale 1 puff into the lungs daily, Disp: , Rfl:     guaiFENesin (MUCINEX) 600 MG extended release tablet, Take 600 mg by mouth 2 times daily, Disp: , Rfl:

## 2023-11-09 ENCOUNTER — OFFICE VISIT (OUTPATIENT)
Dept: ORTHOPEDIC SURGERY | Age: 73
End: 2023-11-09
Payer: MEDICARE

## 2023-11-09 DIAGNOSIS — M17.11 OSTEOARTHRITIS OF RIGHT KNEE, UNSPECIFIED OSTEOARTHRITIS TYPE: Primary | ICD-10-CM

## 2023-11-09 DIAGNOSIS — M17.12 OSTEOARTHRITIS OF LEFT KNEE, UNSPECIFIED OSTEOARTHRITIS TYPE: ICD-10-CM

## 2023-11-09 PROCEDURE — 99214 OFFICE O/P EST MOD 30 MIN: CPT | Performed by: PHYSICIAN ASSISTANT

## 2023-11-09 PROCEDURE — 20611 DRAIN/INJ JOINT/BURSA W/US: CPT | Performed by: PHYSICIAN ASSISTANT

## 2023-11-09 PROCEDURE — G8428 CUR MEDS NOT DOCUMENT: HCPCS | Performed by: PHYSICIAN ASSISTANT

## 2023-11-09 PROCEDURE — 1123F ACP DISCUSS/DSCN MKR DOCD: CPT | Performed by: PHYSICIAN ASSISTANT

## 2023-11-09 PROCEDURE — 4004F PT TOBACCO SCREEN RCVD TLK: CPT | Performed by: PHYSICIAN ASSISTANT

## 2023-11-09 PROCEDURE — G8484 FLU IMMUNIZE NO ADMIN: HCPCS | Performed by: PHYSICIAN ASSISTANT

## 2023-11-09 PROCEDURE — 3017F COLORECTAL CA SCREEN DOC REV: CPT | Performed by: PHYSICIAN ASSISTANT

## 2023-11-09 PROCEDURE — 1090F PRES/ABSN URINE INCON ASSESS: CPT | Performed by: PHYSICIAN ASSISTANT

## 2023-11-09 PROCEDURE — G8421 BMI NOT CALCULATED: HCPCS | Performed by: PHYSICIAN ASSISTANT

## 2023-11-09 PROCEDURE — G8400 PT W/DXA NO RESULTS DOC: HCPCS | Performed by: PHYSICIAN ASSISTANT

## 2023-11-09 RX ORDER — HYALURONATE SODIUM 10 MG/ML
20 SYRINGE (ML) INTRAARTICULAR ONCE
Status: COMPLETED | OUTPATIENT
Start: 2023-11-09 | End: 2023-11-09

## 2023-11-09 RX ADMIN — Medication 20 MG: at 14:11

## 2023-11-09 NOTE — PROGRESS NOTES
lower extremities are as described below. Circulation is normal with palpable pedal pulses bilaterally and no edema. There is no lymph adenopathy in the popliteal or malleolar region. The skin is without stasis disease distally bilaterally. Hip ROM is smooth and painless. Knee range of motion is  degrees on the right and  degrees on the left. bilateral knee: There is 2mm of anterior/posterior translation and 2mm of medial/lateral instability bilaterally. There is 2 degrees of varus alignment in the bilateral knee. There is some pain to palpation over the medial joint line. Limb lengths are equal.  The gait is noted to be with moderate trendelenburg and antalgia. She presents in wheelchair today. Straight leg test is negative. Quadriceps strength is good. Sensation is intact to light touch bilaterally. Their judgment and insight are normal.  They are oriented to time, place and person. Their memory is good and the mood and affect appropriate. X-RAY: Views of the left knee are reviewed. 4 views standing reveal joint space loss, eburnated bone, and osteophyte formation present. X-ray impression:  Advanced degenerative joint disease of left knee    IMPRESSION:    Diagnosis Orders   1. Osteoarthritis of right knee, unspecified osteoarthritis type        2. Osteoarthritis of left knee, unspecified osteoarthritis type        . RECOMMENDATIONS:    Reviewed x-ray findings with the patient. The concerns with functional limitations are increasing and response is variable with conservative measures. Surgery was discussed today with the patient. They are not limited to warrant any type of surgical consideration. We will additionally try injection therapies as we process management of this progressive and chronic condition. TKA - Today we also discussed knee replacement surgery, and implant selection which will be a robotic assisted total knee utilizing a Willard implant.   They are

## 2023-11-16 ENCOUNTER — OFFICE VISIT (OUTPATIENT)
Dept: ORTHOPEDIC SURGERY | Age: 73
End: 2023-11-16
Payer: MEDICARE

## 2023-11-16 DIAGNOSIS — M17.11 OSTEOARTHRITIS OF RIGHT KNEE, UNSPECIFIED OSTEOARTHRITIS TYPE: Primary | ICD-10-CM

## 2023-11-16 DIAGNOSIS — M17.12 OSTEOARTHRITIS OF LEFT KNEE, UNSPECIFIED OSTEOARTHRITIS TYPE: ICD-10-CM

## 2023-11-16 PROCEDURE — 20611 DRAIN/INJ JOINT/BURSA W/US: CPT | Performed by: PHYSICIAN ASSISTANT

## 2023-11-16 PROCEDURE — 99999 PR OFFICE/OUTPT VISIT,PROCEDURE ONLY: CPT | Performed by: PHYSICIAN ASSISTANT

## 2023-11-16 RX ORDER — HYALURONATE SODIUM 10 MG/ML
20 SYRINGE (ML) INTRAARTICULAR ONCE
Status: COMPLETED | OUTPATIENT
Start: 2023-11-16 | End: 2023-11-16

## 2023-11-16 RX ADMIN — Medication 20 MG: at 13:37

## 2023-11-16 NOTE — PROGRESS NOTES
Name: Jeaneth Townsend  YOB: 1950  Gender: female  MRN: [id number]        CC: Bilateral Knee Pain     PROCEDURE:  2  of 3 bilateral knee HP    Diagnosis:   Encounter Diagnoses   Name Primary? Osteoarthritis of right knee, unspecified osteoarthritis type Yes    Osteoarthritis of left knee, unspecified osteoarthritis type           [facility] US unit with variable frequency (6.0-15.0 MHz) linear transducer was used to visualize the intracondylar notch, retropatellar fat pad, patella tendon, patella, tibia, and to ensure proper intra-articular needle placement. Injection image was saved in patient's permanent chart. Procedure Note: The patient was placed in upright position with both knees hanging freely from exam table. The knees were prepped in sterile fashion using alcohol wipe. Using the [employer] ultrasound guidance, a 22 gauge needle was then introduced into both knee joints from an infrapatellar approach and 2 mL of Euflexxa was injected freely into each knee. The needle was then removed, pressure hemostasis achieved, injection sites were cleansed with alcohol wipe, and dressed with band aid. The patient tolerated the procedure without complication. The patient will follow up as scheduled.

## 2023-11-30 ENCOUNTER — OFFICE VISIT (OUTPATIENT)
Dept: ORTHOPEDIC SURGERY | Age: 73
End: 2023-11-30
Payer: MEDICARE

## 2023-11-30 DIAGNOSIS — M17.11 OSTEOARTHRITIS OF RIGHT KNEE, UNSPECIFIED OSTEOARTHRITIS TYPE: Primary | ICD-10-CM

## 2023-11-30 DIAGNOSIS — M17.12 OSTEOARTHRITIS OF LEFT KNEE, UNSPECIFIED OSTEOARTHRITIS TYPE: ICD-10-CM

## 2023-11-30 PROCEDURE — 20611 DRAIN/INJ JOINT/BURSA W/US: CPT | Performed by: PHYSICIAN ASSISTANT

## 2023-11-30 PROCEDURE — 99999 PR OFFICE/OUTPT VISIT,PROCEDURE ONLY: CPT | Performed by: PHYSICIAN ASSISTANT

## 2023-11-30 RX ORDER — HYALURONATE SODIUM 10 MG/ML
20 SYRINGE (ML) INTRAARTICULAR ONCE
Status: COMPLETED | OUTPATIENT
Start: 2023-11-30 | End: 2023-11-30

## 2023-11-30 RX ADMIN — Medication 20 MG: at 13:45

## 2023-11-30 NOTE — PROGRESS NOTES
Name: Gala Ngo  YOB: 1950  Gender: female  MRN: [id number]        CC: Bilateral Knee Pain     PROCEDURE:  3  of 3 bilateral knee HP    Diagnosis:   Encounter Diagnoses   Name Primary? Osteoarthritis of right knee, unspecified osteoarthritis type Yes    Osteoarthritis of left knee, unspecified osteoarthritis type           [facility] with variable frequency (6.0-15.0 MHz) linear transducer was used to visualize the intracondylar notch, retropatellar fat pad, patella tendon, patella, tibia, and to ensure proper intra-articular needle placement. Injection image was saved in patient's permanent chart. Procedure Note: The patient was placed in upright position with both knees hanging freely from exam table. The knees were prepped in sterile fashion using alcohol wipe. Using the [employer] ultrasound guidance, a 22 gauge needle was then introduced into both knee joints from an infrapatellar approach and 2 mL of Euflexxa was injected freely into each knee. The needle was then removed, pressure hemostasis achieved, injection sites were cleansed with alcohol wipe, and dressed with band aid. The patient tolerated the procedure without complication. The patient reports mild relief of her bilateral knee pain thus far and plans to return in 8 weeks for bilateral knee cortisone injections, if needed. She understands that end treatment would be TKA but does not feel limited enough to warrant surgery at this time.

## 2024-01-25 NOTE — ED TRIAGE NOTES
Pt reports difficulty speaking for about a week. Pt reports today she noticed left foot numbness since this morning.       Alaina Villa RN
bloody stools

## 2024-05-02 ENCOUNTER — OFFICE VISIT (OUTPATIENT)
Dept: PULMONOLOGY | Age: 74
End: 2024-05-02
Payer: MEDICARE

## 2024-05-02 VITALS
WEIGHT: 236 LBS | RESPIRATION RATE: 17 BRPM | HEART RATE: 85 BPM | OXYGEN SATURATION: 95 % | BODY MASS INDEX: 40.29 KG/M2 | HEIGHT: 64 IN | SYSTOLIC BLOOD PRESSURE: 130 MMHG | DIASTOLIC BLOOD PRESSURE: 90 MMHG

## 2024-05-02 DIAGNOSIS — D83.9 CVID (COMMON VARIABLE IMMUNODEFICIENCY) (HCC): ICD-10-CM

## 2024-05-02 DIAGNOSIS — E66.01 MORBID OBESITY (HCC): ICD-10-CM

## 2024-05-02 DIAGNOSIS — J45.30 MILD PERSISTENT ASTHMA WITHOUT COMPLICATION: Primary | ICD-10-CM

## 2024-05-02 PROCEDURE — 1090F PRES/ABSN URINE INCON ASSESS: CPT | Performed by: INTERNAL MEDICINE

## 2024-05-02 PROCEDURE — G8427 DOCREV CUR MEDS BY ELIG CLIN: HCPCS | Performed by: INTERNAL MEDICINE

## 2024-05-02 PROCEDURE — 3080F DIAST BP >= 90 MM HG: CPT | Performed by: INTERNAL MEDICINE

## 2024-05-02 PROCEDURE — 3075F SYST BP GE 130 - 139MM HG: CPT | Performed by: INTERNAL MEDICINE

## 2024-05-02 PROCEDURE — G8400 PT W/DXA NO RESULTS DOC: HCPCS | Performed by: INTERNAL MEDICINE

## 2024-05-02 PROCEDURE — 99204 OFFICE O/P NEW MOD 45 MIN: CPT | Performed by: INTERNAL MEDICINE

## 2024-05-02 PROCEDURE — G8417 CALC BMI ABV UP PARAM F/U: HCPCS | Performed by: INTERNAL MEDICINE

## 2024-05-02 PROCEDURE — 1123F ACP DISCUSS/DSCN MKR DOCD: CPT | Performed by: INTERNAL MEDICINE

## 2024-05-02 PROCEDURE — 3017F COLORECTAL CA SCREEN DOC REV: CPT | Performed by: INTERNAL MEDICINE

## 2024-05-02 PROCEDURE — 1036F TOBACCO NON-USER: CPT | Performed by: INTERNAL MEDICINE

## 2024-05-02 RX ORDER — FUROSEMIDE 40 MG/1
20 TABLET ORAL DAILY
COMMUNITY
Start: 2024-02-26

## 2024-05-02 RX ORDER — SERTRALINE HYDROCHLORIDE 100 MG/1
200 TABLET, FILM COATED ORAL DAILY
COMMUNITY

## 2024-05-02 RX ORDER — TRAMADOL HYDROCHLORIDE 50 MG/1
50 TABLET ORAL EVERY 8 HOURS PRN
COMMUNITY
Start: 2024-04-03

## 2024-05-02 RX ORDER — AMLODIPINE BESYLATE 5 MG/1
5 TABLET ORAL EVERY MORNING
COMMUNITY
Start: 2024-02-22

## 2024-05-02 RX ORDER — UBROGEPANT 100 MG/1
TABLET ORAL
COMMUNITY

## 2024-05-02 RX ORDER — ERENUMAB-AOOE 70 MG/ML
INJECTION SUBCUTANEOUS
COMMUNITY

## 2024-05-02 RX ORDER — PREGABALIN 200 MG/1
200 CAPSULE ORAL 2 TIMES DAILY
COMMUNITY

## 2024-05-02 RX ORDER — MEMANTINE HYDROCHLORIDE 10 MG/1
10 TABLET ORAL 2 TIMES DAILY
COMMUNITY

## 2024-05-02 NOTE — PROGRESS NOTES
pneumoperitoneum.    Bladder: No evidence of focal bladder wall thickening.    Reproductive: Unremarkable.    Soft tissues - MSK: No evidence of an aggressive osseous lesion. Bilateral thyroid nodules measure up to 25 mm on the left.    Impression  1. No evidence of acute abnormality of the chest, abdomen or pelvis on contrast enhanced CT exam.  2. Thyroid nodule(s) as above for which further characterization with thyroid ultrasound is recommended. This may be performed on an outpatient basis.  3. Nonobstructive nephrolithiasis on the right.    Signed by: 2/15/2024 11:34 PM: Yamilka OTERO, Jam    Nuclear Medicine: No results found for this or any previous visit from the past 3650 days.    PFTs:        No data to display                   Date:     05/23/2019 03/19/2020     FVC     2.1-74  2.48 - 89 %     FEV1     1.8-84  1.72 - 80 %     FEV1/FVC     86  0.70 %     FEF 25-75%     2.4-123       Bronchodilator Response     na       TLC     na       RV     na           DLCO     na       No results found for this or any previous visit. No results found for this or any previous visit.    FeNO: No results found for this or any previous visit.  FeNO and Likelihood of Eosinophilic Asthma   Unlikely Intermediate Likely   <25 ppb 25-50 ppb >50ppb     Exercise Oximetry:    Echo: No results found for this or any previous visit from the past 3650 days.    [facility] Reference Info:                                                                                                                Immunization History   Administered Date(s) Administered    COVID-19, PFIZER PURPLE top, DILUTE for use, (age 12 y+), 30mcg/0.3mL 03/27/2021, 04/17/2021    Influenza Virus Vaccine 09/21/2012, 10/01/2014, 08/01/2016, 09/01/2017, 09/01/2018, 08/01/2019    PPD Test 01/11/2013, 12/27/2016, 04/11/2017, 08/22/2018    Pneumococcal Vaccine 09/16/2010    Pneumococcal, PCV-13, PREVNAR 13, (age 6w+), IM, 0.5mL 11/16/2017    Pneumococcal, PPSV23, PNEUMOVAX 23,

## 2024-10-30 ENCOUNTER — TELEPHONE (OUTPATIENT)
Dept: PULMONOLOGY | Age: 74
End: 2024-10-30

## 2025-02-21 ENCOUNTER — TELEPHONE (OUTPATIENT)
Dept: ORTHOPEDIC SURGERY | Age: 75
End: 2025-02-21

## 2025-02-24 ENCOUNTER — TELEPHONE (OUTPATIENT)
Dept: ORTHOPEDIC SURGERY | Age: 75
End: 2025-02-24

## 2025-02-25 NOTE — TELEPHONE ENCOUNTER
Left detailed vm letting patient know since she hasn't been in to see CHK since 2023 we need to make her an appt to come in and get new xrays and talk about surgery options.   **Appts-we can see her tomorrow 2/26 at [facility] in the AM at 9:10 or 10:50 if she calls back. If that does not work then make her an appt based on her availability in our next available

## 2025-03-05 ENCOUNTER — TELEPHONE (OUTPATIENT)
Dept: ORTHOPEDIC SURGERY | Age: 75
End: 2025-03-05

## 2025-03-06 NOTE — TELEPHONE ENCOUNTER
Called patient back and it goes straight vm every time. I left patient a detailed message letting her know she hasn't been in since 2023 so she needs to make an appt to see K for new x-rays and discuss surgery. I had also tried calling her last week and left a vm then as well.  **APPTS- when she calls back please add her onto Dr. Carson schedule next week to come in

## 2025-03-14 ENCOUNTER — TELEPHONE (OUTPATIENT)
Dept: ORTHOPEDIC SURGERY | Age: 75
End: 2025-03-14

## 2025-03-14 NOTE — TELEPHONE ENCOUNTER
Can  you  see this   pt  sooner  a t  [facility] in the  afternoon before  4/14?    Either  AUGUSTINE or  GEOFFREY

## 2025-03-20 ENCOUNTER — OFFICE VISIT (OUTPATIENT)
Dept: ORTHOPEDIC SURGERY | Age: 75
End: 2025-03-20
Payer: COMMERCIAL

## 2025-03-20 DIAGNOSIS — M17.11 OSTEOARTHRITIS OF RIGHT KNEE, UNSPECIFIED OSTEOARTHRITIS TYPE: Primary | ICD-10-CM

## 2025-03-20 PROCEDURE — G8428 CUR MEDS NOT DOCUMENT: HCPCS | Performed by: PHYSICIAN ASSISTANT

## 2025-03-20 PROCEDURE — G8400 PT W/DXA NO RESULTS DOC: HCPCS | Performed by: PHYSICIAN ASSISTANT

## 2025-03-20 PROCEDURE — 1036F TOBACCO NON-USER: CPT | Performed by: PHYSICIAN ASSISTANT

## 2025-03-20 PROCEDURE — 20611 DRAIN/INJ JOINT/BURSA W/US: CPT | Performed by: PHYSICIAN ASSISTANT

## 2025-03-20 PROCEDURE — 99214 OFFICE O/P EST MOD 30 MIN: CPT | Performed by: PHYSICIAN ASSISTANT

## 2025-03-20 PROCEDURE — 1123F ACP DISCUSS/DSCN MKR DOCD: CPT | Performed by: PHYSICIAN ASSISTANT

## 2025-03-20 PROCEDURE — G8417 CALC BMI ABV UP PARAM F/U: HCPCS | Performed by: PHYSICIAN ASSISTANT

## 2025-03-20 PROCEDURE — 1090F PRES/ABSN URINE INCON ASSESS: CPT | Performed by: PHYSICIAN ASSISTANT

## 2025-03-20 PROCEDURE — 3017F COLORECTAL CA SCREEN DOC REV: CPT | Performed by: PHYSICIAN ASSISTANT

## 2025-03-20 RX ORDER — TRIAMCINOLONE ACETONIDE 40 MG/ML
40 INJECTION, SUSPENSION INTRA-ARTICULAR; INTRAMUSCULAR ONCE
Status: COMPLETED | OUTPATIENT
Start: 2025-03-20 | End: 2025-03-20

## 2025-03-20 RX ADMIN — TRIAMCINOLONE ACETONIDE 40 MG: 40 INJECTION, SUSPENSION INTRA-ARTICULAR; INTRAMUSCULAR at 14:01

## 2025-03-20 NOTE — PROGRESS NOTES
apixaban (ELIQUIS) 5 MG TABS tablet, Take 1 tablet by mouth 2 times daily, Disp: , Rfl:     aspirin 81 MG chewable tablet, Take 1 tablet by mouth daily, Disp: , Rfl:     atorvastatin (LIPITOR) 10 MG tablet, Take 2 tablets by mouth, Disp: , Rfl:     carvedilol (COREG) 12.5 MG tablet, Take 1 tablet by mouth 2 times daily, Disp: , Rfl:     Cetirizine HCl 10 MG CAPS, Take by mouth, Disp: , Rfl:     famotidine (PEPCID) 20 MG tablet, Take 1 tablet by mouth, Disp: , Rfl:     Fluticasone furoate-vilanterol (BREO ELLIPTA) 200-25 MCG/INH AEPB inhaler, Inhale 1 puff into the lungs daily, Disp: , Rfl:     guaiFENesin (MUCINEX) 600 MG extended release tablet, Take 1 tablet by mouth 2 times daily, Disp: , Rfl:     HYDROcodone-acetaminophen (NORCO)  MG per tablet, Take 1 tablet by mouth every 6 hours as needed., Disp: , Rfl:     Icosapent Ethyl (VASCEPA) 1 g CAPS capsule, Take 2 capsules by mouth 2 times daily (with meals), Disp: , Rfl:     ipratropium-albuterol (DUONEB) 0.5-2.5 (3) MG/3ML SOLN nebulizer solution, Inhale 3 mLs into the lungs every 4 hours as needed, Disp: , Rfl:     levothyroxine (SYNTHROID) 125 MCG tablet, Take 112 mcg by mouth every evening, Disp: , Rfl:     lisinopril (PRINIVIL;ZESTRIL) 10 MG tablet, Take 2 tablets by mouth 2 times daily, Disp: , Rfl:     Magnesium 500 MG CAPS, Take 500 mg by mouth, Disp: , Rfl:     meclizine (ANTIVERT) 25 MG tablet, Take 1 tablet by mouth 3 times daily as needed, Disp: , Rfl:     montelukast (SINGULAIR) 10 MG tablet, Take 1 tablet by mouth, Disp: , Rfl:     naloxegol (MOVANTIK) 25 MG TABS tablet, Take 1 tablet by mouth every morning (before breakfast), Disp: , Rfl:     nystatin (MYCOSTATIN) 525404 UNIT/GM powder, Apply topically 2 times daily, Disp: , Rfl:     ondansetron (ZOFRAN-ODT) 8 MG TBDP disintegrating tablet, Take 1 tablet by mouth every 8 hours as needed, Disp: , Rfl:     oxybutynin (DITROPAN-XL) 10 MG extended release tablet, Take 1 tablet by mouth daily, Disp:

## 2025-06-05 DIAGNOSIS — M17.11 PRIMARY OSTEOARTHRITIS OF RIGHT KNEE: ICD-10-CM

## 2025-06-05 DIAGNOSIS — M17.11 OSTEOARTHRITIS OF RIGHT KNEE, UNSPECIFIED OSTEOARTHRITIS TYPE: Primary | ICD-10-CM

## 2025-06-18 ENCOUNTER — TELEPHONE (OUTPATIENT)
Dept: PULMONOLOGY | Age: 75
End: 2025-06-18

## 2025-06-18 DIAGNOSIS — Z01.811 PRE-OP CHEST EXAM: Primary | ICD-10-CM

## 2025-07-02 ENCOUNTER — TELEPHONE (OUTPATIENT)
Dept: ORTHOPEDIC SURGERY | Age: 75
End: 2025-07-02

## 2025-07-10 ENCOUNTER — TELEPHONE (OUTPATIENT)
Dept: ORTHOPEDIC SURGERY | Age: 75
End: 2025-07-10

## 2025-07-10 NOTE — TELEPHONE ENCOUNTER
Patient needs to cancel surgery at this time due to her daughter having surgery and she will not have any help. Would like to reschedule

## 2025-07-10 NOTE — TELEPHONE ENCOUNTER
Spoke with pt she needs to move surgery date to Nov 11th due to her daughter having surgery and she will be her caretaker.   Pt aware will move and send a new letter

## 2025-07-10 NOTE — TELEPHONE ENCOUNTER
Received a call from Jessica at [facility] who said the patient wants to discuss canceling/rescheduling surgery please.

## (undated) DEVICE — CONNECTOR TBNG OD5-7MM O2 END DISP

## (undated) DEVICE — BLOCK BITE AD 60FR W/ VELC STRP ADDRESSES MOST PT AND

## (undated) DEVICE — CANNULA NSL ORAL AD FOR CAPNOFLEX CO2 O2 AIRLFE

## (undated) DEVICE — NDL PRT INJ NSAF BLNT 18GX1.5 --

## (undated) DEVICE — CONTAINER PREFIL FRMLN 40ML --

## (undated) DEVICE — KENDALL RADIOLUCENT FOAM MONITORING ELECTRODE RECTANGULAR SHAPE: Brand: KENDALL

## (undated) DEVICE — SYR 3ML LL TIP 1/10ML GRAD --

## (undated) DEVICE — FORCEPS BX L240CM JAW DIA2.8MM L CAP W/ NDL MIC MESH TOOTH

## (undated) DEVICE — SYR 5ML 1/5 GRAD LL NSAF LF --